# Patient Record
Sex: MALE | Race: WHITE | NOT HISPANIC OR LATINO
[De-identification: names, ages, dates, MRNs, and addresses within clinical notes are randomized per-mention and may not be internally consistent; named-entity substitution may affect disease eponyms.]

---

## 2018-08-31 PROBLEM — Z00.00 ENCOUNTER FOR PREVENTIVE HEALTH EXAMINATION: Status: ACTIVE | Noted: 2018-08-31

## 2018-09-03 ENCOUNTER — MOBILE ON CALL (OUTPATIENT)
Age: 83
End: 2018-09-03

## 2018-11-13 ENCOUNTER — APPOINTMENT (OUTPATIENT)
Dept: GASTROENTEROLOGY | Facility: CLINIC | Age: 83
End: 2018-11-13
Payer: MEDICARE

## 2018-11-13 VITALS
BODY MASS INDEX: 23.39 KG/M2 | SYSTOLIC BLOOD PRESSURE: 123 MMHG | HEIGHT: 67 IN | RESPIRATION RATE: 17 BRPM | WEIGHT: 149 LBS | OXYGEN SATURATION: 98 % | HEART RATE: 76 BPM | DIASTOLIC BLOOD PRESSURE: 70 MMHG

## 2018-11-13 DIAGNOSIS — K86.2 CYST OF PANCREAS: ICD-10-CM

## 2018-11-13 DIAGNOSIS — D50.9 IRON DEFICIENCY ANEMIA, UNSPECIFIED: ICD-10-CM

## 2018-11-13 PROCEDURE — 82272 OCCULT BLD FECES 1-3 TESTS: CPT

## 2018-11-13 PROCEDURE — 99204 OFFICE O/P NEW MOD 45 MIN: CPT

## 2018-11-13 RX ORDER — INSULIN GLARGINE 100 [IU]/ML
100 INJECTION, SOLUTION SUBCUTANEOUS
Refills: 0 | Status: DISCONTINUED | COMMUNITY

## 2019-04-22 ENCOUNTER — APPOINTMENT (OUTPATIENT)
Dept: DERMATOLOGY | Facility: CLINIC | Age: 84
End: 2019-04-22
Payer: MEDICARE

## 2019-04-22 PROCEDURE — 99203 OFFICE O/P NEW LOW 30 MIN: CPT | Mod: 25

## 2019-04-22 PROCEDURE — 17000 DESTRUCT PREMALG LESION: CPT

## 2019-04-22 PROCEDURE — 17003 DESTRUCT PREMALG LES 2-14: CPT

## 2019-10-17 ENCOUNTER — INPATIENT (INPATIENT)
Facility: HOSPITAL | Age: 84
LOS: 13 days | Discharge: NURSING, REHAB OR LONG-TERM CARE | DRG: 291 | End: 2019-10-31
Attending: HOSPITALIST | Admitting: HOSPITALIST
Payer: MEDICARE

## 2019-10-17 VITALS
HEART RATE: 88 BPM | SYSTOLIC BLOOD PRESSURE: 178 MMHG | OXYGEN SATURATION: 88 % | DIASTOLIC BLOOD PRESSURE: 74 MMHG | RESPIRATION RATE: 24 BRPM

## 2019-10-17 DIAGNOSIS — R55 SYNCOPE AND COLLAPSE: ICD-10-CM

## 2019-10-17 LAB
ALBUMIN SERPL ELPH-MCNC: 3.4 G/DL — SIGNIFICANT CHANGE UP (ref 3.3–5.2)
ALP SERPL-CCNC: 168 U/L — HIGH (ref 40–120)
ALT FLD-CCNC: 579 U/L — HIGH
ANION GAP SERPL CALC-SCNC: 16 MMOL/L — SIGNIFICANT CHANGE UP (ref 5–17)
AST SERPL-CCNC: 650 U/L — HIGH
BASE EXCESS BLDV CALC-SCNC: -4.5 MMOL/L — LOW (ref -2–2)
BASOPHILS # BLD AUTO: 0.02 K/UL — SIGNIFICANT CHANGE UP (ref 0–0.2)
BASOPHILS NFR BLD AUTO: 0.2 % — SIGNIFICANT CHANGE UP (ref 0–2)
BILIRUB SERPL-MCNC: 0.6 MG/DL — SIGNIFICANT CHANGE UP (ref 0.4–2)
BUN SERPL-MCNC: 70 MG/DL — HIGH (ref 8–20)
CA-I SERPL-SCNC: 1.13 MMOL/L — LOW (ref 1.15–1.33)
CALCIUM SERPL-MCNC: 8.8 MG/DL — SIGNIFICANT CHANGE UP (ref 8.6–10.2)
CHLORIDE BLDV-SCNC: 102 MMOL/L — SIGNIFICANT CHANGE UP (ref 98–107)
CHLORIDE SERPL-SCNC: 99 MMOL/L — SIGNIFICANT CHANGE UP (ref 98–107)
CO2 SERPL-SCNC: 19 MMOL/L — LOW (ref 22–29)
CREAT SERPL-MCNC: 3.25 MG/DL — HIGH (ref 0.5–1.3)
EOSINOPHIL # BLD AUTO: 0.05 K/UL — SIGNIFICANT CHANGE UP (ref 0–0.5)
EOSINOPHIL NFR BLD AUTO: 0.4 % — SIGNIFICANT CHANGE UP (ref 0–6)
GAS PNL BLDV: 137 MMOL/L — SIGNIFICANT CHANGE UP (ref 135–145)
GAS PNL BLDV: SIGNIFICANT CHANGE UP
GAS PNL BLDV: SIGNIFICANT CHANGE UP
GLUCOSE BLDC GLUCOMTR-MCNC: 149 MG/DL — HIGH (ref 70–99)
GLUCOSE BLDV-MCNC: 104 MG/DL — HIGH (ref 70–99)
GLUCOSE SERPL-MCNC: 103 MG/DL — SIGNIFICANT CHANGE UP (ref 70–115)
HCO3 BLDV-SCNC: 20 MMOL/L — LOW (ref 21–29)
HCT VFR BLD CALC: 32.9 % — LOW (ref 39–50)
HCT VFR BLDA CALC: 32 — LOW (ref 39–50)
HGB BLD CALC-MCNC: 10.5 G/DL — LOW (ref 13–17)
HGB BLD-MCNC: 10.1 G/DL — LOW (ref 13–17)
IMM GRANULOCYTES NFR BLD AUTO: 1 % — SIGNIFICANT CHANGE UP (ref 0–1.5)
LACTATE BLDV-MCNC: 1.5 MMOL/L — SIGNIFICANT CHANGE UP (ref 0.5–2)
LYMPHOCYTES # BLD AUTO: 0.87 K/UL — LOW (ref 1–3.3)
LYMPHOCYTES # BLD AUTO: 7.4 % — LOW (ref 13–44)
MCHC RBC-ENTMCNC: 28.5 PG — SIGNIFICANT CHANGE UP (ref 27–34)
MCHC RBC-ENTMCNC: 30.7 GM/DL — LOW (ref 32–36)
MCV RBC AUTO: 92.7 FL — SIGNIFICANT CHANGE UP (ref 80–100)
MONOCYTES # BLD AUTO: 0.87 K/UL — SIGNIFICANT CHANGE UP (ref 0–0.9)
MONOCYTES NFR BLD AUTO: 7.4 % — SIGNIFICANT CHANGE UP (ref 2–14)
NEUTROPHILS # BLD AUTO: 9.75 K/UL — HIGH (ref 1.8–7.4)
NEUTROPHILS NFR BLD AUTO: 83.6 % — HIGH (ref 43–77)
NT-PROBNP SERPL-SCNC: 811 PG/ML — HIGH (ref 0–300)
OTHER CELLS CSF MANUAL: 11 ML/DL — LOW (ref 18–22)
PCO2 BLDV: 58 MMHG — HIGH (ref 35–50)
PH BLDV: 7.22 — LOW (ref 7.32–7.43)
PLATELET # BLD AUTO: 269 K/UL — SIGNIFICANT CHANGE UP (ref 150–400)
PO2 BLDV: 50 MMHG — HIGH (ref 25–45)
POTASSIUM BLDV-SCNC: 4.7 MMOL/L — HIGH (ref 3.4–4.5)
POTASSIUM SERPL-MCNC: 4.6 MMOL/L — SIGNIFICANT CHANGE UP (ref 3.5–5.3)
POTASSIUM SERPL-SCNC: 4.6 MMOL/L — SIGNIFICANT CHANGE UP (ref 3.5–5.3)
PROT SERPL-MCNC: 7.4 G/DL — SIGNIFICANT CHANGE UP (ref 6.6–8.7)
RBC # BLD: 3.55 M/UL — LOW (ref 4.2–5.8)
RBC # FLD: 15 % — HIGH (ref 10.3–14.5)
SAO2 % BLDV: 76 % — SIGNIFICANT CHANGE UP
SODIUM SERPL-SCNC: 134 MMOL/L — LOW (ref 135–145)
TROPONIN T SERPL-MCNC: 0.03 NG/ML — SIGNIFICANT CHANGE UP (ref 0–0.06)
WBC # BLD: 11.68 K/UL — HIGH (ref 3.8–10.5)
WBC # FLD AUTO: 11.68 K/UL — HIGH (ref 3.8–10.5)

## 2019-10-17 PROCEDURE — 99223 1ST HOSP IP/OBS HIGH 75: CPT

## 2019-10-17 PROCEDURE — 70450 CT HEAD/BRAIN W/O DYE: CPT | Mod: 26

## 2019-10-17 PROCEDURE — 71250 CT THORAX DX C-: CPT | Mod: 26

## 2019-10-17 PROCEDURE — 93010 ELECTROCARDIOGRAM REPORT: CPT

## 2019-10-17 PROCEDURE — 99291 CRITICAL CARE FIRST HOUR: CPT

## 2019-10-17 PROCEDURE — 71045 X-RAY EXAM CHEST 1 VIEW: CPT | Mod: 26

## 2019-10-17 PROCEDURE — 74176 CT ABD & PELVIS W/O CONTRAST: CPT | Mod: 26

## 2019-10-17 RX ORDER — GLUCAGON INJECTION, SOLUTION 0.5 MG/.1ML
1 INJECTION, SOLUTION SUBCUTANEOUS ONCE
Refills: 0 | Status: DISCONTINUED | OUTPATIENT
Start: 2019-10-17 | End: 2019-10-31

## 2019-10-17 RX ORDER — ASPIRIN/CALCIUM CARB/MAGNESIUM 324 MG
81 TABLET ORAL DAILY
Refills: 0 | Status: DISCONTINUED | OUTPATIENT
Start: 2019-10-17 | End: 2019-10-28

## 2019-10-17 RX ORDER — DEXTROSE 50 % IN WATER 50 %
25 SYRINGE (ML) INTRAVENOUS ONCE
Refills: 0 | Status: DISCONTINUED | OUTPATIENT
Start: 2019-10-17 | End: 2019-10-19

## 2019-10-17 RX ORDER — ACETAMINOPHEN 500 MG
650 TABLET ORAL EVERY 6 HOURS
Refills: 0 | Status: DISCONTINUED | OUTPATIENT
Start: 2019-10-17 | End: 2019-10-31

## 2019-10-17 RX ORDER — FUROSEMIDE 40 MG
40 TABLET ORAL
Refills: 0 | Status: COMPLETED | OUTPATIENT
Start: 2019-10-17 | End: 2019-10-18

## 2019-10-17 RX ORDER — SODIUM CHLORIDE 9 MG/ML
1000 INJECTION, SOLUTION INTRAVENOUS
Refills: 0 | Status: DISCONTINUED | OUTPATIENT
Start: 2019-10-17 | End: 2019-10-19

## 2019-10-17 RX ORDER — ROSUVASTATIN CALCIUM 5 MG/1
1 TABLET ORAL
Qty: 0 | Refills: 0 | DISCHARGE

## 2019-10-17 RX ORDER — DONEPEZIL HYDROCHLORIDE 10 MG/1
5 TABLET, FILM COATED ORAL AT BEDTIME
Refills: 0 | Status: DISCONTINUED | OUTPATIENT
Start: 2019-10-17 | End: 2019-10-18

## 2019-10-17 RX ORDER — LANOLIN ALCOHOL/MO/W.PET/CERES
5 CREAM (GRAM) TOPICAL AT BEDTIME
Refills: 0 | Status: DISCONTINUED | OUTPATIENT
Start: 2019-10-17 | End: 2019-10-31

## 2019-10-17 RX ORDER — IPRATROPIUM/ALBUTEROL SULFATE 18-103MCG
3 AEROSOL WITH ADAPTER (GRAM) INHALATION EVERY 6 HOURS
Refills: 0 | Status: DISCONTINUED | OUTPATIENT
Start: 2019-10-17 | End: 2019-10-31

## 2019-10-17 RX ORDER — MEMANTINE HYDROCHLORIDE 10 MG/1
7 TABLET ORAL AT BEDTIME
Refills: 0 | Status: DISCONTINUED | OUTPATIENT
Start: 2019-10-17 | End: 2019-10-18

## 2019-10-17 RX ORDER — DIVALPROEX SODIUM 500 MG/1
500 TABLET, DELAYED RELEASE ORAL DAILY
Refills: 0 | Status: DISCONTINUED | OUTPATIENT
Start: 2019-10-17 | End: 2019-10-18

## 2019-10-17 RX ORDER — MEMANTINE HYDROCHLORIDE AND DONEPEZIL HYDROCHLORIDE 21; 10 MG/1; MG/1
0 CAPSULE ORAL
Qty: 0 | Refills: 0 | DISCHARGE

## 2019-10-17 RX ORDER — ONDANSETRON 8 MG/1
4 TABLET, FILM COATED ORAL ONCE
Refills: 0 | Status: COMPLETED | OUTPATIENT
Start: 2019-10-17 | End: 2019-10-17

## 2019-10-17 RX ORDER — DEXTROSE 50 % IN WATER 50 %
12.5 SYRINGE (ML) INTRAVENOUS ONCE
Refills: 0 | Status: DISCONTINUED | OUTPATIENT
Start: 2019-10-17 | End: 2019-10-19

## 2019-10-17 RX ORDER — INSULIN GLARGINE 100 [IU]/ML
10 INJECTION, SOLUTION SUBCUTANEOUS AT BEDTIME
Refills: 0 | Status: DISCONTINUED | OUTPATIENT
Start: 2019-10-18 | End: 2019-10-19

## 2019-10-17 RX ORDER — ALBUTEROL 90 UG/1
2 AEROSOL, METERED ORAL
Qty: 0 | Refills: 0 | DISCHARGE

## 2019-10-17 RX ORDER — FERROUS SULFATE 325(65) MG
325 TABLET ORAL DAILY
Refills: 0 | Status: DISCONTINUED | OUTPATIENT
Start: 2019-10-17 | End: 2019-10-19

## 2019-10-17 RX ORDER — RISPERIDONE 4 MG/1
0.25 TABLET ORAL DAILY
Refills: 0 | Status: DISCONTINUED | OUTPATIENT
Start: 2019-10-17 | End: 2019-10-19

## 2019-10-17 RX ORDER — INSULIN LISPRO 100/ML
VIAL (ML) SUBCUTANEOUS
Refills: 0 | Status: DISCONTINUED | OUTPATIENT
Start: 2019-10-17 | End: 2019-10-19

## 2019-10-17 RX ORDER — IPRATROPIUM/ALBUTEROL SULFATE 18-103MCG
3 AEROSOL WITH ADAPTER (GRAM) INHALATION ONCE
Refills: 0 | Status: COMPLETED | OUTPATIENT
Start: 2019-10-17 | End: 2019-10-17

## 2019-10-17 RX ORDER — LEVOTHYROXINE SODIUM 125 MCG
1 TABLET ORAL
Qty: 0 | Refills: 0 | DISCHARGE

## 2019-10-17 RX ORDER — INSULIN LISPRO 100/ML
VIAL (ML) SUBCUTANEOUS AT BEDTIME
Refills: 0 | Status: DISCONTINUED | OUTPATIENT
Start: 2019-10-17 | End: 2019-10-19

## 2019-10-17 RX ORDER — HEPARIN SODIUM 5000 [USP'U]/ML
5000 INJECTION INTRAVENOUS; SUBCUTANEOUS EVERY 8 HOURS
Refills: 0 | Status: DISCONTINUED | OUTPATIENT
Start: 2019-10-17 | End: 2019-10-19

## 2019-10-17 RX ORDER — MONTELUKAST 4 MG/1
10 TABLET, CHEWABLE ORAL DAILY
Refills: 0 | Status: DISCONTINUED | OUTPATIENT
Start: 2019-10-17 | End: 2019-10-19

## 2019-10-17 RX ORDER — ONDANSETRON 8 MG/1
4 TABLET, FILM COATED ORAL EVERY 6 HOURS
Refills: 0 | Status: DISCONTINUED | OUTPATIENT
Start: 2019-10-17 | End: 2019-10-31

## 2019-10-17 RX ORDER — DEXTROSE 50 % IN WATER 50 %
15 SYRINGE (ML) INTRAVENOUS ONCE
Refills: 0 | Status: DISCONTINUED | OUTPATIENT
Start: 2019-10-17 | End: 2019-10-31

## 2019-10-17 RX ORDER — PANTOPRAZOLE SODIUM 20 MG/1
40 TABLET, DELAYED RELEASE ORAL
Refills: 0 | Status: DISCONTINUED | OUTPATIENT
Start: 2019-10-17 | End: 2019-10-23

## 2019-10-17 RX ADMIN — ONDANSETRON 4 MILLIGRAM(S): 8 TABLET, FILM COATED ORAL at 14:00

## 2019-10-17 RX ADMIN — Medication 3 MILLILITER(S): at 22:30

## 2019-10-17 RX ADMIN — Medication 3 MILLILITER(S): at 15:46

## 2019-10-17 RX ADMIN — Medication 40 MILLIGRAM(S): at 22:30

## 2019-10-17 NOTE — H&P ADULT - ASSESSMENT
85M hx vascular demetnia, afib (not on ac), CKD, DM, COPD presents with syncope and worsening edema found to have hypoglycemia, nikki on ckd, transaminitis, concern for decompensated heart failure.

## 2019-10-17 NOTE — ED PROVIDER NOTE - CLINICAL SUMMARY MEDICAL DECISION MAKING FREE TEXT BOX
Pt with increasing edema. Now with two syncopal episodes today (in setting of straining and then standing up). Concern for worsening renal failure vs CHF vs liver failure. Plan for ecg, labs, CXR, CTH, CT c/a/p. Admit.

## 2019-10-17 NOTE — H&P ADULT - PROBLEM SELECTOR PLAN 2
pt with anasarca  concern for possible decompensated heart failure  will check UA and protein/creatinine ratio check for nephrotic syndrome  will also check dopplers to r/o DVT  CT with likely edema  will c/w lasix 40 bid  monitor creatinine and electrolytes  cardiology consult in am

## 2019-10-17 NOTE — H&P ADULT - NSHPREVIEWOFSYSTEMS_GEN_ALL_CORE
REVIEW OF SYSTEMS:    CONSTITUTIONAL: No fevers or chills +weakness  EYES/ENT: No visual changes;  No vertigo or throat pain   NECK: No pain or stiffness  RESPIRATORY: No cough, wheezing, hemoptysis; No shortness of breath  CARDIOVASCULAR: No chest pain or palpitations  GASTROINTESTINAL: +epigastric pain +vomiting. .No diarrhea or constipation. No melena or hematochezia.  GENITOURINARY: No dysuria, frequency or hematuria  NEUROLOGICAL: No numbness or weakness  SKIN: No itching, burning, rashes, or lesions   All other review of systems is negative unless indicated above.

## 2019-10-17 NOTE — ED PROVIDER NOTE - CARE PLAN
Principal Discharge DX:	Syncope  Secondary Diagnosis:	Acute on chronic renal failure  Secondary Diagnosis:	Edema  Secondary Diagnosis:	Transaminitis

## 2019-10-17 NOTE — CONSULT NOTE ADULT - SUBJECTIVE AND OBJECTIVE BOX
Patient is a 85y old  Male who presents with a chief complaint of syncope    HPI: 84 yo WM (known to me from office) + CKD(IV) with baseline Screat=2.5mg/dL (due to DM nephropathy) who presents to the ED with several syncopal episodes at home earlier today.  According to family he initially had episode while getting off toilet and then again when EMS was transferring him.  Noted to have hypoglycemia and poor appetite, progressive weakness over last few days.  Pt also has advanced dementia and was found to be more confused as well. The family notes worsening edema, abdominal bloating and approximately 20 lb. weight gain over the past month.      PAST MEDICAL & SURGICAL HISTORY:  CKD (chronic kidney disease)  COPD (chronic obstructive pulmonary disease)  Dementia  Afib  DM (diabetes mellitus)      FAMILY HISTORY:  No renal disease noted    Social History:  No current tobacco; no etoh nor drug abuse    MEDICATIONS  (STANDING):  Outpatient meds reviewed including Lasix  No NSAID abuse; no ACE nor ARB    MEDICATIONS  (PRN):      Allergies    No Known Allergies    Intolerances        REVIEW OF SYSTEMS:    CONSTITUTIONAL: No fever; + weight gain, +fatigue  EYES: No eye pain, visual disturbances, or discharge  ENMT:  No difficulty hearing, tinnitus, vertigo; No sinus or throat pain  NECK: No pain or stiffness  BREASTS: No pain, masses, or nipple discharge  RESPIRATORY: No cough, wheezing, chills or hemoptysis; + shortness of breath with exertion  CARDIOVASCULAR: No chest pain, palpitations, dizziness, + leg swelling  GASTROINTESTINAL: + abdominal swelling, no pain. No nausea, vomiting, or hematemesis; No diarrhea or constipation. No melena or hematochezia.  GENITOURINARY: No dysuria, frequency, hematuria, or incontinence  NEUROLOGICAL: No headaches, + memory loss, loss of strength  SKIN: No itching, burning, rashes, or lesions   MUSCULOSKELETAL: No joint pain or swelling; No muscle, back, or extremity pain  PSYCHIATRIC: + dementia        Vital Signs Last 24 Hrs  T(C): 36.4 (17 Oct 2019 13:45), Max: 36.4 (17 Oct 2019 13:45)  T(F): 97.6 (17 Oct 2019 13:45), Max: 97.6 (17 Oct 2019 13:45)  HR: 80 (17 Oct 2019 15:00) (80 - 88)  BP: 130/70 (17 Oct 2019 15:00) (130/70 - 178/74)  BP(mean): --  RR: 20 (17 Oct 2019 15:00) (18 - 24)  SpO2: 96% (17 Oct 2019 15:00) (88% - 96%)    PHYSICAL EXAM:    GENERAL: Appears acutely ill  HEAD:  Atraumatic, Normocephalic  EYES: EOMI, PERRLA, conjunctiva and sclera clear  ENMT: No tonsillar erythema, exudates, or enlargement; Moist mucous membranes  NECK: Supple, + JVD  NERVOUS SYSTEM:  Alert & Oriented X2,  intact and symmetric; demented  CHEST/LUNG: Diminished BS bilaterally  HEART: Regular rate and rhythm; No rub  ABDOMEN: Soft, + distended +BS; nontender; abd wall and flank edema  EXTREMITIES:  2+ Peripheral Pulses, + pitting LE edema; R > L arm edema  SKIN: No rashes or lesions      LABS:                        10.1   11.68 )-----------( 269      ( 17 Oct 2019 14:14 )             32.9     10-17    134<L>  |  99  |  70.0<H>  ----------------------------<  103  4.6   |  19.0<L>  |  3.25<H>    Ca    8.8      17 Oct 2019 14:14    TPro  7.4  /  Alb  3.4  /  TBili  0.6  /  DBili  x   /  AST  650<H>  /  ALT  579<H>  /  AlkPhos  168<H>  10-17            RADIOLOGY & ADDITIONAL TESTS:  < from: Xray Chest 1 View-PORTABLE IMMEDIATE (10.17.19 @ 15:24) >   EXAM:  XR CHEST PORTABLE IMMED 1V                          PROCEDURE DATE:  10/17/2019          INTERPRETATION:  Clinical indication:  SOB    Technique: XR CHEST IMMEDIATE portable AP    Comparison:None    Findings:  Lines: None    Heart/Mediastinum/Lungs: The heart size is normal. There is bibasilar   atelectasis and small bilateral pleural effusions.    Impression:    As above.    < end of copied text >

## 2019-10-17 NOTE — H&P ADULT - PROBLEM SELECTOR PLAN 4
pt with transaminitis.  non specific enteritis/cholecystiits on CT, but exam benign  will check us abdomen  otherwise may be 2/2 heart failure

## 2019-10-17 NOTE — CONSULT NOTE ADULT - ATTENDING COMMENTS
VTE sq heparin  med rec done w family  echo      Follow up exam  CHF vs PNA  examine abd  and extrrem edema? doppler all? VTE sq heparin, SCD  med rec done w family  echo  venous dopplers upper and lower extrem  RVP  BNP in AM  TSH in AM    Follow up exam  CHF vs PNA; RVP  examine abd  and extrem edema

## 2019-10-17 NOTE — H&P ADULT - PROBLEM SELECTOR PLAN 3
pt with acute worsening in setting of volume overload  posssibly 2/2 chf  will check ua, urine lytes, post void residual  nephrology consulted. follow up recs.

## 2019-10-17 NOTE — ED ADULT NURSE REASSESSMENT NOTE - NS ED NURSE REASSESS COMMENT FT1
Report received from day RN Pt A&Ox4 at this time. Pt resting comfortably, VSS, no signs of distress at this time, CM in place, safety maintained, call bell in reach. Waiting on admit. Report received from day RN Pt Alert and confused to baseline per family at this time. Pt resting comfortably, VSS, no signs of distress at this time, CM in place, safety maintained, call bell in reach. Waiting on admit.

## 2019-10-17 NOTE — ED PROVIDER NOTE - MDM ORDERS SUBMITTED SELECTION
Marielena called about the this pt's case she received and asked if Dr Ritu Vann was going to be using Dr Byron Figueroa block time. I realized I had written the wrong date and on the case request and wanted to make sure I had not done the same for the pt.  Marielena change the pt's shonna EKG/Medications/Imaging Studies/Labs

## 2019-10-17 NOTE — CONSULT NOTE ADULT - SUBJECTIVE AND OBJECTIVE BOX
EMR reviewed.  Family gives hx at bedside      85 yr old male w  vascular dementia, AFIB, CKD, COPD, DM presents w syncope    Family had difficulty getting pt out of bed today;  BG 60 and he was given food.    Pt went to bathroom and syncopized on toilet and then vomitied.  Wife caught him and called EMS  When transferring to EMS wheelchair had syncope again x 30 seconds    +2 syncopal episodes today  +all extremities are swollen "for awhile"   +increasing lower extremity edema over past month   +abdominal bloating x 1 month, worse over last few days  +increasingly confused last few days  +intermittent epigastric/chest pains x months  cannot give details due to dementia  + weight gain 20 lbs    In ED /74 HR 88 RR 24 sat 88%RA  EKG NSR 88   Head CT no acute changes  Labs reveal Cr 3.25 up from 2.2 08-.  Nephrology consulted  Noncontrast CT chest, abd and pelvis (see report)       PAST MEDICAL HX  Afib  Anemia due to CKD III  CAD    CKD (chronic kidney disease) III   COPD (chronic obstructive pulmonary disease)    Dementia    DM (diabetes mellitus)  GERD  GI bleed s/p transfusion  Hearing impaired  HLD hyperlipidemia  Hypothyroid  Lung mass (9-11 related but benign)    PAST SURGICAL HX  R CEA carotid endarterectomy   Cataract extraction w IOL  Lung biopsy 2013  Thoracoscopy w talc pleurodesis      SOCIAL HX  Lives at home w family  ex smoker quit 38 yrs ago  was 4 ppd x 49 yrs    ROS  as above  +20 lb weight gain          T(C): 36.4 (10-17-19 @ 13:45), Max: 36.4 (10-17-19 @ 13:45)  HR: 80 (10-17-19 @ 15:00) (80 - 88)  BP: 130/70 (10-17-19 @ 15:00) (130/70 - 178/74)  RR: 20 (10-17-19 @ 15:00) (18 - 24)  SpO2: 96% (10-17-19 @ 15:00) (88% - 96%)    PHYSICAL EXAM: evaluation precludes physical exam. Pertinent physical exam findings as per video conference with  teleNA at bedside is as follows:    Family at bedside  Pt hearing impaired and yelling "who is she"  "what does she want"    EKG NSR 88 w NS ST-T changes      CARDIAC MARKERS ( 17 Oct 2019 14:14 )  x     / 0.03 ng/mL / x     / x     / x                           10.1   11.68 )-----------( 269      ( 17 Oct 2019 14:14 )             32.9     17 Oct 2019 14:14    134    |  99     |  70.0   ----------------------------<  103    4.6     |  19.0   |  3.25     Ca    8.8        17 Oct 2019 14:14    TPro  7.4    /  Alb  3.4    /  TBili  0.6    /  DBili  x      /  AST  650    /  ALT  579    /  AlkPhos  168    17 Oct 2019 14:14      CAPILLARY BLOOD GLUCOSE      POCT Blood Glucose.: 111 mg/dL (17 Oct 2019 18:27)  POCT Blood Glucose.: 101 mg/dL (17 Oct 2019 13:20)    LIVER FUNCTIONS - ( 17 Oct 2019 14:14 )  Alb: 3.4 g/dL / Pro: 7.4 g/dL / ALK PHOS: 168 U/L / ALT: 579 U/L / AST: 650 U/L / GGT: x                   RADIOLOGY    1)   EXAM:  CT BRAIN                          PROCEDURE DATE:  10/17/2019    INTERPRETATION:  CLINICAL INFORMATION: Altered mental status and syncope.    COMPARISON: None available.    PROCEDURE:   Noncontrast CT of the Head was performed from the skull base to the   vertex. Coronal and Sagittal reformats were obtained.    FINDINGS:    There is no acute intracranial hemorrhage, mass effect, midline shift,   extra-axial collection, hydrocephalus, or evidence of acute vascular   territorial infarction. Mild patchy hypodensities within the   periventricular and subcortical white matter, although nonspecific,   likely reflect chronic microvascular disease. Cerebral volume loss   results in prominence of the ventricles and sulci.    The visualized paranasal sinuses and mastoid air cells are clear. Status   post bilateral cataract surgery. No calvarial fracture.    IMPRESSION:     No acute intracranial hemorrhage, mass effect, or calvarial fracture.      2)   EXAM:  XR CHEST PORTABLE IMMED 1V                          PROCEDURE DATE:  10/17/2019    INTERPRETATION:  Clinical indication:  SOB    Technique: XR CHEST IMMEDIATE portable AP    Comparison:None    Findings:  Lines: None    Heart/Mediastinum/Lungs: The heart size is normal. There is bibasilar   atelectasis and small bilateral pleural effusions.    Impression:    As above.      3)   CT chest/abd/pelvis  FINDINGS: The visualized structures of the lower neck are unremarkable. The   visualized aorta is of normal course and caliber. The heart is not enlarged.   There is atherosclerotic calcification of the thoracic aorta. There is mild   coronary artery calcification. There is a small pericardial effusion     There is no evidence of axillary, hilar, or mediastinal lymphadenopathy.     The lungs demonstrate patchy areas of groundglass opacities and tree-in-bud   nodules in the right upper lobe consistent with infiltrate. Air is probable   small amount of infiltrate in the right lower lobe as well. There are also   areas of masslike consolidation at both lung bases. Rounded contour is seen   to the density at the left lung base and this may represent rounded   atelectasis versus infiltrate or mass. Appearance of the right lower lobe is   suggestive of infiltrate versus atelectasis     The liver is of normal contour. No evidence of focal hepatic lesion on this   nonenhanced examination. The gallbladder is present. No evidence of intra or   extrahepatic biliary dilatation. There is mild stranding around the   gallbladder and mild stranding around the duodenum     The pancreas, spleen, adrenal glands, and kidneys are grossly unremarkable.   There is no evidence of hydronephrosis or perinephric stranding. No evidence   of nephrolithiasis.The bladder is thick-walled which may be due to   underdistention. The prostate gland is significantly enlarged. There is a   right inguinal hernia containing fat and a small amount of fluid.     No evidence of lymphadenopathy utilizing CT size criteria. The aorta is not   aneurysmal. There is atherosclerotic calcification of the abdominal aorta   and its branches     There is no evidence of bowel obstruction. There is no evidence of   pneumoperitoneum or free fluid.     The visualized osseous structures demonstrate osteopenia and degenerative   change most prominent at L4-5 and L5-S1.     IMPRESSION: Infiltrates in the right upper and right lower lobes   Small pericardial effusion   Rounded density at the left lung base suggestive of rounded atelectasis   versus mass or infiltrate. There is atelectasis versus infiltrate at the   right lung base as well.   Mild stranding in the fat around the gallbladder and duodenum maintained to   indicate cholecystitis and/or duodenitis. Further evaluation of the   gallbladder may be obtained with ultrasound as clinically indicated. Please   correlate clinically for possible duodenitis.   Thick-walled bladder may indicate cystitis. Please correlate clinically   Enlarged prostate glands EMR reviewed.  Family gives hx at bedside      85 yr old male w  vascular dementia, AFIB, CKD, COPD, DM presents w syncope    Family had difficulty getting pt out of bed today;  FS glucose 60 and he was given food.    Pt went to bathroom and syncopized on toilet and then vomitied.  Wife caught him and called EMS  When transferring to EMS wheelchair had syncope again x 30 seconds  Wife reported a 3rd episode while in ambulance    +2 syncopal episodes today  +all extremities are swollen "for awhile"   +increasing lower extremity edema over past month   +abdominal bloating x 1 month, worse over last few days  +increasingly confused last few days  +intermittent epigastric/chest pains x months;  cannot give details due to dementia  + weight gain 20 lbs w poor appetite repported    In ED /74 HR 88 RR 24 sat 88%RA  EKG NSR 88   Head CT no acute changes  Labs reveal Cr 3.25 up from 2.2 08-.  Nephrology consulted  Noncontrast CT chest, abd and pelvis (see report)       PAST MEDICAL HX  Afib not on AC  Anemia due to CKD III  CAD    CKD (chronic kidney disease) III   COPD (chronic obstructive pulmonary disease)    Dementia    DM (diabetes mellitus)  GERD  GI bleed s/p transfusion  Hearing impaired  HLD hyperlipidemia  Hypothyroid  Lung mass (9-11 related but benign)    PAST SURGICAL HX  R CEA carotid endarterectomy   Cataract extraction w IOL  Lung biopsy 2013  Thoracoscopy w talc pleurodesis      SOCIAL HX  Lives at home w family  ex smoker quit 38 yrs ago  was 4 ppd x 49 yrs    ROS  as above  +20 lb weight gain          T(C): 36.4 (10-17-19 @ 13:45), Max: 36.4 (10-17-19 @ 13:45)  HR: 80 (10-17-19 @ 15:00) (80 - 88)  BP: 130/70 (10-17-19 @ 15:00) (130/70 - 178/74)  RR: 20 (10-17-19 @ 15:00) (18 - 24)  SpO2: 96% (10-17-19 @ 15:00) (88% - 96%)    PHYSICAL EXAM: evaluation precludes physical exam. Pertinent physical exam findings as per video conference with  teleNA at bedside is as follows:    Family at bedside  Pt hearing impaired and yelling "who is she"  "what does she want"    EKG NSR 88 w NS ST-T changes      CARDIAC MARKERS ( 17 Oct 2019 14:14 )  x     / 0.03 ng/mL / x     / x     / x                           10.1   11.68 )-----------( 269      ( 17 Oct 2019 14:14 )             32.9     17 Oct 2019 14:14    134    |  99     |  70.0   ----------------------------<  103    4.6     |  19.0   |  3.25     Ca    8.8        17 Oct 2019 14:14    TPro  7.4    /  Alb  3.4    /  TBili  0.6    /  DBili  x      /  AST  650    /  ALT  579    /  AlkPhos  168    17 Oct 2019 14:14      CAPILLARY BLOOD GLUCOSE      POCT Blood Glucose.: 111 mg/dL (17 Oct 2019 18:27)  POCT Blood Glucose.: 101 mg/dL (17 Oct 2019 13:20)    LIVER FUNCTIONS - ( 17 Oct 2019 14:14 )  Alb: 3.4 g/dL / Pro: 7.4 g/dL / ALK PHOS: 168 U/L / ALT: 579 U/L / AST: 650 U/L / GGT: x                   RADIOLOGY    1)   EXAM:  CT BRAIN                          PROCEDURE DATE:  10/17/2019    INTERPRETATION:  CLINICAL INFORMATION: Altered mental status and syncope.    COMPARISON: None available.    PROCEDURE:   Noncontrast CT of the Head was performed from the skull base to the   vertex. Coronal and Sagittal reformats were obtained.    FINDINGS:    There is no acute intracranial hemorrhage, mass effect, midline shift,   extra-axial collection, hydrocephalus, or evidence of acute vascular   territorial infarction. Mild patchy hypodensities within the   periventricular and subcortical white matter, although nonspecific,   likely reflect chronic microvascular disease. Cerebral volume loss   results in prominence of the ventricles and sulci.    The visualized paranasal sinuses and mastoid air cells are clear. Status   post bilateral cataract surgery. No calvarial fracture.    IMPRESSION:     No acute intracranial hemorrhage, mass effect, or calvarial fracture.      2)   EXAM:  XR CHEST PORTABLE IMMED 1V                          PROCEDURE DATE:  10/17/2019    INTERPRETATION:  Clinical indication:  SOB    Technique: XR CHEST IMMEDIATE portable AP    Comparison:None    Findings:  Lines: None    Heart/Mediastinum/Lungs: The heart size is normal. There is bibasilar   atelectasis and small bilateral pleural effusions.    Impression:    As above.      3)   CT chest/abd/pelvis  FINDINGS: The visualized structures of the lower neck are unremarkable. The   visualized aorta is of normal course and caliber. The heart is not enlarged.   There is atherosclerotic calcification of the thoracic aorta. There is mild   coronary artery calcification. There is a small pericardial effusion     There is no evidence of axillary, hilar, or mediastinal lymphadenopathy.     The lungs demonstrate patchy areas of groundglass opacities and tree-in-bud   nodules in the right upper lobe consistent with infiltrate. Air is probable   small amount of infiltrate in the right lower lobe as well. There are also   areas of masslike consolidation at both lung bases. Rounded contour is seen   to the density at the left lung base and this may represent rounded   atelectasis versus infiltrate or mass. Appearance of the right lower lobe is   suggestive of infiltrate versus atelectasis     The liver is of normal contour. No evidence of focal hepatic lesion on this   nonenhanced examination. The gallbladder is present. No evidence of intra or   extrahepatic biliary dilatation. There is mild stranding around the   gallbladder and mild stranding around the duodenum     The pancreas, spleen, adrenal glands, and kidneys are grossly unremarkable.   There is no evidence of hydronephrosis or perinephric stranding. No evidence   of nephrolithiasis.The bladder is thick-walled which may be due to   underdistention. The prostate gland is significantly enlarged. There is a   right inguinal hernia containing fat and a small amount of fluid.     No evidence of lymphadenopathy utilizing CT size criteria. The aorta is not   aneurysmal. There is atherosclerotic calcification of the abdominal aorta   and its branches     There is no evidence of bowel obstruction. There is no evidence of   pneumoperitoneum or free fluid.     The visualized osseous structures demonstrate osteopenia and degenerative   change most prominent at L4-5 and L5-S1.     IMPRESSION: Infiltrates in the right upper and right lower lobes   Small pericardial effusion   Rounded density at the left lung base suggestive of rounded atelectasis   versus mass or infiltrate. There is atelectasis versus infiltrate at the   right lung base as well.   Mild stranding in the fat around the gallbladder and duodenum maintained to   indicate cholecystitis and/or duodenitis. Further evaluation of the   gallbladder may be obtained with ultrasound as clinically indicated. Please   correlate clinically for possible duodenitis.   Thick-walled bladder may indicate cystitis. Please correlate clinically   Enlarged prostate glands

## 2019-10-17 NOTE — H&P ADULT - NSICDXPASTMEDICALHX_GEN_ALL_CORE_FT
PAST MEDICAL HISTORY:  Afib     CKD (chronic kidney disease)     COPD (chronic obstructive pulmonary disease)     Dementia     DM (diabetes mellitus)

## 2019-10-17 NOTE — H&P ADULT - NSHPLABSRESULTS_GEN_ALL_CORE
10.1   11.68 )-----------( 269      ( 17 Oct 2019 14:14 )             32.9       10-17    134<L>  |  99  |  70.0<H>  ----------------------------<  103  4.6   |  19.0<L>  |  3.25<H>    Ca    8.8      17 Oct 2019 14:14    TPro  7.4  /  Alb  3.4  /  TBili  0.6  /  DBili  x   /  AST  650<H>  /  ALT  579<H>  /  AlkPhos  168<H>  10-17          Lactate Trend      CARDIAC MARKERS ( 17 Oct 2019 14:14 )  x     / 0.03 ng/mL / x     / x     / x            CAPILLARY BLOOD GLUCOSE      POCT Blood Glucose.: 149 mg/dL (17 Oct 2019 22:16)    RADIOLOGY, EKG & ADDITIONAL TESTS: Reviewed.   EKG- NSR, non specific t wave changes    < from: CT Chest No Cont (10.17.19 @ 19:58) >    IMPRESSION:  Infiltrates in the right upper and right lower lobes  Small pericardial effusion  Rounded density at the left lung base suggestive of rounded atelectasis   versus mass or infiltrate. There is atelectasis versus infiltrate at the   right lung base as well.  Mild stranding in the fat around the gallbladder and duodenum maintained   to indicate cholecystitis and/or duodenitis. Further evaluation of the   gallbladder may be obtained with ultrasound as clinically indicated.   Please correlate clinically for possible duodenitis.  Thick-walled bladder may indicate cystitis. Please correlate clinically  Enlarged prostate glands      < end of copied text >    < from: CT Head No Cont (10.17.19 @ 19:55) >    IMPRESSION:     No acute intracranial hemorrhage, mass effect, or calvarial fracture.    < end of copied text >

## 2019-10-17 NOTE — ED ADULT NURSE NOTE - PMH
Afib    CKD (chronic kidney disease)    COPD (chronic obstructive pulmonary disease)    Dementia    DM (diabetes mellitus)

## 2019-10-17 NOTE — H&P ADULT - PROBLEM SELECTOR PLAN 7
pt currently alert and cooperative. reportedly likely vascular dementia  after discussion with daughter and HCP she would like to cut back on medication  will hold memenatine, depakote and donepezil

## 2019-10-17 NOTE — ED PROVIDER NOTE - OBJECTIVE STATEMENT
85M h/o vascular dementia, afib, DM, COPD, CKD presents s/p 2 syncopal episodes today. Has increasing lower extremity edema and abdominal bloating x 1 month, worse over last few days. Also increasingly confused last few days. This morning family was having difficulty getting him out of bed, FSG 60s, gave him some food. Pt appeared pale and weak, got up and went to the bathroom and syncopized on toilet, caught by wife, called EMS, when transferring to EMS wheelchair pt syncopized again, lasted approx 30 seconds. No incontinence, tongue trauma or post ictal. +vomiting this morning. Endorses intermittent epigastric/chest pains x months. Denies fever, sick contacts.

## 2019-10-17 NOTE — CONSULT NOTE ADULT - ASSESSMENT
CKD(IV): DM nephropathy s/p syncopal episode(s)  ALMAS vs progression of disease   Decompensated CHF (? R > L)  - await NC CT scan of abdomen  - check ECHO  - urine prot:creat ratio r/o NS  - daily weights  - IV diuretics

## 2019-10-17 NOTE — CONSULT NOTE ADULT - ASSESSMENT
85 yr old male w  vascular dementia, AFIB, CKD, COPD, DM presents w syncope    #syncope  witnessed x 2 events  no soft tissue injury sustained  Hx AFIB no AC w EKG in NSR  1. admit to telemetry'  2. serial trop  3. echo  4. asa 81 mg        #RESP -   abn CT infiltrates and masses at both bases  #COPD  hx thoracostomy w pleurodysis  #poss CHF w extrem all edematous  1. duoneb  2. hold anoro ellipta  3. RVP  4. BNP in AM  5. serial trop  6. consider doppler    #acute on chronic renal failure  Cr 2.2 now 3.25  thickened bladder  1, renal  2, serial BMP  3. UA      #ABN LFT  poss R sided failure  1. trend  2. PT/INR/APTT  3. sono GB    #DM  1. lantus 10 units  2. BGM  3. ISS  4. Hb A1c      #dementia  1. memantine 7 mg  2. aricept 5 mg    #GI  1. prilosec to protonix 40 mg      IMPROVE VTE Individual Risk Assessment    RISK                                                                Points    [  ] Previous VTE                                                  3    [  ] Thrombophilia                                               2    [  ] Lower limb paralysis                                      2        (unable to hold up >15 seconds)      [  ] Current Cancer                                              2         (within 6 months)    [X  ] Immobilization > 24 hrs                                1    [  ] ICU/CCU stay > 24 hours                              1    [ X ] Age > 60                                                      1    IMPROVE VTE Score _____2____    IMPROVE Score 0-1: Low Risk, No VTE prophylaxis required for most patients, encourage ambulation.   IMPROVE Score 2-3: At risk, pharmacologic VTE prophylaxis is indicated for most patients (in the absence of a contraindication)  IMPROVE Score > or = 4: High Risk, pharmacologic VTE prophylaxis is indicated for most patients (in the absence of a contraindication) 85 yr old male w  vascular dementia, AFIB, CKD, COPD, DM presents w syncope    #syncope  witnessed x 2 events  no soft tissue injury sustained  Hx AFIB no AC w EKG in NSR  1. admit to telemetry'  2. serial trop  3. echo  4. asa 81 mg        #RESP -   abn CT infiltrates and masses at both bases  #COPD  #hx thoracostomy w pleurodysis  #poss CHF w extrem all edematous  1. duoneb  2. hold anoro ellipta  3. RVP  4. BNP in AM  5. serial trop  6. venous doppler all extremities    #acute on chronic renal failure  Cr 2.2 now 3.25  thickened bladder poss cystitis   1, renal  2, serial BMP  3. UA  4. urine studies ordered to evaluate for Nephrotic Syndrome      #ABN LFT  suspect  R sided failure  1. trend  2. PT/INR/APTT  3. sono GB  4. held lovalo    #DM  1. lantus 10 units  2. BGM  3. ISS  4. Hb A1c      #dementia  1. memantine 7 mg  2. aricept 5 mg    #GI  1. prilosec to protonix 40 mg      #Hypothyroid  1. was taken off replacement  2. TSH in AM      IMPROVE VTE Individual Risk Assessment    RISK                                                                Points    [  ] Previous VTE                                                  3    [  ] Thrombophilia                                               2    [  ] Lower limb paralysis                                      2        (unable to hold up >15 seconds)      [  ] Current Cancer                                              2         (within 6 months)    [X  ] Immobilization > 24 hrs                                1    [  ] ICU/CCU stay > 24 hours                              1    [ X ] Age > 60                                                      1    IMPROVE VTE Score _____2____    IMPROVE Score 0-1: Low Risk, No VTE prophylaxis required for most patients, encourage ambulation.   IMPROVE Score 2-3: At risk, pharmacologic VTE prophylaxis is indicated for most patients (in the absence of a contraindication)  IMPROVE Score > or = 4: High Risk, pharmacologic VTE prophylaxis is indicated for most patients (in the absence of a contraindication)

## 2019-10-17 NOTE — H&P ADULT - NSHPSOCIALHISTORY_GEN_ALL_CORE
Former smoker, smoked multipe packs per day for 40 years  former , was at [facility]  lives ho0me with family .

## 2019-10-17 NOTE — H&P ADULT - PROBLEM SELECTOR PLAN 1
pt with syncope in setting of hypoglycemia, possible heart failure  hypoglycemia now resolved  will admit to tele  check TTE  check urinalysis  no other obvious infection  CTH negative

## 2019-10-17 NOTE — ED ADULT NURSE REASSESSMENT NOTE - NS ED NURSE REASSESS COMMENT FT1
1700- pt remains a&ox3. respirations nonlabored. v/s stable. skin warm, dry. spo2 97% on 3L o2. ct scans pending. family at bedside. comfort and safety measures in place.

## 2019-10-17 NOTE — INPATIENT CERTIFICATION FOR MEDICARE PATIENTS - RISKS OF ADVERSE EVENTS
Concern for neurologic deterioration/Concern for cardiopulmonary deterioration/Concern for delay in diagnosis and treatment/Concern for renal deterioration

## 2019-10-17 NOTE — H&P ADULT - NSHPPHYSICALEXAM_GEN_ALL_CORE
Vital Signs Last 24 Hrs  T(C): 36.7 (17 Oct 2019 22:30), Max: 36.7 (17 Oct 2019 22:30)  T(F): 98 (17 Oct 2019 22:30), Max: 98 (17 Oct 2019 22:30)  HR: 84 (17 Oct 2019 22:30) (80 - 88)  BP: 133/76 (17 Oct 2019 22:30) (130/70 - 178/74)  BP(mean): --  RR: 18 (17 Oct 2019 22:30) (18 - 24)  SpO2: 96% (17 Oct 2019 22:30) (88% - 96%)    GENERAL: NAD,   HEENT:  Atraumatic, Normocephalic, MMM, no oropharyngeal lesions  EYES: EOMI, PERRLA, conjunctiva and sclera clear  NECK: Supple, No JVD, no throat tenderness.  CHEST/LUNG: bilateral rales in lower half of lungs  HEART: Regular rate and rhythm; diastolic murmur  ABDOMEN: distended, but soft and without tenderness  EXTREMITIES:  2+ Peripheral Pulses 3+ pitting edema  of lower extremities bilaterally, edema of bilater upper extremities with Right worse than left  PSYCH: AAOx2, normal affect  NEUROLOGY: moves all extremities spontaneously. no sensory deficits  SKIN: No rashes or lesions

## 2019-10-17 NOTE — H&P ADULT - HISTORY OF PRESENT ILLNESS
85M hx vascular demetnia, afib (not on ac), CKD, DM, COPD presents with syncope and worsening edema. 85M hx vascular demetnia, afib (not on ac), CKD, DM, COPD presents with syncope and worsening edema. Most of history taken from daughter at bedside and previous documentation given patient's dementia. According to daughter pt has had progressive decline. Last 2 month has had worsening weakness, edema in all extremities, 20 lb weight gain and fatigue. He was in [facility] in August where reportedly had normal nuclear stress test. For last couple weeks edema worsened and also had abdominal distention and intermittent epigastric/chest pain. For last couple of days he's been weak to point where unable to get out of bed. This morning he was using bathroom when had syncopal episode on toilet as well as vomiting. EMS called and had another syncopal episode when being transferred. FS found to be 60 which improved with juice.     In ED, pt afebrile, bp initially elevatd at 178/74, but improved to 130/70. Breathing 88% on RA, improved to 96% on 2L. Pt given lasix for fluid overload.

## 2019-10-18 DIAGNOSIS — J44.9 CHRONIC OBSTRUCTIVE PULMONARY DISEASE, UNSPECIFIED: ICD-10-CM

## 2019-10-18 DIAGNOSIS — R60.9 EDEMA, UNSPECIFIED: ICD-10-CM

## 2019-10-18 DIAGNOSIS — N17.9 ACUTE KIDNEY FAILURE, UNSPECIFIED: ICD-10-CM

## 2019-10-18 DIAGNOSIS — Z98.890 OTHER SPECIFIED POSTPROCEDURAL STATES: Chronic | ICD-10-CM

## 2019-10-18 DIAGNOSIS — E11.9 TYPE 2 DIABETES MELLITUS WITHOUT COMPLICATIONS: ICD-10-CM

## 2019-10-18 DIAGNOSIS — R74.0 NONSPECIFIC ELEVATION OF LEVELS OF TRANSAMINASE AND LACTIC ACID DEHYDROGENASE [LDH]: ICD-10-CM

## 2019-10-18 DIAGNOSIS — R55 SYNCOPE AND COLLAPSE: ICD-10-CM

## 2019-10-18 DIAGNOSIS — F03.90 UNSPECIFIED DEMENTIA WITHOUT BEHAVIORAL DISTURBANCE: ICD-10-CM

## 2019-10-18 LAB
ALBUMIN SERPL ELPH-MCNC: 3.2 G/DL — LOW (ref 3.3–5.2)
ALP SERPL-CCNC: 141 U/L — HIGH (ref 40–120)
ALT FLD-CCNC: 465 U/L — HIGH
ANION GAP SERPL CALC-SCNC: 15 MMOL/L — SIGNIFICANT CHANGE UP (ref 5–17)
ANION GAP SERPL CALC-SCNC: 18 MMOL/L — HIGH (ref 5–17)
APPEARANCE UR: CLEAR — SIGNIFICANT CHANGE UP
APTT BLD: 29.1 SEC — SIGNIFICANT CHANGE UP (ref 27.5–36.3)
AST SERPL-CCNC: 367 U/L — HIGH
BASE EXCESS BLDA CALC-SCNC: -3.7 MMOL/L — LOW (ref -2–2)
BASOPHILS # BLD AUTO: 0.01 K/UL — SIGNIFICANT CHANGE UP (ref 0–0.2)
BASOPHILS NFR BLD AUTO: 0.1 % — SIGNIFICANT CHANGE UP (ref 0–2)
BILIRUB DIRECT SERPL-MCNC: 0.2 MG/DL — SIGNIFICANT CHANGE UP (ref 0–0.3)
BILIRUB INDIRECT FLD-MCNC: 0.2 MG/DL — SIGNIFICANT CHANGE UP (ref 0.2–1)
BILIRUB SERPL-MCNC: 0.4 MG/DL — SIGNIFICANT CHANGE UP (ref 0.4–2)
BILIRUB UR-MCNC: NEGATIVE — SIGNIFICANT CHANGE UP
BLD GP AB SCN SERPL QL: SIGNIFICANT CHANGE UP
BLOOD GAS COMMENTS ARTERIAL: SIGNIFICANT CHANGE UP
BUN SERPL-MCNC: 80 MG/DL — HIGH (ref 8–20)
BUN SERPL-MCNC: 87 MG/DL — HIGH (ref 8–20)
CALCIUM SERPL-MCNC: 8.6 MG/DL — SIGNIFICANT CHANGE UP (ref 8.6–10.2)
CALCIUM SERPL-MCNC: 8.7 MG/DL — SIGNIFICANT CHANGE UP (ref 8.6–10.2)
CHLORIDE SERPL-SCNC: 96 MMOL/L — LOW (ref 98–107)
CHLORIDE SERPL-SCNC: 98 MMOL/L — SIGNIFICANT CHANGE UP (ref 98–107)
CO2 SERPL-SCNC: 19 MMOL/L — LOW (ref 22–29)
CO2 SERPL-SCNC: 23 MMOL/L — SIGNIFICANT CHANGE UP (ref 22–29)
COLOR SPEC: YELLOW — SIGNIFICANT CHANGE UP
CREAT ?TM UR-MCNC: 29 MG/DL — SIGNIFICANT CHANGE UP
CREAT SERPL-MCNC: 3.4 MG/DL — HIGH (ref 0.5–1.3)
CREAT SERPL-MCNC: 3.75 MG/DL — HIGH (ref 0.5–1.3)
DIFF PNL FLD: NEGATIVE — SIGNIFICANT CHANGE UP
EOSINOPHIL # BLD AUTO: 0.11 K/UL — SIGNIFICANT CHANGE UP (ref 0–0.5)
EOSINOPHIL NFR BLD AUTO: 0.9 % — SIGNIFICANT CHANGE UP (ref 0–6)
GAS PNL BLDA: SIGNIFICANT CHANGE UP
GLUCOSE BLDC GLUCOMTR-MCNC: 199 MG/DL — HIGH (ref 70–99)
GLUCOSE BLDC GLUCOMTR-MCNC: 203 MG/DL — HIGH (ref 70–99)
GLUCOSE BLDC GLUCOMTR-MCNC: 205 MG/DL — HIGH (ref 70–99)
GLUCOSE BLDC GLUCOMTR-MCNC: 240 MG/DL — HIGH (ref 70–99)
GLUCOSE BLDC GLUCOMTR-MCNC: 247 MG/DL — HIGH (ref 70–99)
GLUCOSE SERPL-MCNC: 184 MG/DL — HIGH (ref 70–115)
GLUCOSE SERPL-MCNC: 198 MG/DL — HIGH (ref 70–115)
GLUCOSE UR QL: NEGATIVE MG/DL — SIGNIFICANT CHANGE UP
HBA1C BLD-MCNC: 8.5 % — HIGH (ref 4–5.6)
HCO3 BLDA-SCNC: 21 MMOL/L — SIGNIFICANT CHANGE UP (ref 20–26)
HCT VFR BLD CALC: 30.5 % — LOW (ref 39–50)
HCT VFR BLD CALC: 31.4 % — LOW (ref 39–50)
HGB BLD-MCNC: 9.6 G/DL — LOW (ref 13–17)
HGB BLD-MCNC: 9.8 G/DL — LOW (ref 13–17)
HOROWITZ INDEX BLDA+IHG-RTO: SIGNIFICANT CHANGE UP
IMM GRANULOCYTES NFR BLD AUTO: 0.7 % — SIGNIFICANT CHANGE UP (ref 0–1.5)
INR BLD: 1.32 RATIO — HIGH (ref 0.88–1.16)
KETONES UR-MCNC: NEGATIVE — SIGNIFICANT CHANGE UP
LEUKOCYTE ESTERASE UR-ACNC: NEGATIVE — SIGNIFICANT CHANGE UP
LYMPHOCYTES # BLD AUTO: 0.81 K/UL — LOW (ref 1–3.3)
LYMPHOCYTES # BLD AUTO: 6.6 % — LOW (ref 13–44)
MAGNESIUM SERPL-MCNC: 2.3 MG/DL — SIGNIFICANT CHANGE UP (ref 1.6–2.6)
MCHC RBC-ENTMCNC: 28.4 PG — SIGNIFICANT CHANGE UP (ref 27–34)
MCHC RBC-ENTMCNC: 28.7 PG — SIGNIFICANT CHANGE UP (ref 27–34)
MCHC RBC-ENTMCNC: 31.2 GM/DL — LOW (ref 32–36)
MCHC RBC-ENTMCNC: 31.5 GM/DL — LOW (ref 32–36)
MCV RBC AUTO: 91 FL — SIGNIFICANT CHANGE UP (ref 80–100)
MCV RBC AUTO: 91 FL — SIGNIFICANT CHANGE UP (ref 80–100)
MONOCYTES # BLD AUTO: 0.97 K/UL — HIGH (ref 0–0.9)
MONOCYTES NFR BLD AUTO: 7.9 % — SIGNIFICANT CHANGE UP (ref 2–14)
NEUTROPHILS # BLD AUTO: 10.32 K/UL — HIGH (ref 1.8–7.4)
NEUTROPHILS NFR BLD AUTO: 83.8 % — HIGH (ref 43–77)
NITRITE UR-MCNC: NEGATIVE — SIGNIFICANT CHANGE UP
NT-PROBNP SERPL-SCNC: 1636 PG/ML — HIGH (ref 0–300)
NT-PROBNP SERPL-SCNC: 1639 PG/ML — HIGH (ref 0–300)
PCO2 BLDA: 56 MMHG — HIGH (ref 35–45)
PH BLDA: 7.24 — LOW (ref 7.35–7.45)
PH UR: 5 — SIGNIFICANT CHANGE UP (ref 5–8)
PLATELET # BLD AUTO: 235 K/UL — SIGNIFICANT CHANGE UP (ref 150–400)
PLATELET # BLD AUTO: 246 K/UL — SIGNIFICANT CHANGE UP (ref 150–400)
PO2 BLDA: 108 MMHG — SIGNIFICANT CHANGE UP (ref 83–108)
POTASSIUM SERPL-MCNC: 4.1 MMOL/L — SIGNIFICANT CHANGE UP (ref 3.5–5.3)
POTASSIUM SERPL-MCNC: 4.5 MMOL/L — SIGNIFICANT CHANGE UP (ref 3.5–5.3)
POTASSIUM SERPL-SCNC: 4.1 MMOL/L — SIGNIFICANT CHANGE UP (ref 3.5–5.3)
POTASSIUM SERPL-SCNC: 4.5 MMOL/L — SIGNIFICANT CHANGE UP (ref 3.5–5.3)
PROCALCITONIN SERPL-MCNC: 0.95 NG/ML — HIGH (ref 0.02–0.1)
PROT ?TM UR-MCNC: 5 MG/DL — SIGNIFICANT CHANGE UP (ref 0–12)
PROT SERPL-MCNC: 7.2 G/DL — SIGNIFICANT CHANGE UP (ref 6.6–8.7)
PROT UR-MCNC: NEGATIVE MG/DL — SIGNIFICANT CHANGE UP
PROT/CREAT UR-RTO: 0.2 RATIO — SIGNIFICANT CHANGE UP
PROTHROM AB SERPL-ACNC: 15.3 SEC — HIGH (ref 10–12.9)
RAPID RVP RESULT: SIGNIFICANT CHANGE UP
RBC # BLD: 3.35 M/UL — LOW (ref 4.2–5.8)
RBC # BLD: 3.45 M/UL — LOW (ref 4.2–5.8)
RBC # FLD: 14.9 % — HIGH (ref 10.3–14.5)
RBC # FLD: 15 % — HIGH (ref 10.3–14.5)
SAO2 % BLDA: 97 % — SIGNIFICANT CHANGE UP (ref 95–99)
SODIUM SERPL-SCNC: 134 MMOL/L — LOW (ref 135–145)
SODIUM SERPL-SCNC: 135 MMOL/L — SIGNIFICANT CHANGE UP (ref 135–145)
SP GR SPEC: 1.01 — SIGNIFICANT CHANGE UP (ref 1.01–1.02)
TROPONIN T SERPL-MCNC: 0.06 NG/ML — SIGNIFICANT CHANGE UP (ref 0–0.06)
TSH SERPL-MCNC: 1.04 UIU/ML — SIGNIFICANT CHANGE UP (ref 0.27–4.2)
UROBILINOGEN FLD QL: NEGATIVE MG/DL — SIGNIFICANT CHANGE UP
WBC # BLD: 12.3 K/UL — HIGH (ref 3.8–10.5)
WBC # BLD: 14.59 K/UL — HIGH (ref 3.8–10.5)
WBC # FLD AUTO: 12.3 K/UL — HIGH (ref 3.8–10.5)
WBC # FLD AUTO: 14.59 K/UL — HIGH (ref 3.8–10.5)

## 2019-10-18 PROCEDURE — 93971 EXTREMITY STUDY: CPT | Mod: 26,59

## 2019-10-18 PROCEDURE — 99233 SBSQ HOSP IP/OBS HIGH 50: CPT

## 2019-10-18 PROCEDURE — 93306 TTE W/DOPPLER COMPLETE: CPT | Mod: 26

## 2019-10-18 PROCEDURE — 93010 ELECTROCARDIOGRAM REPORT: CPT

## 2019-10-18 PROCEDURE — 76705 ECHO EXAM OF ABDOMEN: CPT | Mod: 26

## 2019-10-18 PROCEDURE — 71045 X-RAY EXAM CHEST 1 VIEW: CPT | Mod: 26

## 2019-10-18 PROCEDURE — 93970 EXTREMITY STUDY: CPT | Mod: 26

## 2019-10-18 RX ORDER — GLYCOPYRROLATE AND FORMOTEROL FUMARATE 9; 4.8 UG/1; UG/1
2 AEROSOL, METERED RESPIRATORY (INHALATION)
Qty: 0 | Refills: 0 | DISCHARGE

## 2019-10-18 RX ORDER — VORTIOXETINE 5 MG/1
1 TABLET, FILM COATED ORAL
Qty: 0 | Refills: 0 | DISCHARGE

## 2019-10-18 RX ORDER — DIVALPROEX SODIUM 500 MG/1
500 TABLET, DELAYED RELEASE ORAL AT BEDTIME
Refills: 0 | Status: DISCONTINUED | OUTPATIENT
Start: 2019-10-18 | End: 2019-10-31

## 2019-10-18 RX ORDER — LEVOTHYROXINE SODIUM 125 MCG
50 TABLET ORAL DAILY
Refills: 0 | Status: DISCONTINUED | OUTPATIENT
Start: 2019-10-18 | End: 2019-10-31

## 2019-10-18 RX ORDER — FUROSEMIDE 40 MG
40 TABLET ORAL EVERY 12 HOURS
Refills: 0 | Status: DISCONTINUED | OUTPATIENT
Start: 2019-10-18 | End: 2019-10-19

## 2019-10-18 RX ORDER — DONEPEZIL HYDROCHLORIDE 10 MG/1
5 TABLET, FILM COATED ORAL AT BEDTIME
Refills: 0 | Status: DISCONTINUED | OUTPATIENT
Start: 2019-10-18 | End: 2019-10-31

## 2019-10-18 RX ORDER — IPRATROPIUM/ALBUTEROL SULFATE 18-103MCG
3 AEROSOL WITH ADAPTER (GRAM) INHALATION ONCE
Refills: 0 | Status: DISCONTINUED | OUTPATIENT
Start: 2019-10-18 | End: 2019-10-31

## 2019-10-18 RX ORDER — DIVALPROEX SODIUM 500 MG/1
1 TABLET, DELAYED RELEASE ORAL
Qty: 0 | Refills: 0 | DISCHARGE

## 2019-10-18 RX ORDER — INSULIN GLARGINE 100 [IU]/ML
16 INJECTION, SOLUTION SUBCUTANEOUS
Qty: 0 | Refills: 0 | DISCHARGE

## 2019-10-18 RX ADMIN — Medication 3 MILLILITER(S): at 04:17

## 2019-10-18 RX ADMIN — Medication 50 MICROGRAM(S): at 13:01

## 2019-10-18 RX ADMIN — Medication 5 MILLIGRAM(S): at 21:19

## 2019-10-18 RX ADMIN — INSULIN GLARGINE 10 UNIT(S): 100 INJECTION, SOLUTION SUBCUTANEOUS at 22:06

## 2019-10-18 RX ADMIN — Medication 4: at 08:18

## 2019-10-18 RX ADMIN — Medication 3 MILLILITER(S): at 19:01

## 2019-10-18 RX ADMIN — MONTELUKAST 10 MILLIGRAM(S): 4 TABLET, CHEWABLE ORAL at 13:01

## 2019-10-18 RX ADMIN — Medication 4: at 13:01

## 2019-10-18 RX ADMIN — Medication 40 MILLIGRAM(S): at 17:29

## 2019-10-18 RX ADMIN — PANTOPRAZOLE SODIUM 40 MILLIGRAM(S): 20 TABLET, DELAYED RELEASE ORAL at 08:18

## 2019-10-18 RX ADMIN — HEPARIN SODIUM 5000 UNIT(S): 5000 INJECTION INTRAVENOUS; SUBCUTANEOUS at 21:19

## 2019-10-18 RX ADMIN — Medication 4: at 17:29

## 2019-10-18 RX ADMIN — Medication 40 MILLIGRAM(S): at 05:34

## 2019-10-18 RX ADMIN — DIVALPROEX SODIUM 500 MILLIGRAM(S): 500 TABLET, DELAYED RELEASE ORAL at 21:18

## 2019-10-18 RX ADMIN — HEPARIN SODIUM 5000 UNIT(S): 5000 INJECTION INTRAVENOUS; SUBCUTANEOUS at 05:34

## 2019-10-18 RX ADMIN — HEPARIN SODIUM 5000 UNIT(S): 5000 INJECTION INTRAVENOUS; SUBCUTANEOUS at 13:01

## 2019-10-18 RX ADMIN — DONEPEZIL HYDROCHLORIDE 5 MILLIGRAM(S): 10 TABLET, FILM COATED ORAL at 21:20

## 2019-10-18 RX ADMIN — Medication 3 MILLILITER(S): at 14:46

## 2019-10-18 RX ADMIN — Medication 3 MILLILITER(S): at 10:09

## 2019-10-18 RX ADMIN — Medication 81 MILLIGRAM(S): at 13:01

## 2019-10-18 RX ADMIN — Medication 325 MILLIGRAM(S): at 13:01

## 2019-10-18 NOTE — PROGRESS NOTE ADULT - ASSESSMENT
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

## 2019-10-18 NOTE — PROGRESS NOTE ADULT - SUBJECTIVE AND OBJECTIVE BOX
NEPHROLOGY INTERVAL HPI/OVERNIGHT EVENTS:    interim noted   feels better   good UO with Lasix   Less SOB     MEDICATIONS  (STANDING):  ALBUTerol/ipratropium for Nebulization 3 milliLiter(s) Nebulizer every 6 hours  aspirin  chewable 81 milliGRAM(s) Oral daily  dextrose 5%. 1000 milliLiter(s) (50 mL/Hr) IV Continuous <Continuous>  dextrose 50% Injectable 12.5 Gram(s) IV Push once  dextrose 50% Injectable 25 Gram(s) IV Push once  dextrose 50% Injectable 25 Gram(s) IV Push once  diVALproex  milliGRAM(s) Oral at bedtime  donepezil 5 milliGRAM(s) Oral at bedtime  ferrous    sulfate 325 milliGRAM(s) Oral daily  furosemide   Injectable 40 milliGRAM(s) IV Push every 12 hours  heparin  Injectable 5000 Unit(s) SubCutaneous every 8 hours  insulin glargine Injectable (LANTUS) 10 Unit(s) SubCutaneous at bedtime  insulin lispro (HumaLOG) corrective regimen sliding scale   SubCutaneous three times a day before meals  insulin lispro (HumaLOG) corrective regimen sliding scale   SubCutaneous at bedtime  levothyroxine 50 MICROGram(s) Oral daily  melatonin 5 milliGRAM(s) Oral at bedtime  montelukast 10 milliGRAM(s) Oral daily  pantoprazole    Tablet 40 milliGRAM(s) Oral before breakfast    MEDICATIONS  (PRN):  acetaminophen   Tablet .. 650 milliGRAM(s) Oral every 6 hours PRN Temp greater or equal to 38C (100.4F), Mild Pain (1 - 3)  dextrose 40% Gel 15 Gram(s) Oral once PRN Blood Glucose LESS THAN 70 milliGRAM(s)/deciliter  glucagon  Injectable 1 milliGRAM(s) IntraMuscular once PRN Glucose LESS THAN 70 milligrams/deciliter  ondansetron Injectable 4 milliGRAM(s) IV Push every 6 hours PRN Nausea  risperiDONE   Tablet 0.25 milliGRAM(s) Oral daily PRN agitation      Allergies    No Known Allergies    Intolerances        Vital Signs Last 24 Hrs  T(C): 36.6 (18 Oct 2019 08:01), Max: 36.7 (17 Oct 2019 22:30)  T(F): 97.8 (18 Oct 2019 08:01), Max: 98 (17 Oct 2019 22:30)  HR: 88 (18 Oct 2019 14:59) (80 - 88)  BP: 114/62 (18 Oct 2019 08:01) (114/62 - 154/84)  BP(mean): --  RR: 19 (18 Oct 2019 08:01) (18 - 20)  SpO2: 96% (18 Oct 2019 08:01) (95% - 97%)  Daily     Daily   I&O's Detail    I&O's Summary      PHYSICAL EXAM:  HEAD:  Atraumatic, Normocephalic  EYES: EOMI, PERRLA, conjunctiva and sclera clear  ENMT: No tonsillar erythema, exudates, or enlargement; Moist mucous membranes  NECK: Supple, + JVD  NERVOUS SYSTEM:  Alert & Oriented X2,  intact and symmetric; demented  CHEST/LUNG: Diminished BS bilaterally  HEART: Regular rate and rhythm; No rub  ABDOMEN: Soft, + distended +BS; nontender; abd wall and flank edema  EXTREMITIES:  2+ Peripheral Pulses, + pitting LE edema; R > L arm edema  LABS:                        9.8    14.59 )-----------( 235      ( 18 Oct 2019 07:32 )             31.4     10    135  |  98  |  80.0<H>  ----------------------------<  198<H>  4.5   |  19.0<L>  |  3.40<H>    Ca    8.6      18 Oct 2019 07:32  Mg     2.3     10-18    TPro  7.2  /  Alb  3.2<L>  /  TBili  0.4  /  DBili  0.2  /  AST  367<H>  /  ALT  465<H>  /  AlkPhos  141<H>  10    PT/INR - ( 18 Oct 2019 07:32 )   PT: 15.3 sec;   INR: 1.32 ratio         PTT - ( 18 Oct 2019 07:32 )  PTT:29.1 sec  Urinalysis Basic - ( 18 Oct 2019 09:56 )    Color: Yellow / Appearance: Clear / S.010 / pH: x  Gluc: x / Ketone: Negative  / Bili: Negative / Urobili: Negative mg/dL   Blood: x / Protein: Negative mg/dL / Nitrite: Negative   Leuk Esterase: Negative / RBC: x / WBC x   Sq Epi: x / Non Sq Epi: x / Bacteria: x      Magnesium, Serum: 2.3 mg/dL (10-18 @ 07:32)      Summary:   1. Endocardial visualization was enhanced with intravenous echo contrast.   2. Hyperdynamic global left ventricular systolic function. Left   ventricular ejection fraction, by visual estimation, is >75%.   3. LV Ejection Fraction by Espinoza's Method with a biplane EF of 76 %.   4. Decreased left ventricular internal cavity size.   5. Normal right ventricular size and function.   6. Spectral Doppler shows impaired relaxation pattern of left   ventricular myocardial filling (Grade I diastolic dysfunction).   7. No significant valvular abnormalities.   8. Small pericardial effusion.   9. ** No prior echocardiograms available for comparison.    Felicita Smiley DO Electronically signed on 10/18/2019 at 3:08:40 PM             EXAM:  US ABDOMEN LIMITED                          PROCEDURE DATE:  10/18/2019          INTERPRETATION:  CLINICAL INFORMATION: Abnormal liver function tests    COMPARISON: CT chest abdomen pelvis 10/17/2019    TECHNIQUE: Sonography of the right upper quadrant.     FINDINGS:    Liver: Increased echogenicity of hepatic parenchyma with mild coarsening   echotexture suggesting chronic fibrofatty parenchymal disease with or   without hepatic steatosis. Nodularity to the surface contour may   represent early cirrhosis. No focal hepatic masses.    Bile ducts: Normal caliber. Common bile duct measures 6 mm.     Gallbladder: No definite gallstones. Mild thickening of the gallbladder   wall.    Pancreas: Visualized portions are within normal limits.    Right kidney: 10 cm. No hydronephrosis. Increased echogenicity of the   renal parenchyma with parenchymal thinning compatible with medical renal   disease.    Ascites: None.    IVC: Visualized portions are within normal limits.    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 No focal hepatic masses. A component of   hepatic steatosis cannot be excluded.    Findings compatible with medical renal disease. No hydronephrosis.         EXAM:  US DPLX LWR EXT VEINS COMPL BI                          PROCEDURE DATE:  10/18/2019          INTERPRETATION:  CLINICAL INFORMATION: Bilateral lower extremity edema    COMPARISON: None available.    TECHNIQUE: Duplex sonography of the BILATERAL LOWER extremity veins with   color and spectral Doppler, with and without compression.      FINDINGS:    There is normal compressibility of the bilateral common femoral, femoral   and popliteal veins.     Doppler examination shows normal spontaneous and phasic flow.    No calf vein thrombosis is detected.    Minimal bilateral subcutaneous edema.    IMPRESSION:     No evidence of deep venous thrombosis in either lower extremity.             EXAM:  CT CHEST                         EXAM:  CT ABDOMEN AND PELVIS                          PROCEDURE DATE:  10/17/2019          INTERPRETATION:  CT CHEST/ABDOMEN/PELVIS:     CLINICAL INFORMATION: Altered mental status, syncope and shortness of   breath with abdominal bloating    COMPARISON: Chest x-ray of earlier in the day.    PROCEDURE:  Using multislice helical CT, 2.5 mm sections were obtained   from the thoracic inlet through the ischial tuberosities without the   administration of intravenous contrast. Oral contrast was not   administered. The patient was unable to raise the arms above the head for   this study    Coronal and sagittal reformations were performed by  CT technologist and   made available for review.    FINDINGS:  The visualized structures of the lower neck are unremarkable.   The visualized aorta is of normal course and caliber. The heart is not   enlarged. There is atherosclerotic calcification of the thoracic aorta.   There is mild coronary artery calcification. There is a small pericardial   effusion    There is no evidence of axillary, hilar, or mediastinal lymphadenopathy.    The lungs demonstrate patchy areas of groundglass opacities and   tree-in-bud nodules in the right upper lobe consistent with infiltrate.   Air is probable small amount of infiltrate in the right lower lobe as   well. There are also areas of masslike consolidation at both lung bases.   Rounded contour is seen to the density at the left lung base and this may   represent roundedatelectasis versus infiltrate or mass. Appearance of   the right lower lobe is suggestive of infiltrate versus atelectasis    The liver is of normal contour. No evidence of focal hepatic lesion on   this nonenhanced examination. The gallbladder is present. No evidence of   intra or extrahepatic biliary dilatation. There is mild stranding around   the gallbladder and mild stranding around the duodenum    The pancreas, spleen, adrenal glands, and kidneys are grossly   unremarkable. There is no evidence of hydronephrosis or perinephric   stranding. No evidence of  nephrolithiasis.The bladder is thick-walled   which may be due to underdistention. The prostate gland is significantly   enlarged. There is a right inguinal hernia containing fat and asmall   amount of fluid.    No evidence of lymphadenopathy utilizing CT size criteria. The aorta is   not aneurysmal. There is atherosclerotic calcification of the abdominal   aorta and its branches    There is no evidence of bowel obstruction. Thereis no evidence of   pneumoperitoneum or free fluid.    The visualized osseous structures demonstrate osteopenia and degenerative   change most prominent at L4-5 and L5-S1.    IMPRESSION:  Infiltrates in the right upper and right lower lobes  Small pericardial effusion  Rounded density at the left lung base suggestive of rounded atelectasis   versus mass or infiltrate. There is atelectasis versus infiltrate at the   right lung base as well.  Mild stranding in the fat around the gallbladder and duodenum maintained   to indicate cholecystitis and/or duodenitis. Further evaluation of the   gallbladder may be obtained with ultrasound as clinically indicated.   Please correlate clinically for possible duodenitis.  Thick-walled bladder may indicate cystitis. Please correlate clinically  Enlarged prostate glands

## 2019-10-18 NOTE — CONSULT NOTE ADULT - SUBJECTIVE AND OBJECTIVE BOX
[place] [facility]                                                        Office: [address] [address]                                                       Telephone: [phone]. Fax:[phone]    Patient is a 85y old  Male who presents with a chief complaint of syncope (17 Oct 2019 23:51)      HPI: 84 y/o M with a PMHx of vascular dementia, ?Afib (not on AC), CKD, DMII, COPD, and GERD who presented to the ED after a syncopal episode and with worsening edema. Patient's history was obtained from patient's daughter Felicita, who states that he has been declining over the last few months. Patient initially had an episode of chest pain and dyspnea while on vacation, had negative workup at [place], and then had a NST and echo with his primary Cardiologist Dr. Regan Farrar who said everything looked good. Patient had then been having some worsening LE edema and abdominal edema, and was put on a diuretic daily, but was only taking it eveyr other day. Patient has now had a weight gain of 20 lbs over the last month, and has been feeling weak. Yesterday after having a large BM, noted to be straining, the patient had a syncopal episode. Patient's family was able to brace him, no fall, or head trauma, but then had 2 additional syncopal episodes in the ambulance. Patient was found to have a BG in the 60s that resolved with some juice. Patient is currently resting comfortably in bed, denies any symptoms or syncopal episode.     PAST MEDICAL & SURGICAL HISTORY:  CKD (chronic kidney disease)  COPD (chronic obstructive pulmonary disease)  Dementia  Afib  DM (diabetes mellitus)  History of lung biopsy  S/P carotid endarterectomy      PREVIOUS DIAGNOSTIC TESTING:      ECHO  FINDINGS: Pending    Allergies    No Known Allergies    Intolerances    MEDICATIONS  (STANDING):  ALBUTerol/ipratropium for Nebulization 3 milliLiter(s) Nebulizer every 6 hours  aspirin  chewable 81 milliGRAM(s) Oral daily  dextrose 5%. 1000 milliLiter(s) (50 mL/Hr) IV Continuous <Continuous>  dextrose 50% Injectable 12.5 Gram(s) IV Push once  dextrose 50% Injectable 25 Gram(s) IV Push once  dextrose 50% Injectable 25 Gram(s) IV Push once  ferrous    sulfate 325 milliGRAM(s) Oral daily  heparin  Injectable 5000 Unit(s) SubCutaneous every 8 hours  insulin glargine Injectable (LANTUS) 10 Unit(s) SubCutaneous at bedtime  insulin lispro (HumaLOG) corrective regimen sliding scale   SubCutaneous three times a day before meals  insulin lispro (HumaLOG) corrective regimen sliding scale   SubCutaneous at bedtime  melatonin 5 milliGRAM(s) Oral at bedtime  montelukast 10 milliGRAM(s) Oral daily  pantoprazole    Tablet 40 milliGRAM(s) Oral before breakfast    MEDICATIONS  (PRN):  acetaminophen   Tablet .. 650 milliGRAM(s) Oral every 6 hours PRN Temp greater or equal to 38C (100.4F), Mild Pain (1 - 3)  dextrose 40% Gel 15 Gram(s) Oral once PRN Blood Glucose LESS THAN 70 milliGRAM(s)/deciliter  glucagon  Injectable 1 milliGRAM(s) IntraMuscular once PRN Glucose LESS THAN 70 milligrams/deciliter  ondansetron Injectable 4 milliGRAM(s) IV Push every 6 hours PRN Nausea  risperiDONE   Tablet 0.25 milliGRAM(s) Oral daily PRN agitation    Home Medications:  albuterol 2.5 mg/3 mL (0.083%) inhalation solution: 3 milliliter(s) inhaled 3 times a day (17 Oct 2019 22:44)  Anoro Ellipta 62.5 mcg-25 mcg/inh inhalation powder: 1 puff(s) inhaled once a day (17 Oct 2019 22:44)  Aricept 5 mg oral tablet: 1 tab(s) orally once a day (at bedtime) (17 Oct 2019 22:44)  aspirin 81 mg oral tablet: 1 tab(s) orally once a day (17 Oct 2019 22:44)  Bevespi Aerosphere 9 mcg-4.8 mcg/inh inhalation aerosol: 2 puff(s) inhaled 2 times a day (17 Oct 2019 22:44)  Centrum Silver oral tablet: 1 tab(s) orally once a day (17 Oct 2019 22:44)  divalproex sodium 500 mg oral tablet, extended release: 1 tab(s) orally once a day (17 Oct 2019 22:44)  Dyazide 25 mg-37.5 mg oral capsule: 1 cap(s) orally once a day (17 Oct 2019 22:44)  ferrous sulfate 325 mg (65 mg elemental iron) oral tablet: 1 tab(s) orally once a day (17 Oct 2019 22:44)  Livalo: orally 5 times a week (17 Oct 2019 22:44)  melatonin 5 mg oral tablet: 1 tab(s) orally once a day (at bedtime) (17 Oct 2019 22:44)  memantine 7 mg oral capsule, extended release: 1 cap(s) orally once a day (17 Oct 2019 22:44)  montelukast 10 mg oral tablet: 1 tab(s) orally once a day (17 Oct 2019 22:44)  NovoLOG 100 units/mL subcutaneous solution:  (17 Oct 2019 22:44)  pantoprazole 40 mg oral delayed release tablet: 1 tab(s) orally once a day (17 Oct 2019 22:44)  probiotic:  (17 Oct 2019 22:44)  risperiDONE 0.25 mg oral tablet: 1 tab(s) orally once a day, As Needed (17 Oct 2019 22:44)  Toujeo SoloStar 300 units/mL subcutaneous solution: 16 unit(s) subcutaneous once a day (at bedtime) (17 Oct 2019 22:44)  Trintellix 5 mg oral tablet: 1 tab(s) orally once a day (17 Oct 2019 22:44)    FAMILY HISTORY:  No pertinent family history in first degree relatives      SOCIAL HISTORY: Lives at home with his wife     CIGARETTES: Former smoker, smoked multiple packs per day x 40 years. Quit 20 years ago    ALCOHOL: Denies    REVIEW OF SYSTEMS:  CONSTITUTIONAL: No fever, weight loss, or fatigue  Cardiovascular:     AS PER HPI   Respiratory: No  Dyspnea,   cough,   ;   Genitourinary:  No dysuria, no hematuria;   Gastrointestinal:   No dark color stool, no melena, no diarrhea, no constipation, no abdominal pain;   Neurological: No headache, no dizziness, no slurred speech;    Psychiatric: No agitation, no anxiety.    ALL OTHER REVIEW OF SYSTEMS ARE NEGATIVE.    Vital Signs Last 24 Hrs  T(C): 36.6 (18 Oct 2019 08:01), Max: 36.7 (17 Oct 2019 22:30)  T(F): 97.8 (18 Oct 2019 08:01), Max: 98 (17 Oct 2019 22:30)  HR: 83 (18 Oct 2019 08:01) (80 - 88)  BP: 114/62 (18 Oct 2019 08:01) (114/62 - 178/74)  RR: 19 (18 Oct 2019 08:01) (18 - 24)  SpO2: 96% (18 Oct 2019 08:01) (88% - 97%)    PHYSICAL EXAM:  Appearance: Normal, well nourished	  HEENT:   Normal oral mucosa, PERRL, EOMI, sclera non-icteric	  Lymphatic: No cervical lymphadenopathy  Cardiovascular: Normal S1 S2, + JVD, No cardiac murmurs, No carotid bruits, 2++ pitting edema  Respiratory: 	  Psychiatry: A & O x 3, Mood & affect appropriate  Gastrointestinal:  Soft, Non-tender, + BS, no bruits	  Skin: No rashes, No ecchymoses, No cyanosis  Neurologic: Grossly non-focal with full strength in all four extremities  Extremities: Normal range of motion, No clubbing, cyanosis or edema  Vascular: Peripheral pulses palpable 2+ bilaterally      INTERPRETATION OF TELEMETRY: SR    ECG: NSR, nonspecific ST/T wave abnormalities     LABS:                        9.8    14.59 )-----------( 235      ( 18 Oct 2019 07:32 )             31.4     10-18    135  |  98  |  80.0<H>  ----------------------------<  198<H>  4.5   |  19.0<L>  |  3.40<H>    Ca    8.6      18 Oct 2019 07:32  Mg     2.3     10-18    TPro  7.2  /  Alb  3.2<L>  /  TBili  0.4  /  DBili  0.2  /  AST  367<H>  /  ALT  465<H>  /  AlkPhos  141<H>  10-18    CARDIAC MARKERS ( 18 Oct 2019 07:32 )  x     / 0.06 ng/mL / x     / x     / x      CARDIAC MARKERS ( 17 Oct 2019 14:14 )  x     / 0.03 ng/mL / x     / x     / x          PT/INR - ( 18 Oct 2019 07:32 )   PT: 15.3 sec;   INR: 1.32 ratio         PTT - ( 18 Oct 2019 07:32 )  PTT:29.1 sec    I&O's Summary    BNPSerum Pro-Brain Natriuretic Peptide: 1639 pg/mL (10-18 @ 07:32)  Serum Pro-Brain Natriuretic Peptide: 811 pg/mL (10-17 @ 14:14)    Serum Pro-Brain Natriuretic Peptide: 1639 pg/mL (10-18-19 @ 07:32)  Serum Pro-Brain Natriuretic Peptide: 811 pg/mL (10-17-19 @ 14:14)    RADIOLOGY & ADDITIONAL STUDIES:  < from: CT Chest No Cont (10.17.19 @ 19:58) >  FINDINGS:  The visualized structures of the lower neck are unremarkable.   The visualized aorta is of normal course and caliber. The heart is not   enlarged. There is atherosclerotic calcification of the thoracic aorta.   There is mild coronary artery calcification. There is a small pericardial   effusion    There is no evidence of axillary, hilar, or mediastinal lymphadenopathy.    The lungs demonstrate patchy areas of groundglass opacities and   tree-in-bud nodules in the right upper lobe consistent with infiltrate.   Air is probable small amount of infiltrate in the right lower lobe as   well. There are also areas of masslike consolidation at both lung bases.   Rounded contour is seen to the density at the left lung base and this may   represent roundedatelectasis versus infiltrate or mass. Appearance of   the right lower lobe is suggestive of infiltrate versus atelectasis    The liver is of normal contour. No evidence of focal hepatic lesion on   this nonenhanced examination. The gallbladder is present. No evidence of   intra or extrahepatic biliary dilatation. There is mild stranding around   the gallbladder and mild stranding around the duodenum    The pancreas, spleen, adrenal glands, and kidneys are grossly   unremarkable. There is no evidence of hydronephrosis or perinephric   stranding. No evidence of  nephrolithiasis.The bladder is thick-walled   which may be due to underdistention. The prostate gland is significantly   enlarged. There is a right inguinal hernia containing fat and asmall   amount of fluid.    No evidence of lymphadenopathy utilizing CT size criteria. The aorta is   not aneurysmal. There is atherosclerotic calcification of the abdominal   aorta and its branches    There is no evidence of bowel obstruction. Thereis no evidence of   pneumoperitoneum or free fluid.    The visualized osseous structures demonstrate osteopenia and degenerative   change most prominent at L4-5 and L5-S1.    IMPRESSION:  Infiltrates in the right upper and right lower lobes  Small pericardial effusion  Rounded density at the left lung base suggestive of rounded atelectasis   versus mass or infiltrate. There is atelectasis versus infiltrate at the   right lung base as well.  Mild stranding in the fat around the gallbladder and duodenum maintained   to indicate cholecystitis and/or duodenitis. Further evaluation of the   gallbladder may be obtained with ultrasound as clinically indicated.   Please correlate clinically for possible duodenitis.  Thick-walled bladder may indicate cystitis. Please correlate clinically  Enlarged prostate glands            JUDD CARDOSO M.D.,ATTENDING RADIOLOGIST    < end of copied text > [place] [facility]                                                        Office: [address] [address]                                                       Telephone: [phone]. Fax:[phone]    Patient is a 85y old  Male who presents with a chief complaint of syncope (17 Oct 2019 23:51)      HPI: 84 y/o M with a PMHx of vascular dementia, ?Afib (not on AC), CKD, DMII, COPD, and GERD who presented to the ED after a syncopal episode and with worsening edema. Patient's history was obtained from patient's daughter Felicita, who states that he has been declining over the last few months. Patient initially had an episode of chest pain and dyspnea while on vacation, had negative workup at [place], and then had a NST and echo with his primary Cardiologist Dr. Regan Farrar who said everything looked good. Patient had then been having some worsening LE edema and abdominal edema, and was put on a diuretic daily, but was only taking it eveyr other day. Patient has now had a weight gain of 20 lbs over the last month, and has been feeling weak. Yesterday after having a large BM, noted to be straining, the patient had a syncopal episode. Patient's family was able to brace him, no fall, or head trauma, but then had 2 additional syncopal episodes in the ambulance. Patient was found to have a BG in the 60s that resolved with some juice. Patient is currently resting comfortably in bed, denies any symptoms or syncopal episode.     PAST MEDICAL & SURGICAL HISTORY:  CKD (chronic kidney disease)  COPD (chronic obstructive pulmonary disease)  Dementia  Afib  DM (diabetes mellitus)  History of lung biopsy  S/P carotid endarterectomy      PREVIOUS DIAGNOSTIC TESTING:      ECHO  FINDINGS: 08/30/19  EF 55-60%, RV normal, LA/RA normal, no significant valvular abnormalities    STRESS FINDINGS:   Negative for myocardial ischemia.     Allergies    No Known Allergies    Intolerances    MEDICATIONS  (STANDING):  ALBUTerol/ipratropium for Nebulization 3 milliLiter(s) Nebulizer every 6 hours  aspirin  chewable 81 milliGRAM(s) Oral daily  dextrose 5%. 1000 milliLiter(s) (50 mL/Hr) IV Continuous <Continuous>  dextrose 50% Injectable 12.5 Gram(s) IV Push once  dextrose 50% Injectable 25 Gram(s) IV Push once  dextrose 50% Injectable 25 Gram(s) IV Push once  ferrous    sulfate 325 milliGRAM(s) Oral daily  heparin  Injectable 5000 Unit(s) SubCutaneous every 8 hours  insulin glargine Injectable (LANTUS) 10 Unit(s) SubCutaneous at bedtime  insulin lispro (HumaLOG) corrective regimen sliding scale   SubCutaneous three times a day before meals  insulin lispro (HumaLOG) corrective regimen sliding scale   SubCutaneous at bedtime  melatonin 5 milliGRAM(s) Oral at bedtime  montelukast 10 milliGRAM(s) Oral daily  pantoprazole    Tablet 40 milliGRAM(s) Oral before breakfast    MEDICATIONS  (PRN):  acetaminophen   Tablet .. 650 milliGRAM(s) Oral every 6 hours PRN Temp greater or equal to 38C (100.4F), Mild Pain (1 - 3)  dextrose 40% Gel 15 Gram(s) Oral once PRN Blood Glucose LESS THAN 70 milliGRAM(s)/deciliter  glucagon  Injectable 1 milliGRAM(s) IntraMuscular once PRN Glucose LESS THAN 70 milligrams/deciliter  ondansetron Injectable 4 milliGRAM(s) IV Push every 6 hours PRN Nausea  risperiDONE   Tablet 0.25 milliGRAM(s) Oral daily PRN agitation    Home Medications:  albuterol 2.5 mg/3 mL (0.083%) inhalation solution: 3 milliliter(s) inhaled 3 times a day (17 Oct 2019 22:44)  Anoro Ellipta 62.5 mcg-25 mcg/inh inhalation powder: 1 puff(s) inhaled once a day (17 Oct 2019 22:44)  Aricept 5 mg oral tablet: 1 tab(s) orally once a day (at bedtime) (17 Oct 2019 22:44)  aspirin 81 mg oral tablet: 1 tab(s) orally once a day (17 Oct 2019 22:44)  Bevespi Aerosphere 9 mcg-4.8 mcg/inh inhalation aerosol: 2 puff(s) inhaled 2 times a day (17 Oct 2019 22:44)  Centrum Silver oral tablet: 1 tab(s) orally once a day (17 Oct 2019 22:44)  divalproex sodium 500 mg oral tablet, extended release: 1 tab(s) orally once a day (17 Oct 2019 22:44)  Dyazide 25 mg-37.5 mg oral capsule: 1 cap(s) orally once a day (17 Oct 2019 22:44)  ferrous sulfate 325 mg (65 mg elemental iron) oral tablet: 1 tab(s) orally once a day (17 Oct 2019 22:44)  Livalo: orally 5 times a week (17 Oct 2019 22:44)  melatonin 5 mg oral tablet: 1 tab(s) orally once a day (at bedtime) (17 Oct 2019 22:44)  memantine 7 mg oral capsule, extended release: 1 cap(s) orally once a day (17 Oct 2019 22:44)  montelukast 10 mg oral tablet: 1 tab(s) orally once a day (17 Oct 2019 22:44)  NovoLOG 100 units/mL subcutaneous solution:  (17 Oct 2019 22:44)  pantoprazole 40 mg oral delayed release tablet: 1 tab(s) orally once a day (17 Oct 2019 22:44)  probiotic:  (17 Oct 2019 22:44)  risperiDONE 0.25 mg oral tablet: 1 tab(s) orally once a day, As Needed (17 Oct 2019 22:44)  Toujeo SoloStar 300 units/mL subcutaneous solution: 16 unit(s) subcutaneous once a day (at bedtime) (17 Oct 2019 22:44)  Trintellix 5 mg oral tablet: 1 tab(s) orally once a day (17 Oct 2019 22:44)    FAMILY HISTORY:  No pertinent family history in first degree relatives      SOCIAL HISTORY: Lives at home with his wife     CIGARETTES: Former smoker, smoked multiple packs per day x 40 years. Quit 20 years ago    ALCOHOL: Denies    REVIEW OF SYSTEMS:  CONSTITUTIONAL: No fever, weight loss, or fatigue  Cardiovascular:     AS PER HPI   Respiratory: No  Dyspnea,   cough,   ;   Genitourinary:  No dysuria, no hematuria;   Gastrointestinal:   No dark color stool, no melena, no diarrhea, no constipation, no abdominal pain;   Neurological: No headache, no dizziness, no slurred speech;    Psychiatric: No agitation, no anxiety.    ALL OTHER REVIEW OF SYSTEMS ARE NEGATIVE.    Vital Signs Last 24 Hrs  T(C): 36.6 (18 Oct 2019 08:01), Max: 36.7 (17 Oct 2019 22:30)  T(F): 97.8 (18 Oct 2019 08:01), Max: 98 (17 Oct 2019 22:30)  HR: 83 (18 Oct 2019 08:01) (80 - 88)  BP: 114/62 (18 Oct 2019 08:01) (114/62 - 178/74)  RR: 19 (18 Oct 2019 08:01) (18 - 24)  SpO2: 96% (18 Oct 2019 08:01) (88% - 97%)    PHYSICAL EXAM:  Appearance: Normal, well nourished	  HEENT:   Normal oral mucosa, PERRL, EOMI, sclera non-icteric	  Lymphatic: No cervical lymphadenopathy  Cardiovascular: Normal S1 S2, + JVD, No cardiac murmurs, No carotid bruits, 2++ pitting edema  Respiratory: Bilateral rales R>L	  Psychiatry: A & O x 1-2, Mood & affect appropriate  Gastrointestinal:  Soft, Non-tender, + BS, no bruits	  Skin: No rashes, No ecchymoses, No cyanosis  Neurologic: Grossly non-focal with full strength in all four extremities  Extremities: Normal range of motion, No clubbing, cyanosis, 2++ pitting edema   Vascular: Peripheral pulses palpable 2+ bilaterally      INTERPRETATION OF TELEMETRY: SR    ECG: NSR, nonspecific ST/T wave abnormalities     LABS:                        9.8    14.59 )-----------( 235      ( 18 Oct 2019 07:32 )             31.4     10-18    135  |  98  |  80.0<H>  ----------------------------<  198<H>  4.5   |  19.0<L>  |  3.40<H>    Ca    8.6      18 Oct 2019 07:32  Mg     2.3     10-18    TPro  7.2  /  Alb  3.2<L>  /  TBili  0.4  /  DBili  0.2  /  AST  367<H>  /  ALT  465<H>  /  AlkPhos  141<H>  10-18    CARDIAC MARKERS ( 18 Oct 2019 07:32 )  x     / 0.06 ng/mL / x     / x     / x      CARDIAC MARKERS ( 17 Oct 2019 14:14 )  x     / 0.03 ng/mL / x     / x     / x          PT/INR - ( 18 Oct 2019 07:32 )   PT: 15.3 sec;   INR: 1.32 ratio         PTT - ( 18 Oct 2019 07:32 )  PTT:29.1 sec    I&O's Summary    BNPSerum Pro-Brain Natriuretic Peptide: 1639 pg/mL (10-18 @ 07:32)  Serum Pro-Brain Natriuretic Peptide: 811 pg/mL (10-17 @ 14:14)    Serum Pro-Brain Natriuretic Peptide: 1639 pg/mL (10-18-19 @ 07:32)  Serum Pro-Brain Natriuretic Peptide: 811 pg/mL (10-17-19 @ 14:14)    RADIOLOGY & ADDITIONAL STUDIES:  < from: CT Chest No Cont (10.17.19 @ 19:58) >  FINDINGS:  The visualized structures of the lower neck are unremarkable.   The visualized aorta is of normal course and caliber. The heart is not   enlarged. There is atherosclerotic calcification of the thoracic aorta.   There is mild coronary artery calcification. There is a small pericardial   effusion    There is no evidence of axillary, hilar, or mediastinal lymphadenopathy.    The lungs demonstrate patchy areas of groundglass opacities and   tree-in-bud nodules in the right upper lobe consistent with infiltrate.   Air is probable small amount of infiltrate in the right lower lobe as   well. There are also areas of masslike consolidation at both lung bases.   Rounded contour is seen to the density at the left lung base and this may   represent roundedatelectasis versus infiltrate or mass. Appearance of   the right lower lobe is suggestive of infiltrate versus atelectasis    The liver is of normal contour. No evidence of focal hepatic lesion on   this nonenhanced examination. The gallbladder is present. No evidence of   intra or extrahepatic biliary dilatation. There is mild stranding around   the gallbladder and mild stranding around the duodenum    The pancreas, spleen, adrenal glands, and kidneys are grossly   unremarkable. There is no evidence of hydronephrosis or perinephric   stranding. No evidence of  nephrolithiasis.The bladder is thick-walled   which may be due to underdistention. The prostate gland is significantly   enlarged. There is a right inguinal hernia containing fat and asmall   amount of fluid.    No evidence of lymphadenopathy utilizing CT size criteria. The aorta is   not aneurysmal. There is atherosclerotic calcification of the abdominal   aorta and its branches    There is no evidence of bowel obstruction. Thereis no evidence of   pneumoperitoneum or free fluid.    The visualized osseous structures demonstrate osteopenia and degenerative   change most prominent at L4-5 and L5-S1.    IMPRESSION:  Infiltrates in the right upper and right lower lobes  Small pericardial effusion  Rounded density at the left lung base suggestive of rounded atelectasis   versus mass or infiltrate. There is atelectasis versus infiltrate at the   right lung base as well.  Mild stranding in the fat around the gallbladder and duodenum maintained   to indicate cholecystitis and/or duodenitis. Further evaluation of the   gallbladder may be obtained with ultrasound as clinically indicated.   Please correlate clinically for possible duodenitis.  Thick-walled bladder may indicate cystitis. Please correlate clinically  Enlarged prostate glands      JUDD CARDOSO M.D.,ATTENDING RADIOLOGIST    < end of copied text > [place] [facility]                                                        Office: [address] [address]                                                       Telephone: [phone]. Fax:[phone]    Patient is a 85y old  Male who presents with a chief complaint of syncope (17 Oct 2019 23:51)    HPI: 86 y/o M with a PMHx of vascular dementia, ?Afib (not on AC), CKD, DMII, COPD, and GERD who presented to the ED after a syncopal episode and with worsening edema. Patient's history was obtained from patient's daughter Felicita, who states that he has been declining over the last few months. Patient initially had an episode of chest pain and dyspnea while on vacation, had negative workup at [place], and then had a NST and echo with his primary Cardiologist Dr. Regan Farrar who said everything looked good. Patient had then been having some worsening LE edema and abdominal edema, and was put on a diuretic daily, but was only taking it eveyr other day. Patient has now had a weight gain of 20 lbs over the last month, and has been feeling weak. Yesterday after having a large BM, noted to be straining, the patient had a syncopal episode. Patient's family was able to brace him, no fall, or head trauma, but then had 2 additional syncopal episodes in the ambulance. Patient was found to have a BG in the 60s that resolved with some juice. Patient is currently resting comfortably in bed, denies any symptoms or syncopal episode.     PAST MEDICAL & SURGICAL HISTORY:  CKD (chronic kidney disease)  COPD (chronic obstructive pulmonary disease)  Dementia  Afib  DM (diabetes mellitus)  History of lung biopsy  S/P carotid endarterectomy      PREVIOUS DIAGNOSTIC TESTING:      ECHO  FINDINGS: 08/30/19  EF 55-60%, RV normal, LA/RA normal, no significant valvular abnormalities    STRESS FINDINGS:   Negative for myocardial ischemia.     Allergies    No Known Allergies    Intolerances    MEDICATIONS  (STANDING):  ALBUTerol/ipratropium for Nebulization 3 milliLiter(s) Nebulizer every 6 hours  aspirin  chewable 81 milliGRAM(s) Oral daily  dextrose 5%. 1000 milliLiter(s) (50 mL/Hr) IV Continuous <Continuous>  dextrose 50% Injectable 12.5 Gram(s) IV Push once  dextrose 50% Injectable 25 Gram(s) IV Push once  dextrose 50% Injectable 25 Gram(s) IV Push once  ferrous    sulfate 325 milliGRAM(s) Oral daily  heparin  Injectable 5000 Unit(s) SubCutaneous every 8 hours  insulin glargine Injectable (LANTUS) 10 Unit(s) SubCutaneous at bedtime  insulin lispro (HumaLOG) corrective regimen sliding scale   SubCutaneous three times a day before meals  insulin lispro (HumaLOG) corrective regimen sliding scale   SubCutaneous at bedtime  melatonin 5 milliGRAM(s) Oral at bedtime  montelukast 10 milliGRAM(s) Oral daily  pantoprazole    Tablet 40 milliGRAM(s) Oral before breakfast    MEDICATIONS  (PRN):  acetaminophen   Tablet .. 650 milliGRAM(s) Oral every 6 hours PRN Temp greater or equal to 38C (100.4F), Mild Pain (1 - 3)  dextrose 40% Gel 15 Gram(s) Oral once PRN Blood Glucose LESS THAN 70 milliGRAM(s)/deciliter  glucagon  Injectable 1 milliGRAM(s) IntraMuscular once PRN Glucose LESS THAN 70 milligrams/deciliter  ondansetron Injectable 4 milliGRAM(s) IV Push every 6 hours PRN Nausea  risperiDONE   Tablet 0.25 milliGRAM(s) Oral daily PRN agitation    Home Medications:  albuterol 2.5 mg/3 mL (0.083%) inhalation solution: 3 milliliter(s) inhaled 3 times a day (17 Oct 2019 22:44)  Anoro Ellipta 62.5 mcg-25 mcg/inh inhalation powder: 1 puff(s) inhaled once a day (17 Oct 2019 22:44)  Aricept 5 mg oral tablet: 1 tab(s) orally once a day (at bedtime) (17 Oct 2019 22:44)  aspirin 81 mg oral tablet: 1 tab(s) orally once a day (17 Oct 2019 22:44)  Bevespi Aerosphere 9 mcg-4.8 mcg/inh inhalation aerosol: 2 puff(s) inhaled 2 times a day (17 Oct 2019 22:44)  Centrum Silver oral tablet: 1 tab(s) orally once a day (17 Oct 2019 22:44)  divalproex sodium 500 mg oral tablet, extended release: 1 tab(s) orally once a day (17 Oct 2019 22:44)  Dyazide 25 mg-37.5 mg oral capsule: 1 cap(s) orally once a day (17 Oct 2019 22:44)  ferrous sulfate 325 mg (65 mg elemental iron) oral tablet: 1 tab(s) orally once a day (17 Oct 2019 22:44)  Livalo: orally 5 times a week (17 Oct 2019 22:44)  melatonin 5 mg oral tablet: 1 tab(s) orally once a day (at bedtime) (17 Oct 2019 22:44)  memantine 7 mg oral capsule, extended release: 1 cap(s) orally once a day (17 Oct 2019 22:44)  montelukast 10 mg oral tablet: 1 tab(s) orally once a day (17 Oct 2019 22:44)  NovoLOG 100 units/mL subcutaneous solution:  (17 Oct 2019 22:44)  pantoprazole 40 mg oral delayed release tablet: 1 tab(s) orally once a day (17 Oct 2019 22:44)  probiotic:  (17 Oct 2019 22:44)  risperiDONE 0.25 mg oral tablet: 1 tab(s) orally once a day, As Needed (17 Oct 2019 22:44)  Toujeo SoloStar 300 units/mL subcutaneous solution: 16 unit(s) subcutaneous once a day (at bedtime) (17 Oct 2019 22:44)  Trintellix 5 mg oral tablet: 1 tab(s) orally once a day (17 Oct 2019 22:44)    FAMILY HISTORY:  No pertinent family history in first degree relatives      SOCIAL HISTORY: Lives at home with his wife     CIGARETTES: Former smoker, smoked multiple packs per day x 40 years. Quit 20 years ago    ALCOHOL: Denies    REVIEW OF SYSTEMS:  CONSTITUTIONAL: No fever, weight loss, or fatigue  Cardiovascular:     AS PER HPI   Respiratory: No  Dyspnea,   cough,   ;   Genitourinary:  No dysuria, no hematuria;   Gastrointestinal:   No dark color stool, no melena, no diarrhea, no constipation, no abdominal pain;   Neurological: No headache, no dizziness, no slurred speech;    Psychiatric: No agitation, no anxiety.    ALL OTHER REVIEW OF SYSTEMS ARE NEGATIVE.    Vital Signs Last 24 Hrs  T(C): 36.6 (18 Oct 2019 08:01), Max: 36.7 (17 Oct 2019 22:30)  T(F): 97.8 (18 Oct 2019 08:01), Max: 98 (17 Oct 2019 22:30)  HR: 83 (18 Oct 2019 08:01) (80 - 88)  BP: 114/62 (18 Oct 2019 08:01) (114/62 - 178/74)  RR: 19 (18 Oct 2019 08:01) (18 - 24)  SpO2: 96% (18 Oct 2019 08:01) (88% - 97%)    PHYSICAL EXAM:  Appearance: Normal, well nourished	  HEENT:   Normal oral mucosa, PERRL, EOMI, sclera non-icteric	  Lymphatic: No cervical lymphadenopathy  Cardiovascular: Normal S1 S2, + JVD, No cardiac murmurs, No carotid bruits, 2++ pitting edema  Respiratory: Bilateral rales R>L	  Psychiatry: A & O x 1-2, Mood & affect appropriate  Gastrointestinal:  Soft, Non-tender, + BS, no bruits	  Skin: No rashes, No ecchymoses, No cyanosis  Neurologic: Grossly non-focal with full strength in all four extremities  Extremities: Normal range of motion, No clubbing, cyanosis, 2++ pitting edema   Vascular: Peripheral pulses palpable 2+ bilaterally      INTERPRETATION OF TELEMETRY: SR    ECG: NSR, nonspecific ST/T wave abnormalities     LABS:                        9.8    14.59 )-----------( 235      ( 18 Oct 2019 07:32 )             31.4     10-18    135  |  98  |  80.0<H>  ----------------------------<  198<H>  4.5   |  19.0<L>  |  3.40<H>    Ca    8.6      18 Oct 2019 07:32  Mg     2.3     10-18    TPro  7.2  /  Alb  3.2<L>  /  TBili  0.4  /  DBili  0.2  /  AST  367<H>  /  ALT  465<H>  /  AlkPhos  141<H>  10-18    CARDIAC MARKERS ( 18 Oct 2019 07:32 )  x     / 0.06 ng/mL / x     / x     / x      CARDIAC MARKERS ( 17 Oct 2019 14:14 )  x     / 0.03 ng/mL / x     / x     / x          PT/INR - ( 18 Oct 2019 07:32 )   PT: 15.3 sec;   INR: 1.32 ratio         PTT - ( 18 Oct 2019 07:32 )  PTT:29.1 sec    I&O's Summary    BNPSerum Pro-Brain Natriuretic Peptide: 1639 pg/mL (10-18 @ 07:32)  Serum Pro-Brain Natriuretic Peptide: 811 pg/mL (10-17 @ 14:14)    Serum Pro-Brain Natriuretic Peptide: 1639 pg/mL (10-18-19 @ 07:32)  Serum Pro-Brain Natriuretic Peptide: 811 pg/mL (10-17-19 @ 14:14)    RADIOLOGY & ADDITIONAL STUDIES:  < from: CT Chest No Cont (10.17.19 @ 19:58) >  FINDINGS:  The visualized structures of the lower neck are unremarkable.   The visualized aorta is of normal course and caliber. The heart is not   enlarged. There is atherosclerotic calcification of the thoracic aorta.   There is mild coronary artery calcification. There is a small pericardial   effusion    There is no evidence of axillary, hilar, or mediastinal lymphadenopathy.    The lungs demonstrate patchy areas of groundglass opacities and   tree-in-bud nodules in the right upper lobe consistent with infiltrate.   Air is probable small amount of infiltrate in the right lower lobe as   well. There are also areas of masslike consolidation at both lung bases.   Rounded contour is seen to the density at the left lung base and this may   represent roundedatelectasis versus infiltrate or mass. Appearance of   the right lower lobe is suggestive of infiltrate versus atelectasis    The liver is of normal contour. No evidence of focal hepatic lesion on   this nonenhanced examination. The gallbladder is present. No evidence of   intra or extrahepatic biliary dilatation. There is mild stranding around   the gallbladder and mild stranding around the duodenum    The pancreas, spleen, adrenal glands, and kidneys are grossly   unremarkable. There is no evidence of hydronephrosis or perinephric   stranding. No evidence of  nephrolithiasis.The bladder is thick-walled   which may be due to underdistention. The prostate gland is significantly   enlarged. There is a right inguinal hernia containing fat and asmall   amount of fluid.    No evidence of lymphadenopathy utilizing CT size criteria. The aorta is   not aneurysmal. There is atherosclerotic calcification of the abdominal   aorta and its branches    There is no evidence of bowel obstruction. Thereis no evidence of   pneumoperitoneum or free fluid.    The visualized osseous structures demonstrate osteopenia and degenerative   change most prominent at L4-5 and L5-S1.    IMPRESSION:  Infiltrates in the right upper and right lower lobes  Small pericardial effusion  Rounded density at the left lung base suggestive of rounded atelectasis   versus mass or infiltrate. There is atelectasis versus infiltrate at the   right lung base as well.  Mild stranding in the fat around the gallbladder and duodenum maintained   to indicate cholecystitis and/or duodenitis. Further evaluation of the   gallbladder may be obtained with ultrasound as clinically indicated.   Please correlate clinically for possible duodenitis.  Thick-walled bladder may indicate cystitis. Please correlate clinically  Enlarged prostate glands      JUDD CARDOSO M.D.,ATTENDING RADIOLOGIST    < end of copied text >

## 2019-10-18 NOTE — CONSULT NOTE ADULT - ASSESSMENT
A/P: 86 y/o M with a PMHx of vascular dementia, ?Afib (not on AC), CKD, DMII, COPD, and GERD who presented to the ED after a syncopal episode and with worsening edema. Patient's history was obtained from patient's daughter Felicita, who states that he has been declining over the last few months. Patient initially had an episode of chest pain and dyspnea while on vacation, had negative workup at [place], and then had a NST and echo with his primary Cardiologist Dr. Regan Farrar who said everything looked good. Patient had then been having some worsening LE edema and abdominal edema, and was put on a diuretic daily, but was only taking it eveyr other day. Patient has now had a weight gain of 20 lbs over the last month, and has been feeling weak. Yesterday after having a large BM, noted to be straining, the patient had a syncopal episode. Patient's family was able to brace him, no fall, or head trauma, but then had 2 additional syncopal episodes in the ambulance. Patient was found to have a BG in the 60s that resolved with some juice. Patient is currently resting comfortably in bed, denies any symptoms or syncopal episode.   Troponin 0.03       1. Fluid Overload  - Likely due to nephrotic syndrome as no documented heart failure history   - Repeat TTE ordered  - Lasix 40mg IV BID  - Nephro following    2. Syncope  - Vasovagal vs. hypoglycemic   - No events on telemetry at this time  - Echo as above  - Continue telemetry monitoring     3. Acute on Chronic CKD  - Nephro following     Preliminary evaluation, please await official recommendations by Dr. Andrade

## 2019-10-18 NOTE — RAPID RESPONSE TEAM SUMMARY - NSSITUATIONBACKGROUNDRRT_GEN_ALL_CORE
85M hx vascular demetnia, afib (not on ac), CKD, DM, COPD presents with syncope and worsening edema rapid response was called due to hypoxia. The patient was having food and "choked". The patient had coughing spell, had evidence of desaturating to 76%. Patient was given Nasal canula saturation improved to 96%. Duoneb was given stat. Pt symptoms improved.

## 2019-10-18 NOTE — CONSULT NOTE ADULT - ATTENDING COMMENTS
86 y/o M with a PMHx of vascular dementia, ?Afib (not on AC), CKD, DMII, COPD, and GERD who presented to the ED with syncope. Per patient's daughter (present at bedside), he had an episode of syncope while straining to have a BM; then had 2 subsequent episodes en route to the hospital. No history of syncope. Patient denies prodromal symptoms, head trauma, incontinence, or tongue bite. Prior to this, patient's daughter states he has been having worsening lower extremity swelling and ~20 lb weight gain over the past month. Had recent workup including echo and NST with his private cardiologist.     Troponin 0.03       Previous echo and stress results:  Echo (8/30/19):  EF 55-60%, RV normal, LA/RA normal, no significant valvular abnormalities  STRESS FINDINGS:   Negative for myocardial ischemia.     Assessment/Recommendations:  1. Syncope does not sound cardiac in etiology. EKG shows nonspecific T wave changes. No events on telemetry. Continue cardiac monitoring. Repeat TTE pending. Check carotid US.   2. Fluid overload is likely secondary to nephrotic syndrome as patient has no documented history of heart failure and a recent echo which was grossly normal. Diuresis with lasix 40mg IV q12. Monitor I's and O's, daily weights. Nephrology following.   3. Acute on Chronic CKD  4. ?AF- unclear history. Per patient's daughter, it was seen on the monitor by EMS during a previous hospitalization, but was never officially diagnosed. Attempted to contact patient's private cardiologist for further information, but they are unavailable.   5. DM    Will continue to follow.   Thank you for involving me in the care of this patient.

## 2019-10-18 NOTE — RAPID RESPONSE TEAM SUMMARY - NSADDTLFINDINGSRRT_GEN_ALL_CORE
General: elderly male sitting upright in bed, NAD  Neck: , no JVD  Lungs: Rales, ronchi and wheezing in the posterior lung fields.  Cardio: S1S2+, RRR, no murmurs  Abdominal: soft, NT, ND, BS+  Extremities: + 2 pitting edema bi-laterally

## 2019-10-18 NOTE — PROGRESS NOTE ADULT - SUBJECTIVE AND OBJECTIVE BOX
MINO STEPHENS  ----------------------------------------  The patient was seen and evaluated for heart failure.  The patient is in no acute distress.  Denied any chest pain, palpitations, dyspnea, or abdominal pain.  Offers no complaints.    Vital Signs Last 24 Hrs  T(C): 36.6 (18 Oct 2019 08:01), Max: 36.7 (17 Oct 2019 22:30)  T(F): 97.8 (18 Oct 2019 08:01), Max: 98 (17 Oct 2019 22:30)  HR: 84 (18 Oct 2019 10:35) (80 - 88)  BP: 114/62 (18 Oct 2019 08:01) (114/62 - 178/74)  BP(mean): --  RR: 19 (18 Oct 2019 08:01) (18 - 24)  SpO2: 96% (18 Oct 2019 08:01) (88% - 97%)    CAPILLARY BLOOD GLUCOSE  POCT Blood Glucose.: 205 mg/dL (18 Oct 2019 08:16)  POCT Blood Glucose.: 149 mg/dL (17 Oct 2019 22:16)  POCT Blood Glucose.: 111 mg/dL (17 Oct 2019 18:27)  POCT Blood Glucose.: 101 mg/dL (17 Oct 2019 13:20)    PHYSICAL EXAMINATION:  ----------------------------------------  General appearance: No acute distress, Awake, Alert, Responsive to questions  HEENT: Normocephalic, Atraumatic, Conjunctiva clear, EOMI  Neck: Supple, No JVD, No tenderness  Lungs: Breath sound equal bilaterally, No wheezes, Bibasilar rales  Cardiovascular: S1S2, Regular rhythm  Abdomen: Soft, Nontender, Nondistended, No guarding/rebound, Positive bowel sounds  Extremities: No clubbing, No cyanosis, No calf tenderness, Edema in the upper and lower extremities  Neuro: Strength equal bilaterally, No tremors  Psychiatric: Appropriate mood, Normal affect    LABORATORY STUDIES:  ----------------------------------------             9.8    14.59 )-----------( 235      ( 18 Oct 2019 07:32 )             31.4     10    135  |  98  |  80.0<H>  ----------------------------<  198<H>  4.5   |  19.0<L>  |  3.40<H>    Ca    8.6      18 Oct 2019 07:32  Mg     2.3     10-18    TPro  7.2  /  Alb  3.2<L>  /  TBili  0.4  /  DBili  0.2  /  AST  367<H>  /  ALT  465<H>  /  AlkPhos  141<H>  10-18    LIVER FUNCTIONS - ( 18 Oct 2019 07:32 )  Alb: 3.2 g/dL / Pro: 7.2 g/dL / ALK PHOS: 141 U/L / ALT: 465 U/L / AST: 367 U/L / GGT: x           PT/INR - ( 18 Oct 2019 07:32 )   PT: 15.3 sec;   INR: 1.32 ratio    PTT - ( 18 Oct 2019 07:32 )  PTT:29.1 sec    CARDIAC MARKERS ( 18 Oct 2019 07:32 )  x     / 0.06 ng/mL / x     / x     / x      CARDIAC MARKERS ( 17 Oct 2019 14:14 )  x     / 0.03 ng/mL / x     / x     / x        Urinalysis Basic - ( 18 Oct 2019 09:56 )  Color: Yellow / Appearance: Clear / S.010 / pH: x  Gluc: x / Ketone: Negative  / Bili: Negative / Urobili: Negative mg/dL   Blood: x / Protein: Negative mg/dL / Nitrite: Negative   Leuk Esterase: Negative / RBC: x / WBC x   Sq Epi: x / Non Sq Epi: x / Bacteria: x    MEDICATIONS  (STANDING):  ALBUTerol/ipratropium for Nebulization 3 milliLiter(s) Nebulizer every 6 hours  aspirin  chewable 81 milliGRAM(s) Oral daily  dextrose 5%. 1000 milliLiter(s) (50 mL/Hr) IV Continuous <Continuous>  dextrose 50% Injectable 12.5 Gram(s) IV Push once  dextrose 50% Injectable 25 Gram(s) IV Push once  dextrose 50% Injectable 25 Gram(s) IV Push once  ferrous    sulfate 325 milliGRAM(s) Oral daily  furosemide   Injectable 40 milliGRAM(s) IV Push every 12 hours  heparin  Injectable 5000 Unit(s) SubCutaneous every 8 hours  insulin glargine Injectable (LANTUS) 10 Unit(s) SubCutaneous at bedtime  insulin lispro (HumaLOG) corrective regimen sliding scale   SubCutaneous three times a day before meals  insulin lispro (HumaLOG) corrective regimen sliding scale   SubCutaneous at bedtime  levothyroxine 50 MICROGram(s) Oral daily  melatonin 5 milliGRAM(s) Oral at bedtime  montelukast 10 milliGRAM(s) Oral daily  pantoprazole    Tablet 40 milliGRAM(s) Oral before breakfast    MEDICATIONS  (PRN):  acetaminophen   Tablet .. 650 milliGRAM(s) Oral every 6 hours PRN Temp greater or equal to 38C (100.4F), Mild Pain (1 - 3)  dextrose 40% Gel 15 Gram(s) Oral once PRN Blood Glucose LESS THAN 70 milliGRAM(s)/deciliter  glucagon  Injectable 1 milliGRAM(s) IntraMuscular once PRN Glucose LESS THAN 70 milligrams/deciliter  ondansetron Injectable 4 milliGRAM(s) IV Push every 6 hours PRN Nausea  risperiDONE   Tablet 0.25 milliGRAM(s) Oral daily PRN agitation      ASSESSMENT / PLAN:  ----------------------------------------  85M with a history of vascular dementia, chronic kidney disease, diabetes, and COPD who presented with syncopal episodes as well as a two month history of declining status and increased weakness with increasing swelling.     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 Continue close monitoring of kidney function. On intravenous furosemide.    Syncope - On telemetry monitoring. No significant arrhythmias noted on review. Possibly due to vasovagal or hypoglycemia. CT of the head was without acute intracranial pathology.    Heart failure - Cardiology consultation noted. Echocardiogram results to be followed. On intravenous furosemide.    Transaminitis - Comprehensive metabolic panel to be followed. Abdominal ultrasound results pending.    Diabetes - Insulin coverage, close monitoring of blood glucose levels. Episodes of hypoglycemia noted. The patient was previously on 22 units of Toujeo and is now on a lower dose of Lantus.    COPD - On inhaled bronchodilator therapy.    Hypothyroidism - On levothyroxine.    Dementia - Prior discussion from last night noted. Discussed with the patient's wife who wished to continue on divalproex and donepezil.    Discussed with the patient's daughter at the bedside who expressed frustration in regards to the patient not receiving his Synthroid. It was explained to the daughter that, on initial review of the documented medications available on the computer system that levothyroxine was not listed as a home medication. The patient's list of home medications was provided by the patient's wife and the medication list on the computer was reviewed and corrected in her presence shortly before the patient's daughter had returned. It was explained that Synthroid was ordered and that the patient would receive the medications upon returning from his test. In regards to the patient using his own Toujeo while in the hospital, it was advised that the medication be held for now as the patient did present with episodes of hypoglycemia and renal insufficiency. The recommendation from the consultants were explained to the patient's daughter and wife.

## 2019-10-19 LAB
ANION GAP SERPL CALC-SCNC: 16 MMOL/L — SIGNIFICANT CHANGE UP (ref 5–17)
ANION GAP SERPL CALC-SCNC: 18 MMOL/L — HIGH (ref 5–17)
APTT BLD: >200 SEC — CRITICAL HIGH (ref 27.5–36.3)
APTT BLD: >200 SEC — CRITICAL HIGH (ref 27.5–36.3)
BUN SERPL-MCNC: 70 MG/DL — HIGH (ref 8–20)
BUN SERPL-MCNC: 95 MG/DL — HIGH (ref 8–20)
CALCIUM SERPL-MCNC: 5.3 MG/DL — CRITICAL LOW (ref 8.6–10.2)
CALCIUM SERPL-MCNC: 8.5 MG/DL — LOW (ref 8.6–10.2)
CHLORIDE SERPL-SCNC: 107 MMOL/L — SIGNIFICANT CHANGE UP (ref 98–107)
CHLORIDE SERPL-SCNC: 97 MMOL/L — LOW (ref 98–107)
CK MB CFR SERPL CALC: 5.2 NG/ML — SIGNIFICANT CHANGE UP (ref 0–6.7)
CK SERPL-CCNC: 171 U/L — SIGNIFICANT CHANGE UP (ref 30–200)
CO2 SERPL-SCNC: 15 MMOL/L — LOW (ref 22–29)
CO2 SERPL-SCNC: 21 MMOL/L — LOW (ref 22–29)
CREAT SERPL-MCNC: 2.68 MG/DL — HIGH (ref 0.5–1.3)
CREAT SERPL-MCNC: 4.32 MG/DL — HIGH (ref 0.5–1.3)
D DIMER BLD IA.RAPID-MCNC: 2703 NG/ML DDU — HIGH
GAS PNL BLDA: SIGNIFICANT CHANGE UP
GAS PNL BLDA: SIGNIFICANT CHANGE UP
GLUCOSE BLDC GLUCOMTR-MCNC: 132 MG/DL — HIGH (ref 70–99)
GLUCOSE BLDC GLUCOMTR-MCNC: 154 MG/DL — HIGH (ref 70–99)
GLUCOSE BLDC GLUCOMTR-MCNC: 175 MG/DL — HIGH (ref 70–99)
GLUCOSE BLDC GLUCOMTR-MCNC: 258 MG/DL — HIGH (ref 70–99)
GLUCOSE SERPL-MCNC: 188 MG/DL — HIGH (ref 70–115)
GLUCOSE SERPL-MCNC: 188 MG/DL — HIGH (ref 70–115)
HCT VFR BLD CALC: 27.2 % — LOW (ref 39–50)
HCT VFR BLD CALC: 32.3 % — LOW (ref 39–50)
HGB BLD-MCNC: 10.1 G/DL — LOW (ref 13–17)
HGB BLD-MCNC: 8.8 G/DL — LOW (ref 13–17)
INR BLD: 1.44 RATIO — HIGH (ref 0.88–1.16)
LACTATE SERPL-SCNC: 1 MMOL/L — SIGNIFICANT CHANGE UP (ref 0.5–2)
MAGNESIUM SERPL-MCNC: 1.5 MG/DL — LOW (ref 1.6–2.6)
MCHC RBC-ENTMCNC: 28.3 PG — SIGNIFICANT CHANGE UP (ref 27–34)
MCHC RBC-ENTMCNC: 28.7 PG — SIGNIFICANT CHANGE UP (ref 27–34)
MCHC RBC-ENTMCNC: 31.3 GM/DL — LOW (ref 32–36)
MCHC RBC-ENTMCNC: 32.4 GM/DL — SIGNIFICANT CHANGE UP (ref 32–36)
MCV RBC AUTO: 88.6 FL — SIGNIFICANT CHANGE UP (ref 80–100)
MCV RBC AUTO: 90.5 FL — SIGNIFICANT CHANGE UP (ref 80–100)
PLATELET # BLD AUTO: 230 K/UL — SIGNIFICANT CHANGE UP (ref 150–400)
PLATELET # BLD AUTO: 248 K/UL — SIGNIFICANT CHANGE UP (ref 150–400)
POTASSIUM SERPL-MCNC: 2.4 MMOL/L — CRITICAL LOW (ref 3.5–5.3)
POTASSIUM SERPL-MCNC: 3.6 MMOL/L — SIGNIFICANT CHANGE UP (ref 3.5–5.3)
POTASSIUM SERPL-SCNC: 2.4 MMOL/L — CRITICAL LOW (ref 3.5–5.3)
POTASSIUM SERPL-SCNC: 3.6 MMOL/L — SIGNIFICANT CHANGE UP (ref 3.5–5.3)
PROTHROM AB SERPL-ACNC: 16.7 SEC — HIGH (ref 10–12.9)
RBC # BLD: 3.07 M/UL — LOW (ref 4.2–5.8)
RBC # BLD: 3.57 M/UL — LOW (ref 4.2–5.8)
RBC # FLD: 14.9 % — HIGH (ref 10.3–14.5)
RBC # FLD: 14.9 % — HIGH (ref 10.3–14.5)
SODIUM SERPL-SCNC: 136 MMOL/L — SIGNIFICANT CHANGE UP (ref 135–145)
SODIUM SERPL-SCNC: 138 MMOL/L — SIGNIFICANT CHANGE UP (ref 135–145)
TROPONIN T SERPL-MCNC: 0.07 NG/ML — HIGH (ref 0–0.06)
TROPONIN T SERPL-MCNC: 0.07 NG/ML — HIGH (ref 0–0.06)
WBC # BLD: 13.87 K/UL — HIGH (ref 3.8–10.5)
WBC # BLD: 15.67 K/UL — HIGH (ref 3.8–10.5)
WBC # FLD AUTO: 13.87 K/UL — HIGH (ref 3.8–10.5)
WBC # FLD AUTO: 15.67 K/UL — HIGH (ref 3.8–10.5)

## 2019-10-19 PROCEDURE — 76700 US EXAM ABDOM COMPLETE: CPT | Mod: 26

## 2019-10-19 PROCEDURE — 99233 SBSQ HOSP IP/OBS HIGH 50: CPT

## 2019-10-19 PROCEDURE — 93010 ELECTROCARDIOGRAM REPORT: CPT

## 2019-10-19 PROCEDURE — 93306 TTE W/DOPPLER COMPLETE: CPT | Mod: 26

## 2019-10-19 PROCEDURE — 93970 EXTREMITY STUDY: CPT | Mod: 26

## 2019-10-19 PROCEDURE — 71045 X-RAY EXAM CHEST 1 VIEW: CPT | Mod: 26

## 2019-10-19 PROCEDURE — 99291 CRITICAL CARE FIRST HOUR: CPT

## 2019-10-19 RX ORDER — HEPARIN SODIUM 5000 [USP'U]/ML
3500 INJECTION INTRAVENOUS; SUBCUTANEOUS EVERY 6 HOURS
Refills: 0 | Status: DISCONTINUED | OUTPATIENT
Start: 2019-10-19 | End: 2019-10-29

## 2019-10-19 RX ORDER — HEPARIN SODIUM 5000 [USP'U]/ML
7500 INJECTION INTRAVENOUS; SUBCUTANEOUS EVERY 6 HOURS
Refills: 0 | Status: DISCONTINUED | OUTPATIENT
Start: 2019-10-19 | End: 2019-10-19

## 2019-10-19 RX ORDER — HEPARIN SODIUM 5000 [USP'U]/ML
3500 INJECTION INTRAVENOUS; SUBCUTANEOUS EVERY 6 HOURS
Refills: 0 | Status: DISCONTINUED | OUTPATIENT
Start: 2019-10-19 | End: 2019-10-19

## 2019-10-19 RX ORDER — DOPAMINE HYDROCHLORIDE 40 MG/ML
10 INJECTION, SOLUTION, CONCENTRATE INTRAVENOUS
Qty: 400 | Refills: 0 | Status: DISCONTINUED | OUTPATIENT
Start: 2019-10-19 | End: 2019-10-19

## 2019-10-19 RX ORDER — METOPROLOL TARTRATE 50 MG
25 TABLET ORAL EVERY 8 HOURS
Refills: 0 | Status: DISCONTINUED | OUTPATIENT
Start: 2019-10-19 | End: 2019-10-19

## 2019-10-19 RX ORDER — POTASSIUM CHLORIDE 20 MEQ
20 PACKET (EA) ORAL
Refills: 0 | Status: COMPLETED | OUTPATIENT
Start: 2019-10-19 | End: 2019-10-20

## 2019-10-19 RX ORDER — HEPARIN SODIUM 5000 [USP'U]/ML
INJECTION INTRAVENOUS; SUBCUTANEOUS
Qty: 25000 | Refills: 0 | Status: DISCONTINUED | OUTPATIENT
Start: 2019-10-19 | End: 2019-10-19

## 2019-10-19 RX ORDER — NOREPINEPHRINE BITARTRATE/D5W 8 MG/250ML
0.05 PLASTIC BAG, INJECTION (ML) INTRAVENOUS
Qty: 8 | Refills: 0 | Status: DISCONTINUED | OUTPATIENT
Start: 2019-10-19 | End: 2019-10-20

## 2019-10-19 RX ORDER — VANCOMYCIN HCL 1 G
1000 VIAL (EA) INTRAVENOUS ONCE
Refills: 0 | Status: COMPLETED | OUTPATIENT
Start: 2019-10-19 | End: 2019-10-19

## 2019-10-19 RX ORDER — PIPERACILLIN AND TAZOBACTAM 4; .5 G/20ML; G/20ML
3.38 INJECTION, POWDER, LYOPHILIZED, FOR SOLUTION INTRAVENOUS ONCE
Refills: 0 | Status: COMPLETED | OUTPATIENT
Start: 2019-10-19 | End: 2019-10-19

## 2019-10-19 RX ORDER — PIPERACILLIN AND TAZOBACTAM 4; .5 G/20ML; G/20ML
3.38 INJECTION, POWDER, LYOPHILIZED, FOR SOLUTION INTRAVENOUS EVERY 12 HOURS
Refills: 0 | Status: DISCONTINUED | OUTPATIENT
Start: 2019-10-19 | End: 2019-10-21

## 2019-10-19 RX ORDER — MAGNESIUM SULFATE 500 MG/ML
2 VIAL (ML) INJECTION ONCE
Refills: 0 | Status: COMPLETED | OUTPATIENT
Start: 2019-10-19 | End: 2019-10-19

## 2019-10-19 RX ORDER — HEPARIN SODIUM 5000 [USP'U]/ML
7500 INJECTION INTRAVENOUS; SUBCUTANEOUS ONCE
Refills: 0 | Status: COMPLETED | OUTPATIENT
Start: 2019-10-19 | End: 2019-10-19

## 2019-10-19 RX ORDER — SODIUM CHLORIDE 9 MG/ML
10 INJECTION INTRAMUSCULAR; INTRAVENOUS; SUBCUTANEOUS
Refills: 0 | Status: DISCONTINUED | OUTPATIENT
Start: 2019-10-19 | End: 2019-10-31

## 2019-10-19 RX ORDER — HEPARIN SODIUM 5000 [USP'U]/ML
7500 INJECTION INTRAVENOUS; SUBCUTANEOUS EVERY 6 HOURS
Refills: 0 | Status: DISCONTINUED | OUTPATIENT
Start: 2019-10-19 | End: 2019-10-29

## 2019-10-19 RX ORDER — CHLORHEXIDINE GLUCONATE 213 G/1000ML
1 SOLUTION TOPICAL
Refills: 0 | Status: DISCONTINUED | OUTPATIENT
Start: 2019-10-19 | End: 2019-10-20

## 2019-10-19 RX ORDER — HEPARIN SODIUM 5000 [USP'U]/ML
INJECTION INTRAVENOUS; SUBCUTANEOUS
Qty: 25000 | Refills: 0 | Status: DISCONTINUED | OUTPATIENT
Start: 2019-10-19 | End: 2019-10-25

## 2019-10-19 RX ORDER — SODIUM CHLORIDE 9 MG/ML
250 INJECTION INTRAMUSCULAR; INTRAVENOUS; SUBCUTANEOUS ONCE
Refills: 0 | Status: COMPLETED | OUTPATIENT
Start: 2019-10-19 | End: 2019-10-19

## 2019-10-19 RX ORDER — DOPAMINE HYDROCHLORIDE 40 MG/ML
5 INJECTION, SOLUTION, CONCENTRATE INTRAVENOUS
Qty: 400 | Refills: 0 | Status: DISCONTINUED | OUTPATIENT
Start: 2019-10-19 | End: 2019-10-19

## 2019-10-19 RX ORDER — MIDODRINE HYDROCHLORIDE 2.5 MG/1
10 TABLET ORAL ONCE
Refills: 0 | Status: COMPLETED | OUTPATIENT
Start: 2019-10-19 | End: 2019-10-19

## 2019-10-19 RX ORDER — PIPERACILLIN AND TAZOBACTAM 4; .5 G/20ML; G/20ML
INJECTION, POWDER, LYOPHILIZED, FOR SOLUTION INTRAVENOUS
Refills: 0 | Status: DISCONTINUED | OUTPATIENT
Start: 2019-10-19 | End: 2019-10-19

## 2019-10-19 RX ORDER — SODIUM CHLORIDE 9 MG/ML
1000 INJECTION, SOLUTION INTRAVENOUS
Refills: 0 | Status: DISCONTINUED | OUTPATIENT
Start: 2019-10-19 | End: 2019-10-20

## 2019-10-19 RX ADMIN — Medication 30 MILLILITER(S): at 10:59

## 2019-10-19 RX ADMIN — HEPARIN SODIUM 0 UNIT(S)/HR: 5000 INJECTION INTRAVENOUS; SUBCUTANEOUS at 19:15

## 2019-10-19 RX ADMIN — Medication 3 MILLILITER(S): at 20:32

## 2019-10-19 RX ADMIN — Medication 50 GRAM(S): at 22:30

## 2019-10-19 RX ADMIN — Medication 50 MILLIEQUIVALENT(S): at 22:31

## 2019-10-19 RX ADMIN — Medication 50 MICROGRAM(S): at 06:46

## 2019-10-19 RX ADMIN — DIVALPROEX SODIUM 500 MILLIGRAM(S): 500 TABLET, DELAYED RELEASE ORAL at 23:10

## 2019-10-19 RX ADMIN — Medication 3 MILLILITER(S): at 15:21

## 2019-10-19 RX ADMIN — PANTOPRAZOLE SODIUM 40 MILLIGRAM(S): 20 TABLET, DELAYED RELEASE ORAL at 06:46

## 2019-10-19 RX ADMIN — HEPARIN SODIUM 1700 UNIT(S)/HR: 5000 INJECTION INTRAVENOUS; SUBCUTANEOUS at 13:22

## 2019-10-19 RX ADMIN — Medication 5 MILLIGRAM(S): at 23:10

## 2019-10-19 RX ADMIN — HEPARIN SODIUM 1400 UNIT(S)/HR: 5000 INJECTION INTRAVENOUS; SUBCUTANEOUS at 22:46

## 2019-10-19 RX ADMIN — Medication 3 MILLILITER(S): at 03:15

## 2019-10-19 RX ADMIN — Medication 650 MILLIGRAM(S): at 11:20

## 2019-10-19 RX ADMIN — Medication 40 MILLIGRAM(S): at 08:28

## 2019-10-19 RX ADMIN — PIPERACILLIN AND TAZOBACTAM 200 GRAM(S): 4; .5 INJECTION, POWDER, LYOPHILIZED, FOR SOLUTION INTRAVENOUS at 15:30

## 2019-10-19 RX ADMIN — HEPARIN SODIUM 5000 UNIT(S): 5000 INJECTION INTRAVENOUS; SUBCUTANEOUS at 06:46

## 2019-10-19 RX ADMIN — Medication 250 MILLIGRAM(S): at 15:30

## 2019-10-19 RX ADMIN — SODIUM CHLORIDE 100 MILLILITER(S): 9 INJECTION, SOLUTION INTRAVENOUS at 18:46

## 2019-10-19 RX ADMIN — Medication 650 MILLIGRAM(S): at 10:26

## 2019-10-19 RX ADMIN — SODIUM CHLORIDE 1000 MILLILITER(S): 9 INJECTION INTRAMUSCULAR; INTRAVENOUS; SUBCUTANEOUS at 13:01

## 2019-10-19 RX ADMIN — DONEPEZIL HYDROCHLORIDE 5 MILLIGRAM(S): 10 TABLET, FILM COATED ORAL at 23:10

## 2019-10-19 RX ADMIN — DOPAMINE HYDROCHLORIDE 34.88 MICROGRAM(S)/KG/MIN: 40 INJECTION, SOLUTION, CONCENTRATE INTRAVENOUS at 13:22

## 2019-10-19 RX ADMIN — HEPARIN SODIUM 7500 UNIT(S): 5000 INJECTION INTRAVENOUS; SUBCUTANEOUS at 13:01

## 2019-10-19 RX ADMIN — Medication 3 MILLILITER(S): at 09:16

## 2019-10-19 RX ADMIN — PIPERACILLIN AND TAZOBACTAM 25 GRAM(S): 4; .5 INJECTION, POWDER, LYOPHILIZED, FOR SOLUTION INTRAVENOUS at 22:23

## 2019-10-19 NOTE — SWALLOW BEDSIDE ASSESSMENT ADULT - SWALLOW EVAL: DIAGNOSIS
Oral and pharyngeal stages of swallow appear grossly WFL with no s/s aspiration at bedside. Given pt's dementia, pt would benefit from supervised feeding to ensure strict adherence to aspiration precautions

## 2019-10-19 NOTE — PROVIDER CONTACT NOTE (CRITICAL VALUE NOTIFICATION) - ACTION/TREATMENT ORDERED:
heparin gtt held for 2 hours will have restart rate determined by night team coverage
hold heparin from 21:45 - 22:45    replacemenr orders to follow

## 2019-10-19 NOTE — PROGRESS NOTE ADULT - ASSESSMENT
85M with a history of vascular dementia, chronic kidney disease, diabetes, and COPD who presented with syncopal episodes as well as a two month history of declining status and increased weakness with increasing swelling.     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 No significant arrhythmias noted on review. Possibly due to vasovagal or hypoglycemia. CT of the head was without acute intracranial pathology.    acute on chronic diastolic Heart failure - Cardiology consultation noted. Echocardiogram shows preserved ef  --> cardio following     Transaminitis - improved  --> us abd reviewed    Diabetes - Insulin coverage, close monitoring of blood glucose levels. Episodes of hypoglycemia noted. The patient was previously on 22 units of Toujeo and is now on a lower dose of Lantus.    COPD - On inhaled bronchodilator therapy.    Hypothyroidism - On levothyroxine.    ** as per nurse patient went into rapid afib, after speaking to family has history of afib.   patient became hypotensive and micu consult called    Dementia - Prior discussion from last night noted. Discussed with the patient's wife who wished to continue on divalproex and donepezil. 85M with a history of vascular dementia, chronic kidney disease, diabetes, and COPD who presented with syncopal episodes as well as a two month history of declining status and increased weakness with increasing swelling.     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 No significant arrhythmias noted on review. Possibly due to vasovagal or hypoglycemia. CT of the head was without acute intracranial pathology.    acute on chronic diastolic Heart failure - Cardiology consultation noted. Echocardiogram shows preserved ef  --> cardio following     Transaminitis - improved  --> us abd reviewed    Diabetes - Insulin coverage, close monitoring of blood glucose levels. Episodes of hypoglycemia noted. The patient was previously on 22 units of Toujeo and is now on a lower dose of Lantus.    COPD - On inhaled bronchodilator therapy.    Hypothyroidism - On levothyroxine.    choking episode - spoke to speech  --> regular with thins     ** as per nurse patient went into rapid afib, after speaking to family has history of afib.   patient became hypotensive and micu consult called    Dementia - Prior discussion from last night noted. Discussed with the patient's wife who wished to continue on divalproex and donepezil.

## 2019-10-19 NOTE — PROGRESS NOTE ADULT - SUBJECTIVE AND OBJECTIVE BOX
NEPHROLOGY INTERVAL HPI/OVERNIGHT EVENTS:    Interim noted   Moved to ICU   Rapid afib with drop in BP, while on diuresis   Moved to ICU   Placed on Levophed   Lasix held , being given IVF   CVP noted   CT chest from last evening noted , placed on renal dose Zosyn and Vanco   ? episode of aspiration after chocking event 10-18 evening while eating ?  There was some concern over possible PE given changes on port ECHO , but repeat formal ECHO today shows preserved RV fcn   Placed on Heparin drip   Leg doppler 10-18 , no DVT  Family did not want CT angiogram due to RF and pt not stable enough to get VQ scan   Hemodynamics improved in ICU   Lactate 1.0         MEDICATIONS  (STANDING):  ALBUTerol/ipratropium for Nebulization 3 milliLiter(s) Nebulizer every 6 hours  ALBUTerol/ipratropium for Nebulization. 3 milliLiter(s) Nebulizer once  aspirin  chewable 81 milliGRAM(s) Oral daily  chlorhexidine 4% Liquid 1 Application(s) Topical <User Schedule>  dextrose 5%. 1000 milliLiter(s) (50 mL/Hr) IV Continuous <Continuous>  dextrose 50% Injectable 12.5 Gram(s) IV Push once  dextrose 50% Injectable 25 Gram(s) IV Push once  dextrose 50% Injectable 25 Gram(s) IV Push once  diVALproex  milliGRAM(s) Oral at bedtime  donepezil 5 milliGRAM(s) Oral at bedtime  heparin  Infusion.  Unit(s)/Hr (17 mL/Hr) IV Continuous <Continuous>  levothyroxine 50 MICROGram(s) Oral daily  melatonin 5 milliGRAM(s) Oral at bedtime  multiple electrolytes Injection Type 1 1000 milliLiter(s) (60 mL/Hr) IV Continuous <Continuous>  norepinephrine Infusion 0.05 MICROgram(s)/kG/Min (8.719 mL/Hr) IV Continuous <Continuous>  pantoprazole    Tablet 40 milliGRAM(s) Oral before breakfast  piperacillin/tazobactam IVPB.. 3.375 Gram(s) IV Intermittent every 12 hours    MEDICATIONS  (PRN):  acetaminophen   Tablet .. 650 milliGRAM(s) Oral every 6 hours PRN Temp greater or equal to 38C (100.4F), Mild Pain (1 - 3)  aluminum hydroxide/magnesium hydroxide/simethicone Suspension 30 milliLiter(s) Oral every 4 hours PRN Dyspepsia  dextrose 40% Gel 15 Gram(s) Oral once PRN Blood Glucose LESS THAN 70 milliGRAM(s)/deciliter  glucagon  Injectable 1 milliGRAM(s) IntraMuscular once PRN Glucose LESS THAN 70 milligrams/deciliter  heparin  Injectable 7500 Unit(s) IV Push every 6 hours PRN For aPTT less than 40  heparin  Injectable 3500 Unit(s) IV Push every 6 hours PRN For aPTT between 40 - 57  ondansetron Injectable 4 milliGRAM(s) IV Push every 6 hours PRN Nausea  sodium chloride 0.9% lock flush 10 milliLiter(s) IV Push every 1 hour PRN Pre/post blood products, medications, blood draw, and to maintain line patency      Allergies    No Known Allergies    Intolerances        Vital Signs Last 24 Hrs  T(C): 36.4 (19 Oct 2019 15:29), Max: 36.7 (18 Oct 2019 21:16)  T(F): 97.5 (19 Oct 2019 15:29), Max: 98.1 (18 Oct 2019 21:16)  HR: 80 (19 Oct 2019 19:00) (80 - 130)  BP: 111/67 (19 Oct 2019 19:00) (57/46 - 133/57)  BP(mean): 82 (19 Oct 2019 19:00) (64 - 90)  RR: 18 (19 Oct 2019 19:00) (7 - 28)  SpO2: 100% (19 Oct 2019 19:00) (90% - 100%)  Daily     Daily   I&O's Detail    18 Oct 2019 07:  -  19 Oct 2019 07:00  --------------------------------------------------------  IN:    Oral Fluid: 160 mL  Total IN: 160 mL    OUT:    Voided: 2320 mL  Total OUT: 2320 mL    Total NET: -2160 mL      19 Oct 2019 07:  -  19 Oct 2019 19:59  --------------------------------------------------------  IN:    DOPamine Infusion: 104.4 mL    heparin  Infusion.: 85 mL    multiple electrolytes Injection Type 1: 1300 mL    norepinephrine Infusion: 79.4 mL    Oral Fluid: 240 mL    Solution: 100 mL    Solution: 250 mL  Total IN: 2158.8 mL    OUT:    Estimated Blood Loss: 410 mL    Voided: 600 mL  Total OUT: 1010 mL    Total NET: 1148.8 mL        I&O's Summary    18 Oct 2019 07:  -  19 Oct 2019 07:00  --------------------------------------------------------  IN: 160 mL / OUT: 2320 mL / NET: -2160 mL    19 Oct 2019 07:  -  19 Oct 2019 19:59  --------------------------------------------------------  IN: 2158.8 mL / OUT: 1010 mL / NET: 1148.8 mL        PHYSICAL EXAM:  HEAD:  Atraumatic, Normocephalic  EYES: EOMI, PERRLA, conjunctiva and sclera clear  ENMT: No tonsillar erythema, exudates, or enlargement; Moist mucous membranes  NECK: Supple, + JVD  NERVOUS SYSTEM:  Alert & Oriented X2,  intact and symmetric; demented  CHEST/LUNG: Diminished BS bilaterally  HEART: Regular rate and rhythm; No rub  ABDOMEN: Soft, + distended +BS; nontender; abd wall and flank edema  EXTREMITIES:  2+ Peripheral Pulses, + pitting LE edema; R > L arm edema  LABS:                        10.1   13.87 )-----------( 248      ( 19 Oct 2019 14:05 )             32.3     10    136  |  97<L>  |  95.0<H>  ----------------------------<  188<H>  3.6   |  21.0<L>  |  4.32<H>    Ca    8.5<L>      19 Oct 2019 14:05  Mg     2.3     10-18    TPro  7.2  /  Alb  3.2<L>  /  TBili  0.4  /  DBili  0.2  /  AST  367<H>  /  ALT  465<H>  /  AlkPhos  141<H>  10-18    PT/INR - ( 19 Oct 2019 18:42 )   PT: 16.7 sec;   INR: 1.44 ratio         PTT - ( 19 Oct 2019 18:42 )  PTT:>200.0 sec  Urinalysis Basic - ( 18 Oct 2019 09:56 )    Color: Yellow / Appearance: Clear / S.010 / pH: x  Gluc: x / Ketone: Negative  / Bili: Negative / Urobili: Negative mg/dL   Blood: x / Protein: Negative mg/dL / Nitrite: Negative   Leuk Esterase: Negative / RBC: x / WBC x   Sq Epi: x / Non Sq Epi: x / Bacteria: x        ABG - ( 19 Oct 2019 14:47 )  pH, Arterial: 7.29  pH, Blood: x     /  pCO2: 47    /  pO2: 114   / HCO3: 21    / Base Excess: -4.0  /  SaO2: 98                    EXAM:  ECHO TRANSTHORACIC COMP W DOPP      PROCEDURE DATE:  Oct 19 2019   .      INTERPRETATION:  REPORT:    TRANSTHORACIC ECHOCARDIOGRAM REPORT         Patient Name:   MINO STEPHENS Patient Location:  [facility] Rec #:  [id number]      Accession #: [id number]  Account #:                    Height:           67.7 in 172.0 cm  YOB: 1934     Weight:           205.0 lb 93.00 kg  Patient Age:    85 years      BSA:              2.06 m²  Patient Gender: M             BP:               125/56 mmHg       Date of Exam:        10/19/2019 2:45:13 PM  Sonographer:         Tahir Trotter  Referring Physician: Quinn Rico MD    Procedure:     2D Echo/Doppler/Color Doppler Complete.  Indications:   Syncope and collapse - R55  Diagnosis:     Syncope and collapse - R55  Study Details: Technically limited study. Study quality was adversely   affected                 due to patient obesity, body habitus and lung interference.    SPECTRAL DOPPLER ANALYSIS (where applicable):  Aortic Valve: AoVMax Ellis: 1.36 m/s AoV Peak P.4 mmHg AoV Mean PG:    LVOT Vmax: 0.81 m/s LVOT VTI:  LVOT Diameter:    Tricuspid Valve and PA/RV Systolic Pressure: TR Max Velocity: 2.89 m/s RA   Pressure: 15 mmHg RVSP/PASP: 48.4 mmHg       PHYSICIAN INTERPRETATION:  Left Ventricle: The left ventricular internal cavity size is normal.  Global LV systolic function was hyperdynamic. Left ventricular ejection   fraction, by visual estimation, is >75%. Normal LV filling pressures.  Right Ventricle: Normal right ventricular size and function. The right   ventricular size is normal. RV systolic function is normal.  Pericardium: A small pericardial effusion is present. The pericardial   effusion is anterior to the right ventricle and localized near the right   atrium. There is no evidence of cardiac tamponade.  Tricuspid Valve: Mild-moderate tricuspid regurgitation is visualized.   Estimated pulmonary artery systolic pressure is 48.4 mmHg assuming a   right atrial pressure of 15 mmHg, which is consistent with mild pulmonary   hypertension.  Venous: The inferior vena cava was dilated, with respiratory size   variation less than 50%.       Summary:   1. Patient tachycardic during the entire study.   2. Hyperdynamic wall motion.   3. Left ventricular ejectionfraction, by visual estimation, is >75%.   4. Normal right ventricular size and function.   5. Mild-moderate tricuspid regurgitation.   6. Estimated pulmonary artery systolic pressure is 48 mmHg -mild   pulmonary hypertension.   7. Small pericardial effusion. No echo evidence of tamponade.    MD Zo, RPVI Electronically signed on 10/19/2019 at 4:41:21   PM         *** Final ***    RADIOLOGY & ADDITIONAL TESTS:

## 2019-10-19 NOTE — PROGRESS NOTE ADULT - SUBJECTIVE AND OBJECTIVE BOX
MINO STEPHENS    [id number]    85y      Male    INTERVAL HPI/OVERNIGHT EVENTS:    patient being seen for ckd, syncope and acute on chronic diastolic heart failure. patient seen earlier in the day and denies any complaints    REVIEW OF SYSTEMS:    CONSTITUTIONAL: No fever, weight loss, or fatigue  RESPIRATORY: No cough, wheezing, hemoptysis; No shortness of breath  CARDIOVASCULAR: No chest pain, palpitations  GASTROINTESTINAL: No abdominal or epigastric pain. No nausea, vomiting  NEUROLOGICAL: No headaches, memory loss, loss of strength.  MISCELLANEOUS:      Vital Signs Last 24 Hrs  T(C): 36.7 (19 Oct 2019 11:30), Max: 37 (18 Oct 2019 18:42)  T(F): 98 (19 Oct 2019 11:30), Max: 98.6 (18 Oct 2019 18:42)  HR: 118 (19 Oct 2019 12:12) (80 - 130)  BP: 74/41 (19 Oct 2019 12:58) (57/46 - 128/72)  BP(mean): --  RR: 18 (19 Oct 2019 12:12) (16 - 22)  SpO2: 93% (19 Oct 2019 12:12) (70% - 99%)    PHYSICAL EXAM:    GENERAL: NAD, well-groomed  HEENT: PERRL, +EOMI  NECK: soft, Supple, No JVD,   CHEST/LUNG: Crackles  HEART: S1S2+, no murmurs  ABDOMEN: Soft, Nontender, Nondistended; Bowel sounds present  EXTREMITIES:  2+ Peripheral Pulses, No clubbing, cyanosis, or edema  SKIN: No rashes or lesions  NEURO: AAOX3, no focal deficits,  PSYCH: normal mood      LABS:                        9.6    12.30 )-----------( 246      ( 18 Oct 2019 22:01 )             30.5     10-18    134<L>  |  96<L>  |  87.0<H>  ----------------------------<  184<H>  4.1   |  23.0  |  3.75<H>    Ca    8.7      18 Oct 2019 21:59  Mg     2.3     10-18    TPro  7.2  /  Alb  3.2<L>  /  TBili  0.4  /  DBili  0.2  /  AST  367<H>  /  ALT  465<H>  /  AlkPhos  141<H>  10-18    PT/INR - ( 18 Oct 2019 07:32 )   PT: 15.3 sec;   INR: 1.32 ratio         PTT - ( 18 Oct 2019 07:32 )  PTT:29.1 sec  Urinalysis Basic - ( 18 Oct 2019 09:56 )    Color: Yellow / Appearance: Clear / S.010 / pH: x  Gluc: x / Ketone: Negative  / Bili: Negative / Urobili: Negative mg/dL   Blood: x / Protein: Negative mg/dL / Nitrite: Negative   Leuk Esterase: Negative / RBC: x / WBC x   Sq Epi: x / Non Sq Epi: x / Bacteria: x          MEDICATIONS  (STANDING):  ALBUTerol/ipratropium for Nebulization 3 milliLiter(s) Nebulizer every 6 hours  ALBUTerol/ipratropium for Nebulization. 3 milliLiter(s) Nebulizer once  aspirin  chewable 81 milliGRAM(s) Oral daily  dextrose 5%. 1000 milliLiter(s) (50 mL/Hr) IV Continuous <Continuous>  dextrose 50% Injectable 12.5 Gram(s) IV Push once  dextrose 50% Injectable 25 Gram(s) IV Push once  dextrose 50% Injectable 25 Gram(s) IV Push once  diVALproex  milliGRAM(s) Oral at bedtime  donepezil 5 milliGRAM(s) Oral at bedtime  DOPamine Infusion 10 MICROgram(s)/kG/Min (34.875 mL/Hr) IV Continuous <Continuous>  ferrous    sulfate 325 milliGRAM(s) Oral daily  furosemide   Injectable 40 milliGRAM(s) IV Push every 12 hours  heparin  Infusion.  Unit(s)/Hr (17 mL/Hr) IV Continuous <Continuous>  heparin  Injectable 5000 Unit(s) SubCutaneous every 8 hours  insulin glargine Injectable (LANTUS) 10 Unit(s) SubCutaneous at bedtime  insulin lispro (HumaLOG) corrective regimen sliding scale   SubCutaneous three times a day before meals  insulin lispro (HumaLOG) corrective regimen sliding scale   SubCutaneous at bedtime  levothyroxine 50 MICROGram(s) Oral daily  melatonin 5 milliGRAM(s) Oral at bedtime  metoprolol tartrate 25 milliGRAM(s) Oral every 8 hours  montelukast 10 milliGRAM(s) Oral daily  pantoprazole    Tablet 40 milliGRAM(s) Oral before breakfast    MEDICATIONS  (PRN):  acetaminophen   Tablet .. 650 milliGRAM(s) Oral every 6 hours PRN Temp greater or equal to 38C (100.4F), Mild Pain (1 - 3)  aluminum hydroxide/magnesium hydroxide/simethicone Suspension 30 milliLiter(s) Oral every 4 hours PRN Dyspepsia  dextrose 40% Gel 15 Gram(s) Oral once PRN Blood Glucose LESS THAN 70 milliGRAM(s)/deciliter  glucagon  Injectable 1 milliGRAM(s) IntraMuscular once PRN Glucose LESS THAN 70 milligrams/deciliter  heparin  Injectable 7500 Unit(s) IV Push every 6 hours PRN For aPTT less than 40  heparin  Injectable 3500 Unit(s) IV Push every 6 hours PRN For aPTT between 40 - 57  ondansetron Injectable 4 milliGRAM(s) IV Push every 6 hours PRN Nausea  risperiDONE   Tablet 0.25 milliGRAM(s) Oral daily PRN agitation      RADIOLOGY & ADDITIONAL TESTS:

## 2019-10-19 NOTE — CONSULT NOTE ADULT - ATTENDING COMMENTS
85M w/ PMHx vascular dementia, HFpEF, COPD, CKD IV, Afib (not on AC) who was initially admitted for w/u recurrent syncope. ICU consulted for Afib w/ RVR and shock. CTH was grossly normal. CT C/A/P suggest of RUL/RLL PNA, L lung mass/ atelectasis, cholecystitis, duodenitis and cystitis. He was on IV lasix 40 bid in addition to metoprolol. DVT study normal.  On evaluation he was hypotensive SBP ~ 60s and pt was very encephalopathic. POCUS reveal a hypokinetic RV and dilated IVC. Pt was given fluid bolus and started on IV Hep and Dopamine prior to ICU transfer.     Shock Hypovolemic vs Septic vs cardiogenic    Dopamine switched to Levophed. Titrate to MAP ~ 65. Zapata inserted  Given close to 1L fluid bolus and maintained @ 60cc/hr  Started on empiric Zosyn and Vanco loading dose. Pancultured. Abd sonogram  Started on heparin as ACS/PE in the differential. TTE at bedside was not suggest of RV strain, will wait for full report. Trend trops, check BNP, lactate and EKGs

## 2019-10-19 NOTE — PROGRESS NOTE ADULT - SUBJECTIVE AND OBJECTIVE BOX
[place] [facility]                                                        Office: [address] [address]                                                       Telephone: [phone]. Fax:[phone]                                                                             PROGRESS NOTE   Reason for follow up:  Syncope                            Overnight: No new events.   Update:   Converted to Afib, stat ekg with documented afib.  Daughter states her family member has been treating him and is a physician for PAF.  Not sure about AC treatment or rate control drugs.  MD Rico called ICU team and to evaluate and will be transfer over to the ICU.   BP low at 57s, dopamine started, fluids, and Heparin drip.  Stat bedside echo to R/P PE pending.  Also will add d-dimmer to morning labs and venous dopplers stat.    Subjective: minimal verbalizaiton"   Complains of:  Nothing  Review of symptoms: Cardiac:  No chest pain. No dyspnea. No palpitations.  Respiratory: no cough. No dyspnea  Gastrointestinal: No diarrhea. + abdominal pain vague response.   No bleeding.     Past medical history: No updates.   Chronic conditions: HEALTH ISSUES - PROBLEM Dx:  Dementia: Dementia  COPD (chronic obstructive pulmonary disease): COPD (chronic obstructive pulmonary disease)  DM (diabetes mellitus): DM (diabetes mellitus)  Transaminitis: Transaminitis  Acute on chronic renal failure: Acute on chronic renal failure  Edema: Edema  Syncope: Syncope    Vitals:  T(C): 36.7 (10-19-19 @ 11:30), Max: 37 (10-18-19 @ 18:42)  HR: 118 (10-19-19 @ 12:12) (80 - 130)  BP: 74/41 (10-19-19 @ 12:58) (57/46 - 128/72)  RR: 18 (10-19-19 @ 12:12) (16 - 22)  SpO2: 93% (10-19-19 @ 12:12) (70% - 99%)  I&O's Summary  18 Oct 2019 07:01  -  19 Oct 2019 07:00  --------------------------------------------------------  IN: 160 mL / OUT: 2320 mL / NET: -2160 mL  19 Oct 2019 07:01  -  19 Oct 2019 13:00  --------------------------------------------------------  IN: 240 mL / OUT: 600 mL / NET: -360 mL  Weight (kg): 93 (10-18 @ 18:18)    PHYSICAL EXAM:  Appearance: Minimal verbalizaion, obese, and vomiting.     HEENT:  Head and neck: Atraumatic. Normocephalic.  Normal oral mucosa, PERRL, Neck is supple. No JVD, No carotid bruit.   Neurologic: A & O x 0, no focal deficits. EOMI , Cranial nerves are intact.  Lymphatic: No cervical lymphadenopathy  Cardiovascular: Normal S1 S2, No murmur, rubs/gallops. + JVD, +2 edema  Respiratory: bilateral wheezing, basilar crackles increased on right.    Gastrointestinal:  Soft, Non-tender, + BS  Lower Extremities: No edema  Psychiatry: Patient is calm. No agitation. Mood & affect appropriate  Skin: No rashes/ ecchymoses/cyanosis/ulcers visualized on the face, hands or feet.    CURRENT MEDICATIONS:  DOPamine Infusion 10 MICROgram(s)/kG/Min IV Continuous <Continuous>  furosemide   Injectable 40 milliGRAM(s) IV Push every 12 hours  metoprolol tartrate 25 milliGRAM(s) Oral every 8 hours  midodrine. 10 milliGRAM(s) Oral once    ALBUTerol/ipratropium for Nebulization  ALBUTerol/ipratropium for Nebulization.  montelukast  diVALproex ER  donepezil  melatonin  pantoprazole    Tablet  dextrose 50% Injectable  insulin glargine Injectable (LANTUS)  insulin lispro (HumaLOG) corrective regimen sliding scale  levothyroxine  aspirin  chewable  dextrose 5%.  ferrous    sulfate    LABS:	 	  CARDIAC MARKERS ( 18 Oct 2019 21:59 )  x     / x     / x     / x     / x      p-BNP 18 Oct 2019 21:59: 1636 pg/mL, CARDIAC MARKERS ( 18 Oct 2019 07:32 )  x     / 0.06 ng/mL / x     / x     / x      p-BNP 18 Oct 2019 07:32: 1639 pg/mL, CARDIAC MARKERS ( 17 Oct 2019 14:14 )  x     / 0.03 ng/mL / x     / x     / x      p-BNP 17 Oct 2019 14:14: 811 pg/mL                        9.6    12.30 )-----------( 246      ( 18 Oct 2019 22:01 )            30.5   10-18  134<L>  |  96<L>  |  87.0<H>  ----------------------------<  184<H>  4.1   |  23.0  |  3.75<H>  Ca    8.7      18 Oct 2019 21:59  Mg     2.3     10-18  TPro  7.2  /  Alb  3.2<L>  /  TBili  0.4  /  DBili  0.2  /  AST  367<H>  /  ALT  465<H>  /  AlkPhos  141<H>  10-18  proBNP: Serum Pro-Brain Natriuretic Peptide: 1636 pg/mL (10-18 @ 21:59)  Serum Pro-Brain Natriuretic Peptide: 1639 pg/mL (10-18 @ 07:32)  Serum Pro-Brain Natriuretic Peptide: 811 pg/mL (10-17 @ 14:14)  gA1c: Hemoglobin A1C, Whole Blood: 8.5 %  TSH: Thyroid Stimulating Hormone, Serum: 1.04 uIU/mL    TELEMETRY: afib rate 90's.      ECG:  Reviewed by me.

## 2019-10-19 NOTE — CHART NOTE - NSCHARTNOTEFT_GEN_A_CORE
Rapid response called for Afib RVR, Medicine, ICU and Cardiology attendings at bedside.  RRT dismissed

## 2019-10-19 NOTE — PROGRESS NOTE ADULT - ASSESSMENT
HPI: 84 y/o M with a PMHx of vascular dementia, ?Afib (not on AC), CKD, DMII, COPD, and GERD who presented to the ED after a syncopal episode and with worsening edema. Patient's history was obtained from patient's daughter Felicita, who states that he has been declining over the last few months. Patient initially had an episode of chest pain and dyspnea while on vacation, had negative workup at [place], and then had a NST and echo with his primary Cardiologist Dr. Regan Farrar who said everything looked good. Patient had then been having some worsening LE edema and abdominal edema, and was put on a diuretic daily, but was only taking it eveyr other day. Patient has now had a weight gain of 20 lbs over the last month, and has been feeling weak. Yesterday after having a large BM, noted to be straining, the patient had a syncopal episode. Patient's family was able to brace him, no fall, or head trauma, but then had 2 additional syncopal episodes in the ambulance. Patient was found to have a BG in the 60s that resolved with some juice. Patient is currently resting comfortably in bed, denies any symptoms or syncopal episode.   Today Converted to Afib, stat ekg with documented afib.  Daughter states her family member has been treating him and is a physician for PAF.  Not sure about AC treatment or rate control drugs.  MD Rico called ICU team and to evaluate and will be transfer over to the ICU.   BP low at 57s, dopamine started, fluids, and Heparin drip.  Stat bedside echo to R/P PE by MD Negro.  Also will add d-dimmer to morning labs and venous dopplers stat.        1. Afib/hypotension  Unable to tolerate bb at this time, consider when stable  On dopamine and fluids.   - stat ehco and doppler noted pending  -Stat Ddimer  - D.C Lasix  - Nephro following  -Transfer to ICU      3. Acute on Chronic CKD  - Nephro following

## 2019-10-19 NOTE — SWALLOW BEDSIDE ASSESSMENT ADULT - MODE OF PRESENTATION
spoon/fed by clinician noted to take larger bite size independently and benefitted from cues to take smaller bites/self fed fed by clinician self fed/8 oz/cup

## 2019-10-19 NOTE — PROGRESS NOTE ADULT - ASSESSMENT
CKD IV related to DM   Followed by dr Calhoun  No hydro on renal imaging   Possible episode of ATN related to hypotensive events   Hemodynamics better now   Hgb stable   Lactate level 1.0   Repeat ECHO noted from today , preserved EF , No sig valve lesions , normal RV fcn   No nephrotoxins or IV contrast   Placed on Levophed , IVF , Renal dose Zosyn and Vanco ( check level in am ) .   Will follow   Cardiology follow up noted     Will follow   Discussed with daughter at bedside

## 2019-10-19 NOTE — CONSULT NOTE ADULT - SUBJECTIVE AND OBJECTIVE BOX
REASON FOR CONSULT: Hypotension, AMS    CONSULT REQUESTED BY: Dr. Gabriel Rico    Patient is a 85y old  Male who presents with a chief complaint of syncope (19 Oct 2019 13:13).   Per chart patient has been worked up and given IV lasix for CKD and LE edema.  Patient  was -2.5L since admission.  TTE on 10/18 showed EF 75% with small LV cavity and normal sized RV without significant valve disease or wall motion abnormalities.  Per medical team, patient was in normal state of health  until he developed KELLEN this afternoon, he then became hypotensive and AMS.  Upon arrival to beside with Dr. Priest, Dr. Jiang was present performing a bedside echo.  Read was severe RV dysfxn, dilation, and ivc dilation.  Patient barely arousable, BP 59/43.  IV heparin bolused and gtt started for possible PE vs ACS.  IV fluids and dopamine started by cardiology team.  SBP improved to 70s and patient transferred to MICU more arousable and answering questions.           PAST MEDICAL & SURGICAL HISTORY:  CKD (chronic kidney disease)  COPD (chronic obstructive pulmonary disease)  Dementia  Afib  DM (diabetes mellitus)  History of lung biopsy  S/P carotid endarterectomy    Allergies    No Known Allergies    Intolerances      FAMILY HISTORY:  No pertinent family history in first degree relatives      Review of Systems:  CONSTITUTIONAL: No fever, chills, or fatigue  EYES: No eye pain, visual disturbances, or discharge  ENMT:  No difficulty hearing, tinnitus, vertigo; No sinus or throat pain  NECK: No pain or stiffness  RESPIRATORY: No cough, wheezing, chills or hemoptysis; No shortness of breath  CARDIOVASCULAR: No chest pain, palpitations, dizziness, or leg swelling  GASTROINTESTINAL: +vague diffuse abd pain, 2/10, non radiating.  No nausea, vomiting, or hematemesis; No diarrhea or constipation. No melena or hematochezia.  GENITOURINARY: No dysuria, frequency, hematuria, or incontinence  NEUROLOGICAL: No headaches, memory loss, loss of strength, numbness, or tremors  SKIN: No itching, burning, rashes, or lesions   MUSCULOSKELETAL: No joint pain or swelling; No muscle, back, or extremity pain  PSYCHIATRIC: No depression, anxiety, mood swings, or difficulty sleeping      Medications:  piperacillin/tazobactam IVPB. 3.375 Gram(s) IV Intermittent once  piperacillin/tazobactam IVPB.. 3.375 Gram(s) IV Intermittent every 12 hours  vancomycin  IVPB 1000 milliGRAM(s) IV Intermittent once    norepinephrine Infusion 0.05 MICROgram(s)/kG/Min IV Continuous <Continuous>    ALBUTerol/ipratropium for Nebulization 3 milliLiter(s) Nebulizer every 6 hours  ALBUTerol/ipratropium for Nebulization. 3 milliLiter(s) Nebulizer once    acetaminophen   Tablet .. 650 milliGRAM(s) Oral every 6 hours PRN  diVALproex  milliGRAM(s) Oral at bedtime  donepezil 5 milliGRAM(s) Oral at bedtime  melatonin 5 milliGRAM(s) Oral at bedtime  ondansetron Injectable 4 milliGRAM(s) IV Push every 6 hours PRN      aspirin  chewable 81 milliGRAM(s) Oral daily  heparin  Infusion.  Unit(s)/Hr IV Continuous <Continuous>  heparin  Injectable 7500 Unit(s) IV Push every 6 hours PRN  heparin  Injectable 3500 Unit(s) IV Push every 6 hours PRN    aluminum hydroxide/magnesium hydroxide/simethicone Suspension 30 milliLiter(s) Oral every 4 hours PRN  pantoprazole    Tablet 40 milliGRAM(s) Oral before breakfast      dextrose 40% Gel 15 Gram(s) Oral once PRN  dextrose 50% Injectable 12.5 Gram(s) IV Push once  dextrose 50% Injectable 25 Gram(s) IV Push once  dextrose 50% Injectable 25 Gram(s) IV Push once  glucagon  Injectable 1 milliGRAM(s) IntraMuscular once PRN  levothyroxine 50 MICROGram(s) Oral daily    dextrose 5%. 1000 milliLiter(s) IV Continuous <Continuous>  multiple electrolytes Injection Type 1 1000 milliLiter(s) IV Continuous <Continuous>  sodium chloride 0.9% lock flush 10 milliLiter(s) IV Push every 1 hour PRN      chlorhexidine 4% Liquid 1 Application(s) Topical <User Schedule>            ICU Vital Signs Last 24 Hrs  T(C): 36.7 (19 Oct 2019 11:30), Max: 37 (18 Oct 2019 18:42)  T(F): 98 (19 Oct 2019 11:30), Max: 98.6 (18 Oct 2019 18:42)  HR: 118 (19 Oct 2019 12:12) (80 - 130)  BP: 74/41 (19 Oct 2019 12:58) (57/46 - 128/72)  BP(mean): --  ABP: --  ABP(mean): --  RR: 18 (19 Oct 2019 12:12) (16 - 22)  SpO2: 93% (19 Oct 2019 12:12) (70% - 99%)    Vital Signs Last 24 Hrs  T(C): 36.7 (19 Oct 2019 11:30), Max: 37 (18 Oct 2019 18:42)  T(F): 98 (19 Oct 2019 11:30), Max: 98.6 (18 Oct 2019 18:42)  HR: 118 (19 Oct 2019 12:12) (80 - 130)  BP: 74/41 (19 Oct 2019 12:58) (57/46 - 128/72)  BP(mean): --  RR: 18 (19 Oct 2019 12:12) (16 - 22)  SpO2: 93% (19 Oct 2019 12:12) (70% - 99%)    ABG - ( 19 Oct 2019 14:47 )  pH, Arterial: 7.29  pH, Blood: x     /  pCO2: 47    /  pO2: 114   / HCO3: 21    / Base Excess: -4.0  /  SaO2: 98                  I&O's Detail    18 Oct 2019 07:01  -  19 Oct 2019 07:00  --------------------------------------------------------  IN:    Oral Fluid: 160 mL  Total IN: 160 mL    OUT:    Voided: 2320 mL  Total OUT: 2320 mL    Total NET: -2160 mL      19 Oct 2019 07:01  -  19 Oct 2019 15:09  --------------------------------------------------------  IN:    Oral Fluid: 240 mL  Total IN: 240 mL    OUT:    Voided: 600 mL  Total OUT: 600 mL    Total NET: -360 mL            LABS:                        10.1   13.87 )-----------( 248      ( 19 Oct 2019 14:05 )             32.3     10-19    136  |  97<L>  |  95.0<H>  ----------------------------<  188<H>  3.6   |  21.0<L>  |  4.32<H>    Ca    8.5<L>      19 Oct 2019 14:05  Mg     2.3     10    TPro  7.2  /  Alb  3.2<L>  /  TBili  0.4  /  DBili  0.2  /  AST  367<H>  /  ALT  465<H>  /  AlkPhos  141<H>  10-18      CARDIAC MARKERS ( 19 Oct 2019 14:05 )  x     / 0.07 ng/mL / x     / x     / x      CARDIAC MARKERS ( 18 Oct 2019 07:32 )  x     / 0.06 ng/mL / x     / x     / x          CAPILLARY BLOOD GLUCOSE      POCT Blood Glucose.: 154 mg/dL (19 Oct 2019 12:13)    PT/INR - ( 18 Oct 2019 07:32 )   PT: 15.3 sec;   INR: 1.32 ratio         PTT - ( 18 Oct 2019 07:32 )  PTT:29.1 sec  Urinalysis Basic - ( 18 Oct 2019 09:56 )    Color: Yellow / Appearance: Clear / S.010 / pH: x  Gluc: x / Ketone: Negative  / Bili: Negative / Urobili: Negative mg/dL   Blood: x / Protein: Negative mg/dL / Nitrite: Negative   Leuk Esterase: Negative / RBC: x / WBC x   Sq Epi: x / Non Sq Epi: x / Bacteria: x      CULTURES:      Physical Examination:    General:  alert, interactive, nonfocal    HEENT: Pupils equal, reactive to light.  Symmetric.    PULM: diminished BL bases no significant sputum production    CVS: irregularly irregular no m/r/g    ABD: +BS soft, ND c/o vague abd pain, but NT to palpation, no rebound no guarding no murphys sign, nondistended    EXT: 2+ edema b/l    SKIN: pale, no rashes noted    RADIOLOGY:  No ptx no pleural effusion, L IJ TLC in appopriate position    CRITICAL CARE TIME SPENT: 59 minutes non-inclusive of procedures

## 2019-10-19 NOTE — CONSULT NOTE ADULT - ASSESSMENT
85M h/o dementia, ckd, diastolic dysfxn, "lung problems,", DM presents c/o syncope.  Initial episode was after straining for a BM however patient also lost consciousness in ambulance x2, BG found to be 60s at the time of the event.  Was being actively diuresed on the floor when he developed KELLEN, profound hypotension, and AMS.  Condition guarded.      hypovolemia vs. pe vs. acs vs. sepsis    -hypovolemia likely in the setting of active diuresis with thick small cavity LV and EF >70%.  Loss of atrial kick potentiated hypotension.  Will continue fluid resuscitation, and trend CVP  -patient not a candidate for cta given ckd, le duplex negative and repeat tte unofficially appears to have normal fxn.  Will await f/u read from Dr. Jinag.  Echo currently being repeated with definity  -trend enzymes, check echo for wall motion abnormalities,repeat ekg, continue heparin  -vanco x1, zosyn for now.  No lactemia, no fever, less likely  diagnosis    -wean pressors as tolerated  -repeat labs  -continue heparin for now  -trend cvp  -continue to monitor 85M h/o dementia, ckd, diastolic dysfxn, "lung problems,", DM presents c/o syncope.  Initial episode was after straining for a BM however patient also lost consciousness in ambulance x2, BG found to be 60s at the time of the event.  Was being actively diuresed on the floor when he developed KELLEN, profound hypotension, and AMS.   Hypovolemic shock improving with volume resuscitation and afib with RVR on pressors. Condition guarded.      hypovolemia vs. pe vs. acs vs. sepsis    -hypovolemia likely in the setting of active diuresis with thick small cavity LV and EF >70%.  Loss of atrial kick potentiated hypotension.  Will continue fluid resuscitation, and trend CVP  -patient not a candidate for cta given ckd, le duplex negative and repeat tte unofficially appears to have normal fxn.  Will await f/u read from Dr. Jiang.  Echo currently being repeated with definity  -trend enzymes, check echo for wall motion abnormalities,repeat ekg, continue heparin  -vanco x1, zosyn for now.  No lactemia, no fever, less likely  diagnosis    -wean pressors as tolerated  -repeat labs  -continue heparin for now  -trend cvp  -continue to monitor

## 2019-10-20 LAB
ALBUMIN SERPL ELPH-MCNC: 2.9 G/DL — LOW (ref 3.3–5.2)
ALP SERPL-CCNC: 136 U/L — HIGH (ref 40–120)
ALT FLD-CCNC: 604 U/L — HIGH
AMYLASE P1 CFR SERPL: 53 U/L — SIGNIFICANT CHANGE UP (ref 36–128)
ANION GAP SERPL CALC-SCNC: 18 MMOL/L — HIGH (ref 5–17)
APTT BLD: 106.1 SEC — HIGH (ref 27.5–36.3)
APTT BLD: 70.2 SEC — HIGH (ref 27.5–36.3)
APTT BLD: >200 SEC — CRITICAL HIGH (ref 27.5–36.3)
AST SERPL-CCNC: 387 U/L — HIGH
BILIRUB SERPL-MCNC: 0.3 MG/DL — LOW (ref 0.4–2)
BUN SERPL-MCNC: 100 MG/DL — HIGH (ref 8–20)
CALCIUM SERPL-MCNC: 7.9 MG/DL — LOW (ref 8.6–10.2)
CHLORIDE SERPL-SCNC: 93 MMOL/L — LOW (ref 98–107)
CO2 SERPL-SCNC: 23 MMOL/L — SIGNIFICANT CHANGE UP (ref 22–29)
CREAT SERPL-MCNC: 4.22 MG/DL — HIGH (ref 0.5–1.3)
GGT SERPL-CCNC: 114 U/L — HIGH (ref 8–61)
GLUCOSE BLDC GLUCOMTR-MCNC: 101 MG/DL — HIGH (ref 70–99)
GLUCOSE BLDC GLUCOMTR-MCNC: 102 MG/DL — HIGH (ref 70–99)
GLUCOSE BLDC GLUCOMTR-MCNC: 128 MG/DL — HIGH (ref 70–99)
GLUCOSE BLDC GLUCOMTR-MCNC: 283 MG/DL — HIGH (ref 70–99)
GLUCOSE BLDC GLUCOMTR-MCNC: 284 MG/DL — HIGH (ref 70–99)
GLUCOSE BLDC GLUCOMTR-MCNC: 373 MG/DL — HIGH (ref 70–99)
GLUCOSE BLDC GLUCOMTR-MCNC: 86 MG/DL — SIGNIFICANT CHANGE UP (ref 70–99)
GLUCOSE BLDC GLUCOMTR-MCNC: 88 MG/DL — SIGNIFICANT CHANGE UP (ref 70–99)
GLUCOSE SERPL-MCNC: 410 MG/DL — HIGH (ref 70–115)
HCT VFR BLD CALC: 27.9 % — LOW (ref 39–50)
HGB BLD-MCNC: 8.6 G/DL — LOW (ref 13–17)
INR BLD: 1.33 RATIO — HIGH (ref 0.88–1.16)
LIDOCAIN IGE QN: 18 U/L — LOW (ref 22–51)
MAGNESIUM SERPL-MCNC: 2.8 MG/DL — HIGH (ref 1.6–2.6)
MCHC RBC-ENTMCNC: 27.4 PG — SIGNIFICANT CHANGE UP (ref 27–34)
MCHC RBC-ENTMCNC: 30.8 GM/DL — LOW (ref 32–36)
MCV RBC AUTO: 88.9 FL — SIGNIFICANT CHANGE UP (ref 80–100)
PHOSPHATE SERPL-MCNC: 6.3 MG/DL — HIGH (ref 2.4–4.7)
PLATELET # BLD AUTO: 217 K/UL — SIGNIFICANT CHANGE UP (ref 150–400)
POTASSIUM SERPL-MCNC: 4.2 MMOL/L — SIGNIFICANT CHANGE UP (ref 3.5–5.3)
POTASSIUM SERPL-SCNC: 4.2 MMOL/L — SIGNIFICANT CHANGE UP (ref 3.5–5.3)
PROT SERPL-MCNC: 6.2 G/DL — LOW (ref 6.6–8.7)
PROTHROM AB SERPL-ACNC: 15.4 SEC — HIGH (ref 10–12.9)
RBC # BLD: 3.14 M/UL — LOW (ref 4.2–5.8)
RBC # FLD: 15 % — HIGH (ref 10.3–14.5)
SODIUM SERPL-SCNC: 134 MMOL/L — LOW (ref 135–145)
TROPONIN T SERPL-MCNC: 0.09 NG/ML — HIGH (ref 0–0.06)
TROPONIN T SERPL-MCNC: 0.09 NG/ML — HIGH (ref 0–0.06)
VANCOMYCIN TROUGH SERPL-MCNC: 8.4 UG/ML — LOW (ref 10–20)
WBC # BLD: 11.25 K/UL — HIGH (ref 3.8–10.5)
WBC # FLD AUTO: 11.25 K/UL — HIGH (ref 3.8–10.5)

## 2019-10-20 PROCEDURE — 71045 X-RAY EXAM CHEST 1 VIEW: CPT | Mod: 26

## 2019-10-20 PROCEDURE — 99223 1ST HOSP IP/OBS HIGH 75: CPT

## 2019-10-20 PROCEDURE — 99291 CRITICAL CARE FIRST HOUR: CPT

## 2019-10-20 PROCEDURE — 93010 ELECTROCARDIOGRAM REPORT: CPT

## 2019-10-20 RX ORDER — DEXTROSE 50 % IN WATER 50 %
25 SYRINGE (ML) INTRAVENOUS ONCE
Refills: 0 | Status: DISCONTINUED | OUTPATIENT
Start: 2019-10-20 | End: 2019-10-31

## 2019-10-20 RX ORDER — INSULIN HUMAN 100 [IU]/ML
6 INJECTION, SOLUTION SUBCUTANEOUS
Qty: 100 | Refills: 0 | Status: DISCONTINUED | OUTPATIENT
Start: 2019-10-20 | End: 2019-10-20

## 2019-10-20 RX ORDER — INSULIN GLARGINE 100 [IU]/ML
15 INJECTION, SOLUTION SUBCUTANEOUS ONCE
Refills: 0 | Status: COMPLETED | OUTPATIENT
Start: 2019-10-20 | End: 2019-10-20

## 2019-10-20 RX ORDER — SODIUM CHLORIDE 9 MG/ML
1000 INJECTION, SOLUTION INTRAVENOUS
Refills: 0 | Status: DISCONTINUED | OUTPATIENT
Start: 2019-10-20 | End: 2019-10-31

## 2019-10-20 RX ORDER — DEXTROSE 50 % IN WATER 50 %
12.5 SYRINGE (ML) INTRAVENOUS ONCE
Refills: 0 | Status: DISCONTINUED | OUTPATIENT
Start: 2019-10-20 | End: 2019-10-31

## 2019-10-20 RX ORDER — INSULIN HUMAN 100 [IU]/ML
5 INJECTION, SOLUTION SUBCUTANEOUS
Qty: 100 | Refills: 0 | Status: DISCONTINUED | OUTPATIENT
Start: 2019-10-20 | End: 2019-10-21

## 2019-10-20 RX ORDER — INSULIN LISPRO 100/ML
VIAL (ML) SUBCUTANEOUS
Refills: 0 | Status: DISCONTINUED | OUTPATIENT
Start: 2019-10-20 | End: 2019-10-20

## 2019-10-20 RX ORDER — LACTOBACILLUS ACIDOPHILUS 100MM CELL
1 CAPSULE ORAL DAILY
Refills: 0 | Status: DISCONTINUED | OUTPATIENT
Start: 2019-10-20 | End: 2019-10-29

## 2019-10-20 RX ORDER — MONTELUKAST 4 MG/1
10 TABLET, CHEWABLE ORAL DAILY
Refills: 0 | Status: DISCONTINUED | OUTPATIENT
Start: 2019-10-20 | End: 2019-10-31

## 2019-10-20 RX ORDER — FERROUS SULFATE 325(65) MG
325 TABLET ORAL DAILY
Refills: 0 | Status: DISCONTINUED | OUTPATIENT
Start: 2019-10-20 | End: 2019-10-25

## 2019-10-20 RX ADMIN — PANTOPRAZOLE SODIUM 40 MILLIGRAM(S): 20 TABLET, DELAYED RELEASE ORAL at 06:41

## 2019-10-20 RX ADMIN — Medication 600 MILLIGRAM(S): at 17:23

## 2019-10-20 RX ADMIN — DIVALPROEX SODIUM 500 MILLIGRAM(S): 500 TABLET, DELAYED RELEASE ORAL at 21:14

## 2019-10-20 RX ADMIN — INSULIN HUMAN 5 UNIT(S)/HR: 100 INJECTION, SOLUTION SUBCUTANEOUS at 14:39

## 2019-10-20 RX ADMIN — PIPERACILLIN AND TAZOBACTAM 25 GRAM(S): 4; .5 INJECTION, POWDER, LYOPHILIZED, FOR SOLUTION INTRAVENOUS at 17:23

## 2019-10-20 RX ADMIN — Medication 81 MILLIGRAM(S): at 11:52

## 2019-10-20 RX ADMIN — Medication 1 TABLET(S): at 11:52

## 2019-10-20 RX ADMIN — MONTELUKAST 10 MILLIGRAM(S): 4 TABLET, CHEWABLE ORAL at 11:52

## 2019-10-20 RX ADMIN — INSULIN GLARGINE 15 UNIT(S): 100 INJECTION, SOLUTION SUBCUTANEOUS at 01:38

## 2019-10-20 RX ADMIN — HEPARIN SODIUM 0 UNIT(S)/HR: 5000 INJECTION INTRAVENOUS; SUBCUTANEOUS at 06:11

## 2019-10-20 RX ADMIN — CHLORHEXIDINE GLUCONATE 1 APPLICATION(S): 213 SOLUTION TOPICAL at 07:05

## 2019-10-20 RX ADMIN — Medication 3 MILLILITER(S): at 09:27

## 2019-10-20 RX ADMIN — HEPARIN SODIUM 1100 UNIT(S)/HR: 5000 INJECTION INTRAVENOUS; SUBCUTANEOUS at 07:10

## 2019-10-20 RX ADMIN — DONEPEZIL HYDROCHLORIDE 5 MILLIGRAM(S): 10 TABLET, FILM COATED ORAL at 21:14

## 2019-10-20 RX ADMIN — HEPARIN SODIUM 900 UNIT(S)/HR: 5000 INJECTION INTRAVENOUS; SUBCUTANEOUS at 21:13

## 2019-10-20 RX ADMIN — Medication 50 MICROGRAM(S): at 06:40

## 2019-10-20 RX ADMIN — Medication 6: at 11:51

## 2019-10-20 RX ADMIN — PIPERACILLIN AND TAZOBACTAM 25 GRAM(S): 4; .5 INJECTION, POWDER, LYOPHILIZED, FOR SOLUTION INTRAVENOUS at 05:26

## 2019-10-20 RX ADMIN — Medication 325 MILLIGRAM(S): at 11:52

## 2019-10-20 RX ADMIN — HEPARIN SODIUM 900 UNIT(S)/HR: 5000 INJECTION INTRAVENOUS; SUBCUTANEOUS at 14:35

## 2019-10-20 RX ADMIN — Medication 3 MILLILITER(S): at 16:46

## 2019-10-20 RX ADMIN — Medication 5 MILLIGRAM(S): at 21:14

## 2019-10-20 RX ADMIN — Medication 3 MILLILITER(S): at 03:32

## 2019-10-20 RX ADMIN — Medication 50 MILLIEQUIVALENT(S): at 00:46

## 2019-10-20 RX ADMIN — Medication 3 MILLILITER(S): at 21:07

## 2019-10-20 RX ADMIN — Medication 10: at 06:47

## 2019-10-20 NOTE — SWALLOW BEDSIDE ASSESSMENT ADULT - SLP PERTINENT HISTORY OF CURRENT PROBLEM
85M w/ PMHx vascular dementia, HFpEF, COPD, CKD IV, Afib (not on AC) who was initially admitted for w/u recurrent syncope. ICU consulted for Afib w/ RVR and shock. CTH was grossly normal. CT C/A/P suggest of RUL/RLL PNA, L lung mass/ atelectasis, cholecystitis, duodenitis and cystitis. He was on IV lasix 40 bid in addition to metoprolol. DVT study normal.
Nida note:"85M with a history of vascular dementia, chronic kidney disease, diabetes, and COPD who presented with syncopal episodes as well as a two month history of declining status and increased weakness with increasing swelling."Pt is s/p a rapid response for hypoxemia after choking incident while eating onion rings last night.

## 2019-10-20 NOTE — PROGRESS NOTE ADULT - SUBJECTIVE AND OBJECTIVE BOX
NEPHROLOGY INTERVAL HPI/OVERNIGHT EVENTS:    awake and interactive   Off Pressors    ml off Lasix   Afebrile on Abx regimen for PNA   Afib on tele  Daughter Felicita at bedside   + Heparin drip , Hgb and plat stable     MEDICATIONS  (STANDING):  ALBUTerol/ipratropium for Nebulization 3 milliLiter(s) Nebulizer every 6 hours  ALBUTerol/ipratropium for Nebulization. 3 milliLiter(s) Nebulizer once  aspirin  chewable 81 milliGRAM(s) Oral daily  dextrose 5%. 1000 milliLiter(s) (50 mL/Hr) IV Continuous <Continuous>  dextrose 50% Injectable 12.5 Gram(s) IV Push once  dextrose 50% Injectable 25 Gram(s) IV Push once  dextrose 50% Injectable 25 Gram(s) IV Push once  diVALproex  milliGRAM(s) Oral at bedtime  donepezil 5 milliGRAM(s) Oral at bedtime  ferrous    sulfate 325 milliGRAM(s) Oral daily  guaiFENesin  milliGRAM(s) Oral every 12 hours  heparin  Infusion.  Unit(s)/Hr (17 mL/Hr) IV Continuous <Continuous>  insulin lispro (HumaLOG) corrective regimen sliding scale   SubCutaneous Before meals and at bedtime  lactobacillus acidophilus 1 Tablet(s) Oral daily  levothyroxine 50 MICROGram(s) Oral daily  melatonin 5 milliGRAM(s) Oral at bedtime  montelukast 10 milliGRAM(s) Oral daily  multivitamin 1 Tablet(s) Oral daily  pantoprazole    Tablet 40 milliGRAM(s) Oral before breakfast  piperacillin/tazobactam IVPB.. 3.375 Gram(s) IV Intermittent every 12 hours    MEDICATIONS  (PRN):  acetaminophen   Tablet .. 650 milliGRAM(s) Oral every 6 hours PRN Temp greater or equal to 38C (100.4F), Mild Pain (1 - 3)  aluminum hydroxide/magnesium hydroxide/simethicone Suspension 30 milliLiter(s) Oral every 4 hours PRN Dyspepsia  dextrose 40% Gel 15 Gram(s) Oral once PRN Blood Glucose LESS THAN 70 milliGRAM(s)/deciliter  glucagon  Injectable 1 milliGRAM(s) IntraMuscular once PRN Glucose LESS THAN 70 milligrams/deciliter  heparin  Injectable 7500 Unit(s) IV Push every 6 hours PRN For aPTT less than 40  heparin  Injectable 3500 Unit(s) IV Push every 6 hours PRN For aPTT between 40 - 57  ondansetron Injectable 4 milliGRAM(s) IV Push every 6 hours PRN Nausea  sodium chloride 0.9% lock flush 10 milliLiter(s) IV Push every 1 hour PRN Pre/post blood products, medications, blood draw, and to maintain line patency      Allergies    No Known Allergies    Intolerances          Vital Signs Last 24 Hrs  T(C): 37 (20 Oct 2019 09:00), Max: 37 (19 Oct 2019 23:38)  T(F): 98.6 (20 Oct 2019 09:00), Max: 98.6 (19 Oct 2019 23:38)  HR: 77 (20 Oct 2019 09:27) (72 - 130)  BP: 110/64 (20 Oct 2019 09:00) (57/46 - 136/68)  BP(mean): 79 (20 Oct 2019 09:00) (64 - 96)  RR: 24 (20 Oct 2019 09:00) (7 - 36)  SpO2: 98% (20 Oct 2019 09:27) (91% - 100%)  Daily     Daily Weight in k.9 (20 Oct 2019 04:03)  I&O's Detail    19 Oct 2019 07:01  -  20 Oct 2019 07:00  --------------------------------------------------------  IN:    DOPamine Infusion: 104.4 mL    heparin  Infusion.: 123 mL    heparin  Infusion.: 85 mL    multiple electrolytes Injection Type 1multiple electrolytes Injection Type 1: 2080 mL    norepinephrine Infusion: 79.4 mL    Oral Fluid: 240 mL    Solution: 250 mL    Solution: 50 mL    Solution: 200 mL    Solution: 275 mL  Total IN: 3486.8 mL    OUT:    Estimated Blood Loss: 410 mL    Indwelling Catheter - Urethral: 810 mL    Voided: 600 mL  Total OUT: 1820 mL    Total NET: 1666.8 mL      20 Oct 2019 07:01  -  20 Oct 2019 09:54  --------------------------------------------------------  IN:    heparin  Infusion.: 22 mL    multiple electrolytes Injection Type 1multiple electrolytes Injection Type 1: 60 mL    Oral Fluid: 60 mL  Total IN: 142 mL    OUT:    Indwelling Catheter - Urethral: 110 mL  Total OUT: 110 mL    Total NET: 32 mL        I&O's Summary    19 Oct 2019 07:  -  20 Oct 2019 07:00  --------------------------------------------------------  IN: 3486.8 mL / OUT: 1820 mL / NET: 1666.8 mL    20 Oct 2019 07:  -  20 Oct 2019 09:54  --------------------------------------------------------  IN: 142 mL / OUT: 110 mL / NET: 32 mL        PHYSICAL EXAM:  HEAD:  Atraumatic, No edema   NECK: Supple, L TLC   CHEST/LUNG: EAE , few basilar crackles , no rub   HEART: Irregular , no rub   ABDOMEN: Soft, + distended +BS; nontender; abd wall and flank edema  EXTREMITIES: + edema , the same   LABS:                        8.6    11.25 )-----------( 217      ( 20 Oct 2019 05:22 )             27.9     10-20    134<L>  |  93<L>  |  100.0<H>  ----------------------------<  410<H>  4.2   |  23.0  |  4.22<H>    Ca    7.9<L>      20 Oct 2019 05:21  Phos  6.3     10-20  Mg     2.8     10-20    TPro  6.2<L>  /  Alb  2.9<L>  /  TBili  0.3<L>  /  DBili  x   /  AST  387<H>  /  ALT  604<H>  /  AlkPhos  136<H>  10-20    PT/INR - ( 20 Oct 2019 05:22 )   PT: 15.4 sec;   INR: 1.33 ratio         PTT - ( 20 Oct 2019 05:22 )  PTT:>200.0 sec  Urinalysis Basic - ( 18 Oct 2019 09:56 )    Color: Yellow / Appearance: Clear / S.010 / pH: x  Gluc: x / Ketone: Negative  / Bili: Negative / Urobili: Negative mg/dL   Blood: x / Protein: Negative mg/dL / Nitrite: Negative   Leuk Esterase: Negative / RBC: x / WBC x   Sq Epi: x / Non Sq Epi: x / Bacteria: x      Magnesium, Serum: 2.8 mg/dL (10-20 @ 05:22)  Phosphorus Level, Serum: 6.3 mg/dL (10-20 @ 05:22)  Magnesium, Serum: 1.5 mg/dL (10-19 @ 21:08)    ABG - ( 19 Oct 2019 21:02 )  pH, Arterial: 7.34  pH, Blood: x     /  pCO2: 31    /  pO2: 144   / HCO3: 18    / Base Excess: -8.1  /  SaO2: 99                    RADIOLOGY & ADDITIONAL TESTS:

## 2019-10-20 NOTE — SWALLOW BEDSIDE ASSESSMENT ADULT - COMMENTS
Pt is also a rapid response for hypoxemia 2/2 choking on onion ring per chart review. cues to cease talking with PO in oral cavity needed t/o assessment.

## 2019-10-20 NOTE — PROGRESS NOTE ADULT - ASSESSMENT
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

## 2019-10-20 NOTE — PROGRESS NOTE ADULT - SUBJECTIVE AND OBJECTIVE BOX
[facility]                                                       [facility]                                                        Office: [address] [address]                                                       Telephone: [phone]. Fax:[phone]                                                                             PROGRESS NOTE   Reason for follow up:  Syncope                            Overnight: No new events.   Update:   In ICU. opff pressors. Feels good. ggoo urine output. echo reviewed. IVC dilated and elevatred RA pressures.   Spoke to family again, patient complaisn of epigastric pain adn discomfort and had similar symptoms when he was in doctrs office when he passed out.      Subjective: i am ok   Complains of:  No new symptoms.   Review of symptoms: Cardiac:  No chest pain. No dyspnea. No palpitations.  Respiratory: no cough. No dyspnea  Gastrointestinal: No diarrhea. + abdominal pain vague response.   No bleeding.     Past medical history: No updates.   Chronic conditions: HEALTH ISSUES - PROBLEM Dx:  Dementia: Dementia  COPD (chronic obstructive pulmonary disease): COPD (chronic obstructive pulmonary disease)  DM (diabetes mellitus): DM (diabetes mellitus)  Transaminitis: Transaminitis  Acute on chronic renal failure: Acute on chronic renal failure  Edema: Edema  Syncope: Syncope    Vitals:  Vital Signs Last 24 Hrs  T(C): 36.9 (10-20-19 @ 18:00), Max: 37 (10-19-19 @ 23:38)  T(F): 98.4 (10-20-19 @ 18:00), Max: 98.6 (10-19-19 @ 23:38)  HR: 78 (10-20-19 @ 18:00) (72 - 84)  BP: 104/57 (10-20-19 @ 18:00) (95/63 - 136/68)  BP(mean): 75 (10-20-19 @ 18:00) (72 - 96)  RR: 21 (10-20-19 @ 18:00) (18 - 36)  SpO2: 96% (10-20-19 @ 18:00) (91% - 100%)    PHYSICAL EXAM:  Appearance: Minimal verbalizaion, obese, and vomiting.     HEENT:  Head and neck: Atraumatic. Normocephalic.  Normal oral mucosa, PERRL, Neck is supple. No JVD, No carotid bruit.   Neurologic: A & O x 0, no focal deficits. EOMI , Cranial nerves are intact.  Lymphatic: No cervical lymphadenopathy  Cardiovascular: Normal S1 S2, No murmur, rubs/gallops. + JVD, +2 edema  Respiratory: bilateral wheezing, basilar crackles increased on right.    Gastrointestinal:  Soft, mild tenderness. upper quadrant.   Lower Extremities: No edema  Psychiatry: Patient is calm. No agitation. Mood & affect appropriate  Skin: No rashes/ ecchymoses/cyanosis/ulcers visualized on the face, hands or feet.    CURRENT MEDICATIONS:  MEDICATIONS  (STANDING):    ALBUTerol/ipratropium for Nebulization  ALBUTerol/ipratropium for Nebulization.  guaiFENesin ER  montelukast  pantoprazole    Tablet  piperacillin/tazobactam IVPB..  aspirin  chewable  dextrose 5%.  dextrose 50% Injectable  dextrose 50% Injectable  dextrose 50% Injectable  diVALproex ER  donepezil  ferrous    sulfate  heparin  Infusion.  insulin regular Infusion  lactobacillus acidophilus  levothyroxine  melatonin  multivitamin    PRN: acetaminophen   Tablet .. PRN  aluminum hydroxide/magnesium hydroxide/simethicone Suspension PRN  dextrose 40% Gel PRN  glucagon  Injectable PRN  heparin  Injectable PRN  heparin  Injectable PRN  ondansetron Injectable PRN  sodium chloride 0.9% lock flush PRN      LABS:	 	                        8.6    11.25 )-----------( 217      ( 20 Oct 2019 05:22 )             27.9   N=x    ; L=x        20 Oct 2019 05:21    134    |  93     |  100.0  ----------------------------<  410    4.2     |  23.0   |  4.22     Ca    7.9        20 Oct 2019 05:21  Phos  6.3       20 Oct 2019 05:22  Mg     2.8       20 Oct 2019 05:22    TPro  6.2    /  Alb  2.9    /  TBili  0.3    /  DBili  x      /  AST  387    /  ALT  604    /  AlkPhos  136    20 Oct 2019 05:21      Hepatic panel: 20 Oct 2019 05:21  6.2   | 2.9                            0.3   | 0.3  /x                              387   | 604                               /136  \par                                 TELEMETRY: afib rate 90's.      ECG:  Reviewed by me. 	ORDERED.

## 2019-10-20 NOTE — SWALLOW BEDSIDE ASSESSMENT ADULT - ASR SWALLOW ASPIRATION MONITOR
gurgly voice/upper respiratory infection/cough/oral hygiene/position upright (90Y)/throat clearing/change of breathing pattern/fever/pneumonia
pneumonia/oral hygiene/position upright (90Y)/fever/throat clearing/change of breathing pattern/cough/gurgly voice/upper respiratory infection

## 2019-10-20 NOTE — SWALLOW BEDSIDE ASSESSMENT ADULT - SLP GENERAL OBSERVATIONS
Pt received A&A, reduced cognition, able to follow simple commands and respond to questions, at times needed encouragement to participate, Sp02 93-94% throughout on room air, intermittent baseline cough (per family baseline 2* COPD), no pain reported prior to evaluation, Pt's daughter and wife at bedside
Pt received A&A OOB in chair, mildly reduced cognition, able to follow simple commands and respond to questions, Sp02 90-03% throughout on room air, intermittent baseline cough (per family baseline 2* COPD), no pain reported prior to evaluation, however pt reported pain 5/10 right side chest at eval end. RN Epi aware. Pt's daughter and wife at bedside.

## 2019-10-20 NOTE — CONSULT NOTE ADULT - SUBJECTIVE AND OBJECTIVE BOX
History obtained from the family and the chart    HISTORY OF PRESENT ILLNESS:  This is a 85y old Male with a past medical history significant for vascular dementia afib (not on ac), CKD, DM, COPD presented with syncope and worsening edema. Prior to admission patient had worsening edema and abdominal distention.  He also had burning epigastric pain and chest pain. He was admitted and given IV lasix for CKD and LE edema.  Patient  was -2.5L since admission.  He developed rapid a fib yesterday then became hypotensive and AMS and was transferred to the ICU. He was found to have severe rv dysfunction on repeat echo and started on IV heparin for possible PE vs ACS. There was concern for sepsis. Imaging showed GB wall thickening. GI consulted to r/o acalculous cholecystitis     REVIEW OF SYSTEMS:  Constitutional:  No unintentional weight loss, fevers, chills or night sweats	  Eyes: No eye pain, redness, discharge, or proptosis  ENMT: No sore throat, ear pain, mouth sores, or swollen glands in the neck  Respiratory: No dyspnea, cough or wheezing  Cardiovascular: No chest pain, dyspnea on exertion, or orthopnea  Gastrointestinal:	Please see HPI  Genitourinary: No dysuria or hematuria  Neurological:	 No changes in sleep/wake cycle, convulsions, confusion, dizziness or lightheadedness  Psychiatric: No changes in personality or emotional problems   Hematology: No easy bruising   Endocrine: No hot or cold flashes or deepening of voice	  All other review of systems were completed and were otherwise negative save what is reported in the HPI.    PAST MEDICAL/SURGICAL HISTORY:  CKD (chronic kidney disease)  COPD (chronic obstructive pulmonary disease)  Dementia  Afib  DM (diabetes mellitus)  History of lung biopsy  S/P carotid endarterectomy    SOCIAL HISTORY:  - ILLICIT DRUG USE: Denies    FAMILY HISTORY:  No known history of gastrointestinal or liver disease;  No pertinent family history in first degree relatives      HOME MEDICATIONS:  albuterol 2.5 mg/3 mL (0.083%) inhalation solution: 3 milliliter(s) inhaled 3 times a day (18 Oct 2019 11:30)  Anoro Ellipta 62.5 mcg-25 mcg/inh inhalation powder: 1 puff(s) inhaled once a day (18 Oct 2019 11:30)  Aricept 5 mg oral tablet: 1 tab(s) orally once a day (at bedtime) (18 Oct 2019 11:30)  aspirin 81 mg oral tablet: 1 tab(s) orally once a day (18 Oct 2019 11:30)  Centrum Silver oral tablet: 1 tab(s) orally once a day (18 Oct 2019 11:30)  Co Q-10 100 mg oral capsule: 1 cap(s) orally once a day (18 Oct 2019 11:30)  divalproex sodium 500 mg oral tablet, extended release: 1 tab(s) orally once a day (at bedtime) (18 Oct 2019 11:30)  Dyazide 25 mg-37.5 mg oral capsule: 1 cap(s) orally once a day (18 Oct 2019 11:30)  ferrous sulfate 325 mg (65 mg elemental iron) oral tablet: 1 tab(s) orally once a day (18 Oct 2019 11:30)  Livalo: orally 5 times a week (18 Oct 2019 11:30)  melatonin 5 mg oral tablet: 1 tab(s) orally once a day (at bedtime) (18 Oct 2019 11:30)  memantine 7 mg oral capsule, extended release: 1 cap(s) orally once a day (18 Oct 2019 11:30)  montelukast 10 mg oral tablet: 1 tab(s) orally once a day (18 Oct 2019 11:30)  NovoLOG 100 units/mL subcutaneous solution:  (18 Oct 2019 11:30)  pantoprazole 40 mg oral delayed release tablet: 1 tab(s) orally once a day (18 Oct 2019 11:30)  PriLOSEC 20 mg oral delayed release capsule: 1 cap(s) orally once a day (20 Oct 2019 00:38)  probiotic:  (18 Oct 2019 11:30)  risperiDONE 0.25 mg oral tablet: 1 tab(s) orally once a day, As Needed (18 Oct 2019 11:30)  Synthroid 50 mcg (0.05 mg) oral tablet: 1 tab(s) orally once a day (18 Oct 2019 11:30)  Toujeo SoloStar 300 units/mL subcutaneous solution: 22 unit(s) subcutaneous once a day (at bedtime) (18 Oct 2019 11:30)    INPATIENT MEDICATIONS:  MEDICATIONS  (STANDING):  ALBUTerol/ipratropium for Nebulization 3 milliLiter(s) Nebulizer every 6 hours  ALBUTerol/ipratropium for Nebulization. 3 milliLiter(s) Nebulizer once  aspirin  chewable 81 milliGRAM(s) Oral daily  dextrose 5%. 1000 milliLiter(s) (50 mL/Hr) IV Continuous <Continuous>  dextrose 50% Injectable 12.5 Gram(s) IV Push once  dextrose 50% Injectable 25 Gram(s) IV Push once  dextrose 50% Injectable 25 Gram(s) IV Push once  diVALproex  milliGRAM(s) Oral at bedtime  donepezil 5 milliGRAM(s) Oral at bedtime  ferrous    sulfate 325 milliGRAM(s) Oral daily  guaiFENesin  milliGRAM(s) Oral every 12 hours  heparin  Infusion.  Unit(s)/Hr (17 mL/Hr) IV Continuous <Continuous>  insulin regular Infusion 5 Unit(s)/Hr (5 mL/Hr) IV Continuous <Continuous>  lactobacillus acidophilus 1 Tablet(s) Oral daily  levothyroxine 50 MICROGram(s) Oral daily  melatonin 5 milliGRAM(s) Oral at bedtime  montelukast 10 milliGRAM(s) Oral daily  multivitamin 1 Tablet(s) Oral daily  pantoprazole    Tablet 40 milliGRAM(s) Oral before breakfast  piperacillin/tazobactam IVPB.. 3.375 Gram(s) IV Intermittent every 12 hours    MEDICATIONS  (PRN):  acetaminophen   Tablet .. 650 milliGRAM(s) Oral every 6 hours PRN Temp greater or equal to 38C (100.4F), Mild Pain (1 - 3)  aluminum hydroxide/magnesium hydroxide/simethicone Suspension 30 milliLiter(s) Oral every 4 hours PRN Dyspepsia  dextrose 40% Gel 15 Gram(s) Oral once PRN Blood Glucose LESS THAN 70 milliGRAM(s)/deciliter  glucagon  Injectable 1 milliGRAM(s) IntraMuscular once PRN Glucose LESS THAN 70 milligrams/deciliter  heparin  Injectable 7500 Unit(s) IV Push every 6 hours PRN For aPTT less than 40  heparin  Injectable 3500 Unit(s) IV Push every 6 hours PRN For aPTT between 40 - 57  ondansetron Injectable 4 milliGRAM(s) IV Push every 6 hours PRN Nausea  sodium chloride 0.9% lock flush 10 milliLiter(s) IV Push every 1 hour PRN Pre/post blood products, medications, blood draw, and to maintain line patency    ALLERGIES:  No Known Allergies    VITAL SIGNS LAST 24 HOURS:  T(C): 36.7 (20 Oct 2019 13:00), Max: 37 (19 Oct 2019 23:38)  T(F): 98.1 (20 Oct 2019 13:00), Max: 98.6 (19 Oct 2019 23:38)  HR: 75 (20 Oct 2019 14:00) (72 - 124)  BP: 106/58 (20 Oct 2019 14:00) (81/53 - 136/68)  BP(mean): 77 (20 Oct 2019 14:00) (64 - 96)  RR: 18 (20 Oct 2019 14:00) (7 - 36)  SpO2: 99% (20 Oct 2019 14:00) (91% - 100%)      10-19-19 @ 07:01  -  10-20-19 @ 07:00  --------------------------------------------------------  IN: 3486.8 mL / OUT: 1820 mL / NET: 1666.8 mL    10-20-19 @ 07:01  -  10-20-19 @ 14:44  --------------------------------------------------------  IN: 306 mL / OUT: 325 mL / NET: -19 mL        10-19-19 @ 07:01  -  10-20-19 @ 07:00  --------------------------------------------------------  IN: 3486.8 mL / OUT: 1820 mL / NET: 1666.8 mL    10-20-19 @ 07:01  -  10-20-19 @ 14:44  --------------------------------------------------------  IN: 306 mL / OUT: 325 mL / NET: -19 mL          PHYSICAL EXAM:  Constitutional: Well-developed, well-nourished, in no apparent distress  Eyes: Sclerae anicteric, conjunctivae normal  ENMT: Mucus membranes moist, no oropharyngeal thrush noted  Neck: No thyroid nodules appreciated, no significant cervical or supraclavicular lymphadenopathy  Respiratory: Breathing nonlabored; clear to auscultation  Cardiovascular: irregular  Gastrointestinal: Soft, nontender, nondistended, normoactive bowel sounds  Extremities: No clubbing, cyanosis or edema  Neurological: Alert   Skin: No jaundice  Lymph Nodes: No significant lymphadenopathy  Musculoskeletal: No significant peripheral atrophy  Psychiatric: Affect and mood appropriate      LABS:                        8.6    11.25 )-----------( 217      ( 20 Oct 2019 05:22 )             27.9     PT/INR - ( 20 Oct 2019 05:22 )   PT: 15.4 sec;   INR: 1.33 ratio         PTT - ( 20 Oct 2019 13:22 )  PTT:106.1 sec  10-20    134<L>  |  93<L>  |  100.0<H>  ----------------------------<  410<H>  4.2   |  23.0  |  4.22<H>    Ca    7.9<L>      20 Oct 2019 05:21  Phos  6.3     10-20  Mg     2.8     10-20    TPro  6.2<L>  /  Alb  2.9<L>  /  TBili  0.3<L>  /  DBili  x   /  AST  387<H>  /  ALT  604<H>  /  AlkPhos  136<H>  10-20    LIVER FUNCTIONS - ( 20 Oct 2019 05:21 )  Alb: 2.9 g/dL / Pro: 6.2 g/dL / ALK PHOS: 136 U/L / ALT: 604 U/L / AST: 387 U/L / GGT: 114 U/L       IMAGING:  < from: US Abdomen Complete (10.19.19 @ 20:42) >  FINDINGS:   Liver: Heterogeneous echotexture.  Gallbladder: Diffuse gallbladder wall thickening again noted. Edema noted   within the gallbladder wall. No gallstones.    Common bile duct: No biliary ductal dilatation. 3 mm.  Pancreas: Visualized pancreas is unremarkable.   Right kidney: Right kidney length 10.2 cm.   No hydronephrosis.   Left kidney:  Left kidney length 10.0 cm. No   hydronephrosis.   Spleen: Normal. No splenomegaly.   Aorta: Normal. No aneurysm.   Inferior vena cava: Normal.   Portal venous: Portal vein patent with hepatopedal flow.   Hepatic veins: Visualized hepatic veins patent and unremarkable.     IMPRESSION:   1. Heterogeneous echotexture to the liver. Correlate clinically.   2. Diffuse gallbladder wall thickening again noted. Edema noted within   the gallbladder wall. No gallstones. Acalculous cholecystitis must be   considered in the appropriate clinical setting.    < end of copied text >

## 2019-10-20 NOTE — PROGRESS NOTE ADULT - SUBJECTIVE AND OBJECTIVE BOX
Patient is a 85y old  Male who presents with a chief complaint of syncope (20 Oct 2019 09:54)    BRIEF HOSPITAL COURSE:     85M w/ PMHx vascular dementia, HFpEF, COPD, CKD IV, Afib (not on AC) who was initially admitted for w/u recurrent syncope. ICU consulted for Afib w/ RVR and shock. CTH was grossly normal. CT C/A/P suggest of RUL/RLL PNA, L lung mass/ atelectasis, cholecystitis, duodenitis and cystitis. He was on IV lasix 40 bid in addition to metoprolol. DVT study normal. On evaluation he was hypotensive SBP ~ 60s and pt was very encephalopathic. POCUS reveal a hypokinetic RV and dilated IVC. Pt was given fluid bolus and started on IV Hep and Dopamine prior to ICU transfer.     Events last 24 hours:   Pt was taken off Levo overnight. Afebrile. Seems a bit congested today. IVF stopped. C/o vague abd discomfort at times. Afebrile. On Hep gtt  TTE w/ hyperdynamic LV and normal looking RV    PAST MEDICAL & SURGICAL HISTORY:  CKD (chronic kidney disease)  COPD (chronic obstructive pulmonary disease)  Dementia  Afib  DM (diabetes mellitus)  History of lung biopsy  S/P carotid endarterectomy    Review of Systems:  CONSTITUTIONAL: No fever, chills, or fatigue  EYES: No eye pain, visual disturbances, or discharge  ENMT:  No difficulty hearing, tinnitus, vertigo; No sinus or throat pain  NECK: No pain or stiffness  RESPIRATORY: No cough, wheezing, chills or hemoptysis; No shortness of breath  CARDIOVASCULAR: No chest pain, palpitations, dizziness, or leg swelling  GASTROINTESTINAL: No abdominal or epigastric pain. No nausea, vomiting, or hematemesis; No diarrhea or constipation. No melena or hematochezia.  GENITOURINARY: No dysuria, frequency, hematuria, or incontinence  NEUROLOGICAL: No headaches, memory loss, loss of strength, numbness, or tremors  SKIN: No itching, burning, rashes, or lesions   MUSCULOSKELETAL: No joint pain or swelling; No muscle, back, or extremity pain  PSYCHIATRIC: No depression, anxiety, mood swings, or difficulty sleeping    Physical Examination:    ICU Vital Signs Last 24 Hrs  T(C): 36.9 (20 Oct 2019 12:00), Max: 37 (19 Oct 2019 23:38)  T(F): 98.4 (20 Oct 2019 12:00), Max: 98.6 (19 Oct 2019 23:38)  HR: 75 (20 Oct 2019 12:00) (72 - 124)  BP: 105/59 (20 Oct 2019 12:00) (74/41 - 136/68)  BP(mean): 77 (20 Oct 2019 12:00) (64 - 96)  ABP: 108/46 (20 Oct 2019 12:00) (86/53 - 153/57)  ABP(mean): 62 (20 Oct 2019 12:00) (53 - 88)  RR: 21 (20 Oct 2019 12:00) (7 - 36)  SpO2: 98% (20 Oct 2019 12:00) (91% - 100%)    General: No acute distress.      Neuro: AAO*2, No motor, sensory, or cranial nerve deficit    HEENT: Pupils equal, reactive to light, Moist oral mucosa    PULM: Clear to auscultation bilaterally, no significant adventitious breath sounds     CVS: Regular rhythm and controlled rate, no murmurs, rubs, or gallops    ABD: Soft, nondistended, nontender, normoactive bowel sounds    EXT: 2+ b/l LE edema, nontender with pedal pulse palpable     SKIN: Warm and well perfused, no acute rashes       Medications:  piperacillin/tazobactam IVPB.. 3.375 Gram(s) IV Intermittent every 12 hours      ALBUTerol/ipratropium for Nebulization 3 milliLiter(s) Nebulizer every 6 hours  ALBUTerol/ipratropium for Nebulization. 3 milliLiter(s) Nebulizer once  guaiFENesin  milliGRAM(s) Oral every 12 hours  montelukast 10 milliGRAM(s) Oral daily    acetaminophen   Tablet .. 650 milliGRAM(s) Oral every 6 hours PRN  diVALproex  milliGRAM(s) Oral at bedtime  donepezil 5 milliGRAM(s) Oral at bedtime  melatonin 5 milliGRAM(s) Oral at bedtime  ondansetron Injectable 4 milliGRAM(s) IV Push every 6 hours PRN      aspirin  chewable 81 milliGRAM(s) Oral daily  heparin  Infusion.  Unit(s)/Hr IV Continuous <Continuous>  heparin  Injectable 7500 Unit(s) IV Push every 6 hours PRN  heparin  Injectable 3500 Unit(s) IV Push every 6 hours PRN    aluminum hydroxide/magnesium hydroxide/simethicone Suspension 30 milliLiter(s) Oral every 4 hours PRN  pantoprazole    Tablet 40 milliGRAM(s) Oral before breakfast      dextrose 40% Gel 15 Gram(s) Oral once PRN  dextrose 50% Injectable 12.5 Gram(s) IV Push once  dextrose 50% Injectable 25 Gram(s) IV Push once  dextrose 50% Injectable 25 Gram(s) IV Push once  glucagon  Injectable 1 milliGRAM(s) IntraMuscular once PRN  insulin regular Infusion 5 Unit(s)/Hr IV Continuous <Continuous>  levothyroxine 50 MICROGram(s) Oral daily    dextrose 5%. 1000 milliLiter(s) IV Continuous <Continuous>  ferrous    sulfate 325 milliGRAM(s) Oral daily  multivitamin 1 Tablet(s) Oral daily  sodium chloride 0.9% lock flush 10 milliLiter(s) IV Push every 1 hour PRN        lactobacillus acidophilus 1 Tablet(s) Oral daily          I&O's Detail    19 Oct 2019 07:01  -  20 Oct 2019 07:00  --------------------------------------------------------  IN:    DOPamine Infusion: 104.4 mL    heparin  Infusion.: 123 mL    heparin  Infusion.: 85 mL    multiple electrolytes Injection Type 1multiple electrolytes Injection Type 1: 2080 mL    norepinephrine Infusion: 79.4 mL    Oral Fluid: 240 mL    Solution: 250 mL    Solution: 50 mL    Solution: 200 mL    Solution: 275 mL  Total IN: 3486.8 mL    OUT:    Estimated Blood Loss: 410 mL    Indwelling Catheter - Urethral: 810 mL    Voided: 600 mL  Total OUT: 1820 mL    Total NET: 1666.8 mL      20 Oct 2019 07:01  -  20 Oct 2019 12:49  --------------------------------------------------------  IN:    heparin  Infusion.: 55 mL    multiple electrolytes Injection Type 1multiple electrolytes Injection Type 1: 60 mL    Oral Fluid: 180 mL  Total IN: 295 mL    OUT:    Indwelling Catheter - Urethral: 250 mL  Total OUT: 250 mL    Total NET: 45 mL            RADIOLOGY/ Microbiology/ Labs: reviewed

## 2019-10-20 NOTE — SWALLOW BEDSIDE ASSESSMENT ADULT - MODE OF PRESENTATION
spoon/fed by clinician fed by clinician/self fed/cues to limit bite size needed self fed/fed by clinician straw/cup/spoon/self fed/fed by clinician

## 2019-10-20 NOTE — SWALLOW BEDSIDE ASSESSMENT ADULT - ADDITIONAL RECOMMENDATIONS
ST will follow to check PO tolerance and reassess as schedule permits
Remind pt not to verbalized while eating to minimize aspiration risk  Crush medications PRN  Upright with all PO

## 2019-10-20 NOTE — SWALLOW BEDSIDE ASSESSMENT ADULT - SWALLOW EVAL: FEEDING ASSISTANCE
frequent cues/help required/needed cues to decrease negative behaviors such as excessive talking with PO
1:1 supervision with all PO rx'd 2* h/o dementia

## 2019-10-20 NOTE — SWALLOW BEDSIDE ASSESSMENT ADULT - SPECIFY REASON(S)
MD order for swallow re-eval 2/2 "coughing during PO" per RN and s/p rapid response with transfer to ICU now.
r/o dysphagia; pt s/p choking incident

## 2019-10-20 NOTE — PROGRESS NOTE ADULT - SUBJECTIVE AND OBJECTIVE BOX
Patient is a 85y old  Male who presents with a chief complaint of syncope (19 Oct 2019 19:59)    PAST MEDICAL & SURGICAL HISTORY:  CKD (chronic kidney disease)  COPD (chronic obstructive pulmonary disease)  Dementia  Afib  DM (diabetes mellitus)  History of lung biopsy  S/P carotid endarterectomy    MINO STEPHENS   85y    Male    BRIEF HOSPITAL COURSE:    Review of Systems:                       All other ROS are negative.    Allergies    No Known Allergies    Intolerances      Weight (kg): 93 (10-19-19 @ 19:00)    ICU Vital Signs Last 24 Hrs  T(C): 36.9 (20 Oct 2019 04:03), Max: 37 (19 Oct 2019 23:38)  T(F): 98.4 (20 Oct 2019 04:03), Max: 98.6 (19 Oct 2019 23:38)  HR: 74 (20 Oct 2019 06:00) (73 - 130)  BP: 115/62 (20 Oct 2019 06:00) (57/46 - 136/68)  BP(mean): 83 (20 Oct 2019 06:00) (64 - 96)  ABP: 128/59 (20 Oct 2019 06:00) (86/53 - 153/57)  ABP(mean): 78 (20 Oct 2019 06:00) (53 - 88)  RR: 18 (20 Oct 2019 06:00) (7 - 36)  SpO2: 96% (20 Oct 2019 06:00) (90% - 100%)    Physical Examination:    General: NAD    HEENT: L IJ TLC site clean    PULM: bilateral BS     CVS: s1 s2 reg    ABD:  soft [place] no RUQ tenderness.  Tolerated a diet     EXT: trace edema     SKIN: cool feet     Neuro: alert confused    ABG - ( 19 Oct 2019 21:02 )  pH, Arterial: 7.34  pH, Blood: x     /  pCO2: 31    /  pO2: 144   / HCO3: 18    / Base Excess: -8.1  /  SaO2: 99            LABS:                        8.6    11.25 )-----------( 217      ( 20 Oct 2019 05:22 )             27.9     10-20    134<L>  |  93<L>  |  100.0<H>  ----------------------------<  410<H>  4.2   |  23.0  |  4.22<H>    Ca    7.9<L>      20 Oct 2019 05:21  Phos  6.3     10-20  Mg     2.8     10-20    TPro  6.2<L>  /  Alb  2.9<L>  /  TBili  0.3<L>  /  DBili  x   /  AST  387<H>  /  ALT  604<H>  /  AlkPhos  136<H>  10-20      CARDIAC MARKERS ( 19 Oct 2019 18:42 )  x     / 0.07 ng/mL / 171 U/L / x     / 5.2 ng/mL  CARDIAC MARKERS ( 19 Oct 2019 14:05 )  x     / 0.07 ng/mL / x     / x     / x      CARDIAC MARKERS ( 18 Oct 2019 07:32 )  x     / 0.06 ng/mL / x     / x     / x          CAPILLARY BLOOD GLUCOSE      POCT Blood Glucose.: 258 mg/dL (19 Oct 2019 19:36)  POCT Blood Glucose.: 154 mg/dL (19 Oct 2019 12:13)  POCT Blood Glucose.: 132 mg/dL (19 Oct 2019 08:18)    PT/INR - ( 20 Oct 2019 05:22 )   PT: 15.4 sec;   INR: 1.33 ratio         PTT - ( 20 Oct 2019 05:22 )  PTT:>200.0 sec  Urinalysis Basic - ( 18 Oct 2019 09:56 )    Color: Yellow / Appearance: Clear / S.010 / pH: x  Gluc: x / Ketone: Negative  / Bili: Negative / Urobili: Negative mg/dL   Blood: x / Protein: Negative mg/dL / Nitrite: Negative   Leuk Esterase: Negative / RBC: x / WBC x   Sq Epi: x / Non Sq Epi: x / Bacteria: x      CULTURES:  Rapid RVP Result: Tres (10-18 @ 00:33)      Medications:  MEDICATIONS  (STANDING):  ALBUTerol/ipratropium for Nebulization 3 milliLiter(s) Nebulizer every 6 hours  ALBUTerol/ipratropium for Nebulization. 3 milliLiter(s) Nebulizer once  aspirin  chewable 81 milliGRAM(s) Oral daily  chlorhexidine 4% Liquid 1 Application(s) Topical <User Schedule>  dextrose 5%. 1000 milliLiter(s) (50 mL/Hr) IV Continuous <Continuous>  dextrose 50% Injectable 12.5 Gram(s) IV Push once  dextrose 50% Injectable 25 Gram(s) IV Push once  dextrose 50% Injectable 25 Gram(s) IV Push once  diVALproex  milliGRAM(s) Oral at bedtime  donepezil 5 milliGRAM(s) Oral at bedtime  heparin  Infusion.  Unit(s)/Hr (17 mL/Hr) IV Continuous <Continuous>  insulin lispro (HumaLOG) corrective regimen sliding scale   SubCutaneous Before meals and at bedtime  levothyroxine 50 MICROGram(s) Oral daily  melatonin 5 milliGRAM(s) Oral at bedtime  multiple electrolytes Injection Type 1 1000 milliLiter(s) (60 mL/Hr) IV Continuous <Continuous>  pantoprazole    Tablet 40 milliGRAM(s) Oral before breakfast  piperacillin/tazobactam IVPB.. 3.375 Gram(s) IV Intermittent every 12 hours    MEDICATIONS  (PRN):  acetaminophen   Tablet .. 650 milliGRAM(s) Oral every 6 hours PRN Temp greater or equal to 38C (100.4F), Mild Pain (1 - 3)  aluminum hydroxide/magnesium hydroxide/simethicone Suspension 30 milliLiter(s) Oral every 4 hours PRN Dyspepsia  dextrose 40% Gel 15 Gram(s) Oral once PRN Blood Glucose LESS THAN 70 milliGRAM(s)/deciliter  glucagon  Injectable 1 milliGRAM(s) IntraMuscular once PRN Glucose LESS THAN 70 milligrams/deciliter  heparin  Injectable 7500 Unit(s) IV Push every 6 hours PRN For aPTT less than 40  heparin  Injectable 3500 Unit(s) IV Push every 6 hours PRN For aPTT between 40 - 57  ondansetron Injectable 4 milliGRAM(s) IV Push every 6 hours PRN Nausea  sodium chloride 0.9% lock flush 10 milliLiter(s) IV Push every 1 hour PRN Pre/post blood products, medications, blood draw, and to maintain line patency        10-18 @ 07:01  -  10-19 @ 07:00  --------------------------------------------------------  IN: 160 mL / OUT: 2320 mL / NET: -2160 mL    10-19 @ 07:01  -  10-20 @ 06:17  --------------------------------------------------------  IN: 2670.8 mL / OUT: 1770 mL / NET: 900.8 mL        RADIOLOGY/IMAGING/ECHO    < from: TTE Echo Complete w/Doppler (10.19.19 @ 14:45) >  Summary:   1. Patient tachycardic during the entire study.   2. Hyperdynamic wall motion.   3. Left ventricular ejectionfraction, by visual estimation, is >75%.   4. Normal right ventricular size and function.   5. Mild-moderate tricuspid regurgitation.   6. Estimated pulmonary artery systolic pressure is 48 mmHg -mild   pulmonary hypertension.   7. Small pericardial effusion. No echo evidence of tamponade.    < from: CT Abdomen and Pelvis No Cont (10.17.19 @ 19:56) >  IMPRESSION:  Infiltrates in the right upper and right lower lobes  Small pericardial effusion  Rounded density at the left lung base suggestive of rounded atelectasis   versus mass or infiltrate. There is atelectasis versus infiltrate at the   right lung base as well.  Mild stranding in the fat around the gallbladder and duodenum maintained   to indicate cholecystitis and/or duodenitis. Further evaluation of the   gallbladder may be obtained with ultrasound as clinically indicated.   Please correlate clinically for possible duodenitis.  Thick-walled bladder may indicate cystitis. Please correlate clinically  Enlarged prostate glands          Assessment/Plan:    85M hx  dementia, HFpEF, COPD, CKD IV, Afib (not on AC) admit 10/17 with syncope.    Yesterday, he developed  Afib w/ RVR with hypotension  started on ABX  for PNA seen on CT and full AC for the  a fib and concern for possible PE KAREN      Rate related hypotension in the setting of A fib with a rvr while volume depleted and hypokalemic/hypomagnesemic  Has converted to SR is off pressors.  Has some ischemic hepatopathy from yesterdays hypotensive episode.  RUQ US shows GB wall thickening, but no RUQ tenderness on exam.    Complete course of ABX  Will discuss if he should be on permanent AC

## 2019-10-20 NOTE — PROVIDER CONTACT NOTE (OTHER) - SITUATION
Pt chart reviewed,contents noted, pt transferred to ICU from medical floor, please enter new PT orders when deemed appropriate by team, PT to sign off pending receipt of new order.

## 2019-10-20 NOTE — PROGRESS NOTE ADULT - ASSESSMENT
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 Hold off on any sedation if possible  Cardiovascular: Aim for MAP target 65. Off pressors. IVF stopped as well. Plan for cardiac cath once stable. On heparin gtt  Resp/Chest: Maintain SpO2 > 92%. On Duoneb and Singulair  GI/Nutrition: Seen by SLP, will reassess. On regular diet for now w/ aspiration precautions. GB wall thickening w/ no CBD dilation on US. HIDA and GI consult requested.   ID: f/u Blood cultures. On empiric Zosyn/vanco for now. Dose Vacno by level  Renal: Trend Cr which will likely go up further in the next few days. Renal already following. Avoid nephrotoxic agents. Strict I&O. No HD needs at this time  Endocrinology: Start Insulin gtt as per protocol. Maintain Blood sugar < 180. On levothyroxine  Haem/Oncology:  DVT ppx w/ Hep gtt    Critical Care time: 35 minutes assessing presenting problems of acute illness that poses high probability of life threatening deterioration or end organ damage/dysfunction.  Medical decision making including Initiating plan of care, reviewing data, reviewing radiology, discussing with multidisciplinary team, non inclusive of procedures, discussing goals of care with patient/family

## 2019-10-20 NOTE — SWALLOW BEDSIDE ASSESSMENT ADULT - SWALLOW EVAL: RECOMMENDED FEEDING/EATING TECHNIQUES
maintain upright posture during/after eating for 30 mins/oral hygiene/small sips/bites/check mouth frequently for oral residue/pocketing/position upright (90 degrees)/no straws
maintain upright posture during/after eating for 30 mins/crush medication (when feasible)/no straws/position upright (90 degrees)/small sips/bites/oral hygiene

## 2019-10-20 NOTE — CONSULT NOTE ADULT - ASSESSMENT
85y old Male with a past medical history significant for vascular dementia afib (not on ac), CKD, DM, COPD presented with syncope and worsening edema. s/p IV lasix. Developed rapid afib. Questionable PE.  In the ICU for hypotension. Concern for sepsis. Us abdomen showed GB wall thickening.   No RUQ tenderness.  LFTs are likely elevated due to RV dysfunction and passive congestion.   - monitor LFTs   - agree with HIDA scan     Thanks

## 2019-10-20 NOTE — PROGRESS NOTE ADULT - ASSESSMENT
HPI: 84 y/o M with a PMHx of vascular dementia, ?Afib (not on AC), CKD, DMII, COPD, and GERD who presented to the ED after a syncopal episode and with worsening edema. Patient's history was obtained from patient's daughter Felicita, who states that he has been declining over the last few months. Patient initially had an episode of chest pain and dyspnea while on vacation, had negative workup at [place], and then had a NST and echo with his primary Cardiologist Dr. Regan Farrar who said everything looked good. Patient had then been having some worsening LE edema and abdominal edema, and was put on a diuretic daily, but was only taking it eveyr other day. Patient has now had a weight gain of 20 lbs over the last month, and has been feeling weak. Yesterday after having a large BM, noted to be straining, the patient had a syncopal episode. Patient's family was able to brace him, no fall, or head trauma, but then had 2 additional syncopal episodes in the ambulance. Patient was found to have a BG in the 60s that resolved with some juice. Patient is currently resting comfortably in bed, denies any symptoms or syncopal episode.   Today Converted to Afib, stat ekg with documented afib.  Daughter states her family member has been treating him and is a physician for PAF.  Not sure about AC treatment or rate control drugs.  MD Rico called ICU team and to evaluate and will be transfer over to the ICU.   BP low at 57s, dopamine started, fluids, and Heparin drip.  Stat bedside echo to R/P PE by MD Negro.  Also will add d-dimmer to morning labs and venous dopplers stat.      1) Shock: Transient.  Unclear cause.  Vagal vs. ischemic RCA . plan for cath once cr stable.    Abdominal pain and tansaminitis. GI evalns. GI consult.   2) pulm edema and elevated RA pressures: no more IV fluids if hypotensive. RA pressure elevated. self diuresing for now. Monitor uirn output.   3) Acute on chronioc renal failure: ATN due to hypotension.   4) Tranasietn RV dysfunction: due to hypotension or ischemia.   5)

## 2019-10-21 DIAGNOSIS — J44.9 CHRONIC OBSTRUCTIVE PULMONARY DISEASE, UNSPECIFIED: ICD-10-CM

## 2019-10-21 DIAGNOSIS — R60.9 EDEMA, UNSPECIFIED: ICD-10-CM

## 2019-10-21 LAB
ANION GAP SERPL CALC-SCNC: 15 MMOL/L — SIGNIFICANT CHANGE UP (ref 5–17)
APTT BLD: 57.3 SEC — HIGH (ref 27.5–36.3)
APTT BLD: 73.6 SEC — HIGH (ref 27.5–36.3)
APTT BLD: 79.7 SEC — HIGH (ref 27.5–36.3)
BUN SERPL-MCNC: 94 MG/DL — HIGH (ref 8–20)
CALCIUM SERPL-MCNC: 8 MG/DL — LOW (ref 8.6–10.2)
CHLORIDE SERPL-SCNC: 97 MMOL/L — LOW (ref 98–107)
CO2 SERPL-SCNC: 24 MMOL/L — SIGNIFICANT CHANGE UP (ref 22–29)
CREAT SERPL-MCNC: 3.97 MG/DL — HIGH (ref 0.5–1.3)
GLUCOSE BLDC GLUCOMTR-MCNC: 104 MG/DL — HIGH (ref 70–99)
GLUCOSE BLDC GLUCOMTR-MCNC: 245 MG/DL — HIGH (ref 70–99)
GLUCOSE BLDC GLUCOMTR-MCNC: 339 MG/DL — HIGH (ref 70–99)
GLUCOSE BLDC GLUCOMTR-MCNC: 79 MG/DL — SIGNIFICANT CHANGE UP (ref 70–99)
GLUCOSE SERPL-MCNC: 67 MG/DL — LOW (ref 70–115)
HCT VFR BLD CALC: 28 % — LOW (ref 39–50)
HGB BLD-MCNC: 9 G/DL — LOW (ref 13–17)
INR BLD: 1.22 RATIO — HIGH (ref 0.88–1.16)
MAGNESIUM SERPL-MCNC: 2.6 MG/DL — SIGNIFICANT CHANGE UP (ref 1.6–2.6)
MCHC RBC-ENTMCNC: 28.3 PG — SIGNIFICANT CHANGE UP (ref 27–34)
MCHC RBC-ENTMCNC: 32.1 GM/DL — SIGNIFICANT CHANGE UP (ref 32–36)
MCV RBC AUTO: 88.1 FL — SIGNIFICANT CHANGE UP (ref 80–100)
NT-PROBNP SERPL-SCNC: 2462 PG/ML — HIGH (ref 0–300)
PHOSPHATE SERPL-MCNC: 5.4 MG/DL — HIGH (ref 2.4–4.7)
PLATELET # BLD AUTO: 213 K/UL — SIGNIFICANT CHANGE UP (ref 150–400)
POTASSIUM SERPL-MCNC: 3.5 MMOL/L — SIGNIFICANT CHANGE UP (ref 3.5–5.3)
POTASSIUM SERPL-SCNC: 3.5 MMOL/L — SIGNIFICANT CHANGE UP (ref 3.5–5.3)
PROTHROM AB SERPL-ACNC: 14.1 SEC — HIGH (ref 10–12.9)
RBC # BLD: 3.18 M/UL — LOW (ref 4.2–5.8)
RBC # FLD: 14.9 % — HIGH (ref 10.3–14.5)
SODIUM SERPL-SCNC: 136 MMOL/L — SIGNIFICANT CHANGE UP (ref 135–145)
VANCOMYCIN FLD-MCNC: 8.3 UG/ML — SIGNIFICANT CHANGE UP
WBC # BLD: 12.22 K/UL — HIGH (ref 3.8–10.5)
WBC # FLD AUTO: 12.22 K/UL — HIGH (ref 3.8–10.5)

## 2019-10-21 PROCEDURE — 99233 SBSQ HOSP IP/OBS HIGH 50: CPT

## 2019-10-21 PROCEDURE — 99291 CRITICAL CARE FIRST HOUR: CPT

## 2019-10-21 PROCEDURE — 78226 HEPATOBILIARY SYSTEM IMAGING: CPT | Mod: 26

## 2019-10-21 RX ORDER — INSULIN LISPRO 100/ML
VIAL (ML) SUBCUTANEOUS
Refills: 0 | Status: DISCONTINUED | OUTPATIENT
Start: 2019-10-21 | End: 2019-10-31

## 2019-10-21 RX ORDER — INSULIN GLARGINE 100 [IU]/ML
16 INJECTION, SOLUTION SUBCUTANEOUS AT BEDTIME
Refills: 0 | Status: DISCONTINUED | OUTPATIENT
Start: 2019-10-21 | End: 2019-10-24

## 2019-10-21 RX ADMIN — Medication 600 MILLIGRAM(S): at 06:17

## 2019-10-21 RX ADMIN — Medication 1 TABLET(S): at 14:40

## 2019-10-21 RX ADMIN — Medication 3 MILLILITER(S): at 08:15

## 2019-10-21 RX ADMIN — Medication 3 MILLILITER(S): at 03:56

## 2019-10-21 RX ADMIN — Medication 3 MILLILITER(S): at 20:25

## 2019-10-21 RX ADMIN — Medication 3 MILLILITER(S): at 15:08

## 2019-10-21 RX ADMIN — Medication 4: at 16:55

## 2019-10-21 RX ADMIN — MONTELUKAST 10 MILLIGRAM(S): 4 TABLET, CHEWABLE ORAL at 14:40

## 2019-10-21 RX ADMIN — DONEPEZIL HYDROCHLORIDE 5 MILLIGRAM(S): 10 TABLET, FILM COATED ORAL at 21:37

## 2019-10-21 RX ADMIN — HEPARIN SODIUM 1100 UNIT(S)/HR: 5000 INJECTION INTRAVENOUS; SUBCUTANEOUS at 06:36

## 2019-10-21 RX ADMIN — Medication 50 MICROGRAM(S): at 06:17

## 2019-10-21 RX ADMIN — INSULIN GLARGINE 16 UNIT(S): 100 INJECTION, SOLUTION SUBCUTANEOUS at 22:43

## 2019-10-21 RX ADMIN — HEPARIN SODIUM 1100 UNIT(S)/HR: 5000 INJECTION INTRAVENOUS; SUBCUTANEOUS at 21:45

## 2019-10-21 RX ADMIN — Medication 325 MILLIGRAM(S): at 14:40

## 2019-10-21 RX ADMIN — HEPARIN SODIUM 1100 UNIT(S)/HR: 5000 INJECTION INTRAVENOUS; SUBCUTANEOUS at 14:14

## 2019-10-21 RX ADMIN — Medication 5 MILLIGRAM(S): at 21:37

## 2019-10-21 RX ADMIN — Medication 600 MILLIGRAM(S): at 16:55

## 2019-10-21 RX ADMIN — Medication 81 MILLIGRAM(S): at 14:40

## 2019-10-21 RX ADMIN — PIPERACILLIN AND TAZOBACTAM 25 GRAM(S): 4; .5 INJECTION, POWDER, LYOPHILIZED, FOR SOLUTION INTRAVENOUS at 06:17

## 2019-10-21 RX ADMIN — Medication 1 DROP(S): at 21:36

## 2019-10-21 RX ADMIN — PANTOPRAZOLE SODIUM 40 MILLIGRAM(S): 20 TABLET, DELAYED RELEASE ORAL at 06:17

## 2019-10-21 RX ADMIN — DIVALPROEX SODIUM 500 MILLIGRAM(S): 500 TABLET, DELAYED RELEASE ORAL at 21:37

## 2019-10-21 NOTE — PROGRESS NOTE ADULT - SUBJECTIVE AND OBJECTIVE BOX
NEPHROLOGY INTERVAL HPI/OVERNIGHT EVENTS:    Feels better   Awaits HIDA scan   No new events   GI follow up noted   Good UO , Off Lasix   SBP holding off pressors   Afebrile on renal doose Zosyn     MEDICATIONS  (STANDING):  ALBUTerol/ipratropium for Nebulization 3 milliLiter(s) Nebulizer every 6 hours  ALBUTerol/ipratropium for Nebulization. 3 milliLiter(s) Nebulizer once  aspirin  chewable 81 milliGRAM(s) Oral daily  dextrose 5%. 1000 milliLiter(s) (50 mL/Hr) IV Continuous <Continuous>  dextrose 50% Injectable 12.5 Gram(s) IV Push once  dextrose 50% Injectable 25 Gram(s) IV Push once  dextrose 50% Injectable 25 Gram(s) IV Push once  diVALproex  milliGRAM(s) Oral at bedtime  donepezil 5 milliGRAM(s) Oral at bedtime  ferrous    sulfate 325 milliGRAM(s) Oral daily  guaiFENesin  milliGRAM(s) Oral every 12 hours  heparin  Infusion.  Unit(s)/Hr (17 mL/Hr) IV Continuous <Continuous>  lactobacillus acidophilus 1 Tablet(s) Oral daily  levothyroxine 50 MICROGram(s) Oral daily  melatonin 5 milliGRAM(s) Oral at bedtime  montelukast 10 milliGRAM(s) Oral daily  multivitamin 1 Tablet(s) Oral daily  pantoprazole    Tablet 40 milliGRAM(s) Oral before breakfast  piperacillin/tazobactam IVPB.. 3.375 Gram(s) IV Intermittent every 12 hours    MEDICATIONS  (PRN):  acetaminophen   Tablet .. 650 milliGRAM(s) Oral every 6 hours PRN Temp greater or equal to 38C (100.4F), Mild Pain (1 - 3)  aluminum hydroxide/magnesium hydroxide/simethicone Suspension 30 milliLiter(s) Oral every 4 hours PRN Dyspepsia  dextrose 40% Gel 15 Gram(s) Oral once PRN Blood Glucose LESS THAN 70 milliGRAM(s)/deciliter  glucagon  Injectable 1 milliGRAM(s) IntraMuscular once PRN Glucose LESS THAN 70 milligrams/deciliter  heparin  Injectable 7500 Unit(s) IV Push every 6 hours PRN For aPTT less than 40  heparin  Injectable 3500 Unit(s) IV Push every 6 hours PRN For aPTT between 40 - 57  ondansetron Injectable 4 milliGRAM(s) IV Push every 6 hours PRN Nausea  sodium chloride 0.9% lock flush 10 milliLiter(s) IV Push every 1 hour PRN Pre/post blood products, medications, blood draw, and to maintain line patency      Allergies    No Known Allergies    Intolerances          Vital Signs Last 24 Hrs  T(C): 36.8 (21 Oct 2019 11:00), Max: 37 (20 Oct 2019 19:30)  T(F): 98.2 (21 Oct 2019 11:00), Max: 98.6 (20 Oct 2019 19:30)  HR: 74 (21 Oct 2019 11:00) (72 - 87)  BP: 97/55 (21 Oct 2019 11:00) (90/56 - 122/66)  BP(mean): 71 (21 Oct 2019 11:00) (70 - 89)  RR: 17 (21 Oct 2019 11:00) (17 - 30)  SpO2: 99% (21 Oct 2019 11:00) (83% - 99%)  Daily     Daily Weight in k.8 (21 Oct 2019 04:55)  I&O's Detail    20 Oct 2019 07:  -  21 Oct 2019 07:00  --------------------------------------------------------  IN:    heparin  Infusion.: 122 mL    insulin regular Infusion: 11 mL    multiple electrolytes Injection Type 1multiple electrolytes Injection Type 1: 60 mL    Oral Fluid: 300 mL  Total IN: 493 mL    OUT:    Indwelling Catheter - Urethral: 700 mL  Total OUT: 700 mL    Total NET: -207 mL      21 Oct 2019 07:  -  21 Oct 2019 11:34  --------------------------------------------------------  IN:    heparin  Infusion.: 44 mL  Total IN: 44 mL    OUT:    Indwelling Catheter - Urethral: 120 mL  Total OUT: 120 mL    Total NET: -76 mL        I&O's Summary    20 Oct 2019 07:  -  21 Oct 2019 07:00  --------------------------------------------------------  IN: 493 mL / OUT: 700 mL / NET: -207 mL    21 Oct 2019 07:01  -  21 Oct 2019 11:34  --------------------------------------------------------  IN: 44 mL / OUT: 120 mL / NET: -76 mL        PHYSICAL EXAM:    HEAD:  Atraumatic, No edema   NECK: Supple, L TLC   CHEST/LUNG: EAE , few basilar crackles , no rub   HEART: Irregular , no rub   ABDOMEN: Soft, + distended +BS; nontender; abd wall and flank edema  EXTREMITIES: + edema , the same . + beal     LABS:                        9.0    12.22 )-----------( 213      ( 21 Oct 2019 06:08 )             28.0     10-21    136  |  97<L>  |  94.0<H>  ----------------------------<  67<L>  3.5   |  24.0  |  3.97<H>    Ca    8.0<L>      21 Oct 2019 06:08  Phos  5.4     10-21  Mg     2.6     10-21    TPro  6.2<L>  /  Alb  2.9<L>  /  TBili  0.3<L>  /  DBili  x   /  AST  387<H>  /  ALT  604<H>  /  AlkPhos  136<H>  10-20    PT/INR - ( 21 Oct 2019 06:08 )   PT: 14.1 sec;   INR: 1.22 ratio         PTT - ( 21 Oct 2019 06:08 )  PTT:57.3 sec    Magnesium, Serum: 2.6 mg/dL (10-21 @ 06:08)  Phosphorus Level, Serum: 5.4 mg/dL (10-21 @ 06:08)    ABG - ( 19 Oct 2019 21:02 )  pH, Arterial: 7.34  pH, Blood: x     /  pCO2: 31    /  pO2: 144   / HCO3: 18    / Base Excess: -8.1  /  SaO2: 99             EXAM:  XR CHEST PORTABLE ROUTINE 1V                          PROCEDURE DATE:  10/20/2019          INTERPRETATION:  CHEST AP PORTABLE:    History: Abnormal Chest Sounds.     Date and time of exam: 10/20/2019 6:45 AM.    Technique: A single AP view of the chest was obtained.    Comparison exam: 10/19/2019 2:00 PM.    Findings:  Left IJ central line again noted, unchanged. Persistent bilateral   airspace disease without significant change area and no evidence of   pneumothorax. No definite evidence of pleural effusion..    Impression:  Diffuse bilateral airspace disease, unchanged..                MORTEZA KELLER M.D., ATTENDING RADIOLOGIST  This document has been electronically signed. Oct 20 2019  9:22AM                      RADIOLOGY & ADDITIONAL TESTS:

## 2019-10-21 NOTE — DIETITIAN INITIAL EVALUATION ADULT. - PERTINENT LABORATORY DATA
10-21 Na136 mmol/L Glu 67 mg/dL<L> K+ 3.5 mmol/L Cr  3.97 mg/dL<H> BUN 94.0 mg/dL<H> Phos 5.4 mg/dL<H> Alb n/a   PAB n/a  HgA1c 8.5%

## 2019-10-21 NOTE — PROGRESS NOTE ADULT - ASSESSMENT
CKD IV related to DM   Followed by Dr Calhoun  No hydro on renal imaging   ALMAS from likely  episode of ATN related to hypotensive events , getting better   Hemodynamics better now   Hgb better   Repeat ECHO noted from 10-19 , preserved EF , No sig valve lesions , normal RV fcn   No nephrotoxins or IV contrast   Off pressors and IVF   Renal dose Zosyn   Will follow   Cardiology follow up noted   Lasix currently on hold       Will follow   Discussed with daughter at bedside   I explained that there is no pressing need for acute HD at this moment and will continue to monitor   Also , I explained to her that HIDA scan does not hurt the kidneys

## 2019-10-21 NOTE — PROGRESS NOTE ADULT - SUBJECTIVE AND OBJECTIVE BOX
[place] [facility]                                                        Office: [address] [address]                                                       Telephone: [phone]. Fax:[phone]                                                                             PROGRESS NOTE      CC: Patient is a 85y old  Male who presents with a chief complaint of syncope (21 Oct 2019 08:22)      Review of symptoms: All review of systems negative unless otherwise indicated below    Past medical history: No updates.   Chronic conditions:  Hypertension: controlled. CHF: Stable. CAD: Stable ischemic heart disease. DM: Stable;    	  Vitals:  T(C): 36.8 (10-21-19 @ 11:00), Max: 37 (10-20-19 @ 19:30)  HR: 74 (10-21-19 @ 11:00) (72 - 87)  BP: 97/55 (10-21-19 @ 11:00) (90/56 - 122/66)  RR: 17 (10-21-19 @ 11:00) (17 - 30)  SpO2: 99% (10-21-19 @ 11:00) (83% - 99%)  Wt(kg): --  I&O's Summary    20 Oct 2019 07:01  -  21 Oct 2019 07:00  --------------------------------------------------------  IN: 493 mL / OUT: 700 mL / NET: -207 mL    21 Oct 2019 07:01  -  21 Oct 2019 11:28  --------------------------------------------------------  IN: 44 mL / OUT: 120 mL / NET: -76 mL      Weight (kg): 93 (10-19 @ 19:00)    PHYSICAL EXAM:  Appearance: Comfortable. No acute distress  HEENT:  Head and neck: Atraumatic. Normocephalic.  Normal oral mucosa, PERRL, Neck is supple. No JVD, No carotid bruit.   Neurologic: A & O x 3, no focal deficits. EOMI , Cranial nerves are intact.  Lymphatic: No cervical lymphadenopathy  Cardiovascular: Normal S1 S2, No murmur, rubs/gallops. No JVD, No edema  Respiratory: Lungs clear to auscultation  Gastrointestinal:  Soft, Non-tender, + BS  Lower Extremities: No edema  Psychiatry: Patient is calm. No agitation. Mood & affect appropriate  Skin: No rashes/ ecchymoses/cyanosis/ulcers visualized on the face, hands or feet.    CURRENT MEDICATIONS:    ALBUTerol/ipratropium for Nebulization  ALBUTerol/ipratropium for Nebulization.  guaiFENesin ER  montelukast  piperacillin/tazobactam IVPB..  diVALproex ER  donepezil  melatonin  pantoprazole    Tablet  dextrose 50% Injectable  dextrose 50% Injectable  dextrose 50% Injectable  levothyroxine  aspirin  chewable  dextrose 5%.  ferrous    sulfate  heparin  Infusion.  multivitamin      LABS:	 	  CARDIAC MARKERS ( 21 Oct 2019 06:08 )  x     / x     / x     / x     / x      p-BNP 21 Oct 2019 06:08: 2462 pg/mL, CARDIAC MARKERS ( 20 Oct 2019 13:22 )  x     / 0.09 ng/mL / x     / x     / x      p-BNP 20 Oct 2019 13:22: x    , CARDIAC MARKERS ( 20 Oct 2019 05:21 )  x     / 0.09 ng/mL / x     / x     / x      p-BNP 20 Oct 2019 05:21: x    , CARDIAC MARKERS ( 19 Oct 2019 18:42 )  x     / 0.07 ng/mL / 171 U/L / x     / 5.2 ng/mL  p-BNP 19 Oct 2019 18:42: x    , CARDIAC MARKERS ( 19 Oct 2019 14:05 )  x     / 0.07 ng/mL / x     / x     / x      p-BNP 19 Oct 2019 14:05: x    , CARDIAC MARKERS ( 18 Oct 2019 21:59 )  x     / x     / x     / x     / x      p-BNP 18 Oct 2019 21:59: 1636 pg/mL                            9.0    12.22 )-----------( 213      ( 21 Oct 2019 06:08 )             28.0     10-21    136  |  97<L>  |  94.0<H>  ----------------------------<  67<L>  3.5   |  24.0  |  3.97<H>    Ca    8.0<L>      21 Oct 2019 06:08  Phos  5.4     10-21  Mg     2.6     10-21    TPro  6.2<L>  /  Alb  2.9<L>  /  TBili  0.3<L>  /  DBili  x   /  AST  387<H>  /  ALT  604<H>  /  AlkPhos  136<H>  10-20    proBNP: Serum Pro-Brain Natriuretic Peptide: 2462 pg/mL (10-21 @ 06:08)  Serum Pro-Brain Natriuretic Peptide: 1636 pg/mL (10-18 @ 21:59)  Serum Pro-Brain Natriuretic Peptide: 1639 pg/mL (10-18 @ 07:32)  Serum Pro-Brain Natriuretic Peptide: 811 pg/mL (10-17 @ 14:14)    Lipid Profile:   HgA1c: Hemoglobin A1C, Whole Blood: 8.5 %  TSH: Thyroid Stimulating Hormone, Serum: 1.04 uIU/mL      TELEMETRY: Reviewed    ECG:  Reviewed by me. 	    DIAGNOSTIC TESTING:  [ ] Echocardiogram:    1. Patient tachycardic during the entire study.   2. Hyperdynamic wall motion.   3. Left ventricular ejection fraction, by visual estimation, is >75%.   4. Normal right ventricular size and function.   5. Mild-moderate tricuspid regurgitation.   6. Estimated pulmonary artery systolic pressure is 48 mmHg -mild   pulmonary hypertension.   7. Small pericardial effusion. No echo evidence of tamponade.    [ ]  Catheterization:  [ ] Stress Test:    OTHER:

## 2019-10-21 NOTE — PROGRESS NOTE ADULT - SUBJECTIVE AND OBJECTIVE BOX
Patient is a 85y old  Male who presents with a chief complaint of syncope (21 Oct 2019 11:34)      BRIEF HOSPITAL COURSE:   85M w/ PMHx vascular dementia, HFpEF, COPD, CKD IV, Afib (not on AC) who was initially admitted for w/u recurrent syncope. ICU consulted for Afib w/ RVR and shock. CTH was grossly normal. CT C/A/P suggest of RUL/RLL PNA, L lung mass/ atelectasis, cholecystitis, duodenitis and cystitis. He was on IV lasix 40 bid in addition to metoprolol. DVT study normal. On evaluation he was hypotensive SBP ~ 60s and pt was very encephalopathic. POCUS reveal a hypokinetic RV and dilated IVC. Pt was given fluid bolus and started on IV Hep and Dopamine prior to ICU transfer.     S/p Pressor   S/p IVF  Making Urine       Events last 24 hours:   No significant overnight events  C/o abd/ epi gastric pain-> Hungry     PAST MEDICAL & SURGICAL HISTORY:  CKD (chronic kidney disease)  COPD (chronic obstructive pulmonary disease)  Dementia  Afib  DM (diabetes mellitus)  History of lung biopsy  S/P carotid endarterectomy      All systems reviewed with symptoms mentions as above.       Physical Examination:    ICU Vital Signs Last 24 Hrs  T(C): 36.9 (21 Oct 2019 14:00), Max: 37 (20 Oct 2019 19:30)  T(F): 98.4 (21 Oct 2019 14:00), Max: 98.6 (20 Oct 2019 19:30)  HR: 86 (21 Oct 2019 14:00) (72 - 87)  BP: 125/66 (21 Oct 2019 14:00) (90/56 - 125/66)  BP(mean): 89 (21 Oct 2019 14:00) (70 - 89)  ABP: 126/62 (21 Oct 2019 14:00) (88/46 - 149/56)  ABP(mean): 82 (21 Oct 2019 14:00) (55 - 123)  RR: 25 (21 Oct 2019 14:00) (16 - 30)  SpO2: 95% (21 Oct 2019 14:00) (83% - 99%)      General: No acute distress.      Neuro: AAO*3, No motor, sensory, or cranial nerve deficit    HEENT: Pupils equal, reactive to light, Oral mucosa    PULM: Clear to auscultation bilaterally, no significant adventitious breath sounds     CVS: Regular rhythm and controlled rate, no murmurs, rubs, or gallops    ABD: Soft, nondistended, non spec tender, normoactive bowel sounds, no CVA tenderness    EXT:3+ b/l LE edema, nontender with pedal pulse palpable     SKIN: Warm and well perfused, no acute rashes       Medications:  piperacillin/tazobactam IVPB.. 3.375 Gram(s) IV Intermittent every 12 hours      ALBUTerol/ipratropium for Nebulization 3 milliLiter(s) Nebulizer every 6 hours  ALBUTerol/ipratropium for Nebulization. 3 milliLiter(s) Nebulizer once  guaiFENesin  milliGRAM(s) Oral every 12 hours  montelukast 10 milliGRAM(s) Oral daily    acetaminophen   Tablet .. 650 milliGRAM(s) Oral every 6 hours PRN  diVALproex  milliGRAM(s) Oral at bedtime  donepezil 5 milliGRAM(s) Oral at bedtime  melatonin 5 milliGRAM(s) Oral at bedtime  ondansetron Injectable 4 milliGRAM(s) IV Push every 6 hours PRN      aspirin  chewable 81 milliGRAM(s) Oral daily  heparin  Infusion.  Unit(s)/Hr IV Continuous <Continuous>  heparin  Injectable 7500 Unit(s) IV Push every 6 hours PRN  heparin  Injectable 3500 Unit(s) IV Push every 6 hours PRN    aluminum hydroxide/magnesium hydroxide/simethicone Suspension 30 milliLiter(s) Oral every 4 hours PRN  pantoprazole    Tablet 40 milliGRAM(s) Oral before breakfast      dextrose 40% Gel 15 Gram(s) Oral once PRN  dextrose 50% Injectable 12.5 Gram(s) IV Push once  dextrose 50% Injectable 25 Gram(s) IV Push once  dextrose 50% Injectable 25 Gram(s) IV Push once  glucagon  Injectable 1 milliGRAM(s) IntraMuscular once PRN  levothyroxine 50 MICROGram(s) Oral daily    dextrose 5%. 1000 milliLiter(s) IV Continuous <Continuous>  ferrous    sulfate 325 milliGRAM(s) Oral daily  multivitamin 1 Tablet(s) Oral daily  sodium chloride 0.9% lock flush 10 milliLiter(s) IV Push every 1 hour PRN        lactobacillus acidophilus 1 Tablet(s) Oral daily          I&O's Detail    20 Oct 2019 07:01  -  21 Oct 2019 07:00  --------------------------------------------------------  IN:    heparin  Infusion.: 122 mL    insulin regular Infusion: 11 mL    multiple electrolytes Injection Type 1multiple electrolytes Injection Type 1: 60 mL    Oral Fluid: 300 mL  Total IN: 493 mL    OUT:    Indwelling Catheter - Urethral: 700 mL  Total OUT: 700 mL    Total NET: -207 mL      21 Oct 2019 07:01  -  21 Oct 2019 14:42  --------------------------------------------------------  IN:    heparin  Infusion.: 88 mL  Total IN: 88 mL    OUT:    Indwelling Catheter - Urethral: 150 mL  Total OUT: 150 mL    Total NET: -62 mL      RADIOLOGY/ Microbiology/ Labs: reviewed     CRITICAL CARE TIME SPENT: 45 min

## 2019-10-21 NOTE — PROGRESS NOTE ADULT - SUBJECTIVE AND OBJECTIVE BOX
Chief Complaint: This is a, 85-year-old man being seen for follow-up consultation for elevated liver chemistries and gallbladder wall thickening..    HPI / 24H events:  Patient had increased oxygen requirement overnight.  Awaiting HIDA scan.  Denies any nausea, vomiting, or abdominal pain.  Denies any history of alcohol abuse or liver disease.    ROS: A 14-point review of systems was reviewed and was otherwise negative save what was reported in the HPI.    PAST MEDICAL/SURGICAL HISTORY:  CKD (chronic kidney disease)  COPD (chronic obstructive pulmonary disease)  Dementia  Afib  DM (diabetes mellitus)  History of lung biopsy  S/P carotid endarterectomy    MEDICATIONS  (STANDING):  ALBUTerol/ipratropium for Nebulization 3 milliLiter(s) Nebulizer every 6 hours  ALBUTerol/ipratropium for Nebulization. 3 milliLiter(s) Nebulizer once  aspirin  chewable 81 milliGRAM(s) Oral daily  dextrose 5%. 1000 milliLiter(s) (50 mL/Hr) IV Continuous <Continuous>  dextrose 50% Injectable 12.5 Gram(s) IV Push once  dextrose 50% Injectable 25 Gram(s) IV Push once  dextrose 50% Injectable 25 Gram(s) IV Push once  diVALproex  milliGRAM(s) Oral at bedtime  donepezil 5 milliGRAM(s) Oral at bedtime  ferrous    sulfate 325 milliGRAM(s) Oral daily  guaiFENesin  milliGRAM(s) Oral every 12 hours  heparin  Infusion.  Unit(s)/Hr (17 mL/Hr) IV Continuous <Continuous>  insulin regular Infusion 5 Unit(s)/Hr (5 mL/Hr) IV Continuous <Continuous>  lactobacillus acidophilus 1 Tablet(s) Oral daily  levothyroxine 50 MICROGram(s) Oral daily  melatonin 5 milliGRAM(s) Oral at bedtime  montelukast 10 milliGRAM(s) Oral daily  multivitamin 1 Tablet(s) Oral daily  pantoprazole    Tablet 40 milliGRAM(s) Oral before breakfast  piperacillin/tazobactam IVPB.. 3.375 Gram(s) IV Intermittent every 12 hours    MEDICATIONS  (PRN):  acetaminophen   Tablet .. 650 milliGRAM(s) Oral every 6 hours PRN Temp greater or equal to 38C (100.4F), Mild Pain (1 - 3)  aluminum hydroxide/magnesium hydroxide/simethicone Suspension 30 milliLiter(s) Oral every 4 hours PRN Dyspepsia  dextrose 40% Gel 15 Gram(s) Oral once PRN Blood Glucose LESS THAN 70 milliGRAM(s)/deciliter  glucagon  Injectable 1 milliGRAM(s) IntraMuscular once PRN Glucose LESS THAN 70 milligrams/deciliter  heparin  Injectable 7500 Unit(s) IV Push every 6 hours PRN For aPTT less than 40  heparin  Injectable 3500 Unit(s) IV Push every 6 hours PRN For aPTT between 40 - 57  ondansetron Injectable 4 milliGRAM(s) IV Push every 6 hours PRN Nausea  sodium chloride 0.9% lock flush 10 milliLiter(s) IV Push every 1 hour PRN Pre/post blood products, medications, blood draw, and to maintain line patency    T(C): 36.7 (10-21-19 @ 08:00), Max: 37 (10-20-19 @ 09:00)  HR: 75 (10-21-19 @ 08:17) (72 - 87)  BP: 93/59 (10-21-19 @ 08:00) (90/56 - 122/66)  RR: 30 (10-21-19 @ 08:00) (17 - 30)  SpO2: 98% (10-21-19 @ 08:17) (83% - 99%)    I&O's Summary    20 Oct 2019 07:01  -  21 Oct 2019 07:00  --------------------------------------------------------  IN: 493 mL / OUT: 700 mL / NET: -207 mL    21 Oct 2019 07:01  -  21 Oct 2019 08:23  --------------------------------------------------------  IN: 11 mL / OUT: 40 mL / NET: -29 mL      ABG - ( 19 Oct 2019 21:02 )  pH, Arterial: 7.34  pH, Blood: x     /  pCO2: 31    /  pO2: 144   / HCO3: 18    / Base Excess: -8.1  /  SaO2: 99                                      9.0    12.22 )-----------( 213      ( 21 Oct 2019 06:08 )             28.0     10-21    136  |  97<L>  |  94.0<H>  ----------------------------<  67<L>  3.5   |  24.0  |  3.97<H>    Ca    8.0<L>      21 Oct 2019 06:08  Phos  5.4     10-21  Mg     2.6     10-21    TPro  6.2<L>  /  Alb  2.9<L>  /  TBili  0.3<L>  /  DBili  x   /  AST  387<H>  /  ALT  604<H>  /  AlkPhos  136<H>  10-20    LIVER FUNCTIONS - ( 20 Oct 2019 05:21 )  Alb: 2.9 g/dL / Pro: 6.2 g/dL / ALK PHOS: 136 U/L / ALT: 604 U/L / AST: 387 U/L / GGT: 114 U/L       Amylase, Serum Total: 53 U/L (10-20-19 @ 05:21)    Lipase, Serum: 18 U/L (10-20-19 @ 05:21)    PT/INR - ( 21 Oct 2019 06:08 )   PT: 14.1 sec;   INR: 1.22 ratio         PTT - ( 21 Oct 2019 06:08 )  PTT:57.3 sec    IMAGING: I personally reviewed the abdominal sonogram, and I agree with the radiologist's interpretation as described below:    < from: US Abdomen Complete (10.19.19 @ 20:42) >  FINDINGS:   Liver: Heterogeneous echotexture.  Gallbladder: Diffuse gallbladder wall thickening again noted. Edema noted   within the gallbladder wall. No gallstones.    Common bile duct: No biliary ductal dilatation. 3 mm.  Pancreas: Visualized pancreas is unremarkable.   Right kidney: Right kidney length 10.2 cm.   No hydronephrosis.   Left kidney:  Left kidney length 10.0 cm. No   hydronephrosis.   Spleen: Normal. No splenomegaly.   Aorta: Normal. No aneurysm.   Inferior vena cava: Normal.   Portal venous: Portal vein patent with hepatopedal flow.   Hepatic veins: Visualized hepatic veins patent and unremarkable.     IMPRESSION:   1. Heterogeneous echotexture to the liver. Correlate clinically.   2. Diffuse gallbladder wall thickening again noted. Edema noted within   the gallbladder wall. No gallstones. Acalculous cholecystitis must be   considered in the appropriate clinical setting.    < end of copied text >

## 2019-10-21 NOTE — DIETITIAN INITIAL EVALUATION ADULT. - OTHER INFO
85 year old male PMH of vascular dementia, ?Afib, CKD, DMII, COPD, and GERD presents after syncopal episode and with worsening edema. Admitted to workup recurrent syncope. ICU consulted for Afib w/ RVR and shock. CT C/A/P suggest of RUL/RLL PNA, L lung mass/ atelectasis, cholecystitis, duodenitis and cystitis. Spoke with pts family at bedside, reports pt eating good PTA and currently. Aware seen by SLP with recommendations for a regular diet with thin liquids as tolerated (strict aspiration precautions). Pt consuming lunch at time of interview, appears to be tolerating well with good po intake. Family reports pt gained ~20 lbs over the last 2 months due to fluid. Family also requesting diet education -- provided in depth education on CKD and DM diet. Written education provided as well.

## 2019-10-21 NOTE — PROGRESS NOTE ADULT - ASSESSMENT
86 y/o M with a PMH of vascular dementia, ?Afib (not on AC), CKD, DMII, COPD, and GERD who presented to the ED after a syncopal episode and with worsening edema. Patient had previous workup in 08/2019 with his private cardiologist Dr. Regan Farrar that included an echo and NST after an episode of chest pain and dyspnea while on vacation, results were normal.     Over the weekend, patient had an episode of AF with hypotension that required pressor support and IVF. He had a stat bedside echo which revealed hypokinetic RV and dilated IVC. Official TTE revealed hyperdynamic LV and normal looking RV.     Patient seen and examined in ICU. Daughter and wife present at bedside. He has no current complaints.     Assessment/Plan:   1) Shock secondary to unclear etiology- vagal vs. septic vs. ischemic etiology. Now hemodynamically stable, off pressors >24 hours. Off IVF. Receiving broad spectrum antibiotics. HIDA scan today to rule out cholecystitis. Will benefit from cardiac cath once Cr stabilizes.   2) Pulm edema and elevated RA pressures- patient appears congested, however, would not diurese patient in view of his renal insufficiency and recent hypotension. Avoid IVF. Monitor I's and O's.  3) ALMAS on CKD- Cr improved from 4.22 to 3.97 today. Avoid nephrotoxins. Nephrology following.   4) PAF- now in sinus rhythm. Not currently on any rate control medications. CHADSVASC 5- patient is currently on heparin drip; would recommend longterm AC if no contraindication. However, will hold off until plan is clear re: cath.     Plan was discussed with patient and his family at bedside. They are still undecided about cardiac cath, but will consider further as patient's kidney function stabilizes/improves.  Will continue to follow     Discussed with Dr. Villegas

## 2019-10-21 NOTE — DIETITIAN INITIAL EVALUATION ADULT. - PERTINENT MEDS FT
MEDICATIONS  (STANDING):  ALBUTerol/ipratropium for Nebulization 3 milliLiter(s) Nebulizer every 6 hours  ALBUTerol/ipratropium for Nebulization. 3 milliLiter(s) Nebulizer once  aspirin  chewable 81 milliGRAM(s) Oral daily  dextrose 5%. 1000 milliLiter(s) (50 mL/Hr) IV Continuous <Continuous>  dextrose 50% Injectable 12.5 Gram(s) IV Push once  dextrose 50% Injectable 25 Gram(s) IV Push once  dextrose 50% Injectable 25 Gram(s) IV Push once  diVALproex  milliGRAM(s) Oral at bedtime  donepezil 5 milliGRAM(s) Oral at bedtime  ferrous    sulfate 325 milliGRAM(s) Oral daily  guaiFENesin  milliGRAM(s) Oral every 12 hours  heparin  Infusion.  Unit(s)/Hr (17 mL/Hr) IV Continuous <Continuous>  lactobacillus acidophilus 1 Tablet(s) Oral daily  levothyroxine 50 MICROGram(s) Oral daily  melatonin 5 milliGRAM(s) Oral at bedtime  montelukast 10 milliGRAM(s) Oral daily  multivitamin 1 Tablet(s) Oral daily  pantoprazole    Tablet 40 milliGRAM(s) Oral before breakfast  piperacillin/tazobactam IVPB.. 3.375 Gram(s) IV Intermittent every 12 hours    MEDICATIONS  (PRN):  acetaminophen   Tablet .. 650 milliGRAM(s) Oral every 6 hours PRN Temp greater or equal to 38C (100.4F), Mild Pain (1 - 3)  aluminum hydroxide/magnesium hydroxide/simethicone Suspension 30 milliLiter(s) Oral every 4 hours PRN Dyspepsia  dextrose 40% Gel 15 Gram(s) Oral once PRN Blood Glucose LESS THAN 70 milliGRAM(s)/deciliter  glucagon  Injectable 1 milliGRAM(s) IntraMuscular once PRN Glucose LESS THAN 70 milligrams/deciliter  heparin  Injectable 7500 Unit(s) IV Push every 6 hours PRN For aPTT less than 40  heparin  Injectable 3500 Unit(s) IV Push every 6 hours PRN For aPTT between 40 - 57  ondansetron Injectable 4 milliGRAM(s) IV Push every 6 hours PRN Nausea  sodium chloride 0.9% lock flush 10 milliLiter(s) IV Push every 1 hour PRN Pre/post blood products, medications, blood draw, and to maintain line patency

## 2019-10-21 NOTE — DIETITIAN INITIAL EVALUATION ADULT. - DIET TYPE
continue Glucerna TID to optimize po intake and provide an additional 220 kcal, 10g protein per serving./consistent carbohydrate renal with evening snacks

## 2019-10-21 NOTE — PROGRESS NOTE ADULT - ASSESSMENT
1: Shock of unclear etiology: CAD vs less likely VTE/PE and ruled out cholecystitis; duodenitis related    2: Syncope    3: Afib with RVR: controlled for now     Patient seen and examined  Full code  POc explained to patient and his wife as well as daughter at bedside      Neuro:   Pain Mx: Tylenol as needed   Sedation/Anxiolytic:   HD depakote, Aricept    Cardiovascular:   MAP Target: 65  HDS for now   Cardio following   Cardiac Cath possible once renal function improved     Resp/Chest:   On supplemental oxygen-> oxygenating and ventilating fine for now   PRN Duoneb   Chest PT    Gi/Nutrition:   Diet: Resumed diet  IV PPI, PRN antiemetic   Bowel Regimen as needed    ID:   Microbiological studies: Will get Blood Cx  Abx: Will stop Abx for now     Nephro/Electrolyte/Acid-Base:   Avoid nephrotoxic agents  Strict I&O with Cr monitoring   Close Electrolyte monitoring and correction as needed     Endocrinology:   c/w HD synthroid     Haem/Oncology:   Mechanical and Chemical DVT ppx

## 2019-10-21 NOTE — DIETITIAN INITIAL EVALUATION ADULT. - PROBLEM SELECTOR PLAN 7
Dr Kulkarni pt currently alert and cooperative. reportedly likely vascular dementia  after discussion with daughter and HCP she would like to cut back on medication  will hold memenatine, depakote and donepezil

## 2019-10-21 NOTE — PROGRESS NOTE ADULT - ASSESSMENT
85y old Male with a past medical history significant for vascular dementia afib (not on ac), CKD, DM, COPD presented with syncope and worsening edema. s/p IV lasix. Developed rapid afib. Questionable PE.  In the ICU for hypotension. Concern for sepsis. Us abdomen showed GB wall thickening.   No RUQ tenderness.  LFTs are likely elevated due to RV dysfunction and passive congestion.   - monitor LFTs   - agree with HIDA scan

## 2019-10-22 DIAGNOSIS — I95.9 HYPOTENSION, UNSPECIFIED: ICD-10-CM

## 2019-10-22 DIAGNOSIS — I48.91 UNSPECIFIED ATRIAL FIBRILLATION: ICD-10-CM

## 2019-10-22 DIAGNOSIS — Z51.5 ENCOUNTER FOR PALLIATIVE CARE: ICD-10-CM

## 2019-10-22 DIAGNOSIS — N17.9 ACUTE KIDNEY FAILURE, UNSPECIFIED: ICD-10-CM

## 2019-10-22 LAB
ALBUMIN SERPL ELPH-MCNC: 2.8 G/DL — LOW (ref 3.3–5.2)
ALP SERPL-CCNC: 116 U/L — SIGNIFICANT CHANGE UP (ref 40–120)
ALT FLD-CCNC: 264 U/L — HIGH
ANION GAP SERPL CALC-SCNC: 14 MMOL/L — SIGNIFICANT CHANGE UP (ref 5–17)
APPEARANCE UR: CLEAR — SIGNIFICANT CHANGE UP
APTT BLD: 71.5 SEC — HIGH (ref 27.5–36.3)
AST SERPL-CCNC: 32 U/L — SIGNIFICANT CHANGE UP
BACTERIA # UR AUTO: ABNORMAL
BILIRUB SERPL-MCNC: 0.3 MG/DL — LOW (ref 0.4–2)
BILIRUB UR-MCNC: NEGATIVE — SIGNIFICANT CHANGE UP
BUN SERPL-MCNC: 84 MG/DL — HIGH (ref 8–20)
CALCIUM SERPL-MCNC: 8.2 MG/DL — LOW (ref 8.6–10.2)
CHLORIDE SERPL-SCNC: 97 MMOL/L — LOW (ref 98–107)
CO2 SERPL-SCNC: 25 MMOL/L — SIGNIFICANT CHANGE UP (ref 22–29)
COLOR SPEC: YELLOW — SIGNIFICANT CHANGE UP
CREAT SERPL-MCNC: 3.26 MG/DL — HIGH (ref 0.5–1.3)
DIFF PNL FLD: ABNORMAL
EPI CELLS # UR: SIGNIFICANT CHANGE UP
GLUCOSE BLDC GLUCOMTR-MCNC: 236 MG/DL — HIGH (ref 70–99)
GLUCOSE BLDC GLUCOMTR-MCNC: 288 MG/DL — HIGH (ref 70–99)
GLUCOSE BLDC GLUCOMTR-MCNC: 296 MG/DL — HIGH (ref 70–99)
GLUCOSE BLDC GLUCOMTR-MCNC: 322 MG/DL — HIGH (ref 70–99)
GLUCOSE SERPL-MCNC: 267 MG/DL — HIGH (ref 70–115)
GLUCOSE UR QL: 50 MG/DL
HCT VFR BLD CALC: 28.1 % — LOW (ref 39–50)
HGB BLD-MCNC: 8.8 G/DL — LOW (ref 13–17)
KETONES UR-MCNC: NEGATIVE — SIGNIFICANT CHANGE UP
LEUKOCYTE ESTERASE UR-ACNC: NEGATIVE — SIGNIFICANT CHANGE UP
MAGNESIUM SERPL-MCNC: 2.5 MG/DL — SIGNIFICANT CHANGE UP (ref 1.6–2.6)
MCHC RBC-ENTMCNC: 28.2 PG — SIGNIFICANT CHANGE UP (ref 27–34)
MCHC RBC-ENTMCNC: 31.3 GM/DL — LOW (ref 32–36)
MCV RBC AUTO: 90.1 FL — SIGNIFICANT CHANGE UP (ref 80–100)
NITRITE UR-MCNC: NEGATIVE — SIGNIFICANT CHANGE UP
PH UR: 5 — SIGNIFICANT CHANGE UP (ref 5–8)
PHOSPHATE SERPL-MCNC: 4.6 MG/DL — SIGNIFICANT CHANGE UP (ref 2.4–4.7)
PLATELET # BLD AUTO: 198 K/UL — SIGNIFICANT CHANGE UP (ref 150–400)
POTASSIUM SERPL-MCNC: 3.9 MMOL/L — SIGNIFICANT CHANGE UP (ref 3.5–5.3)
POTASSIUM SERPL-SCNC: 3.9 MMOL/L — SIGNIFICANT CHANGE UP (ref 3.5–5.3)
PROT SERPL-MCNC: 6.4 G/DL — LOW (ref 6.6–8.7)
PROT UR-MCNC: 30 MG/DL
RBC # BLD: 3.12 M/UL — LOW (ref 4.2–5.8)
RBC # FLD: 15 % — HIGH (ref 10.3–14.5)
RBC CASTS # UR COMP ASSIST: SIGNIFICANT CHANGE UP /HPF (ref 0–4)
SODIUM SERPL-SCNC: 136 MMOL/L — SIGNIFICANT CHANGE UP (ref 135–145)
SP GR SPEC: 1.01 — SIGNIFICANT CHANGE UP (ref 1.01–1.02)
UROBILINOGEN FLD QL: NEGATIVE MG/DL — SIGNIFICANT CHANGE UP
WBC # BLD: 11.15 K/UL — HIGH (ref 3.8–10.5)
WBC # FLD AUTO: 11.15 K/UL — HIGH (ref 3.8–10.5)
WBC UR QL: SIGNIFICANT CHANGE UP

## 2019-10-22 PROCEDURE — 99233 SBSQ HOSP IP/OBS HIGH 50: CPT

## 2019-10-22 PROCEDURE — 99223 1ST HOSP IP/OBS HIGH 75: CPT

## 2019-10-22 PROCEDURE — 99232 SBSQ HOSP IP/OBS MODERATE 35: CPT

## 2019-10-22 PROCEDURE — 12345: CPT | Mod: NC

## 2019-10-22 RX ORDER — MEMANTINE HYDROCHLORIDE 10 MG/1
7 TABLET ORAL DAILY
Refills: 0 | Status: DISCONTINUED | OUTPATIENT
Start: 2019-10-22 | End: 2019-10-31

## 2019-10-22 RX ORDER — MEMANTINE HYDROCHLORIDE 10 MG/1
7 TABLET ORAL AT BEDTIME
Refills: 0 | Status: DISCONTINUED | OUTPATIENT
Start: 2019-10-22 | End: 2019-10-22

## 2019-10-22 RX ORDER — MEMANTINE HYDROCHLORIDE 10 MG/1
7 TABLET ORAL DAILY
Refills: 0 | Status: DISCONTINUED | OUTPATIENT
Start: 2019-10-22 | End: 2019-10-22

## 2019-10-22 RX ORDER — ERYTHROPOIETIN 10000 [IU]/ML
20000 INJECTION, SOLUTION INTRAVENOUS; SUBCUTANEOUS
Refills: 0 | Status: DISCONTINUED | OUTPATIENT
Start: 2019-10-22 | End: 2019-10-23

## 2019-10-22 RX ORDER — UMECLIDINIUM BROMIDE AND VILANTEROL TRIFENATATE 62.5; 25 UG/1; UG/1
1 POWDER RESPIRATORY (INHALATION)
Refills: 0 | Status: DISCONTINUED | OUTPATIENT
Start: 2019-10-23 | End: 2019-10-31

## 2019-10-22 RX ORDER — FOLIC ACID 0.8 MG
1 TABLET ORAL DAILY
Refills: 0 | Status: DISCONTINUED | OUTPATIENT
Start: 2019-10-22 | End: 2019-10-31

## 2019-10-22 RX ORDER — PITAVASTATIN CALCIUM 1.04 MG/1
2 TABLET, FILM COATED ORAL
Refills: 0 | Status: DISCONTINUED | OUTPATIENT
Start: 2019-10-22 | End: 2019-10-31

## 2019-10-22 RX ADMIN — Medication 3 MILLILITER(S): at 08:01

## 2019-10-22 RX ADMIN — Medication 325 MILLIGRAM(S): at 12:43

## 2019-10-22 RX ADMIN — DIVALPROEX SODIUM 500 MILLIGRAM(S): 500 TABLET, DELAYED RELEASE ORAL at 21:59

## 2019-10-22 RX ADMIN — PANTOPRAZOLE SODIUM 40 MILLIGRAM(S): 20 TABLET, DELAYED RELEASE ORAL at 05:32

## 2019-10-22 RX ADMIN — INSULIN GLARGINE 16 UNIT(S): 100 INJECTION, SOLUTION SUBCUTANEOUS at 22:00

## 2019-10-22 RX ADMIN — Medication 1 TABLET(S): at 12:57

## 2019-10-22 RX ADMIN — MEMANTINE HYDROCHLORIDE 7 MILLIGRAM(S): 10 TABLET ORAL at 18:12

## 2019-10-22 RX ADMIN — Medication 50 MICROGRAM(S): at 05:32

## 2019-10-22 RX ADMIN — Medication 3 MILLILITER(S): at 20:48

## 2019-10-22 RX ADMIN — Medication 600 MILLIGRAM(S): at 05:32

## 2019-10-22 RX ADMIN — Medication 1 TABLET(S): at 12:43

## 2019-10-22 RX ADMIN — Medication 3 MILLILITER(S): at 03:11

## 2019-10-22 RX ADMIN — Medication 6: at 11:53

## 2019-10-22 RX ADMIN — DONEPEZIL HYDROCHLORIDE 5 MILLIGRAM(S): 10 TABLET, FILM COATED ORAL at 21:59

## 2019-10-22 RX ADMIN — Medication 1 DROP(S): at 17:50

## 2019-10-22 RX ADMIN — HEPARIN SODIUM 1100 UNIT(S)/HR: 5000 INJECTION INTRAVENOUS; SUBCUTANEOUS at 08:36

## 2019-10-22 RX ADMIN — Medication 5 MILLIGRAM(S): at 21:59

## 2019-10-22 RX ADMIN — Medication 600 MILLIGRAM(S): at 17:50

## 2019-10-22 RX ADMIN — Medication 4: at 08:20

## 2019-10-22 RX ADMIN — Medication 3 MILLILITER(S): at 14:18

## 2019-10-22 RX ADMIN — Medication 1 DROP(S): at 05:32

## 2019-10-22 RX ADMIN — MONTELUKAST 10 MILLIGRAM(S): 4 TABLET, CHEWABLE ORAL at 12:57

## 2019-10-22 RX ADMIN — Medication 81 MILLIGRAM(S): at 12:43

## 2019-10-22 RX ADMIN — PITAVASTATIN CALCIUM 2 MILLIGRAM(S): 1.04 TABLET, FILM COATED ORAL at 22:00

## 2019-10-22 RX ADMIN — Medication 1 MILLIGRAM(S): at 18:11

## 2019-10-22 RX ADMIN — Medication 6: at 16:34

## 2019-10-22 RX ADMIN — Medication 30 MILLILITER(S): at 18:14

## 2019-10-22 RX ADMIN — Medication 1 DROP(S): at 11:54

## 2019-10-22 NOTE — PROGRESS NOTE ADULT - ASSESSMENT
85M hx vascular demetnia, afib (not on ac), CKD, DM, COPD presents with syncope and worsening edema found to have hypoglycemia, nikki on ckd, transaminitis. Course complicated by Afib w/ RVR and shock requiring pressors. Now stable ready for transfer out of micu.

## 2019-10-22 NOTE — PROGRESS NOTE ADULT - ASSESSMENT
85M hx vascular demetnia, afib (not on ac), CKD, DM, COPD presents with syncope and worsening edema found to have hypoglycemia, nikki on ckd, transaminitis. Course complicated by Afib w/ RVR and shock requiring pressors.     #Hypotension- present on admission w/ shock (unclear etiology ?cardiogenic)  -currently normotensive  -hold hypertensive medications  -monitor pressure     #Afib  -?new afib. pt now reverted to sinus rhythm  -heparing gtt for now will eventually need to be changed to po   -consider starting BB when blood pressure stable.  -cardiology consult appreciated- ?cardiac cath possible  -chadsvasc elevated    #COPD (chronic obstructive pulmonary disease)  -pt with bilateral rhonchi on exam, but otherwise breathing well on 2L nc  -montelukast and duonebs.     #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  -nephrology consult appreciated  -possible 2/2 atn  -hold lasix for now   -monitor cr  -avoid nephrotoxic meds     #DM (diabetes mellitus)  -monitor fingersticks  -lantus  -iss    #Transaminitis  -likely 2/2 heart failure  -cholecystitis ruled out  -continue to trend  -GI consult appreciated     #DVT prophylaxis  - heparin GTT 85M hx vascular demetnia, afib (not on ac), CKD, DM, COPD presents with syncope and worsening edema found to have hypoglycemia, nikki on ckd, transaminitis. Course complicated by Afib w/ RVR and shock requiring pressors.     #Hypotension- present on admission w/ shock (unclear etiology ?cardiogenic)  -currently normotensive  -hold hypertensive medications  -monitor pressure     #Afib  -?new afib. pt now reverted to sinus rhythm  -heparing gtt for now will eventually need to be changed to po   -consider starting BB when blood pressure stable.  -cardiology consult appreciated- ?cardiac cath possible  -chadsvasc elevated    #COPD (chronic obstructive pulmonary disease)  -pt with bilateral rhonchi on exam, but otherwise breathing well on 2L nc  -montelukast and duonebs.     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

## 2019-10-22 NOTE — PROGRESS NOTE ADULT - SUBJECTIVE AND OBJECTIVE BOX
[facility]                                                        Office: [address] [address]                                                       Telephone: [phone]. Fax:[phone]                                                                             PROGRESS NOTE      CC: Patient is a 85y old  Male who presents with a chief complaint of syncope (22 Oct 2019 08:52)    Interval history: Patient was downgraded from MICU. Currently feeling well. He is accumulating some fluid in his upper and lower extremities, but states breathing is comfortable. O2 sat 92% off supplemental oxygen. No events on telemetry; remains in sinus rhythm.     Review of symptoms: All review of systems negative unless otherwise indicated below    Past medical history: No updates.   Chronic conditions:  Hypertension: controlled. CHF: Stable. CAD: Stable ischemic heart disease. DM: Stable;    	  Vitals:  T(C): 36.6 (10-22-19 @ 08:00), Max: 37.1 (10-21-19 @ 16:06)  HR: 88 (10-22-19 @ 08:09) (78 - 93)  BP: 125/73 (10-22-19 @ 08:00) (81/49 - 125/73)  RR: 20 (10-22-19 @ 08:00) (16 - 25)  SpO2: 98% (10-22-19 @ 08:09) (92% - 99%)  Wt(kg): --  I&O's Summary    21 Oct 2019 07:01  -  22 Oct 2019 07:00  --------------------------------------------------------  IN: 432 mL / OUT: 515 mL / NET: -83 mL    22 Oct 2019 07:01  -  22 Oct 2019 12:36  --------------------------------------------------------  IN: 0 mL / OUT: 50 mL / NET: -50 mL      Weight (kg): 93 (10-19 @ 19:00)    PHYSICAL EXAM:  Appearance: Comfortable. No acute distress  HEENT:  Head and neck: Atraumatic. Normocephalic.  Normal oral mucosa, PERRL, Neck is supple. No JVD, No carotid bruit.   Neurologic: A & O x 3, no focal deficits. EOMI , Cranial nerves are intact.  Lymphatic: No cervical lymphadenopathy  Cardiovascular: Normal S1 S2, No murmur, rubs/gallops. No JVD, No edema  Respiratory: Mild bibasilar crackles  Gastrointestinal:  Soft, Non-tender, + BS  Lower Extremities: No edema  Psychiatry: Patient is calm. No agitation. Mood & affect appropriate  Skin: No rashes/ ecchymoses/cyanosis/ulcers visualized on the face, hands or feet.    CURRENT MEDICATIONS:    ALBUTerol/ipratropium for Nebulization  ALBUTerol/ipratropium for Nebulization.  guaiFENesin ER  montelukast  diVALproex ER  donepezil  melatonin  memantine ER  pantoprazole    Tablet  dextrose 50% Injectable  dextrose 50% Injectable  dextrose 50% Injectable  insulin glargine Injectable (LANTUS)  insulin lispro (HumaLOG) corrective regimen sliding scale  levothyroxine  artificial  tears Solution  aspirin  chewable  dextrose 5%.  ferrous    sulfate  heparin  Infusion.  multivitamin      LABS:	 	  CARDIAC MARKERS ( 21 Oct 2019 06:08 )  x     / x     / x     / x     / x      p-BNP 21 Oct 2019 06:08: 2462 pg/mL, CARDIAC MARKERS ( 20 Oct 2019 13:22 )  x     / 0.09 ng/mL / x     / x     / x      p-BNP 20 Oct 2019 13:22: x    , CARDIAC MARKERS ( 20 Oct 2019 05:21 )  x     / 0.09 ng/mL / x     / x     / x      p-BNP 20 Oct 2019 05:21: x    , CARDIAC MARKERS ( 19 Oct 2019 18:42 )  x     / 0.07 ng/mL / 171 U/L / x     / 5.2 ng/mL  p-BNP 19 Oct 2019 18:42: x    , CARDIAC MARKERS ( 19 Oct 2019 14:05 )  x     / 0.07 ng/mL / x     / x     / x      p-BNP 19 Oct 2019 14:05: x                                8.8    11.15 )-----------( 198      ( 22 Oct 2019 10:44 )             28.1     10-22    136  |  97<L>  |  84.0<H>  ----------------------------<  267<H>  3.9   |  25.0  |  3.26<H>    Ca    8.2<L>      22 Oct 2019 10:44  Phos  4.6     10-22  Mg     2.5     10-22    TPro  6.4<L>  /  Alb  2.8<L>  /  TBili  0.3<L>  /  DBili  x   /  AST  32  /  ALT  264<H>  /  AlkPhos  116  10-22    proBNP: Serum Pro-Brain Natriuretic Peptide: 2462 pg/mL (10-21 @ 06:08)  Serum Pro-Brain Natriuretic Peptide: 1636 pg/mL (10-18 @ 21:59)  Serum Pro-Brain Natriuretic Peptide: 1639 pg/mL (10-18 @ 07:32)  Serum Pro-Brain Natriuretic Peptide: 811 pg/mL (10-17 @ 14:14)    Lipid Profile:   HgA1c: Hemoglobin A1C, Whole Blood: 8.5 %  TSH: Thyroid Stimulating Hormone, Serum: 1.04 uIU/mL      TELEMETRY: Reviewed    ECG:  Reviewed by me. 	    DIAGNOSTIC TESTING:  [ ] Echocardiogram:   [ ]  Catheterization:  [ ] Stress Test:    OTHER:

## 2019-10-22 NOTE — CONSULT NOTE ADULT - SUBJECTIVE AND OBJECTIVE BOX
Palliative Medicine Initial Consultation Note    HPI:  85M hx vascular demetnia, afib (not on ac), CKD, DM, COPD presents with syncope and worsening edema. Most of history taken from daughter at bedside and previous documentation given patient's dementia. According to daughter pt has had progressive decline. Last 2 month has had worsening weakness, edema in all extremities, 20 lb weight gain and fatigue. He was in [facility] in August where reportedly had normal nuclear stress test. For last couple weeks edema worsened and also had abdominal distention and intermittent epigastric/chest pain. For last couple of days he's been weak to point where unable to get out of bed. This morning he was using bathroom when had syncopal episode on toilet as well as vomiting. EMS called and had another syncopal episode when being transferred. FS found to be 60 which improved with juice.     PERTINENT PMH REVIEWED:  [x ] YES [ ] NO         Afib     CKD (chronic kidney disease)     COPD (chronic obstructive pulmonary disease)     Dementia     DM (diabetes mellitus).     PAST SURGICAL HISTORY:  History of lung biopsy     S/P carotid endarterectomy.     No pertinent family history in first degree relatives.     No Pertinent Family History in first degree relatives of: no known history of heart failure.      SOCIAL HISTORY:  EtOH [ ] Yes  [x ] No                                Drugs [ ] Yes [x ] No                                   [ ]x smoker- former   [ ] nonsmoker                                    Admitted from: [x ] home [ ] SNF _________ [ ] AVERY ________   Former smoker, smoked multiple packs per day for 40 years former , was at      Surrogate/HCP/Guardian: Daughter Felicita Lopez  [phone]  FAMILY HISTORY:    No Pertinent Family History in first degree relatives of: no known history of heart failure.        Baseline ADLs (prior to admission):  Independent [ ] moderately [ ] fully   Dependent   [ ] moderately [ ]fully    MEDICATIONS  (STANDING):  ALBUTerol/ipratropium for Nebulization 3 milliLiter(s) Nebulizer every 6 hours  ALBUTerol/ipratropium for Nebulization. 3 milliLiter(s) Nebulizer once  artificial  tears Solution 1 Drop(s) Both EYES four times a day  aspirin  chewable 81 milliGRAM(s) Oral daily  dextrose 5%. 1000 milliLiter(s) (50 mL/Hr) IV Continuous <Continuous>  dextrose 50% Injectable 12.5 Gram(s) IV Push once  dextrose 50% Injectable 25 Gram(s) IV Push once  dextrose 50% Injectable 25 Gram(s) IV Push once  diVALproex  milliGRAM(s) Oral at bedtime  donepezil 5 milliGRAM(s) Oral at bedtime  epoetin triny Injectable 94439 Unit(s) SubCutaneous every 7 days  ferrous    sulfate 325 milliGRAM(s) Oral daily  folic acid 1 milliGRAM(s) Oral daily  guaiFENesin  milliGRAM(s) Oral every 12 hours  heparin  Infusion.  Unit(s)/Hr (17 mL/Hr) IV Continuous <Continuous>  insulin glargine Injectable (LANTUS) 16 Unit(s) SubCutaneous at bedtime  insulin lispro (HumaLOG) corrective regimen sliding scale   SubCutaneous three times a day before meals  lactobacillus acidophilus 1 Tablet(s) Oral daily  levothyroxine 50 MICROGram(s) Oral daily  melatonin 5 milliGRAM(s) Oral at bedtime  memantine ER 7 milliGRAM(s) Oral daily  montelukast 10 milliGRAM(s) Oral daily  multivitamin 1 Tablet(s) Oral daily  pantoprazole    Tablet 40 milliGRAM(s) Oral before breakfast    MEDICATIONS  (PRN):  acetaminophen   Tablet .. 650 milliGRAM(s) Oral every 6 hours PRN Temp greater or equal to 38C (100.4F), Mild Pain (1 - 3)  aluminum hydroxide/magnesium hydroxide/simethicone Suspension 30 milliLiter(s) Oral every 4 hours PRN Dyspepsia  dextrose 40% Gel 15 Gram(s) Oral once PRN Blood Glucose LESS THAN 70 milliGRAM(s)/deciliter  glucagon  Injectable 1 milliGRAM(s) IntraMuscular once PRN Glucose LESS THAN 70 milligrams/deciliter  heparin  Injectable 7500 Unit(s) IV Push every 6 hours PRN For aPTT less than 40  heparin  Injectable 3500 Unit(s) IV Push every 6 hours PRN For aPTT between 40 - 57  ondansetron Injectable 4 milliGRAM(s) IV Push every 6 hours PRN Nausea  sodium chloride 0.9% lock flush 10 milliLiter(s) IV Push every 1 hour PRN Pre/post blood products, medications, blood draw, and to maintain line patency      Allergies    No Known Allergies    Intolerances        REVIEW OF SYSTEMS       [ x] Unable to obtain due to poor mentation       Karnofsky Performance Score/Palliative Performance Status Version 2:         %    Vital Signs Last 24 Hrs  T(C): 36.4 (22 Oct 2019 13:41), Max: 37.1 (21 Oct 2019 16:06)  T(F): 97.6 (22 Oct 2019 13:41), Max: 98.8 (21 Oct 2019 16:06)  HR: 88 (22 Oct 2019 08:09) (78 - 93)  BP: 125/73 (22 Oct 2019 08:00) (81/49 - 125/73)  BP(mean): 89 (22 Oct 2019 08:00) (61 - 89)  RR: 20 (22 Oct 2019 08:00) (17 - 25)  SpO2: 98% (22 Oct 2019 08:09) (92% - 98%)    PHYSICAL EXAM:    General: [ ] alert  [ ] oriented x ____ [ ] lethargic [ ] agitated                  [ ] cachexia  [ ] nonverbal  [ ] coma    HEENT: [ ] normal  [ ] dry mouth  [ ] ET tube/trach    Lungs: [ ] comfortable [ ] tachypnea/labored breathing  [ ] excessive secretions    CV: [ ] normal  [ ] tachycardia    GI: [ ] normal  [ ] distended  [ ] tender  [ ] no BS               [ ] PEG/NG/OG tube    : [ ] normal  [ ] incontinent  [ ] oliguria/anuria  [ ] beal    MSK: [ ] normal  [ ] weakness  [ ] edema             [ ] ambulatory  [ ] bedbound/wheelchair bound    Skin: [ ] normal  [ ] pressure ulcers- Stage_____  [ ] no rash    LABS:                        8.8    11.15 )-----------( 198      ( 22 Oct 2019 10:44 )             28.1     10-22    136  |  97<L>  |  84.0<H>  ----------------------------<  267<H>  3.9   |  25.0  |  3.26<H>    Ca    8.2<L>      22 Oct 2019 10:44  Phos  4.6     10-22  Mg     2.5     10-22    TPro  6.4<L>  /  Alb  2.8<L>  /  TBili  0.3<L>  /  DBili  x   /  AST  32  /  ALT  264<H>  /  AlkPhos  116  10-22    PT/INR - ( 21 Oct 2019 06:08 )   PT: 14.1 sec;   INR: 1.22 ratio         PTT - ( 22 Oct 2019 06:42 )  PTT:71.5 sec  Urinalysis Basic - ( 22 Oct 2019 07:02 )    Color: Yellow / Appearance: Clear / S.015 / pH: x  Gluc: x / Ketone: Negative  / Bili: Negative / Urobili: Negative mg/dL   Blood: x / Protein: 30 mg/dL / Nitrite: Negative   Leuk Esterase: Negative / RBC: 0-2 /HPF / WBC 0-2   Sq Epi: x / Non Sq Epi: Few / Bacteria: Few      I&O's Summary    21 Oct 2019 07:01  -  22 Oct 2019 07:00  --------------------------------------------------------  IN: 432 mL / OUT: 515 mL / NET: -83 mL    22 Oct 2019 07:01  -  22 Oct 2019 14:21  --------------------------------------------------------  IN: 0 mL / OUT: 50 mL / NET: -50 mL        RADIOLOGY & ADDITIONAL STUDIES:  < from: TTE Echo Complete w/Doppler (10.19.19 @ 14:45) >    Summary:   1. Patient tachycardic during the entire study.   2. Hyperdynamic wall motion.   3. Left ventricular ejectionfraction, by visual estimation, is >75%.   4. Normal right ventricular size and function.   5. Mild-moderate tricuspid regurgitation.   6. Estimated pulmonary artery systolic pressure is 48 mmHg -mild   pulmonary hypertension.   7. Small pericardial effusion. No echo evidence of tamponade.      < end of copied text >      ADVANCE DIRECTIVES:  [x ] YES [ ] NO HCP  DNR [ ] YES [ x] NO  Completed on:                     MOLST  [ ] YES [ ]x NO   Completed on:  Living Will  [ ] YES [x ] NO   Completed on:    Thank you for the opportunity to assist with the care of this patient.   [place] Palliative Medicine Consult Service [phone]. Palliative Medicine Initial Consultation Note    HPI:  85M hx vascular demetnia, afib (not on ac), CKD, DM, COPD presents with syncope and worsening edema. Most of history taken from daughter at bedside and previous documentation given patient's dementia. According to daughter pt has had progressive decline. Last 2 month has had worsening weakness, edema in all extremities, 20 lb weight gain and fatigue. He was in [facility] in August where reportedly had normal nuclear stress test. For last couple weeks edema worsened and also had abdominal distention and intermittent epigastric/chest pain. For last couple of days he's been weak to point where unable to get out of bed. This morning he was using bathroom when had syncopal episode on toilet as well as vomiting. EMS called and had another syncopal episode when being transferred. FS found to be 60 which improved with juice.     PERTINENT PMH REVIEWED:  [x ] YES [ ] NO         Afib     CKD (chronic kidney disease)     COPD (chronic obstructive pulmonary disease)     Dementia     DM (diabetes mellitus).     PAST SURGICAL HISTORY:  History of lung biopsy     S/P carotid endarterectomy.     No pertinent family history in first degree relatives.     No Pertinent Family History in first degree relatives of: no known history of heart failure.      SOCIAL HISTORY:  EtOH [ ] Yes  [x ] No                                Drugs [ ] Yes [x ] No                                   [ ]x smoker- former   [ ] nonsmoker                                    Admitted from: [x ] home [ ] SNF _________ [ ] [facility] ________   Former smoker, smoked multiple packs per day for 40 years former , was at      Surrogate/HCP/Guardian: Daughter Felicita Lopez  [phone]  FAMILY HISTORY:    No Pertinent Family History in first degree relatives of: no known history of heart failure.        Baseline ADLs (prior to admission):  Independent [ ] moderately [ ] fully   Dependent   [ ] moderately [ ]fully    MEDICATIONS  (STANDING):  ALBUTerol/ipratropium for Nebulization 3 milliLiter(s) Nebulizer every 6 hours  ALBUTerol/ipratropium for Nebulization. 3 milliLiter(s) Nebulizer once  artificial  tears Solution 1 Drop(s) Both EYES four times a day  aspirin  chewable 81 milliGRAM(s) Oral daily  dextrose 5%. 1000 milliLiter(s) (50 mL/Hr) IV Continuous <Continuous>  dextrose 50% Injectable 12.5 Gram(s) IV Push once  dextrose 50% Injectable 25 Gram(s) IV Push once  dextrose 50% Injectable 25 Gram(s) IV Push once  diVALproex  milliGRAM(s) Oral at bedtime  donepezil 5 milliGRAM(s) Oral at bedtime  epoetin triny Injectable 58795 Unit(s) SubCutaneous every 7 days  ferrous    sulfate 325 milliGRAM(s) Oral daily  folic acid 1 milliGRAM(s) Oral daily  guaiFENesin  milliGRAM(s) Oral every 12 hours  heparin  Infusion.  Unit(s)/Hr (17 mL/Hr) IV Continuous <Continuous>  insulin glargine Injectable (LANTUS) 16 Unit(s) SubCutaneous at bedtime  insulin lispro (HumaLOG) corrective regimen sliding scale   SubCutaneous three times a day before meals  lactobacillus acidophilus 1 Tablet(s) Oral daily  levothyroxine 50 MICROGram(s) Oral daily  melatonin 5 milliGRAM(s) Oral at bedtime  memantine ER 7 milliGRAM(s) Oral daily  montelukast 10 milliGRAM(s) Oral daily  multivitamin 1 Tablet(s) Oral daily  pantoprazole    Tablet 40 milliGRAM(s) Oral before breakfast    MEDICATIONS  (PRN):  acetaminophen   Tablet .. 650 milliGRAM(s) Oral every 6 hours PRN Temp greater or equal to 38C (100.4F), Mild Pain (1 - 3)  aluminum hydroxide/magnesium hydroxide/simethicone Suspension 30 milliLiter(s) Oral every 4 hours PRN Dyspepsia  dextrose 40% Gel 15 Gram(s) Oral once PRN Blood Glucose LESS THAN 70 milliGRAM(s)/deciliter  glucagon  Injectable 1 milliGRAM(s) IntraMuscular once PRN Glucose LESS THAN 70 milligrams/deciliter  heparin  Injectable 7500 Unit(s) IV Push every 6 hours PRN For aPTT less than 40  heparin  Injectable 3500 Unit(s) IV Push every 6 hours PRN For aPTT between 40 - 57  ondansetron Injectable 4 milliGRAM(s) IV Push every 6 hours PRN Nausea  sodium chloride 0.9% lock flush 10 milliLiter(s) IV Push every 1 hour PRN Pre/post blood products, medications, blood draw, and to maintain line patency      Allergies    No Known Allergies    Intolerances        REVIEW OF SYSTEMS       [ x] Unable to obtain due to poor mentation       Karnofsky Performance Score/Palliative Performance Status Version 2:  40  %    Vital Signs Last 24 Hrs  T(C): 36.4 (22 Oct 2019 13:41), Max: 37.1 (21 Oct 2019 16:06)  T(F): 97.6 (22 Oct 2019 13:41), Max: 98.8 (21 Oct 2019 16:06)  HR: 88 (22 Oct 2019 08:09) (78 - 93)  BP: 125/73 (22 Oct 2019 08:00) (81/49 - 125/73)  BP(mean): 89 (22 Oct 2019 08:00) (61 - 89)  RR: 20 (22 Oct 2019 08:00) (17 - 25)  SpO2: 98% (22 Oct 2019 08:09) (92% - 98%)    PHYSICAL EXAM:    General: awakens to verbal communication NAD    HEENT: [ x] normal  [ ] dry mouth  [ ] ET tube/trach    Lungs: [ x] comfortable [ ] tachypnea/labored breathing  [ ] excessive secretions    CV: [ x] normal  [ ] tachycardia    GI: [x ] normal  [ ] distended  [ ] tender  [ ] no BS               [ ] PEG/NG/OG tube    : [ x] normal  [ ] incontinent  [ ] oliguria/anuria  [ ] beal    MSK: [ ] normal  [x ] weakness  [ ] edema             [ ] ambulatory  [ ] bedbound/wheelchair bound    Skin: [ ] normal  [ ] pressure ulcers- Stage_____  [x] no rash    LABS:                        8.8    11.15 )-----------( 198      ( 22 Oct 2019 10:44 )             28.1     10    136  |  97<L>  |  84.0<H>  ----------------------------<  267<H>  3.9   |  25.0  |  3.26<H>    Ca    8.2<L>      22 Oct 2019 10:44  Phos  4.6     10  Mg     2.5     10-22    TPro  6.4<L>  /  Alb  2.8<L>  /  TBili  0.3<L>  /  DBili  x   /  AST  32  /  ALT  264<H>  /  AlkPhos  116  10    PT/INR - ( 21 Oct 2019 06:08 )   PT: 14.1 sec;   INR: 1.22 ratio         PTT - ( 22 Oct 2019 06:42 )  PTT:71.5 sec  Urinalysis Basic - ( 22 Oct 2019 07:02 )    Color: Yellow / Appearance: Clear / S.015 / pH: x  Gluc: x / Ketone: Negative  / Bili: Negative / Urobili: Negative mg/dL   Blood: x / Protein: 30 mg/dL / Nitrite: Negative   Leuk Esterase: Negative / RBC: 0-2 /HPF / WBC 0-2   Sq Epi: x / Non Sq Epi: Few / Bacteria: Few      I&O's Summary    21 Oct 2019 07:01  -  22 Oct 2019 07:00  --------------------------------------------------------  IN: 432 mL / OUT: 515 mL / NET: -83 mL    22 Oct 2019 07:01  -  22 Oct 2019 14:21  --------------------------------------------------------  IN: 0 mL / OUT: 50 mL / NET: -50 mL        RADIOLOGY & ADDITIONAL STUDIES:  < from: TTE Echo Complete w/Doppler (10.19.19 @ 14:45) >    Summary:   1. Patient tachycardic during the entire study.   2. Hyperdynamic wall motion.   3. Left ventricular ejectionfraction, by visual estimation, is >75%.   4. Normal right ventricular size and function.   5. Mild-moderate tricuspid regurgitation.   6. Estimated pulmonary artery systolic pressure is 48 mmHg -mild   pulmonary hypertension.   7. Small pericardial effusion. No echo evidence of tamponade.      < end of copied text >      ADVANCE DIRECTIVES:  [x ] YES [ ] NO HCP  DNR [ ] YES [ x] NO  Completed on:                     MOLST  [ ] YES [ ]x NO   Completed on:  Living Will  [ ] YES [x ] NO   Completed on:    Thank you for the opportunity to assist with the care of this patient.   [place] Palliative Medicine Consult Service [phone].

## 2019-10-22 NOTE — PROGRESS NOTE ADULT - ASSESSMENT
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

## 2019-10-22 NOTE — PROGRESS NOTE ADULT - SUBJECTIVE AND OBJECTIVE BOX
Patient is a 85y old  Male who presents with a chief complaint of syncope (22 Oct 2019 05:57)      HPI:  85M hx vascular demetnia, afib (not on ac), CKD, DM, COPD presents with syncope and worsening edema.Off pressors.  Mild epigastric discomfort. + anxiety. No SOB      REVIEW OF SYSTEMS:  Constitutional: No fever, weight loss or fatigue  ENMT:  No difficulty hearing, tinnitus, vertigo; No sinus or throat pain  Respiratory: No cough, wheezing, chills or hemoptysis  Cardiovascular: No chest pain, palpitations, dizziness or leg swelling  Gastrointestinal:+epigastric pain. No nausea, vomiting or hematemesis; No diarrhea or constipation. No melena or hematochezia.  Skin: No itching, burning, rashes or lesions   Musculoskeletal: No joint pain or swelling; No muscle, back or extremity pain    PAST MEDICAL & SURGICAL HISTORY:  CKD (chronic kidney disease)  COPD (chronic obstructive pulmonary disease)  Dementia  Afib  DM (diabetes mellitus)  History of lung biopsy  S/P carotid endarterectomy      FAMILY HISTORY:  No pertinent family history in first degree relatives      SOCIAL HISTORY:  Smoking Status: [ ] Current, [ ] Former, [ ] Never  Pack Years:  [  ] EtOH-no  [  ] IVDA    MEDICATIONS:  MEDICATIONS  (STANDING):  ALBUTerol/ipratropium for Nebulization 3 milliLiter(s) Nebulizer every 6 hours  ALBUTerol/ipratropium for Nebulization. 3 milliLiter(s) Nebulizer once  artificial  tears Solution 1 Drop(s) Both EYES four times a day  aspirin  chewable 81 milliGRAM(s) Oral daily  dextrose 5%. 1000 milliLiter(s) (50 mL/Hr) IV Continuous <Continuous>  dextrose 50% Injectable 12.5 Gram(s) IV Push once  dextrose 50% Injectable 25 Gram(s) IV Push once  dextrose 50% Injectable 25 Gram(s) IV Push once  diVALproex  milliGRAM(s) Oral at bedtime  donepezil 5 milliGRAM(s) Oral at bedtime  ferrous    sulfate 325 milliGRAM(s) Oral daily  guaiFENesin  milliGRAM(s) Oral every 12 hours  heparin  Infusion.  Unit(s)/Hr (17 mL/Hr) IV Continuous <Continuous>  insulin glargine Injectable (LANTUS) 16 Unit(s) SubCutaneous at bedtime  insulin lispro (HumaLOG) corrective regimen sliding scale   SubCutaneous three times a day before meals  lactobacillus acidophilus 1 Tablet(s) Oral daily  levothyroxine 50 MICROGram(s) Oral daily  melatonin 5 milliGRAM(s) Oral at bedtime  memantine ER 7 milliGRAM(s) Oral daily  montelukast 10 milliGRAM(s) Oral daily  multivitamin 1 Tablet(s) Oral daily  pantoprazole    Tablet 40 milliGRAM(s) Oral before breakfast    MEDICATIONS  (PRN):  acetaminophen   Tablet .. 650 milliGRAM(s) Oral every 6 hours PRN Temp greater or equal to 38C (100.4F), Mild Pain (1 - 3)  aluminum hydroxide/magnesium hydroxide/simethicone Suspension 30 milliLiter(s) Oral every 4 hours PRN Dyspepsia  dextrose 40% Gel 15 Gram(s) Oral once PRN Blood Glucose LESS THAN 70 milliGRAM(s)/deciliter  glucagon  Injectable 1 milliGRAM(s) IntraMuscular once PRN Glucose LESS THAN 70 milligrams/deciliter  heparin  Injectable 7500 Unit(s) IV Push every 6 hours PRN For aPTT less than 40  heparin  Injectable 3500 Unit(s) IV Push every 6 hours PRN For aPTT between 40 - 57  ondansetron Injectable 4 milliGRAM(s) IV Push every 6 hours PRN Nausea  sodium chloride 0.9% lock flush 10 milliLiter(s) IV Push every 1 hour PRN Pre/post blood products, medications, blood draw, and to maintain line patency      Allergies    No Known Allergies    Intolerances        Vital Signs Last 24 Hrs  T(C): 36.7 (22 Oct 2019 03:40), Max: 37.1 (21 Oct 2019 16:06)  T(F): 98 (22 Oct 2019 03:40), Max: 98.8 (21 Oct 2019 16:06)  HR: 87 (22 Oct 2019 05:20) (74 - 93)  BP: 106/54 (22 Oct 2019 05:00) (81/49 - 125/66)  BP(mean): 74 (22 Oct 2019 05:00) (61 - 89)  RR: 20 (22 Oct 2019 05:20) (16 - 30)  SpO2: 94% (22 Oct 2019 05:20) (92% - 99%)    10-21 @ 07:01  -  10-22 @ 07:00  --------------------------------------------------------  IN: 432 mL / OUT: 515 mL / NET: -83 mL          PHYSICAL EXAM:    General: Well developed; well nourished; in no acute distress  HEENT: MMM, conjunctiva and sclera clear  H- RRR  L- CTA  Gastrointestinal: Soft, non-tender  ( no increased pain on palpation) non-distended; Normal bowel sounds; No rebound or guarding  Extremities: Normal range of motion, No clubbing, cyanosis or edema  Neurological: Alert and oriented x3  Skin: Warm and dry. No obvious rash      LABS:                        9.0    12.22 )-----------( 213      ( 21 Oct 2019 06:08 )             28.0     21 Oct 2019 06:08    136    |  97     |  94.0   ----------------------------<  67     3.5     |  24.0   |  3.97     Ca    8.0        21 Oct 2019 06:08  Phos  5.4       21 Oct 2019 06:08  Mg     2.6       21 Oct 2019 06:08                RADIOLOGY & ADDITIONAL STUDIES:     < from: CT Abdomen and Pelvis No Cont (10.17.19 @ 19:56) >  IMPRESSION:  Infiltrates in the right upper and right lower lobes  Small pericardial effusion  Rounded density at the left lung base suggestive of rounded atelectasis   versus mass or infiltrate. There is atelectasis versus infiltrate at the   right lung base as well.  Mild stranding in the fat around the gallbladder and duodenum maintained   to indicate cholecystitis and/or duodenitis. Further evaluation of the   gallbladder may be obtained with ultrasound as clinically indicated.   Please correlate clinically for possible duodenitis.  Thick-walled bladder may indicate cystitis. Please correlate clinically  Enlarged prostate glands    < end of copied text >

## 2019-10-22 NOTE — PROGRESS NOTE ADULT - ASSESSMENT
86 y/o M with a PMH of vascular dementia, ?Afib (not on AC), CKD, DMII, COPD, and GERD who presented to the ED after a syncopal episode and with worsening edema. Patient had previous workup in 08/2019 with his private cardiologist Dr. Regan Farrar that included an echo and NST after an episode of chest pain and dyspnea while on vacation, results were normal.     Over the weekend, patient had an episode of AF with hypotension that required pressor support and IVF. He had a stat bedside echo which revealed hypokinetic RV and dilated IVC. Official TTE revealed hyperdynamic LV and normal looking RV.     Patient seen and examined at bedside, with daughter present.      Assessment/Plan:   1) Shock secondary to unclear etiology- vagal vs. septic vs. ischemic etiology. Now hemodynamically stable, off pressors. Off IVF. Receiving broad spectrum antibiotics. HIDA scan yesterday was negative for cholecystitis. Will benefit from cardiac cath to check for ischemia once Cr stabilizes.   2) Pulm edema and elevated RA pressures- patient appears congested, however, would not aggressively diurese patient in view of his renal insufficiency and recent hypotension. Avoid IVF. Continue to monitor and consider starting PO lasix as patient's kidney function continues to normalize. Monitor I's and O's.  3) ALMAS on CKD- Cr improved from 3.97 to 3.26 today. Avoid nephrotoxins. Nephrology following.   4) PAF- remains in sinus rhythm. As patient's BP is stable, there is no contraindication to starting low dose beta blocker if needed for rate control; however, at this time, patient's HR is stable off medications. CHADSVASC 5- patient is currently on heparin drip; would recommend longterm AC if no contraindication. However, will hold off until plan is clear re: cath.     Plan was discussed with patient and his family at bedside.     Will continue to follow.  Thank you for involving me in the care of this patient.

## 2019-10-22 NOTE — CONSULT NOTE ADULT - PROBLEM SELECTOR RECOMMENDATION 7
Patient has HCP in chart - Daughter Felicita Lopez [phone] primary HCP, Tony mcdonald [phone]  Met with wife with daughter Susanne.  Informed me about his dementia ( see above). Patient seemed fairly functional, not end stage. However, will likely have a decline in functional status after this hospitalization.  They report he had been in a hospital in the [facility] over the summer for chest pain. He was then at [facility] in August for a nuclear stress test which was essentially negative.  Currently full code  Plan to discuss further goals of care with HCP , Felicita.

## 2019-10-22 NOTE — PROGRESS NOTE ADULT - SUBJECTIVE AND OBJECTIVE BOX
Patient is a 85y old  Male who presents with a chief complaint of syncope (22 Oct 2019 07:27)      Patient seen and examined at bedside. patient plan and hospital course d/w daughter bedside     ALLERGIES:  No Known Allergies    MEDICATIONS  (STANDING):  ALBUTerol/ipratropium for Nebulization 3 milliLiter(s) Nebulizer every 6 hours  ALBUTerol/ipratropium for Nebulization. 3 milliLiter(s) Nebulizer once  artificial  tears Solution 1 Drop(s) Both EYES four times a day  aspirin  chewable 81 milliGRAM(s) Oral daily  dextrose 5%. 1000 milliLiter(s) (50 mL/Hr) IV Continuous <Continuous>  dextrose 50% Injectable 12.5 Gram(s) IV Push once  dextrose 50% Injectable 25 Gram(s) IV Push once  dextrose 50% Injectable 25 Gram(s) IV Push once  diVALproex  milliGRAM(s) Oral at bedtime  donepezil 5 milliGRAM(s) Oral at bedtime  ferrous    sulfate 325 milliGRAM(s) Oral daily  guaiFENesin  milliGRAM(s) Oral every 12 hours  heparin  Infusion.  Unit(s)/Hr (17 mL/Hr) IV Continuous <Continuous>  insulin glargine Injectable (LANTUS) 16 Unit(s) SubCutaneous at bedtime  insulin lispro (HumaLOG) corrective regimen sliding scale   SubCutaneous three times a day before meals  lactobacillus acidophilus 1 Tablet(s) Oral daily  levothyroxine 50 MICROGram(s) Oral daily  melatonin 5 milliGRAM(s) Oral at bedtime  memantine ER 7 milliGRAM(s) Oral daily  montelukast 10 milliGRAM(s) Oral daily  multivitamin 1 Tablet(s) Oral daily  pantoprazole    Tablet 40 milliGRAM(s) Oral before breakfast    MEDICATIONS  (PRN):  acetaminophen   Tablet .. 650 milliGRAM(s) Oral every 6 hours PRN Temp greater or equal to 38C (100.4F), Mild Pain (1 - 3)  aluminum hydroxide/magnesium hydroxide/simethicone Suspension 30 milliLiter(s) Oral every 4 hours PRN Dyspepsia  dextrose 40% Gel 15 Gram(s) Oral once PRN Blood Glucose LESS THAN 70 milliGRAM(s)/deciliter  glucagon  Injectable 1 milliGRAM(s) IntraMuscular once PRN Glucose LESS THAN 70 milligrams/deciliter  heparin  Injectable 7500 Unit(s) IV Push every 6 hours PRN For aPTT less than 40  heparin  Injectable 3500 Unit(s) IV Push every 6 hours PRN For aPTT between 40 - 57  ondansetron Injectable 4 milliGRAM(s) IV Push every 6 hours PRN Nausea  sodium chloride 0.9% lock flush 10 milliLiter(s) IV Push every 1 hour PRN Pre/post blood products, medications, blood draw, and to maintain line patency    Vital Signs Last 24 Hrs  T(F): 97.9 (22 Oct 2019 08:00), Max: 98.8 (21 Oct 2019 16:06)  HR: 88 (22 Oct 2019 08:09) (74 - 93)  BP: 125/73 (22 Oct 2019 08:00) (81/49 - 125/73)  RR: 20 (22 Oct 2019 08:00) (16 - 25)  SpO2: 98% (22 Oct 2019 08:09) (92% - 99%)  I&O's Summary    21 Oct 2019 07:  -  22 Oct 2019 07:00  --------------------------------------------------------  IN: 432 mL / OUT: 515 mL / NET: -83 mL    22 Oct 2019 07:01  -  22 Oct 2019 08:52  --------------------------------------------------------  IN: 0 mL / OUT: 50 mL / NET: -50 mL      PHYSICAL EXAM:  General: NAD, alert elderly  ENT: MMM, no thrush  Neck: Supple, No JVD  Lungs: +rhonchi b/l lungs greatest on right lower lobe  Cardio: +s1/s2 +2pitting edema   Abdomen: Soft, Nontender, Nondistended; Bowel sounds present  Extremities: No calf tenderness    LABS:                        9.0    12.22 )-----------( 213      ( 21 Oct 2019 06:08 )             28.0     10-21    136  |  97  |  94.0  ----------------------------<  67  3.5   |  24.0  |  3.97    Ca    8.0      21 Oct 2019 06:08  Phos  5.4     10-21  Mg     2.6     10-21    TPro  6.2  /  Alb  2.9  /  TBili  0.3  /  DBili  x   /  AST  387  /  ALT  604  /  AlkPhos  136  10-20    Amylase, Serum Total: 53 U/L (10-20-19 @ 05:21)    Lipase, Serum: 18 U/L (10-20-19 @ 05:21)    eGFR if Non African American: 13 mL/min/1.73M2 (10-21-19 @ 06:08)  eGFR if African American: 15 mL/min/1.73M2 (10-21-19 @ 06:08)    PT/INR - ( 21 Oct 2019 06:08 )   PT: 14.1 sec;   INR: 1.22 ratio         PTT - ( 22 Oct 2019 06:42 )  PTT:71.5 sec  Lactate, Blood: 1.0 mmol/L (10-19 @ 18:40)      CARDIAC MARKERS ( 20 Oct 2019 13:22 )  x     / 0.09 ng/mL / x     / x     / x      CARDIAC MARKERS ( 20 Oct 2019 05:21 )  x     / 0.09 ng/mL / x     / x     / x      CARDIAC MARKERS ( 19 Oct 2019 18:42 )  x     / 0.07 ng/mL / 171 U/L / x     / 5.2 ng/mL  CARDIAC MARKERS ( 19 Oct 2019 14:05 )  x     / 0.07 ng/mL / x     / x     / x                ABG - ( 19 Oct 2019 21:02 )  pH, Arterial: 7.34  pH, Blood: x     /  pCO2: 31    /  pO2: 144   / HCO3: 18    / Base Excess: -8.1  /  SaO2: 99        Glucose  POCT Blood Glucose.: 236 mg/dL (22 Oct 2019 08:14)  POCT Blood Glucose.: 339 mg/dL (21 Oct 2019 21:44)  POCT Blood Glucose.: 245 mg/dL (21 Oct 2019 16:38)  POCT Blood Glucose.: 104 mg/dL (21 Oct 2019 09:34)    10-18 [id number] 8.5    Urinalysis Basic - ( 22 Oct 2019 07:02 )  Color: Yellow / Appearance: Clear / S.015 / pH: x  Gluc: x / Ketone: Negative  / Bili: Negative / Urobili: Negative mg/dL   Blood: x / Protein: 30 mg/dL / Nitrite: Negative   Leuk Esterase: Negative / RBC: 0-2 /HPF / WBC 0-2   Sq Epi: x / Non Sq Epi: Few / Bacteria: Few    RADIOLOGY & ADDITIONAL TESTS:  < from: NM Hepatobiliary Imaging (10.21.19 @ 12:53) >  IMPRESSION: Normal hepatobiliary scan.  No evidence of acute cholecystitis.  < end of copied text >    < from: TTE Echo Complete w/Doppler (10.19.19 @ 14:45) >  Summary:   1. Patient tachycardic during the entire study.   2. Hyperdynamic wall motion.   3. Left ventricular ejectionfraction, by visual estimation, is >75%.   4. Normal right ventricular size and function.   5. Mild-moderate tricuspid regurgitation.   6. Estimated pulmonary artery systolic pressure is 48 mmHg -mild   pulmonary hypertension.   7. Small pericardial effusion. No echo evidence of tamponade.  < end of copied text >

## 2019-10-22 NOTE — PROGRESS NOTE ADULT - SUBJECTIVE AND OBJECTIVE BOX
MICU DOWN GRADE NOTE      Patient is a 85y old  Male who presents with a chief complaint of syncope (21 Oct 2019 14:42)      HPI: 85M hx vascular demetnia, afib (not on ac), CKD, DM, COPD presents with syncope and worsening edema.  For last couple of days he's been weak to point where unable to get out of bed. This morning he was using bathroom when had syncopal episode on toilet as well as vomiting. EMS called and had another syncopal episode when being transferred. FS found to be 60 which improved with juice.     Patient was admitted to medicine on 10/17 and started on lasix.     Patient  was -2.5L since admission.  TTE on 10/18 showed EF 75% with small LV cavity and normal sized RV without significant valve disease or wall motion abnormalities.  Per medical team, patient was in normal state of health  until he developed Afib with RVR this afternoon, he then became hypotensive and AMS.  Upon arrival to beside with Dr. Priest, Dr. Jiang was present performing a bedside echo.  Read was severe RV dysfxn, dilation, and ivc dilation.  Patient barely arousable, BP 59/43.  IV heparin bolused and gtt started for possible PE vs ACS.  IV fluids and dopamine started by cardiology team.  SBP improved to 70s and patient transferred to MICU.    INTERVAL HPI/OVERNIGHT EVENTS: Upon arriving to MICU pt mental status improved. Etiology shock unclear thought sepsis vs pe vs cad. Pt continued on heparin. HIDA scan done which did not show cholecystitis. TTE showed improved RV funciton. Pt able to be titrated off pressors and blood pressure remained stable. Currently has no complaints was resting comfortably.         REVIEW OF SYSTEMS:  CONSTITUTIONAL: No fever, chills  HEENT:  No blurry vision No sinus or throat pain  NECK: No pain or stiffness  RESPIRATORY: +cough, +sob improved  CARDIOVASCULAR: No chest pain, palpitations  GASTROINTESTINAL: No abdominal pain. No nausea, vomiting, or diarrhea  GENITOURINARY: No dysuria  NEUROLOGICAL: No HA, No focal weakness  SKIN: No itching, burning, rashes, or lesions   MUSCULOSKELETAL: No joint pain or swelling; No muscle, back, or extremity pain    MEDICATIONS:  acetaminophen   Tablet .. 650 milliGRAM(s) Oral every 6 hours PRN  ALBUTerol/ipratropium for Nebulization 3 milliLiter(s) Nebulizer every 6 hours  ALBUTerol/ipratropium for Nebulization. 3 milliLiter(s) Nebulizer once  aluminum hydroxide/magnesium hydroxide/simethicone Suspension 30 milliLiter(s) Oral every 4 hours PRN  artificial  tears Solution 1 Drop(s) Both EYES four times a day  aspirin  chewable 81 milliGRAM(s) Oral daily  dextrose 40% Gel 15 Gram(s) Oral once PRN  dextrose 5%. 1000 milliLiter(s) IV Continuous <Continuous>  dextrose 50% Injectable 12.5 Gram(s) IV Push once  dextrose 50% Injectable 25 Gram(s) IV Push once  dextrose 50% Injectable 25 Gram(s) IV Push once  diVALproex  milliGRAM(s) Oral at bedtime  donepezil 5 milliGRAM(s) Oral at bedtime  ferrous    sulfate 325 milliGRAM(s) Oral daily  glucagon  Injectable 1 milliGRAM(s) IntraMuscular once PRN  guaiFENesin  milliGRAM(s) Oral every 12 hours  heparin  Infusion.  Unit(s)/Hr IV Continuous <Continuous>  heparin  Injectable 7500 Unit(s) IV Push every 6 hours PRN  heparin  Injectable 3500 Unit(s) IV Push every 6 hours PRN  insulin glargine Injectable (LANTUS) 16 Unit(s) SubCutaneous at bedtime  insulin lispro (HumaLOG) corrective regimen sliding scale   SubCutaneous three times a day before meals  lactobacillus acidophilus 1 Tablet(s) Oral daily  levothyroxine 50 MICROGram(s) Oral daily  melatonin 5 milliGRAM(s) Oral at bedtime  memantine ER 7 milliGRAM(s) Oral daily  montelukast 10 milliGRAM(s) Oral daily  multivitamin 1 Tablet(s) Oral daily  ondansetron Injectable 4 milliGRAM(s) IV Push every 6 hours PRN  pantoprazole    Tablet 40 milliGRAM(s) Oral before breakfast  sodium chloride 0.9% lock flush 10 milliLiter(s) IV Push every 1 hour PRN      T(C): 36.7 (10-22-19 @ 03:40), Max: 37.1 (10-21-19 @ 16:06)  HR: 87 (10-22-19 @ 05:20) (73 - 93)  BP: 106/54 (10-22-19 @ 05:00) (81/49 - 125/66)  RR: 20 (10-22-19 @ 05:20) (16 - 30)  SpO2: 94% (10-22-19 @ 05:20) (92% - 99%)  Wt(kg): --Vital Signs Last 24 Hrs  T(C): 36.7 (22 Oct 2019 03:40), Max: 37.1 (21 Oct 2019 16:06)  T(F): 98 (22 Oct 2019 03:40), Max: 98.8 (21 Oct 2019 16:06)  HR: 87 (22 Oct 2019 05:20) (73 - 93)  BP: 106/54 (22 Oct 2019 05:00) (81/49 - 125/66)  BP(mean): 74 (22 Oct 2019 05:00) (61 - 89)  RR: 20 (22 Oct 2019 05:20) (16 - 30)  SpO2: 94% (22 Oct 2019 05:20) (92% - 99%)    PHYSICAL EXAM:  GENERAL: NAD, resting comfortably  HEAD:  Atraumatic, Normocephalic  EYES: EOMI, PERRLA, conjunctiva and sclera clear  ENMT:  Moist mucous membranes  NECK: Supple, No JVD,  CHEST/LUNG: diffuse rhonchi, more pronouniced in RLL  HEART: Regular rate and rhythm; No murmurs, rubs, or gallops  ABDOMEN: Soft, Nontender, Nondistended; Bowel sounds present  NEURO: Alert & Oriented X3  EXTREMITIES: b/l edema w/ pitting  LYMPH: No lymphadenopathy noted      Consultant(s) Notes Reviewed:  [x ] YES  [ ] NO  Care Discussed with Consultants/Other Providers [ x] YES  [ ] NO    LABS:                        9.0    12.22 )-----------( 213      ( 21 Oct 2019 06:08 )             28.0     10-21    136  |  97<L>  |  94.0<H>  ----------------------------<  67<L>  3.5   |  24.0  |  3.97<H>    Ca    8.0<L>      21 Oct 2019 06:08  Phos  5.4     10-21  Mg     2.6     10-21      PT/INR - ( 21 Oct 2019 06:08 )   PT: 14.1 sec;   INR: 1.22 ratio         PTT - ( 21 Oct 2019 20:36 )  PTT:79.7 sec    CAPILLARY BLOOD GLUCOSE      POCT Blood Glucose.: 339 mg/dL (21 Oct 2019 21:44)  POCT Blood Glucose.: 245 mg/dL (21 Oct 2019 16:38)  POCT Blood Glucose.: 104 mg/dL (21 Oct 2019 09:34)  POCT Blood Glucose.: 79 mg/dL (21 Oct 2019 06:26)            RADIOLOGY & ADDITIONAL TESTS:    Imaging Personally Reviewed:  [x ] YES  [ ] NO  < from: NM Hepatobiliary Imaging (10.21.19 @ 12:53) >  IMPRESSION: Normal hepatobiliary scan.    No evidence of acute cholecystitis.    < end of copied text >  < from: Xray Chest 1 View- PORTABLE-Routine (10.20.19 @ 07:24) >  Impression:  Diffuse bilateral airspace disease, unchanged..    < end of copied text >  < from: US Abdomen Complete (10.19.19 @ 20:42) >  IMPRESSION:   1. Heterogeneous echotexture to the liver. Correlate clinically.   2. Diffuse gallbladder wall thickening again noted. Edema noted within   the gallbladder wall. No gallstones. Acalculous cholecystitis must be   considered in the appropriate clinical setting.    < end of copied text >

## 2019-10-22 NOTE — CONSULT NOTE ADULT - ASSESSMENT
85yr man, hx of hx vascular dementia afib (not on ac), CKD, DM, COPD admitted with syncope found to be hypoglycemic with nikki on ckd. Patient's hospital course complicated by Afib with RVR was in MICU

## 2019-10-22 NOTE — PROGRESS NOTE ADULT - SUBJECTIVE AND OBJECTIVE BOX
NEPHROLOGY INTERVAL HPI/OVERNIGHT EVENTS:    ILIANA mosher   Moved out of ICU   Remains on Heparin drip   Family at bedside   BP stable    ml thus far   Remains off Lasix     MEDICATIONS  (STANDING):  ALBUTerol/ipratropium for Nebulization 3 milliLiter(s) Nebulizer every 6 hours  ALBUTerol/ipratropium for Nebulization. 3 milliLiter(s) Nebulizer once  artificial  tears Solution 1 Drop(s) Both EYES four times a day  aspirin  chewable 81 milliGRAM(s) Oral daily  dextrose 5%. 1000 milliLiter(s) (50 mL/Hr) IV Continuous <Continuous>  dextrose 50% Injectable 12.5 Gram(s) IV Push once  dextrose 50% Injectable 25 Gram(s) IV Push once  dextrose 50% Injectable 25 Gram(s) IV Push once  diVALproex  milliGRAM(s) Oral at bedtime  donepezil 5 milliGRAM(s) Oral at bedtime  ferrous    sulfate 325 milliGRAM(s) Oral daily  guaiFENesin  milliGRAM(s) Oral every 12 hours  heparin  Infusion.  Unit(s)/Hr (17 mL/Hr) IV Continuous <Continuous>  insulin glargine Injectable (LANTUS) 16 Unit(s) SubCutaneous at bedtime  insulin lispro (HumaLOG) corrective regimen sliding scale   SubCutaneous three times a day before meals  lactobacillus acidophilus 1 Tablet(s) Oral daily  levothyroxine 50 MICROGram(s) Oral daily  melatonin 5 milliGRAM(s) Oral at bedtime  memantine ER 7 milliGRAM(s) Oral daily  montelukast 10 milliGRAM(s) Oral daily  multivitamin 1 Tablet(s) Oral daily  pantoprazole    Tablet 40 milliGRAM(s) Oral before breakfast    MEDICATIONS  (PRN):  acetaminophen   Tablet .. 650 milliGRAM(s) Oral every 6 hours PRN Temp greater or equal to 38C (100.4F), Mild Pain (1 - 3)  aluminum hydroxide/magnesium hydroxide/simethicone Suspension 30 milliLiter(s) Oral every 4 hours PRN Dyspepsia  dextrose 40% Gel 15 Gram(s) Oral once PRN Blood Glucose LESS THAN 70 milliGRAM(s)/deciliter  glucagon  Injectable 1 milliGRAM(s) IntraMuscular once PRN Glucose LESS THAN 70 milligrams/deciliter  heparin  Injectable 7500 Unit(s) IV Push every 6 hours PRN For aPTT less than 40  heparin  Injectable 3500 Unit(s) IV Push every 6 hours PRN For aPTT between 40 - 57  ondansetron Injectable 4 milliGRAM(s) IV Push every 6 hours PRN Nausea  sodium chloride 0.9% lock flush 10 milliLiter(s) IV Push every 1 hour PRN Pre/post blood products, medications, blood draw, and to maintain line patency      Allergies    No Known Allergies    Intolerances      Vital Signs Last 24 Hrs  T(C): 36.6 (22 Oct 2019 08:00), Max: 37.1 (21 Oct 2019 16:06)  T(F): 97.9 (22 Oct 2019 08:00), Max: 98.8 (21 Oct 2019 16:06)  HR: 88 (22 Oct 2019 08:09) (78 - 93)  BP: 125/73 (22 Oct 2019 08:00) (81/49 - 125/73)  BP(mean): 89 (22 Oct 2019 08:00) (61 - 89)  RR: 20 (22 Oct 2019 08:00) (16 - 25)  SpO2: 98% (22 Oct 2019 08:09) (92% - 99%)  Daily     Daily Weight in k.9 (22 Oct 2019 03:40)  I&O's Detail    21 Oct 2019 07:  -  22 Oct 2019 07:00  --------------------------------------------------------  IN:    heparin  Infusion.: 132 mL    Oral Fluid: 300 mL  Total IN: 432 mL    OUT:    Incontinent per Condom Catheter: 200 mL    Indwelling Catheter - Urethral: 315 mL  Total OUT: 515 mL    Total NET: -83 mL      22 Oct 2019 07:  -  22 Oct 2019 12:36  --------------------------------------------------------  IN:  Total IN: 0 mL    OUT:    Incontinent per Condom Catheter: 50 mL  Total OUT: 50 mL    Total NET: -50 mL        I&O's Summary    21 Oct 2019 07:  -  22 Oct 2019 07:00  --------------------------------------------------------  IN: 432 mL / OUT: 515 mL / NET: -83 mL    22 Oct 2019 07:01  -  22 Oct 2019 12:36  --------------------------------------------------------  IN: 0 mL / OUT: 50 mL / NET: -50 mL        PHYSICAL EXAM:  HEAD:  Atraumatic, No edema   NECK: Supple, L TLC   CHEST/LUNG: EAE , few basilar crackles , no rub   HEART: Irregular , no rub   ABDOMEN: Soft, + distended +BS; nontender; abd wall and flank edema  EXTREMITIES: + edema , the same     LABS:                        8.8    11.15 )-----------( 198      ( 22 Oct 2019 10:44 )             28.1     10    136  |  97<L>  |  84.0<H>  ----------------------------<  267<H>  3.9   |  25.0  |  3.26<H>    Ca    8.2<L>      22 Oct 2019 10:44  Phos  4.6     10  Mg     2.5     10-22    TPro  6.4<L>  /  Alb  2.8<L>  /  TBili  0.3<L>  /  DBili  x   /  AST  32  /  ALT  264<H>  /  AlkPhos  116  10-22    PT/INR - ( 21 Oct 2019 06:08 )   PT: 14.1 sec;   INR: 1.22 ratio         PTT - ( 22 Oct 2019 06:42 )  PTT:71.5 sec  Urinalysis Basic - ( 22 Oct 2019 07:02 )    Color: Yellow / Appearance: Clear / S.015 / pH: x  Gluc: x / Ketone: Negative  / Bili: Negative / Urobili: Negative mg/dL   Blood: x / Protein: 30 mg/dL / Nitrite: Negative   Leuk Esterase: Negative / RBC: 0-2 /HPF / WBC 0-2   Sq Epi: x / Non Sq Epi: Few / Bacteria: Few      Magnesium, Serum: 2.5 mg/dL (10-22 @ 10:44)  Phosphorus Level, Serum: 4.6 mg/dL (10-22 @ 10:44)          RADIOLOGY & ADDITIONAL TESTS:

## 2019-10-23 DIAGNOSIS — R10.13 EPIGASTRIC PAIN: ICD-10-CM

## 2019-10-23 LAB
ALBUMIN SERPL ELPH-MCNC: 3 G/DL — LOW (ref 3.3–5.2)
ALP SERPL-CCNC: 117 U/L — SIGNIFICANT CHANGE UP (ref 40–120)
ALT FLD-CCNC: 195 U/L — HIGH
ANION GAP SERPL CALC-SCNC: 13 MMOL/L — SIGNIFICANT CHANGE UP (ref 5–17)
APTT BLD: 55.6 SEC — HIGH (ref 27.5–36.3)
APTT BLD: 74.5 SEC — HIGH (ref 27.5–36.3)
AST SERPL-CCNC: 20 U/L — SIGNIFICANT CHANGE UP
BILIRUB SERPL-MCNC: 0.3 MG/DL — LOW (ref 0.4–2)
BUN SERPL-MCNC: 84 MG/DL — HIGH (ref 8–20)
CALCIUM SERPL-MCNC: 8.6 MG/DL — SIGNIFICANT CHANGE UP (ref 8.6–10.2)
CHLORIDE SERPL-SCNC: 97 MMOL/L — LOW (ref 98–107)
CO2 SERPL-SCNC: 24 MMOL/L — SIGNIFICANT CHANGE UP (ref 22–29)
CREAT SERPL-MCNC: 3.41 MG/DL — HIGH (ref 0.5–1.3)
GLUCOSE BLDC GLUCOMTR-MCNC: 235 MG/DL — HIGH (ref 70–99)
GLUCOSE BLDC GLUCOMTR-MCNC: 249 MG/DL — HIGH (ref 70–99)
GLUCOSE BLDC GLUCOMTR-MCNC: 257 MG/DL — HIGH (ref 70–99)
GLUCOSE BLDC GLUCOMTR-MCNC: 301 MG/DL — HIGH (ref 70–99)
GLUCOSE SERPL-MCNC: 244 MG/DL — HIGH (ref 70–115)
HCT VFR BLD CALC: 26.9 % — LOW (ref 39–50)
HGB BLD-MCNC: 8.5 G/DL — LOW (ref 13–17)
MAGNESIUM SERPL-MCNC: 2.5 MG/DL — SIGNIFICANT CHANGE UP (ref 1.6–2.6)
MCHC RBC-ENTMCNC: 28.5 PG — SIGNIFICANT CHANGE UP (ref 27–34)
MCHC RBC-ENTMCNC: 31.6 GM/DL — LOW (ref 32–36)
MCV RBC AUTO: 90.3 FL — SIGNIFICANT CHANGE UP (ref 80–100)
PLATELET # BLD AUTO: 196 K/UL — SIGNIFICANT CHANGE UP (ref 150–400)
POTASSIUM SERPL-MCNC: 3.9 MMOL/L — SIGNIFICANT CHANGE UP (ref 3.5–5.3)
POTASSIUM SERPL-SCNC: 3.9 MMOL/L — SIGNIFICANT CHANGE UP (ref 3.5–5.3)
PROT SERPL-MCNC: 6.6 G/DL — SIGNIFICANT CHANGE UP (ref 6.6–8.7)
RBC # BLD: 2.98 M/UL — LOW (ref 4.2–5.8)
RBC # FLD: 15.1 % — HIGH (ref 10.3–14.5)
SODIUM SERPL-SCNC: 134 MMOL/L — LOW (ref 135–145)
WBC # BLD: 11 K/UL — HIGH (ref 3.8–10.5)
WBC # FLD AUTO: 11 K/UL — HIGH (ref 3.8–10.5)

## 2019-10-23 PROCEDURE — 99233 SBSQ HOSP IP/OBS HIGH 50: CPT

## 2019-10-23 RX ORDER — ERYTHROPOIETIN 10000 [IU]/ML
20000 INJECTION, SOLUTION INTRAVENOUS; SUBCUTANEOUS
Refills: 0 | Status: DISCONTINUED | OUTPATIENT
Start: 2019-10-23 | End: 2019-10-31

## 2019-10-23 RX ORDER — PANTOPRAZOLE SODIUM 20 MG/1
40 TABLET, DELAYED RELEASE ORAL EVERY 12 HOURS
Refills: 0 | Status: DISCONTINUED | OUTPATIENT
Start: 2019-10-23 | End: 2019-10-31

## 2019-10-23 RX ORDER — POLYETHYLENE GLYCOL 3350 17 G/17G
17 POWDER, FOR SOLUTION ORAL ONCE
Refills: 0 | Status: COMPLETED | OUTPATIENT
Start: 2019-10-23 | End: 2019-10-23

## 2019-10-23 RX ADMIN — Medication 1 DROP(S): at 12:33

## 2019-10-23 RX ADMIN — Medication 4: at 12:13

## 2019-10-23 RX ADMIN — MONTELUKAST 10 MILLIGRAM(S): 4 TABLET, CHEWABLE ORAL at 12:31

## 2019-10-23 RX ADMIN — INSULIN GLARGINE 16 UNIT(S): 100 INJECTION, SOLUTION SUBCUTANEOUS at 21:58

## 2019-10-23 RX ADMIN — HEPARIN SODIUM 1300 UNIT(S)/HR: 5000 INJECTION INTRAVENOUS; SUBCUTANEOUS at 07:22

## 2019-10-23 RX ADMIN — Medication 5 MILLIGRAM(S): at 21:58

## 2019-10-23 RX ADMIN — Medication 325 MILLIGRAM(S): at 12:31

## 2019-10-23 RX ADMIN — DONEPEZIL HYDROCHLORIDE 5 MILLIGRAM(S): 10 TABLET, FILM COATED ORAL at 21:58

## 2019-10-23 RX ADMIN — ERYTHROPOIETIN 20000 UNIT(S): 10000 INJECTION, SOLUTION INTRAVENOUS; SUBCUTANEOUS at 16:00

## 2019-10-23 RX ADMIN — PANTOPRAZOLE SODIUM 40 MILLIGRAM(S): 20 TABLET, DELAYED RELEASE ORAL at 17:36

## 2019-10-23 RX ADMIN — UMECLIDINIUM BROMIDE AND VILANTEROL TRIFENATATE 1 PUFF(S): 62.5; 25 POWDER RESPIRATORY (INHALATION) at 08:30

## 2019-10-23 RX ADMIN — Medication 1 DROP(S): at 05:16

## 2019-10-23 RX ADMIN — Medication 50 MICROGRAM(S): at 05:17

## 2019-10-23 RX ADMIN — Medication 3 MILLILITER(S): at 08:30

## 2019-10-23 RX ADMIN — PANTOPRAZOLE SODIUM 40 MILLIGRAM(S): 20 TABLET, DELAYED RELEASE ORAL at 05:17

## 2019-10-23 RX ADMIN — PITAVASTATIN CALCIUM 2 MILLIGRAM(S): 1.04 TABLET, FILM COATED ORAL at 21:59

## 2019-10-23 RX ADMIN — Medication 1 DROP(S): at 19:14

## 2019-10-23 RX ADMIN — Medication 1 TABLET(S): at 12:31

## 2019-10-23 RX ADMIN — Medication 3 MILLILITER(S): at 15:15

## 2019-10-23 RX ADMIN — Medication 3 MILLILITER(S): at 20:49

## 2019-10-23 RX ADMIN — MEMANTINE HYDROCHLORIDE 7 MILLIGRAM(S): 10 TABLET ORAL at 12:30

## 2019-10-23 RX ADMIN — POLYETHYLENE GLYCOL 3350 17 GRAM(S): 17 POWDER, FOR SOLUTION ORAL at 12:33

## 2019-10-23 RX ADMIN — Medication 6: at 16:30

## 2019-10-23 RX ADMIN — Medication 600 MILLIGRAM(S): at 17:36

## 2019-10-23 RX ADMIN — HEPARIN SODIUM 1300 UNIT(S)/HR: 5000 INJECTION INTRAVENOUS; SUBCUTANEOUS at 17:39

## 2019-10-23 RX ADMIN — Medication 81 MILLIGRAM(S): at 12:31

## 2019-10-23 RX ADMIN — DIVALPROEX SODIUM 500 MILLIGRAM(S): 500 TABLET, DELAYED RELEASE ORAL at 21:58

## 2019-10-23 RX ADMIN — Medication 1 DROP(S): at 00:20

## 2019-10-23 RX ADMIN — Medication 600 MILLIGRAM(S): at 05:17

## 2019-10-23 RX ADMIN — Medication 1 MILLIGRAM(S): at 12:31

## 2019-10-23 RX ADMIN — Medication 4: at 07:53

## 2019-10-23 NOTE — PHYSICAL THERAPY INITIAL EVALUATION ADULT - GAIT DEVIATIONS NOTED, PT EVAL
decreased step length/decreased sarbjit/decreased stride length/shuffling steps
decreased sarbjit/decreased step length/decreased stride length

## 2019-10-23 NOTE — PHYSICAL THERAPY INITIAL EVALUATION ADULT - DIAGNOSIS, PT EVAL
Impaired functional mobility secondary to weakness and recent episodes of syncope and worsening edema
decreased functional mobility secondary to decreased strength and endurance

## 2019-10-23 NOTE — PROGRESS NOTE ADULT - ASSESSMENT
84 y/o M with a PMH of vascular dementia, ?Afib (not on AC), CKD, DMII, COPD, and GERD who presented to the ED after a syncopal episode and with worsening edema. Patient had previous workup in 08/2019 with his private cardiologist Dr. Regan Farrar that included an echo and NST after an episode of chest pain and dyspnea while on vacation, results were normal.     Over the weekend, patient had an episode of AF with hypotension that required pressor support and IVF. He had a stat bedside echo which revealed hypokinetic RV and dilated IVC. Official TTE revealed hyperdynamic LV and normal looking RV.     Patient seen and examined at bedside, with daughter present.      Assessment/Plan:   1) Shock secondary to unclear etiology- vagal vs. septic vs. ischemic etiology. Now hemodynamically stable, off pressors. Off IVF. Will benefit from cardiac cath to check for ischemia once Cr stabilizes.   2) Fluid overload secondary to HFpEF and renal insufficiency- Edema is worsening. Patient has been off diuretics to allow improvement in renal function. To be started on torsemide 20mg daily. Monitor I's and O's. Patient will also benefit from physical therapy to mobilize fluid in his extremities.   3) ALMAS on CKD- Cr essentially stable (3.26 -> 3.4). Avoid nephrotoxins. Nephrology following.   4) PAF- remains in sinus rhythm. As patient's BP is stable, there is no contraindication to starting low dose beta blocker if needed for rate control; however, at this time, patient's HR is stable off medications. CHADSVASC 5- patient is currently on heparin drip; would recommend longterm AC if no contraindication. However, will hold off until plan is clear re: cath.     Plan was discussed with patient, his family, and Dr. Villegas.     Will continue to follow.  Thank you for involving me in the care of this patient.

## 2019-10-23 NOTE — PROGRESS NOTE ADULT - ASSESSMENT
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

## 2019-10-23 NOTE — PROGRESS NOTE ADULT - SUBJECTIVE AND OBJECTIVE BOX
NEPHROLOGY INTERVAL HPI/OVERNIGHT EVENTS:    Interim noted   Increased edema   daughter at bedside   Cardio follow up noted     MEDICATIONS  (STANDING):  ALBUTerol/ipratropium for Nebulization 3 milliLiter(s) Nebulizer every 6 hours  ALBUTerol/ipratropium for Nebulization. 3 milliLiter(s) Nebulizer once  artificial  tears Solution 1 Drop(s) Both EYES four times a day  aspirin  chewable 81 milliGRAM(s) Oral daily  dextrose 5%. 1000 milliLiter(s) (50 mL/Hr) IV Continuous <Continuous>  dextrose 50% Injectable 12.5 Gram(s) IV Push once  dextrose 50% Injectable 25 Gram(s) IV Push once  dextrose 50% Injectable 25 Gram(s) IV Push once  diVALproex  milliGRAM(s) Oral at bedtime  donepezil 5 milliGRAM(s) Oral at bedtime  epoetin triny Injectable 10899 Unit(s) SubCutaneous every 7 days  ferrous    sulfate 325 milliGRAM(s) Oral daily  folic acid 1 milliGRAM(s) Oral daily  guaiFENesin  milliGRAM(s) Oral every 12 hours  heparin  Infusion.  Unit(s)/Hr (17 mL/Hr) IV Continuous <Continuous>  insulin glargine Injectable (LANTUS) 16 Unit(s) SubCutaneous at bedtime  insulin lispro (HumaLOG) corrective regimen sliding scale   SubCutaneous three times a day before meals  lactobacillus acidophilus 1 Tablet(s) Oral daily  levothyroxine 50 MICROGram(s) Oral daily  melatonin 5 milliGRAM(s) Oral at bedtime  memantine ER 7 milliGRAM(s) Oral daily  montelukast 10 milliGRAM(s) Oral daily  multivitamin 1 Tablet(s) Oral daily  pantoprazole    Tablet 40 milliGRAM(s) Oral every 12 hours  pitavastatin 2 milliGRAM(s) Oral <User Schedule>  torsemide 20 milliGRAM(s) Oral daily  umeclidinium 62.5 MICROgram(s)/vilanterol 25 MICROgram(s) Inhaler 1 Puff(s) Inhalation <User Schedule>    MEDICATIONS  (PRN):  acetaminophen   Tablet .. 650 milliGRAM(s) Oral every 6 hours PRN Temp greater or equal to 38C (100.4F), Mild Pain (1 - 3)  aluminum hydroxide/magnesium hydroxide/simethicone Suspension 30 milliLiter(s) Oral every 4 hours PRN Dyspepsia  dextrose 40% Gel 15 Gram(s) Oral once PRN Blood Glucose LESS THAN 70 milliGRAM(s)/deciliter  glucagon  Injectable 1 milliGRAM(s) IntraMuscular once PRN Glucose LESS THAN 70 milligrams/deciliter  heparin  Injectable 7500 Unit(s) IV Push every 6 hours PRN For aPTT less than 40  heparin  Injectable 3500 Unit(s) IV Push every 6 hours PRN For aPTT between 40 - 57  ondansetron Injectable 4 milliGRAM(s) IV Push every 6 hours PRN Nausea  sodium chloride 0.9% lock flush 10 milliLiter(s) IV Push every 1 hour PRN Pre/post blood products, medications, blood draw, and to maintain line patency      Allergies    No Known Allergies    Intolerances        Vital Signs Last 24 Hrs  T(C): 36.7 (23 Oct 2019 13:26), Max: 37.3 (23 Oct 2019 05:00)  T(F): 98 (23 Oct 2019 13:26), Max: 99.2 (23 Oct 2019 05:00)  HR: 83 (23 Oct 2019 15:15) (83 - 91)  BP: 121/61 (23 Oct 2019 12:00) (115/64 - 131/68)  BP(mean): 80 (23 Oct 2019 04:00) (80 - 80)  RR: 25 (23 Oct 2019 12:00) (18 - 25)  SpO2: 95% (23 Oct 2019 15:15) (93% - 97%)  Daily     Daily Weight in k.1 (23 Oct 2019 05:00)  I&O's Detail    22 Oct 2019 07:01  -  23 Oct 2019 07:00  --------------------------------------------------------  IN:    heparin  Infusion.: 242 mL    Oral Fluid: 350 mL  Total IN: 592 mL    OUT:    Incontinent per Condom Catheter: 50 mL    Voided: 1150 mL  Total OUT: 1200 mL    Total NET: -608 mL      23 Oct 2019 07:01  -  23 Oct 2019 16:02  --------------------------------------------------------  IN:  Total IN: 0 mL    OUT:    Voided: 125 mL  Total OUT: 125 mL    Total NET: -125 mL        I&O's Summary    22 Oct 2019 07:  -  23 Oct 2019 07:00  --------------------------------------------------------  IN: 592 mL / OUT: 1200 mL / NET: -608 mL    23 Oct 2019 07:  -  23 Oct 2019 16:02  --------------------------------------------------------  IN: 0 mL / OUT: 125 mL / NET: -125 mL        PHYSICAL EXAM:  HEAD:  Atraumatic, No edema   NECK: Supple, L TLC   CHEST/LUNG: EAE , few basilar crackles , no rub   HEART: Irregular , no rub   ABDOMEN: Soft, + distended +BS; nontender; abd wall and flank edema  EXTREMITIES: more edema    LABS:                        8.5    11.00 )-----------( 196      ( 23 Oct 2019 06:45 )             26.9     10-    134<L>  |  97<L>  |  84.0<H>  ----------------------------<  244<H>  3.9   |  24.0  |  3.41<H>    Ca    8.6      23 Oct 2019 06:45  Phos  4.6     10-  Mg     2.5     10-    TPro  6.6  /  Alb  3.0<L>  /  TBili  0.3<L>  /  DBili  x   /  AST  20  /  ALT  195<H>  /  AlkPhos  117  10-23    PTT - ( 23 Oct 2019 15:18 )  PTT:74.5 sec  Urinalysis Basic - ( 22 Oct 2019 07:02 )    Color: Yellow / Appearance: Clear / S.015 / pH: x  Gluc: x / Ketone: Negative  / Bili: Negative / Urobili: Negative mg/dL   Blood: x / Protein: 30 mg/dL / Nitrite: Negative   Leuk Esterase: Negative / RBC: 0-2 /HPF / WBC 0-2   Sq Epi: x / Non Sq Epi: Few / Bacteria: Few      Magnesium, Serum: 2.5 mg/dL (10-23 @ 06:45)          RADIOLOGY & ADDITIONAL TESTS:

## 2019-10-23 NOTE — PHYSICAL THERAPY INITIAL EVALUATION ADULT - PRECAUTIONS/LIMITATIONS, REHAB EVAL
aspiration precautions/cardiac precautions/fall precautions/oxygen therapy device and L/min/O2 3L/min via NC
fall precautions

## 2019-10-23 NOTE — PHYSICAL THERAPY INITIAL EVALUATION ADULT - FOLLOWS COMMANDS/ANSWERS QUESTIONS, REHAB EVAL
50% of the time/able to follow single-step instructions
WDL
able to follow single-step instructions/50% of the time

## 2019-10-23 NOTE — PROGRESS NOTE ADULT - SUBJECTIVE AND OBJECTIVE BOX
Patient is a 85y old  Male who presents with a chief complaint of syncope (22 Oct 2019 14:20)      HPI:  85M hx vascular demetnia, afib (not on ac), CKD, DM, COPD  Daughter at bedside. LFT better. Has mild epigastric discomfort. States he had hx of GI bleed remotely. However, no reports of melena. AS per daughter only one BM since admission.          REVIEW OF SYSTEMS:  Constitutional: No fever, weight loss or fatigue  ENMT:  No difficulty hearing, tinnitus, vertigo; No sinus or throat pain  Respiratory: No cough, wheezing, chills or hemoptysis  Cardiovascular: No chest pain, palpitations, dizziness or leg swelling  Gastrointestinal: epigastric pain. No nausea, vomiting or hematemesis; constipation. No melena or hematochezia.  Skin: No itching, burning, rashes or lesions   Musculoskeletal: No joint pain or swelling; No muscle, back or extremity pain    PAST MEDICAL & SURGICAL HISTORY:  CKD (chronic kidney disease)  COPD (chronic obstructive pulmonary disease)  Dementia  Afib  DM (diabetes mellitus)  History of lung biopsy  S/P carotid endarterectomy      FAMILY HISTORY:  No pertinent family history in first degree relatives      SOCIAL HISTORY:  Smoking Status: [ ] Current, [ ] Former, [ ] Never  Pack Years:  [  ] EtOH-no  [  ] IVDA    MEDICATIONS:  MEDICATIONS  (STANDING):  ALBUTerol/ipratropium for Nebulization 3 milliLiter(s) Nebulizer every 6 hours  ALBUTerol/ipratropium for Nebulization. 3 milliLiter(s) Nebulizer once  artificial  tears Solution 1 Drop(s) Both EYES four times a day  aspirin  chewable 81 milliGRAM(s) Oral daily  dextrose 5%. 1000 milliLiter(s) (50 mL/Hr) IV Continuous <Continuous>  dextrose 50% Injectable 12.5 Gram(s) IV Push once  dextrose 50% Injectable 25 Gram(s) IV Push once  dextrose 50% Injectable 25 Gram(s) IV Push once  diVALproex  milliGRAM(s) Oral at bedtime  donepezil 5 milliGRAM(s) Oral at bedtime  epoetin triny Injectable 74611 Unit(s) SubCutaneous every 7 days  ferrous    sulfate 325 milliGRAM(s) Oral daily  folic acid 1 milliGRAM(s) Oral daily  guaiFENesin  milliGRAM(s) Oral every 12 hours  heparin  Infusion.  Unit(s)/Hr (17 mL/Hr) IV Continuous <Continuous>  insulin glargine Injectable (LANTUS) 16 Unit(s) SubCutaneous at bedtime  insulin lispro (HumaLOG) corrective regimen sliding scale   SubCutaneous three times a day before meals  lactobacillus acidophilus 1 Tablet(s) Oral daily  levothyroxine 50 MICROGram(s) Oral daily  melatonin 5 milliGRAM(s) Oral at bedtime  memantine ER 7 milliGRAM(s) Oral daily  montelukast 10 milliGRAM(s) Oral daily  multivitamin 1 Tablet(s) Oral daily  pantoprazole    Tablet 40 milliGRAM(s) Oral before breakfast  pitavastatin 2 milliGRAM(s) Oral <User Schedule>  umeclidinium 62.5 MICROgram(s)/vilanterol 25 MICROgram(s) Inhaler 1 Puff(s) Inhalation <User Schedule>    MEDICATIONS  (PRN):  acetaminophen   Tablet .. 650 milliGRAM(s) Oral every 6 hours PRN Temp greater or equal to 38C (100.4F), Mild Pain (1 - 3)  aluminum hydroxide/magnesium hydroxide/simethicone Suspension 30 milliLiter(s) Oral every 4 hours PRN Dyspepsia  dextrose 40% Gel 15 Gram(s) Oral once PRN Blood Glucose LESS THAN 70 milliGRAM(s)/deciliter  glucagon  Injectable 1 milliGRAM(s) IntraMuscular once PRN Glucose LESS THAN 70 milligrams/deciliter  heparin  Injectable 7500 Unit(s) IV Push every 6 hours PRN For aPTT less than 40  heparin  Injectable 3500 Unit(s) IV Push every 6 hours PRN For aPTT between 40 - 57  ondansetron Injectable 4 milliGRAM(s) IV Push every 6 hours PRN Nausea  sodium chloride 0.9% lock flush 10 milliLiter(s) IV Push every 1 hour PRN Pre/post blood products, medications, blood draw, and to maintain line patency      Allergies    No Known Allergies    Intolerances        Vital Signs Last 24 Hrs  T(C): 37.1 (23 Oct 2019 08:42), Max: 37.3 (23 Oct 2019 05:00)  T(F): 98.8 (23 Oct 2019 08:42), Max: 99.2 (23 Oct 2019 05:00)  HR: 87 (23 Oct 2019 08:35) (80 - 89)  BP: 121/65 (23 Oct 2019 04:00) (101/60 - 121/65)  BP(mean): 80 (23 Oct 2019 04:00) (80 - 80)  RR: 24 (23 Oct 2019 04:00) (18 - 24)  SpO2: 95% (23 Oct 2019 08:35) (94% - 97%)    10-22 @ 07:01  -  10-23 @ 07:00  --------------------------------------------------------  IN: 592 mL / OUT: 1200 mL / NET: -608 mL          PHYSICAL EXAM:    General: Well developed; well nourished; in no acute distress  HEENT: MMM, conjunctiva and sclera clear  H- RRR  Gastrointestinal: Soft, non-tender non-distended; Normal bowel sounds; No rebound or guarding  Extremities: Normal range of motion, No clubbing, cyanosis + b/l upper extremity edema  Neurological: Alert and oriented x 1   Skin: Warm and dry. No obvious rash      LABS:                        8.5    11.00 )-----------( 196      ( 23 Oct 2019 06:45 )             26.9     23 Oct 2019 06:45    134    |  97     |  84.0   ----------------------------<  244    3.9     |  24.0   |  3.41     Ca    8.6        23 Oct 2019 06:45  Phos  4.6       22 Oct 2019 10:44  Mg     2.5       23 Oct 2019 06:45    TPro  6.6    /  Alb  3.0    /  TBili  0.3    /  DBili  x      /  AST  20     /  ALT  195    /  AlkPhos  117    / Amylase x      /Lipase x      23 Oct 2019 06:45              RADIOLOGY & ADDITIONAL STUDIES:     < from: CT Abdomen and Pelvis No Cont (10.17.19 @ 19:56) >  IMPRESSION:  Infiltrates in the right upper and right lower lobes  Small pericardial effusion  Rounded density at the left lung base suggestive of rounded atelectasis   versus mass or infiltrate. There is atelectasis versus infiltrate at the   right lung base as well.  Mild stranding in the fat around the gallbladder and duodenum maintained   to indicate cholecystitis and/or duodenitis. Further evaluation of the   gallbladder may be obtained with ultrasound as clinically indicated.   Please correlate clinically for possible duodenitis.  Thick-walled bladder may indicate cystitis. Please correlate clinically  Enlarged prostate glands    < end of copied text >

## 2019-10-23 NOTE — PHYSICAL THERAPY INITIAL EVALUATION ADULT - BED MOBILITY LIMITATIONS, REHAB EVAL
decreased ability to use legs for bridging/pushing/decreased ability to use arms for pushing/pulling
decreased ability to use arms for pushing/pulling/decreased ability to use legs for bridging/pushing

## 2019-10-23 NOTE — PROGRESS NOTE ADULT - ASSESSMENT
85M hx vascular demetnia, afib (not on ac), CKD, DM, COPD presents with syncope and worsening edema found to have hypoglycemia, nikki on ckd, transaminitis. Course complicated by Afib w/ RVR and shock requiring pressors.     #Hypotension- present on admission w/ shock (unclear etiology ?cardiogenic)  -currently normotensive  -hold hypertensive medications  -monitor pressure     #LE edema  - torsemide  - pt follow up    #Afib  -?new afib. pt now reverted to sinus rhythm  -heparing gtt for now will eventually need to be changed to po   -consider starting BB when blood pressure stable.  -cardiology consult appreciated- ?cardiac cath possible  -chadsvasc elevated    #COPD (chronic obstructive pulmonary disease)  -pt with bilateral rhonchi on exam, but otherwise breathing well on 2L nc  -montelukast and duonebs.     #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  -nephrology consult appreciated  -possible 2/2 atn due to hypotension  -hold lasix for now   -monitor cr  -avoid nephrotoxic meds     #DM (diabetes mellitus) type 2  -monitor fingersticks  -lantus  -iss    #Transaminitis  -likely 2/2 heart failure  -cholecystitis ruled out  -continue to trend  -GI consult appreciated     #constipation  - miralax    #DVT prophylaxis  - heparin GTT 85M hx vascular demetnia, afib (not on ac), CKD, DM, COPD presents with syncope and worsening edema found to have hypoglycemia, nikki on ckd, transaminitis. Course complicated by Afib w/ RVR and shock requiring pressors.     #Hypotension- present on admission w/ shock (unclear etiology ?cardiogenic)  -currently normotensive  -hold hypertensive medications  -monitor pressure     #Pitting edema  - torsemide  - pt follow up    #Afib  -?new afib. pt now reverted to sinus rhythm  -heparing gtt for now will eventually need to be changed to po   -consider starting BB when blood pressure stable.  -cardiology consult appreciated- ?cardiac cath possible  -chadsvasc elevated    #COPD (chronic obstructive pulmonary disease)  -pt with bilateral rhonchi on exam, but otherwise breathing well on 2L nc  -montelukast and duonebs.     #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  -nephrology consult appreciated  -possible 2/2 atn due to hypotension  -hold lasix for now   -monitor cr  -avoid nephrotoxic meds     #DM (diabetes mellitus) type 2  -monitor fingersticks  -lantus  -iss    #Transaminitis  -likely 2/2 heart failure  -cholecystitis ruled out  -continue to trend  -GI consult appreciated     #constipation  - miralax    #DVT prophylaxis  - heparin GTT

## 2019-10-23 NOTE — PHYSICAL THERAPY INITIAL EVALUATION ADULT - LEVEL OF INDEPENDENCE: STAIR NEGOTIATION, REHAB EVAL
secondary to decreased safety/unable to perform
Not assessed secondary to confusion, weakness; TBA at future session

## 2019-10-23 NOTE — PHYSICAL THERAPY INITIAL EVALUATION ADULT - ADDITIONAL COMMENTS
Patient is poor historian secondary to dementia, history taken from spouse and daughter at bedside. Patient lives in a two-story house with 2 LEIGHTON with railing and all needs met on the first floor. He lives with his wife, who is his primary caregiver and available to provide 24/7 assist. Family is also very involved in his care (four daughters). Pt was able to ambulate without AD prior to admission, but required some level of assist from family. He has only a cane at home.
pt lives with wife and daughter in a house with 2 steps to enter with bilat rails. pt was independent without AD prior to admission. pt's wife is at home with pt to assist 24/7 and daughters also able to assist.

## 2019-10-23 NOTE — PHYSICAL THERAPY INITIAL EVALUATION ADULT - RANGE OF MOTION EXAMINATION, REHAB EVAL
no ROM deficits were identified
bilateral lower extremity ROM was WFL (within functional limits)/bilateral upper extremity ROM was WFL (within functional limits)

## 2019-10-23 NOTE — PHYSICAL THERAPY INITIAL EVALUATION ADULT - PERTINENT HX OF CURRENT PROBLEM, REHAB EVAL
Multiple episodes of syncope and worsening Edema, hx of afib, COPD
syncope and worsening edema found to have hypoglycemia, nikki on ckd, transaminitis. Course complicated by Afib w/ RVR and shock requiring pressors.

## 2019-10-23 NOTE — PHYSICAL THERAPY INITIAL EVALUATION ADULT - IMPAIRMENTS FOUND, PT EVAL
aerobic capacity/endurance/gait, locomotion, and balance
gait, locomotion, and balance/aerobic capacity/endurance/cognitive impairment/muscle strength

## 2019-10-23 NOTE — PROGRESS NOTE ADULT - SUBJECTIVE AND OBJECTIVE BOX
[facility]                                                        Office: [address] [address]                                                       Telephone: [phone]. Fax:[phone]                                                                             PROGRESS NOTE      CC: Patient is a 85y old  Male who presents with a chief complaint of syncope (23 Oct 2019 10:24)    Interval history: Patient seen and examined at bedside. Appears slightly more lethargic today, but denies any current complaints. No overnight events on telemetry.     Review of symptoms: All review of systems negative unless otherwise indicated below    Past medical history: No updates.   Chronic conditions:  Hypertension: controlled. CHF: Stable. CAD: Stable ischemic heart disease. DM: Stable;    	  Vitals:  T(C): 37.1 (10-23-19 @ 08:42), Max: 37.3 (10-23-19 @ 05:00)  HR: 87 (10-23-19 @ 08:35) (80 - 89)  BP: 121/65 (10-23-19 @ 04:00) (101/60 - 121/65)  RR: 24 (10-23-19 @ 04:00) (18 - 24)  SpO2: 95% (10-23-19 @ 08:35) (94% - 97%)  Wt(kg): --  I&O's Summary    22 Oct 2019 07:01  -  23 Oct 2019 07:00  --------------------------------------------------------  IN: 592 mL / OUT: 1200 mL / NET: -608 mL      Weight (kg): 93 (10-19 @ 19:00)    PHYSICAL EXAM:  Appearance: Comfortable. No acute distress  HEENT:  Head and neck: Atraumatic. Normocephalic.  Normal oral mucosa, PERRL, Neck is supple. No JVD, No carotid bruit.   Neurologic: A & O x 3, no focal deficits. EOMI , Cranial nerves are intact.  Lymphatic: No cervical lymphadenopathy  Cardiovascular: Normal S1 S2, No murmur, rubs/gallops. No JVD, No edema  Respiratory: Lung exam limited due to patient noncooperation; decreased breath sounds b/l at bases  Gastrointestinal:  Soft, Non-tender, + BS  Lower Extremities: 2+ edema in all four extremities   Psychiatry: Patient is calm. No agitation. Mood & affect appropriate  Skin: No rashes/ ecchymoses/cyanosis/ulcers visualized on the face, hands or feet.    CURRENT MEDICATIONS:  torsemide 20 milliGRAM(s) Oral daily    ALBUTerol/ipratropium for Nebulization  ALBUTerol/ipratropium for Nebulization.  guaiFENesin ER  montelukast  umeclidinium 62.5 MICROgram(s)/vilanterol 25 MICROgram(s) Inhaler  diVALproex ER  donepezil  melatonin  memantine ER  pantoprazole    Tablet  polyethylene glycol 3350  dextrose 50% Injectable  dextrose 50% Injectable  dextrose 50% Injectable  insulin glargine Injectable (LANTUS)  insulin lispro (HumaLOG) corrective regimen sliding scale  levothyroxine  pitavastatin  artificial  tears Solution  aspirin  chewable  dextrose 5%.  epoetin triny Injectable  ferrous    sulfate  folic acid  heparin  Infusion.  multivitamin      LABS:	 	  CARDIAC MARKERS ( 21 Oct 2019 06:08 )  x     / x     / x     / x     / x      p-BNP 21 Oct 2019 06:08: 2462 pg/mL, CARDIAC MARKERS ( 20 Oct 2019 13:22 )  x     / 0.09 ng/mL / x     / x     / x      p-BNP 20 Oct 2019 13:22: x                                8.5    11.00 )-----------( 196      ( 23 Oct 2019 06:45 )             26.9     10-23    134<L>  |  97<L>  |  84.0<H>  ----------------------------<  244<H>  3.9   |  24.0  |  3.41<H>    Ca    8.6      23 Oct 2019 06:45  Phos  4.6     10-22  Mg     2.5     10-23    TPro  6.6  /  Alb  3.0<L>  /  TBili  0.3<L>  /  DBili  x   /  AST  20  /  ALT  195<H>  /  AlkPhos  117  10-23    proBNP: Serum Pro-Brain Natriuretic Peptide: 2462 pg/mL (10-21 @ 06:08)  Serum Pro-Brain Natriuretic Peptide: 1636 pg/mL (10-18 @ 21:59)    Lipid Profile:   HgA1c: Hemoglobin A1C, Whole Blood: 8.5 %  TSH: Thyroid Stimulating Hormone, Serum: 1.04 uIU/mL      TELEMETRY: Reviewed    ECG:  Reviewed by me. 	    DIAGNOSTIC TESTING:  [ ] Echocardiogram:   [ ]  Catheterization:  [ ] Stress Test:    OTHER:

## 2019-10-23 NOTE — PHYSICAL THERAPY INITIAL EVALUATION ADULT - CRITERIA FOR SKILLED THERAPEUTIC INTERVENTIONS
functional limitations in following categories/anticipated equipment needs at discharge/impairments found/rehab potential/anticipated discharge recommendation
impairments found

## 2019-10-23 NOTE — PROGRESS NOTE ADULT - SUBJECTIVE AND OBJECTIVE BOX
Patient is a 85y old  Male who presents with a chief complaint of syncope (23 Oct 2019 10:24)      Patient seen and examined at bedside. will order pt and start torsemide via nephro    ALLERGIES:  No Known Allergies    MEDICATIONS  (STANDING):  ALBUTerol/ipratropium for Nebulization 3 milliLiter(s) Nebulizer every 6 hours  ALBUTerol/ipratropium for Nebulization. 3 milliLiter(s) Nebulizer once  artificial  tears Solution 1 Drop(s) Both EYES four times a day  aspirin  chewable 81 milliGRAM(s) Oral daily  dextrose 5%. 1000 milliLiter(s) (50 mL/Hr) IV Continuous <Continuous>  dextrose 50% Injectable 12.5 Gram(s) IV Push once  dextrose 50% Injectable 25 Gram(s) IV Push once  dextrose 50% Injectable 25 Gram(s) IV Push once  diVALproex  milliGRAM(s) Oral at bedtime  donepezil 5 milliGRAM(s) Oral at bedtime  epoetin triny Injectable 43393 Unit(s) SubCutaneous every 7 days  ferrous    sulfate 325 milliGRAM(s) Oral daily  folic acid 1 milliGRAM(s) Oral daily  guaiFENesin  milliGRAM(s) Oral every 12 hours  heparin  Infusion.  Unit(s)/Hr (17 mL/Hr) IV Continuous <Continuous>  insulin glargine Injectable (LANTUS) 16 Unit(s) SubCutaneous at bedtime  insulin lispro (HumaLOG) corrective regimen sliding scale   SubCutaneous three times a day before meals  lactobacillus acidophilus 1 Tablet(s) Oral daily  levothyroxine 50 MICROGram(s) Oral daily  melatonin 5 milliGRAM(s) Oral at bedtime  memantine ER 7 milliGRAM(s) Oral daily  montelukast 10 milliGRAM(s) Oral daily  multivitamin 1 Tablet(s) Oral daily  pantoprazole    Tablet 40 milliGRAM(s) Oral every 12 hours  pitavastatin 2 milliGRAM(s) Oral <User Schedule>  polyethylene glycol 3350 17 Gram(s) Oral once  umeclidinium 62.5 MICROgram(s)/vilanterol 25 MICROgram(s) Inhaler 1 Puff(s) Inhalation <User Schedule>    MEDICATIONS  (PRN):  acetaminophen   Tablet .. 650 milliGRAM(s) Oral every 6 hours PRN Temp greater or equal to 38C (100.4F), Mild Pain (1 - 3)  aluminum hydroxide/magnesium hydroxide/simethicone Suspension 30 milliLiter(s) Oral every 4 hours PRN Dyspepsia  dextrose 40% Gel 15 Gram(s) Oral once PRN Blood Glucose LESS THAN 70 milliGRAM(s)/deciliter  glucagon  Injectable 1 milliGRAM(s) IntraMuscular once PRN Glucose LESS THAN 70 milligrams/deciliter  heparin  Injectable 7500 Unit(s) IV Push every 6 hours PRN For aPTT less than 40  heparin  Injectable 3500 Unit(s) IV Push every 6 hours PRN For aPTT between 40 - 57  ondansetron Injectable 4 milliGRAM(s) IV Push every 6 hours PRN Nausea  sodium chloride 0.9% lock flush 10 milliLiter(s) IV Push every 1 hour PRN Pre/post blood products, medications, blood draw, and to maintain line patency    Vital Signs Last 24 Hrs  T(F): 98.8 (23 Oct 2019 08:42), Max: 99.2 (23 Oct 2019 05:00)  HR: 87 (23 Oct 2019 08:35) (80 - 89)  BP: 121/65 (23 Oct 2019 04:00) (101/60 - 121/65)  RR: 24 (23 Oct 2019 04:00) (18 - 24)  SpO2: 95% (23 Oct 2019 08:35) (94% - 97%)  I&O's Summary    22 Oct 2019 07:01  -  23 Oct 2019 07:00  --------------------------------------------------------  IN: 592 mL / OUT: 1200 mL / NET: -608 mL      PHYSICAL EXAM:  General: NAD, alert elderly  ENT: MMM, no thrush  Neck: Supple, No JVD  Lungs: +rhonchi b/l lungs greatest on right lower lobe  Cardio: +s1/s2 +3pitting edema   Abdomen: Soft, Nontender, Nondistended; Bowel sounds present  Extremities: No calf tenderness    LABS:                        8.5    11.00 )-----------( 196      ( 23 Oct 2019 06:45 )             26.9     10-23    134  |  97  |  84.0  ----------------------------<  244  3.9   |  24.0  |  3.41    Ca    8.6      23 Oct 2019 06:45  Phos  4.6     10-22  Mg     2.5     10-23    TPro  6.6  /  Alb  3.0  /  TBili  0.3  /  DBili  x   /  AST  20  /  ALT  195  /  AlkPhos  117  10-23    Amylase, Serum Total: 53 U/L (10-20-19 @ 05:21)    Lipase, Serum: 18 U/L (10-20-19 @ 05:21)    eGFR if Non African American: 16 mL/min/1.73M2 (10-23-19 @ 06:45)  eGFR if : 18 mL/min/1.73M2 (10-23-19 @ 06:45)    PT/INR - ( 21 Oct 2019 06:08 )   PT: 14.1 sec;   INR: 1.22 ratio    PTT - ( 23 Oct 2019 06:45 )  PTT:55.6 sec      CARDIAC MARKERS ( 20 Oct 2019 13:22 )  x     / 0.09 ng/mL / x     / x     / x        Glucose  POCT Blood Glucose.: 235 mg/dL (23 Oct 2019 07:46)  POCT Blood Glucose.: 322 mg/dL (22 Oct 2019 21:59)  POCT Blood Glucose.: 296 mg/dL (22 Oct 2019 16:29)  POCT Blood Glucose.: 288 mg/dL (22 Oct 2019 11:49)    10-18 [id number] 8.5    Urinalysis Basic - ( 22 Oct 2019 07:02 )  Color: Yellow / Appearance: Clear / S.015 / pH: x  Gluc: x / Ketone: Negative  / Bili: Negative / Urobili: Negative mg/dL   Blood: x / Protein: 30 mg/dL / Nitrite: Negative   Leuk Esterase: Negative / RBC: 0-2 /HPF / WBC 0-2   Sq Epi: x / Non Sq Epi: Few / Bacteria: Few    Case d/w  Nephrology   Cardiology

## 2019-10-23 NOTE — PHYSICAL THERAPY INITIAL EVALUATION ADULT - GROSSLY INTACT, SENSORY
Grossly Intact
Grossly Intact/difficult to assess secondary to confusion, pt responded to light touch by pulling foot away on both sides

## 2019-10-23 NOTE — PHYSICAL THERAPY INITIAL EVALUATION ADULT - IMPAIRMENTS CONTRIBUTING TO GAIT DEVIATIONS, PT EVAL
impaired balance/cognition/decreased strength/pt needing ++ encouragement throughout session to continue participating
impaired balance/cognition/decreased strength

## 2019-10-23 NOTE — PHYSICAL THERAPY INITIAL EVALUATION ADULT - GENERAL OBSERVATIONS, REHAB EVAL
Patient received lying in bed, NAD, +tele, +NC (3L), family present at bedside. Pt/family agreeable to Physical Therapy evaluation.
Pt received in bed + IV, + Tele, + 2LNC O2. pt in NAD and agreeable to mobilize with PT. pt's daughters, wife and sister at bedside eager to have pt out of bed.

## 2019-10-23 NOTE — PHYSICAL THERAPY INITIAL EVALUATION ADULT - DISCHARGE DISPOSITION, PT EVAL
[facility] vs home with PT, home with 24/7 assist, pending progress
Home with home PT and RW, pt has 24/7 family support/assist/home w/ home PT/home w/ assist

## 2019-10-23 NOTE — PHYSICAL THERAPY INITIAL EVALUATION ADULT - PLANNED THERAPY INTERVENTIONS, PT EVAL
gait training/transfer training/bed mobility training
transfer training/strengthening/gait training/bed mobility training

## 2019-10-24 DIAGNOSIS — R94.5 ABNORMAL RESULTS OF LIVER FUNCTION STUDIES: ICD-10-CM

## 2019-10-24 LAB
ANION GAP SERPL CALC-SCNC: 15 MMOL/L — SIGNIFICANT CHANGE UP (ref 5–17)
APTT BLD: 68.6 SEC — HIGH (ref 27.5–36.3)
BUN SERPL-MCNC: 81 MG/DL — HIGH (ref 8–20)
CALCIUM SERPL-MCNC: 8.6 MG/DL — SIGNIFICANT CHANGE UP (ref 8.6–10.2)
CHLORIDE SERPL-SCNC: 98 MMOL/L — SIGNIFICANT CHANGE UP (ref 98–107)
CO2 SERPL-SCNC: 25 MMOL/L — SIGNIFICANT CHANGE UP (ref 22–29)
CREAT SERPL-MCNC: 3.93 MG/DL — HIGH (ref 0.5–1.3)
FERRITIN SERPL-MCNC: 432 NG/ML — HIGH (ref 30–400)
GLUCOSE BLDC GLUCOMTR-MCNC: 240 MG/DL — HIGH (ref 70–99)
GLUCOSE BLDC GLUCOMTR-MCNC: 283 MG/DL — HIGH (ref 70–99)
GLUCOSE BLDC GLUCOMTR-MCNC: 326 MG/DL — HIGH (ref 70–99)
GLUCOSE BLDC GLUCOMTR-MCNC: 519 MG/DL — CRITICAL HIGH (ref 70–99)
GLUCOSE BLDC GLUCOMTR-MCNC: >530 MG/DL — CRITICAL HIGH (ref 70–99)
GLUCOSE BLDC GLUCOMTR-MCNC: >530 MG/DL — CRITICAL HIGH (ref 70–99)
GLUCOSE SERPL-MCNC: 276 MG/DL — HIGH (ref 70–115)
HCT VFR BLD CALC: 27 % — LOW (ref 39–50)
HGB BLD-MCNC: 8.3 G/DL — LOW (ref 13–17)
IRON SATN MFR SERPL: 20 UG/DL — LOW (ref 59–158)
IRON SATN MFR SERPL: 8 % — LOW (ref 16–55)
MCHC RBC-ENTMCNC: 27.8 PG — SIGNIFICANT CHANGE UP (ref 27–34)
MCHC RBC-ENTMCNC: 30.7 GM/DL — LOW (ref 32–36)
MCV RBC AUTO: 90.3 FL — SIGNIFICANT CHANGE UP (ref 80–100)
PLATELET # BLD AUTO: 184 K/UL — SIGNIFICANT CHANGE UP (ref 150–400)
POTASSIUM SERPL-MCNC: 3.8 MMOL/L — SIGNIFICANT CHANGE UP (ref 3.5–5.3)
POTASSIUM SERPL-SCNC: 3.8 MMOL/L — SIGNIFICANT CHANGE UP (ref 3.5–5.3)
RBC # BLD: 2.99 M/UL — LOW (ref 4.2–5.8)
RBC # FLD: 15.3 % — HIGH (ref 10.3–14.5)
SODIUM SERPL-SCNC: 138 MMOL/L — SIGNIFICANT CHANGE UP (ref 135–145)
TIBC SERPL-MCNC: 252 UG/DL — SIGNIFICANT CHANGE UP (ref 220–430)
TRANSFERRIN SERPL-MCNC: 176 MG/DL — LOW (ref 180–329)
VIT B12 SERPL-MCNC: 1063 PG/ML — SIGNIFICANT CHANGE UP (ref 232–1245)
WBC # BLD: 9.99 K/UL — SIGNIFICANT CHANGE UP (ref 3.8–10.5)
WBC # FLD AUTO: 9.99 K/UL — SIGNIFICANT CHANGE UP (ref 3.8–10.5)

## 2019-10-24 PROCEDURE — 99233 SBSQ HOSP IP/OBS HIGH 50: CPT

## 2019-10-24 PROCEDURE — 71045 X-RAY EXAM CHEST 1 VIEW: CPT | Mod: 26

## 2019-10-24 PROCEDURE — 99223 1ST HOSP IP/OBS HIGH 75: CPT

## 2019-10-24 RX ORDER — IRON SUCROSE 20 MG/ML
200 INJECTION, SOLUTION INTRAVENOUS EVERY 24 HOURS
Refills: 0 | Status: DISCONTINUED | OUTPATIENT
Start: 2019-10-24 | End: 2019-10-25

## 2019-10-24 RX ORDER — ACETYLCYSTEINE 200 MG/ML
2 VIAL (ML) MISCELLANEOUS EVERY 6 HOURS
Refills: 0 | Status: COMPLETED | OUTPATIENT
Start: 2019-10-24 | End: 2019-10-26

## 2019-10-24 RX ORDER — INSULIN GLARGINE 100 [IU]/ML
20 INJECTION, SOLUTION SUBCUTANEOUS AT BEDTIME
Refills: 0 | Status: DISCONTINUED | OUTPATIENT
Start: 2019-10-24 | End: 2019-10-25

## 2019-10-24 RX ORDER — POLYETHYLENE GLYCOL 3350 17 G/17G
17 POWDER, FOR SOLUTION ORAL DAILY
Refills: 0 | Status: DISCONTINUED | OUTPATIENT
Start: 2019-10-24 | End: 2019-10-27

## 2019-10-24 RX ORDER — SENNA PLUS 8.6 MG/1
2 TABLET ORAL AT BEDTIME
Refills: 0 | Status: DISCONTINUED | OUTPATIENT
Start: 2019-10-24 | End: 2019-10-31

## 2019-10-24 RX ORDER — BUDESONIDE, MICRONIZED 100 %
0.5 POWDER (GRAM) MISCELLANEOUS
Refills: 0 | Status: DISCONTINUED | OUTPATIENT
Start: 2019-10-24 | End: 2019-10-31

## 2019-10-24 RX ORDER — PIPERACILLIN AND TAZOBACTAM 4; .5 G/20ML; G/20ML
3.38 INJECTION, POWDER, LYOPHILIZED, FOR SOLUTION INTRAVENOUS EVERY 12 HOURS
Refills: 0 | Status: DISCONTINUED | OUTPATIENT
Start: 2019-10-24 | End: 2019-10-25

## 2019-10-24 RX ORDER — AZITHROMYCIN 500 MG/1
500 TABLET, FILM COATED ORAL DAILY
Refills: 0 | Status: COMPLETED | OUTPATIENT
Start: 2019-10-24 | End: 2019-10-28

## 2019-10-24 RX ORDER — METOPROLOL TARTRATE 50 MG
12.5 TABLET ORAL
Refills: 0 | Status: DISCONTINUED | OUTPATIENT
Start: 2019-10-24 | End: 2019-10-31

## 2019-10-24 RX ORDER — LACTULOSE 10 G/15ML
20 SOLUTION ORAL ONCE
Refills: 0 | Status: COMPLETED | OUTPATIENT
Start: 2019-10-24 | End: 2019-10-24

## 2019-10-24 RX ADMIN — Medication 20 MILLIGRAM(S): at 14:48

## 2019-10-24 RX ADMIN — Medication 1 DROP(S): at 00:20

## 2019-10-24 RX ADMIN — Medication 1 DROP(S): at 13:05

## 2019-10-24 RX ADMIN — Medication 12.5 MILLIGRAM(S): at 18:56

## 2019-10-24 RX ADMIN — Medication 1 DROP(S): at 23:23

## 2019-10-24 RX ADMIN — Medication 8: at 16:09

## 2019-10-24 RX ADMIN — Medication 6: at 12:30

## 2019-10-24 RX ADMIN — Medication 3 MILLILITER(S): at 09:57

## 2019-10-24 RX ADMIN — HEPARIN SODIUM 1300 UNIT(S)/HR: 5000 INJECTION INTRAVENOUS; SUBCUTANEOUS at 02:00

## 2019-10-24 RX ADMIN — POLYETHYLENE GLYCOL 3350 17 GRAM(S): 17 POWDER, FOR SOLUTION ORAL at 14:48

## 2019-10-24 RX ADMIN — Medication 0.5 MILLIGRAM(S): at 20:35

## 2019-10-24 RX ADMIN — UMECLIDINIUM BROMIDE AND VILANTEROL TRIFENATATE 1 PUFF(S): 62.5; 25 POWDER RESPIRATORY (INHALATION) at 09:58

## 2019-10-24 RX ADMIN — SENNA PLUS 2 TABLET(S): 8.6 TABLET ORAL at 22:17

## 2019-10-24 RX ADMIN — PANTOPRAZOLE SODIUM 40 MILLIGRAM(S): 20 TABLET, DELAYED RELEASE ORAL at 05:20

## 2019-10-24 RX ADMIN — Medication 2 MILLILITER(S): at 15:09

## 2019-10-24 RX ADMIN — Medication 600 MILLIGRAM(S): at 18:57

## 2019-10-24 RX ADMIN — Medication 3 MILLILITER(S): at 04:10

## 2019-10-24 RX ADMIN — Medication 5 MILLIGRAM(S): at 22:16

## 2019-10-24 RX ADMIN — Medication 1 DROP(S): at 05:20

## 2019-10-24 RX ADMIN — INSULIN GLARGINE 20 UNIT(S): 100 INJECTION, SOLUTION SUBCUTANEOUS at 23:23

## 2019-10-24 RX ADMIN — Medication 40 MILLIGRAM(S): at 22:16

## 2019-10-24 RX ADMIN — PIPERACILLIN AND TAZOBACTAM 25 GRAM(S): 4; .5 INJECTION, POWDER, LYOPHILIZED, FOR SOLUTION INTRAVENOUS at 12:30

## 2019-10-24 RX ADMIN — MEMANTINE HYDROCHLORIDE 7 MILLIGRAM(S): 10 TABLET ORAL at 12:29

## 2019-10-24 RX ADMIN — Medication 1 DROP(S): at 18:57

## 2019-10-24 RX ADMIN — Medication 20 MILLIGRAM(S): at 05:20

## 2019-10-24 RX ADMIN — Medication 1 TABLET(S): at 12:30

## 2019-10-24 RX ADMIN — Medication 40 MILLIGRAM(S): at 12:29

## 2019-10-24 RX ADMIN — LACTULOSE 20 GRAM(S): 10 SOLUTION ORAL at 18:57

## 2019-10-24 RX ADMIN — PANTOPRAZOLE SODIUM 40 MILLIGRAM(S): 20 TABLET, DELAYED RELEASE ORAL at 18:56

## 2019-10-24 RX ADMIN — Medication 1 MILLIGRAM(S): at 12:30

## 2019-10-24 RX ADMIN — Medication 3 MILLILITER(S): at 15:08

## 2019-10-24 RX ADMIN — MONTELUKAST 10 MILLIGRAM(S): 4 TABLET, CHEWABLE ORAL at 12:29

## 2019-10-24 RX ADMIN — Medication 3 MILLILITER(S): at 20:35

## 2019-10-24 RX ADMIN — DIVALPROEX SODIUM 500 MILLIGRAM(S): 500 TABLET, DELAYED RELEASE ORAL at 22:16

## 2019-10-24 RX ADMIN — Medication 325 MILLIGRAM(S): at 12:30

## 2019-10-24 RX ADMIN — Medication 1 TABLET(S): at 13:08

## 2019-10-24 RX ADMIN — PITAVASTATIN CALCIUM 2 MILLIGRAM(S): 1.04 TABLET, FILM COATED ORAL at 22:16

## 2019-10-24 RX ADMIN — AZITHROMYCIN 500 MILLIGRAM(S): 500 TABLET, FILM COATED ORAL at 14:48

## 2019-10-24 RX ADMIN — Medication 50 MICROGRAM(S): at 05:20

## 2019-10-24 RX ADMIN — Medication 81 MILLIGRAM(S): at 12:30

## 2019-10-24 RX ADMIN — Medication 2 MILLILITER(S): at 20:35

## 2019-10-24 RX ADMIN — Medication 4: at 08:26

## 2019-10-24 RX ADMIN — Medication 600 MILLIGRAM(S): at 05:20

## 2019-10-24 RX ADMIN — DONEPEZIL HYDROCHLORIDE 5 MILLIGRAM(S): 10 TABLET, FILM COATED ORAL at 22:16

## 2019-10-24 NOTE — PROGRESS NOTE ADULT - PROBLEM SELECTOR PLAN 1
- nephrology following, worsening kidney function. I discussed with family - daughter Felicita regarding major change in quality of life with an invasive procedure like HD. At this time, would like to avoid that at all costs if possible.   -meds being renally dosed.

## 2019-10-24 NOTE — PROGRESS NOTE ADULT - ASSESSMENT
85M hx vascular demetnia, afib (not on ac), CKD, DM, COPD presents with syncope and worsening edema found to have hypoglycemia, nikki on ckd, transaminitis. Course complicated by Afib w/ RVR and shock requiring pressors.     1- COPd with excaerbation   possible pneumonia on CT nova add zosyn renal dose adjusted   will add short course of steroid , mucomyst , chest PT and zithromax   pulmonary consult called pending   cont neb therapy     2-Paroxysmal atrial fib in NSR   cont iv heparin , add   cardiology follow up appreciated    add low dose b blocker BP is better   eventual cardiac cath for ischemic work up once respiratory status and cr better     3Acuteon chronic renal failure currently stage 5  -nephrology on board   -possible 2/2 atn due to hypotension  on low dose diuretics   -monitor cr    4- Diabetes Mellitus type 2 - uncontrolled   will increase dose of lantus for better control  add pre meals insulin if needed ,keep BG less than 200  diet     5-anemia of cronic kidney dx   will check iron studies , vit b12 , folate   epogen per renal weekly     6- Fluid overload due to renal failure , diastolic CHF acute   cont lasix low dose

## 2019-10-24 NOTE — PROGRESS NOTE ADULT - SUBJECTIVE AND OBJECTIVE BOX
Patient seen and examined    I&O's Summary    23 Oct 2019 07:01  -  24 Oct 2019 07:00  --------------------------------------------------------  IN: 962 mL / OUT: 1075 mL / NET: -113 mL        REVIEW OF SYSTEMS:    CONSTITUTIONAL: No F/C  RESPIRATORY: No cough,  No SOB  CARDIOVASCULAR: No CP/palpitations,    GASTROINTESTINAL: No abdominal pain  or NVD   GENITOURINARY: No UTI sx  NEUROLOGICAL: No headaches, NO wk/numbness  MUSCULOSKELETAL:   EXTREMITIES : no swelling,    Vital Signs Last 24 Hrs  T(C): 37.3 (24 Oct 2019 05:00), Max: 37.3 (24 Oct 2019 05:00)  T(F): 99.1 (24 Oct 2019 05:00), Max: 99.1 (24 Oct 2019 05:00)  HR: 90 (24 Oct 2019 10:00) (83 - 93)  BP: 103/55 (24 Oct 2019 04:00) (103/55 - 132/54)  BP(mean): 70 (24 Oct 2019 04:00) (70 - 88)  RR: 26 (24 Oct 2019 04:00) (24 - 26)  SpO2: 95% (24 Oct 2019 04:00) (95% - 99%)    PHYSICAL EXAM:    GENERAL: ppears Dysp, frail, on NCO2  EYES:  conjunctiva and sclera clear  NECK: Supple, No JVD/Bruit  NERVOUS SYSTEM:  A/O x3,   CHEST:  bibasilar rales Few scatt rhonchi  HEART:  RRR, gr 2 murmur  ABDOMEN: Soft, NT/ND BS+  EXTREMITIES:  + pitting Edema;  SKIN: No rashes    LABS:                          8.3    9.99  )-----------( 184      ( 24 Oct 2019 02:26 )             27.0     10-24    138  |  98  |  81.0<H>  ----------------------------<  276<H>  3.8   |  25.0  |  3.93<H>    Ca    8.6      24 Oct 2019 02:26  Mg     2.5     10-23    TPro  6.6  /  Alb  3.0<L>  /  TBili  0.3<L>  /  DBili  x   /  AST  20  /  ALT  195<H>  /  AlkPhos  117  10-23      MEDICATIONS  (STANDING):  acetaminophen   Tablet .. PRN  acetylcysteine 10%  Inhalation  ALBUTerol/ipratropium for Nebulization  ALBUTerol/ipratropium for Nebulization.  aluminum hydroxide/magnesium hydroxide/simethicone Suspension PRN  artificial  tears Solution  aspirin  chewable  azithromycin   Tablet  buDESOnide    Inhalation Suspension  dextrose 40% Gel PRN  dextrose 5%.  dextrose 50% Injectable  dextrose 50% Injectable  dextrose 50% Injectable  diVALproex ER  donepezil  epoetin triny Injectable  ferrous    sulfate  folic acid  glucagon  Injectable PRN  guaiFENesin ER  heparin  Infusion.  heparin  Injectable PRN  heparin  Injectable PRN  insulin glargine Injectable (LANTUS)  insulin lispro (HumaLOG) corrective regimen sliding scale  lactobacillus acidophilus  levothyroxine  melatonin  memantine ER  methylPREDNISolone sodium succinate Injectable  metoprolol tartrate  montelukast  multivitamin  ondansetron Injectable PRN  pantoprazole    Tablet  piperacillin/tazobactam IVPB..  pitavastatin  polyethylene glycol 3350  senna  sodium chloride 0.9% lock flush PRN  torsemide  umeclidinium 62.5 MICROgram(s)/vilanterol 25 MICROgram(s) Inhaler

## 2019-10-24 NOTE — PROGRESS NOTE ADULT - SUBJECTIVE AND OBJECTIVE BOX
[facility]                                                        Office: [address] [address]                                                       Telephone: [phone]. Fax:[phone]                                                                             PROGRESS NOTE      CC: Patient is a 85y old  Male who presents with a chief complaint of syncope (24 Oct 2019 13:09)    Interval history: Patient appears more lethargic today. Extremity edema is improved, but per his daughter, his breathing was significantly labored overnight and he "sounded like he was breathing underwater."     Review of symptoms: All review of systems negative unless otherwise indicated below    Past medical history: No updates.   Chronic conditions:  Hypertension: controlled. CHF: Stable. CAD: Stable ischemic heart disease. DM: Stable;    	  Vitals:  T(C): 37.3 (10-24-19 @ 05:00), Max: 37.3 (10-24-19 @ 05:00)  HR: 90 (10-24-19 @ 10:00) (85 - 93)  BP: 103/55 (10-24-19 @ 04:00) (103/55 - 132/54)  RR: 26 (10-24-19 @ 04:00) (24 - 26)  SpO2: 95% (10-24-19 @ 04:00) (95% - 99%)  Wt(kg): --  I&O's Summary    23 Oct 2019 07:01  -  24 Oct 2019 07:00  --------------------------------------------------------  IN: 962 mL / OUT: 1075 mL / NET: -113 mL      Weight (kg): 93 (10-19 @ 19:00)    PHYSICAL EXAM:  Appearance: Mildly lethargic  HEENT:  Head and neck: Atraumatic. Normocephalic.  Normal oral mucosa, PERRL, Neck is supple. No JVD, No carotid bruit.   Neurologic: A & O x 3, no focal deficits. EOMI , Cranial nerves are intact.  Lymphatic: No cervical lymphadenopathy  Cardiovascular: Normal S1 S2, No murmur, rubs/gallops.   Respiratory: Coarse breath sounds b/l, with mild wheezing throughout and crackles at the bases   Gastrointestinal:  Soft, Non-tender, + BS  Lower Extremities: +Edema of all 4 extremities   Psychiatry: Patient is calm. No agitation. Mood & affect appropriate  Skin: No rashes/ ecchymoses/cyanosis/ulcers visualized on the face, hands or feet.    CURRENT MEDICATIONS:  metoprolol tartrate 12.5 milliGRAM(s) Oral two times a day    acetylcysteine 10%  Inhalation  ALBUTerol/ipratropium for Nebulization  ALBUTerol/ipratropium for Nebulization.  buDESOnide    Inhalation Suspension  guaiFENesin ER  montelukast  umeclidinium 62.5 MICROgram(s)/vilanterol 25 MICROgram(s) Inhaler  azithromycin   Tablet  piperacillin/tazobactam IVPB..  diVALproex ER  donepezil  melatonin  memantine ER  pantoprazole    Tablet  polyethylene glycol 3350  senna  dextrose 50% Injectable  dextrose 50% Injectable  dextrose 50% Injectable  insulin glargine Injectable (LANTUS)  insulin lispro (HumaLOG) corrective regimen sliding scale  levothyroxine  methylPREDNISolone sodium succinate Injectable  pitavastatin  artificial  tears Solution  aspirin  chewable  dextrose 5%.  epoetin triny Injectable  ferrous    sulfate  folic acid  heparin  Infusion.  iron sucrose IVPB  multivitamin      LABS:	 	                              8.3    9.99  )-----------( 184      ( 24 Oct 2019 02:26 )             27.0     10-24    138  |  98  |  81.0<H>  ----------------------------<  276<H>  3.8   |  25.0  |  3.93<H>    Ca    8.6      24 Oct 2019 02:26  Mg     2.5     10-23    TPro  6.6  /  Alb  3.0<L>  /  TBili  0.3<L>  /  DBili  x   /  AST  20  /  ALT  195<H>  /  AlkPhos  117  10-23    proBNP: Serum Pro-Brain Natriuretic Peptide: 2462 pg/mL (10-21 @ 06:08)    Lipid Profile:   HgA1c: Hemoglobin A1C, Whole Blood: 8.5 %  TSH:     TELEMETRY: Reviewed    ECG:  Reviewed by me. 	    DIAGNOSTIC TESTING:  [ ] Echocardiogram:   [ ]  Catheterization:  [ ] Stress Test:    OTHER:

## 2019-10-24 NOTE — CONSULT NOTE ADULT - SUBJECTIVE AND OBJECTIVE BOX
PULMONARY CONSULT NOTE      MINO STEPHENS  MRN-[id number]    Patient is a 85y old  Male who presents with a chief complaint of syncope (24 Oct 2019 11:34)      HISTORY OF PRESENT ILLNESS:    85M w/ PMHx vascular dementia, HFpEF, COPD, CKD IV, Afib (not on AC) who was initially admitted for w/u recurrent syncope. ICU consulted for Afib w/ RVR and shock. CTH was grossly normal. CT C/A/P suggest of RUL/RLL PNA, L lung mass/ atelectasis, cholecystitis, duodenitis and cystitis. He was on IV lasix 40 bid in addition to metoprolol. DVT study normal. On evaluation he was hypotensive SBP ~ 60s and pt was very encephalopathic. POCUS reveal a hypokinetic RV and dilated IVC. Pt was given fluid bolus and started on IV Hep and Dopamine prior to ICU transfer. Pt improved and was tx'd to 4B. He is an ex-smoker of up to 4 ppd x 40 years but quit 20 years ago. Daughter also reports that pt had [employer] exposure. He is reportedly maintained on drug nebs at home.    Pt is arousable and responsive but cannot give hx therefore hx is obtained from daughter at bedside and from chart.      MEDICATIONS  (STANDING):  acetylcysteine 10%  Inhalation 2 milliLiter(s) Inhalation every 6 hours  ALBUTerol/ipratropium for Nebulization 3 milliLiter(s) Nebulizer every 6 hours  ALBUTerol/ipratropium for Nebulization. 3 milliLiter(s) Nebulizer once  artificial  tears Solution 1 Drop(s) Both EYES four times a day  aspirin  chewable 81 milliGRAM(s) Oral daily  azithromycin   Tablet 500 milliGRAM(s) Oral daily  buDESOnide    Inhalation Suspension 0.5 milliGRAM(s) Inhalation two times a day  dextrose 5%. 1000 milliLiter(s) (50 mL/Hr) IV Continuous <Continuous>  dextrose 50% Injectable 12.5 Gram(s) IV Push once  dextrose 50% Injectable 25 Gram(s) IV Push once  dextrose 50% Injectable 25 Gram(s) IV Push once  diVALproex  milliGRAM(s) Oral at bedtime  donepezil 5 milliGRAM(s) Oral at bedtime  epoetin triny Injectable 31063 Unit(s) SubCutaneous every 7 days  ferrous    sulfate 325 milliGRAM(s) Oral daily  folic acid 1 milliGRAM(s) Oral daily  guaiFENesin  milliGRAM(s) Oral every 12 hours  heparin  Infusion.  Unit(s)/Hr (17 mL/Hr) IV Continuous <Continuous>  insulin glargine Injectable (LANTUS) 20 Unit(s) SubCutaneous at bedtime  insulin lispro (HumaLOG) corrective regimen sliding scale   SubCutaneous three times a day before meals  lactobacillus acidophilus 1 Tablet(s) Oral daily  levothyroxine 50 MICROGram(s) Oral daily  melatonin 5 milliGRAM(s) Oral at bedtime  memantine ER 7 milliGRAM(s) Oral daily  methylPREDNISolone sodium succinate Injectable 40 milliGRAM(s) IV Push every 8 hours  metoprolol tartrate 12.5 milliGRAM(s) Oral two times a day  montelukast 10 milliGRAM(s) Oral daily  multivitamin 1 Tablet(s) Oral daily  pantoprazole    Tablet 40 milliGRAM(s) Oral every 12 hours  piperacillin/tazobactam IVPB.. 3.375 Gram(s) IV Intermittent every 12 hours  pitavastatin 2 milliGRAM(s) Oral <User Schedule>  polyethylene glycol 3350 17 Gram(s) Oral daily  senna 2 Tablet(s) Oral at bedtime  torsemide 20 milliGRAM(s) Oral daily  umeclidinium 62.5 MICROgram(s)/vilanterol 25 MICROgram(s) Inhaler 1 Puff(s) Inhalation <User Schedule>      MEDICATIONS  (PRN):  acetaminophen   Tablet .. 650 milliGRAM(s) Oral every 6 hours PRN Temp greater or equal to 38C (100.4F), Mild Pain (1 - 3)  aluminum hydroxide/magnesium hydroxide/simethicone Suspension 30 milliLiter(s) Oral every 4 hours PRN Dyspepsia  dextrose 40% Gel 15 Gram(s) Oral once PRN Blood Glucose LESS THAN 70 milliGRAM(s)/deciliter  glucagon  Injectable 1 milliGRAM(s) IntraMuscular once PRN Glucose LESS THAN 70 milligrams/deciliter  heparin  Injectable 7500 Unit(s) IV Push every 6 hours PRN For aPTT less than 40  heparin  Injectable 3500 Unit(s) IV Push every 6 hours PRN For aPTT between 40 - 57  ondansetron Injectable 4 milliGRAM(s) IV Push every 6 hours PRN Nausea  sodium chloride 0.9% lock flush 10 milliLiter(s) IV Push every 1 hour PRN Pre/post blood products, medications, blood draw, and to maintain line patency      Allergies    No Known Allergies    Intolerances        PAST MEDICAL & SURGICAL HISTORY:  CKD (chronic kidney disease)  COPD (chronic obstructive pulmonary disease)  Dementia  Afib  DM (diabetes mellitus)  History of lung biopsy  S/P carotid endarterectomy      FAMILY HISTORY:  No pertinent family history in first degree relatives      SOCIAL HISTORY  Smoking History: as above    REVIEW OF SYSTEMS: Pt unable to provide    Vital Signs Last 24 Hrs  T(C): 37.3 (24 Oct 2019 05:00), Max: 37.3 (24 Oct 2019 05:00)  T(F): 99.1 (24 Oct 2019 05:00), Max: 99.1 (24 Oct 2019 05:00)  HR: 90 (24 Oct 2019 10:00) (83 - 93)  BP: 103/55 (24 Oct 2019 04:00) (103/55 - 132/54)  BP(mean): 70 (24 Oct 2019 04:00) (70 - 88)  RR: 26 (24 Oct 2019 04:00) (24 - 26)  SpO2: 95% (24 Oct 2019 04:00) (95% - 99%)    PHYSICAL EXAMINATION:    GENERAL: The patient is elderly male in no apparent distress.     HEENT: Head is normocephalic and atraumatic. Extraocular muscles are intact. Mucous membranes are moist.     NECK: Supple.     LUNGS: Clear to auscultation without wheezing, rales, but with b/l rhonchi. Respirations unlabored    HEART: Regular rate and rhythm without murmur.    ABDOMEN: Soft, nontender, and nondistended.  No hepatosplenomegaly is noted.    EXTREMITIES: Without any cyanosis, clubbing, rash, lesions or edema.    NEUROLOGIC: Grossly intact.      LABS:                        8.3    9.99  )-----------( 184      ( 24 Oct 2019 02:26 )             27.0     10-24    138  |  98  |  81.0<H>  ----------------------------<  276<H>  3.8   |  25.0  |  3.93<H>    Ca    8.6      24 Oct 2019 02:26  Mg     2.5     10-23    TPro  6.6  /  Alb  3.0<L>  /  TBili  0.3<L>  /  DBili  x   /  AST  20  /  ALT  195<H>  /  AlkPhos  117  10-23    PTT - ( 24 Oct 2019 02:26 )  PTT:68.6 sec        MICROBIOLOGY:    Rapid Respiratory Viral Panel (10.18.19 @ 00:33)    Rapid RVP Result: NotDetec: This Respiratory Panel uses polymerase chain reaction (PCR) to detect for  adenovirus; coronavirus (HKU1, NL63, 229E, OC43); human metapneumovirus  (hMPV); human enterovirus/rhinovirus (Entero/RV); influenza A; influenza  A/H1; influenza A/H3; influenza A/H1-2009; influenza B; parainfluenza  viruses 1, 2, 3, 4; respiratory syncytial virus; Mycoplasma pneumoniae;  and Chlamydophila pneumoniae.        RADIOLOGY & ADDITIONAL STUDIES:       EXAM:  XR CHEST PORTABLE ROUTINE 1V                          PROCEDURE DATE:  10/20/2019          INTERPRETATION:  CHEST AP PORTABLE:    History: Abnormal Chest Sounds.     Date and time of exam: 10/20/2019 6:45 AM.    Technique: A single AP view of the chest was obtained.    Comparison exam: 10/19/2019 2:00 PM.    Findings:  Left IJ central line again noted, unchanged. Persistent bilateral   airspace disease without significant change area and no evidence of   pneumothorax. No definite evidence of pleural effusion..    Impression:  Diffuse bilateral airspace disease, unchanged..      MORTEZA KELLER M.D., ATTENDING RADIOLOGIST  This document has been electronically signed. Oct 20 2019  9:22AM             EXAM:  US DPLX LWR EXT VEINS COMPL BI                          PROCEDURE DATE:  10/19/2019          INTERPRETATION:  CLINICAL INFORMATION: Lower extremity edema.    COMPARISON: Bilateral lower extremity duplex of 10/18/2019.    TECHNIQUE: Duplex sonography of the BILATERAL LOWER extremity veins with   color and spectral Doppler, with and without compression.      FINDINGS:    There is normal compressibility of the bilateral common femoral, femoral   and popliteal veins.     Doppler examinationshows normal spontaneous and phasic flow.    Limited evaluation of the calf veins secondary to lower extremity edema.   No calf vein thrombosis is detected.    IMPRESSION:     No evidence of deep venous thrombosis in either lower extremity.    Limited evaluation of the calf veins.          FAWN FIGUEROA M.D., ATTENDING RADIOLOGIST  This document has been electronically signed. Oct 19 2019  8:54PM             EXAM:  CT CHEST                         EXAM:  CT ABDOMEN AND PELVIS                          PROCEDURE DATE:  10/17/2019          INTERPRETATION:  CT CHEST/ABDOMEN/PELVIS:     CLINICAL INFORMATION: Altered mental status, syncope and shortness of   breath with abdominal bloating    COMPARISON: Chest x-ray of earlier in the day.    PROCEDURE:  Using multislice helical CT, 2.5 mm sections were obtained   from the thoracic inlet through the ischial tuberosities without the   administration of intravenous contrast. Oral contrast was not   administered. The patient was unable to raise the arms above the head for   this study    Coronal and sagittal reformations were performed by  CT technologist and   made available for review.    FINDINGS:  The visualized structures of the lower neck are unremarkable.   The visualized aorta is of normal course and caliber. The heart is not   enlarged. There is atherosclerotic calcification of the thoracic aorta.   There is mild coronary artery calcification. There is a small pericardial   effusion    There is no evidence of axillary, hilar, or mediastinal lymphadenopathy.    The lungs demonstrate patchy areas of groundglass opacities and   tree-in-bud nodules in the right upper lobe consistent with infiltrate.   Air is probable small amount of infiltrate in the right lower lobe as   well. There are also areas of masslike consolidation at both lung bases.   Rounded contour is seen to the density at the left lung base and this may   represent roundedatelectasis versus infiltrate or mass. Appearance of   the right lower lobe is suggestive of infiltrate versus atelectasis    The liver is of normal contour. No evidence of focal hepatic lesion on   this nonenhanced examination. The gallbladder is present. No evidence of   intra or extrahepatic biliary dilatation. There is mild stranding around   the gallbladder and mild stranding around the duodenum    The pancreas, spleen, adrenal glands, and kidneys are grossly   unremarkable. There is no evidence of hydronephrosis or perinephric   stranding. No evidence of  nephrolithiasis.The bladder is thick-walled   which may be due to underdistention. The prostate gland is significantly   enlarged. There is a right inguinal hernia containing fat and asmall   amount of fluid.    No evidence of lymphadenopathy utilizing CT size criteria. The aorta is   not aneurysmal. There is atherosclerotic calcification of the abdominal   aorta and its branches    There is no evidence of bowel obstruction. Thereis no evidence of   pneumoperitoneum or free fluid.    The visualized osseous structures demonstrate osteopenia and degenerative   change most prominent at L4-5 and L5-S1.    IMPRESSION:  Infiltrates in the right upper and right lower lobes  Small pericardial effusion  Rounded density at the left lung base suggestive of rounded atelectasis   versus mass or infiltrate. There is atelectasis versus infiltrate at the   right lung base as well.  Mild stranding in the fat around the gallbladder and duodenum maintained   to indicate cholecystitis and/or duodenitis. Further evaluation of the   gallbladder may be obtained with ultrasound as clinically indicated.   Please correlate clinically for possible duodenitis.  Thick-walled bladder may indicate cystitis. Please correlate clinically  Enlarged prostate glands        JUDD CARDOSO M.D.,ATTENDING RADIOLOGIST  This document has been electronically signed. Oct 17 2019  8:26PM            ECHO:      Summary:   1. Patient tachycardic during the entire study.   2. Hyperdynamic wall motion.   3. Left ventricular ejectionfraction, by visual estimation, is >75%.   4. Normal right ventricular size and function.   5. Mild-moderate tricuspid regurgitation.   6. Estimated pulmonary artery systolic pressure is 48 mmHg -mild   pulmonary hypertension.   7. Small pericardial effusion. No echo evidence of tamponade.    MD Zo, RPVI Electronically signed on 10/19/2019 at 4:41:21   PM

## 2019-10-24 NOTE — PROGRESS NOTE ADULT - ASSESSMENT
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 Mcdowell re [facility] and [facility], - he will d/w   Family wants HD if pt not improving; Explained that we have not maximized diuretics at present , shall increase as needed  At very high risk of ERICKSON but family already wants HD, will explain once again the risks if cath planned  Increase Torsemide 40 mg po daily   If no response or Creat worsens then t/c HD    Will follow   Discussed with daughter at bedside

## 2019-10-24 NOTE — PROGRESS NOTE ADULT - PROBLEM SELECTOR PLAN 1
Improving likely secondary to passive congestion from right sided heart failure. No plans or need for continued GI f/u. Signing off. Reconsult as needed. Improving likely secondary to passive congestion from right sided heart failure. No plans or need for continued GI f/u. Signing off. Reconsult as needed. Thank you.

## 2019-10-24 NOTE — PROGRESS NOTE ADULT - SUBJECTIVE AND OBJECTIVE BOX
Pt seen and examined. Condition essentially unchanged although pt. appears somewhat more SOB this AM.     REVIEW OF SYSTEMS:  Constitutional: No fever, weight loss or fatigue  Cardiovascular: No chest pain, palpitations, dizziness or leg swelling  Gastrointestinal: As noted above.  Skin: No itching, burning, rashes or lesions       MEDICATIONS:  MEDICATIONS  (STANDING):  ALBUTerol/ipratropium for Nebulization 3 milliLiter(s) Nebulizer every 6 hours  ALBUTerol/ipratropium for Nebulization. 3 milliLiter(s) Nebulizer once  artificial  tears Solution 1 Drop(s) Both EYES four times a day  aspirin  chewable 81 milliGRAM(s) Oral daily  dextrose 5%. 1000 milliLiter(s) (50 mL/Hr) IV Continuous <Continuous>  dextrose 50% Injectable 12.5 Gram(s) IV Push once  dextrose 50% Injectable 25 Gram(s) IV Push once  dextrose 50% Injectable 25 Gram(s) IV Push once  diVALproex  milliGRAM(s) Oral at bedtime  donepezil 5 milliGRAM(s) Oral at bedtime  epoetin triny Injectable 35662 Unit(s) SubCutaneous every 7 days  ferrous    sulfate 325 milliGRAM(s) Oral daily  folic acid 1 milliGRAM(s) Oral daily  guaiFENesin  milliGRAM(s) Oral every 12 hours  heparin  Infusion.  Unit(s)/Hr (17 mL/Hr) IV Continuous <Continuous>  insulin glargine Injectable (LANTUS) 16 Unit(s) SubCutaneous at bedtime  insulin lispro (HumaLOG) corrective regimen sliding scale   SubCutaneous three times a day before meals  lactobacillus acidophilus 1 Tablet(s) Oral daily  levothyroxine 50 MICROGram(s) Oral daily  melatonin 5 milliGRAM(s) Oral at bedtime  memantine ER 7 milliGRAM(s) Oral daily  montelukast 10 milliGRAM(s) Oral daily  multivitamin 1 Tablet(s) Oral daily  pantoprazole    Tablet 40 milliGRAM(s) Oral every 12 hours  pitavastatin 2 milliGRAM(s) Oral <User Schedule>  torsemide 20 milliGRAM(s) Oral daily  umeclidinium 62.5 MICROgram(s)/vilanterol 25 MICROgram(s) Inhaler 1 Puff(s) Inhalation <User Schedule>    MEDICATIONS  (PRN):  acetaminophen   Tablet .. 650 milliGRAM(s) Oral every 6 hours PRN Temp greater or equal to 38C (100.4F), Mild Pain (1 - 3)  aluminum hydroxide/magnesium hydroxide/simethicone Suspension 30 milliLiter(s) Oral every 4 hours PRN Dyspepsia  dextrose 40% Gel 15 Gram(s) Oral once PRN Blood Glucose LESS THAN 70 milliGRAM(s)/deciliter  glucagon  Injectable 1 milliGRAM(s) IntraMuscular once PRN Glucose LESS THAN 70 milligrams/deciliter  heparin  Injectable 7500 Unit(s) IV Push every 6 hours PRN For aPTT less than 40  heparin  Injectable 3500 Unit(s) IV Push every 6 hours PRN For aPTT between 40 - 57  ondansetron Injectable 4 milliGRAM(s) IV Push every 6 hours PRN Nausea  sodium chloride 0.9% lock flush 10 milliLiter(s) IV Push every 1 hour PRN Pre/post blood products, medications, blood draw, and to maintain line patency      Allergies    No Known Allergies    Intolerances        Vital Signs Last 24 Hrs  T(C): 37.3 (24 Oct 2019 05:00), Max: 37.3 (24 Oct 2019 05:00)  T(F): 99.1 (24 Oct 2019 05:00), Max: 99.1 (24 Oct 2019 05:00)  HR: 90 (24 Oct 2019 04:00) (83 - 92)  BP: 103/55 (24 Oct 2019 04:00) (103/55 - 132/54)  BP(mean): 70 (24 Oct 2019 04:00) (70 - 88)  RR: 26 (24 Oct 2019 04:00) (24 - 26)  SpO2: 95% (24 Oct 2019 04:00) (93% - 99%)    10-23 @ 07:01  -  10-24 @ 07:00  --------------------------------------------------------  IN: 962 mL / OUT: 1075 mL / NET: -113 mL        PHYSICAL EXAM:    General: Well developed; well nourished; in no acute distress  HEENT: MMM, conjunctiva pink and sclera anicteric.  Lungs: Bilateral rales appreciated.  Cor: RRR S1, S2 only.  Gastrointestinal: Abdomen: Soft non-tender non-distended; Normal bowel sounds; No hepatosplenomegaly.  Extremities: no cyanosis, clubbing or edema.  Skin: Warm and dry. No obvious rash  Neuro: Pt. a + o x 3.    LABS:  CBC Full  -  ( 24 Oct 2019 02:26 )  WBC Count : 9.99 K/uL  RBC Count : 2.99 M/uL  Hemoglobin : 8.3 g/dL  Hematocrit : 27.0 %  Platelet Count - Automated : 184 K/uL  Mean Cell Volume : 90.3 fl  Mean Cell Hemoglobin : 27.8 pg  Mean Cell Hemoglobin Concentration : 30.7 gm/dL  Auto Neutrophil # : x  Auto Lymphocyte # : x  Auto Monocyte # : x  Auto Eosinophil # : x  Auto Basophil # : x  Auto Neutrophil % : x  Auto Lymphocyte % : x  Auto Monocyte % : x  Auto Eosinophil % : x  Auto Basophil % : x    10-24    138  |  98  |  81.0<H>  ----------------------------<  276<H>  3.8   |  25.0  |  3.93<H>    Ca    8.6      24 Oct 2019 02:26  Phos  4.6     10-22  Mg     2.5     10-23    TPro  6.6  /  Alb  3.0<L>  /  TBili  0.3<L>  /  DBili  x   /  AST  20  /  ALT  195<H>  /  AlkPhos  117  10-23    PTT - ( 24 Oct 2019 02:26 )  PTT:68.6 sec                  RADIOLOGY & ADDITIONAL STUDIES (The following images were personally reviewed): Pt seen and examined. Condition essentially unchanged although pt. appears somewhat more SOB this AM. He continues to c/o epigastric pain also likely secondary to passive congestion from right sided heart failure. Epigastric pain is unchanged.    REVIEW OF SYSTEMS:  Constitutional: No fever, weight loss or fatigue  Cardiovascular: No chest pain, palpitations, dizziness or leg swelling  Gastrointestinal: As noted above.  Skin: No itching, burning, rashes or lesions       MEDICATIONS:  MEDICATIONS  (STANDING):  ALBUTerol/ipratropium for Nebulization 3 milliLiter(s) Nebulizer every 6 hours  ALBUTerol/ipratropium for Nebulization. 3 milliLiter(s) Nebulizer once  artificial  tears Solution 1 Drop(s) Both EYES four times a day  aspirin  chewable 81 milliGRAM(s) Oral daily  dextrose 5%. 1000 milliLiter(s) (50 mL/Hr) IV Continuous <Continuous>  dextrose 50% Injectable 12.5 Gram(s) IV Push once  dextrose 50% Injectable 25 Gram(s) IV Push once  dextrose 50% Injectable 25 Gram(s) IV Push once  diVALproex  milliGRAM(s) Oral at bedtime  donepezil 5 milliGRAM(s) Oral at bedtime  epoetin triny Injectable 54066 Unit(s) SubCutaneous every 7 days  ferrous    sulfate 325 milliGRAM(s) Oral daily  folic acid 1 milliGRAM(s) Oral daily  guaiFENesin  milliGRAM(s) Oral every 12 hours  heparin  Infusion.  Unit(s)/Hr (17 mL/Hr) IV Continuous <Continuous>  insulin glargine Injectable (LANTUS) 16 Unit(s) SubCutaneous at bedtime  insulin lispro (HumaLOG) corrective regimen sliding scale   SubCutaneous three times a day before meals  lactobacillus acidophilus 1 Tablet(s) Oral daily  levothyroxine 50 MICROGram(s) Oral daily  melatonin 5 milliGRAM(s) Oral at bedtime  memantine ER 7 milliGRAM(s) Oral daily  montelukast 10 milliGRAM(s) Oral daily  multivitamin 1 Tablet(s) Oral daily  pantoprazole    Tablet 40 milliGRAM(s) Oral every 12 hours  pitavastatin 2 milliGRAM(s) Oral <User Schedule>  torsemide 20 milliGRAM(s) Oral daily  umeclidinium 62.5 MICROgram(s)/vilanterol 25 MICROgram(s) Inhaler 1 Puff(s) Inhalation <User Schedule>    MEDICATIONS  (PRN):  acetaminophen   Tablet .. 650 milliGRAM(s) Oral every 6 hours PRN Temp greater or equal to 38C (100.4F), Mild Pain (1 - 3)  aluminum hydroxide/magnesium hydroxide/simethicone Suspension 30 milliLiter(s) Oral every 4 hours PRN Dyspepsia  dextrose 40% Gel 15 Gram(s) Oral once PRN Blood Glucose LESS THAN 70 milliGRAM(s)/deciliter  glucagon  Injectable 1 milliGRAM(s) IntraMuscular once PRN Glucose LESS THAN 70 milligrams/deciliter  heparin  Injectable 7500 Unit(s) IV Push every 6 hours PRN For aPTT less than 40  heparin  Injectable 3500 Unit(s) IV Push every 6 hours PRN For aPTT between 40 - 57  ondansetron Injectable 4 milliGRAM(s) IV Push every 6 hours PRN Nausea  sodium chloride 0.9% lock flush 10 milliLiter(s) IV Push every 1 hour PRN Pre/post blood products, medications, blood draw, and to maintain line patency      Allergies    No Known Allergies    Intolerances        Vital Signs Last 24 Hrs  T(C): 37.3 (24 Oct 2019 05:00), Max: 37.3 (24 Oct 2019 05:00)  T(F): 99.1 (24 Oct 2019 05:00), Max: 99.1 (24 Oct 2019 05:00)  HR: 90 (24 Oct 2019 04:00) (83 - 92)  BP: 103/55 (24 Oct 2019 04:00) (103/55 - 132/54)  BP(mean): 70 (24 Oct 2019 04:00) (70 - 88)  RR: 26 (24 Oct 2019 04:00) (24 - 26)  SpO2: 95% (24 Oct 2019 04:00) (93% - 99%)    10-23 @ 07:01  -  10-24 @ 07:00  --------------------------------------------------------  IN: 962 mL / OUT: 1075 mL / NET: -113 mL        PHYSICAL EXAM:    General: Well developed; well nourished; in no acute distress  HEENT: MMM, conjunctiva pink and sclera anicteric.  Lungs: Bilateral rales appreciated.  Cor: RRR S1, S2 only.  Gastrointestinal: Abdomen: Soft non-tender non-distended; Normal bowel sounds; No hepatosplenomegaly.  Extremities: no cyanosis, clubbing or edema.  Skin: Warm and dry. No obvious rash  Neuro: Pt. a + o x 3.    LABS:  CBC Full  -  ( 24 Oct 2019 02:26 )  WBC Count : 9.99 K/uL  RBC Count : 2.99 M/uL  Hemoglobin : 8.3 g/dL  Hematocrit : 27.0 %  Platelet Count - Automated : 184 K/uL  Mean Cell Volume : 90.3 fl  Mean Cell Hemoglobin : 27.8 pg  Mean Cell Hemoglobin Concentration : 30.7 gm/dL  Auto Neutrophil # : x  Auto Lymphocyte # : x  Auto Monocyte # : x  Auto Eosinophil # : x  Auto Basophil # : x  Auto Neutrophil % : x  Auto Lymphocyte % : x  Auto Monocyte % : x  Auto Eosinophil % : x  Auto Basophil % : x    10-24    138  |  98  |  81.0<H>  ----------------------------<  276<H>  3.8   |  25.0  |  3.93<H>    Ca    8.6      24 Oct 2019 02:26  Phos  4.6     10-22  Mg     2.5     10-23    TPro  6.6  /  Alb  3.0<L>  /  TBili  0.3<L>  /  DBili  x   /  AST  20  /  ALT  195<H>  /  AlkPhos  117  10-23    PTT - ( 24 Oct 2019 02:26 )  PTT:68.6 sec                  RADIOLOGY & ADDITIONAL STUDIES (The following images were personally reviewed):

## 2019-10-24 NOTE — PROGRESS NOTE ADULT - SUBJECTIVE AND OBJECTIVE BOX
CC cough congestion   seen at the bedside , daughter is present , she is concern about his lung breathing status , fludi overload   all questions answered , labs chart reviewed     pt is sitting in the chair at the bedside , tired , denies SOB , no chest pain at present , daughter  states that he is constipated   d/w nephrology and cardiology     Vital Signs Last 24 Hrs  T(C): 37.3 (24 Oct 2019 05:00), Max: 37.3 (24 Oct 2019 05:00)  T(F): 99.1 (24 Oct 2019 05:00), Max: 99.1 (24 Oct 2019 05:00)  HR: 90 (24 Oct 2019 10:00) (83 - 93)  BP: 103/55 (24 Oct 2019 04:00) (103/55 - 132/54)  BP(mean): 70 (24 Oct 2019 04:00) (70 - 88)  RR: 26 (24 Oct 2019 04:00) (24 - 26)  SpO2: 95% (24 Oct 2019 04:00) (94% - 99%)    PHYSICAL EXAM:  General: No distress , + congestion  upper airway   Neck: Supple, No JVD  Lungs: diffuse coarse BS bilaterally +rhonchi b/l lungs   Cardio: +s1/s2 , irregular tachycardic    Abdomen: Soft, Nontender, Nondistended; Bowel sounds present  Extremities: No calf tenderness, +  pretibial edema feet swelling   Skin : normal color no rash                             8.3    9.99  )-----------( 184      ( 24 Oct 2019 02:26 )             27.0   10-24    138  |  98  |  81.0<H>  ----------------------------<  276<H>  3.8   |  25.0  |  3.93<H>    Ca    8.6      24 Oct 2019 02:26  Mg     2.5     10-23    TPro  6.6  /  Alb  3.0<L>  /  TBili  0.3<L>  /  DBili  x   /  AST  20  /  ALT  195<H>  /  AlkPhos  117  10-23

## 2019-10-24 NOTE — CONSULT NOTE ADULT - CONSULT REQUESTED DATE/TIME
17-Oct-2019 19:12
17-Oct-2019 21:32
19-Oct-2019
20-Oct-2019 14:44
22-Oct-2019 14:21
24-Oct-2019 12:04
18-Oct-2019 08:50

## 2019-10-24 NOTE — PROGRESS NOTE ADULT - ASSESSMENT
84 y/o M with a PMH of vascular dementia, ?Afib (not on AC), CKD, DMII, COPD, and GERD who presented to the ED after a syncopal episode and with worsening edema. Patient had previous workup in 08/2019 with his private cardiologist Dr. Regan Farrar that included an echo and NST after an episode of chest pain and dyspnea while on vacation, results were normal. Admitted for HFpEF and ALMAS on CKD. Hospital course was complicated by an episode of rapid AF with hypotension that required pressor support and IVF. He had a stat bedside echo which revealed hypokinetic RV and dilated IVC. Later, official TTE revealed hyperdynamic LV and normal looking RV.     No overnight events on telemetry.   Patient has more labored breathing today, with b/l wheezing and rales.     Assessment/Plan:   1) Shock secondary to unclear etiology- vagal vs. septic vs. ischemic etiology. Now hemodynamically stable, off pressors. Off IVF. Will benefit from cardiac cath to check for ischemia once Cr stabilizes, or if patient is started on HD.   2) Fluid overload secondary to HFpEF and renal insufficiency- Patient's respiratory status is worsening. Was started on torsemide 20mg daily yesterday; to be increased to 40mg today. Monitor I's and O's. Patient will also benefit from physical therapy to mobilize fluid in his extremities.   3) COPD exacerbation- Patient was started on steroids, azithromycin, and mucomyst. Pulmonary following.   4) ALMAS on CKD- Cr worsening today. Family is stating that they want to start HD as he is not improving. I discussed increasing diuretic dose to allow for improvement in his fluid status, with the possibility of starting HD if he continues deteriorating. This was also explained by nephrology Dr. Jimenez. Avoid nephrotoxins. Nephrology following.   5) PAF- remains in sinus rhythm. Patient was started on low dose lopressor today. Monitor HR and BP. CHADSVASC 5- patient is currently on heparin drip; would recommend longterm AC if no contraindication. However, will hold off until plan is clear re: cath.     Plan was discussed with patient's wife and daughter, Dr. Parsons, and Dr. Jimenez.     Will continue to follow.  Thank you for involving me in the care of this patient.

## 2019-10-24 NOTE — CONSULT NOTE ADULT - ASSESSMENT
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

## 2019-10-24 NOTE — PROGRESS NOTE ADULT - SUBJECTIVE AND OBJECTIVE BOX
OVERNIGHT EVENTS: patient sitting in bedside chair, does not appear short of breath.     Present Symptoms:     Dyspnea: 0  Nausea/Vomiting: No  Anxiety:  No  Depression: No  Fatigue: Yes   Loss of appetite: No    Pain: none currently             Character-            Duration-            Effect-            Factors-            Frequency-            Location-            Severity-    Review of Systems: Reviewed             Unable to obtain due to poor mentation   All others negative    MEDICATIONS  (STANDING):  acetylcysteine 10%  Inhalation 2 milliLiter(s) Inhalation every 6 hours  ALBUTerol/ipratropium for Nebulization 3 milliLiter(s) Nebulizer every 6 hours  ALBUTerol/ipratropium for Nebulization. 3 milliLiter(s) Nebulizer once  artificial  tears Solution 1 Drop(s) Both EYES four times a day  aspirin  chewable 81 milliGRAM(s) Oral daily  azithromycin   Tablet 500 milliGRAM(s) Oral daily  buDESOnide    Inhalation Suspension 0.5 milliGRAM(s) Inhalation two times a day  dextrose 5%. 1000 milliLiter(s) (50 mL/Hr) IV Continuous <Continuous>  dextrose 50% Injectable 12.5 Gram(s) IV Push once  dextrose 50% Injectable 25 Gram(s) IV Push once  dextrose 50% Injectable 25 Gram(s) IV Push once  diVALproex  milliGRAM(s) Oral at bedtime  donepezil 5 milliGRAM(s) Oral at bedtime  epoetin triny Injectable 64164 Unit(s) SubCutaneous every 7 days  ferrous    sulfate 325 milliGRAM(s) Oral daily  folic acid 1 milliGRAM(s) Oral daily  guaiFENesin  milliGRAM(s) Oral every 12 hours  heparin  Infusion.  Unit(s)/Hr (17 mL/Hr) IV Continuous <Continuous>  insulin glargine Injectable (LANTUS) 20 Unit(s) SubCutaneous at bedtime  insulin lispro (HumaLOG) corrective regimen sliding scale   SubCutaneous three times a day before meals  iron sucrose IVPB 200 milliGRAM(s) IV Intermittent every 24 hours  lactobacillus acidophilus 1 Tablet(s) Oral daily  levothyroxine 50 MICROGram(s) Oral daily  melatonin 5 milliGRAM(s) Oral at bedtime  memantine ER 7 milliGRAM(s) Oral daily  methylPREDNISolone sodium succinate Injectable 40 milliGRAM(s) IV Push every 8 hours  metoprolol tartrate 12.5 milliGRAM(s) Oral two times a day  montelukast 10 milliGRAM(s) Oral daily  multivitamin 1 Tablet(s) Oral daily  pantoprazole    Tablet 40 milliGRAM(s) Oral every 12 hours  piperacillin/tazobactam IVPB.. 3.375 Gram(s) IV Intermittent every 12 hours  pitavastatin 2 milliGRAM(s) Oral <User Schedule>  polyethylene glycol 3350 17 Gram(s) Oral daily  senna 2 Tablet(s) Oral at bedtime  umeclidinium 62.5 MICROgram(s)/vilanterol 25 MICROgram(s) Inhaler 1 Puff(s) Inhalation <User Schedule>    MEDICATIONS  (PRN):  acetaminophen   Tablet .. 650 milliGRAM(s) Oral every 6 hours PRN Temp greater or equal to 38C (100.4F), Mild Pain (1 - 3)  aluminum hydroxide/magnesium hydroxide/simethicone Suspension 30 milliLiter(s) Oral every 4 hours PRN Dyspepsia  dextrose 40% Gel 15 Gram(s) Oral once PRN Blood Glucose LESS THAN 70 milliGRAM(s)/deciliter  glucagon  Injectable 1 milliGRAM(s) IntraMuscular once PRN Glucose LESS THAN 70 milligrams/deciliter  heparin  Injectable 7500 Unit(s) IV Push every 6 hours PRN For aPTT less than 40  heparin  Injectable 3500 Unit(s) IV Push every 6 hours PRN For aPTT between 40 - 57  ondansetron Injectable 4 milliGRAM(s) IV Push every 6 hours PRN Nausea  sodium chloride 0.9% lock flush 10 milliLiter(s) IV Push every 1 hour PRN Pre/post blood products, medications, blood draw, and to maintain line patency    PHYSICAL EXAM:    Vital Signs Last 24 Hrs  T(C): 37.3 (24 Oct 2019 05:00), Max: 37.3 (24 Oct 2019 05:00)  T(F): 99.1 (24 Oct 2019 05:00), Max: 99.1 (24 Oct 2019 05:00)  HR: 88 (24 Oct 2019 15:15) (85 - 93)  BP: 103/55 (24 Oct 2019 04:00) (103/55 - 132/54)  BP(mean): 70 (24 Oct 2019 04:00) (70 - 88)  RR: 26 (24 Oct 2019 04:00) (24 - 26)  SpO2: 98% (24 Oct 2019 15:15) (95% - 99%)    General: alert not oriented     Karnofsky: 30 %    HEENT: normal      Lungs: comfortable    CV: normal      GI: normal      : normal     MSK: weakness     Skin: no rash    LABS:                      8.3    9.99  )-----------( 184      ( 24 Oct 2019 02:26 )             27.0     10-24    138  |  98  |  81.0<H>  ----------------------------<  276<H>  3.8   |  25.0  |  3.93<H>    Ca    8.6      24 Oct 2019 02:26  Mg     2.5     10-23    TPro  6.6  /  Alb  3.0<L>  /  TBili  0.3<L>  /  DBili  x   /  AST  20  /  ALT  195<H>  /  AlkPhos  117  10-23    PTT - ( 24 Oct 2019 02:26 )  PTT:68.6 sec    I&O's Summary    23 Oct 2019 07:01  -  24 Oct 2019 07:00  --------------------------------------------------------  IN: 962 mL / OUT: 1075 mL / NET: -113 mL    RADIOLOGY & ADDITIONAL STUDIES:    < from: Xray Chest 1 View- PORTABLE-Routine (10.24.19 @ 14:44) >  Unchanged bibasilar opacity which could be related to atelectasis and/or pneumonia. Superimposed small pleural effusions are not excluded.    < end of copied text >    ADVANCE DIRECTIVES: Full Code

## 2019-10-24 NOTE — PROGRESS NOTE ADULT - ASSESSMENT
85M with vascular dementia, afib, CKD, DM, COPD, admitted with syncope, hypoglycemia, now with worsening ALMAS on CKD, fluid overload, s/p MICU stay for afib with RVR.

## 2019-10-25 LAB
ANION GAP SERPL CALC-SCNC: 16 MMOL/L — SIGNIFICANT CHANGE UP (ref 5–17)
ANION GAP SERPL CALC-SCNC: 18 MMOL/L — HIGH (ref 5–17)
APTT BLD: 59.2 SEC — HIGH (ref 27.5–36.3)
APTT BLD: 79.6 SEC — HIGH (ref 27.5–36.3)
BUN SERPL-MCNC: 83 MG/DL — HIGH (ref 8–20)
BUN SERPL-MCNC: 90 MG/DL — HIGH (ref 8–20)
CALCIUM SERPL-MCNC: 8.7 MG/DL — SIGNIFICANT CHANGE UP (ref 8.6–10.2)
CALCIUM SERPL-MCNC: 8.9 MG/DL — SIGNIFICANT CHANGE UP (ref 8.6–10.2)
CHLORIDE SERPL-SCNC: 93 MMOL/L — LOW (ref 98–107)
CHLORIDE SERPL-SCNC: 95 MMOL/L — LOW (ref 98–107)
CO2 SERPL-SCNC: 22 MMOL/L — SIGNIFICANT CHANGE UP (ref 22–29)
CO2 SERPL-SCNC: 24 MMOL/L — SIGNIFICANT CHANGE UP (ref 22–29)
CREAT SERPL-MCNC: 3.7 MG/DL — HIGH (ref 0.5–1.3)
CREAT SERPL-MCNC: 3.7 MG/DL — HIGH (ref 0.5–1.3)
GLUCOSE BLDC GLUCOMTR-MCNC: 306 MG/DL — HIGH (ref 70–99)
GLUCOSE BLDC GLUCOMTR-MCNC: 312 MG/DL — HIGH (ref 70–99)
GLUCOSE BLDC GLUCOMTR-MCNC: 347 MG/DL — HIGH (ref 70–99)
GLUCOSE BLDC GLUCOMTR-MCNC: 349 MG/DL — HIGH (ref 70–99)
GLUCOSE BLDC GLUCOMTR-MCNC: 468 MG/DL — CRITICAL HIGH (ref 70–99)
GLUCOSE BLDC GLUCOMTR-MCNC: >530 MG/DL — CRITICAL HIGH (ref 70–99)
GLUCOSE BLDC GLUCOMTR-MCNC: >530 MG/DL — CRITICAL HIGH (ref 70–99)
GLUCOSE SERPL-MCNC: 471 MG/DL — HIGH (ref 70–115)
GLUCOSE SERPL-MCNC: 606 MG/DL — CRITICAL HIGH (ref 70–115)
HCT VFR BLD CALC: 26.9 % — LOW (ref 39–50)
HGB BLD-MCNC: 8.4 G/DL — LOW (ref 13–17)
MAGNESIUM SERPL-MCNC: 2.3 MG/DL — SIGNIFICANT CHANGE UP (ref 1.6–2.6)
MCHC RBC-ENTMCNC: 28 PG — SIGNIFICANT CHANGE UP (ref 27–34)
MCHC RBC-ENTMCNC: 31.2 GM/DL — LOW (ref 32–36)
MCV RBC AUTO: 89.7 FL — SIGNIFICANT CHANGE UP (ref 80–100)
OB PNL STL: NEGATIVE — SIGNIFICANT CHANGE UP
PHOSPHATE SERPL-MCNC: 3.4 MG/DL — SIGNIFICANT CHANGE UP (ref 2.4–4.7)
PLATELET # BLD AUTO: 181 K/UL — SIGNIFICANT CHANGE UP (ref 150–400)
POTASSIUM SERPL-MCNC: 3.6 MMOL/L — SIGNIFICANT CHANGE UP (ref 3.5–5.3)
POTASSIUM SERPL-MCNC: 4.6 MMOL/L — SIGNIFICANT CHANGE UP (ref 3.5–5.3)
POTASSIUM SERPL-SCNC: 3.6 MMOL/L — SIGNIFICANT CHANGE UP (ref 3.5–5.3)
POTASSIUM SERPL-SCNC: 4.6 MMOL/L — SIGNIFICANT CHANGE UP (ref 3.5–5.3)
RBC # BLD: 3 M/UL — LOW (ref 4.2–5.8)
RBC # FLD: 14.9 % — HIGH (ref 10.3–14.5)
SODIUM SERPL-SCNC: 133 MMOL/L — LOW (ref 135–145)
SODIUM SERPL-SCNC: 135 MMOL/L — SIGNIFICANT CHANGE UP (ref 135–145)
WBC # BLD: 10.14 K/UL — SIGNIFICANT CHANGE UP (ref 3.8–10.5)
WBC # FLD AUTO: 10.14 K/UL — SIGNIFICANT CHANGE UP (ref 3.8–10.5)

## 2019-10-25 PROCEDURE — 99233 SBSQ HOSP IP/OBS HIGH 50: CPT

## 2019-10-25 PROCEDURE — 74018 RADEX ABDOMEN 1 VIEW: CPT | Mod: 26

## 2019-10-25 PROCEDURE — 74176 CT ABD & PELVIS W/O CONTRAST: CPT | Mod: 26

## 2019-10-25 RX ORDER — DOCUSATE SODIUM 100 MG
100 CAPSULE ORAL
Refills: 0 | Status: DISCONTINUED | OUTPATIENT
Start: 2019-10-25 | End: 2019-10-31

## 2019-10-25 RX ORDER — INSULIN HUMAN 100 [IU]/ML
12 INJECTION, SOLUTION SUBCUTANEOUS ONCE
Refills: 0 | Status: COMPLETED | OUTPATIENT
Start: 2019-10-25 | End: 2019-10-25

## 2019-10-25 RX ORDER — HEPARIN SODIUM 5000 [USP'U]/ML
1300 INJECTION INTRAVENOUS; SUBCUTANEOUS
Qty: 25000 | Refills: 0 | Status: DISCONTINUED | OUTPATIENT
Start: 2019-10-25 | End: 2019-10-25

## 2019-10-25 RX ORDER — HEPARIN SODIUM 5000 [USP'U]/ML
1200 INJECTION INTRAVENOUS; SUBCUTANEOUS
Qty: 25000 | Refills: 0 | Status: DISCONTINUED | OUTPATIENT
Start: 2019-10-25 | End: 2019-10-29

## 2019-10-25 RX ORDER — PIPERACILLIN AND TAZOBACTAM 4; .5 G/20ML; G/20ML
3.38 INJECTION, POWDER, LYOPHILIZED, FOR SOLUTION INTRAVENOUS EVERY 12 HOURS
Refills: 0 | Status: COMPLETED | OUTPATIENT
Start: 2019-10-25 | End: 2019-10-31

## 2019-10-25 RX ORDER — INSULIN GLARGINE 100 [IU]/ML
10 INJECTION, SOLUTION SUBCUTANEOUS ONCE
Refills: 0 | Status: COMPLETED | OUTPATIENT
Start: 2019-10-25 | End: 2019-10-25

## 2019-10-25 RX ORDER — POTASSIUM CHLORIDE 20 MEQ
20 PACKET (EA) ORAL ONCE
Refills: 0 | Status: COMPLETED | OUTPATIENT
Start: 2019-10-25 | End: 2019-10-25

## 2019-10-25 RX ORDER — TAMSULOSIN HYDROCHLORIDE 0.4 MG/1
0.4 CAPSULE ORAL AT BEDTIME
Refills: 0 | Status: DISCONTINUED | OUTPATIENT
Start: 2019-10-25 | End: 2019-10-27

## 2019-10-25 RX ORDER — LACTULOSE 10 G/15ML
20 SOLUTION ORAL ONCE
Refills: 0 | Status: DISCONTINUED | OUTPATIENT
Start: 2019-10-25 | End: 2019-10-25

## 2019-10-25 RX ORDER — MINERAL OIL
133 OIL (ML) MISCELLANEOUS DAILY
Refills: 0 | Status: DISCONTINUED | OUTPATIENT
Start: 2019-10-25 | End: 2019-10-27

## 2019-10-25 RX ORDER — INSULIN HUMAN 100 [IU]/ML
10 INJECTION, SOLUTION SUBCUTANEOUS ONCE
Refills: 0 | Status: COMPLETED | OUTPATIENT
Start: 2019-10-25 | End: 2019-10-25

## 2019-10-25 RX ORDER — LACTULOSE 10 G/15ML
20 SOLUTION ORAL
Refills: 0 | Status: DISCONTINUED | OUTPATIENT
Start: 2019-10-25 | End: 2019-10-31

## 2019-10-25 RX ORDER — HEPARIN SODIUM 5000 [USP'U]/ML
5600 INJECTION INTRAVENOUS; SUBCUTANEOUS EVERY 6 HOURS
Refills: 0 | Status: DISCONTINUED | OUTPATIENT
Start: 2019-10-25 | End: 2019-10-25

## 2019-10-25 RX ORDER — INSULIN LISPRO 100/ML
10 VIAL (ML) SUBCUTANEOUS
Refills: 0 | Status: DISCONTINUED | OUTPATIENT
Start: 2019-10-25 | End: 2019-10-27

## 2019-10-25 RX ORDER — INSULIN LISPRO 100/ML
5 VIAL (ML) SUBCUTANEOUS
Refills: 0 | Status: DISCONTINUED | OUTPATIENT
Start: 2019-10-25 | End: 2019-10-25

## 2019-10-25 RX ORDER — INSULIN GLARGINE 100 [IU]/ML
24 INJECTION, SOLUTION SUBCUTANEOUS AT BEDTIME
Refills: 0 | Status: DISCONTINUED | OUTPATIENT
Start: 2019-10-25 | End: 2019-10-26

## 2019-10-25 RX ADMIN — Medication 600 MILLIGRAM(S): at 17:46

## 2019-10-25 RX ADMIN — HEPARIN SODIUM 1200 UNIT(S)/HR: 5000 INJECTION INTRAVENOUS; SUBCUTANEOUS at 17:34

## 2019-10-25 RX ADMIN — Medication 1 DROP(S): at 12:07

## 2019-10-25 RX ADMIN — Medication 40 MILLIGRAM(S): at 05:32

## 2019-10-25 RX ADMIN — HEPARIN SODIUM 1200 UNIT(S)/HR: 5000 INJECTION INTRAVENOUS; SUBCUTANEOUS at 10:51

## 2019-10-25 RX ADMIN — UMECLIDINIUM BROMIDE AND VILANTEROL TRIFENATATE 1 PUFF(S): 62.5; 25 POWDER RESPIRATORY (INHALATION) at 09:13

## 2019-10-25 RX ADMIN — Medication 3 MILLILITER(S): at 15:32

## 2019-10-25 RX ADMIN — Medication 1 TABLET(S): at 12:07

## 2019-10-25 RX ADMIN — Medication 81 MILLIGRAM(S): at 12:07

## 2019-10-25 RX ADMIN — AZITHROMYCIN 500 MILLIGRAM(S): 500 TABLET, FILM COATED ORAL at 12:07

## 2019-10-25 RX ADMIN — LACTULOSE 20 GRAM(S): 10 SOLUTION ORAL at 17:46

## 2019-10-25 RX ADMIN — INSULIN HUMAN 12 UNIT(S): 100 INJECTION, SOLUTION SUBCUTANEOUS at 03:34

## 2019-10-25 RX ADMIN — Medication 2 MILLILITER(S): at 03:09

## 2019-10-25 RX ADMIN — MEMANTINE HYDROCHLORIDE 7 MILLIGRAM(S): 10 TABLET ORAL at 12:07

## 2019-10-25 RX ADMIN — Medication 5 MILLIGRAM(S): at 20:54

## 2019-10-25 RX ADMIN — HEPARIN SODIUM 1300 UNIT(S)/HR: 5000 INJECTION INTRAVENOUS; SUBCUTANEOUS at 03:46

## 2019-10-25 RX ADMIN — PANTOPRAZOLE SODIUM 40 MILLIGRAM(S): 20 TABLET, DELAYED RELEASE ORAL at 17:46

## 2019-10-25 RX ADMIN — PANTOPRAZOLE SODIUM 40 MILLIGRAM(S): 20 TABLET, DELAYED RELEASE ORAL at 05:31

## 2019-10-25 RX ADMIN — Medication 1 DROP(S): at 17:46

## 2019-10-25 RX ADMIN — Medication 10 UNIT(S): at 12:08

## 2019-10-25 RX ADMIN — PITAVASTATIN CALCIUM 2 MILLIGRAM(S): 1.04 TABLET, FILM COATED ORAL at 20:54

## 2019-10-25 RX ADMIN — Medication 1 TABLET(S): at 12:06

## 2019-10-25 RX ADMIN — Medication 2 MILLILITER(S): at 08:53

## 2019-10-25 RX ADMIN — Medication 12.5 MILLIGRAM(S): at 05:31

## 2019-10-25 RX ADMIN — Medication 600 MILLIGRAM(S): at 05:30

## 2019-10-25 RX ADMIN — Medication 0.5 MILLIGRAM(S): at 09:14

## 2019-10-25 RX ADMIN — DIVALPROEX SODIUM 500 MILLIGRAM(S): 500 TABLET, DELAYED RELEASE ORAL at 20:51

## 2019-10-25 RX ADMIN — Medication 10 UNIT(S): at 09:20

## 2019-10-25 RX ADMIN — Medication 3 MILLILITER(S): at 08:51

## 2019-10-25 RX ADMIN — PIPERACILLIN AND TAZOBACTAM 25 GRAM(S): 4; .5 INJECTION, POWDER, LYOPHILIZED, FOR SOLUTION INTRAVENOUS at 12:07

## 2019-10-25 RX ADMIN — Medication 50 MICROGRAM(S): at 05:30

## 2019-10-25 RX ADMIN — HEPARIN SODIUM 1200 UNIT(S)/HR: 5000 INJECTION INTRAVENOUS; SUBCUTANEOUS at 07:22

## 2019-10-25 RX ADMIN — Medication 8: at 17:30

## 2019-10-25 RX ADMIN — Medication 40 MILLIGRAM(S): at 05:31

## 2019-10-25 RX ADMIN — SENNA PLUS 2 TABLET(S): 8.6 TABLET ORAL at 20:51

## 2019-10-25 RX ADMIN — Medication 1 MILLIGRAM(S): at 12:06

## 2019-10-25 RX ADMIN — Medication 2 MILLILITER(S): at 15:32

## 2019-10-25 RX ADMIN — Medication 0.5 MILLIGRAM(S): at 21:50

## 2019-10-25 RX ADMIN — Medication 3 MILLILITER(S): at 03:10

## 2019-10-25 RX ADMIN — Medication 20 MILLIEQUIVALENT(S): at 17:34

## 2019-10-25 RX ADMIN — Medication 12: at 06:42

## 2019-10-25 RX ADMIN — Medication 8: at 12:08

## 2019-10-25 RX ADMIN — Medication 133 MILLILITER(S): at 17:45

## 2019-10-25 RX ADMIN — INSULIN GLARGINE 24 UNIT(S): 100 INJECTION, SOLUTION SUBCUTANEOUS at 21:38

## 2019-10-25 RX ADMIN — PIPERACILLIN AND TAZOBACTAM 25 GRAM(S): 4; .5 INJECTION, POWDER, LYOPHILIZED, FOR SOLUTION INTRAVENOUS at 00:15

## 2019-10-25 RX ADMIN — Medication 2 MILLILITER(S): at 21:49

## 2019-10-25 RX ADMIN — Medication 3 MILLILITER(S): at 21:50

## 2019-10-25 RX ADMIN — Medication 100 MILLIGRAM(S): at 20:51

## 2019-10-25 RX ADMIN — Medication 1 DROP(S): at 05:29

## 2019-10-25 RX ADMIN — DONEPEZIL HYDROCHLORIDE 5 MILLIGRAM(S): 10 TABLET, FILM COATED ORAL at 20:52

## 2019-10-25 RX ADMIN — INSULIN HUMAN 10 UNIT(S): 100 INJECTION, SOLUTION SUBCUTANEOUS at 02:47

## 2019-10-25 NOTE — PROGRESS NOTE ADULT - SUBJECTIVE AND OBJECTIVE BOX
NEPHROLOGY INTERVAL HPI/OVERNIGHT EVENTS:    Examined  Family at bedside  edematous  Lethargic    MEDICATIONS  (STANDING):  acetylcysteine 10%  Inhalation 2 milliLiter(s) Inhalation every 6 hours  ALBUTerol/ipratropium for Nebulization 3 milliLiter(s) Nebulizer every 6 hours  ALBUTerol/ipratropium for Nebulization. 3 milliLiter(s) Nebulizer once  artificial  tears Solution 1 Drop(s) Both EYES four times a day  aspirin  chewable 81 milliGRAM(s) Oral daily  azithromycin   Tablet 500 milliGRAM(s) Oral daily  buDESOnide    Inhalation Suspension 0.5 milliGRAM(s) Inhalation two times a day  dextrose 5%. 1000 milliLiter(s) (50 mL/Hr) IV Continuous <Continuous>  dextrose 50% Injectable 12.5 Gram(s) IV Push once  dextrose 50% Injectable 25 Gram(s) IV Push once  dextrose 50% Injectable 25 Gram(s) IV Push once  diVALproex  milliGRAM(s) Oral at bedtime  donepezil 5 milliGRAM(s) Oral at bedtime  epoetin triny Injectable 20342 Unit(s) SubCutaneous every 7 days  folic acid 1 milliGRAM(s) Oral daily  guaiFENesin  milliGRAM(s) Oral every 12 hours  heparin  Infusion. 1200 Unit(s)/Hr (12 mL/Hr) IV Continuous <Continuous>  insulin glargine Injectable (LANTUS) 24 Unit(s) SubCutaneous at bedtime  insulin lispro (HumaLOG) corrective regimen sliding scale   SubCutaneous three times a day before meals  insulin lispro Injectable (HumaLOG) 10 Unit(s) SubCutaneous three times a day before meals  lactobacillus acidophilus 1 Tablet(s) Oral daily  levothyroxine 50 MICROGram(s) Oral daily  melatonin 5 milliGRAM(s) Oral at bedtime  memantine ER 7 milliGRAM(s) Oral daily  metoprolol tartrate 12.5 milliGRAM(s) Oral two times a day  montelukast 10 milliGRAM(s) Oral daily  multivitamin 1 Tablet(s) Oral daily  pantoprazole    Tablet 40 milliGRAM(s) Oral every 12 hours  piperacillin/tazobactam IVPB.. 3.375 Gram(s) IV Intermittent every 12 hours  pitavastatin 2 milliGRAM(s) Oral <User Schedule>  polyethylene glycol 3350 17 Gram(s) Oral daily  potassium chloride    Tablet ER 20 milliEquivalent(s) Oral once  senna 2 Tablet(s) Oral at bedtime  torsemide 40 milliGRAM(s) Oral daily  umeclidinium 62.5 MICROgram(s)/vilanterol 25 MICROgram(s) Inhaler 1 Puff(s) Inhalation <User Schedule>    MEDICATIONS  (PRN):  acetaminophen   Tablet .. 650 milliGRAM(s) Oral every 6 hours PRN Temp greater or equal to 38C (100.4F), Mild Pain (1 - 3)  aluminum hydroxide/magnesium hydroxide/simethicone Suspension 30 milliLiter(s) Oral every 4 hours PRN Dyspepsia  dextrose 40% Gel 15 Gram(s) Oral once PRN Blood Glucose LESS THAN 70 milliGRAM(s)/deciliter  glucagon  Injectable 1 milliGRAM(s) IntraMuscular once PRN Glucose LESS THAN 70 milligrams/deciliter  heparin  Injectable 7500 Unit(s) IV Push every 6 hours PRN For aPTT less than 40  heparin  Injectable 3500 Unit(s) IV Push every 6 hours PRN For aPTT between 40 - 57  ondansetron Injectable 4 milliGRAM(s) IV Push every 6 hours PRN Nausea  sodium chloride 0.9% lock flush 10 milliLiter(s) IV Push every 1 hour PRN Pre/post blood products, medications, blood draw, and to maintain line patency      Allergies    No Known Allergies    Intolerances        Vital Signs Last 24 Hrs  T(C): 36.6 (25 Oct 2019 13:40), Max: 36.9 (24 Oct 2019 21:00)  T(F): 97.9 (25 Oct 2019 13:40), Max: 98.5 (24 Oct 2019 21:00)  HR: 89 (25 Oct 2019 12:00) (82 - 102)  BP: 124/63 (25 Oct 2019 12:00) (107/67 - 160/95)  BP(mean): 81 (25 Oct 2019 12:00) (73 - 114)  RR: 22 (25 Oct 2019 12:00) (20 - 23)  SpO2: 97% (25 Oct 2019 12:00) (94% - 99%)  Daily     Daily Weight in k.1 (25 Oct 2019 05:00)    PHYSICAL EXAM:  HEAD:  NCAT  NECK: Supple, L TLC   CHEST/LUNG: EAE , few basilar crackles , no rub   HEART: Irregular , no rub   ABDOMEN: Soft, + distended +BS; nontender; abd wall and flank edema  EXTREMITIES: + edema pitting B/L LE      LABS:                        8.4    10.14 )-----------( 181      ( 25 Oct 2019 05:38 )             26.9     10    135  |  95<L>  |  83.0<H>  ----------------------------<  471<H>  3.6   |  24.0  |  3.70<H>    Ca    8.9      25 Oct 2019 05:38  Phos  3.4     10-25  Mg     2.3     10-25      PTT - ( 25 Oct 2019 05:38 )  PTT:79.6 sec    Magnesium, Serum: 2.3 mg/dL (10-25 @ 05:38)  Phosphorus Level, Serum: 3.4 mg/dL (10-25 @ 05:38)          RADIOLOGY & ADDITIONAL TESTS:

## 2019-10-25 NOTE — PROGRESS NOTE ADULT - SUBJECTIVE AND OBJECTIVE BOX
[facility]                                                        Office: [address] [address]                                                       Telephone: [phone]. Fax:[phone]                                                                             PROGRESS NOTE      CC: Patient is a 85y old  Male who presents with a chief complaint of syncope (25 Oct 2019 14:19)    Interval History: Patient is more awake and alert today. Mildly dyspneic, but breathing appears less labored. Overall, edema appears stable.   Patient had a brief episode of AF overnight, but otherwise no cardiac events on telemetry. Now back in sinus rhythm.   C/o abdominal distention; abd xray revealed fecal impaction.     Review of symptoms: All review of systems negative unless otherwise indicated below    Past medical history: No updates.   Chronic conditions:  Hypertension: controlled. CHF: Stable. CAD: Stable ischemic heart disease. DM: Stable;    	  Vitals:  T(C): 36.6 (10-25-19 @ 13:40), Max: 36.9 (10-24-19 @ 21:00)  HR: 89 (10-25-19 @ 12:00) (82 - 102)  BP: 124/63 (10-25-19 @ 12:00) (107/67 - 160/95)  RR: 22 (10-25-19 @ 12:00) (20 - 23)  SpO2: 97% (10-25-19 @ 12:00) (94% - 99%)  Wt(kg): --  I&O's Summary    24 Oct 2019 07:01  -  25 Oct 2019 07:00  --------------------------------------------------------  IN: 1832 mL / OUT: 1152 mL / NET: 680 mL    25 Oct 2019 07:01  -  25 Oct 2019 14:42  --------------------------------------------------------  IN: 36 mL / OUT: 0 mL / NET: 36 mL          PHYSICAL EXAM:  Appearance: Comfortable. No acute distress  HEENT:  Head and neck: Atraumatic. Normocephalic.  Normal oral mucosa, PERRL, Neck is supple. No JVD, No carotid bruit.   Neurologic: A & O x 3, no focal deficits. EOMI , Cranial nerves are intact.  Lymphatic: No cervical lymphadenopathy  Cardiovascular: Normal S1 S2, No murmur, rubs/gallops. No JVD, No edema  Respiratory: Lungs clear to auscultation  Gastrointestinal:  Soft, Non-tender, + BS  Lower Extremities: No edema  Psychiatry: Patient is calm. No agitation. Mood & affect appropriate  Skin: No rashes/ ecchymoses/cyanosis/ulcers visualized on the face, hands or feet.    CURRENT MEDICATIONS:  metoprolol tartrate 12.5 milliGRAM(s) Oral two times a day  torsemide 40 milliGRAM(s) Oral daily    acetylcysteine 10%  Inhalation  ALBUTerol/ipratropium for Nebulization  ALBUTerol/ipratropium for Nebulization.  buDESOnide    Inhalation Suspension  guaiFENesin ER  montelukast  umeclidinium 62.5 MICROgram(s)/vilanterol 25 MICROgram(s) Inhaler  azithromycin   Tablet  piperacillin/tazobactam IVPB..  diVALproex ER  donepezil  melatonin  memantine ER  pantoprazole    Tablet  polyethylene glycol 3350  senna  dextrose 50% Injectable  dextrose 50% Injectable  dextrose 50% Injectable  insulin glargine Injectable (LANTUS)  insulin lispro (HumaLOG) corrective regimen sliding scale  insulin lispro Injectable (HumaLOG)  levothyroxine  pitavastatin  artificial  tears Solution  aspirin  chewable  dextrose 5%.  epoetin triny Injectable  folic acid  heparin  Infusion.  multivitamin  potassium chloride    Tablet ER      LABS:	 	                              8.4    10.14 )-----------( 181      ( 25 Oct 2019 05:38 )             26.9     10-25    135  |  95<L>  |  83.0<H>  ----------------------------<  471<H>  3.6   |  24.0  |  3.70<H>    Ca    8.9      25 Oct 2019 05:38  Phos  3.4     10-25  Mg     2.3     10-25      proBNP: Serum Pro-Brain Natriuretic Peptide: 2462 pg/mL (10-21 @ 06:08)    Lipid Profile:   HgA1c: TSH:     TELEMETRY: Reviewed    ECG:  Reviewed by me. 	    DIAGNOSTIC TESTING:  [ ] Echocardiogram:   [ ]  Catheterization:  [ ] Stress Test:    OTHER:

## 2019-10-25 NOTE — CHART NOTE - NSCHARTNOTEFT_GEN_A_CORE
Notified by RN that patient Glucose was not reading on the glucometer. stat BMP request for confirmation. multiple attempts conducted for blood sample due to difficult sticks. Notified by RN that patient Glucose was not reading on the glucometer. 20units Lantus given. stat BMP request for confirmation. multiple attempts conducted for blood sample due to difficult sticks. patient seen and evaluated, asymptomatic, annoyed that he has to have his blood drawn. BMP results reviewed, Glu 606 with mild elevation in AG. 10 units regular insulin IVP given. recheck Glu shows >530. patient IV medications reviewed with pharmacy, confirmed Heparin can be mixed with NS instead of 5% dextrose, order changed accordingly. Zosyn also changed to NS mixture instead of dextrose. 12 units IVP regular insulin given. will cont to re-evaluate. patient currently appears clinically volume overloaded with significant extremity edema. No additional IV hydration given. Will cont to monitor Glucose closely. Will consult ICU for insulin drip if glucose remains elevated on next re-evaluation.

## 2019-10-25 NOTE — PROGRESS NOTE ADULT - PROBLEM SELECTOR PLAN 1
- stabilizing. no need for HD at this time; additionally, in my discussions with daughter who is HCP Felicita, it is unlikely that her father would do well with hemodialysis.

## 2019-10-25 NOTE — PROGRESS NOTE ADULT - SUBJECTIVE AND OBJECTIVE BOX
OVERNIGHT EVENTS:     Present Symptoms:     Dyspnea: 0 1 2 3   Nausea/Vomiting: Yes No  Anxiety:  Yes No  Depression: Yes No  Fatigue: Yes No  Loss of appetite: Yes No    Pain:             Character-            Duration-            Effect-            Factors-            Frequency-            Location-            Severity-    Review of Systems: Reviewed                     Negative:                     Positive:  Unable to obtain due to poor mentation   All others negative    MEDICATIONS  (STANDING):  acetylcysteine 10%  Inhalation 2 milliLiter(s) Inhalation every 6 hours  ALBUTerol/ipratropium for Nebulization 3 milliLiter(s) Nebulizer every 6 hours  ALBUTerol/ipratropium for Nebulization. 3 milliLiter(s) Nebulizer once  artificial  tears Solution 1 Drop(s) Both EYES four times a day  aspirin  chewable 81 milliGRAM(s) Oral daily  azithromycin   Tablet 500 milliGRAM(s) Oral daily  buDESOnide    Inhalation Suspension 0.5 milliGRAM(s) Inhalation two times a day  dextrose 5%. 1000 milliLiter(s) (50 mL/Hr) IV Continuous <Continuous>  dextrose 50% Injectable 12.5 Gram(s) IV Push once  dextrose 50% Injectable 25 Gram(s) IV Push once  dextrose 50% Injectable 25 Gram(s) IV Push once  diVALproex  milliGRAM(s) Oral at bedtime  donepezil 5 milliGRAM(s) Oral at bedtime  epoetin triny Injectable 41990 Unit(s) SubCutaneous every 7 days  folic acid 1 milliGRAM(s) Oral daily  guaiFENesin  milliGRAM(s) Oral every 12 hours  heparin  Infusion. 1200 Unit(s)/Hr (12 mL/Hr) IV Continuous <Continuous>  insulin glargine Injectable (LANTUS) 24 Unit(s) SubCutaneous at bedtime  insulin lispro (HumaLOG) corrective regimen sliding scale   SubCutaneous three times a day before meals  insulin lispro Injectable (HumaLOG) 10 Unit(s) SubCutaneous three times a day before meals  lactobacillus acidophilus 1 Tablet(s) Oral daily  levothyroxine 50 MICROGram(s) Oral daily  melatonin 5 milliGRAM(s) Oral at bedtime  memantine ER 7 milliGRAM(s) Oral daily  metoprolol tartrate 12.5 milliGRAM(s) Oral two times a day  montelukast 10 milliGRAM(s) Oral daily  multivitamin 1 Tablet(s) Oral daily  pantoprazole    Tablet 40 milliGRAM(s) Oral every 12 hours  piperacillin/tazobactam IVPB.. 3.375 Gram(s) IV Intermittent every 12 hours  pitavastatin 2 milliGRAM(s) Oral <User Schedule>  polyethylene glycol 3350 17 Gram(s) Oral daily  potassium chloride    Tablet ER 20 milliEquivalent(s) Oral once  senna 2 Tablet(s) Oral at bedtime  torsemide 40 milliGRAM(s) Oral daily  umeclidinium 62.5 MICROgram(s)/vilanterol 25 MICROgram(s) Inhaler 1 Puff(s) Inhalation <User Schedule>    MEDICATIONS  (PRN):  acetaminophen   Tablet .. 650 milliGRAM(s) Oral every 6 hours PRN Temp greater or equal to 38C (100.4F), Mild Pain (1 - 3)  aluminum hydroxide/magnesium hydroxide/simethicone Suspension 30 milliLiter(s) Oral every 4 hours PRN Dyspepsia  dextrose 40% Gel 15 Gram(s) Oral once PRN Blood Glucose LESS THAN 70 milliGRAM(s)/deciliter  glucagon  Injectable 1 milliGRAM(s) IntraMuscular once PRN Glucose LESS THAN 70 milligrams/deciliter  heparin  Injectable 7500 Unit(s) IV Push every 6 hours PRN For aPTT less than 40  heparin  Injectable 3500 Unit(s) IV Push every 6 hours PRN For aPTT between 40 - 57  ondansetron Injectable 4 milliGRAM(s) IV Push every 6 hours PRN Nausea  sodium chloride 0.9% lock flush 10 milliLiter(s) IV Push every 1 hour PRN Pre/post blood products, medications, blood draw, and to maintain line patency    PHYSICAL EXAM:    Vital Signs Last 24 Hrs  T(C): 36.6 (25 Oct 2019 13:40), Max: 36.9 (24 Oct 2019 21:00)  T(F): 97.9 (25 Oct 2019 13:40), Max: 98.5 (24 Oct 2019 21:00)  HR: 89 (25 Oct 2019 12:00) (82 - 102)  BP: 124/63 (25 Oct 2019 12:00) (107/67 - 160/95)  BP(mean): 81 (25 Oct 2019 12:00) (73 - 114)  RR: 22 (25 Oct 2019 12:00) (20 - 23)  SpO2: 97% (25 Oct 2019 12:00) (94% - 99%)    General: alert  oriented x ____ lethargic agitated                  cachexia  nonverbal  coma    Karnofsky:  %    HEENT: normal  dry mouth  ET tube/trach    Lungs: comfortable tachypnea/labored breathing  excessive secretions    CV: normal  tachycardia    GI: normal  distended  tender  no BS               PEG/NG/OG tube  constipation  last BM:     : normal  incontinent  oliguria/anuria  beal    MSK: normal  weakness  edema             ambulatory  bedbound/wheelchair bound    Skin: normal  pressure ulcers- Stage_____  no rash    LABS:                      8.4    10.14 )-----------( 181      ( 25 Oct 2019 05:38 )             26.9     10-25    135  |  95<L>  |  83.0<H>  ----------------------------<  471<H>  3.6   |  24.0  |  3.70<H>    Ca    8.9      25 Oct 2019 05:38  Phos  3.4     10-25  Mg     2.3     10-25    PTT - ( 25 Oct 2019 05:38 )  PTT:79.6 sec    I&O's Summary    24 Oct 2019 07:01  -  25 Oct 2019 07:00  --------------------------------------------------------  IN: 1832 mL / OUT: 1152 mL / NET: 680 mL    25 Oct 2019 07:01  -  25 Oct 2019 14:58  --------------------------------------------------------  IN: 36 mL / OUT: 0 mL / NET: 36 mL        RADIOLOGY & ADDITIONAL STUDIES:    ADVANCE DIRECTIVES:   DNR YES NO  Completed on:                     MOLST  YES NO   Completed on:  Living Will  YES NO   Completed on:

## 2019-10-25 NOTE — PROGRESS NOTE ADULT - ASSESSMENT
85M hx vascular demetnia, afib (not on ac), CKD, DM, COPD presents with syncope and worsening edema found to have hypoglycemia, nikki on ckd, transaminitis. Course complicated by Afib w/ RVR and shock requiring pressors. Pt is transfered to SDU , cardiology , nephrology on board , cardiac cath on hold due to CRF and respiratory status , pulmonary called , iv abx and steroid added for COPD exacerbation  and possible PNA     1- COPD with exacerbation   short course of steroid, cont neb   pulmonary consult appreciated     2- Bilateral pneumonia - gr positive gr neg infection   cont zosyn , zithromax   add florastar   incentive spirometry mobilize     3- Diabetes Mellitus type 2 with hyperglycemia   adjusted lantus and premeals insulin coverage added   cont ISS     4-Paroxysmal atrial fib in NSR   cont iv heparin , add low dose B blocker   cardiology follow up appreciated      5-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  -nephrology on board , monitor trend   -possible 2/2 atn due to hypotension  pt's daughter is aware of worsening cr if he gets LHC due to contrast     6-anemia of cronic kidney dx   venofer added daughter is refusing with stop ( pt does not have second line she is refusing to get him stuck again)    epogen per renal weekly   add ferrous sulfate vit c , folic acid     7- Fluid overload due to renal failure , acute diastolic CHF acute   cont lasix , increased dose to 40 mg daily     8- Constipated   AXR ordered r/o impaction ileus   will try enema if axr no acute findings   overall prognosis poor   explained the daughter in details

## 2019-10-25 NOTE — PROGRESS NOTE ADULT - SUBJECTIVE AND OBJECTIVE BOX
PULMONARY PROGRESS NOTE      [id number]    Patient is a 85y old  Male who presents with a chief complaint of syncope (25 Oct 2019 10:02)      INTERVAL HPI/OVERNIGHT EVENTS:  85M w/ PMHx vascular dementia, HFpEF, COPD, CKD IV, Afib (not on AC) who was initially admitted for w/u recurrent syncope. ICU consulted for Afib w/ RVR and shock. CTH was grossly normal. CT C/A/P suggest of RUL/RLL PNA, L lung mass/ atelectasis, cholecystitis, duodenitis and cystitis. He was on IV lasix 40 bid in addition to metoprolol. DVT study normal. On evaluation he was hypotensive SBP ~ 60s and pt was very encephalopathic. POCUS reveal a hypokinetic RV and dilated IVC. Pt was given fluid bolus and started on IV Hep and Dopamine prior to ICU transfer. Pt improved and was tx'd to 4B. He is an ex-smoker of up to 4 ppd x 40 years but quit 20 years ago. Daughter also reports that pt had [employer] exposure. He is reportedly maintained on drug nebs at home.    Currently awake and alert in NAD on NCO. Wife and daughter at baseline    MEDICATIONS  (STANDING):  acetylcysteine 10%  Inhalation 2 milliLiter(s) Inhalation every 6 hours  ALBUTerol/ipratropium for Nebulization 3 milliLiter(s) Nebulizer every 6 hours  ALBUTerol/ipratropium for Nebulization. 3 milliLiter(s) Nebulizer once  artificial  tears Solution 1 Drop(s) Both EYES four times a day  aspirin  chewable 81 milliGRAM(s) Oral daily  azithromycin   Tablet 500 milliGRAM(s) Oral daily  buDESOnide    Inhalation Suspension 0.5 milliGRAM(s) Inhalation two times a day  dextrose 5%. 1000 milliLiter(s) (50 mL/Hr) IV Continuous <Continuous>  dextrose 50% Injectable 12.5 Gram(s) IV Push once  dextrose 50% Injectable 25 Gram(s) IV Push once  dextrose 50% Injectable 25 Gram(s) IV Push once  diVALproex  milliGRAM(s) Oral at bedtime  donepezil 5 milliGRAM(s) Oral at bedtime  epoetin triny Injectable 63920 Unit(s) SubCutaneous every 7 days  ferrous    sulfate 325 milliGRAM(s) Oral daily  folic acid 1 milliGRAM(s) Oral daily  guaiFENesin  milliGRAM(s) Oral every 12 hours  heparin  Infusion. 1200 Unit(s)/Hr (12 mL/Hr) IV Continuous <Continuous>  insulin glargine Injectable (LANTUS) 24 Unit(s) SubCutaneous at bedtime  insulin lispro (HumaLOG) corrective regimen sliding scale   SubCutaneous three times a day before meals  insulin lispro Injectable (HumaLOG) 10 Unit(s) SubCutaneous three times a day before meals  lactobacillus acidophilus 1 Tablet(s) Oral daily  levothyroxine 50 MICROGram(s) Oral daily  melatonin 5 milliGRAM(s) Oral at bedtime  memantine ER 7 milliGRAM(s) Oral daily  methylPREDNISolone sodium succinate Injectable 40 milliGRAM(s) IV Push every 12 hours  metoprolol tartrate 12.5 milliGRAM(s) Oral two times a day  montelukast 10 milliGRAM(s) Oral daily  multivitamin 1 Tablet(s) Oral daily  pantoprazole    Tablet 40 milliGRAM(s) Oral every 12 hours  piperacillin/tazobactam IVPB.. 3.375 Gram(s) IV Intermittent every 12 hours  pitavastatin 2 milliGRAM(s) Oral <User Schedule>  polyethylene glycol 3350 17 Gram(s) Oral daily  potassium chloride    Tablet ER 20 milliEquivalent(s) Oral once  senna 2 Tablet(s) Oral at bedtime  torsemide 40 milliGRAM(s) Oral daily  umeclidinium 62.5 MICROgram(s)/vilanterol 25 MICROgram(s) Inhaler 1 Puff(s) Inhalation <User Schedule>      MEDICATIONS  (PRN):  acetaminophen   Tablet .. 650 milliGRAM(s) Oral every 6 hours PRN Temp greater or equal to 38C (100.4F), Mild Pain (1 - 3)  aluminum hydroxide/magnesium hydroxide/simethicone Suspension 30 milliLiter(s) Oral every 4 hours PRN Dyspepsia  dextrose 40% Gel 15 Gram(s) Oral once PRN Blood Glucose LESS THAN 70 milliGRAM(s)/deciliter  glucagon  Injectable 1 milliGRAM(s) IntraMuscular once PRN Glucose LESS THAN 70 milligrams/deciliter  heparin  Injectable 7500 Unit(s) IV Push every 6 hours PRN For aPTT less than 40  heparin  Injectable 3500 Unit(s) IV Push every 6 hours PRN For aPTT between 40 - 57  ondansetron Injectable 4 milliGRAM(s) IV Push every 6 hours PRN Nausea  sodium chloride 0.9% lock flush 10 milliLiter(s) IV Push every 1 hour PRN Pre/post blood products, medications, blood draw, and to maintain line patency      Allergies    No Known Allergies    Intolerances        PAST MEDICAL & SURGICAL HISTORY:  CKD (chronic kidney disease)  COPD (chronic obstructive pulmonary disease)  Dementia  Afib  DM (diabetes mellitus)  History of lung biopsy  S/P carotid endarterectomy      SOCIAL HISTORY  Smoking History:       REVIEW OF SYSTEMS:    CONSTITUTIONAL:  No distress    HEENT:  Eyes:  No diplopia or blurred vision. ENT:  No earache, sore throat or runny nose.    CARDIOVASCULAR:  No pressure, squeezing, tightness, heaviness or aching about the chest; no palpitations.    RESPIRATORY:  above    GASTROINTESTINAL:  No nausea, vomiting or diarrhea.    GENITOURINARY:  No dysuria, frequency or urgency.    NEUROLOGIC:  No paresthesias, fasciculations, seizures or weakness.    Extremities: No cyanosis, clubbing or edema    PSYCHIATRIC:  No disorder of thought or mood.    Vital Signs Last 24 Hrs  T(C): 36.7 (25 Oct 2019 08:04), Max: 36.9 (24 Oct 2019 21:00)  T(F): 98 (25 Oct 2019 08:04), Max: 98.5 (24 Oct 2019 21:00)  HR: 85 (25 Oct 2019 08:53) (82 - 102)  BP: 160/95 (25 Oct 2019 08:00) (107/67 - 160/95)  BP(mean): 114 (25 Oct 2019 08:00) (73 - 114)  RR: 21 (25 Oct 2019 08:00) (20 - 23)  SpO2: 98% (25 Oct 2019 08:53) (94% - 99%)    PHYSICAL EXAMINATION:    GENERAL: The patient is awake and alert in no apparent distress.     HEENT: Head is normocephalic and atraumatic. Extraocular muscles are intact. Mucous membranes are moist.    NECK: Supple.    LUNGS: rales at bases bilateral; respirations unlabored    HEART: Regular rate and rhythm without murmur.    ABDOMEN: Soft, nontender, and nondistended.  obese    EXTREMITIES: Without any cyanosis, clubbing, rash, lesions, 3+ edema.    NEUROLOGIC: Grossly intact.    LABS:                        8.4    10.14 )-----------( 181      ( 25 Oct 2019 05:38 )             26.9     10-25    135  |  95<L>  |  83.0<H>  ----------------------------<  471<H>  3.6   |  24.0  |  3.70<H>    Ca    8.9      25 Oct 2019 05:38  Phos  3.4     10-25  Mg     2.3     10-25      PTT - ( 25 Oct 2019 05:38 )  PTT:79.6 sec                    MICROBIOLOGY:    RADIOLOGY & ADDITIONAL STUDIES:  EXAM:  XR CHEST PORTABLE ROUTINE 1V                          PROCEDURE DATE:  10/20/2019          INTERPRETATION:  CHEST AP PORTABLE:    History: Abnormal Chest Sounds.     Date and time of exam: 10/20/2019 6:45 AM.    Technique: A single AP view of the chest was obtained.    Comparison exam: 10/19/2019 2:00 PM.    Findings:  Left IJ central line again noted, unchanged. Persistent bilateral   airspace disease without significant change area and no evidence of   pneumothorax. No definite evidence of pleural effusion..    Impression:  Diffuse bilateral airspace disease, unchanged..      MORTEZA KELLER M.D., ATTENDING RADIOLOGIST  This document has been electronically signed. Oct 20 2019  9:22AM             EXAM:  US DPLX LWR EXT VEINS COMPL BI                          PROCEDURE DATE:  10/19/2019          INTERPRETATION:  CLINICAL INFORMATION: Lower extremity edema.    COMPARISON: Bilateral lower extremity duplex of 10/18/2019.    TECHNIQUE: Duplex sonography of the BILATERAL LOWER extremity veins with   color and spectral Doppler, with and without compression.      FINDINGS:    There is normal compressibility of the bilateral common femoral, femoral   and popliteal veins.     Doppler examinationshows normal spontaneous and phasic flow.    Limited evaluation of the calf veins secondary to lower extremity edema.   No calf vein thrombosis is detected.    IMPRESSION:     No evidence of deep venous thrombosis in either lower extremity.    Limited evaluation of the calf veins.          FAWN FIGUEROA M.D., ATTENDING RADIOLOGIST  This document has been electronically signed. Oct 19 2019  8:54PM             EXAM:  CT CHEST                         EXAM:  CT ABDOMEN AND PELVIS                          PROCEDURE DATE:  10/17/2019          INTERPRETATION:  CT CHEST/ABDOMEN/PELVIS:     CLINICAL INFORMATION: Altered mental status, syncope and shortness of   breath with abdominal bloating    COMPARISON: Chest x-ray of earlier in the day.    PROCEDURE:  Using multislice helical CT, 2.5 mm sections were obtained   from the thoracic inlet through the ischial tuberosities without the   administration of intravenous contrast. Oral contrast was not   administered. The patient was unable to raise the arms above the head for   this study    Coronal and sagittal reformations were performed by  CT technologist and   made available for review.    FINDINGS:  The visualized structures of the lower neck are unremarkable.   The visualized aorta is of normal course and caliber. The heart is not   enlarged. There is atherosclerotic calcification of the thoracic aorta.   There is mild coronary artery calcification. There is a small pericardial   effusion    There is no evidence of axillary, hilar, or mediastinal lymphadenopathy.    The lungs demonstrate patchy areas of groundglass opacities and   tree-in-bud nodules in the right upper lobe consistent with infiltrate.   Air is probable small amount of infiltrate in the right lower lobe as   well. There are also areas of masslike consolidation at both lung bases.   Rounded contour is seen to the density at the left lung base and this may   represent roundedatelectasis versus infiltrate or mass. Appearance of   the right lower lobe is suggestive of infiltrate versus atelectasis    The liver is of normal contour. No evidence of focal hepatic lesion on   this nonenhanced examination. The gallbladder is present. No evidence of   intra or extrahepatic biliary dilatation. There is mild stranding around   the gallbladder and mild stranding around the duodenum    The pancreas, spleen, adrenal glands, and kidneys are grossly   unremarkable. There is no evidence of hydronephrosis or perinephric   stranding. No evidence of  nephrolithiasis.The bladder is thick-walled   which may be due to underdistention. The prostate gland is significantly   enlarged. There is a right inguinal hernia containing fat and asmall   amount of fluid.    No evidence of lymphadenopathy utilizing CT size criteria. The aorta is   not aneurysmal. There is atherosclerotic calcification of the abdominal   aorta and its branches    There is no evidence of bowel obstruction. Thereis no evidence of   pneumoperitoneum or free fluid.    The visualized osseous structures demonstrate osteopenia and degenerative   change most prominent at L4-5 and L5-S1.    IMPRESSION:  Infiltrates in the right upper and right lower lobes  Small pericardial effusion  Rounded density at the left lung base suggestive of rounded atelectasis   versus mass or infiltrate. There is atelectasis versus infiltrate at the   right lung base as well.  Mild stranding in the fat around the gallbladder and duodenum maintained   to indicate cholecystitis and/or duodenitis. Further evaluation of the   gallbladder may be obtained with ultrasound as clinically indicated.   Please correlate clinically for possible duodenitis.  Thick-walled bladder may indicate cystitis. Please correlate clinically  Enlarged prostate glands        JUDD CARDOSO M.D.,ATTENDING RADIOLOGIST  This document has been electronically signed. Oct 17 2019  8:26PM            ECHO:      Summary:   1. Patient tachycardic during the entire study.   2. Hyperdynamic wall motion.   3. Left ventricular ejectionfraction, by visual estimation, is >75%.   4. Normal right ventricular size and function.   5. Mild-moderate tricuspid regurgitation.   6. Estimated pulmonary artery systolic pressure is 48 mmHg -mild   pulmonary hypertension.   7. Small pericardial effusion. No echo evidence of tamponade.    MD Zo, RPVI Electronically signed on 10/19/2019 at 4:41:21   PM    All films reviewed on PACS

## 2019-10-25 NOTE — PROGRESS NOTE ADULT - SUBJECTIVE AND OBJECTIVE BOX
CC cough congestion   seen earlier , daughter and wife  are at the bedside    High BG overnight , treated with iv insulin , explained to the daughter in lenght the findings, treatment plan ,   pt is still with no BM , + flatus , given laxative lactulose   + abd distention , no nausea, pt is feeling better   daughter declines iv venofer due to iv acsess , does not want  her dad to get new iv peripheral line   less congested today       Vital Signs Last 24 Hrs  T(C): 36.7 (25 Oct 2019 08:04), Max: 36.9 (24 Oct 2019 21:00)  T(F): 98 (25 Oct 2019 08:04), Max: 98.5 (24 Oct 2019 21:00)  HR: 85 (25 Oct 2019 08:53) (82 - 102)  BP: 160/95 (25 Oct 2019 08:00) (107/67 - 160/95)  BP(mean): 114 (25 Oct 2019 08:00) (73 - 114)  RR: 21 (25 Oct 2019 08:00) (20 - 23)  SpO2: 98% (25 Oct 2019 08:53) (94% - 99%)    PHYSICAL EXAM:  General: No distress , + congestion  upper airway   Neck: Supple, No JVD  Lungs: diffuse coarse BS bilaterally +rhonchi b/l lungs   Cardio: +s1/s2 , irregular tachycardic    Abdomen: Soft, Nontender, distended , Bowel sounds present hypoactive   Extremities: No calf tenderness, +  pretibial edema, + hands and feet swelling    Skin : normal color no rash                < from: Xray Chest 1 View- PORTABLE-Routine (10.24.19 @ 14:44) >        IMPRESSION:  Unchanged bibasilar opacity which could be related to   atelectasis and/or pneumonia. Superimposed small pleural effusions are   not excluded.    < end of copied text >  AXR is pending

## 2019-10-25 NOTE — PROGRESS NOTE ADULT - ASSESSMENT
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

## 2019-10-25 NOTE — PROGRESS NOTE ADULT - ASSESSMENT
84 y/o M with a PMH of vascular dementia, ?Afib (not on AC), CKD, DMII, COPD, and GERD who presented to the ED after a syncopal episode and with worsening edema. Patient had previous workup in 08/2019 with his private cardiologist Dr. Regan Farrar that included an echo and NST after an episode of chest pain and dyspnea while on vacation, results were normal. Admitted for HFpEF and ALMAS on CKD. Hospital course was complicated by an episode of rapid AF with hypotension that required pressor support and IVF. He had a stat bedside echo which revealed hypokinetic RV and dilated IVC. Later, official TTE revealed hyperdynamic LV and normal looking RV.     Assessment/Plan:   1) Shock secondary to unclear etiology- vagal vs. septic vs. ischemic etiology. Resolved. Hemodynamically stable, off pressors. Off IVF. We discussed cardiac cath to check for ischemia when Cr stabilizes or if patient was started on HD; patient's daughter was initially interested in pursuing this, however, as his overall condition has deteriorated, she now states that she would like him to stabilize enough to be discharged home, and then consider further workup in the future.   2) Fluid overload secondary to HFpEF and renal insufficiency- Overall, patient's fluid status is stable/slightly improved. Continue torsemide 40mg daily. Monitor I's and O's. Patient will also benefit from physical therapy to mobilize fluid in his extremities.   3) ALMAS on CKD- Cr essentially stable (3.9 -> 3.7). Continue to monitor. Avoid nephrotoxins. Nephrology following.   4) PAF- In sinus rhythm after brief episode of AF overnight. Tolerating low dose lopressor today. Monitor HR and BP. CHADSVASC 5- patient is currently on heparin drip; would recommend longterm AC if no contraindication.  5) COPD exacerbation- Patient on steroids, azithromycin, and mucomyst. Pulmonary following.  6) Possible PNA- on antibiotics  7) Fecal impaction on abd xray; CT pending     Plan was discussed with patient's daughter and Dr. Parsons     Will continue to follow.  Thank you for involving me in the care of this patient.

## 2019-10-25 NOTE — PROGRESS NOTE ADULT - ASSESSMENT
CHF responding to diuresis  afib  No active bronchospasm from COPD  Possible PNA w rounded atectasis    Plan:  diurese  rate control  dc steroids  drug neb  abx  f/u Chest xray for resolution once clear

## 2019-10-26 LAB
ANION GAP SERPL CALC-SCNC: 15 MMOL/L — SIGNIFICANT CHANGE UP (ref 5–17)
APTT BLD: 62.1 SEC — HIGH (ref 27.5–36.3)
BUN SERPL-MCNC: 93 MG/DL — HIGH (ref 8–20)
CALCIUM SERPL-MCNC: 8.9 MG/DL — SIGNIFICANT CHANGE UP (ref 8.6–10.2)
CHLORIDE SERPL-SCNC: 96 MMOL/L — LOW (ref 98–107)
CO2 SERPL-SCNC: 26 MMOL/L — SIGNIFICANT CHANGE UP (ref 22–29)
CREAT SERPL-MCNC: 3.49 MG/DL — HIGH (ref 0.5–1.3)
GLUCOSE BLDC GLUCOMTR-MCNC: 315 MG/DL — HIGH (ref 70–99)
GLUCOSE BLDC GLUCOMTR-MCNC: 373 MG/DL — HIGH (ref 70–99)
GLUCOSE BLDC GLUCOMTR-MCNC: 385 MG/DL — HIGH (ref 70–99)
GLUCOSE BLDC GLUCOMTR-MCNC: 413 MG/DL — HIGH (ref 70–99)
GLUCOSE BLDC GLUCOMTR-MCNC: 423 MG/DL — HIGH (ref 70–99)
GLUCOSE BLDC GLUCOMTR-MCNC: 449 MG/DL — HIGH (ref 70–99)
GLUCOSE SERPL-MCNC: 403 MG/DL — HIGH (ref 70–115)
HCT VFR BLD CALC: 26.6 % — LOW (ref 39–50)
HGB BLD-MCNC: 8.2 G/DL — LOW (ref 13–17)
MCHC RBC-ENTMCNC: 28 PG — SIGNIFICANT CHANGE UP (ref 27–34)
MCHC RBC-ENTMCNC: 30.8 GM/DL — LOW (ref 32–36)
MCV RBC AUTO: 90.8 FL — SIGNIFICANT CHANGE UP (ref 80–100)
PLATELET # BLD AUTO: 225 K/UL — SIGNIFICANT CHANGE UP (ref 150–400)
POTASSIUM SERPL-MCNC: 4.1 MMOL/L — SIGNIFICANT CHANGE UP (ref 3.5–5.3)
POTASSIUM SERPL-SCNC: 4.1 MMOL/L — SIGNIFICANT CHANGE UP (ref 3.5–5.3)
RBC # BLD: 2.93 M/UL — LOW (ref 4.2–5.8)
RBC # FLD: 14.8 % — HIGH (ref 10.3–14.5)
SODIUM SERPL-SCNC: 137 MMOL/L — SIGNIFICANT CHANGE UP (ref 135–145)
WBC # BLD: 15.48 K/UL — HIGH (ref 3.8–10.5)
WBC # FLD AUTO: 15.48 K/UL — HIGH (ref 3.8–10.5)

## 2019-10-26 PROCEDURE — 99233 SBSQ HOSP IP/OBS HIGH 50: CPT

## 2019-10-26 RX ORDER — INSULIN HUMAN 100 [IU]/ML
10 INJECTION, SOLUTION SUBCUTANEOUS ONCE
Refills: 0 | Status: COMPLETED | OUTPATIENT
Start: 2019-10-26 | End: 2019-10-26

## 2019-10-26 RX ORDER — INSULIN GLARGINE 100 [IU]/ML
30 INJECTION, SOLUTION SUBCUTANEOUS AT BEDTIME
Refills: 0 | Status: DISCONTINUED | OUTPATIENT
Start: 2019-10-26 | End: 2019-10-27

## 2019-10-26 RX ADMIN — PANTOPRAZOLE SODIUM 40 MILLIGRAM(S): 20 TABLET, DELAYED RELEASE ORAL at 17:34

## 2019-10-26 RX ADMIN — Medication 12.5 MILLIGRAM(S): at 17:33

## 2019-10-26 RX ADMIN — Medication 50 MICROGRAM(S): at 06:44

## 2019-10-26 RX ADMIN — Medication 1 TABLET(S): at 12:51

## 2019-10-26 RX ADMIN — Medication 2 MILLILITER(S): at 08:48

## 2019-10-26 RX ADMIN — Medication 1 MILLIGRAM(S): at 12:51

## 2019-10-26 RX ADMIN — Medication 3 MILLILITER(S): at 15:21

## 2019-10-26 RX ADMIN — Medication 600 MILLIGRAM(S): at 17:34

## 2019-10-26 RX ADMIN — Medication 2 MILLILITER(S): at 04:02

## 2019-10-26 RX ADMIN — Medication 3 MILLILITER(S): at 20:32

## 2019-10-26 RX ADMIN — Medication 81 MILLIGRAM(S): at 12:51

## 2019-10-26 RX ADMIN — Medication 1 DROP(S): at 17:34

## 2019-10-26 RX ADMIN — Medication 10 UNIT(S): at 12:05

## 2019-10-26 RX ADMIN — Medication 5 MILLIGRAM(S): at 21:56

## 2019-10-26 RX ADMIN — UMECLIDINIUM BROMIDE AND VILANTEROL TRIFENATATE 1 PUFF(S): 62.5; 25 POWDER RESPIRATORY (INHALATION) at 08:51

## 2019-10-26 RX ADMIN — INSULIN HUMAN 10 UNIT(S): 100 INJECTION, SOLUTION SUBCUTANEOUS at 13:48

## 2019-10-26 RX ADMIN — PANTOPRAZOLE SODIUM 40 MILLIGRAM(S): 20 TABLET, DELAYED RELEASE ORAL at 06:45

## 2019-10-26 RX ADMIN — Medication 3 MILLILITER(S): at 08:48

## 2019-10-26 RX ADMIN — AZITHROMYCIN 500 MILLIGRAM(S): 500 TABLET, FILM COATED ORAL at 12:51

## 2019-10-26 RX ADMIN — Medication 12.5 MILLIGRAM(S): at 06:44

## 2019-10-26 RX ADMIN — Medication 3 MILLILITER(S): at 04:05

## 2019-10-26 RX ADMIN — POLYETHYLENE GLYCOL 3350 17 GRAM(S): 17 POWDER, FOR SOLUTION ORAL at 12:51

## 2019-10-26 RX ADMIN — DIVALPROEX SODIUM 500 MILLIGRAM(S): 500 TABLET, DELAYED RELEASE ORAL at 21:56

## 2019-10-26 RX ADMIN — INSULIN GLARGINE 30 UNIT(S): 100 INJECTION, SOLUTION SUBCUTANEOUS at 21:56

## 2019-10-26 RX ADMIN — DONEPEZIL HYDROCHLORIDE 5 MILLIGRAM(S): 10 TABLET, FILM COATED ORAL at 21:57

## 2019-10-26 RX ADMIN — PIPERACILLIN AND TAZOBACTAM 25 GRAM(S): 4; .5 INJECTION, POWDER, LYOPHILIZED, FOR SOLUTION INTRAVENOUS at 00:24

## 2019-10-26 RX ADMIN — Medication 10 UNIT(S): at 16:50

## 2019-10-26 RX ADMIN — Medication 12: at 07:02

## 2019-10-26 RX ADMIN — MONTELUKAST 10 MILLIGRAM(S): 4 TABLET, CHEWABLE ORAL at 12:51

## 2019-10-26 RX ADMIN — Medication 10 UNIT(S): at 09:29

## 2019-10-26 RX ADMIN — Medication 100 MILLIGRAM(S): at 06:44

## 2019-10-26 RX ADMIN — PIPERACILLIN AND TAZOBACTAM 25 GRAM(S): 4; .5 INJECTION, POWDER, LYOPHILIZED, FOR SOLUTION INTRAVENOUS at 13:11

## 2019-10-26 RX ADMIN — Medication 1 DROP(S): at 06:46

## 2019-10-26 RX ADMIN — Medication 100 MILLIGRAM(S): at 17:34

## 2019-10-26 RX ADMIN — LACTULOSE 20 GRAM(S): 10 SOLUTION ORAL at 12:51

## 2019-10-26 RX ADMIN — Medication 10: at 16:50

## 2019-10-26 RX ADMIN — Medication 1 DROP(S): at 12:51

## 2019-10-26 RX ADMIN — SENNA PLUS 2 TABLET(S): 8.6 TABLET ORAL at 21:56

## 2019-10-26 RX ADMIN — Medication 0.5 MILLIGRAM(S): at 20:32

## 2019-10-26 RX ADMIN — Medication 12: at 12:06

## 2019-10-26 RX ADMIN — Medication 0.5 MILLIGRAM(S): at 08:50

## 2019-10-26 RX ADMIN — HEPARIN SODIUM 1200 UNIT(S)/HR: 5000 INJECTION INTRAVENOUS; SUBCUTANEOUS at 00:23

## 2019-10-26 RX ADMIN — Medication 600 MILLIGRAM(S): at 06:44

## 2019-10-26 RX ADMIN — Medication 40 MILLIGRAM(S): at 06:44

## 2019-10-26 RX ADMIN — MEMANTINE HYDROCHLORIDE 7 MILLIGRAM(S): 10 TABLET ORAL at 12:52

## 2019-10-26 NOTE — PROGRESS NOTE ADULT - SUBJECTIVE AND OBJECTIVE BOX
PULMONARY PROGRESS NOTE      MINO STEPHENSEITAN-[id number]    Patient is a 85y old  Male who presents with a chief complaint of syncope (26 Oct 2019 07:48)      INTERVAL HPI/OVERNIGHT EVENTS:  Awake alert in NAD on 2lpm [employer]  Daughter at bedside    MEDICATIONS  (STANDING):  ALBUTerol/ipratropium for Nebulization 3 milliLiter(s) Nebulizer every 6 hours  ALBUTerol/ipratropium for Nebulization. 3 milliLiter(s) Nebulizer once  artificial  tears Solution 1 Drop(s) Both EYES four times a day  aspirin  chewable 81 milliGRAM(s) Oral daily  azithromycin   Tablet 500 milliGRAM(s) Oral daily  buDESOnide    Inhalation Suspension 0.5 milliGRAM(s) Inhalation two times a day  dextrose 5%. 1000 milliLiter(s) (50 mL/Hr) IV Continuous <Continuous>  dextrose 50% Injectable 12.5 Gram(s) IV Push once  dextrose 50% Injectable 25 Gram(s) IV Push once  dextrose 50% Injectable 25 Gram(s) IV Push once  diVALproex  milliGRAM(s) Oral at bedtime  docusate sodium 100 milliGRAM(s) Oral two times a day  donepezil 5 milliGRAM(s) Oral at bedtime  epoetin triny Injectable 50377 Unit(s) SubCutaneous every 7 days  folic acid 1 milliGRAM(s) Oral daily  guaiFENesin  milliGRAM(s) Oral every 12 hours  heparin  Infusion. 1200 Unit(s)/Hr (12 mL/Hr) IV Continuous <Continuous>  insulin glargine Injectable (LANTUS) 24 Unit(s) SubCutaneous at bedtime  insulin lispro (HumaLOG) corrective regimen sliding scale   SubCutaneous three times a day before meals  insulin lispro Injectable (HumaLOG) 10 Unit(s) SubCutaneous three times a day before meals  lactobacillus acidophilus 1 Tablet(s) Oral daily  levothyroxine 50 MICROGram(s) Oral daily  melatonin 5 milliGRAM(s) Oral at bedtime  memantine ER 7 milliGRAM(s) Oral daily  metoprolol tartrate 12.5 milliGRAM(s) Oral two times a day  montelukast 10 milliGRAM(s) Oral daily  multivitamin 1 Tablet(s) Oral daily  pantoprazole    Tablet 40 milliGRAM(s) Oral every 12 hours  piperacillin/tazobactam IVPB.. 3.375 Gram(s) IV Intermittent every 12 hours  pitavastatin 2 milliGRAM(s) Oral <User Schedule>  polyethylene glycol 3350 17 Gram(s) Oral daily  senna 2 Tablet(s) Oral at bedtime  tamsulosin 0.4 milliGRAM(s) Oral at bedtime  torsemide 40 milliGRAM(s) Oral daily  umeclidinium 62.5 MICROgram(s)/vilanterol 25 MICROgram(s) Inhaler 1 Puff(s) Inhalation <User Schedule>      MEDICATIONS  (PRN):  acetaminophen   Tablet .. 650 milliGRAM(s) Oral every 6 hours PRN Temp greater or equal to 38C (100.4F), Mild Pain (1 - 3)  aluminum hydroxide/magnesium hydroxide/simethicone Suspension 30 milliLiter(s) Oral every 4 hours PRN Dyspepsia  dextrose 40% Gel 15 Gram(s) Oral once PRN Blood Glucose LESS THAN 70 milliGRAM(s)/deciliter  glucagon  Injectable 1 milliGRAM(s) IntraMuscular once PRN Glucose LESS THAN 70 milligrams/deciliter  heparin  Injectable 7500 Unit(s) IV Push every 6 hours PRN For aPTT less than 40  heparin  Injectable 3500 Unit(s) IV Push every 6 hours PRN For aPTT between 40 - 57  lactulose Syrup 20 Gram(s) Oral two times a day PRN constipation  mineral oil enema 133 milliLiter(s) Rectal daily PRN constipation  ondansetron Injectable 4 milliGRAM(s) IV Push every 6 hours PRN Nausea  sodium chloride 0.9% lock flush 10 milliLiter(s) IV Push every 1 hour PRN Pre/post blood products, medications, blood draw, and to maintain line patency      Allergies    No Known Allergies    Intolerances        PAST MEDICAL & SURGICAL HISTORY:  CKD (chronic kidney disease)  COPD (chronic obstructive pulmonary disease)  Dementia  Afib  DM (diabetes mellitus)  History of lung biopsy  S/P carotid endarterectomy      SOCIAL HISTORY  Smoking History:       REVIEW OF SYSTEMS:    CONSTITUTIONAL:  No distress    HEENT:  Eyes:  No diplopia or blurred vision. ENT:  No earache, sore throat or runny nose.    CARDIOVASCULAR:  No pressure, squeezing, tightness, heaviness or aching about the chest; no palpitations.    RESPIRATORY:  No cough, shortness of breath, PND or orthopnea. Mild SOBOE    GASTROINTESTINAL:  No nausea, vomiting or diarrhea.    GENITOURINARY:  No dysuria, frequency or urgency.    NEUROLOGIC:  No paresthesias, fasciculations, seizures or weakness.    Extremities: No cyanosis, clubbing or edema    PSYCHIATRIC:  Ox1    Vital Signs Last 24 Hrs  T(C): 36.8 (25 Oct 2019 23:18), Max: 36.8 (25 Oct 2019 23:18)  T(F): 98.2 (25 Oct 2019 23:18), Max: 98.2 (25 Oct 2019 23:18)  HR: 95 (26 Oct 2019 04:02) (88 - 95)  BP: 109/65 (26 Oct 2019 04:00) (108/60 - 124/63)  BP(mean): 79 (26 Oct 2019 04:00) (75 - 81)  RR: 21 (26 Oct 2019 04:00) (16 - 22)  SpO2: 97% (26 Oct 2019 04:02) (94% - 98%)    PHYSICAL EXAMINATION:    GENERAL: The patient is awake and alert in no apparent distress.     HEENT: Head is normocephalic and atraumatic. Extraocular muscles are intact. Mucous membranes are moist.    NECK: Supple.    LUNGS: Clear to auscultation without wheezing, rales or rhonchi; respirations unlabored    HEART: Regular rate and rhythm without murmur.    ABDOMEN: Soft, nontender, and nondistended.  obese    EXTREMITIES: Without any cyanosis, clubbing, rash, lesions or edema.    NEUROLOGIC: Grossly intact.    LABS:                        8.2    15.48 )-----------( 225      ( 26 Oct 2019 08:20 )             26.6     10-25    135  |  95<L>  |  83.0<H>  ----------------------------<  471<H>  3.6   |  24.0  |  3.70<H>    Ca    8.9      25 Oct 2019 05:38  Phos  3.4     10-25  Mg     2.3     10-25      PTT - ( 25 Oct 2019 23:55 )  PTT:62.1 sec                    MICROBIOLOGY:    RADIOLOGY & ADDITIONAL STUDIES:  All films reviewed on PACS

## 2019-10-26 NOTE — PROGRESS NOTE ADULT - SUBJECTIVE AND OBJECTIVE BOX
CC cough congestion , uncontrolled Diabetes Mellitus , CHF . CRF   pt is seen in am , he is resting sleeping , reported to have large BM last evening per nurse   daughter is at the bedside     Pt is with no new complaints , no chest pain , no SOB at rest '  denies nausea , abd pain     Vital Signs Last 24 Hrs  T(C): 37.1 (26 Oct 2019 13:06), Max: 37.1 (26 Oct 2019 13:06)  T(F): 98.8 (26 Oct 2019 13:06), Max: 98.8 (26 Oct 2019 13:06)  HR: 98 (26 Oct 2019 12:00) (88 - 98)  BP: 109/69 (26 Oct 2019 12:00) (108/60 - 119/73)  BP(mean): 82 (26 Oct 2019 12:00) (75 - 82)  RR: 22 (26 Oct 2019 12:00) (16 - 22)  SpO2: 93% (26 Oct 2019 12:00) (93% - 98%)    PHYSICAL EXAM:  General: No distress , awake alert    Neck: Supple, No JVD  Lungs: clear to auscultation bilaterally   Cardio: +s1/s2 , irregular tachycardic    Abdomen: Soft, Nontender, not distended , Bowel sounds present   Extremities: No calf tenderness, +  pretibial edema, + hands and feet swelling    Skin : normal color no rash                               8.2    15.48 )-----------( 225      ( 26 Oct 2019 08:20 )             26.6   10-26    137  |  96<L>  |  93.0<H>  ----------------------------<  403<H>  4.1   |  26.0  |  3.49<H>    Ca    8.9      26 Oct 2019 08:20  Phos  3.4     10-25  Mg     2.3     10-25               < from: Xray Chest 1 View- PORTABLE-Routine (10.24.19 @ 14:44) >        IMPRESSION:  Unchanged bibasilar opacity which could be related to   atelectasis and/or pneumonia. Superimposed small pleural effusions are   not excluded.    < end of copied text >  AXR is pending

## 2019-10-26 NOTE — PROGRESS NOTE ADULT - SUBJECTIVE AND OBJECTIVE BOX
NEPHROLOGY INTERVAL HPI/OVERNIGHT EVENTS:    Examined  Lethargic - sleepy  Edema better    MEDICATIONS  (STANDING):  acetylcysteine 10%  Inhalation 2 milliLiter(s) Inhalation every 6 hours  ALBUTerol/ipratropium for Nebulization 3 milliLiter(s) Nebulizer every 6 hours  ALBUTerol/ipratropium for Nebulization. 3 milliLiter(s) Nebulizer once  artificial  tears Solution 1 Drop(s) Both EYES four times a day  aspirin  chewable 81 milliGRAM(s) Oral daily  azithromycin   Tablet 500 milliGRAM(s) Oral daily  buDESOnide    Inhalation Suspension 0.5 milliGRAM(s) Inhalation two times a day  dextrose 5%. 1000 milliLiter(s) (50 mL/Hr) IV Continuous <Continuous>  dextrose 50% Injectable 12.5 Gram(s) IV Push once  dextrose 50% Injectable 25 Gram(s) IV Push once  dextrose 50% Injectable 25 Gram(s) IV Push once  diVALproex  milliGRAM(s) Oral at bedtime  docusate sodium 100 milliGRAM(s) Oral two times a day  donepezil 5 milliGRAM(s) Oral at bedtime  epoetin triny Injectable 74642 Unit(s) SubCutaneous every 7 days  folic acid 1 milliGRAM(s) Oral daily  guaiFENesin  milliGRAM(s) Oral every 12 hours  heparin  Infusion. 1200 Unit(s)/Hr (12 mL/Hr) IV Continuous <Continuous>  insulin glargine Injectable (LANTUS) 24 Unit(s) SubCutaneous at bedtime  insulin lispro (HumaLOG) corrective regimen sliding scale   SubCutaneous three times a day before meals  insulin lispro Injectable (HumaLOG) 10 Unit(s) SubCutaneous three times a day before meals  lactobacillus acidophilus 1 Tablet(s) Oral daily  levothyroxine 50 MICROGram(s) Oral daily  melatonin 5 milliGRAM(s) Oral at bedtime  memantine ER 7 milliGRAM(s) Oral daily  metoprolol tartrate 12.5 milliGRAM(s) Oral two times a day  montelukast 10 milliGRAM(s) Oral daily  multivitamin 1 Tablet(s) Oral daily  pantoprazole    Tablet 40 milliGRAM(s) Oral every 12 hours  piperacillin/tazobactam IVPB.. 3.375 Gram(s) IV Intermittent every 12 hours  pitavastatin 2 milliGRAM(s) Oral <User Schedule>  polyethylene glycol 3350 17 Gram(s) Oral daily  senna 2 Tablet(s) Oral at bedtime  tamsulosin 0.4 milliGRAM(s) Oral at bedtime  torsemide 40 milliGRAM(s) Oral daily  umeclidinium 62.5 MICROgram(s)/vilanterol 25 MICROgram(s) Inhaler 1 Puff(s) Inhalation <User Schedule>    MEDICATIONS  (PRN):  acetaminophen   Tablet .. 650 milliGRAM(s) Oral every 6 hours PRN Temp greater or equal to 38C (100.4F), Mild Pain (1 - 3)  aluminum hydroxide/magnesium hydroxide/simethicone Suspension 30 milliLiter(s) Oral every 4 hours PRN Dyspepsia  dextrose 40% Gel 15 Gram(s) Oral once PRN Blood Glucose LESS THAN 70 milliGRAM(s)/deciliter  glucagon  Injectable 1 milliGRAM(s) IntraMuscular once PRN Glucose LESS THAN 70 milligrams/deciliter  heparin  Injectable 7500 Unit(s) IV Push every 6 hours PRN For aPTT less than 40  heparin  Injectable 3500 Unit(s) IV Push every 6 hours PRN For aPTT between 40 - 57  lactulose Syrup 20 Gram(s) Oral two times a day PRN constipation  mineral oil enema 133 milliLiter(s) Rectal daily PRN constipation  ondansetron Injectable 4 milliGRAM(s) IV Push every 6 hours PRN Nausea  sodium chloride 0.9% lock flush 10 milliLiter(s) IV Push every 1 hour PRN Pre/post blood products, medications, blood draw, and to maintain line patency      Allergies    No Known Allergies    Intolerances        Vital Signs Last 24 Hrs  T(C): 36.8 (25 Oct 2019 23:18), Max: 36.8 (25 Oct 2019 23:18)  T(F): 98.2 (25 Oct 2019 23:18), Max: 98.2 (25 Oct 2019 23:18)  HR: 95 (26 Oct 2019 04:02) (85 - 95)  BP: 109/65 (26 Oct 2019 04:00) (108/60 - 160/95)  BP(mean): 79 (26 Oct 2019 04:00) (75 - 114)  RR: 21 (26 Oct 2019 04:00) (16 - 22)  SpO2: 97% (26 Oct 2019 04:02) (94% - 99%)  Daily     Daily     PHYSICAL EXAM:  HEAD:  NCAT  NECK: Supple, L TLC   CHEST/LUNG: EAE , few basilar crackles , no rub   HEART: Irregular , no rub   ABDOMEN: Soft, + distended +BS; nontender; abd wall and flank edema  EXTREMITIES: + edema pitting B/L LE        LABS:                        8.4    10.14 )-----------( 181      ( 25 Oct 2019 05:38 )             26.9     10-25    135  |  95<L>  |  83.0<H>  ----------------------------<  471<H>  3.6   |  24.0  |  3.70<H>    Ca    8.9      25 Oct 2019 05:38  Phos  3.4     10-25  Mg     2.3     10-25      PTT - ( 25 Oct 2019 23:55 )  PTT:62.1 sec            RADIOLOGY & ADDITIONAL TESTS:

## 2019-10-26 NOTE — PROGRESS NOTE ADULT - ASSESSMENT
85M hx vascular demetnia, afib (not on ac), CKD, DM, COPD presents with syncope and worsening edema found to have hypoglycemia, nikki on ckd, transaminitis. Course complicated by Afib w/ RVR and shock requiring pressors. Pt is transfered to SDU , cardiology , nephrology on board , cardiac cath on hold due to CRF and respiratory status , pulmonary called , iv abx and steroid added for COPD exacerbation  and possible PNA     1- Diabetes Mellitus type 2 with hyperglycemia   still with high BG   will increase lantus dose , give extra dose of insulin iv ,cont ISS and  premeals insulin     2- COPD with exacerbation   short course of steroid  given improving , cont neb   pulmonary consult appreciated     3- Bilateral pneumonia - gr positive gr neg infection   cont zosyn , zithromax complete  course   add florastar   Pt ambulate     4-Paroxysmal atrial fib in NSR   cont iv heparin , added  low dose B blocker , improving   cardiology on board       5-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  -nephrology on board , monitor trend   -possible 2/2 atn due to hypotension  pt's daughter is aware of worsening cr if he gets LHC due to contrast   she is thinking likely to defer until after discharge     6-anemia of cronic kidney dx   venofer added daughter is refusing with stop ( pt does not have second line she is refusing to get him stuck again)    epogen per renal weekly   add ferrous sulfate vit c , folic acid     7- Fluid overload due to renal failure , acute diastolic CHF acute   cont lasix , increased dose to 40 mg daily     8- Constipated improving    pt had BM , cont bowel regimen 'stool softener

## 2019-10-26 NOTE — PROGRESS NOTE ADULT - ASSESSMENT
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

## 2019-10-26 NOTE — PROGRESS NOTE ADULT - ASSESSMENT
84 y/o M with a PMH of vascular dementia, ?Afib (not on AC), CKD, DMII, COPD, and GERD who presented to the ED after a syncopal episode and with worsening edema. Patient had previous workup in 08/2019 with his private cardiologist Dr. Regan Farrar that included an echo and NST after an episode of chest pain and dyspnea while on vacation, results were normal. Admitted for HFpEF and ALMAS on CKD. Hospital course was complicated by an episode of rapid AF with hypotension that required pressor support and IVF. He had a stat bedside echo which revealed hypokinetic RV and dilated IVC. Later, official TTE revealed hyperdynamic LV and normal looking RV.     Assessment/Plan:   1) Shock secondary to unclear etiology- vagal vs. septic vs. ischemic etiology. Resolved. Hemodynamically stable, off pressors. Off IVF. We discussed cardiac cath to check for ischemia when Cr stabilizes or if patient was started on HD; patient's daughter was initially interested in pursuing this, however, as his overall condition has deteriorated, she now states that she would like him to stabilize enough to be discharged home, and then consider further workup in the future.   2) Fluid overload secondary to HFpEF and renal insufficiency- Overall, patient's fluid status is stable/slightly improved. Continue torsemide 40mg daily. Monitor I's and O's. Patient will also benefit from physical therapy to mobilize fluid in his extremities.   3) ALMAS on CKD- Cr essentially stable (3.9 -> 3.7). Continue to monitor. Avoid nephrotoxins. Nephrology following.   4) PAF- In sinus rhythm after brief episode of AF overnight. Tolerating low dose lopressor today. Monitor HR and BP. CHADSVASC 5- patient is currently on heparin drip; I spoke to the patients wife about changing to Coumadin or NOAC's, patient has unsteady gait, risks of bleeding were discussed, patients wife at this just want to continue the IV Heparin drip and she some more time to decide about long term AC    5) COPD exacerbation- Patient on steroids, azithromycin, and mucomyst. Pulmonary following.  6) Possible PNA- on antibiotics      Discussed with Wife    Will continue to follow.  Thank you for involving me in the care of this patient.

## 2019-10-26 NOTE — PROGRESS NOTE ADULT - SUBJECTIVE AND OBJECTIVE BOX
[facility]                                                        Office: [address] [address]                                                       Telephone: [phone]. Fax:[phone]                                                                             PROGRESS NOTE    Subjective:  Patient is alert and awake, wife at bedside. Per wife patient had 10-15 mins of chest discomfort today am. No other associated symptoms.     Review of symptoms:   Cardiac:  +chest pain. No dyspnea. No palpitations.  Respiratory:no cough. No dyspnea  Gastrointestinal: No diarrhea. No abdominal pain. No bleeding.   Neuro: No focal neuro complaints.      	  Vitals:  T(C): 36.7 (10-26-19 @ 16:24), Max: 37.1 (10-26-19 @ 13:06)  HR: 95 (10-26-19 @ 16:00) (88 - 98)  BP: 107/60 (10-26-19 @ 16:00) (107/60 - 114/65)  RR: 24 (10-26-19 @ 16:00) (16 - 24)  SpO2: 97% (10-26-19 @ 16:00) (93% - 99%)  Wt(kg): --  I&O's Summary    25 Oct 2019 07:01  -  26 Oct 2019 07:00  --------------------------------------------------------  IN: 1136 mL / OUT: 1600 mL / NET: -464 mL    26 Oct 2019 07:01  -  26 Oct 2019 18:14  --------------------------------------------------------  IN: 724 mL / OUT: 600 mL / NET: 124 mL          PHYSICAL EXAM:  Appearance: Comfortable. No acute distress  HEENT:  Head and neck: Atraumatic. Normocephalic. , Neck is supple. No JVD,   Neurologic: Alert and awake, Grossly nonfocal.   Lymphatic: No cervical lymphadenopathy  Cardiovascular: Normal S1 S2, No murmurs. No JVD,   Respiratory: Lungs clear to auscultation  Gastrointestinal:  Soft, Non-tender, + BS  Lower Extremities: + edema  Psychiatry: Patient is calm. No agitation.  Skin: No rashes.    CURRENT MEDICATIONS:    MEDICATIONS  (STANDING):  ALBUTerol/ipratropium for Nebulization 3 milliLiter(s) Nebulizer every 6 hours  ALBUTerol/ipratropium for Nebulization. 3 milliLiter(s) Nebulizer once  artificial  tears Solution 1 Drop(s) Both EYES four times a day  aspirin  chewable 81 milliGRAM(s) Oral daily  azithromycin   Tablet 500 milliGRAM(s) Oral daily  buDESOnide    Inhalation Suspension 0.5 milliGRAM(s) Inhalation two times a day  dextrose 5%. 1000 milliLiter(s) (50 mL/Hr) IV Continuous <Continuous>  dextrose 50% Injectable 12.5 Gram(s) IV Push once  dextrose 50% Injectable 25 Gram(s) IV Push once  dextrose 50% Injectable 25 Gram(s) IV Push once  diVALproex  milliGRAM(s) Oral at bedtime  docusate sodium 100 milliGRAM(s) Oral two times a day  donepezil 5 milliGRAM(s) Oral at bedtime  epoetin triny Injectable 93938 Unit(s) SubCutaneous every 7 days  folic acid 1 milliGRAM(s) Oral daily  guaiFENesin  milliGRAM(s) Oral every 12 hours  heparin  Infusion. 1200 Unit(s)/Hr (12 mL/Hr) IV Continuous <Continuous>  insulin glargine Injectable (LANTUS) 30 Unit(s) SubCutaneous at bedtime  insulin lispro (HumaLOG) corrective regimen sliding scale   SubCutaneous three times a day before meals  insulin lispro Injectable (HumaLOG) 10 Unit(s) SubCutaneous three times a day before meals  lactobacillus acidophilus 1 Tablet(s) Oral daily  levothyroxine 50 MICROGram(s) Oral daily  melatonin 5 milliGRAM(s) Oral at bedtime  memantine ER 7 milliGRAM(s) Oral daily  metoprolol tartrate 12.5 milliGRAM(s) Oral two times a day  montelukast 10 milliGRAM(s) Oral daily  multivitamin 1 Tablet(s) Oral daily  pantoprazole    Tablet 40 milliGRAM(s) Oral every 12 hours  piperacillin/tazobactam IVPB.. 3.375 Gram(s) IV Intermittent every 12 hours  pitavastatin 2 milliGRAM(s) Oral <User Schedule>  polyethylene glycol 3350 17 Gram(s) Oral daily  senna 2 Tablet(s) Oral at bedtime  tamsulosin 0.4 milliGRAM(s) Oral at bedtime  torsemide 40 milliGRAM(s) Oral daily  umeclidinium 62.5 MICROgram(s)/vilanterol 25 MICROgram(s) Inhaler 1 Puff(s) Inhalation <User Schedule>      LABS:	 	                          8.2    15.48 )-----------( 225      ( 26 Oct 2019 08:20 )             26.6     10-26    137  |  96<L>  |  93.0<H>  ----------------------------<  403<H>  4.1   |  26.0  |  3.49<H>    Ca    8.9      26 Oct 2019 08:20  Phos  3.4     10-25  Mg     2.3     10-25      proBNP:   Lipid Profile:           TELEMETRY: Reviewed  - No significant arrhythmias. Sinus tachycardia    ECG:    < from: TTE Echo Complete w/Doppler (10.19.19 @ 14:45) >  Summary:   1. Patient tachycardic during the entire study.   2. Hyperdynamic wall motion.   3. Left ventricular ejectionfraction, by visual estimation, is >75%.   4. Normal right ventricular size and function.   5. Mild-moderate tricuspid regurgitation.   6. Estimated pulmonary artery systolic pressure is 48 mmHg -mild   pulmonary hypertension.   7. Small pericardial effusion. No echo evidence of tamponade.    MD Zo, RPVI Electronically signed on 10/19/2019 at 4:41:21   PM       < end of copied text >

## 2019-10-26 NOTE — PROGRESS NOTE ADULT - ASSESSMENT
No active bronchospasm off steroids  CHF responding to diuresis  a fib  ?pna responding well to rx      Plan:  wean O2  rate control  DN  complete abx

## 2019-10-27 LAB
ANION GAP SERPL CALC-SCNC: 18 MMOL/L — HIGH (ref 5–17)
APTT BLD: 54.1 SEC — HIGH (ref 27.5–36.3)
APTT BLD: 65.2 SEC — HIGH (ref 27.5–36.3)
APTT BLD: 71.1 SEC — HIGH (ref 27.5–36.3)
BUN SERPL-MCNC: 89 MG/DL — HIGH (ref 8–20)
CALCIUM SERPL-MCNC: 9.3 MG/DL — SIGNIFICANT CHANGE UP (ref 8.6–10.2)
CHLORIDE SERPL-SCNC: 96 MMOL/L — LOW (ref 98–107)
CO2 SERPL-SCNC: 27 MMOL/L — SIGNIFICANT CHANGE UP (ref 22–29)
CREAT SERPL-MCNC: 3.48 MG/DL — HIGH (ref 0.5–1.3)
GLUCOSE BLDC GLUCOMTR-MCNC: 168 MG/DL — HIGH (ref 70–99)
GLUCOSE BLDC GLUCOMTR-MCNC: 173 MG/DL — HIGH (ref 70–99)
GLUCOSE BLDC GLUCOMTR-MCNC: 188 MG/DL — HIGH (ref 70–99)
GLUCOSE BLDC GLUCOMTR-MCNC: 299 MG/DL — HIGH (ref 70–99)
GLUCOSE BLDC GLUCOMTR-MCNC: 92 MG/DL — SIGNIFICANT CHANGE UP (ref 70–99)
GLUCOSE SERPL-MCNC: 106 MG/DL — SIGNIFICANT CHANGE UP (ref 70–115)
HCT VFR BLD CALC: 28.3 % — LOW (ref 39–50)
HGB BLD-MCNC: 8.7 G/DL — LOW (ref 13–17)
MCHC RBC-ENTMCNC: 27.7 PG — SIGNIFICANT CHANGE UP (ref 27–34)
MCHC RBC-ENTMCNC: 30.7 GM/DL — LOW (ref 32–36)
MCV RBC AUTO: 90.1 FL — SIGNIFICANT CHANGE UP (ref 80–100)
PLATELET # BLD AUTO: 245 K/UL — SIGNIFICANT CHANGE UP (ref 150–400)
POTASSIUM SERPL-MCNC: 3.7 MMOL/L — SIGNIFICANT CHANGE UP (ref 3.5–5.3)
POTASSIUM SERPL-SCNC: 3.7 MMOL/L — SIGNIFICANT CHANGE UP (ref 3.5–5.3)
RBC # BLD: 3.14 M/UL — LOW (ref 4.2–5.8)
RBC # FLD: 15 % — HIGH (ref 10.3–14.5)
SODIUM SERPL-SCNC: 141 MMOL/L — SIGNIFICANT CHANGE UP (ref 135–145)
WBC # BLD: 12.16 K/UL — HIGH (ref 3.8–10.5)
WBC # FLD AUTO: 12.16 K/UL — HIGH (ref 3.8–10.5)

## 2019-10-27 PROCEDURE — 99232 SBSQ HOSP IP/OBS MODERATE 35: CPT

## 2019-10-27 PROCEDURE — 99233 SBSQ HOSP IP/OBS HIGH 50: CPT

## 2019-10-27 PROCEDURE — 93010 ELECTROCARDIOGRAM REPORT: CPT

## 2019-10-27 RX ORDER — POLYETHYLENE GLYCOL 3350 17 G/17G
17 POWDER, FOR SOLUTION ORAL DAILY
Refills: 0 | Status: DISCONTINUED | OUTPATIENT
Start: 2019-10-27 | End: 2019-10-31

## 2019-10-27 RX ORDER — SODIUM CHLORIDE 9 MG/ML
200 INJECTION INTRAMUSCULAR; INTRAVENOUS; SUBCUTANEOUS ONCE
Refills: 0 | Status: COMPLETED | OUTPATIENT
Start: 2019-10-27 | End: 2019-10-27

## 2019-10-27 RX ORDER — INSULIN GLARGINE 100 [IU]/ML
25 INJECTION, SOLUTION SUBCUTANEOUS AT BEDTIME
Refills: 0 | Status: DISCONTINUED | OUTPATIENT
Start: 2019-10-27 | End: 2019-10-31

## 2019-10-27 RX ADMIN — Medication 12.5 MILLIGRAM(S): at 05:35

## 2019-10-27 RX ADMIN — MEMANTINE HYDROCHLORIDE 7 MILLIGRAM(S): 10 TABLET ORAL at 12:03

## 2019-10-27 RX ADMIN — Medication 40 MILLIGRAM(S): at 05:35

## 2019-10-27 RX ADMIN — Medication 3 MILLILITER(S): at 14:21

## 2019-10-27 RX ADMIN — Medication 0.5 MILLIGRAM(S): at 20:10

## 2019-10-27 RX ADMIN — Medication 2: at 08:14

## 2019-10-27 RX ADMIN — Medication 100 MILLIGRAM(S): at 05:36

## 2019-10-27 RX ADMIN — HEPARIN SODIUM 1400 UNIT(S)/HR: 5000 INJECTION INTRAVENOUS; SUBCUTANEOUS at 14:46

## 2019-10-27 RX ADMIN — AZITHROMYCIN 500 MILLIGRAM(S): 500 TABLET, FILM COATED ORAL at 12:02

## 2019-10-27 RX ADMIN — Medication 50 MICROGRAM(S): at 05:36

## 2019-10-27 RX ADMIN — Medication 3 MILLILITER(S): at 01:33

## 2019-10-27 RX ADMIN — Medication 600 MILLIGRAM(S): at 17:20

## 2019-10-27 RX ADMIN — Medication 3 MILLILITER(S): at 20:11

## 2019-10-27 RX ADMIN — Medication 0.5 MILLIGRAM(S): at 08:45

## 2019-10-27 RX ADMIN — Medication 1 DROP(S): at 05:35

## 2019-10-27 RX ADMIN — Medication 1 DROP(S): at 01:00

## 2019-10-27 RX ADMIN — SENNA PLUS 2 TABLET(S): 8.6 TABLET ORAL at 21:29

## 2019-10-27 RX ADMIN — Medication 10 UNIT(S): at 08:14

## 2019-10-27 RX ADMIN — DONEPEZIL HYDROCHLORIDE 5 MILLIGRAM(S): 10 TABLET, FILM COATED ORAL at 21:28

## 2019-10-27 RX ADMIN — UMECLIDINIUM BROMIDE AND VILANTEROL TRIFENATATE 1 PUFF(S): 62.5; 25 POWDER RESPIRATORY (INHALATION) at 08:48

## 2019-10-27 RX ADMIN — POLYETHYLENE GLYCOL 3350 17 GRAM(S): 17 POWDER, FOR SOLUTION ORAL at 12:02

## 2019-10-27 RX ADMIN — Medication 3 MILLILITER(S): at 08:45

## 2019-10-27 RX ADMIN — SODIUM CHLORIDE 400 MILLILITER(S): 9 INJECTION INTRAMUSCULAR; INTRAVENOUS; SUBCUTANEOUS at 16:55

## 2019-10-27 RX ADMIN — Medication 1 DROP(S): at 21:29

## 2019-10-27 RX ADMIN — INSULIN GLARGINE 25 UNIT(S): 100 INJECTION, SOLUTION SUBCUTANEOUS at 21:54

## 2019-10-27 RX ADMIN — Medication 600 MILLIGRAM(S): at 05:36

## 2019-10-27 RX ADMIN — Medication 1 MILLIGRAM(S): at 12:03

## 2019-10-27 RX ADMIN — Medication 2: at 16:56

## 2019-10-27 RX ADMIN — PIPERACILLIN AND TAZOBACTAM 25 GRAM(S): 4; .5 INJECTION, POWDER, LYOPHILIZED, FOR SOLUTION INTRAVENOUS at 13:44

## 2019-10-27 RX ADMIN — MONTELUKAST 10 MILLIGRAM(S): 4 TABLET, CHEWABLE ORAL at 12:02

## 2019-10-27 RX ADMIN — Medication 100 MILLIGRAM(S): at 17:20

## 2019-10-27 RX ADMIN — Medication 30 MILLILITER(S): at 15:56

## 2019-10-27 RX ADMIN — PANTOPRAZOLE SODIUM 40 MILLIGRAM(S): 20 TABLET, DELAYED RELEASE ORAL at 17:22

## 2019-10-27 RX ADMIN — Medication 1 TABLET(S): at 12:02

## 2019-10-27 RX ADMIN — Medication 5 MILLIGRAM(S): at 21:28

## 2019-10-27 RX ADMIN — Medication 1 DROP(S): at 11:59

## 2019-10-27 RX ADMIN — Medication 1 DROP(S): at 17:19

## 2019-10-27 RX ADMIN — PIPERACILLIN AND TAZOBACTAM 25 GRAM(S): 4; .5 INJECTION, POWDER, LYOPHILIZED, FOR SOLUTION INTRAVENOUS at 01:00

## 2019-10-27 RX ADMIN — Medication 81 MILLIGRAM(S): at 12:02

## 2019-10-27 RX ADMIN — HEPARIN SODIUM 1400 UNIT(S)/HR: 5000 INJECTION INTRAVENOUS; SUBCUTANEOUS at 06:06

## 2019-10-27 RX ADMIN — PANTOPRAZOLE SODIUM 40 MILLIGRAM(S): 20 TABLET, DELAYED RELEASE ORAL at 05:36

## 2019-10-27 RX ADMIN — DIVALPROEX SODIUM 500 MILLIGRAM(S): 500 TABLET, DELAYED RELEASE ORAL at 21:28

## 2019-10-27 RX ADMIN — PIPERACILLIN AND TAZOBACTAM 25 GRAM(S): 4; .5 INJECTION, POWDER, LYOPHILIZED, FOR SOLUTION INTRAVENOUS at 21:48

## 2019-10-27 NOTE — PROGRESS NOTE ADULT - SUBJECTIVE AND OBJECTIVE BOX
NEPHROLOGY INTERVAL HPI/OVERNIGHT EVENTS:    Examined  Looks comfortable  Dtr wife at bedside  Edema improved      MEDICATIONS  (STANDING):  ALBUTerol/ipratropium for Nebulization 3 milliLiter(s) Nebulizer every 6 hours  ALBUTerol/ipratropium for Nebulization. 3 milliLiter(s) Nebulizer once  artificial  tears Solution 1 Drop(s) Both EYES four times a day  aspirin  chewable 81 milliGRAM(s) Oral daily  azithromycin   Tablet 500 milliGRAM(s) Oral daily  buDESOnide    Inhalation Suspension 0.5 milliGRAM(s) Inhalation two times a day  dextrose 5%. 1000 milliLiter(s) (50 mL/Hr) IV Continuous <Continuous>  dextrose 50% Injectable 12.5 Gram(s) IV Push once  dextrose 50% Injectable 25 Gram(s) IV Push once  dextrose 50% Injectable 25 Gram(s) IV Push once  diVALproex  milliGRAM(s) Oral at bedtime  docusate sodium 100 milliGRAM(s) Oral two times a day  donepezil 5 milliGRAM(s) Oral at bedtime  epoetin triny Injectable 92222 Unit(s) SubCutaneous every 7 days  folic acid 1 milliGRAM(s) Oral daily  guaiFENesin  milliGRAM(s) Oral every 12 hours  heparin  Infusion. 1200 Unit(s)/Hr (12 mL/Hr) IV Continuous <Continuous>  insulin glargine Injectable (LANTUS) 30 Unit(s) SubCutaneous at bedtime  insulin lispro (HumaLOG) corrective regimen sliding scale   SubCutaneous three times a day before meals  insulin lispro Injectable (HumaLOG) 10 Unit(s) SubCutaneous three times a day before meals  lactobacillus acidophilus 1 Tablet(s) Oral daily  levothyroxine 50 MICROGram(s) Oral daily  melatonin 5 milliGRAM(s) Oral at bedtime  memantine ER 7 milliGRAM(s) Oral daily  metoprolol tartrate 12.5 milliGRAM(s) Oral two times a day  montelukast 10 milliGRAM(s) Oral daily  multivitamin 1 Tablet(s) Oral daily  pantoprazole    Tablet 40 milliGRAM(s) Oral every 12 hours  piperacillin/tazobactam IVPB.. 3.375 Gram(s) IV Intermittent every 12 hours  pitavastatin 2 milliGRAM(s) Oral <User Schedule>  polyethylene glycol 3350 17 Gram(s) Oral daily  senna 2 Tablet(s) Oral at bedtime  tamsulosin 0.4 milliGRAM(s) Oral at bedtime  torsemide 40 milliGRAM(s) Oral daily  umeclidinium 62.5 MICROgram(s)/vilanterol 25 MICROgram(s) Inhaler 1 Puff(s) Inhalation <User Schedule>    MEDICATIONS  (PRN):  acetaminophen   Tablet .. 650 milliGRAM(s) Oral every 6 hours PRN Temp greater or equal to 38C (100.4F), Mild Pain (1 - 3)  aluminum hydroxide/magnesium hydroxide/simethicone Suspension 30 milliLiter(s) Oral every 4 hours PRN Dyspepsia  dextrose 40% Gel 15 Gram(s) Oral once PRN Blood Glucose LESS THAN 70 milliGRAM(s)/deciliter  glucagon  Injectable 1 milliGRAM(s) IntraMuscular once PRN Glucose LESS THAN 70 milligrams/deciliter  heparin  Injectable 7500 Unit(s) IV Push every 6 hours PRN For aPTT less than 40  heparin  Injectable 3500 Unit(s) IV Push every 6 hours PRN For aPTT between 40 - 57  lactulose Syrup 20 Gram(s) Oral two times a day PRN constipation  mineral oil enema 133 milliLiter(s) Rectal daily PRN constipation  ondansetron Injectable 4 milliGRAM(s) IV Push every 6 hours PRN Nausea  sodium chloride 0.9% lock flush 10 milliLiter(s) IV Push every 1 hour PRN Pre/post blood products, medications, blood draw, and to maintain line patency      Allergies    No Known Allergies    Intolerances        Vital Signs Last 24 Hrs  T(C): 37.1 (27 Oct 2019 08:00), Max: 37.2 (27 Oct 2019 05:00)  T(F): 98.7 (27 Oct 2019 08:00), Max: 98.9 (27 Oct 2019 05:00)  HR: 86 (27 Oct 2019 09:01) (86 - 98)  BP: 98/56 (27 Oct 2019 08:00) (98/56 - 143/78)  BP(mean): 75 (27 Oct 2019 04:00) (74 - 96)  RR: 26 (27 Oct 2019 08:00) (22 - 26)  SpO2: 94% (27 Oct 2019 09:01) (92% - 97%)  Daily     Daily Weight in k.5 (27 Oct 2019 05:00)    PHYSICAL EXAM:  HEAD:  NCAT  NECK: Supple, L TLC   CHEST/LUNG: EAE , few basilar crackles , no rub   HEART: Irregular , no rub   ABDOMEN: Soft, + distended +BS; nontender; abd wall and flank edema  EXTREMITIES: + edema pitting B/L LE    LABS:                        8.7    12.16 )-----------( 245      ( 27 Oct 2019 05:21 )             28.3     10-26    137  |  96<L>  |  93.0<H>  ----------------------------<  403<H>  4.1   |  26.0  |  3.49<H>    Ca    8.9      26 Oct 2019 08:20      PTT - ( 27 Oct 2019 05:21 )  PTT:54.1 sec            RADIOLOGY & ADDITIONAL TESTS:

## 2019-10-27 NOTE — PROGRESS NOTE ADULT - SUBJECTIVE AND OBJECTIVE BOX
[place] [facility]                                                        Office: [address] [address]                                                       Telephone: [phone]. Fax:[phone]                                                                             PROGRESS NOTE    Subjective:  Patient is sitting in a wheel chair, patients wife and meleciogter and others were at bedside     Review of symptoms:   Cardiac:  No chest pain. No dyspnea. No palpitations.  Respiratory:no cough. No dyspnea  Gastrointestinal: No diarrhea. No abdominal pain. No bleeding.   Neuro: No focal neuro complaints.      	  Vitals:  T(C): 37.3 (10-27-19 @ 13:34), Max: 37.3 (10-27-19 @ 13:34)  HR: 86 (10-27-19 @ 09:01) (86 - 95)  BP: 98/56 (10-27-19 @ 08:00) (98/56 - 143/78)  RR: 26 (10-27-19 @ 08:00) (23 - 26)  SpO2: 94% (10-27-19 @ 09:01) (92% - 97%)  Wt(kg): --  I&O's Summary    26 Oct 2019 07:01  -  27 Oct 2019 07:00  --------------------------------------------------------  IN: 1424 mL / OUT: 900 mL / NET: 524 mL    27 Oct 2019 07:01  -  27 Oct 2019 14:18  --------------------------------------------------------  IN: 28 mL / OUT: 100 mL / NET: -72 mL          PHYSICAL EXAM:  Appearance: Comfortable. No acute distress  HEENT:  Head and neck: Atraumatic. Normocephalic. , Neck is supple. No JVD,   Neurologic: Alert and awake, Grossly nonfocal. has dementia  Lymphatic: No cervical lymphadenopathy  Cardiovascular: Normal S1 S2, No murmurs. No JVD,   Respiratory: Lungs clear to auscultation  Gastrointestinal:  Soft, Non-tender, + BS  Lower Extremities: No edema  Psychiatry: Patient is calm. No agitation.  Skin: No rashes.    CURRENT MEDICATIONS:    MEDICATIONS  (STANDING):  ALBUTerol/ipratropium for Nebulization 3 milliLiter(s) Nebulizer every 6 hours  ALBUTerol/ipratropium for Nebulization. 3 milliLiter(s) Nebulizer once  artificial  tears Solution 1 Drop(s) Both EYES four times a day  aspirin  chewable 81 milliGRAM(s) Oral daily  azithromycin   Tablet 500 milliGRAM(s) Oral daily  buDESOnide    Inhalation Suspension 0.5 milliGRAM(s) Inhalation two times a day  dextrose 5%. 1000 milliLiter(s) (50 mL/Hr) IV Continuous <Continuous>  dextrose 50% Injectable 12.5 Gram(s) IV Push once  dextrose 50% Injectable 25 Gram(s) IV Push once  dextrose 50% Injectable 25 Gram(s) IV Push once  diVALproex  milliGRAM(s) Oral at bedtime  docusate sodium 100 milliGRAM(s) Oral two times a day  donepezil 5 milliGRAM(s) Oral at bedtime  epoetin triny Injectable 46042 Unit(s) SubCutaneous every 7 days  folic acid 1 milliGRAM(s) Oral daily  guaiFENesin  milliGRAM(s) Oral every 12 hours  heparin  Infusion. 1200 Unit(s)/Hr (12 mL/Hr) IV Continuous <Continuous>  insulin glargine Injectable (LANTUS) 30 Unit(s) SubCutaneous at bedtime  insulin lispro (HumaLOG) corrective regimen sliding scale   SubCutaneous three times a day before meals  lactobacillus acidophilus 1 Tablet(s) Oral daily  levothyroxine 50 MICROGram(s) Oral daily  melatonin 5 milliGRAM(s) Oral at bedtime  memantine ER 7 milliGRAM(s) Oral daily  metoprolol tartrate 12.5 milliGRAM(s) Oral two times a day  montelukast 10 milliGRAM(s) Oral daily  multivitamin 1 Tablet(s) Oral daily  pantoprazole    Tablet 40 milliGRAM(s) Oral every 12 hours  piperacillin/tazobactam IVPB.. 3.375 Gram(s) IV Intermittent every 12 hours  pitavastatin 2 milliGRAM(s) Oral <User Schedule>  polyethylene glycol 3350 17 Gram(s) Oral daily  senna 2 Tablet(s) Oral at bedtime  tamsulosin 0.4 milliGRAM(s) Oral at bedtime  torsemide 40 milliGRAM(s) Oral daily  umeclidinium 62.5 MICROgram(s)/vilanterol 25 MICROgram(s) Inhaler 1 Puff(s) Inhalation <User Schedule>      LABS:	 	                          8.7    12.16 )-----------( 245      ( 27 Oct 2019 05:21 )             28.3     10-27    141  |  96<L>  |  89.0<H>  ----------------------------<  106  3.7   |  27.0  |  3.48<H>    Ca    9.3      27 Oct 2019 13:44      proBNP:   Lipid Profile:           TELEMETRY: Reviewed  - No significant arrhythmias    ECG:

## 2019-10-27 NOTE — PROGRESS NOTE ADULT - ASSESSMENT
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   Followed by Dr Calhoun  No hydro on renal imaging   Improved ALMAS from likely  episode of ATN related to hypotensive events- awaiting today labs  Hemodynamics better now   Hgb better   Repeat ECHO noted from 10-19 , preserved EF , No sig valve lesions   No nephrotoxins or IV contrast   Off pressors and IVF   Renal dose Zosyn and Zithromax  Cont Torsemide 20 mg po daily w strict I&O's    No urgent need for HD   Cards eval noted-  cardiac cath to check for ischemia- would prefer to hold for improved Cr - will discuss w cards  Cont current care had long discussion w dtr on plans    Anemia - cont Epogen weekly and folate       Will follow

## 2019-10-27 NOTE — PROGRESS NOTE ADULT - ASSESSMENT
84 y/o M with a PMH of vascular dementia, ?Afib (not on AC), CKD, DMII, COPD, and GERD who presented to the ED after a syncopal episode and with worsening edema. Patient had previous workup in 08/2019 with his private cardiologist Dr. Regan Farrar that included an echo and NST after an episode of chest pain and dyspnea while on vacation, results were normal. Admitted for HFpEF and ALMAS on CKD. Hospital course was complicated by an episode of rapid AF with hypotension that required pressor support and IVF. He had a stat bedside echo which revealed hypokinetic RV and dilated IVC. Later, official TTE revealed hyperdynamic LV and normal looking RV.     Assessment/Plan:   1) Shock secondary to unclear etiology- vagal vs. septic vs. ischemic etiology. Resolved. Hemodynamically stable, off pressors. Off IVF. We discussed cardiac cath to check for ischemia when Cr stabilizes or if patient was started on HD; patient's daughter was initially interested in pursuing this, however, as his overall condition has deteriorated, she now states that she would like him to stabilize enough to be discharged home, and then consider further workup in the future.   2) Fluid overload secondary to HFpEF and renal insufficiency- Overall, patient's fluid status is stable/slightly improved. Continue torsemide 40mg daily. Monitor I's and O's. Patient will also benefit from physical therapy to mobilize fluid in his extremities.   3) ALMAS on CKD- Cr essentially stable (3.9 -> 3.7). Continue to monitor. Avoid nephrotoxins. Nephrology following.   4) PAF- In sinus rhythm after brief episode of AF overnight. Tolerating low dose lopressor today. Monitor HR and BP. CHADSVASC 5- patient is currently on heparin drip; I spoke to the patients wife about changing to Coumadin or NOAC's, patient has unsteady gait, risks of bleeding were discussed, patients wife at this just want to continue the IV Heparin drip and she some more time to decide about long term AC. Again today I have discussed anticoagulation issue with the wife/daughter, given his CKD, he is not a candidate for NOAC's, he can take coumadin, risks and benefits of coumadin therapy have been discussed with the family again, they have to make a decision    5) COPD exacerbation- Patient on steroids, azithromycin, and mucomyst. Pulmonary following.  6) Possible PNA- on antibiotics      Discussed with Wife    Will continue to follow.  Thank you for involving me in the care of this patient.

## 2019-10-27 NOTE — PROGRESS NOTE ADULT - ASSESSMENT
85M hx vascular demetnia, afib (not on ac), CKD, DM, COPD presents with syncope and worsening edema found to have hypoglycemia, nikki on ckd, transaminitis. Course complicated by Afib w/ RVR and shock requiring pressors. Pt is transfered to SDU , cardiology , nephrology on board , cardiac cath on hold due to CRF and respiratory status , pulmonary called , iv abx and steroid added for COPD exacerbation  and possible PNA , respiratory status improving ,     1- Diabetes Mellitus type 2 with hyperglycemia   BG improved , low at lunch   poor appetite   will discontinue pre meals insulin, restart lower dose as needed   cont ISS and lantus at bedtime     2- COPD with exacerbation   short course of steroid  given improving , cont neb   pulmonary consult appreciated     3- Bilateral pneumonia - gr positive gr neg infection   cont zosyn , zithromax complete  course of therapy   add florastar   mobilize     4- constipated improving   cont bowel regimen     5-Paroxysmal atrial fib in NSR   cont iv heparin , added  low dose B blocker , improving   cardiology on board , d/w cardio concern re anticoagulation use since he is weak and may be high risk for fall   will talk to family explain isk and benefit of anticoagulation   if declines will give aspirin plavix per cardio     6-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  -possible 2/2 atn due to hypotension  pt's daughter is aware of worsening cr if he gets LHC due to contrast   cr stable monitor on diuretics     7-anemia of cronic kidney dx   venofer added daughter is refusing with stop ( pt does not have second line she is refusing to get him stuck again)    epogen per renal weekly   add ferrous sulfate vit c , folic acid     8- Fluid overload /acute diastolic CHF acute   cont lasix 40 mg daily      mobilize , PT pending     d/w wife and daughter at the bedside

## 2019-10-27 NOTE — PROGRESS NOTE ADULT - SUBJECTIVE AND OBJECTIVE BOX
CC uncontrolled Diabetes Mellitus , CHF . CRF . PAF     pt is seen in am , family at the bedside   he is resting in the recliner chair , no complaints , poor appetite reported by family   pt denies abd pain, no chest pain , + BM     Vital Signs Last 24 Hrs  T(C): 37.1 (27 Oct 2019 08:00), Max: 37.2 (27 Oct 2019 05:00)  T(F): 98.7 (27 Oct 2019 08:00), Max: 98.9 (27 Oct 2019 05:00)  HR: 86 (27 Oct 2019 09:01) (86 - 95)  BP: 98/56 (27 Oct 2019 08:00) (98/56 - 143/78)  BP(mean): 75 (27 Oct 2019 04:00) (74 - 96)  RR: 26 (27 Oct 2019 08:00) (23 - 26)  SpO2: 94% (27 Oct 2019 09:01) (92% - 97%)    PHYSICAL EXAM:  General: No distress , resting   Neck: Supple, No JVD  Lungs: clear to auscultation bilaterally   Cardio: +s1/s2 , regular   Abdomen: Soft, Nontender, not distended , Bowel sounds present   Extremities: No calf tenderness, +  pretibial edema  Skin : normal color no rash                             8.7    12.16 )-----------( 245      ( 27 Oct 2019 05:21 )             28.3   10-26    137  |  96<L>  |  93.0<H>  ----------------------------<  403<H>  4.1   |  26.0  |  3.49<H>    Ca    8.9      26 Oct 2019 08:20    CAPILLARY BLOOD GLUCOSE      POCT Blood Glucose.: 92 mg/dL (27 Oct 2019 11:52)  POCT Blood Glucose.: 173 mg/dL (27 Oct 2019 08:02)  POCT Blood Glucose.: 188 mg/dL (27 Oct 2019 05:32)  POCT Blood Glucose.: 315 mg/dL (26 Oct 2019 21:48)  POCT Blood Glucose.: 373 mg/dL (26 Oct 2019 16:11)  POCT Blood Glucose.: 449 mg/dL (26 Oct 2019 13:13)

## 2019-10-28 DIAGNOSIS — I48.91 UNSPECIFIED ATRIAL FIBRILLATION: ICD-10-CM

## 2019-10-28 LAB
ANION GAP SERPL CALC-SCNC: 15 MMOL/L — SIGNIFICANT CHANGE UP (ref 5–17)
BUN SERPL-MCNC: 91 MG/DL — HIGH (ref 8–20)
CALCIUM SERPL-MCNC: 9 MG/DL — SIGNIFICANT CHANGE UP (ref 8.6–10.2)
CHLORIDE SERPL-SCNC: 97 MMOL/L — LOW (ref 98–107)
CO2 SERPL-SCNC: 29 MMOL/L — SIGNIFICANT CHANGE UP (ref 22–29)
CREAT SERPL-MCNC: 3.48 MG/DL — HIGH (ref 0.5–1.3)
GLUCOSE BLDC GLUCOMTR-MCNC: 175 MG/DL — HIGH (ref 70–99)
GLUCOSE BLDC GLUCOMTR-MCNC: 186 MG/DL — HIGH (ref 70–99)
GLUCOSE BLDC GLUCOMTR-MCNC: 201 MG/DL — HIGH (ref 70–99)
GLUCOSE BLDC GLUCOMTR-MCNC: 231 MG/DL — HIGH (ref 70–99)
GLUCOSE BLDC GLUCOMTR-MCNC: 236 MG/DL — HIGH (ref 70–99)
GLUCOSE SERPL-MCNC: 214 MG/DL — HIGH (ref 70–115)
HCT VFR BLD CALC: 27.6 % — LOW (ref 39–50)
HGB BLD-MCNC: 8.6 G/DL — LOW (ref 13–17)
MCHC RBC-ENTMCNC: 27.9 PG — SIGNIFICANT CHANGE UP (ref 27–34)
MCHC RBC-ENTMCNC: 31.2 GM/DL — LOW (ref 32–36)
MCV RBC AUTO: 89.6 FL — SIGNIFICANT CHANGE UP (ref 80–100)
PLATELET # BLD AUTO: 266 K/UL — SIGNIFICANT CHANGE UP (ref 150–400)
POTASSIUM SERPL-MCNC: 4.1 MMOL/L — SIGNIFICANT CHANGE UP (ref 3.5–5.3)
POTASSIUM SERPL-SCNC: 4.1 MMOL/L — SIGNIFICANT CHANGE UP (ref 3.5–5.3)
RBC # BLD: 3.08 M/UL — LOW (ref 4.2–5.8)
RBC # FLD: 15.3 % — HIGH (ref 10.3–14.5)
SODIUM SERPL-SCNC: 141 MMOL/L — SIGNIFICANT CHANGE UP (ref 135–145)
WBC # BLD: 10.5 K/UL — SIGNIFICANT CHANGE UP (ref 3.8–10.5)
WBC # FLD AUTO: 10.5 K/UL — SIGNIFICANT CHANGE UP (ref 3.8–10.5)

## 2019-10-28 PROCEDURE — 99232 SBSQ HOSP IP/OBS MODERATE 35: CPT

## 2019-10-28 PROCEDURE — 99233 SBSQ HOSP IP/OBS HIGH 50: CPT

## 2019-10-28 RX ORDER — IRON SUCROSE 20 MG/ML
250 INJECTION, SOLUTION INTRAVENOUS
Refills: 0 | Status: COMPLETED | OUTPATIENT
Start: 2019-10-28 | End: 2019-10-31

## 2019-10-28 RX ORDER — ASPIRIN/CALCIUM CARB/MAGNESIUM 324 MG
81 TABLET ORAL DAILY
Refills: 0 | Status: DISCONTINUED | OUTPATIENT
Start: 2019-10-28 | End: 2019-10-29

## 2019-10-28 RX ADMIN — Medication 100 MILLIGRAM(S): at 16:50

## 2019-10-28 RX ADMIN — Medication 2: at 11:24

## 2019-10-28 RX ADMIN — Medication 100 MILLIGRAM(S): at 05:47

## 2019-10-28 RX ADMIN — Medication 4: at 05:47

## 2019-10-28 RX ADMIN — MEMANTINE HYDROCHLORIDE 7 MILLIGRAM(S): 10 TABLET ORAL at 11:29

## 2019-10-28 RX ADMIN — Medication 1 DROP(S): at 05:47

## 2019-10-28 RX ADMIN — Medication 0.5 MILLIGRAM(S): at 09:13

## 2019-10-28 RX ADMIN — Medication 3 MILLILITER(S): at 09:13

## 2019-10-28 RX ADMIN — PITAVASTATIN CALCIUM 2 MILLIGRAM(S): 1.04 TABLET, FILM COATED ORAL at 23:02

## 2019-10-28 RX ADMIN — SENNA PLUS 2 TABLET(S): 8.6 TABLET ORAL at 23:03

## 2019-10-28 RX ADMIN — Medication 1 DROP(S): at 23:03

## 2019-10-28 RX ADMIN — Medication 1 TABLET(S): at 11:24

## 2019-10-28 RX ADMIN — Medication 3 MILLILITER(S): at 20:14

## 2019-10-28 RX ADMIN — Medication 12.5 MILLIGRAM(S): at 05:49

## 2019-10-28 RX ADMIN — Medication 5 MILLIGRAM(S): at 23:02

## 2019-10-28 RX ADMIN — Medication 3 MILLILITER(S): at 03:10

## 2019-10-28 RX ADMIN — Medication 2: at 16:51

## 2019-10-28 RX ADMIN — Medication 0.5 MILLIGRAM(S): at 20:15

## 2019-10-28 RX ADMIN — Medication 81 MILLIGRAM(S): at 16:50

## 2019-10-28 RX ADMIN — Medication 1 DROP(S): at 16:50

## 2019-10-28 RX ADMIN — UMECLIDINIUM BROMIDE AND VILANTEROL TRIFENATATE 1 PUFF(S): 62.5; 25 POWDER RESPIRATORY (INHALATION) at 09:17

## 2019-10-28 RX ADMIN — Medication 600 MILLIGRAM(S): at 05:48

## 2019-10-28 RX ADMIN — DIVALPROEX SODIUM 500 MILLIGRAM(S): 500 TABLET, DELAYED RELEASE ORAL at 23:02

## 2019-10-28 RX ADMIN — PIPERACILLIN AND TAZOBACTAM 25 GRAM(S): 4; .5 INJECTION, POWDER, LYOPHILIZED, FOR SOLUTION INTRAVENOUS at 23:03

## 2019-10-28 RX ADMIN — INSULIN GLARGINE 25 UNIT(S): 100 INJECTION, SOLUTION SUBCUTANEOUS at 23:02

## 2019-10-28 RX ADMIN — PIPERACILLIN AND TAZOBACTAM 25 GRAM(S): 4; .5 INJECTION, POWDER, LYOPHILIZED, FOR SOLUTION INTRAVENOUS at 11:27

## 2019-10-28 RX ADMIN — Medication 600 MILLIGRAM(S): at 16:50

## 2019-10-28 RX ADMIN — Medication 1 DROP(S): at 11:24

## 2019-10-28 RX ADMIN — PANTOPRAZOLE SODIUM 40 MILLIGRAM(S): 20 TABLET, DELAYED RELEASE ORAL at 05:52

## 2019-10-28 RX ADMIN — Medication 50 MICROGRAM(S): at 05:46

## 2019-10-28 RX ADMIN — Medication 3 MILLILITER(S): at 14:59

## 2019-10-28 RX ADMIN — DONEPEZIL HYDROCHLORIDE 5 MILLIGRAM(S): 10 TABLET, FILM COATED ORAL at 23:03

## 2019-10-28 RX ADMIN — Medication 1 MILLIGRAM(S): at 11:24

## 2019-10-28 RX ADMIN — MONTELUKAST 10 MILLIGRAM(S): 4 TABLET, CHEWABLE ORAL at 11:24

## 2019-10-28 RX ADMIN — AZITHROMYCIN 500 MILLIGRAM(S): 500 TABLET, FILM COATED ORAL at 11:24

## 2019-10-28 RX ADMIN — PANTOPRAZOLE SODIUM 40 MILLIGRAM(S): 20 TABLET, DELAYED RELEASE ORAL at 17:11

## 2019-10-28 NOTE — PROCEDURE NOTE - NSPROCDETAILS_GEN_ALL_CORE
ultrasound utilization/secured in place/dressing applied/location identified, draped/prepped, sterile technique used/flushes easily/blood seen on insertion/sterile technique, catheter placed
guidewire recovered/lumen(s) aspirated and flushed/sterile technique, catheter placed/sterile dressing applied/ultrasound guidance
sutured in place/Seldinger technique

## 2019-10-28 NOTE — PROCEDURE NOTE - NSPOSTCAREGUIDE_GEN_A_CORE
Instructed patient/caregiver regarding signs and symptoms of infection/Care for catheter as per unit/ICU protocols/Keep the cast/splint/dressing clean and dry/Instructed patient/caregiver to follow-up with primary care physician/Verbal/written post procedure instructions were given to patient/caregiver
Care for catheter as per unit/ICU protocols
Care for catheter as per unit/ICU protocols

## 2019-10-28 NOTE — PROGRESS NOTE ADULT - SUBJECTIVE AND OBJECTIVE BOX
[place] [facility]                                                        Office: [address] [address]                                                       Telephone: [phone]. Fax: [phone]      CC: chest pain    INTERVAL HISTORY: hypotensive episodes. Now stable.     MEDICATIONS  (STANDING):  ALBUTerol/ipratropium for Nebulization 3 milliLiter(s) Nebulizer every 6 hours  ALBUTerol/ipratropium for Nebulization. 3 milliLiter(s) Nebulizer once  artificial  tears Solution 1 Drop(s) Both EYES four times a day  aspirin enteric coated 81 milliGRAM(s) Oral daily  buDESOnide    Inhalation Suspension 0.5 milliGRAM(s) Inhalation two times a day  dextrose 5%. 1000 milliLiter(s) (50 mL/Hr) IV Continuous <Continuous>  dextrose 50% Injectable 12.5 Gram(s) IV Push once  dextrose 50% Injectable 25 Gram(s) IV Push once  dextrose 50% Injectable 25 Gram(s) IV Push once  diVALproex  milliGRAM(s) Oral at bedtime  docusate sodium 100 milliGRAM(s) Oral two times a day  donepezil 5 milliGRAM(s) Oral at bedtime  epoetin triny Injectable 75145 Unit(s) SubCutaneous every 7 days  folic acid 1 milliGRAM(s) Oral daily  guaiFENesin  milliGRAM(s) Oral every 12 hours  heparin  Infusion. 1200 Unit(s)/Hr (12 mL/Hr) IV Continuous <Continuous>  insulin glargine Injectable (LANTUS) 25 Unit(s) SubCutaneous at bedtime  insulin lispro (HumaLOG) corrective regimen sliding scale   SubCutaneous three times a day before meals  iron sucrose IVPB 250 milliGRAM(s) IV Intermittent <User Schedule>  lactobacillus acidophilus 1 Tablet(s) Oral daily  levothyroxine 50 MICROGram(s) Oral daily  melatonin 5 milliGRAM(s) Oral at bedtime  memantine ER 7 milliGRAM(s) Oral daily  metoprolol tartrate 12.5 milliGRAM(s) Oral two times a day  montelukast 10 milliGRAM(s) Oral daily  multivitamin 1 Tablet(s) Oral daily  pantoprazole    Tablet 40 milliGRAM(s) Oral every 12 hours  piperacillin/tazobactam IVPB.. 3.375 Gram(s) IV Intermittent every 12 hours  pitavastatin 2 milliGRAM(s) Oral <User Schedule>  senna 2 Tablet(s) Oral at bedtime  torsemide 20 milliGRAM(s) Oral daily  umeclidinium 62.5 MICROgram(s)/vilanterol 25 MICROgram(s) Inhaler 1 Puff(s) Inhalation <User Schedule>    ROS: All others negative     PHYSICAL EXAM:  T(C): 36.9 (10-28-19 @ 16:48), Max: 36.9 (10-27-19 @ 19:35)  HR: 96 (10-28-19 @ 17:03) (81 - 97)  BP: 96/68 (10-28-19 @ 17:03) (94/56 - 124/66)  RR: 22 (10-28-19 @ 17:03) (17 - 26)  SpO2: 98% (10-28-19 @ 17:03) (94% - 99%)  Wt(kg): --  I&O's Summary    27 Oct 2019 07:01  -  28 Oct 2019 07:00  --------------------------------------------------------  IN: 871 mL / OUT: 1451 mL / NET: -580 mL    28 Oct 2019 07:01  -  28 Oct 2019 18:03  --------------------------------------------------------  IN: 0 mL / OUT: 500 mL / NET: -500 mL      Appearance: Normal	  HEENT:   Normal oral mucosa, PERRL, EOMI	  Cardiovascular: Normal S1 S2, No JVD, No murmurs. Diffuse edema.   Respiratory: Lungs clear to auscultation	  Psychiatry: A & O x 3, Mood & affect appropriate  Gastrointestinal:  Soft, Non-tender, + BS	  Skin: No rashes,   Neurologic: Non-focal  Extremities: Normal range of motion,    TELEMETRY: Sinus. 	      LABS:	 	                        8.6    10.50 )-----------( 266      ( 28 Oct 2019 06:32 )             27.6     10-28    141  |  97<L>  |  91.0<H>  ----------------------------<  214<H>  4.1   |  29.0  |  3.48<H>    Ca    9.0      28 Oct 2019 06:32

## 2019-10-28 NOTE — PROCEDURE NOTE - NSICDXPROCEDURE_GEN_ALL_CORE_FT
PROCEDURES:  US guided vascular access 28-Oct-2019 15:52:54 Patent left median vein Arron Camarillo  US guided needle placement 28-Oct-2019 15:52:45 #20G IV ns flush good heme back left median vein Arron Camarillo
PROCEDURES:  Insertion, arterial line, percutaneous 19-Oct-2019 14:05:48  Susanne Shannon  Insert CV cath w/o tunnel 5+yr 19-Oct-2019 14:04:12  Susanne Shannon
PROCEDURES:  Insert CV cath w/o tunnel 5+yr 19-Oct-2019 14:04:12  Susanne Shannon

## 2019-10-28 NOTE — PROCEDURE NOTE - NSINDICATIONS_GEN_A_CORE
heparin/zosyn
critical illness/hemodynamic monitoring/emergency venous access
critical patient/monitoring purposes

## 2019-10-28 NOTE — PROGRESS NOTE ADULT - PROBLEM SELECTOR PLAN 1
Patient's symptoms of epigastric pain are non cardiac in nature. TTE normal, previous stress normal, cardiac enzymes do not denote cardiac event.   Had a very lengthy discussion with family. Do not think that an invasive route is necessary in this setting. They understand.

## 2019-10-28 NOTE — PROGRESS NOTE ADULT - ASSESSMENT
85M hx vascular demetnia, afib (not on ac), CKD, DM, COPD presents with syncope and worsening edema found to have hypoglycemia, nikki on ckd, transaminitis. Course complicated by Afib w/ RVR and shock requiring pressors. Pt is transfered to SDU , cardiology , nephrology on board , cardiac cath on hold due to CRF and respiratory status , pulmonary called , iv abx and steroid added for COPD exacerbation  and possible PNA , respiratory status improving , Pt remains on iv heparin pending decision re LHC on this admission or not     1- Syncope on admission multifactorial     2- PAF - on iv heparin   due to decreased functional capacity , fall risks and risk of possible bleed , h/o stomach bleed per daughter in the past , family d/w cardiology re risk and benefit of being on warfarin   family daughter thinking likely no anticoagulation and to add plavix to ASA   pt is in NSR now     3- Diastolic CHF acute on cronic   cont demadex low dose   monitor cr    4- Diabetes Mellitus  with hyperglycemia - improving   cont ISS , lantus   diet      5-COPD  exacerbation - resolved   short course of steroid  given improving , cont neb   pulmonary consult appreciated     6- Bilateral pneumonia - gr positive gr neg infection   cont zosyn , zithromax complete  course of therapy   ambulate , improving   mobilize     7- Constipation - improved   cont bowel regimen     8-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  -possible 2/2 atn due to hypotension  pt's daughter is aware of worsening cr if he gets LHC due to contrast and risk of HD if cr worsens       9- Anemia of cronic kidney disease       d/w  daughter at the bedside

## 2019-10-28 NOTE — PROGRESS NOTE ADULT - ASSESSMENT
CKD(IV): +DM nephropathy  ALMAS with pre renal azotemia; probable ATN component due to hypotensive event  - low sodium and fluid restricted diet  - cont oral Torsemide and adjust dose as needed  - weight daily if possible  - avoid potential nephrotoxins  - monitor labs    Anemia: % Saturation, Iron: 8 % (10.24.19), Ferritin, Serum: 432 ng/mL (10.24.19)  - cont THERESA  - add IV Fe  - trend H/H    RO: Phosphorus Level, Serum: 3.4 mg/dL (10.25.19)  - no binders for now    CAD: pt at high risk for ERICKSON if cardiac cath needed; could result in progression to dialysis

## 2019-10-28 NOTE — PROCEDURE NOTE - NSSITEPREP_SKIN_A_CORE
chlorhexidine/Adherence to aseptic technique: hand hygiene prior to donning barriers (gown, gloves), don cap and mask, sterile drape over patient
chlorhexidine
chlorhexidine

## 2019-10-28 NOTE — PROGRESS NOTE ADULT - SUBJECTIVE AND OBJECTIVE BOX
C uncontrolled Diabetes Mellitus , CHF . CRF . PAF     pt is seen examined , resting in recliner chair ,daughter Felicita is at the bedside   pt is more awake alert today , he is feeling well , no BM today had few yesterday per daughter   He denies chest pian today , no SOB , had lower chest discomfort yesterday afternoon was in afib on monitor ( PAF )   daughter is asking to speak to Cardiologist Dr Chung lee [facility] risks and options before making any decision and meet with palliative care team       Vital Signs Last 24 Hrs  T(C): 36.8 (28 Oct 2019 12:01), Max: 37.3 (27 Oct 2019 13:34)  T(F): 98.2 (28 Oct 2019 12:01), Max: 99.2 (27 Oct 2019 13:34)  HR: 87 (28 Oct 2019 11:16) (83 - 101)  BP: 94/56 (28 Oct 2019 11:16) (80/60 - 124/66)  BP(mean): 68 (28 Oct 2019 11:16) (68 - 83)  RR: 26 (28 Oct 2019 11:16) (17 - 29)  SpO2: 98% (28 Oct 2019 11:16) (94% - 99%)      PHYSICAL EXAM:  General: No distress , resting in the chair   Neck: Supple, No JVD  Lungs: clear to auscultation bilaterally , no rhonchi  fair air entry   Cardio: +s1/s2 , regular   Abdomen: Soft, Nontender, not distended , Bowel sounds present   Extremities: No calf tenderness, +  pretibial edema  Skin : normal color no rash                           8.6    10.50 )-----------( 266      ( 28 Oct 2019 06:32 )             27.6   10-28    141  |  97<L>  |  91.0<H>  ----------------------------<  214<H>  4.1   |  29.0  |  3.48<H>    Ca    9.0      28 Oct 2019 06:32

## 2019-10-28 NOTE — PROGRESS NOTE ADULT - SUBJECTIVE AND OBJECTIVE BOX
NEPHROLOGY INTERVAL HPI/OVERNIGHT EVENTS:  pt resting comfortably when seen earlier  no acute distress  no cp, sob, n/v/d  Family at bedside    MEDICATIONS  (STANDING):  ALBUTerol/ipratropium for Nebulization 3 milliLiter(s) Nebulizer every 6 hours  ALBUTerol/ipratropium for Nebulization. 3 milliLiter(s) Nebulizer once  artificial  tears Solution 1 Drop(s) Both EYES four times a day  aspirin  chewable 81 milliGRAM(s) Oral daily  azithromycin   Tablet 500 milliGRAM(s) Oral daily  buDESOnide    Inhalation Suspension 0.5 milliGRAM(s) Inhalation two times a day  dextrose 5%. 1000 milliLiter(s) (50 mL/Hr) IV Continuous <Continuous>  dextrose 50% Injectable 12.5 Gram(s) IV Push once  dextrose 50% Injectable 25 Gram(s) IV Push once  dextrose 50% Injectable 25 Gram(s) IV Push once  diVALproex  milliGRAM(s) Oral at bedtime  docusate sodium 100 milliGRAM(s) Oral two times a day  donepezil 5 milliGRAM(s) Oral at bedtime  epoetin triny Injectable 70979 Unit(s) SubCutaneous every 7 days  folic acid 1 milliGRAM(s) Oral daily  guaiFENesin  milliGRAM(s) Oral every 12 hours  heparin  Infusion. 1200 Unit(s)/Hr (12 mL/Hr) IV Continuous <Continuous>  insulin glargine Injectable (LANTUS) 25 Unit(s) SubCutaneous at bedtime  insulin lispro (HumaLOG) corrective regimen sliding scale   SubCutaneous three times a day before meals  lactobacillus acidophilus 1 Tablet(s) Oral daily  levothyroxine 50 MICROGram(s) Oral daily  melatonin 5 milliGRAM(s) Oral at bedtime  memantine ER 7 milliGRAM(s) Oral daily  metoprolol tartrate 12.5 milliGRAM(s) Oral two times a day  montelukast 10 milliGRAM(s) Oral daily  multivitamin 1 Tablet(s) Oral daily  pantoprazole    Tablet 40 milliGRAM(s) Oral every 12 hours  piperacillin/tazobactam IVPB.. 3.375 Gram(s) IV Intermittent every 12 hours  pitavastatin 2 milliGRAM(s) Oral <User Schedule>  senna 2 Tablet(s) Oral at bedtime  torsemide 20 milliGRAM(s) Oral daily  umeclidinium 62.5 MICROgram(s)/vilanterol 25 MICROgram(s) Inhaler 1 Puff(s) Inhalation <User Schedule>    MEDICATIONS  (PRN):  acetaminophen   Tablet .. 650 milliGRAM(s) Oral every 6 hours PRN Temp greater or equal to 38C (100.4F), Mild Pain (1 - 3)  aluminum hydroxide/magnesium hydroxide/simethicone Suspension 30 milliLiter(s) Oral every 4 hours PRN Dyspepsia  dextrose 40% Gel 15 Gram(s) Oral once PRN Blood Glucose LESS THAN 70 milliGRAM(s)/deciliter  glucagon  Injectable 1 milliGRAM(s) IntraMuscular once PRN Glucose LESS THAN 70 milligrams/deciliter  heparin  Injectable 7500 Unit(s) IV Push every 6 hours PRN For aPTT less than 40  heparin  Injectable 3500 Unit(s) IV Push every 6 hours PRN For aPTT between 40 - 57  lactulose Syrup 20 Gram(s) Oral two times a day PRN constipation  ondansetron Injectable 4 milliGRAM(s) IV Push every 6 hours PRN Nausea  polyethylene glycol 3350 17 Gram(s) Oral daily PRN if no bM for 2 days  sodium chloride 0.9% lock flush 10 milliLiter(s) IV Push every 1 hour PRN Pre/post blood products, medications, blood draw, and to maintain line patency      Allergies    No Known Allergies    Intolerances        Vital Signs Last 24 Hrs  T(C): 36.5 (28 Oct 2019 05:00), Max: 37.3 (27 Oct 2019 13:34)  T(F): 97.7 (28 Oct 2019 05:00), Max: 99.2 (27 Oct 2019 13:34)  HR: 85 (28 Oct 2019 08:00) (83 - 101)  BP: 120/68 (28 Oct 2019 08:00) (80/60 - 124/66)  BP(mean): 83 (28 Oct 2019 08:00) (77 - 83)  RR: 20 (28 Oct 2019 08:00) (17 - 29)  SpO2: 97% (28 Oct 2019 08:00) (94% - 99%)    PHYSICAL EXAM:  GENERAL: Chronically ill and debilitated  HEAD:  Atraumatic, Normocephalic  EYES: EOMI, PERRLA, conjunctiva and sclera clear  ENMT: Moist mucous membranes  NECK: Supple, + JVD  NERVOUS SYSTEM:  Alert & Oriented X2,  intact and symmetric; demented  CHEST/LUNG: Diminished BS bilaterally  HEART: Regular rate and rhythm; No rub  ABDOMEN: Soft, + distended +BS; nontender  EXTREMITIES:  2+ Peripheral Pulses, + improved LE edema  SKIN: No rashes or lesions    LABS:                        8.6    10.50 )-----------( 266      ( 28 Oct 2019 06:32 )             27.6     10-28    141  |  97<L>  |  91.0<H>  ----------------------------<  214<H>  4.1   |  29.0  |  3.48<H>        Ca    9.0      28 Oct 2019 06:32      PTT - ( 27 Oct 2019 21:54 )  PTT:71.1 sec        RADIOLOGY & ADDITIONAL TESTS:  < from: CT Abdomen and Pelvis No Cont (10.25.19 @ 16:20) >     EXAM:  CT ABDOMEN AND PELVIS                          PROCEDURE DATE:  10/25/2019          INTERPRETATION:  CLINICAL INFORMATION: Constipation. Syncope and   collapse. Clinical suspicion of small bowel obstruction.    COMPARISON: 10/17/2019    PROCEDURE:   CT of the Abdomen and Pelvis was performed without intravenous contrast.   Intravenous contrast: None.  Oral contrast: None.  Sagittal and coronal reformats were performed.    FINDINGS:    Small right pleural effusion as well as pleural thickening at the right   lung base demonstrating no change since the prior study. Small left   pleural effusion, unchanged. Small pericardial effusion, unchanged.   Atelectasis at both lung bases, unchanged.    The liver and gallbladder demonstrate no abnormality. The spleen is not   enlarged and demonstrates no focal abnormality.    The pancreatic contour is unremarkable without evidence of mass,   inflammation or ductal dilatation. The adrenal glands demonstrate normal   size and contour.    Mild right hydronephrosis and hydroureter which is new since 10/17/2019.   The ureter demonstrate a transition zone in the right lower quadrant of   uncertain etiology. The left kidney also demonstrates mild hydronephrosis   and hydroureter to the level of the left UVJ. This is new since the prior   exam. The bladder is distended which is new since the prior study. Air is   noted in the lumen presumably from prior instrumentation. The prostate   gland is enlarged, unchanged. The seminal vesicles are unremarkable.    Atherosclerotic changes in the abdominal aorta. No evidence of aneurysm.    No evidence of retroperitoneal or pelvic lymphadenopathy.    There is a right inguinal hernia. Edema noted in the soft tissues   surrounding the flanks. No otherabdominal wall abnormality.    Evidence of constipation with abundant stool in the colon. No evidence of   diverticulitis. Diverticulosis is noted. No evidence of colitis. The   appendix is visualized and is normal. No evidence of small bowel   obstruction.    Degenerative changes in the spine.    IMPRESSION:     Small bilateral pleural effusions, pleural thickening at the right lung   base, and small pericardial effusion as well as bibasilar atelectasis.   These findings are all unchanged from 10/17/2019.    Evidence of constipation. No evidence of intestinal obstruction.    Mild bilateral hydronephrosis, new since the prior study. This may be   secondary to a significantly distended urinary bladder.    Right inguinal hernia, unchanged.    < end of copied text >

## 2019-10-28 NOTE — CHART NOTE - NSCHARTNOTEFT_GEN_A_CORE
Source: Patient [ ]  Family [x]   other [x] EMR    Current Diet: Diet, Consistent Carbohydrate/No Snacks:   Low Sodium  Supplement Feeding Modality:  Oral  Glucerna Shake Cans or Servings Per Day:  1       Frequency:  Three Times a day (10-20-19 @ 09:07)      Patient reports [ ] nausea  [ ] vomiting [ ] diarrhea [ ] constipation  [ ]chewing problems [ ] swallowing issues  [ ] other:     PO intake:  < 50% [x]   50-75%  [ ]   %  [ ]  other :    Source for PO intake [ ] Patient [x] family [x] chart [ ] staff [ ] other    Current Weight:   (10/28) 180.9 lbs  (10/27) 210.5 lbs  (10/25) 207.4 lbs  (10/24) 216.2 lbs  (10/23) 218.4 lbs  (10/22) 215.8 lbs  (10/21) 213.4 lbs  (10/20) 218 lbs  (10/19) 205 lbs  ? accuracy of weights, noted with 1+ generalized edema and 2+ b/l leg/ankle edema, continue to trend and maintain strict Is&Os     Pertinent Medications: MEDICATIONS  (STANDING):  ALBUTerol/ipratropium for Nebulization 3 milliLiter(s) Nebulizer every 6 hours  ALBUTerol/ipratropium for Nebulization. 3 milliLiter(s) Nebulizer once  artificial  tears Solution 1 Drop(s) Both EYES four times a day  aspirin enteric coated 81 milliGRAM(s) Oral daily  buDESOnide    Inhalation Suspension 0.5 milliGRAM(s) Inhalation two times a day  dextrose 5%. 1000 milliLiter(s) (50 mL/Hr) IV Continuous <Continuous>  dextrose 50% Injectable 12.5 Gram(s) IV Push once  dextrose 50% Injectable 25 Gram(s) IV Push once  dextrose 50% Injectable 25 Gram(s) IV Push once  diVALproex  milliGRAM(s) Oral at bedtime  docusate sodium 100 milliGRAM(s) Oral two times a day  donepezil 5 milliGRAM(s) Oral at bedtime  epoetin triny Injectable 70210 Unit(s) SubCutaneous every 7 days  folic acid 1 milliGRAM(s) Oral daily  guaiFENesin  milliGRAM(s) Oral every 12 hours  heparin  Infusion. 1200 Unit(s)/Hr (12 mL/Hr) IV Continuous <Continuous>  insulin glargine Injectable (LANTUS) 25 Unit(s) SubCutaneous at bedtime  insulin lispro (HumaLOG) corrective regimen sliding scale   SubCutaneous three times a day before meals  iron sucrose IVPB 250 milliGRAM(s) IV Intermittent <User Schedule>  lactobacillus acidophilus 1 Tablet(s) Oral daily  levothyroxine 50 MICROGram(s) Oral daily  melatonin 5 milliGRAM(s) Oral at bedtime  memantine ER 7 milliGRAM(s) Oral daily  metoprolol tartrate 12.5 milliGRAM(s) Oral two times a day  montelukast 10 milliGRAM(s) Oral daily  multivitamin 1 Tablet(s) Oral daily  pantoprazole    Tablet 40 milliGRAM(s) Oral every 12 hours  piperacillin/tazobactam IVPB.. 3.375 Gram(s) IV Intermittent every 12 hours  pitavastatin 2 milliGRAM(s) Oral <User Schedule>  senna 2 Tablet(s) Oral at bedtime  torsemide 20 milliGRAM(s) Oral daily  umeclidinium 62.5 MICROgram(s)/vilanterol 25 MICROgram(s) Inhaler 1 Puff(s) Inhalation <User Schedule>    MEDICATIONS  (PRN):  acetaminophen   Tablet .. 650 milliGRAM(s) Oral every 6 hours PRN Temp greater or equal to 38C (100.4F), Mild Pain (1 - 3)  aluminum hydroxide/magnesium hydroxide/simethicone Suspension 30 milliLiter(s) Oral every 4 hours PRN Dyspepsia  dextrose 40% Gel 15 Gram(s) Oral once PRN Blood Glucose LESS THAN 70 milliGRAM(s)/deciliter  glucagon  Injectable 1 milliGRAM(s) IntraMuscular once PRN Glucose LESS THAN 70 milligrams/deciliter  heparin  Injectable 7500 Unit(s) IV Push every 6 hours PRN For aPTT less than 40  heparin  Injectable 3500 Unit(s) IV Push every 6 hours PRN For aPTT between 40 - 57  lactulose Syrup 20 Gram(s) Oral two times a day PRN constipation  ondansetron Injectable 4 milliGRAM(s) IV Push every 6 hours PRN Nausea  polyethylene glycol 3350 17 Gram(s) Oral daily PRN if no bM for 2 days  sodium chloride 0.9% lock flush 10 milliLiter(s) IV Push every 1 hour PRN Pre/post blood products, medications, blood draw, and to maintain line patency    Pertinent Labs: CBC Full  -  ( 28 Oct 2019 06:32 )  WBC Count : 10.50 K/uL  RBC Count : 3.08 M/uL  Hemoglobin : 8.6 g/dL  Hematocrit : 27.6 %  Platelet Count - Automated : 266 K/uL  Mean Cell Volume : 89.6 fl  Mean Cell Hemoglobin : 27.9 pg  Mean Cell Hemoglobin Concentration : 31.2 gm/dL  Auto Neutrophil # : x  Auto Lymphocyte # : x  Auto Monocyte # : x  Auto Eosinophil # : x  Auto Basophil # : x  Auto Neutrophil % : x  Auto Lymphocyte % : x  Auto Monocyte % : x  Auto Eosinophil % : x  Auto Basophil % : x    10-28 Na141 mmol/L Glu 214 mg/dL<H> K+ 4.1 mmol/L Cr  3.48 mg/dL<H> BUN 91.0 mg/dL<H> Phos n/a   Alb n/a   PAB n/a       Skin: Intact per documentation    Nutrition focused physical exam not conducted at this time- found signs of malnutrition [ ]absent [ ]present    Subcutaneous fat loss: [ ] Orbital fat pads region, [ ]Buccal fat region, [ ]Triceps region,  [ ]Ribs region    Muscle wasting: [ ]Temples region, [ ]Clavicle region, [ ]Shoulder region, [ ]Scapula region, [ ]Interosseous region,  [ ]thigh region, [ ]Calf region    Estimated Needs:   [x] no change since previous assessment  [ ] recalculated:     Current Nutrition Diagnosis: Pt remains at nutrition risk secondary to inadequate protein-energy intake related to inability to meet sufficient protein-energy in setting of decreased appetite as evidenced by suboptimal po intake x 2 days. Pt sleeping at time of interview. Spoke with pts wife and daughter at bedside who report pt has hardly consumed any food over the last two days despite encouragement. States yesterday she was able to get pt to drink 2 Glucernas, however, today he consumed less then 1. Lunch tray observed at bedside untouched. Encouraged family to make protein a priority at meals at this time.    Recommendations:   1) Continue diet; if suboptimal po intake continues suggest to liberalize.  2) Continue Glucerna TID to optimize po intake and provide an additional 220 kcal, 10g protein per serving.  3) Change MVI to Nephro-Ashely.  4) Encourage po intake at all meals.  5) Obtain daily weights to monitor trends.     Monitoring and Evaluation:   [x] PO intake [x] Tolerance to diet prescription [X] Weights  [X] Follow up per protocol [X] Labs

## 2019-10-29 DIAGNOSIS — R91.8 OTHER NONSPECIFIC ABNORMAL FINDING OF LUNG FIELD: ICD-10-CM

## 2019-10-29 LAB
ALBUMIN SERPL ELPH-MCNC: 2.7 G/DL — LOW (ref 3.3–5.2)
ALP SERPL-CCNC: 64 U/L — SIGNIFICANT CHANGE UP (ref 40–120)
ALT FLD-CCNC: 32 U/L — SIGNIFICANT CHANGE UP
ANION GAP SERPL CALC-SCNC: 15 MMOL/L — SIGNIFICANT CHANGE UP (ref 5–17)
APTT BLD: 89.5 SEC — HIGH (ref 27.5–36.3)
AST SERPL-CCNC: 10 U/L — SIGNIFICANT CHANGE UP
BILIRUB SERPL-MCNC: 0.3 MG/DL — LOW (ref 0.4–2)
BUN SERPL-MCNC: 81 MG/DL — HIGH (ref 8–20)
CALCIUM SERPL-MCNC: 9.1 MG/DL — SIGNIFICANT CHANGE UP (ref 8.6–10.2)
CHLORIDE SERPL-SCNC: 98 MMOL/L — SIGNIFICANT CHANGE UP (ref 98–107)
CO2 SERPL-SCNC: 28 MMOL/L — SIGNIFICANT CHANGE UP (ref 22–29)
CREAT SERPL-MCNC: 3.11 MG/DL — HIGH (ref 0.5–1.3)
GLUCOSE BLDC GLUCOMTR-MCNC: 133 MG/DL — HIGH (ref 70–99)
GLUCOSE BLDC GLUCOMTR-MCNC: 145 MG/DL — HIGH (ref 70–99)
GLUCOSE BLDC GLUCOMTR-MCNC: 153 MG/DL — HIGH (ref 70–99)
GLUCOSE BLDC GLUCOMTR-MCNC: 278 MG/DL — HIGH (ref 70–99)
GLUCOSE SERPL-MCNC: 133 MG/DL — HIGH (ref 70–115)
HCT VFR BLD CALC: 29.6 % — LOW (ref 39–50)
HGB BLD-MCNC: 9 G/DL — LOW (ref 13–17)
MCHC RBC-ENTMCNC: 27.9 PG — SIGNIFICANT CHANGE UP (ref 27–34)
MCHC RBC-ENTMCNC: 30.4 GM/DL — LOW (ref 32–36)
MCV RBC AUTO: 91.6 FL — SIGNIFICANT CHANGE UP (ref 80–100)
PHOSPHATE SERPL-MCNC: 4.6 MG/DL — SIGNIFICANT CHANGE UP (ref 2.4–4.7)
PLATELET # BLD AUTO: 268 K/UL — SIGNIFICANT CHANGE UP (ref 150–400)
POTASSIUM SERPL-MCNC: 4.3 MMOL/L — SIGNIFICANT CHANGE UP (ref 3.5–5.3)
POTASSIUM SERPL-SCNC: 4.3 MMOL/L — SIGNIFICANT CHANGE UP (ref 3.5–5.3)
PROT SERPL-MCNC: 6.3 G/DL — LOW (ref 6.6–8.7)
RBC # BLD: 3.23 M/UL — LOW (ref 4.2–5.8)
RBC # FLD: 15.4 % — HIGH (ref 10.3–14.5)
SODIUM SERPL-SCNC: 141 MMOL/L — SIGNIFICANT CHANGE UP (ref 135–145)
WBC # BLD: 10.66 K/UL — HIGH (ref 3.8–10.5)
WBC # FLD AUTO: 10.66 K/UL — HIGH (ref 3.8–10.5)

## 2019-10-29 PROCEDURE — 99233 SBSQ HOSP IP/OBS HIGH 50: CPT

## 2019-10-29 PROCEDURE — 99232 SBSQ HOSP IP/OBS MODERATE 35: CPT

## 2019-10-29 RX ORDER — ASPIRIN/CALCIUM CARB/MAGNESIUM 324 MG
325 TABLET ORAL DAILY
Refills: 0 | Status: DISCONTINUED | OUTPATIENT
Start: 2019-10-29 | End: 2019-10-31

## 2019-10-29 RX ORDER — SACCHAROMYCES BOULARDII 250 MG
250 POWDER IN PACKET (EA) ORAL
Refills: 0 | Status: DISCONTINUED | OUTPATIENT
Start: 2019-10-29 | End: 2019-10-31

## 2019-10-29 RX ADMIN — Medication 100 MILLIGRAM(S): at 18:55

## 2019-10-29 RX ADMIN — Medication 0.5 MILLIGRAM(S): at 20:56

## 2019-10-29 RX ADMIN — Medication 0.5 MILLIGRAM(S): at 09:17

## 2019-10-29 RX ADMIN — Medication 1 TABLET(S): at 13:11

## 2019-10-29 RX ADMIN — Medication 3 MILLILITER(S): at 15:51

## 2019-10-29 RX ADMIN — Medication 600 MILLIGRAM(S): at 05:07

## 2019-10-29 RX ADMIN — Medication 20 MILLIGRAM(S): at 18:57

## 2019-10-29 RX ADMIN — IRON SUCROSE 112.5 MILLIGRAM(S): 20 INJECTION, SOLUTION INTRAVENOUS at 19:00

## 2019-10-29 RX ADMIN — PANTOPRAZOLE SODIUM 40 MILLIGRAM(S): 20 TABLET, DELAYED RELEASE ORAL at 05:08

## 2019-10-29 RX ADMIN — PIPERACILLIN AND TAZOBACTAM 25 GRAM(S): 4; .5 INJECTION, POWDER, LYOPHILIZED, FOR SOLUTION INTRAVENOUS at 13:28

## 2019-10-29 RX ADMIN — Medication 1 MILLIGRAM(S): at 13:10

## 2019-10-29 RX ADMIN — Medication 325 MILLIGRAM(S): at 13:28

## 2019-10-29 RX ADMIN — Medication 3 MILLILITER(S): at 20:52

## 2019-10-29 RX ADMIN — INSULIN GLARGINE 25 UNIT(S): 100 INJECTION, SOLUTION SUBCUTANEOUS at 23:02

## 2019-10-29 RX ADMIN — Medication 1 DROP(S): at 05:08

## 2019-10-29 RX ADMIN — Medication 30 MILLILITER(S): at 22:56

## 2019-10-29 RX ADMIN — DIVALPROEX SODIUM 500 MILLIGRAM(S): 500 TABLET, DELAYED RELEASE ORAL at 22:56

## 2019-10-29 RX ADMIN — PANTOPRAZOLE SODIUM 40 MILLIGRAM(S): 20 TABLET, DELAYED RELEASE ORAL at 18:59

## 2019-10-29 RX ADMIN — Medication 1 DROP(S): at 13:10

## 2019-10-29 RX ADMIN — Medication 30 MILLILITER(S): at 09:02

## 2019-10-29 RX ADMIN — DONEPEZIL HYDROCHLORIDE 5 MILLIGRAM(S): 10 TABLET, FILM COATED ORAL at 22:56

## 2019-10-29 RX ADMIN — MONTELUKAST 10 MILLIGRAM(S): 4 TABLET, CHEWABLE ORAL at 13:11

## 2019-10-29 RX ADMIN — UMECLIDINIUM BROMIDE AND VILANTEROL TRIFENATATE 1 PUFF(S): 62.5; 25 POWDER RESPIRATORY (INHALATION) at 09:27

## 2019-10-29 RX ADMIN — Medication 2: at 18:57

## 2019-10-29 RX ADMIN — MEMANTINE HYDROCHLORIDE 7 MILLIGRAM(S): 10 TABLET ORAL at 13:11

## 2019-10-29 RX ADMIN — SENNA PLUS 2 TABLET(S): 8.6 TABLET ORAL at 22:56

## 2019-10-29 RX ADMIN — Medication 3 MILLILITER(S): at 09:16

## 2019-10-29 RX ADMIN — Medication 50 MICROGRAM(S): at 08:58

## 2019-10-29 RX ADMIN — Medication 250 MILLIGRAM(S): at 18:59

## 2019-10-29 RX ADMIN — Medication 100 MILLIGRAM(S): at 05:08

## 2019-10-29 RX ADMIN — PIPERACILLIN AND TAZOBACTAM 25 GRAM(S): 4; .5 INJECTION, POWDER, LYOPHILIZED, FOR SOLUTION INTRAVENOUS at 23:21

## 2019-10-29 RX ADMIN — Medication 5 MILLIGRAM(S): at 22:56

## 2019-10-29 RX ADMIN — Medication 1 DROP(S): at 18:55

## 2019-10-29 RX ADMIN — Medication 1 DROP(S): at 23:21

## 2019-10-29 NOTE — PROGRESS NOTE ADULT - SUBJECTIVE AND OBJECTIVE BOX
OVERNIGHT EVENTS: transferred off [facility], now on 3 tower.     Present Symptoms:     Dyspnea: 0   Nausea/Vomiting: No  Anxiety:  No  Depression: No  Fatigue: Yes   Loss of appetite: No    Pain: none             Character-            Duration-            Effect-            Factors-            Frequency-            Location-            Severity-    Review of Systems: Reviewed                Unable to obtain due to poor mentation   All others negative    MEDICATIONS  (STANDING):  ALBUTerol/ipratropium for Nebulization 3 milliLiter(s) Nebulizer every 6 hours  ALBUTerol/ipratropium for Nebulization. 3 milliLiter(s) Nebulizer once  artificial  tears Solution 1 Drop(s) Both EYES four times a day  aspirin enteric coated 325 milliGRAM(s) Oral daily  buDESOnide    Inhalation Suspension 0.5 milliGRAM(s) Inhalation two times a day  dextrose 5%. 1000 milliLiter(s) (50 mL/Hr) IV Continuous <Continuous>  dextrose 50% Injectable 12.5 Gram(s) IV Push once  dextrose 50% Injectable 25 Gram(s) IV Push once  dextrose 50% Injectable 25 Gram(s) IV Push once  diVALproex  milliGRAM(s) Oral at bedtime  docusate sodium 100 milliGRAM(s) Oral two times a day  donepezil 5 milliGRAM(s) Oral at bedtime  epoetin triny Injectable 04072 Unit(s) SubCutaneous every 7 days  folic acid 1 milliGRAM(s) Oral daily  insulin glargine Injectable (LANTUS) 25 Unit(s) SubCutaneous at bedtime  insulin lispro (HumaLOG) corrective regimen sliding scale   SubCutaneous three times a day before meals  iron sucrose IVPB 250 milliGRAM(s) IV Intermittent <User Schedule>  levothyroxine 50 MICROGram(s) Oral daily  melatonin 5 milliGRAM(s) Oral at bedtime  memantine ER 7 milliGRAM(s) Oral daily  metoprolol tartrate 12.5 milliGRAM(s) Oral two times a day  montelukast 10 milliGRAM(s) Oral daily  multivitamin 1 Tablet(s) Oral daily  pantoprazole    Tablet 40 milliGRAM(s) Oral every 12 hours  piperacillin/tazobactam IVPB.. 3.375 Gram(s) IV Intermittent every 12 hours  pitavastatin 2 milliGRAM(s) Oral <User Schedule>  saccharomyces boulardii 250 milliGRAM(s) Oral two times a day  senna 2 Tablet(s) Oral at bedtime  torsemide 20 milliGRAM(s) Oral two times a day  umeclidinium 62.5 MICROgram(s)/vilanterol 25 MICROgram(s) Inhaler 1 Puff(s) Inhalation <User Schedule>    MEDICATIONS  (PRN):  acetaminophen   Tablet .. 650 milliGRAM(s) Oral every 6 hours PRN Temp greater or equal to 38C (100.4F), Mild Pain (1 - 3)  aluminum hydroxide/magnesium hydroxide/simethicone Suspension 30 milliLiter(s) Oral every 4 hours PRN Dyspepsia  dextrose 40% Gel 15 Gram(s) Oral once PRN Blood Glucose LESS THAN 70 milliGRAM(s)/deciliter  glucagon  Injectable 1 milliGRAM(s) IntraMuscular once PRN Glucose LESS THAN 70 milligrams/deciliter  lactulose Syrup 20 Gram(s) Oral two times a day PRN constipation  ondansetron Injectable 4 milliGRAM(s) IV Push every 6 hours PRN Nausea  polyethylene glycol 3350 17 Gram(s) Oral daily PRN if no bM for 2 days  sodium chloride 0.9% lock flush 10 milliLiter(s) IV Push every 1 hour PRN Pre/post blood products, medications, blood draw, and to maintain line patency    PHYSICAL EXAM:    Vital Signs Last 24 Hrs  T(C): 36.7 (29 Oct 2019 08:00), Max: 36.9 (28 Oct 2019 16:48)  T(F): 98.1 (29 Oct 2019 08:00), Max: 98.4 (28 Oct 2019 16:48)  HR: 83 (29 Oct 2019 09:20) (81 - 98)  BP: 114/73 (29 Oct 2019 08:00) (96/60 - 114/73)  BP(mean): 74 (28 Oct 2019 17:03) (74 - 74)  RR: 18 (29 Oct 2019 08:00) (18 - 22)  SpO2: 96% (29 Oct 2019 09:20) (94% - 98%)    General: alert     Karnofsky: 30 - 40%    HEENT: normal      Lungs: comfortable    CV: normal      GI: normal      : normal      MSK: weakness     Skin: no rash    LABS:                      9.0    10.66 )-----------( 268      ( 29 Oct 2019 07:55 )             29.6     10-29    141  |  98  |  81.0<H>  ----------------------------<  133<H>  4.3   |  28.0  |  3.11<H>    Ca    9.1      29 Oct 2019 07:55  Phos  4.6     10-29    TPro  6.3<L>  /  Alb  2.7<L>  /  TBili  0.3<L>  /  DBili  x   /  AST  10  /  ALT  32  /  AlkPhos  64  10-29    PTT - ( 29 Oct 2019 07:55 )  PTT:89.5 sec    I&O's Summary    28 Oct 2019 07:01  -  29 Oct 2019 07:00  --------------------------------------------------------  IN: 0 mL / OUT: 1000 mL / NET: -1000 mL    RADIOLOGY & ADDITIONAL STUDIES:    ADVANCE DIRECTIVES: full code

## 2019-10-29 NOTE — PROGRESS NOTE ADULT - ASSESSMENT
· Assessment		  CKD(IV): +DM nephropathy  ALMAS with pre renal azotemia==> decompensated CHF and diuretics   ATN component due to hypotensive event==> resolving  - low sodium and fluid restricted diet  - cont oral Torsemide (dose adjusted); daily weights  - avoid potential nephrotoxins  - monitor labs    Anemia: % Saturation, Iron: 8 % (10.24.19), Ferritin, Serum: 432 ng/mL (10.24.19)  - cont THERESA  - added IV Fe  - trend H/H    RO: Phosphorus Level, Serum: 3.4 mg/dL (10.25.19)  - no binders for now    CAD: cardiac cath to be deferred

## 2019-10-29 NOTE — PROGRESS NOTE ADULT - ASSESSMENT
85M hx vascular demetnia, afib (not on ac), CKD, DM, COPD presents with syncope and worsening edema found to have hypoglycemia, nikki on ckd, transaminitis. Course complicated by Afib w/ RVR and shock requiring pressors. Pt is transfered to SDU , cardiology , nephrology on board , cardiac cath on hold due to CRF and respiratory status , pulmonary called , iv abx and steroid added for COPD exacerbation  and possible PNA , respiratory status improving . Not a candidate for long term AC, started on full dose Aspirin. Family to speak with Hospice for possible home support services.     1- Syncope on admission multifactorial     2- PAF -  due to decreased functional capacity , fall risks and risk of possible bleed , h/o stomach bleed per daughter in the past , patient will not be started on Coumadin. Heparin dc'd. As per discussion with Cardiology will give  mg daily, enteric coated. Monitor for S/S bleeding. Add PPI for GI protection.   pt is in NSR now     3- Diastolic CHF acute on chronic   Torsemide 20 mg BID  monitor cr    4- Diabetes Mellitus  with hyperglycemia - improving   cont ISS , lantus   diet      5-COPD  exacerbation - resolved   short course of steroid  given improving , cont neb   pulmonary input appreciated    6- Bilateral pneumonia - gr positive gr neg infection   cont zosyn , zithromax complete  course of therapy , Florastor  ambulate , improving   mobilize     7- Constipation - improved   cont bowel regimen     8-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  -possible 2/2 atn due to hypotension  -Renal following, monitor Cr with addition of Torsemide      9- Anemia of chronic kidney disease - H&H stable       dispo: Daughter at bedside, concerns over AVERY but would need more assistance if plan is for home. Will speak with Hospice to explore services offered and look into additional private support staff for home. 85M hx vascular demetnia, afib (not on ac), CKD, DM, COPD presents with syncope and worsening edema found to have hypoglycemia, nikki on ckd, transaminitis. Course complicated by Afib w/ RVR and shock requiring pressors. Pt is transfered to SDU , cardiology , nephrology on board , cardiac cath on hold due to CRF and respiratory status , pulmonary called , iv abx and steroid added for COPD exacerbation  and possible PNA , respiratory status improving . Not a candidate for long term AC, started on full dose Aspirin. Family to speak with Hospice for possible home support services.     1- Acute on croinc diastolic CHF right herat failure   cont demadex bid   monitor cr   overall prognosis poor     2- PAF   due to decreased functional capacity , fall risks and risk of possible bleed , h/o stomach bleed per daughter in the past , patient will not be started on Coumadin. Heparin dc'd. As per discussion with Cardiology will give  mg daily, enteric coated. Monitor for S/S bleeding. Add PPI for GI protection.   pt is in NSR now     3- Diabetes Mellitus  with hyperglycemia - improving   cont ISS , lantus   diet      4-COPD  exacerbation - resolved   short course of steroid  given improving , cont neb   pulmonary input appreciated    5- Bilateral pneumonia - gr positive gr neg infection   cont zosyn , zithromax completed  , Florastor  ambulate , improving   mobilize   6- Constipation - improved   cont bowel regimen     7-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  -possible 2/2 atn due to hypotension  -Renal following, monitor Cr with addition of Torsemide      8- Anemia of chronic kidney disease - H&H stable       dispo: Daughter at bedside, concerns over AVERY but would need more assistance if plan is for home. Will speak with Hospice to explore services offered and look into additional private support staff for home.

## 2019-10-29 NOTE — PROGRESS NOTE ADULT - SUBJECTIVE AND OBJECTIVE BOX
CC: syncope, CHF . CRF . PAF     INTERVAL HPI/OVERNIGHT EVENTS: Patient seen and examined. Patient confused, intermittently uncooperative but will comply with help of daughter who is staying at bedside. Assisted OOB to chair with PT. Appears comfortable but unwilling to answer questions.    Vital Signs Last 24 Hrs  T(C): 36.7 (29 Oct 2019 08:00), Max: 36.9 (28 Oct 2019 16:48)  T(F): 98.1 (29 Oct 2019 08:00), Max: 98.4 (28 Oct 2019 16:48)  HR: 83 (29 Oct 2019 09:20) (81 - 98)  BP: 114/73 (29 Oct 2019 08:00) (96/60 - 114/73)  BP(mean): 74 (28 Oct 2019 17:03) (74 - 74)  RR: 18 (29 Oct 2019 08:00) (18 - 22)  SpO2: 96% (29 Oct 2019 09:20) (94% - 98%)    ROS:   Unable to assess due to mental status.   PHYSICAL EXAM:  General: No distress , resting in the chair   Neck: Supple, No JVD  Lungs: clear to auscultation bilaterally , no rhonchi  fair air entry   Cardio: +s1/s2 , regular   Abdomen: Soft, Nontender, not distended , Bowel sounds present   Extremities: No calf tenderness, +  pretibial edema  NEURO: Oriented to person, no focal deficits  Skin : normal color no rash     I&O's Detail    28 Oct 2019 07:01  -  29 Oct 2019 07:00  --------------------------------------------------------  IN:  Total IN: 0 mL    OUT:    Incontinent per Collection Ba mL  Total OUT: 1000 mL    Total NET: -1000 mL                                    9.0    10.66 )-----------( 268      ( 29 Oct 2019 07:55 )             29.6     29 Oct 2019 07:55    141    |  98     |  81.0   ----------------------------<  133    4.3     |  28.0   |  3.11     Ca    9.1        29 Oct 2019 07:55  Phos  4.6       29 Oct 2019 07:55    TPro  6.3    /  Alb  2.7    /  TBili  0.3    /  DBili  x      /  AST  10     /  ALT  32     /  AlkPhos  64     29 Oct 2019 07:55    PTT - ( 29 Oct 2019 07:55 )  PTT:89.5 sec  CAPILLARY BLOOD GLUCOSE      POCT Blood Glucose.: 145 mg/dL (29 Oct 2019 11:52)  POCT Blood Glucose.: 133 mg/dL (29 Oct 2019 08:37)  POCT Blood Glucose.: 236 mg/dL (28 Oct 2019 22:58)  POCT Blood Glucose.: 186 mg/dL (28 Oct 2019 16:44)    LIVER FUNCTIONS - ( 29 Oct 2019 07:55 )  Alb: 2.7 g/dL / Pro: 6.3 g/dL / ALK PHOS: 64 U/L / ALT: 32 U/L / AST: 10 U/L / GGT: x               MEDICATIONS  (STANDING):  ALBUTerol/ipratropium for Nebulization 3 milliLiter(s) Nebulizer every 6 hours  ALBUTerol/ipratropium for Nebulization. 3 milliLiter(s) Nebulizer once  artificial  tears Solution 1 Drop(s) Both EYES four times a day  aspirin enteric coated 325 milliGRAM(s) Oral daily  buDESOnide    Inhalation Suspension 0.5 milliGRAM(s) Inhalation two times a day  dextrose 5%. 1000 milliLiter(s) (50 mL/Hr) IV Continuous <Continuous>  dextrose 50% Injectable 12.5 Gram(s) IV Push once  dextrose 50% Injectable 25 Gram(s) IV Push once  dextrose 50% Injectable 25 Gram(s) IV Push once  diVALproex  milliGRAM(s) Oral at bedtime  docusate sodium 100 milliGRAM(s) Oral two times a day  donepezil 5 milliGRAM(s) Oral at bedtime  epoetin triny Injectable 43493 Unit(s) SubCutaneous every 7 days  folic acid 1 milliGRAM(s) Oral daily  insulin glargine Injectable (LANTUS) 25 Unit(s) SubCutaneous at bedtime  insulin lispro (HumaLOG) corrective regimen sliding scale   SubCutaneous three times a day before meals  iron sucrose IVPB 250 milliGRAM(s) IV Intermittent <User Schedule>  levothyroxine 50 MICROGram(s) Oral daily  melatonin 5 milliGRAM(s) Oral at bedtime  memantine ER 7 milliGRAM(s) Oral daily  metoprolol tartrate 12.5 milliGRAM(s) Oral two times a day  montelukast 10 milliGRAM(s) Oral daily  multivitamin 1 Tablet(s) Oral daily  pantoprazole    Tablet 40 milliGRAM(s) Oral every 12 hours  piperacillin/tazobactam IVPB.. 3.375 Gram(s) IV Intermittent every 12 hours  pitavastatin 2 milliGRAM(s) Oral <User Schedule>  saccharomyces boulardii 250 milliGRAM(s) Oral two times a day  senna 2 Tablet(s) Oral at bedtime  torsemide 20 milliGRAM(s) Oral two times a day  umeclidinium 62.5 MICROgram(s)/vilanterol 25 MICROgram(s) Inhaler 1 Puff(s) Inhalation <User Schedule>    MEDICATIONS  (PRN):  acetaminophen   Tablet .. 650 milliGRAM(s) Oral every 6 hours PRN Temp greater or equal to 38C (100.4F), Mild Pain (1 - 3)  aluminum hydroxide/magnesium hydroxide/simethicone Suspension 30 milliLiter(s) Oral every 4 hours PRN Dyspepsia  dextrose 40% Gel 15 Gram(s) Oral once PRN Blood Glucose LESS THAN 70 milliGRAM(s)/deciliter  glucagon  Injectable 1 milliGRAM(s) IntraMuscular once PRN Glucose LESS THAN 70 milligrams/deciliter  lactulose Syrup 20 Gram(s) Oral two times a day PRN constipation  ondansetron Injectable 4 milliGRAM(s) IV Push every 6 hours PRN Nausea  polyethylene glycol 3350 17 Gram(s) Oral daily PRN if no bM for 2 days  sodium chloride 0.9% lock flush 10 milliLiter(s) IV Push every 1 hour PRN Pre/post blood products, medications, blood draw, and to maintain line patency      RADIOLOGY & ADDITIONAL TESTS:

## 2019-10-29 NOTE — PROGRESS NOTE ADULT - SUBJECTIVE AND OBJECTIVE BOX
NEPHROLOGY INTERVAL HPI/OVERNIGHT EVENTS:  pt clinically stable  no acute sob, cp, n/v/d    MEDICATIONS  (STANDING):  ALBUTerol/ipratropium for Nebulization 3 milliLiter(s) Nebulizer every 6 hours  ALBUTerol/ipratropium for Nebulization. 3 milliLiter(s) Nebulizer once  artificial  tears Solution 1 Drop(s) Both EYES four times a day  aspirin enteric coated 325 milliGRAM(s) Oral daily  buDESOnide    Inhalation Suspension 0.5 milliGRAM(s) Inhalation two times a day  dextrose 5%. 1000 milliLiter(s) (50 mL/Hr) IV Continuous <Continuous>  dextrose 50% Injectable 12.5 Gram(s) IV Push once  dextrose 50% Injectable 25 Gram(s) IV Push once  dextrose 50% Injectable 25 Gram(s) IV Push once  diVALproex  milliGRAM(s) Oral at bedtime  docusate sodium 100 milliGRAM(s) Oral two times a day  donepezil 5 milliGRAM(s) Oral at bedtime  epoetin triny Injectable 67137 Unit(s) SubCutaneous every 7 days  folic acid 1 milliGRAM(s) Oral daily  insulin glargine Injectable (LANTUS) 25 Unit(s) SubCutaneous at bedtime  insulin lispro (HumaLOG) corrective regimen sliding scale   SubCutaneous three times a day before meals  iron sucrose IVPB 250 milliGRAM(s) IV Intermittent <User Schedule>  levothyroxine 50 MICROGram(s) Oral daily  melatonin 5 milliGRAM(s) Oral at bedtime  memantine ER 7 milliGRAM(s) Oral daily  metoprolol tartrate 12.5 milliGRAM(s) Oral two times a day  montelukast 10 milliGRAM(s) Oral daily  multivitamin 1 Tablet(s) Oral daily  pantoprazole    Tablet 40 milliGRAM(s) Oral every 12 hours  piperacillin/tazobactam IVPB.. 3.375 Gram(s) IV Intermittent every 12 hours  pitavastatin 2 milliGRAM(s) Oral <User Schedule>  saccharomyces boulardii 250 milliGRAM(s) Oral two times a day  senna 2 Tablet(s) Oral at bedtime  torsemide 20 milliGRAM(s) Oral two times a day  umeclidinium 62.5 MICROgram(s)/vilanterol 25 MICROgram(s) Inhaler 1 Puff(s) Inhalation <User Schedule>    MEDICATIONS  (PRN):  acetaminophen   Tablet .. 650 milliGRAM(s) Oral every 6 hours PRN Temp greater or equal to 38C (100.4F), Mild Pain (1 - 3)  aluminum hydroxide/magnesium hydroxide/simethicone Suspension 30 milliLiter(s) Oral every 4 hours PRN Dyspepsia  dextrose 40% Gel 15 Gram(s) Oral once PRN Blood Glucose LESS THAN 70 milliGRAM(s)/deciliter  glucagon  Injectable 1 milliGRAM(s) IntraMuscular once PRN Glucose LESS THAN 70 milligrams/deciliter  lactulose Syrup 20 Gram(s) Oral two times a day PRN constipation  ondansetron Injectable 4 milliGRAM(s) IV Push every 6 hours PRN Nausea  polyethylene glycol 3350 17 Gram(s) Oral daily PRN if no bM for 2 days  sodium chloride 0.9% lock flush 10 milliLiter(s) IV Push every 1 hour PRN Pre/post blood products, medications, blood draw, and to maintain line patency      Allergies    No Known Allergies    Intolerances              Vital Signs Last 24 Hrs  T(C): 36.7 (29 Oct 2019 08:00), Max: 36.9 (28 Oct 2019 16:48)  T(F): 98.1 (29 Oct 2019 08:00), Max: 98.4 (28 Oct 2019 16:48)  HR: 83 (29 Oct 2019 09:20) (81 - 98)  BP: 114/73 (29 Oct 2019 08:00) (96/60 - 114/73)  BP(mean): 74 (28 Oct 2019 17:03) (74 - 77)  RR: 18 (29 Oct 2019 08:00) (18 - 22)  SpO2: 96% (29 Oct 2019 09:20) (94% - 98%)    PHYSICAL EXAM:  GENERAL: Elderly; weak and frail  ENMT: Moist mucous membranes  NECK: Supple, + JVD  NERVOUS SYSTEM:  Alert & Oriented X2,  intact and symmetric; demented  CHEST/LUNG: Diminished BS bilaterally  HEART: Regular rate and rhythm; No rub  ABDOMEN: Soft, + distended +BS; nontender  EXTREMITIES:  2+ Peripheral Pulses, less LE edema  SKIN: No rashes or lesions    LABS:                        9.0    10.66 )-----------( 268      ( 29 Oct 2019 07:55 )             29.6     Hemoglobin: 8.6 g/dL (10.28.19 @ 06:32)        10-29    141  |  98  |  81.0<H>  ----------------------------<  133<H>  4.3   |  28.0  |  3.11<H>    Creatinine, Serum: 3.48 mg/dL (10.28.19 @ 06:32)        Ca    9.1      29 Oct 2019 07:55  Phos  4.6     10-29    TPro  6.3<L>  /  Alb  2.7<L>  /  TBili  0.3<L>  /  DBili  x   /  AST  10  /  ALT  32  /  AlkPhos  64  10-29    PTT - ( 29 Oct 2019 07:55 )  PTT:89.5 sec    Phosphorus Level, Serum: 4.6 mg/dL (10-29 @ 07:55)      RADIOLOGY & ADDITIONAL TESTS:

## 2019-10-30 LAB
GLUCOSE BLDC GLUCOMTR-MCNC: 141 MG/DL — HIGH (ref 70–99)
GLUCOSE BLDC GLUCOMTR-MCNC: 149 MG/DL — HIGH (ref 70–99)
GLUCOSE BLDC GLUCOMTR-MCNC: 216 MG/DL — HIGH (ref 70–99)
GLUCOSE BLDC GLUCOMTR-MCNC: 310 MG/DL — HIGH (ref 70–99)

## 2019-10-30 PROCEDURE — 71045 X-RAY EXAM CHEST 1 VIEW: CPT | Mod: 26

## 2019-10-30 PROCEDURE — 99233 SBSQ HOSP IP/OBS HIGH 50: CPT

## 2019-10-30 PROCEDURE — 99232 SBSQ HOSP IP/OBS MODERATE 35: CPT

## 2019-10-30 RX ORDER — HEPARIN SODIUM 5000 [USP'U]/ML
5000 INJECTION INTRAVENOUS; SUBCUTANEOUS EVERY 8 HOURS
Refills: 0 | Status: DISCONTINUED | OUTPATIENT
Start: 2019-10-30 | End: 2019-10-31

## 2019-10-30 RX ADMIN — HEPARIN SODIUM 5000 UNIT(S): 5000 INJECTION INTRAVENOUS; SUBCUTANEOUS at 13:32

## 2019-10-30 RX ADMIN — Medication 50 MICROGRAM(S): at 06:23

## 2019-10-30 RX ADMIN — Medication 20 MILLIGRAM(S): at 06:21

## 2019-10-30 RX ADMIN — Medication 0.5 MILLIGRAM(S): at 08:11

## 2019-10-30 RX ADMIN — Medication 1 DROP(S): at 13:28

## 2019-10-30 RX ADMIN — Medication 20 MILLIGRAM(S): at 17:36

## 2019-10-30 RX ADMIN — Medication 3 MILLILITER(S): at 08:12

## 2019-10-30 RX ADMIN — MONTELUKAST 10 MILLIGRAM(S): 4 TABLET, CHEWABLE ORAL at 13:29

## 2019-10-30 RX ADMIN — ERYTHROPOIETIN 20000 UNIT(S): 10000 INJECTION, SOLUTION INTRAVENOUS; SUBCUTANEOUS at 17:33

## 2019-10-30 RX ADMIN — Medication 4: at 17:34

## 2019-10-30 RX ADMIN — SENNA PLUS 2 TABLET(S): 8.6 TABLET ORAL at 21:35

## 2019-10-30 RX ADMIN — PIPERACILLIN AND TAZOBACTAM 25 GRAM(S): 4; .5 INJECTION, POWDER, LYOPHILIZED, FOR SOLUTION INTRAVENOUS at 23:44

## 2019-10-30 RX ADMIN — Medication 100 MILLIGRAM(S): at 17:35

## 2019-10-30 RX ADMIN — DONEPEZIL HYDROCHLORIDE 5 MILLIGRAM(S): 10 TABLET, FILM COATED ORAL at 21:35

## 2019-10-30 RX ADMIN — Medication 1 DROP(S): at 23:44

## 2019-10-30 RX ADMIN — PANTOPRAZOLE SODIUM 40 MILLIGRAM(S): 20 TABLET, DELAYED RELEASE ORAL at 17:36

## 2019-10-30 RX ADMIN — Medication 1 DROP(S): at 17:34

## 2019-10-30 RX ADMIN — PITAVASTATIN CALCIUM 2 MILLIGRAM(S): 1.04 TABLET, FILM COATED ORAL at 21:34

## 2019-10-30 RX ADMIN — IRON SUCROSE 112.5 MILLIGRAM(S): 20 INJECTION, SOLUTION INTRAVENOUS at 17:34

## 2019-10-30 RX ADMIN — PIPERACILLIN AND TAZOBACTAM 25 GRAM(S): 4; .5 INJECTION, POWDER, LYOPHILIZED, FOR SOLUTION INTRAVENOUS at 13:31

## 2019-10-30 RX ADMIN — Medication 1 TABLET(S): at 13:28

## 2019-10-30 RX ADMIN — Medication 0.5 MILLIGRAM(S): at 20:14

## 2019-10-30 RX ADMIN — DIVALPROEX SODIUM 500 MILLIGRAM(S): 500 TABLET, DELAYED RELEASE ORAL at 21:35

## 2019-10-30 RX ADMIN — PANTOPRAZOLE SODIUM 40 MILLIGRAM(S): 20 TABLET, DELAYED RELEASE ORAL at 06:27

## 2019-10-30 RX ADMIN — Medication 1 DROP(S): at 06:21

## 2019-10-30 RX ADMIN — INSULIN GLARGINE 25 UNIT(S): 100 INJECTION, SOLUTION SUBCUTANEOUS at 21:43

## 2019-10-30 RX ADMIN — Medication 3 MILLILITER(S): at 20:12

## 2019-10-30 RX ADMIN — Medication 3 MILLILITER(S): at 15:09

## 2019-10-30 RX ADMIN — UMECLIDINIUM BROMIDE AND VILANTEROL TRIFENATATE 1 PUFF(S): 62.5; 25 POWDER RESPIRATORY (INHALATION) at 08:14

## 2019-10-30 RX ADMIN — Medication 250 MILLIGRAM(S): at 06:21

## 2019-10-30 RX ADMIN — Medication 250 MILLIGRAM(S): at 17:36

## 2019-10-30 RX ADMIN — Medication 3 MILLILITER(S): at 03:16

## 2019-10-30 RX ADMIN — Medication 12.5 MILLIGRAM(S): at 06:21

## 2019-10-30 RX ADMIN — Medication 5 MILLIGRAM(S): at 21:35

## 2019-10-30 RX ADMIN — Medication 1 MILLIGRAM(S): at 13:43

## 2019-10-30 RX ADMIN — MEMANTINE HYDROCHLORIDE 7 MILLIGRAM(S): 10 TABLET ORAL at 13:27

## 2019-10-30 RX ADMIN — Medication 100 MILLIGRAM(S): at 06:21

## 2019-10-30 RX ADMIN — HEPARIN SODIUM 5000 UNIT(S): 5000 INJECTION INTRAVENOUS; SUBCUTANEOUS at 21:43

## 2019-10-30 RX ADMIN — Medication 325 MILLIGRAM(S): at 13:28

## 2019-10-30 NOTE — PROGRESS NOTE ADULT - SUBJECTIVE AND OBJECTIVE BOX
OVERNIGHT EVENTS: no changes or acute events overnight.     Present Symptoms:     Dyspnea: 0   Nausea/Vomiting: No  Anxiety:  No  Depression: No  Fatigue: Yes   Loss of appetite: No    Pain: none             Character-            Duration-            Effect-            Factors-            Frequency-            Location-            Severity-    Review of Systems: Reviewed       Unable to obtain due to poor mentation   All others negative    MEDICATIONS  (STANDING):  ALBUTerol/ipratropium for Nebulization 3 milliLiter(s) Nebulizer every 6 hours  ALBUTerol/ipratropium for Nebulization. 3 milliLiter(s) Nebulizer once  artificial  tears Solution 1 Drop(s) Both EYES four times a day  aspirin enteric coated 325 milliGRAM(s) Oral daily  buDESOnide    Inhalation Suspension 0.5 milliGRAM(s) Inhalation two times a day  dextrose 5%. 1000 milliLiter(s) (50 mL/Hr) IV Continuous <Continuous>  dextrose 50% Injectable 12.5 Gram(s) IV Push once  dextrose 50% Injectable 25 Gram(s) IV Push once  dextrose 50% Injectable 25 Gram(s) IV Push once  diVALproex  milliGRAM(s) Oral at bedtime  docusate sodium 100 milliGRAM(s) Oral two times a day  donepezil 5 milliGRAM(s) Oral at bedtime  epoetin triny Injectable 88944 Unit(s) SubCutaneous every 7 days  folic acid 1 milliGRAM(s) Oral daily  heparin  Injectable 5000 Unit(s) SubCutaneous every 8 hours  insulin glargine Injectable (LANTUS) 25 Unit(s) SubCutaneous at bedtime  insulin lispro (HumaLOG) corrective regimen sliding scale   SubCutaneous three times a day before meals  iron sucrose IVPB 250 milliGRAM(s) IV Intermittent <User Schedule>  levothyroxine 50 MICROGram(s) Oral daily  melatonin 5 milliGRAM(s) Oral at bedtime  memantine ER 7 milliGRAM(s) Oral daily  metoprolol tartrate 12.5 milliGRAM(s) Oral two times a day  montelukast 10 milliGRAM(s) Oral daily  multivitamin 1 Tablet(s) Oral daily  pantoprazole    Tablet 40 milliGRAM(s) Oral every 12 hours  piperacillin/tazobactam IVPB.. 3.375 Gram(s) IV Intermittent every 12 hours  pitavastatin 2 milliGRAM(s) Oral <User Schedule>  saccharomyces boulardii 250 milliGRAM(s) Oral two times a day  senna 2 Tablet(s) Oral at bedtime  torsemide 20 milliGRAM(s) Oral two times a day  umeclidinium 62.5 MICROgram(s)/vilanterol 25 MICROgram(s) Inhaler 1 Puff(s) Inhalation <User Schedule>    MEDICATIONS  (PRN):  acetaminophen   Tablet .. 650 milliGRAM(s) Oral every 6 hours PRN Temp greater or equal to 38C (100.4F), Mild Pain (1 - 3)  aluminum hydroxide/magnesium hydroxide/simethicone Suspension 30 milliLiter(s) Oral every 4 hours PRN Dyspepsia  dextrose 40% Gel 15 Gram(s) Oral once PRN Blood Glucose LESS THAN 70 milliGRAM(s)/deciliter  glucagon  Injectable 1 milliGRAM(s) IntraMuscular once PRN Glucose LESS THAN 70 milligrams/deciliter  lactulose Syrup 20 Gram(s) Oral two times a day PRN constipation  ondansetron Injectable 4 milliGRAM(s) IV Push every 6 hours PRN Nausea  polyethylene glycol 3350 17 Gram(s) Oral daily PRN if no bM for 2 days  sodium chloride 0.9% lock flush 10 milliLiter(s) IV Push every 1 hour PRN Pre/post blood products, medications, blood draw, and to maintain line patency    PHYSICAL EXAM:    Vital Signs Last 24 Hrs  T(C): 36.4 (30 Oct 2019 16:07), Max: 36.9 (30 Oct 2019 00:00)  T(F): 97.5 (30 Oct 2019 16:07), Max: 98.5 (30 Oct 2019 00:00)  HR: 79 (30 Oct 2019 16:07) (78 - 97)  BP: 102/62 (30 Oct 2019 16:07) (102/62 - 135/82)  BP(mean): --  RR: 18 (30 Oct 2019 16:07) (16 - 18)  SpO2: 97% (30 Oct 2019 16:07) (93% - 97%)    General: alert      Karnofsky:  30-40 %    HEENT: normal      Lungs: comfortable     CV: normal      GI: normal      : normal      MSK: bedbound/wheelchair bound    Skin: no rash    LABS:                      9.0    10.66 )-----------( 268      ( 29 Oct 2019 07:55 )             29.6     10-29    141  |  98  |  81.0<H>  ----------------------------<  133<H>  4.3   |  28.0  |  3.11<H>    Ca    9.1      29 Oct 2019 07:55  Phos  4.6     10-29    TPro  6.3<L>  /  Alb  2.7<L>  /  TBili  0.3<L>  /  DBili  x   /  AST  10  /  ALT  32  /  AlkPhos  64  10-29    PTT - ( 29 Oct 2019 07:55 )  PTT:89.5 sec    I&O's Summary    29 Oct 2019 07:01  -  30 Oct 2019 07:00  --------------------------------------------------------  IN: 0 mL / OUT: 1600 mL / NET: -1600 mL    30 Oct 2019 07:01  -  30 Oct 2019 17:34  --------------------------------------------------------  IN: 0 mL / OUT: 650 mL / NET: -650 mL    RADIOLOGY & ADDITIONAL STUDIES:    ADVANCE DIRECTIVES: Full code

## 2019-10-30 NOTE — PROGRESS NOTE ADULT - PROBLEM SELECTOR PROBLEM 1
Abnormal LFTs (liver function tests)
Dementia
Transaminitis
Transaminitis
ALMAS (acute kidney injury)
Dementia
Hypotension
Epigastric pain
ALMAS (acute kidney injury)
Transaminitis

## 2019-10-30 NOTE — PROGRESS NOTE ADULT - ASSESSMENT
85M hx vascular demetnia, afib (not on ac), CKD, DM, COPD presents with syncope and worsening edema found to have hypoglycemia, nikki on ckd, transaminitis. Course complicated by Afib w/ RVR and shock requiring pressors. Pt is transfered to SDU , cardiology , nephrology on board , cardiac cath on hold due to CRF and respiratory status , pulmonary called , iv abx and steroid added for COPD exacerbation  and possible PNA , respiratory status improving . Not a candidate for long term AC, started on full dose Aspirin. Family to speak with Hospice for possible home support services.     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   mg daily, enteric coated. Monitor for S/S bleeding. Add PPI for GI protection.   pt is in NSR now     3- Diabetes Mellitus  with hyperglycemia - improved   cont ISS , lantus   diet      4-COPD  exacerbation - resolved   short course of steroid  given improved , cont neb   pulmonary input appreciated    5- Bilateral pneumonia - gr positive gr neg infection   cont zosyn to complete 7 days, zithromax completed  , Florastor   Repeat chest xray    6- Constipation - improved   cont bowel regimen     7-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  -possible 2/2 atn due to hypotension  -Renal following, monitor Cr with addition of Torsemide      8- Anemia of chronic kidney disease - H&H stable     DVT ppx: Heparin SQ      dispo: Daughter at bedside, lengthy conversation regarding father's known wishes and decision whether to explore rehab as option or home with Hospice. Meeting with Hospice today. Discharge planner giving information regarding home health aides for private hire. Also considering [facility] if she would be allowed to stay in room 24/7. 85M hx vascular demetnia, afib (not on ac), CKD, DM, COPD presents with syncope and worsening edema found to have hypoglycemia, nikki on ckd, transaminitis. Course complicated by Afib w/ RVR and shock requiring pressors. Pt is transfered to SDU , cardiology , nephrology on board , cardiac cath on hold due to CRF and respiratory status , pulmonary called , iv abx and steroid added for COPD exacerbation  and possible PNA , respiratory status improving . Not a candidate for long term AC, started on full dose Aspirin. Family to speak with Hospice for possible home support services.     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   mg daily, enteric coated. Monitor for S/S bleeding. Add PPI for GI protection.   pt is in NSR now     3- Diabetes Mellitus  with hyperglycemia - improved   cont ISS , lantus   diet      4-COPD  exacerbation - resolved   short course of steroid  given improved , cont neb   pulmonary input appreciated    5- Bilateral pneumonia - gr positive gr neg infection   cont zosyn to complete 7 days, zithromax completed  , Florastor   Repeat chest xray    6- Constipation - improved   cont bowel regimen     7-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  -possible 2/2 atn due to hypotension  -Renal following, monitor Cr with addition of Torsemide      8- Anemia of chronic kidney disease - H&H stable   Procrit weekly, Venofer    DVT ppx: Heparin SQ      dispo: Daughter at bedside, lengthy conversation regarding father's known wishes and decision whether to explore rehab as option or home with Hospice. Meeting with Hospice today. Discharge planner giving information regarding home health aides for private hire. Also considering [facility] if she would be allowed to stay in room 24/7.

## 2019-10-30 NOTE — PROGRESS NOTE ADULT - PROBLEM SELECTOR PROBLEM 4
Afib
Lung mass
Acute on chronic renal failure
Encounter for palliative care
Lung mass
Chronic obstructive pulmonary disease, unspecified COPD type

## 2019-10-30 NOTE — PROGRESS NOTE ADULT - PROBLEM SELECTOR PLAN 2
Now in sinus. Can use low dose beta blockers. ? of AC. Family prefers to not use AC. Can dc heparin drip considering no plans for long term AC.   Use aspirin alone. ? of plavix- no clear data for plavix in use of stroke prevention. Will not recommend using plavix.
increase PPI to BId  Pt is  on FE- maybe causing constipation. Hgb stable  add miralax
- has plateaued.
- steady decline in the last year. per daughter Felicita he needs supervision 24/7.
?new afib. pt now reverted to sinus rhythm  pt started on heparin gtt, will need to be transitioned to PO AC  consider starting BB when blood pressure stable.  cardiology recs appreciated  cards considering cath in renal function improves
Likely secondary to passive congestion from right sided heart failure. He is not a suitable candidate for endoscopic evaluation given his cardiopulmonary status. Continue current Pantoprazole therapy for GI mucosal cytoprotection. Signing off. Reconsult as needed. Thank you.
stabilizing.
-steadily declining in the last year and a half. Needs 24/7 supervision.
Agree with HIDA scan for further evaluation.

## 2019-10-30 NOTE — PROGRESS NOTE ADULT - PROBLEM SELECTOR PLAN 4
-rate control. will likely need long term AC per cardiology.
-? malignancy related versus rounded atelectasis from asbestos exposure. he is an ex smoker and had ground zero exposure.
- -? malignancy related versus rounded atelectasis from asbestos exposure. he is an ex smoker and had ground zero exposure.
-met with daughter Felicita outside the room. we discussed the overall situation. Felicita is struggling with the decisions in front of her regarding cath versus dialysis, etc. I expressed to daughter Felicita that both situations will be difficult and there is no good answer. I explained that we need to think about what her father would saw if he was well enough to decide or what he would view as an acceptable quality of life. I expressed to her that hemodialysis is a major change in someone's quality of life and her father is very high risk given his other comorbid conditions. I also discussed with her regarding advanced directives. Felicita is the HCP and she was at the laywer with her father years ago when he did the proxy and at that time, her father was not specific about his wishes, he stated that she would make those decisions when the time comes. I tralked to her about the importance of knowing if he decompensates or has a cardiac arrest what she thinks we should do forr her father with regard to CPR or intubation., At this time, she would like all aggressive measures because she is afraid that her father will die alone. I did explain to her that once those things are done, it is not easy to decide to remove a loved one from life support. At present I recommended to continue to conservatively treat and hold off on cardiac cath and daughter is in agreement. Overall condition is guarded at best.
slightly improved from  yesterday. possible ATN for hypotensive episode  nephrology recs appreciated  holding lasix
Plan per MICU team.

## 2019-10-30 NOTE — PROGRESS NOTE ADULT - PROBLEM SELECTOR PROBLEM 2
ALMAS (acute kidney injury)
Epigastric pain
ALMAS (acute kidney injury)
Afib
Dementia
Atrial fibrillation, unspecified type
Epigastric pain
Dementia
R/O Acalculous cholecystitis

## 2019-10-30 NOTE — PROGRESS NOTE ADULT - PROBLEM SELECTOR PLAN 3
- steroids being tapered quickly per pulmonary. continue oxygen supplementation.
Edema is not secondary to CHF. Does have abnormal BNP but could be secondary to RV strain. hyperdynamic LV function and no history of significant CHF.
- oxygen dependent
- pulmonary saw patient today. patient now on steroids, back on antibiotics
pt with bilateral rhonchi on exam, but otherwise breathing well on 2L nc  c/w montelukast and duonebs
- oxygen dependent
Likely secondary to underlying global process and RV dysfunction.

## 2019-10-30 NOTE — PROGRESS NOTE ADULT - PROBLEM SELECTOR PROBLEM 5
Encounter for palliative care
Encounter for palliative care
DM (diabetes mellitus)
Encounter for palliative care

## 2019-10-30 NOTE — PROGRESS NOTE ADULT - NSHPATTENDINGPLANDISCUSS_GEN_ALL_CORE
daughter at bedside
wife and daughter
the pt's family.
Dr. Andrade
Dr. Parsons
Dr. Wilkes, Dr. Calhoun, Dr. Parsons
Dr. Parsons
RN

## 2019-10-30 NOTE — PROGRESS NOTE ADULT - ASSESSMENT
· Assessment		  CKD(IV): +DM nephropathy  ALMAS with pre renal azotemia==> decompensated CHF and diuretics   ATN component due to hypotensive event==> resolving; No new labs today - difficult stick  - low sodium and fluid restricted diet  - cont oral Torsemide (dose adjusted); daily weights  - avoid potential nephrotoxins  - monitor labs    Anemia: % Saturation, Iron: 8 % (10.24.19), Ferritin, Serum: 432 ng/mL (10.24.19)  - cont THERESA  - added IV Fe  - trend H/H    RO: Phosphorus Level, Serum: 3.4 mg/dL (10.25.19)  - no binders for now    CAD: cardiac cath to be deferred    CXR - noted - Possible PNA; on Zosyn; d/w Dr. Burns, will f/u tomorrow

## 2019-10-30 NOTE — PROGRESS NOTE ADULT - SUBJECTIVE AND OBJECTIVE BOX
CC: syncope, CHF . CRF . PAF     INTERVAL HPI/OVERNIGHT EVENTS: Patient seen and examined. Confused, appetite poor, intermittently uncooperative with care but will take medications with daughter's encouragement. OOB to chair with PT this am. Appears comfortable at present.     Vital Signs Last 24 Hrs  T(C): 36.7 (30 Oct 2019 08:43), Max: 37.1 (29 Oct 2019 16:02)  T(F): 98 (30 Oct 2019 08:43), Max: 98.8 (29 Oct 2019 16:02)  HR: 89 (30 Oct 2019 08:43) (78 - 97)  BP: 110/64 (30 Oct 2019 08:43) (100/63 - 135/82)  BP(mean): --  RR: 18 (30 Oct 2019 08:43) (16 - 18)  SpO2: 96% (30 Oct 2019 08:43) (93% - 97%)    ROS:   Unable to assess due to mental status.   PHYSICAL EXAM:  General: No distress , resting recliner chair   Neck: Supple, No JVD  Lungs: clear to auscultation bilaterally , no rhonchi  fair air entry   Cardio: +s1/s2 , regular   Abdomen: Soft, Nontender, not distended , Bowel sounds present   Extremities: No calf tenderness, +  pretibial edema  NEURO: Oriented to person, no focal deficits  Skin : normal color no rash       I&O's Detail    29 Oct 2019 07:01  -  30 Oct 2019 07:00  --------------------------------------------------------  IN:  Total IN: 0 mL    OUT:    Incontinent per Collection Ba mL    Voided: 900 mL  Total OUT: 1600 mL    Total NET: -1600 mL                                    9.0    10.66 )-----------( 268      ( 29 Oct 2019 07:55 )             29.6     29 Oct 2019 07:55    141    |  98     |  81.0   ----------------------------<  133    4.3     |  28.0   |  3.11     Ca    9.1        29 Oct 2019 07:55  Phos  4.6       29 Oct 2019 07:55    TPro  6.3    /  Alb  2.7    /  TBili  0.3    /  DBili  x      /  AST  10     /  ALT  32     /  AlkPhos  64     29 Oct 2019 07:55    PTT - ( 29 Oct 2019 07:55 )  PTT:89.5 sec  CAPILLARY BLOOD GLUCOSE      POCT Blood Glucose.: 149 mg/dL (30 Oct 2019 11:51)  POCT Blood Glucose.: 141 mg/dL (30 Oct 2019 08:18)  POCT Blood Glucose.: 278 mg/dL (29 Oct 2019 23:01)  POCT Blood Glucose.: 153 mg/dL (29 Oct 2019 17:40)    LIVER FUNCTIONS - ( 29 Oct 2019 07:55 )  Alb: 2.7 g/dL / Pro: 6.3 g/dL / ALK PHOS: 64 U/L / ALT: 32 U/L / AST: 10 U/L / GGT: x               MEDICATIONS  (STANDING):  ALBUTerol/ipratropium for Nebulization 3 milliLiter(s) Nebulizer every 6 hours  ALBUTerol/ipratropium for Nebulization. 3 milliLiter(s) Nebulizer once  artificial  tears Solution 1 Drop(s) Both EYES four times a day  aspirin enteric coated 325 milliGRAM(s) Oral daily  buDESOnide    Inhalation Suspension 0.5 milliGRAM(s) Inhalation two times a day  dextrose 5%. 1000 milliLiter(s) (50 mL/Hr) IV Continuous <Continuous>  dextrose 50% Injectable 12.5 Gram(s) IV Push once  dextrose 50% Injectable 25 Gram(s) IV Push once  dextrose 50% Injectable 25 Gram(s) IV Push once  diVALproex  milliGRAM(s) Oral at bedtime  docusate sodium 100 milliGRAM(s) Oral two times a day  donepezil 5 milliGRAM(s) Oral at bedtime  epoetin triny Injectable 60248 Unit(s) SubCutaneous every 7 days  folic acid 1 milliGRAM(s) Oral daily  heparin  Injectable 5000 Unit(s) SubCutaneous every 8 hours  insulin glargine Injectable (LANTUS) 25 Unit(s) SubCutaneous at bedtime  insulin lispro (HumaLOG) corrective regimen sliding scale   SubCutaneous three times a day before meals  iron sucrose IVPB 250 milliGRAM(s) IV Intermittent <User Schedule>  levothyroxine 50 MICROGram(s) Oral daily  melatonin 5 milliGRAM(s) Oral at bedtime  memantine ER 7 milliGRAM(s) Oral daily  metoprolol tartrate 12.5 milliGRAM(s) Oral two times a day  montelukast 10 milliGRAM(s) Oral daily  multivitamin 1 Tablet(s) Oral daily  pantoprazole    Tablet 40 milliGRAM(s) Oral every 12 hours  piperacillin/tazobactam IVPB.. 3.375 Gram(s) IV Intermittent every 12 hours  pitavastatin 2 milliGRAM(s) Oral <User Schedule>  saccharomyces boulardii 250 milliGRAM(s) Oral two times a day  senna 2 Tablet(s) Oral at bedtime  torsemide 20 milliGRAM(s) Oral two times a day  umeclidinium 62.5 MICROgram(s)/vilanterol 25 MICROgram(s) Inhaler 1 Puff(s) Inhalation <User Schedule>    MEDICATIONS  (PRN):  acetaminophen   Tablet .. 650 milliGRAM(s) Oral every 6 hours PRN Temp greater or equal to 38C (100.4F), Mild Pain (1 - 3)  aluminum hydroxide/magnesium hydroxide/simethicone Suspension 30 milliLiter(s) Oral every 4 hours PRN Dyspepsia  dextrose 40% Gel 15 Gram(s) Oral once PRN Blood Glucose LESS THAN 70 milliGRAM(s)/deciliter  glucagon  Injectable 1 milliGRAM(s) IntraMuscular once PRN Glucose LESS THAN 70 milligrams/deciliter  lactulose Syrup 20 Gram(s) Oral two times a day PRN constipation  ondansetron Injectable 4 milliGRAM(s) IV Push every 6 hours PRN Nausea  polyethylene glycol 3350 17 Gram(s) Oral daily PRN if no bM for 2 days  sodium chloride 0.9% lock flush 10 milliLiter(s) IV Push every 1 hour PRN Pre/post blood products, medications, blood draw, and to maintain line patency      RADIOLOGY & ADDITIONAL TESTS:

## 2019-10-30 NOTE — PROGRESS NOTE ADULT - PROBLEM SELECTOR PLAN 5
-long talk with HCP daughter Felicita regarding overall condition. At this time she seems to be shifting a bit more to the " less is more" thought process. I had expressed to her yesterday that I wasn't sure about pursing aggressive measures like cardiac cath and entertaining the possibility of HD. I expressed to her all the risks and benefits of those measures and at this time given his dementia, debility, overall medical condition and poor mental status, maybe pursing more conservative measures and focusing on optimizing his quality of life is best. I expressed to her that if she wanted to forego heroic measures, and continue conservative measures and keep him home without the burden of coming back and forth to the hospital, then we could discuss hospice services at home. I did tell her it would take a big shift of focusing on all the details of his care and numbers to him as a person and optmizing his quality of life and meaningful interactions with the family. I also discussed with her that it may mean his life wont be as long but we could at least make sure the time he has left is as comfortable as possible and prevent further suffering. She would like to get the whole family together early next week for a meeting to discuss further. For now  full code and continue all conservative measures, but no cardiac cath and no hemodialysis.
- lengthy discussion with daughter Felicita and patient's spouse at bedside. They understand the overall condition, and we discussed the different options. I spoke to them about rehab versus home hospice. I discussed that he may do okay at rehab but there is also probability that he will decompensate, develop an infection, have difficult breathing, etc and come back into the hospital. I also discussed that given his dementia and his confusion, it is possible he may not do well at rehab because of lack of cooperation. Both daughter and wife feel he would be best at home so we discussed getting a hospice evaluation to see if he qualifies for that extra service at home to try to prevent the burden of coming back and forth to the hospital. We discussed that sometimes " less is more," meaning less aggressive measures given his overall clinical condition may be a better option to try to preserve as much quality of life as possible. Daughter is coming to terms with the possibility of losing her father soon. She is his health care proxy and hopes she can honor his wishes and prevent him from suffering. Family meeting tomorrow at 2pm with hospice.
fingerstick elevated to 300, however was 90 earlier in the day  pt initially presented with hypoglycemia to 60's  will c/w lantus 16 for now and ISS, titrate as needed
met with daughter and wife today with [facility] RN. They are in the process of trying to private hire home health aids for patient to be able to go home with hospice. She will meet again with hospice tomorrow morning at 11am to sign hospice consents.

## 2019-10-31 ENCOUNTER — TRANSCRIPTION ENCOUNTER (OUTPATIENT)
Age: 84
End: 2019-10-31

## 2019-10-31 VITALS — SYSTOLIC BLOOD PRESSURE: 95 MMHG | HEART RATE: 95 BPM | TEMPERATURE: 98 F | DIASTOLIC BLOOD PRESSURE: 61 MMHG

## 2019-10-31 LAB
GLUCOSE BLDC GLUCOMTR-MCNC: 140 MG/DL — HIGH (ref 70–99)
GLUCOSE BLDC GLUCOMTR-MCNC: 163 MG/DL — HIGH (ref 70–99)

## 2019-10-31 PROCEDURE — 99239 HOSP IP/OBS DSCHRG MGMT >30: CPT

## 2019-10-31 RX ORDER — ASPIRIN/CALCIUM CARB/MAGNESIUM 324 MG
1 TABLET ORAL
Qty: 0 | Refills: 0 | DISCHARGE
Start: 2019-10-31

## 2019-10-31 RX ORDER — INSULIN GLARGINE 100 [IU]/ML
22 INJECTION, SOLUTION SUBCUTANEOUS
Qty: 0 | Refills: 0 | DISCHARGE

## 2019-10-31 RX ORDER — PITAVASTATIN CALCIUM 1.04 MG/1
1 TABLET, FILM COATED ORAL
Qty: 0 | Refills: 0 | DISCHARGE
Start: 2019-10-31

## 2019-10-31 RX ORDER — TRIAMTERENE/HYDROCHLOROTHIAZID 75 MG-50MG
1 TABLET ORAL
Qty: 0 | Refills: 0 | DISCHARGE

## 2019-10-31 RX ORDER — METOPROLOL TARTRATE 50 MG
12.5 TABLET ORAL
Qty: 0 | Refills: 0 | DISCHARGE
Start: 2019-10-31

## 2019-10-31 RX ORDER — ALBUTEROL 90 UG/1
3 AEROSOL, METERED ORAL
Qty: 0 | Refills: 0 | DISCHARGE

## 2019-10-31 RX ORDER — INSULIN LISPRO 100/ML
0 VIAL (ML) SUBCUTANEOUS
Qty: 0 | Refills: 0 | DISCHARGE
Start: 2019-10-31

## 2019-10-31 RX ORDER — IPRATROPIUM/ALBUTEROL SULFATE 18-103MCG
3 AEROSOL WITH ADAPTER (GRAM) INHALATION
Qty: 0 | Refills: 0 | DISCHARGE
Start: 2019-10-31

## 2019-10-31 RX ORDER — LACTULOSE 10 G/15ML
30 SOLUTION ORAL
Qty: 0 | Refills: 0 | DISCHARGE
Start: 2019-10-31

## 2019-10-31 RX ORDER — OMEPRAZOLE 10 MG/1
1 CAPSULE, DELAYED RELEASE ORAL
Qty: 0 | Refills: 0 | DISCHARGE

## 2019-10-31 RX ORDER — ACETAMINOPHEN 500 MG
2 TABLET ORAL
Qty: 0 | Refills: 0 | DISCHARGE
Start: 2019-10-31

## 2019-10-31 RX ORDER — SENNA PLUS 8.6 MG/1
2 TABLET ORAL
Qty: 0 | Refills: 0 | DISCHARGE
Start: 2019-10-31

## 2019-10-31 RX ORDER — FOLIC ACID 0.8 MG
1 TABLET ORAL
Qty: 0 | Refills: 0 | DISCHARGE
Start: 2019-10-31

## 2019-10-31 RX ORDER — INSULIN ASPART 100 [IU]/ML
0 INJECTION, SOLUTION SUBCUTANEOUS
Qty: 0 | Refills: 0 | DISCHARGE

## 2019-10-31 RX ORDER — DOCUSATE SODIUM 100 MG
1 CAPSULE ORAL
Qty: 0 | Refills: 0 | DISCHARGE
Start: 2019-10-31

## 2019-10-31 RX ORDER — MULTIVIT-MIN/FERROUS GLUCONATE 9 MG/15 ML
1 LIQUID (ML) ORAL
Qty: 0 | Refills: 0 | DISCHARGE

## 2019-10-31 RX ORDER — HEPARIN SODIUM 5000 [USP'U]/ML
5000 INJECTION INTRAVENOUS; SUBCUTANEOUS
Qty: 0 | Refills: 0 | DISCHARGE
Start: 2019-10-31

## 2019-10-31 RX ORDER — RISPERIDONE 4 MG/1
0.25 TABLET ORAL ONCE
Refills: 0 | Status: COMPLETED | OUTPATIENT
Start: 2019-10-31 | End: 2019-10-31

## 2019-10-31 RX ORDER — INSULIN GLARGINE 100 [IU]/ML
25 INJECTION, SOLUTION SUBCUTANEOUS
Qty: 0 | Refills: 0 | DISCHARGE
Start: 2019-10-31

## 2019-10-31 RX ORDER — POLYETHYLENE GLYCOL 3350 17 G/17G
17 POWDER, FOR SOLUTION ORAL
Qty: 0 | Refills: 0 | DISCHARGE
Start: 2019-10-31

## 2019-10-31 RX ORDER — PITAVASTATIN CALCIUM 1.04 MG/1
0 TABLET, FILM COATED ORAL
Qty: 0 | Refills: 0 | DISCHARGE

## 2019-10-31 RX ORDER — PANTOPRAZOLE SODIUM 20 MG/1
1 TABLET, DELAYED RELEASE ORAL
Qty: 0 | Refills: 0 | DISCHARGE

## 2019-10-31 RX ORDER — RISPERIDONE 4 MG/1
1 TABLET ORAL
Qty: 0 | Refills: 0 | DISCHARGE

## 2019-10-31 RX ORDER — PANTOPRAZOLE SODIUM 20 MG/1
1 TABLET, DELAYED RELEASE ORAL
Qty: 0 | Refills: 0 | DISCHARGE
Start: 2019-10-31

## 2019-10-31 RX ORDER — ASPIRIN/CALCIUM CARB/MAGNESIUM 324 MG
1 TABLET ORAL
Qty: 0 | Refills: 0 | DISCHARGE

## 2019-10-31 RX ADMIN — Medication 1 DROP(S): at 12:38

## 2019-10-31 RX ADMIN — MEMANTINE HYDROCHLORIDE 7 MILLIGRAM(S): 10 TABLET ORAL at 12:38

## 2019-10-31 RX ADMIN — Medication 3 MILLILITER(S): at 08:17

## 2019-10-31 RX ADMIN — Medication 3 MILLILITER(S): at 14:33

## 2019-10-31 RX ADMIN — Medication 250 MILLIGRAM(S): at 05:38

## 2019-10-31 RX ADMIN — Medication 50 MICROGRAM(S): at 09:43

## 2019-10-31 RX ADMIN — Medication 0.5 MILLIGRAM(S): at 08:17

## 2019-10-31 RX ADMIN — Medication 325 MILLIGRAM(S): at 16:22

## 2019-10-31 RX ADMIN — UMECLIDINIUM BROMIDE AND VILANTEROL TRIFENATATE 1 PUFF(S): 62.5; 25 POWDER RESPIRATORY (INHALATION) at 08:20

## 2019-10-31 RX ADMIN — Medication 1 TABLET(S): at 12:38

## 2019-10-31 RX ADMIN — Medication 2: at 09:45

## 2019-10-31 RX ADMIN — Medication 1 DROP(S): at 05:38

## 2019-10-31 RX ADMIN — HEPARIN SODIUM 5000 UNIT(S): 5000 INJECTION INTRAVENOUS; SUBCUTANEOUS at 05:53

## 2019-10-31 RX ADMIN — Medication 20 MILLIGRAM(S): at 05:39

## 2019-10-31 RX ADMIN — Medication 100 MILLIGRAM(S): at 05:39

## 2019-10-31 RX ADMIN — PANTOPRAZOLE SODIUM 40 MILLIGRAM(S): 20 TABLET, DELAYED RELEASE ORAL at 05:39

## 2019-10-31 RX ADMIN — Medication 12.5 MILLIGRAM(S): at 05:39

## 2019-10-31 RX ADMIN — Medication 30 MILLILITER(S): at 08:13

## 2019-10-31 RX ADMIN — Medication 1 MILLIGRAM(S): at 12:38

## 2019-10-31 RX ADMIN — PIPERACILLIN AND TAZOBACTAM 25 GRAM(S): 4; .5 INJECTION, POWDER, LYOPHILIZED, FOR SOLUTION INTRAVENOUS at 12:39

## 2019-10-31 RX ADMIN — RISPERIDONE 0.25 MILLIGRAM(S): 4 TABLET ORAL at 12:37

## 2019-10-31 RX ADMIN — MONTELUKAST 10 MILLIGRAM(S): 4 TABLET, CHEWABLE ORAL at 12:38

## 2019-10-31 NOTE — PROGRESS NOTE ADULT - ASSESSMENT
85M hx vascular demetnia, afib (not on ac), CKD, DM, COPD presents with syncope and worsening edema found to have hypoglycemia, nikki on ckd, transaminitis. Course complicated by Afib w/ RVR and shock requiring pressors. Pt is transfered to [facility] , cardiology , nephrology on board , cardiac cath on hold due to CRF and respiratory status , pulmonary called , iv abx and steroid added for COPD exacerbation  and possible PNA , respiratory status improving . Not a candidate for long term AC, started on full dose Aspirin. Family to speak with Hospice for possible home support services. Pt is for [facility]     1- Acute on croinc diastolic CHF right herat failure   cont demadex bid   repeat chest xray reviewed stable   overall prognosis poor   family is aware     2- PAF - NSR now   due to decreased functional capacity , fall risks and risk of possible bleed , h/o stomach bleed per daughter in the past no oral anticoagulant   started   mg daily, enteric coated. Monitor for S/S bleeding. Add PPI for GI protection.       3- Diabetes Mellitus  with hyperglycemia - improved   cont ISS , lantus   diet      4-COPD  exacerbation - resolved   short course of steroid  given improved , cont neb   pulmonary signed off     5- Bilateral pneumonia - gr positive gr neg infection   completed abx therapy       6- Constipation - improved   cont bowel regimen     7-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  -possible 2/2 atn due to hypotension  stable cr , he refused blood work   -Renal follow up in the office       8- Anemia of chronic kidney disease - H&H stable   Procrit weekly, Venofer given     DVT ppx: Heparin SQ      dispo: Daughter at bedside, lengthy conversation regarding father's known wishes and decision  family wishes to get him to [facility] first short term rehab the likely home with hospice

## 2019-10-31 NOTE — DISCHARGE NOTE PROVIDER - CARE PROVIDER_API CALL
Benito Calhoun)  Nephrology  [address], [place] [address]  Phone: [phone]  Fax: [phone]  Follow Up Time:     Jayesh Rivera)  Critical Care Medicine; Pulmonary Disease; Sleep Medicine  [address], [address]  [place] [address]  Phone: [phone]  Fax: [phone]  Follow Up Time:     Melvin Wilkes)  Cardiovascular Disease; Interventional Cardiology  [address], [address]  [place] [address]  Phone: [phone]  Fax: [phone]  Follow Up Time:     Rene Kim)  Gastroenterology; Internal Medicine  [address], [address]  [place] [address]  Phone: [phone]  Fax: [phone]  Follow Up Time:

## 2019-10-31 NOTE — DISCHARGE NOTE NURSING/CASE MANAGEMENT/SOCIAL WORK - NURSING SECTION COMPLETE
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 CAAP-2 administered this date to update plan of care. GOALS/ TREATMENT SESSION:     1. Patient/Caregiver will be independent with home exercise program. ongoing  2. Pt will express her wants/needs utilizing 3-4 word utterances on a communication device (or with own verbal attempts if device not present) for 75% opportunities: verbally, 3 word utterances following a model and verbal cues 80%  3. Pt will imitate bilabials in verbal productions with 75% accuracy: Imitated /p/ initial and /b/ initial 80% with min-mod verbal/visual cues, final /p and b/ 75% with mod cues  4. Pt will use pronouns (I, he, she, they) within structured activities with 90% accuracy: I/me/you 90% accurate  5.  Pt will complete commands containing spatial concepts (in, on, out of, off, behind, in front, next to) with tangible objects with 90% accuracy: on, off, in, out, next to targeted with 80% accuracy with mod cues        EDUCATION  Education provided to patient/family/caregiver:      [x]Yes/New education    []Yes/Continued Review of prior education   __No  If yes Education Provided: Discussed device from school, signed consent for release of information (to communicate with school)      Method of Education:     [x]Discussion     []Demonstration    [] Written     []Other  Evaluation of Patients Response to Education:         []Patient and or caregiver verbalized understanding  []Patient and or Caregiver Demonstrated without assistance   []Patient and or Caregiver Demonstrated with assistance  []Needs additional instruction to demonstrate understanding of education  ASSESSMENT  Patient tolerated todays treatment session:    [x] Good   []  Fair   []  Poor  Limitations/difficulties with treatment session due to:   []Pain     []Fatigue     []Other medical complications     []Other  Goal Assessment: [] No Change    [x]Improved  Comments:  PLAN  [x]Continue with current plan of care  [][facility]  []IHold per patient request  [] Change Treatment plan:  [] Insurance hold  __ Other     TIME   Time Treatment session was INITIATED 8:30   Time Treatment session was STOPPED 9:00       Total TIMED minutes    Total UNTIMED minutes    Total TREATMENT minutes 30     Charges: speech [id number]  Electronically signed by:    Neena Mcdaniel M.S., CCC/SLP              Date:10/31/2017 Patient/Caregiver provided printed discharge information.

## 2019-10-31 NOTE — DISCHARGE NOTE PROVIDER - NSDCCPCAREPLAN_GEN_ALL_CORE_FT
PRINCIPAL DISCHARGE DIAGNOSIS  Diagnosis: Diastolic CHF, acute on chronic  Assessment and Plan of Treatment: Continue Torsemide BID  Weight patient daily  Strict I&O  Follow up with Cardiology 1-2 weeks, sooner if needed        SECONDARY DISCHARGE DIAGNOSES  Diagnosis: PAF (paroxysmal atrial fibrillation)  Assessment and Plan of Treatment: Continue low dose Metoprolol for rate control  Not a candidate for AC due to risk of bleeding, fall risk    Diagnosis: Edema  Assessment and Plan of Treatment: Continue Torsemide as prescribed  low sodium diet    Diagnosis: Acute on chronic renal failure  Assessment and Plan of Treatment: Continue torsemide  Monitor renal function weekly  Avoid nephrotoxic drugs

## 2019-10-31 NOTE — PROGRESS NOTE ADULT - PROVIDER SPECIALTY LIST ADULT
Cardiology
Critical Care
Critical Care
Gastroenterology
Hospitalist
Internal Medicine
Nephrology
Palliative Care
Pulmonology
Pulmonology
Cardiology
Hospitalist
Nephrology
Cardiology
Critical Care
Hospitalist
Hospitalist
Nephrology
Cardiology
Cardiology
Gastroenterology
Palliative Care

## 2019-10-31 NOTE — DISCHARGE NOTE NURSING/CASE MANAGEMENT/SOCIAL WORK - PATIENT PORTAL LINK FT
You can access the FollowMyHealth Patient Portal offered by [facility] by registering at the following website: [email or web address]. By joining [employer] [employer]’s FollowMyHealth portal, you will also be able to view your health information using other applications (apps) compatible with our system.

## 2019-10-31 NOTE — PROGRESS NOTE ADULT - ASSESSMENT
· Assessment		  CKD(IV): +DM nephropathy  ALMAS with pre renal azotemia==> decompensated CHF and diuretics; no new labs ; Last creat 3.11   ATN component due to hypotensive event==> resolving; No new labs today - difficult stick  - low sodium and fluid restricted diet  - cont oral Torsemide (dose adjusted); daily weights  - avoid potential nephrotoxins  - monitor labs    Anemia: % Saturation, Iron: 8 % (10.24.19), Ferritin, Serum: 432 ng/mL (10.24.19)  - cont THERESA  - added IV Fe  - trend H/H    RO: Phosphorus Level, Serum: 3.4 mg/dL (10.25.19)  - no binders for now  CAD: cardiac cath to be deferred    CXR - noted - Possible PNA; on Zosyn; d/w Dr. Burns,    d/w daughter - likely d/c to [facility] today

## 2019-10-31 NOTE — PROGRESS NOTE ADULT - SUBJECTIVE AND OBJECTIVE BOX
Patient seen and examined    I&O's Summary    30 Oct 2019 07:01  -  31 Oct 2019 07:00  --------------------------------------------------------  IN: 0 mL / OUT: 1500 mL / NET: -1500 mL    more comfortable supine  Ate mod amounts    REVIEW OF SYSTEMS:    CONSTITUTIONAL: No F/C  RESPIRATORY: No cough,  less SOB  CARDIOVASCULAR: No CP/palpitations,    GASTROINTESTINAL: No abdominal pain  or NVD   GENITOURINARY: No UTI sx  NEUROLOGICAL: No headaches, NO wk/numbness  MUSCULOSKELETAL:   EXTREMITIES : still has some swelling,    Vital Signs Last 24 Hrs  T(C): 36.5 (31 Oct 2019 07:51), Max: 36.8 (30 Oct 2019 23:48)  T(F): 97.7 (31 Oct 2019 07:51), Max: 98.3 (30 Oct 2019 23:48)  HR: 83 (31 Oct 2019 14:35) (77 - 86)  BP: 97/65 (31 Oct 2019 07:51) (97/65 - 110/68)  BP(mean): --  RR: 18 (31 Oct 2019 07:51) (18 - 18)  SpO2: 94% (31 Oct 2019 14:35) (94% - 97%)    PHYSICAL EXAM:    GENERAL: NAD,   EYES:  conjunctiva and sclera clear  NECK: Supple, No JVD/Bruit  NERVOUS SYSTEM:  A/O x3,   CHEST:  No rales occ rhonchi  HEART:  RRR, gr 2 murmur  ABDOMEN: Soft, NT/ND BS+  EXTREMITIES:  + Edema;  SKIN: No rashes    LABS: Refused by patient    MEDICATIONS  (STANDING):  acetaminophen   Tablet .. PRN  ALBUTerol/ipratropium for Nebulization  ALBUTerol/ipratropium for Nebulization.  aluminum hydroxide/magnesium hydroxide/simethicone Suspension PRN  artificial  tears Solution  aspirin enteric coated  buDESOnide    Inhalation Suspension  dextrose 40% Gel PRN  dextrose 5%.  dextrose 50% Injectable  dextrose 50% Injectable  dextrose 50% Injectable  diVALproex ER  docusate sodium  donepezil  epoetin triny Injectable  folic acid  glucagon  Injectable PRN  heparin  Injectable  insulin glargine Injectable (LANTUS)  insulin lispro (HumaLOG) corrective regimen sliding scale  iron sucrose IVPB  lactulose Syrup PRN  levothyroxine  melatonin  memantine ER  metoprolol tartrate  montelukast  multivitamin  ondansetron Injectable PRN  pantoprazole    Tablet  pitavastatin  polyethylene glycol 3350 PRN  saccharomyces boulardii  senna  sodium chloride 0.9% lock flush PRN  torsemide  umeclidinium 62.5 MICROgram(s)/vilanterol 25 MICROgram(s) Inhaler

## 2019-10-31 NOTE — DISCHARGE NOTE PROVIDER - CARE PROVIDERS DIRECT ADDRESSES
,DirectAddress_Unknown,[email or web address]

## 2019-10-31 NOTE — DISCHARGE NOTE PROVIDER - PROVIDER TOKENS
PROVIDER:[TOKEN:[[id number]]],PROVIDER:[TOKEN:[[id number]]],PROVIDER:[TOKEN:[[id number]]],PROVIDER:[TOKEN:[[id number]]]

## 2019-10-31 NOTE — PROGRESS NOTE ADULT - ATTENDING COMMENTS
Estimated date of discharge undetermined.
Critical Care time: >40 minutes of noncontinuous critical care time spent evaluating/treating, reviewing imaging, labs, discussing case with multidisciplinary team, discussing plans/goals of care with patient/family to prevent further life threatening depreciation of the patient's condition. Non-inclusive is procedure time.
above reviewed   spoke to the daughter with NP Nicci examined the patient   pt is stable , no overnight events   hospice evaluation pending to meet daughter
patient had paroxymal rapid heart rate and low blood pressure.    patient seen at bedside. looks pale and in discomfort. Altered sensorium. c/o nausea and vomiting.   hypotensive to 70.    Started on dopamine drip 5 mcg/kg/min.   ECG. non speicfic ST T change.    Patient has CKD and was admitted for similar symptoms. of not feeling right, pale and feelick sick with nausea nad epigastric discomfort.  CT chest and abdomen negative with elevated LFTs and  elevated cr.    Qucki bedside echo during hypotensive episode  showed that patients RV was dilated and markedly hypokinetic. and LV was small in size and hyperdyanmic.   IVC was dilated and not collapsible.     possible diagnosis of acute pulmonary embolism was made.   US venous LE duplex. Stgarted on heparin drip. trasnfered to ICU.  patietn was given  1 L bolus IV fluids.  \on dopamine drip.   xray chest and abdomen. ordered. Bladder scan and beal     patient transferred to ICU . got a central luine. Started on levophed and normal BP. repeat echo showed,: normalization of RV function,. normal LV. small pericardial effusion no tamponade.   Differential includes: Vasovagal syncope due to GI pathology, Ulcer or Gall bladder , Proximal RCA ischemia,  or PE.  D dimer.. US venous , trops. contineu heparin. Aspirin. f/u LFTs and bilirubin.  evaluate for urinary retention and stool impaction.   central venous pressures.    Plan for cath or stress test.  IN view of acute renal failure, will defer cath for now until cr stabilized or patient agreeble for hemodialysis ./   ct ICU monitoring.   PPI protonix.               Critical Care time: >40 minutes of noncontinuous critical care time spent evaluating/treating, reviewing imaging, labs, discussing case with multidisciplinary team, discussing plans/goals of care with patient/family to prevent further life threatening depreciation of the patient's condition. Non-inclusive is procedure time.
above reviewed   spoke to the daughter with NP Nicci examined the patient   pt is stable , no overnight events   hospice evaluation pending to meet daughter
spoke to the palliative care team and daughter , family willing to take him home with possible hospice   hospice referal   pt is seen was sitting in the chair , agitated at times , poor oral intake reported by the family
Thank you for the opportunity to assist with the care of this patient.   [facility] Consult Service [phone].
Thank you for the opportunity to assist with the care of this patient.   [facility] Consult [facility] [phone].
Thank you for the opportunity to assist with the care of this patient.   [facility] Consult Service [phone].

## 2019-10-31 NOTE — DISCHARGE NOTE PROVIDER - NSDCMRMEDTOKEN_GEN_ALL_CORE_FT
acetaminophen 325 mg oral tablet: 2 tab(s) orally every 6 hours, As needed, Temp greater or equal to 38C (100.4F), Mild Pain (1 - 3)  aluminum hydroxide-magnesium hydroxide 200 mg-200 mg/5 mL oral suspension: 30 milliliter(s) orally every 4 hours, As needed, Dyspepsia  Anoro Ellipta 62.5 mcg-25 mcg/inh inhalation powder: 1 puff(s) inhaled once a day  Aricept 5 mg oral tablet: 1 tab(s) orally once a day (at bedtime)  aspirin 325 mg oral delayed release tablet: 1 tab(s) orally once a day  budesonide 0.5 mg/2 mL inhalation suspension: 2 milliliter(s) inhaled 2 times a day  Co Q-10 100 mg oral capsule: 1 cap(s) orally once a day  divalproex sodium 500 mg oral tablet, extended release: 1 tab(s) orally once a day (at bedtime)  docusate sodium 100 mg oral capsule: 1 cap(s) orally 2 times a day  ferrous sulfate 325 mg (65 mg elemental iron) oral tablet: 1 tab(s) orally once a day  folic acid 1 mg oral tablet: 1 tab(s) orally once a day  heparin: 5000 unit(s) subcutaneous every 8 hours  insulin glargine: 25 unit(s) subcutaneous once a day (at bedtime)  insulin lispro: Accuchecks AC/HS with Insulin Sliding Scale  ipratropium-albuterol 0.5 mg-2.5 mg/3 mLinhalation solution: 3 milliliter(s) inhaled every 6 hours  lactulose 10 g/15 mL oral syrup: 30 milliliter(s) orally 2 times a day, As needed, constipation  melatonin 5 mg oral tablet: 1 tab(s) orally once a day (at bedtime)  memantine 7 mg oral capsule, extended release: 1 cap(s) orally once a day  metoprolol: 12.5 milligram(s) orally 2 times a day  montelukast 10 mg oral tablet: 1 tab(s) orally once a day  Multiple Vitamins oral tablet: 1 tab(s) orally once a day  ocular lubricant ophthalmic solution: 1 drop(s) to each affected eye 4 times a day  pantoprazole 40 mg oral delayed release tablet: 1 tab(s) orally every 12 hours  pitavastatin (as calcium) 2 mg oral tablet: 1 tab(s) orally to be given at 2200 on Mon, Tues, Wed, Thurs, Fri.   polyethylene glycol 3350 oral powder for reconstitution: 17 gram(s) orally once a day, As needed, if no bM for 2 days  senna oral tablet: 2 tab(s) orally once a day (at bedtime)  Synthroid 50 mcg (0.05 mg) oral tablet: 1 tab(s) orally once a day  torsemide 20 mg oral tablet: 1 tab(s) orally 2 times a day acetaminophen 325 mg oral tablet: 2 tab(s) orally every 6 hours, As needed, Temp greater or equal to 38C (100.4F), Mild Pain (1 - 3)  aluminum hydroxide-magnesium hydroxide 200 mg-200 mg/5 mL oral suspension: 30 milliliter(s) orally every 4 hours, As needed, Dyspepsia  Anoro Ellipta 62.5 mcg-25 mcg/inh inhalation powder: 1 puff(s) inhaled once a day  Aricept 5 mg oral tablet: 1 tab(s) orally once a day (at bedtime)  aspirin 325 mg oral delayed release tablet: 1 tab(s) orally once a day  budesonide 0.5 mg/2 mL inhalation suspension: 2 milliliter(s) inhaled 2 times a day  Co Q-10 100 mg oral capsule: 1 cap(s) orally once a day  divalproex sodium 500 mg oral tablet, extended release: 1 tab(s) orally once a day (at bedtime)  docusate sodium 100 mg oral capsule: 1 cap(s) orally 2 times a day  ferrous sulfate 325 mg (65 mg elemental iron) oral tablet: 1 tab(s) orally once a day  folic acid 1 mg oral tablet: 1 tab(s) orally once a day  heparin: 5000 unit(s) subcutaneous every 8 hours  insulin glargine: 25 unit(s) subcutaneous once a day (at bedtime)  insulin lispro: Accuchecks AC/HS with Insulin Sliding Scale  ipratropium-albuterol 0.5 mg-2.5 mg/3 mLinhalation solution: 3 milliliter(s) inhaled every 6 hours  lactulose 10 g/15 mL oral syrup: 30 milliliter(s) orally 2 times a day, As needed, constipation  melatonin 5 mg oral tablet: 1 tab(s) orally once a day (at bedtime)  memantine 7 mg oral capsule, extended release: 1 cap(s) orally once a day  metoprolol: 12.5 milligram(s) orally 2 times a day hold for SBP less than 90   montelukast 10 mg oral tablet: 1 tab(s) orally once a day  Multiple Vitamins oral tablet: 1 tab(s) orally once a day  ocular lubricant ophthalmic solution: 1 drop(s) to each affected eye 4 times a day  pantoprazole 40 mg oral delayed release tablet: 1 tab(s) orally every 12 hours  pitavastatin (as calcium) 2 mg oral tablet: 1 tab(s) orally to be given at 2200 on Mon, Tues, Wed, Thurs, Fri.   polyethylene glycol 3350 oral powder for reconstitution: 17 gram(s) orally once a day, As needed, if no bM for 2 days  senna oral tablet: 2 tab(s) orally once a day (at bedtime)  Synthroid 50 mcg (0.05 mg) oral tablet: 1 tab(s) orally once a day  torsemide 20 mg oral tablet: 1 tab(s) orally 2 times a day

## 2019-10-31 NOTE — PROGRESS NOTE ADULT - SUBJECTIVE AND OBJECTIVE BOX
CC: syncope, CHF . CRF . PAF     pt is feeling better , c/o stomach discomfort on off   no chest pain , no SOB       Vital Signs Last 24 Hrs  T(C): 36.5 (31 Oct 2019 07:51), Max: 36.8 (30 Oct 2019 23:48)  T(F): 97.7 (31 Oct 2019 07:51), Max: 98.3 (30 Oct 2019 23:48)  HR: 77 (31 Oct 2019 08:20) (77 - 86)  BP: 97/65 (31 Oct 2019 07:51) (97/65 - 110/68)  BP(mean): --  RR: 18 (31 Oct 2019 07:51) (18 - 18)  SpO2: 94% (31 Oct 2019 08:20) (94% - 97%)      PHYSICAL EXAM:  General: No distress , lying in the bed   Lungs: clear to auscultation bilaterally , no rhonchi  fair air entry   Cardio: +s1/s2 , regular   Abdomen: Soft, Nontender, not distended , Bowel sounds present   Extremities: No calf tenderness, +  pretibial edema  NEURO: Oriented to person, no focal deficits  Skin : normal color no rash             CAPILLARY BLOOD GLUCOSE      POCT Blood Glucose.: 140 mg/dL (31 Oct 2019 11:58)  POCT Blood Glucose.: 163 mg/dL (31 Oct 2019 08:58)  POCT Blood Glucose.: 310 mg/dL (30 Oct 2019 21:41)  POCT Blood Glucose.: 216 mg/dL (30 Oct 2019 17:18)    < from: Xray Chest 1 View- PORTABLE-Urgent (10.30.19 @ 14:47) >    IMPRESSION:   Bibasilar airspace consolidations as described..      < end of copied text >

## 2019-10-31 NOTE — GOALS OF CARE CONVERSATION - ADVANCED CARE PLANNING - CONVERSATION DETAILS
Writer/[facility] RN again met with patient's daughter/HCP, Felicita at bedside. As per patient's daughter, overnight she reconsidered and now wishes to pursue short term rehab for her father before he returns home with hospice services. Writer notified SAV Cooley and Dr. Desi Pandya of [facility]. Will continue to follow.     Sierra Mon RN  [phone]
Writer/[facility] RN, together with Dr. Desi Pandya, held lengthy family meeting regarding possibility of patient being discharged home with hospice services. Daughter/HCP Felicita and wife Montse agree with hospice plan of care - however, they will need to hire private HHA before patient can be discharged home safely. This writer gave HCP Felicita a list of HHA agencies to call for additional services. Also gave Felicita informational materials regarding [facility] and left consent forms with her to review overnight. Will meet with Felicita again tomorrow morning at approximately 11am.     Sierra Mon RN  [phone]
Had conversation with daughter about his cronic medical condition , CRF , fluid overload , Atrial fib and need for possible LHC , however now on hold due to his worsening cr and risk of getting worse with LHC and need for possible HD , she understand the conditions and overall prognosis , she does not want him to suffer , she would like to think about it , full code , defer LHC at present as well   overall prognosis poor risk of recurrent admission present

## 2019-10-31 NOTE — GOALS OF CARE CONVERSATION - ADVANCED CARE PLANNING - NSPROPTRIGHTCAREGIVER_GEN_A_NUR
information could not be obtained

## 2019-10-31 NOTE — PROGRESS NOTE ADULT - REASON FOR ADMISSION
syncope

## 2019-10-31 NOTE — DISCHARGE NOTE PROVIDER - HOSPITAL COURSE
85M hx vascular dementia, afib (not on ac), CKD, DM, COPD presented with syncope and worsening edema found to have hypoglycemia, nikki on ckd, transaminitis. Course complicated by Afib w/ RVR and shock requiring pressors. Cardiology and  nephrology ere consulted. Cardiac cath was not recommended due  to CRF and respiratory status. The patient was seen in consultation by  pulmonary and received iv Zosyn  and steroid  for COPD exacerbation  and possible PNA . His respiratory status  was improving . The patient was deemed not  a candidate for long term AC due to decreased functional capacity , fall risks and risk of possible bleed ( h/o stomach bleed per daughter in the past) and  started on full dose Aspirin. 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, possible ATN as a result of  hypotension. Cr stable and the  patient resumed Torsemide 20 mg BID. The patient was seen by PT and recommend AVERY. Palliative care was consulted, the daughter who is HCP agrees with AVERY with eventual plan to bring patient home and will explore Hospice as outpatient.     Vital Signs Last 24 Hrs    T(C): 36.5 (31 Oct 2019 07:51), Max: 36.8 (30 Oct 2019 23:48)    T(F): 97.7 (31 Oct 2019 07:51), Max: 98.3 (30 Oct 2019 23:48)    HR: 77 (31 Oct 2019 08:20) (77 - 86)    BP: 97/65 (31 Oct 2019 07:51) (97/65 - 110/68)    BP(mean): --    RR: 18 (31 Oct 2019 07:51) (18 - 18)    SpO2: 94% (31 Oct 2019 08:20) (94% - 97%)                                CAPILLARY BLOOD GLUCOSE            POCT Blood Glucose.: 140 mg/dL (31 Oct 2019 11:58)    POCT Blood Glucose.: 163 mg/dL (31 Oct 2019 08:58)    POCT Blood Glucose.: 310 mg/dL (30 Oct 2019 21:41)    POCT Blood Glucose.: 216 mg/dL (30 Oct 2019 17:18)        PHYSICAL EXAM:    General: No distress , resting in bed    Neck: Supple, No JVD    Lungs: clear to auscultation bilaterally , no rhonchi  fair air entry     Cardio: +s1/s2 , regular     Abdomen: Soft, Nontender, not distended , Bowel sounds present     Extremities: No calf tenderness, +  pretibial edema    NEURO: Oriented to person, no focal deficits    Skin : normal color no rash

## 2019-11-04 PROBLEM — F03.90 UNSPECIFIED DEMENTIA WITHOUT BEHAVIORAL DISTURBANCE: Chronic | Status: ACTIVE | Noted: 2019-10-17

## 2019-11-04 PROBLEM — I48.91 UNSPECIFIED ATRIAL FIBRILLATION: Chronic | Status: ACTIVE | Noted: 2019-10-17

## 2019-11-04 PROBLEM — N18.9 CHRONIC KIDNEY DISEASE, UNSPECIFIED: Chronic | Status: ACTIVE | Noted: 2019-10-17

## 2019-11-04 PROBLEM — E11.9 TYPE 2 DIABETES MELLITUS WITHOUT COMPLICATIONS: Chronic | Status: ACTIVE | Noted: 2019-10-17

## 2019-11-04 PROBLEM — J44.9 CHRONIC OBSTRUCTIVE PULMONARY DISEASE, UNSPECIFIED: Chronic | Status: ACTIVE | Noted: 2019-10-17

## 2019-11-12 PROCEDURE — 84295 ASSAY OF SERUM SODIUM: CPT

## 2019-11-12 PROCEDURE — 82570 ASSAY OF URINE CREATININE: CPT

## 2019-11-12 PROCEDURE — 97530 THERAPEUTIC ACTIVITIES: CPT

## 2019-11-12 PROCEDURE — 94640 AIRWAY INHALATION TREATMENT: CPT

## 2019-11-12 PROCEDURE — 76700 US EXAM ABDOM COMPLETE: CPT

## 2019-11-12 PROCEDURE — 83880 ASSAY OF NATRIURETIC PEPTIDE: CPT

## 2019-11-12 PROCEDURE — 84443 ASSAY THYROID STIM HORMONE: CPT

## 2019-11-12 PROCEDURE — 78226 HEPATOBILIARY SYSTEM IMAGING: CPT

## 2019-11-12 PROCEDURE — 94760 N-INVAS EAR/PLS OXIMETRY 1: CPT

## 2019-11-12 PROCEDURE — 82550 ASSAY OF CK (CPK): CPT

## 2019-11-12 PROCEDURE — 84466 ASSAY OF TRANSFERRIN: CPT

## 2019-11-12 PROCEDURE — 84132 ASSAY OF SERUM POTASSIUM: CPT

## 2019-11-12 PROCEDURE — 86850 RBC ANTIBODY SCREEN: CPT

## 2019-11-12 PROCEDURE — 74018 RADEX ABDOMEN 1 VIEW: CPT

## 2019-11-12 PROCEDURE — 80202 ASSAY OF VANCOMYCIN: CPT

## 2019-11-12 PROCEDURE — 82947 ASSAY GLUCOSE BLOOD QUANT: CPT

## 2019-11-12 PROCEDURE — 96374 THER/PROPH/DIAG INJ IV PUSH: CPT

## 2019-11-12 PROCEDURE — 82150 ASSAY OF AMYLASE: CPT

## 2019-11-12 PROCEDURE — 97116 GAIT TRAINING THERAPY: CPT

## 2019-11-12 PROCEDURE — 80053 COMPREHEN METABOLIC PANEL: CPT

## 2019-11-12 PROCEDURE — 82270 OCCULT BLOOD FECES: CPT

## 2019-11-12 PROCEDURE — 82435 ASSAY OF BLOOD CHLORIDE: CPT

## 2019-11-12 PROCEDURE — 85379 FIBRIN DEGRADATION QUANT: CPT

## 2019-11-12 PROCEDURE — 99285 EMERGENCY DEPT VISIT HI MDM: CPT | Mod: 25

## 2019-11-12 PROCEDURE — 87798 DETECT AGENT NOS DNA AMP: CPT

## 2019-11-12 PROCEDURE — 84100 ASSAY OF PHOSPHORUS: CPT

## 2019-11-12 PROCEDURE — A9537: CPT

## 2019-11-12 PROCEDURE — 82977 ASSAY OF GGT: CPT

## 2019-11-12 PROCEDURE — 82962 GLUCOSE BLOOD TEST: CPT

## 2019-11-12 PROCEDURE — 83540 ASSAY OF IRON: CPT

## 2019-11-12 PROCEDURE — 83690 ASSAY OF LIPASE: CPT

## 2019-11-12 PROCEDURE — 85014 HEMATOCRIT: CPT

## 2019-11-12 PROCEDURE — 85730 THROMBOPLASTIN TIME PARTIAL: CPT

## 2019-11-12 PROCEDURE — 97110 THERAPEUTIC EXERCISES: CPT

## 2019-11-12 PROCEDURE — 71250 CT THORAX DX C-: CPT

## 2019-11-12 PROCEDURE — 86901 BLOOD TYPING SEROLOGIC RH(D): CPT

## 2019-11-12 PROCEDURE — 84484 ASSAY OF TROPONIN QUANT: CPT

## 2019-11-12 PROCEDURE — 82803 BLOOD GASES ANY COMBINATION: CPT

## 2019-11-12 PROCEDURE — 93970 EXTREMITY STUDY: CPT

## 2019-11-12 PROCEDURE — 80048 BASIC METABOLIC PNL TOTAL CA: CPT

## 2019-11-12 PROCEDURE — 85610 PROTHROMBIN TIME: CPT

## 2019-11-12 PROCEDURE — 82330 ASSAY OF CALCIUM: CPT

## 2019-11-12 PROCEDURE — 97112 NEUROMUSCULAR REEDUCATION: CPT

## 2019-11-12 PROCEDURE — 93005 ELECTROCARDIOGRAM TRACING: CPT

## 2019-11-12 PROCEDURE — 71045 X-RAY EXAM CHEST 1 VIEW: CPT

## 2019-11-12 PROCEDURE — 87581 M.PNEUMON DNA AMP PROBE: CPT

## 2019-11-12 PROCEDURE — 87633 RESP VIRUS 12-25 TARGETS: CPT

## 2019-11-12 PROCEDURE — 80076 HEPATIC FUNCTION PANEL: CPT

## 2019-11-12 PROCEDURE — 87486 CHLMYD PNEUM DNA AMP PROBE: CPT

## 2019-11-12 PROCEDURE — 76705 ECHO EXAM OF ABDOMEN: CPT

## 2019-11-12 PROCEDURE — 83550 IRON BINDING TEST: CPT

## 2019-11-12 PROCEDURE — 83735 ASSAY OF MAGNESIUM: CPT

## 2019-11-12 PROCEDURE — 81001 URINALYSIS AUTO W/SCOPE: CPT

## 2019-11-12 PROCEDURE — 82607 VITAMIN B-12: CPT

## 2019-11-12 PROCEDURE — 82553 CREATINE MB FRACTION: CPT

## 2019-11-12 PROCEDURE — 70450 CT HEAD/BRAIN W/O DYE: CPT

## 2019-11-12 PROCEDURE — 86900 BLOOD TYPING SEROLOGIC ABO: CPT

## 2019-11-12 PROCEDURE — 82728 ASSAY OF FERRITIN: CPT

## 2019-11-12 PROCEDURE — 36415 COLL VENOUS BLD VENIPUNCTURE: CPT

## 2019-11-12 PROCEDURE — 83036 HEMOGLOBIN GLYCOSYLATED A1C: CPT

## 2019-11-12 PROCEDURE — 84145 PROCALCITONIN (PCT): CPT

## 2019-11-12 PROCEDURE — 93306 TTE W/DOPPLER COMPLETE: CPT

## 2019-11-12 PROCEDURE — 84156 ASSAY OF PROTEIN URINE: CPT

## 2019-11-12 PROCEDURE — 85027 COMPLETE CBC AUTOMATED: CPT

## 2019-11-12 PROCEDURE — 92610 EVALUATE SWALLOWING FUNCTION: CPT

## 2019-11-12 PROCEDURE — 74176 CT ABD & PELVIS W/O CONTRAST: CPT

## 2019-11-12 PROCEDURE — 97163 PT EVAL HIGH COMPLEX 45 MIN: CPT

## 2019-11-12 PROCEDURE — 81003 URINALYSIS AUTO W/O SCOPE: CPT

## 2019-11-12 PROCEDURE — 83605 ASSAY OF LACTIC ACID: CPT

## 2019-12-12 ENCOUNTER — APPOINTMENT (OUTPATIENT)
Dept: CARDIOLOGY | Facility: CLINIC | Age: 84
End: 2019-12-12

## 2019-12-18 NOTE — CONSULT NOTE ADULT - PROBLEM SELECTOR RECOMMENDATION 6
----- Message from Shu Stokes MD sent at 12/18/2019  9:37 AM EST -----  Please call the patient regarding her abnormal result.  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  This is what is causing her issue.  It is not a surgical problem, but it will not get any better.  I would recommend her doing strengthening exercises for her lower back and stretching to minimize pain.  If the pain gets worse, we can always do pain management as we talked about when she was here.  She wanted more of a what to expect outlook, and I think this is going to be a chronic thing that she is going to have to deal with from now on.   Wife states dx with dementia 1 year ago. Mainly forgetful + ambulatory and able to do most ADLs. However, he does not have any concept of time or place.  She does have a  that stays with him when she works.  cont Namenda and Aricept

## 2019-12-19 ENCOUNTER — NON-APPOINTMENT (OUTPATIENT)
Age: 84
End: 2019-12-19

## 2019-12-19 ENCOUNTER — APPOINTMENT (OUTPATIENT)
Dept: CARDIOLOGY | Facility: CLINIC | Age: 84
End: 2019-12-19
Payer: MEDICARE

## 2019-12-19 VITALS — SYSTOLIC BLOOD PRESSURE: 98 MMHG | DIASTOLIC BLOOD PRESSURE: 60 MMHG

## 2019-12-19 VITALS
HEART RATE: 76 BPM | WEIGHT: 173 LBS | DIASTOLIC BLOOD PRESSURE: 63 MMHG | OXYGEN SATURATION: 98 % | HEIGHT: 67 IN | BODY MASS INDEX: 27.15 KG/M2 | SYSTOLIC BLOOD PRESSURE: 106 MMHG

## 2019-12-19 DIAGNOSIS — Z83.3 FAMILY HISTORY OF DIABETES MELLITUS: ICD-10-CM

## 2019-12-19 DIAGNOSIS — E11.29 TYPE 2 DIABETES MELLITUS WITH OTHER DIABETIC KIDNEY COMPLICATION: ICD-10-CM

## 2019-12-19 DIAGNOSIS — Z82.3 FAMILY HISTORY OF STROKE: ICD-10-CM

## 2019-12-19 DIAGNOSIS — Z29.9 ENCOUNTER FOR PROPHYLACTIC MEASURES, UNSPECIFIED: ICD-10-CM

## 2019-12-19 DIAGNOSIS — F39 UNSPECIFIED MOOD [AFFECTIVE] DISORDER: ICD-10-CM

## 2019-12-19 DIAGNOSIS — E03.9 HYPOTHYROIDISM, UNSPECIFIED: ICD-10-CM

## 2019-12-19 PROCEDURE — 99214 OFFICE O/P EST MOD 30 MIN: CPT

## 2019-12-19 PROCEDURE — 93000 ELECTROCARDIOGRAM COMPLETE: CPT

## 2019-12-19 RX ORDER — VORTIOXETINE 5 MG/1
5 TABLET, FILM COATED ORAL
Refills: 0 | Status: DISCONTINUED | COMMUNITY
End: 2019-12-19

## 2019-12-19 RX ORDER — BACILLUS COAGULANS/INULIN 1B-250 MG
CAPSULE ORAL
Refills: 0 | Status: DISCONTINUED | COMMUNITY
End: 2019-12-19

## 2019-12-19 RX ORDER — PANTOPRAZOLE 40 MG/1
40 TABLET, DELAYED RELEASE ORAL
Refills: 0 | Status: DISCONTINUED | COMMUNITY
End: 2019-12-19

## 2019-12-19 RX ORDER — INSULIN GLARGINE 300 U/ML
300 INJECTION, SOLUTION SUBCUTANEOUS
Refills: 0 | Status: DISCONTINUED | COMMUNITY
End: 2019-12-19

## 2019-12-19 RX ORDER — MV-MIN/FOLIC/K1/LYCOPEN/LUTEIN 300-60 MCG
TABLET ORAL
Refills: 0 | Status: DISCONTINUED | COMMUNITY
End: 2019-12-19

## 2019-12-19 RX ORDER — OMEGA-3-ACID ETHYL ESTERS 1 G/1
1 CAPSULE, LIQUID FILLED ORAL
Refills: 0 | Status: DISCONTINUED | COMMUNITY
End: 2019-12-19

## 2019-12-19 RX ORDER — PITAVASTATIN CALCIUM 2.09 MG/1
2 TABLET, FILM COATED ORAL
Qty: 90 | Refills: 1 | Status: DISCONTINUED | COMMUNITY
End: 2019-12-19

## 2019-12-19 RX ORDER — HEPARIN SODIUM 5000 [USP'U]/.5ML
5000 INJECTION, SOLUTION INTRAVENOUS; SUBCUTANEOUS 3 TIMES DAILY
Refills: 0 | Status: DISCONTINUED | COMMUNITY
Start: 2019-12-19 | End: 2019-12-19

## 2019-12-19 RX ORDER — GLYCOPYRROLATE AND FORMOTEROL FUMARATE 9; 4.8 UG/1; UG/1
9-4.8 AEROSOL, METERED RESPIRATORY (INHALATION)
Refills: 0 | Status: DISCONTINUED | COMMUNITY
End: 2019-12-19

## 2019-12-19 RX ORDER — ROSUVASTATIN CALCIUM 20 MG/1
20 TABLET, FILM COATED ORAL
Refills: 0 | Status: DISCONTINUED | COMMUNITY
End: 2019-12-19

## 2019-12-19 RX ORDER — GUAIFENESIN AND DEXTROMETHORPHAN HYDROBROMIDE 600; 30 MG/1; MG/1
30-600 TABLET, EXTENDED RELEASE ORAL
Refills: 0 | Status: DISCONTINUED | COMMUNITY
End: 2019-12-19

## 2019-12-19 RX ORDER — ALBUTEROL SULFATE 2 MG/5ML
2 SYRUP ORAL
Refills: 0 | Status: DISCONTINUED | COMMUNITY
End: 2019-12-19

## 2019-12-19 RX ORDER — DIVALPROEX SODIUM 500 MG/1
500 TABLET, DELAYED RELEASE ORAL
Refills: 0 | Status: DISCONTINUED | COMMUNITY
Start: 2019-12-19 | End: 2019-12-19

## 2019-12-19 RX ORDER — DONEPEZIL HYDROCHLORIDE 5 MG/1
5 TABLET, FILM COATED ORAL
Refills: 0 | Status: ACTIVE | COMMUNITY
Start: 2019-12-19

## 2019-12-19 RX ORDER — INSULIN GLARGINE 100 [IU]/ML
100 INJECTION, SOLUTION SUBCUTANEOUS
Refills: 0 | Status: DISCONTINUED | COMMUNITY
Start: 2019-12-19 | End: 2019-12-19

## 2019-12-19 RX ORDER — LACTOSE-REDUCED FOOD
LIQUID (ML) ORAL
Refills: 0 | Status: DISCONTINUED | COMMUNITY
End: 2019-12-19

## 2019-12-19 RX ORDER — UMECLIDINIUM BROMIDE AND VILANTEROL TRIFENATATE 62.5; 25 UG/1; UG/1
62.5-25 POWDER RESPIRATORY (INHALATION)
Refills: 0 | Status: DISCONTINUED | COMMUNITY
Start: 2019-12-19 | End: 2019-12-19

## 2019-12-31 ENCOUNTER — RX RENEWAL (OUTPATIENT)
Age: 84
End: 2019-12-31

## 2020-01-11 NOTE — HISTORY OF PRESENT ILLNESS
[FreeTextEntry1] : Patient with history of ckd, dementia, afib, CHF, COPD. Initially presented to [facility] and noted to have afib with RVR which was medically managed. Converted to sinus rhythm spontaneously. \par Noted to have mildly elevated troponins with normal ejection fraction. Due to his dementia, advanced age, ckd, medical management was suggested. \par \par 12/2019- No further cardiac symptoms.

## 2020-01-11 NOTE — DISCUSSION/SUMMARY
[FreeTextEntry1] : 1. CHF- Restarted on torsemide. \par 2. Afib- Not on AC. Frail and concern for falls. Discussed watchman. Now in sinus. \par 3. Follow up in 3 months.

## 2020-01-11 NOTE — PHYSICAL EXAM
[General Appearance - Well Developed] : well developed [Normal Conjunctiva] : the conjunctiva exhibited no abnormalities [Normal Jugular Venous V Waves Present] : normal jugular venous V waves present [Normal Oropharynx] : normal oropharynx [] : no respiratory distress [Heart Rate And Rhythm] : heart rate and rhythm were normal [Bowel Sounds] : normal bowel sounds [Skin Color & Pigmentation] : normal skin color and pigmentation [FreeTextEntry1] : limitefd gait [Nail Clubbing] : no clubbing of the fingernails [Oriented To Time, Place, And Person] : oriented to person, place, and time

## 2020-02-13 ENCOUNTER — NON-APPOINTMENT (OUTPATIENT)
Age: 85
End: 2020-02-13

## 2020-02-13 ENCOUNTER — APPOINTMENT (OUTPATIENT)
Dept: CARDIOLOGY | Facility: CLINIC | Age: 85
End: 2020-02-13
Payer: MEDICARE

## 2020-02-13 VITALS
HEART RATE: 68 BPM | SYSTOLIC BLOOD PRESSURE: 111 MMHG | RESPIRATION RATE: 18 BRPM | OXYGEN SATURATION: 96 % | DIASTOLIC BLOOD PRESSURE: 70 MMHG

## 2020-02-13 VITALS — WEIGHT: 170 LBS | HEIGHT: 67 IN | BODY MASS INDEX: 26.68 KG/M2

## 2020-02-13 PROCEDURE — 93000 ELECTROCARDIOGRAM COMPLETE: CPT

## 2020-02-13 PROCEDURE — 99214 OFFICE O/P EST MOD 30 MIN: CPT

## 2020-02-13 RX ORDER — MULTIVIT-MIN/FA/LYCOPEN/LUTEIN .4-300-25
TABLET ORAL DAILY
Refills: 0 | Status: ACTIVE | COMMUNITY

## 2020-06-29 ENCOUNTER — NON-APPOINTMENT (OUTPATIENT)
Age: 85
End: 2020-06-29

## 2020-07-09 ENCOUNTER — APPOINTMENT (OUTPATIENT)
Dept: CARDIOLOGY | Facility: CLINIC | Age: 85
End: 2020-07-09
Payer: MEDICARE

## 2020-07-09 ENCOUNTER — NON-APPOINTMENT (OUTPATIENT)
Age: 85
End: 2020-07-09

## 2020-07-09 ENCOUNTER — APPOINTMENT (OUTPATIENT)
Dept: GASTROENTEROLOGY | Facility: CLINIC | Age: 85
End: 2020-07-09
Payer: MEDICARE

## 2020-07-09 VITALS
RESPIRATION RATE: 16 BRPM | DIASTOLIC BLOOD PRESSURE: 62 MMHG | HEART RATE: 68 BPM | OXYGEN SATURATION: 94 % | TEMPERATURE: 98.2 F | SYSTOLIC BLOOD PRESSURE: 90 MMHG | BODY MASS INDEX: 25.11 KG/M2 | WEIGHT: 160 LBS | HEIGHT: 67 IN

## 2020-07-09 VITALS
WEIGHT: 160 LBS | BODY MASS INDEX: 25.11 KG/M2 | SYSTOLIC BLOOD PRESSURE: 93 MMHG | TEMPERATURE: 97.6 F | HEART RATE: 64 BPM | DIASTOLIC BLOOD PRESSURE: 56 MMHG | HEIGHT: 67 IN

## 2020-07-09 PROCEDURE — 99214 OFFICE O/P EST MOD 30 MIN: CPT

## 2020-07-09 PROCEDURE — 93000 ELECTROCARDIOGRAM COMPLETE: CPT

## 2020-07-09 RX ORDER — INSULIN LISPRO 100 [IU]/ML
INJECTION, SOLUTION INTRAVENOUS; SUBCUTANEOUS
Refills: 0 | Status: DISCONTINUED | COMMUNITY
End: 2020-07-09

## 2020-07-09 NOTE — PHYSICAL EXAM
[General Appearance - Alert] : alert [General Appearance - In No Acute Distress] : in no acute distress [Sclera] : the sclera and conjunctiva were normal [PERRL With Normal Accommodation] : pupils were equal in size, round, and reactive to light [Extraocular Movements] : extraocular movements were intact [Neck Appearance] : the appearance of the neck was normal [Neck Cervical Mass (___cm)] : no neck mass was observed [Jugular Venous Distention Increased] : there was no jugular-venous distention [Thyroid Diffuse Enlargement] : the thyroid was not enlarged [Thyroid Nodule] : there were no palpable thyroid nodules [Auscultation Breath Sounds / Voice Sounds] : lungs were clear to auscultation bilaterally [Heart Rate And Rhythm] : heart rate was normal and rhythm regular [Heart Sounds] : normal S1 and S2 [Heart Sounds Gallop] : no gallops [Murmurs] : no murmurs [Bowel Sounds] : normal bowel sounds [Heart Sounds Pericardial Friction Rub] : no pericardial rub [Abdomen Soft] : soft [Abdomen Tenderness] : non-tender [] : no hepato-splenomegaly [Abdomen Mass (___ Cm)] : no abdominal mass palpated [Normal Sphincter Tone] : normal sphincter tone [No Rectal Mass] : no rectal mass [Occult Blood Positive] : stool was negative for occult blood [FreeTextEntry1] : dementia

## 2020-07-09 NOTE — ASSESSMENT
[FreeTextEntry1] : I discussed with the patient and his family at the moment there is nothing obvious on physical examination.He had recent blood work performed which I asked them to send me for review. They were concerned he may have gallbladder disease and I ordered a sonogram to see if in fact is any pathology causing his symptoms. Pending the results of this test will determine further workup and treatment.

## 2020-07-10 ENCOUNTER — APPOINTMENT (OUTPATIENT)
Dept: ULTRASOUND IMAGING | Facility: CLINIC | Age: 85
End: 2020-07-10
Payer: MEDICARE

## 2020-07-10 ENCOUNTER — OUTPATIENT (OUTPATIENT)
Dept: OUTPATIENT SERVICES | Facility: HOSPITAL | Age: 85
LOS: 1 days | End: 2020-07-10
Payer: MEDICARE

## 2020-07-10 DIAGNOSIS — Z98.890 OTHER SPECIFIED POSTPROCEDURAL STATES: Chronic | ICD-10-CM

## 2020-07-10 DIAGNOSIS — Z00.00 ENCOUNTER FOR GENERAL ADULT MEDICAL EXAMINATION WITHOUT ABNORMAL FINDINGS: ICD-10-CM

## 2020-07-10 DIAGNOSIS — R10.11 RIGHT UPPER QUADRANT PAIN: ICD-10-CM

## 2020-07-10 PROCEDURE — 76700 US EXAM ABDOM COMPLETE: CPT | Mod: 26

## 2020-07-10 PROCEDURE — 76700 US EXAM ABDOM COMPLETE: CPT

## 2020-07-13 ENCOUNTER — APPOINTMENT (OUTPATIENT)
Dept: DERMATOLOGY | Facility: CLINIC | Age: 85
End: 2020-07-13
Payer: MEDICARE

## 2020-07-13 PROCEDURE — 99214 OFFICE O/P EST MOD 30 MIN: CPT | Mod: 25

## 2020-07-13 PROCEDURE — 10120 INC&RMVL FB SUBQ TISS SMPL: CPT

## 2020-07-23 DIAGNOSIS — R10.9 UNSPECIFIED ABDOMINAL PAIN: ICD-10-CM

## 2020-08-18 ENCOUNTER — APPOINTMENT (OUTPATIENT)
Dept: CARDIOLOGY | Facility: CLINIC | Age: 85
End: 2020-08-18

## 2020-08-24 NOTE — DIETITIAN INITIAL EVALUATION ADULT. - ADD RECOMMEND
No cyanosis, no pallor, no jaundice, no rash
Encourage continued good po intake at all meals, monitor tolerance. Obtain daily weights to monitor trends. RD to follow up with diet education PRN.

## 2020-10-08 ENCOUNTER — APPOINTMENT (OUTPATIENT)
Dept: GASTROENTEROLOGY | Facility: CLINIC | Age: 85
End: 2020-10-08
Payer: MEDICARE

## 2020-10-08 VITALS
DIASTOLIC BLOOD PRESSURE: 62 MMHG | HEIGHT: 67 IN | BODY MASS INDEX: 25.74 KG/M2 | WEIGHT: 164 LBS | RESPIRATION RATE: 15 BRPM | HEART RATE: 67 BPM | SYSTOLIC BLOOD PRESSURE: 98 MMHG | OXYGEN SATURATION: 97 % | TEMPERATURE: 97.3 F

## 2020-10-08 DIAGNOSIS — R10.11 RIGHT UPPER QUADRANT PAIN: ICD-10-CM

## 2020-10-08 PROCEDURE — 99214 OFFICE O/P EST MOD 30 MIN: CPT

## 2020-10-08 NOTE — HISTORY OF PRESENT ILLNESS
[de-identified] : lise returns with similar complaints that he's had a past 3 times the last week he had some lower abdominal pain primarily right lower quadrant and a tiny couple episodes of nonbloody vomitus. The family thinks the pain is worse after meals sometimes but not all the time and the patient denies even having pain. Patient had repeat imaging including sonogram and x-rays which were unremarkable.His hemoglobin was 10.8 with normocytic normochromic patient does take iron and folate but his folate levels normal and is heard and was elevated. Patient also takes injections because of his diabetes into his lower abdomen.the daughter was also concerned about possible return of being and requested that to be checked as well.

## 2020-10-08 NOTE — ASSESSMENT
[FreeTextEntry1] : i discussed with the daughter and the wife that the pain could be related to the ecchymoses he has from continued injections in the same spots and advised them to rotate the injections. In addition I did explain also that iron can give abdominal pain and since he does not have iron deficiency anemia this should be stopped. The folic also be stopped since his folate levels normal area another possibility would be mesenteric ischemia but the patient cannot take contrast with CT air angiogram is have the question and therapy little that can be done anyway since he is not an operative candidate. However he is seeing a vascular surgeon next week.The family will call in 2 weeks to let me know how he is doing with this pain after stopping the iron and rotating his injections

## 2020-10-08 NOTE — PHYSICAL EXAM
[General Appearance - Alert] : alert [General Appearance - In No Acute Distress] : in no acute distress [Sclera] : the sclera and conjunctiva were normal [PERRL With Normal Accommodation] : pupils were equal in size, round, and reactive to light [Extraocular Movements] : extraocular movements were intact [Neck Appearance] : the appearance of the neck was normal [Neck Cervical Mass (___cm)] : no neck mass was observed [Jugular Venous Distention Increased] : there was no jugular-venous distention [Thyroid Diffuse Enlargement] : the thyroid was not enlarged [Thyroid Nodule] : there were no palpable thyroid nodules [] : no respiratory distress [Auscultation Breath Sounds / Voice Sounds] : lungs were clear to auscultation bilaterally [Heart Rate And Rhythm] : heart rate was normal and rhythm regular [Heart Sounds] : normal S1 and S2 [Heart Sounds Gallop] : no gallops [Murmurs] : no murmurs [Heart Sounds Pericardial Friction Rub] : no pericardial rub [Bowel Sounds] : normal bowel sounds [Abdomen Soft] : soft [Abdomen Tenderness] : non-tender [Normal Sphincter Tone] : normal sphincter tone [No Rectal Mass] : no rectal mass [Occult Blood Positive] : stool was negative for occult blood [FreeTextEntry1] : ecchymosis

## 2020-10-15 ENCOUNTER — APPOINTMENT (OUTPATIENT)
Dept: CARDIOLOGY | Facility: CLINIC | Age: 85
End: 2020-10-15
Payer: MEDICARE

## 2020-10-15 ENCOUNTER — NON-APPOINTMENT (OUTPATIENT)
Age: 85
End: 2020-10-15

## 2020-10-15 VITALS — OXYGEN SATURATION: 96 % | SYSTOLIC BLOOD PRESSURE: 114 MMHG | HEART RATE: 60 BPM | DIASTOLIC BLOOD PRESSURE: 75 MMHG

## 2020-10-15 PROCEDURE — 99214 OFFICE O/P EST MOD 30 MIN: CPT

## 2020-10-15 PROCEDURE — 93000 ELECTROCARDIOGRAM COMPLETE: CPT

## 2020-10-15 RX ORDER — ERYTHROPOIETIN 10000 [IU]/ML
10000 INJECTION, SOLUTION INTRAVENOUS; SUBCUTANEOUS
Refills: 0 | Status: DISCONTINUED | COMMUNITY
End: 2020-10-15

## 2020-10-15 RX ORDER — CHLORHEXIDINE GLUCONATE 4 %
325 (65 FE) LIQUID (ML) TOPICAL DAILY
Refills: 0 | Status: DISCONTINUED | COMMUNITY
Start: 2019-12-19 | End: 2020-10-15

## 2020-10-15 RX ORDER — TORSEMIDE 20 MG/1
20 TABLET ORAL DAILY
Refills: 0 | Status: DISCONTINUED | COMMUNITY
End: 2020-10-15

## 2020-10-15 RX ORDER — FOLIC ACID 1 MG/1
1 TABLET ORAL DAILY
Qty: 90 | Refills: 3 | Status: DISCONTINUED | COMMUNITY
Start: 2019-12-19 | End: 2020-10-15

## 2020-10-15 RX ORDER — ERYTHROPOIETIN 2000 [IU]/ML
2000 INJECTION, SOLUTION INTRAVENOUS; SUBCUTANEOUS
Refills: 0 | Status: DISCONTINUED | COMMUNITY
Start: 2020-07-09 | End: 2020-10-15

## 2020-10-15 RX ORDER — UBIDECARENONE 100 MG
100 CAPSULE ORAL
Refills: 0 | Status: DISCONTINUED | COMMUNITY
Start: 2019-12-19 | End: 2020-10-15

## 2020-10-15 RX ORDER — DOCUSATE SODIUM 100 MG/1
100 CAPSULE, LIQUID FILLED ORAL TWICE DAILY
Refills: 0 | Status: DISCONTINUED | COMMUNITY
Start: 2019-12-19 | End: 2020-10-15

## 2020-10-15 RX ORDER — INSULIN ASPART 100 [IU]/ML
100 INJECTION, SOLUTION INTRAVENOUS; SUBCUTANEOUS
Refills: 0 | Status: DISCONTINUED | COMMUNITY
End: 2020-10-15

## 2020-10-15 RX ORDER — PANTOPRAZOLE 40 MG/1
40 TABLET, DELAYED RELEASE ORAL DAILY
Refills: 4 | Status: DISCONTINUED | COMMUNITY
Start: 2019-12-19 | End: 2020-10-15

## 2020-11-18 ENCOUNTER — NON-APPOINTMENT (OUTPATIENT)
Age: 85
End: 2020-11-18

## 2020-11-18 DIAGNOSIS — R11.10 VOMITING, UNSPECIFIED: ICD-10-CM

## 2020-12-04 ENCOUNTER — OUTPATIENT (OUTPATIENT)
Dept: OUTPATIENT SERVICES | Facility: HOSPITAL | Age: 85
LOS: 1 days | End: 2020-12-04
Payer: MEDICARE

## 2020-12-04 ENCOUNTER — RESULT REVIEW (OUTPATIENT)
Age: 85
End: 2020-12-04

## 2020-12-04 DIAGNOSIS — R11.10 VOMITING, UNSPECIFIED: ICD-10-CM

## 2020-12-04 DIAGNOSIS — Z98.890 OTHER SPECIFIED POSTPROCEDURAL STATES: Chronic | ICD-10-CM

## 2020-12-04 PROCEDURE — 74240 X-RAY XM UPR GI TRC 1CNTRST: CPT | Mod: 26

## 2020-12-04 PROCEDURE — 74240 X-RAY XM UPR GI TRC 1CNTRST: CPT

## 2020-12-15 ENCOUNTER — NON-APPOINTMENT (OUTPATIENT)
Age: 85
End: 2020-12-15

## 2021-03-09 ENCOUNTER — RX RENEWAL (OUTPATIENT)
Age: 86
End: 2021-03-09

## 2021-04-06 ENCOUNTER — APPOINTMENT (OUTPATIENT)
Dept: CARDIOLOGY | Facility: CLINIC | Age: 86
End: 2021-04-06
Payer: MEDICARE

## 2021-04-06 ENCOUNTER — NON-APPOINTMENT (OUTPATIENT)
Age: 86
End: 2021-04-06

## 2021-04-06 VITALS
SYSTOLIC BLOOD PRESSURE: 123 MMHG | HEART RATE: 63 BPM | HEIGHT: 67 IN | DIASTOLIC BLOOD PRESSURE: 70 MMHG | OXYGEN SATURATION: 94 % | BODY MASS INDEX: 25.27 KG/M2 | WEIGHT: 161 LBS | TEMPERATURE: 97.3 F

## 2021-04-06 PROCEDURE — 93000 ELECTROCARDIOGRAM COMPLETE: CPT

## 2021-04-06 PROCEDURE — 99214 OFFICE O/P EST MOD 30 MIN: CPT

## 2021-04-06 RX ORDER — DIVALPROEX SODIUM 250 MG/1
250 TABLET, DELAYED RELEASE ORAL
Refills: 0 | Status: DISCONTINUED | COMMUNITY
Start: 2019-12-19 | End: 2021-04-06

## 2021-04-06 RX ORDER — INSULIN ASPART 100 [IU]/ML
100 INJECTION, SOLUTION INTRAVENOUS; SUBCUTANEOUS
Refills: 0 | Status: DISCONTINUED | COMMUNITY
Start: 2020-07-09 | End: 2021-04-06

## 2021-04-08 ENCOUNTER — APPOINTMENT (OUTPATIENT)
Dept: CARDIOLOGY | Facility: CLINIC | Age: 86
End: 2021-04-08
Payer: MEDICARE

## 2021-04-08 PROCEDURE — 93880 EXTRACRANIAL BILAT STUDY: CPT

## 2021-04-08 PROCEDURE — 93306 TTE W/DOPPLER COMPLETE: CPT

## 2021-04-16 ENCOUNTER — APPOINTMENT (OUTPATIENT)
Dept: DERMATOLOGY | Facility: CLINIC | Age: 86
End: 2021-04-16
Payer: MEDICARE

## 2021-04-16 PROCEDURE — 99214 OFFICE O/P EST MOD 30 MIN: CPT | Mod: 25

## 2021-04-16 PROCEDURE — 17000 DESTRUCT PREMALG LESION: CPT

## 2021-04-19 ENCOUNTER — EMERGENCY (EMERGENCY)
Facility: HOSPITAL | Age: 86
LOS: 1 days | Discharge: HOME | End: 2021-04-19
Attending: STUDENT IN AN ORGANIZED HEALTH CARE EDUCATION/TRAINING PROGRAM
Payer: MEDICARE

## 2021-04-19 VITALS
TEMPERATURE: 98 F | OXYGEN SATURATION: 96 % | WEIGHT: 145.06 LBS | DIASTOLIC BLOOD PRESSURE: 81 MMHG | RESPIRATION RATE: 14 BRPM | HEART RATE: 62 BPM | SYSTOLIC BLOOD PRESSURE: 126 MMHG | HEIGHT: 68 IN

## 2021-04-19 DIAGNOSIS — Z98.890 OTHER SPECIFIED POSTPROCEDURAL STATES: Chronic | ICD-10-CM

## 2021-04-19 LAB
ALBUMIN SERPL ELPH-MCNC: 4 G/DL — SIGNIFICANT CHANGE UP (ref 3.3–5.2)
ALP SERPL-CCNC: 249 U/L — HIGH (ref 40–120)
ALT FLD-CCNC: 35 U/L — SIGNIFICANT CHANGE UP
ANION GAP SERPL CALC-SCNC: 17 MMOL/L — SIGNIFICANT CHANGE UP (ref 5–17)
APPEARANCE UR: CLEAR — SIGNIFICANT CHANGE UP
APTT BLD: 30.2 SEC — SIGNIFICANT CHANGE UP (ref 27.5–35.5)
AST SERPL-CCNC: 38 U/L — SIGNIFICANT CHANGE UP
BACTERIA # UR AUTO: ABNORMAL
BASOPHILS # BLD AUTO: 0.03 K/UL — SIGNIFICANT CHANGE UP (ref 0–0.2)
BASOPHILS NFR BLD AUTO: 0.3 % — SIGNIFICANT CHANGE UP (ref 0–2)
BILIRUB SERPL-MCNC: 0.6 MG/DL — SIGNIFICANT CHANGE UP (ref 0.4–2)
BILIRUB UR-MCNC: NEGATIVE — SIGNIFICANT CHANGE UP
BUN SERPL-MCNC: 115 MG/DL — HIGH (ref 8–20)
CALCIUM SERPL-MCNC: 10.1 MG/DL — SIGNIFICANT CHANGE UP (ref 8.6–10.2)
CHLORIDE SERPL-SCNC: 90 MMOL/L — LOW (ref 98–107)
CO2 SERPL-SCNC: 31 MMOL/L — HIGH (ref 22–29)
COLOR SPEC: YELLOW — SIGNIFICANT CHANGE UP
CREAT SERPL-MCNC: 2.87 MG/DL — HIGH (ref 0.5–1.3)
DIFF PNL FLD: NEGATIVE — SIGNIFICANT CHANGE UP
EOSINOPHIL # BLD AUTO: 0.23 K/UL — SIGNIFICANT CHANGE UP (ref 0–0.5)
EOSINOPHIL NFR BLD AUTO: 2.1 % — SIGNIFICANT CHANGE UP (ref 0–6)
EPI CELLS # UR: SIGNIFICANT CHANGE UP
GLUCOSE SERPL-MCNC: 207 MG/DL — HIGH (ref 70–99)
GLUCOSE UR QL: NEGATIVE MG/DL — SIGNIFICANT CHANGE UP
HCT VFR BLD CALC: 40.4 % — SIGNIFICANT CHANGE UP (ref 39–50)
HGB BLD-MCNC: 13.4 G/DL — SIGNIFICANT CHANGE UP (ref 13–17)
IMM GRANULOCYTES NFR BLD AUTO: 0.3 % — SIGNIFICANT CHANGE UP (ref 0–1.5)
INR BLD: 1.14 RATIO — SIGNIFICANT CHANGE UP (ref 0.88–1.16)
KETONES UR-MCNC: NEGATIVE — SIGNIFICANT CHANGE UP
LACTATE BLDV-MCNC: 1.2 MMOL/L — SIGNIFICANT CHANGE UP (ref 0.5–2)
LEUKOCYTE ESTERASE UR-ACNC: NEGATIVE — SIGNIFICANT CHANGE UP
LIDOCAIN IGE QN: 53 U/L — HIGH (ref 22–51)
LYMPHOCYTES # BLD AUTO: 1.98 K/UL — SIGNIFICANT CHANGE UP (ref 1–3.3)
LYMPHOCYTES # BLD AUTO: 17.9 % — SIGNIFICANT CHANGE UP (ref 13–44)
MAGNESIUM SERPL-MCNC: 3.1 MG/DL — HIGH (ref 1.8–2.6)
MCHC RBC-ENTMCNC: 29.4 PG — SIGNIFICANT CHANGE UP (ref 27–34)
MCHC RBC-ENTMCNC: 33.2 GM/DL — SIGNIFICANT CHANGE UP (ref 32–36)
MCV RBC AUTO: 88.6 FL — SIGNIFICANT CHANGE UP (ref 80–100)
MONOCYTES # BLD AUTO: 0.92 K/UL — HIGH (ref 0–0.9)
MONOCYTES NFR BLD AUTO: 8.3 % — SIGNIFICANT CHANGE UP (ref 2–14)
NEUTROPHILS # BLD AUTO: 7.86 K/UL — HIGH (ref 1.8–7.4)
NEUTROPHILS NFR BLD AUTO: 71.1 % — SIGNIFICANT CHANGE UP (ref 43–77)
NITRITE UR-MCNC: NEGATIVE — SIGNIFICANT CHANGE UP
PH UR: 6 — SIGNIFICANT CHANGE UP (ref 5–8)
PLATELET # BLD AUTO: 187 K/UL — SIGNIFICANT CHANGE UP (ref 150–400)
POTASSIUM SERPL-MCNC: 4.3 MMOL/L — SIGNIFICANT CHANGE UP (ref 3.5–5.3)
POTASSIUM SERPL-SCNC: 4.3 MMOL/L — SIGNIFICANT CHANGE UP (ref 3.5–5.3)
PROT SERPL-MCNC: 8 G/DL — SIGNIFICANT CHANGE UP (ref 6.6–8.7)
PROT UR-MCNC: 30 MG/DL
PROTHROM AB SERPL-ACNC: 13.1 SEC — SIGNIFICANT CHANGE UP (ref 10.6–13.6)
RBC # BLD: 4.56 M/UL — SIGNIFICANT CHANGE UP (ref 4.2–5.8)
RBC # FLD: 13.9 % — SIGNIFICANT CHANGE UP (ref 10.3–14.5)
RBC CASTS # UR COMP ASSIST: SIGNIFICANT CHANGE UP /HPF (ref 0–4)
SODIUM SERPL-SCNC: 138 MMOL/L — SIGNIFICANT CHANGE UP (ref 135–145)
SP GR SPEC: 1.01 — SIGNIFICANT CHANGE UP (ref 1.01–1.02)
UROBILINOGEN FLD QL: NEGATIVE MG/DL — SIGNIFICANT CHANGE UP
WBC # BLD: 11.05 K/UL — HIGH (ref 3.8–10.5)
WBC # FLD AUTO: 11.05 K/UL — HIGH (ref 3.8–10.5)
WBC UR QL: SIGNIFICANT CHANGE UP

## 2021-04-19 PROCEDURE — 71045 X-RAY EXAM CHEST 1 VIEW: CPT

## 2021-04-19 PROCEDURE — 82962 GLUCOSE BLOOD TEST: CPT

## 2021-04-19 PROCEDURE — 74176 CT ABD & PELVIS W/O CONTRAST: CPT | Mod: 26,ME

## 2021-04-19 PROCEDURE — 85025 COMPLETE CBC W/AUTO DIFF WBC: CPT

## 2021-04-19 PROCEDURE — 83735 ASSAY OF MAGNESIUM: CPT

## 2021-04-19 PROCEDURE — 85610 PROTHROMBIN TIME: CPT

## 2021-04-19 PROCEDURE — 81001 URINALYSIS AUTO W/SCOPE: CPT

## 2021-04-19 PROCEDURE — 93010 ELECTROCARDIOGRAM REPORT: CPT

## 2021-04-19 PROCEDURE — 99284 EMERGENCY DEPT VISIT MOD MDM: CPT | Mod: 25

## 2021-04-19 PROCEDURE — G1004: CPT

## 2021-04-19 PROCEDURE — 85730 THROMBOPLASTIN TIME PARTIAL: CPT

## 2021-04-19 PROCEDURE — 71045 X-RAY EXAM CHEST 1 VIEW: CPT | Mod: 26

## 2021-04-19 PROCEDURE — 83690 ASSAY OF LIPASE: CPT

## 2021-04-19 PROCEDURE — 74176 CT ABD & PELVIS W/O CONTRAST: CPT

## 2021-04-19 PROCEDURE — 99285 EMERGENCY DEPT VISIT HI MDM: CPT | Mod: GC

## 2021-04-19 PROCEDURE — 80053 COMPREHEN METABOLIC PANEL: CPT

## 2021-04-19 PROCEDURE — 36415 COLL VENOUS BLD VENIPUNCTURE: CPT

## 2021-04-19 PROCEDURE — 93005 ELECTROCARDIOGRAM TRACING: CPT

## 2021-04-19 PROCEDURE — 87086 URINE CULTURE/COLONY COUNT: CPT

## 2021-04-19 PROCEDURE — 83605 ASSAY OF LACTIC ACID: CPT

## 2021-04-19 RX ORDER — INSULIN LISPRO 100/ML
VIAL (ML) SUBCUTANEOUS
Refills: 0 | Status: DISCONTINUED | OUTPATIENT
Start: 2021-04-19 | End: 2021-04-24

## 2021-04-19 RX ORDER — IOHEXOL 300 MG/ML
30 INJECTION, SOLUTION INTRAVENOUS ONCE
Refills: 0 | Status: COMPLETED | OUTPATIENT
Start: 2021-04-19 | End: 2021-04-19

## 2021-04-19 RX ADMIN — Medication 4: at 21:55

## 2021-04-19 RX ADMIN — IOHEXOL 30 MILLILITER(S): 300 INJECTION, SOLUTION INTRAVENOUS at 21:07

## 2021-04-19 NOTE — ED PROVIDER NOTE - NSFOLLOWUPINSTRUCTIONS_ED_ALL_ED_FT
- PLEASE FOLLOW UP WITH NEPHROLOGIST AND GASTROENTEROLOGIST.  - RETURN TO THE EMERGENCY ROOM FOR ANY NEW OR WORSENING SYMPTOMS, INCLUDING FEVER, VOMITING BLOOD, BLACK/BLOODY STOOLS, CHANGE IN MENTAL STATUS.    ----------------------------------    Chronic Abdominal Pain    WHAT YOU NEED TO KNOW:    The cause of chronic abdominal pain may not be found. You may be given medicine to manage symptoms. You may need therapy to help manage anxiety or stress that is causing abdominal pain. Rarely, surgery is needed if there is a problem with an organ in your abdomen.     DISCHARGE INSTRUCTIONS:    Return to the emergency department if:   •Your abdominal pain gets worse, and spreads to your back.       •You vomit blood or what looks like coffee grounds.       •You have blood or mucus in your bowel movement.       •You cannot stop vomiting.       •You have diarrhea for more than 1 week.       •You feel weak, dizzy, or faint.       •Your abdomen is larger than usual, more painful, and hard.       Contact your healthcare provider if:   •You have a fever or chills.      •You have new symptoms.       •You lose weight without trying.       •Your pain prevents you from doing your daily activities.       •You have questions or concerns about your condition or care.      Medicines:   •Medicines may be given to decrease pain and manage your symptoms. Medicines may also be given to manage anxiety.       •Take your medicine as directed. Contact your healthcare provider if you think your medicine is not helping or if you have side effects. Tell him of her if you are allergic to any medicine. Keep a list of the medicines, vitamins, and herbs you take. Include the amounts, and when and why you take them. Bring the list or the pill bottles to follow-up visits. Carry your medicine list with you in case of an emergency.      Self-care:   •Apply heat on your abdomen for 20 to 30 minutes every 2 hours for as many days as directed. Heat helps decrease pain and muscle spasms.       •Make changes to the food you eat as directed. Do not eat foods that cause abdominal pain or other symptoms. Eat small meals more often. ?Eat more high-fiber foods if you are constipated. High-fiber foods include fruits, vegetables, whole-grain foods, and legumes.       ?Do not eat foods that cause gas if you have bloating. Examples include broccoli, cabbage, and cauliflower. Do not drink soda or carbonated drinks, because these may also cause gas.       ?Do not eat foods or drinks that contain sorbitol or fructose if you have diarrhea and bloating. Some examples are fruit juices, candy, jelly, and sugar-free gum.       ?Do not eat high-fat foods, such as fried foods, cheeseburgers, hot dogs, and desserts.      ?Limit or do not drink caffeine. Caffeine may make symptoms, such as heart burn or nausea, worse.       ?Drink plenty of liquids to prevent dehydration from diarrhea or vomiting. Ask your healthcare provider how much liquid to drink each day and which liquids are best for you.       •Keep a diary of your abdominal pain. A diary may help your healthcare provider learn what is causing your abdominal pain. Include when the pain happens, how long it lasts, and what the pain feels like. Write down any other symptoms you have with abdominal pain. Also write down what you eat, and what symptoms you have after you eat.       •Manage your stress. Stress may cause abdominal pain. Your healthcare provider may recommend relaxation techniques and deep breathing exercises to help decrease your stress. Your healthcare provider may recommend you talk to someone about your stress or anxiety, such as a counselor or a trusted friend. Get plenty of sleep and exercise regularly.       •Limit or do not drink alcohol. Alcohol can make your abdominal pain worse. Ask your healthcare provider if it is safe for you to drink alcohol. Also ask how much is safe for you to drink.       •Do not smoke. Nicotine and other chemicals in cigarettes can damage your esophagus and stomach. Ask your healthcare provider for information if you currently smoke and need help to quit. E-cigarettes or smokeless tobacco still contain nicotine. Talk to your healthcare provider before you use these products.       Follow up with your healthcare provider as directed: You may need more tests. Write down your questions so you remember to ask them during your visits.

## 2021-04-19 NOTE — ED ADULT TRIAGE NOTE - CHIEF COMPLAINT QUOTE
Patient agitated, refusing to answer questions. Daughter stated patient has been having abdominal pain, chronic but getting worse. Sent from PCP, concerned for dehydration.

## 2021-04-19 NOTE — ED PROVIDER NOTE - PATIENT PORTAL LINK FT
You can access the FollowMyHealth Patient Portal offered by [facility] by registering at the following website: [email or web address]. By joining [employer]’s FollowMyHealth portal, you will also be able to view your health information using other applications (apps) compatible with our system.

## 2021-04-19 NOTE — ED PROVIDER NOTE - NS ED ROS FT
Constitutional: no fever, no chills  Eyes: no vision changes  ENT: no nasal congestion, no sore throat  CV: no chest pain  Resp: no cough, no shortness of breath  GI: +abdominal pain, +vomiting, no diarrhea  : no dysuria  MSK: no joint pain  Skin: no rash  Neuro: no headache

## 2021-04-19 NOTE — ED ADULT TRIAGE NOTE - WEIGHT IN KG
65.8 no blurred vision/no photophobia/no double vision/no pain/no eye lid swelling/no itching/no foreign body

## 2021-04-19 NOTE — ED PROVIDER NOTE - CARE PROVIDERS DIRECT ADDRESSES
,DirectAddress_Unknown,[email or web address] ,DirectAddress_Unknown,[email or web address]

## 2021-04-19 NOTE — ED PROVIDER NOTE - OBJECTIVE STATEMENT
86y M w/ hx vascular dementia, afib (not on AC), CKD, DM, COPD, presenting for abdominal pain.  Per daughter, pt follows with Dr. Calhoun of nephrology, who has been adjusting his diuretics.  Pt had been on Torsemide, but then lost around 20-30 lbs over the past few weeks.  Pt was then sent to the ED today to evaluate for possible electrolyte abnormalities.  Per daughter, pt also has been complaining of intermittent R sided abdominal pain over the past few weeks.  Has history of chronic abdominal pain, but states that this has increased over the past few weeks.  Pain seems to occur after lunch, associated with vomiting.  Daughter states that pt has been more irritable and generally weak as of late, but otherwise has been acting his usual self.  No fever, chills, cough, chest pain, SOB, hematemesis, black/bloody stools.

## 2021-04-19 NOTE — ED PROVIDER NOTE - PROVIDER TOKENS
PROVIDER:[TOKEN:[[id number]]],PROVIDER:[TOKEN:[[id number]]] PROVIDER:[TOKEN:[[id number]]],PROVIDER:[TOKEN:[[id number]]]

## 2021-04-19 NOTE — ED PROVIDER NOTE - PROGRESS NOTE DETAILS
Baron: Labs and imaging results discussed with pt's daughter.  Kidney function stable.  Pt to f/u with nephrology and GI.  Stable for discharge.

## 2021-04-19 NOTE — ED PROVIDER NOTE - CLINICAL SUMMARY MEDICAL DECISION MAKING FREE TEXT BOX
86y M w/ hx CKD, vascular dementia, Afib, HTN, COPD, DM, presenting for intermittent abdominal pain and concern of possible electrolyte abnormalities in the setting of recent weight loss from diuretic use.  Pt currently appears well, in no distress, stable VS.  Will check labs, EKG, CXR, CT abd/pelvis, UA.

## 2021-04-19 NOTE — ED PROVIDER NOTE - PHYSICAL EXAMINATION
Constitutional: Awake, alert, in no acute distress  Eyes: no scleral icterus  HENT: normocephalic, atraumatic, moist oral mucosa  Neck: supple  CV: RRR, no murmur  Pulm: non-labored respirations, CTAB  Abdomen: soft, non-tender, +mild distention  Extremities: +trace b/l LE pitting edema, no deformity  Skin: no rash, no jaundice  Neuro: AAOx3, moving all extremities equally

## 2021-04-19 NOTE — ED PROVIDER NOTE - CARE PROVIDER_API CALL
Benito Calhoun)  Nephrology  [address], [place] [address]  Phone: [phone]  Fax: [phone]  Follow Up Time:     Chintan Thomas)  Gastroenterology; Internal Medicine  [address], [address]  [place] [address]  Phone: [phone]  Fax: [phone]  Follow Up Time:    Benito Calhoun)  Nephrology  [address], [place] [address]  Phone: [phone]  Fax: [phone]  Follow Up Time:     Jayjay Stewart)  Gastroenterology; Internal Medicine  [address] [facility] [address], [address]  [place] [address]  Phone: [phone]  Fax: [phone]  Follow Up Time:

## 2021-04-19 NOTE — ED PROVIDER NOTE - ATTENDING CONTRIBUTION TO CARE
87 yo well appearing male with hx of dementia, DM, and renal disease brought in by daughter due to concern of rapid weight loss over the past several weeks with increasing diuretic use and episodic abdominal pains(  usually resolved quickly after he has a brief episode of emesis). Patient currently resting comfortably with no abdominal discomfort. I personally saw the patient with the resident, and completed the key components of the history and physical exam. I then discussed the management plan with the resident.

## 2021-04-20 VITALS
OXYGEN SATURATION: 98 % | DIASTOLIC BLOOD PRESSURE: 70 MMHG | HEART RATE: 70 BPM | RESPIRATION RATE: 15 BRPM | TEMPERATURE: 99 F | SYSTOLIC BLOOD PRESSURE: 120 MMHG

## 2021-04-20 NOTE — ED ADULT NURSE NOTE - OBJECTIVE STATEMENT
Pt is alert and disoriented. As per daughter this is the patients normal. Pts daughter states that the patient has been having right sided abdominal pain chronically. As per daughter, pts  stated to come to the er because he is dehydrated. Pt has vomited 4x in the past 2-3 weeks. Pt has also been unsteady on his feet. Pt has bowel sounds in all 4 quadrants and no pain to palpation,. Pt resp are even and unlabored, skin color cy for race. pt educated on plan of care, pt able to successfully teach back plan of care to RN, RN will continue to reeducate pt during hospital stay.

## 2021-04-20 NOTE — ED ADULT NURSE NOTE - CHIEF COMPLAINT
"  Delivery Date: 2019   Delivery Time: 2:28 PM   Delivery Type: Vaginal, Spontaneous     Maternal History:   Girl Yudith العراقي is a 1 days day old 40w2d   born to a mother who is a 36 y.o.   . She has no past medical history on file. .     Prenatal Labs Review:  ABO/Rh:   Lab Results   Component Value Date/Time    GROUPTRH O POS 2019 10:50 AM     Group B Beta Strep:   Lab Results   Component Value Date/Time    STREPBCULT No Group B Streptococcus isolated 2019 10:35 AM     HIV: 2019: HIV 1/2 Ag/Ab Negative (Ref range: Negative)  RPR:   Lab Results   Component Value Date/Time    RPR Non-reactive 2019 08:04 AM     Hepatitis B Surface Antigen:   Lab Results   Component Value Date/Time    HEPBSAG Negative 2018 09:26 AM     Rubella Immune Status:   Lab Results   Component Value Date/Time    RUBELLAIMMUN Reactive 2018 09:26 AM       Pregnancy/Delivery Course (synopsis of major diagnoses, care, treatment, and services provided during the course of the hospital stay):    The pregnancy was uncomplicated. Prenatal ultrasound revealed normal anatomy. Prenatal care was good. Mother received expectant delivery medications. Membranes ruptured just prior to vaginal delivery. The delivery was complicated by loose nuchal x 1. Apgar scores 9/10.  [place] Assessment:     1 Minute:   Skin color:     Muscle tone:     Heart rate:     Breathing:     Grimace:     Total:  9          5 Minute:   Skin color:     Muscle tone:     Heart rate:     Breathing:     Grimace:     Total:  10          10 Minute:   Skin color:     Muscle tone:     Heart rate:     Breathing:     Grimace:     Total:           Living Status:         Review of Systems  Objective:     Admission GA: 40w2d   Admission Weight: 3544 g (7 lb 13 oz)(Filed from Delivery Summary)  Admission  Head Circumference: 35.6 cm(Filed from Delivery Summary)   Admission Length: Height: 49.5 cm (19.5")(Filed from Delivery Summary)    Delivery " Method: Vaginal, Spontaneous     Feeding Method: Breastmilk     Labs:  Recent Results (from the past 168 hour(s))   Cord Blood Evaluation    Collection Time: 19  3:17 PM   Result Value Ref Range    Cord ABO O POS     Cord Direct Sathya NEG        There is no immunization history for the selected administration types on file for this patient.    Nursery Course (synopsis of major diagnoses, care, treatment, and services provided during the course of the hospital stay): - Infant had uncomplicated  course. Infant fed well with normal urination, stools, hydration status, and appropriate weight. Bilirubin assessment prior to discharge with close PCP follow up.    [place] Screen sent greater than 24 hours?: yes  Hearing Screen Right Ear:      Left Ear:     Stooling: Yes  Voiding: Yes        Car Seat Test?    Therapeutic Interventions: none  Surgical Procedures: none    Discharge Exam:   Discharge Weight: Weight: 3450 g (7 lb 9.7 oz)  Weight Change Since Birth: -3%     Physical Exam   Constitutional: She appears well-developed. She is easily aroused. She has a strong cry. No distress.   HENT:   Normocephalic, atraumatic. Anterior fontanelle open, soft, flat. Nares patent bilaterally without discharge or congestion. Moist mucous membranes without oral lesions. Palate intact. Normal external ears bilaterally without pits or tags.   Eyes: Conjunctivae and lids are normal. Red reflex is present bilaterally.   Neck: Normal range of motion.   Cardiovascular: Normal rate, regular rhythm, S1 normal and S2 normal.   No murmur heard.  2+ femoral and brachial pulses equal bilaterally   Pulmonary/Chest: Effort normal and breath sounds normal. There is normal air entry. No nasal flaring or grunting. No respiratory distress. She exhibits no retraction.   Abdominal: Soft. Bowel sounds are normal. The umbilical stump is clean. There is no tenderness.   No palpable abdominal masses.    Genitourinary:   Genitourinary Comments:  Normal female genitalia   Musculoskeletal:   Moves all extremities equally. Negative Ortolani and Riley hip testing. Spine straight without sacral dimple or tuft of hair.   Neurological: She is easily aroused.   Awake and responsive to exam. Normal muscle tone and bulk for gestational age. Moves all extremities well and equally. Symmetric [place], intact suck reflex, normal plantar and clark grasp, upgoing Babinski.   Skin:   Warm, well perfused without rashes or bruising.       The patient is a 86y Male complaining of see chief complaint quote.

## 2021-04-21 ENCOUNTER — APPOINTMENT (OUTPATIENT)
Dept: GASTROENTEROLOGY | Facility: CLINIC | Age: 86
End: 2021-04-21
Payer: MEDICARE

## 2021-04-21 VITALS
OXYGEN SATURATION: 96 % | DIASTOLIC BLOOD PRESSURE: 62 MMHG | HEART RATE: 60 BPM | SYSTOLIC BLOOD PRESSURE: 144 MMHG | TEMPERATURE: 97.2 F | RESPIRATION RATE: 14 BRPM

## 2021-04-21 LAB
CULTURE RESULTS: SIGNIFICANT CHANGE UP
SPECIMEN SOURCE: SIGNIFICANT CHANGE UP

## 2021-04-21 PROCEDURE — 99214 OFFICE O/P EST MOD 30 MIN: CPT

## 2021-04-21 RX ORDER — LORATADINE 10 MG
17 TABLET,DISINTEGRATING ORAL
Refills: 0 | Status: DISCONTINUED | COMMUNITY
End: 2021-04-21

## 2021-04-21 RX ORDER — ROSUVASTATIN CALCIUM 10 MG/1
10 TABLET, FILM COATED ORAL DAILY
Qty: 30 | Refills: 4 | Status: DISCONTINUED | COMMUNITY
Start: 2021-04-13 | End: 2021-04-21

## 2021-04-21 NOTE — HISTORY OF PRESENT ILLNESS
[de-identified] : Patient presents today for an acute visit to the fact that he was in the emergency room yesterday with abdominal pain.  A complete evaluation was unremarkable including a CT of the abdomen.  He was not anemic but he did have an elevated alkaline phosphatase in the 300s his last one was in the 200s and the last time I saw him a number of months ago it was in the low 100s.  Patient has no evidence of liver or pancreatic disease on the CT scan.  His main complaint is some right lower quadrant pain but also he has burning pain in the epigastrium after meals but specifically after lunch where he has pain and then vomits.  He does not vomit all the time.  He has no hematemesis.  Patient had a GI series for this issue earlier this year and was unremarkable.  He has no weight loss.  He does take senna for constipation but he has been constipated in a long time.  He also takes Colace.  Patient does have significant chronic kidney disease with that creatinine of 2.9 and a BUN of 115 but is not a candidate for dialysis.  He also has significant dementia.

## 2021-04-21 NOTE — PHYSICAL EXAM
[General Appearance - Alert] : alert [General Appearance - In No Acute Distress] : in no acute distress [Auscultation Breath Sounds / Voice Sounds] : lungs were clear to auscultation bilaterally [Heart Rate And Rhythm] : heart rate was normal and rhythm regular [Heart Sounds] : normal S1 and S2 [Heart Sounds Gallop] : no gallops [Murmurs] : no murmurs [Heart Sounds Pericardial Friction Rub] : no pericardial rub [Bowel Sounds] : normal bowel sounds [Abdomen Soft] : soft [Abdomen Tenderness] : non-tender [] : no hepato-splenomegaly [Abdomen Mass (___ Cm)] : no abdominal mass palpated [FreeTextEntry1] : Unable to respond to normal questions and ask inappropriate questions and makes nonsensical comments and not oriented

## 2021-04-21 NOTE — ASSESSMENT
[FreeTextEntry1] : I had a long talk with both the daughter and the patient's wife who is on FaceTime.  It is unclear the etiology of the patient's symptoms but giving the pain and episodes of vomiting it certainly possible that he has gastroparesis especially considering his significant chronic kidney disease as well as diabetes which would be both put him at increased risk for gastroparesis.  I spoke to the daughter and we both agreed he would not tolerate being under nuclear camera for gastric emptying test for a few hours and therefore be appropriate to treat him empirically with metoclopramide.  I explained we would give him a reduced doses of 5 mg 3 times daily because of his renal insufficiency but she also wanted to check with his nephrologist.  The patient is also taking protein drinks and I think he is taking Glucerna but I recommended they talk to nephrologist about giving him Nepro.  I also recommended we do alkaline phosphatase isoenzymes because his alkaline phosphatase was elevated.  Although from imaging it does not seem to be hepatic in etiology would be important to see if he has bone disease and in fact whether some of his pain is related to bone disease.  I also said he could have intestinal angina due to mesenteric ischemia but he is not a candidate for further investigation because he could not tolerate contrast because of his kidney disease and he would clearly not be a candidate for any intervention.

## 2021-04-27 ENCOUNTER — INPATIENT (INPATIENT)
Facility: HOSPITAL | Age: 86
LOS: 7 days | Discharge: HOME | DRG: 444 | End: 2021-05-05
Attending: INTERNAL MEDICINE | Admitting: INTERNAL MEDICINE
Payer: MEDICARE

## 2021-04-27 ENCOUNTER — APPOINTMENT (OUTPATIENT)
Dept: CARDIOLOGY | Facility: CLINIC | Age: 86
End: 2021-04-27
Payer: MEDICARE

## 2021-04-27 VITALS
WEIGHT: 149 LBS | SYSTOLIC BLOOD PRESSURE: 108 MMHG | HEIGHT: 67 IN | OXYGEN SATURATION: 95 % | BODY MASS INDEX: 23.39 KG/M2 | DIASTOLIC BLOOD PRESSURE: 60 MMHG | TEMPERATURE: 97.7 F | HEART RATE: 74 BPM

## 2021-04-27 VITALS
SYSTOLIC BLOOD PRESSURE: 106 MMHG | DIASTOLIC BLOOD PRESSURE: 66 MMHG | TEMPERATURE: 98 F | HEIGHT: 68 IN | WEIGHT: 190.04 LBS | OXYGEN SATURATION: 98 % | RESPIRATION RATE: 18 BRPM | HEART RATE: 97 BPM

## 2021-04-27 DIAGNOSIS — Z98.890 OTHER SPECIFIED POSTPROCEDURAL STATES: Chronic | ICD-10-CM

## 2021-04-27 LAB
ALP BLD-CCNC: 198 IU/L
ALP BONE CFR SERPL: 24 %
ALP INTEST CFR SERPL: 10 %
ALP LIVER CFR SERPL: 66 %
APPEARANCE UR: CLEAR — SIGNIFICANT CHANGE UP
APTT BLD: 29.3 SEC — SIGNIFICANT CHANGE UP (ref 27.5–35.5)
BACTERIA # UR AUTO: ABNORMAL
BASE EXCESS BLDV CALC-SCNC: 5.3 MMOL/L — HIGH (ref -2–2)
BASOPHILS # BLD AUTO: 0.03 K/UL — SIGNIFICANT CHANGE UP (ref 0–0.2)
BASOPHILS NFR BLD AUTO: 0.2 % — SIGNIFICANT CHANGE UP (ref 0–2)
BILIRUB UR-MCNC: NEGATIVE — SIGNIFICANT CHANGE UP
COLOR SPEC: YELLOW — SIGNIFICANT CHANGE UP
DIFF PNL FLD: NEGATIVE — SIGNIFICANT CHANGE UP
EOSINOPHIL # BLD AUTO: 0.04 K/UL — SIGNIFICANT CHANGE UP (ref 0–0.5)
EOSINOPHIL NFR BLD AUTO: 0.3 % — SIGNIFICANT CHANGE UP (ref 0–6)
EPI CELLS # UR: SIGNIFICANT CHANGE UP
GAS PNL BLDV: SIGNIFICANT CHANGE UP
GLUCOSE UR QL: NEGATIVE MG/DL — SIGNIFICANT CHANGE UP
HCO3 BLDV-SCNC: 29 MMOL/L — SIGNIFICANT CHANGE UP (ref 21–29)
HCT VFR BLD CALC: 37.5 % — LOW (ref 39–50)
HGB BLD-MCNC: 12.2 G/DL — LOW (ref 13–17)
IMM GRANULOCYTES NFR BLD AUTO: 0.5 % — SIGNIFICANT CHANGE UP (ref 0–1.5)
INR BLD: 1.17 RATIO — HIGH (ref 0.88–1.16)
KETONES UR-MCNC: ABNORMAL
LACTATE BLDV-MCNC: 1.5 MMOL/L — SIGNIFICANT CHANGE UP (ref 0.5–2)
LEUKOCYTE ESTERASE UR-ACNC: ABNORMAL
LYMPHOCYTES # BLD AUTO: 1.33 K/UL — SIGNIFICANT CHANGE UP (ref 1–3.3)
LYMPHOCYTES # BLD AUTO: 8.6 % — LOW (ref 13–44)
MCHC RBC-ENTMCNC: 29.3 PG — SIGNIFICANT CHANGE UP (ref 27–34)
MCHC RBC-ENTMCNC: 32.5 GM/DL — SIGNIFICANT CHANGE UP (ref 32–36)
MCV RBC AUTO: 90.1 FL — SIGNIFICANT CHANGE UP (ref 80–100)
MONOCYTES # BLD AUTO: 1.06 K/UL — HIGH (ref 0–0.9)
MONOCYTES NFR BLD AUTO: 6.9 % — SIGNIFICANT CHANGE UP (ref 2–14)
NEUTROPHILS # BLD AUTO: 12.84 K/UL — HIGH (ref 1.8–7.4)
NEUTROPHILS NFR BLD AUTO: 83.5 % — HIGH (ref 43–77)
NITRITE UR-MCNC: NEGATIVE — SIGNIFICANT CHANGE UP
PCO2 BLDV: 47 MMHG — SIGNIFICANT CHANGE UP (ref 35–50)
PH BLDV: 7.42 — SIGNIFICANT CHANGE UP (ref 7.32–7.43)
PH UR: 6 — SIGNIFICANT CHANGE UP (ref 5–8)
PLATELET # BLD AUTO: 171 K/UL — SIGNIFICANT CHANGE UP (ref 150–400)
PO2 BLDV: 44 MMHG — SIGNIFICANT CHANGE UP (ref 25–45)
PROT UR-MCNC: 30 MG/DL
PROTHROM AB SERPL-ACNC: 13.5 SEC — SIGNIFICANT CHANGE UP (ref 10.6–13.6)
RBC # BLD: 4.16 M/UL — LOW (ref 4.2–5.8)
RBC # FLD: 13.8 % — SIGNIFICANT CHANGE UP (ref 10.3–14.5)
RBC CASTS # UR COMP ASSIST: SIGNIFICANT CHANGE UP /HPF (ref 0–4)
SAO2 % BLDV: 79 % — SIGNIFICANT CHANGE UP
SP GR SPEC: 1.01 — SIGNIFICANT CHANGE UP (ref 1.01–1.02)
UROBILINOGEN FLD QL: NEGATIVE MG/DL — SIGNIFICANT CHANGE UP
WBC # BLD: 15.38 K/UL — HIGH (ref 3.8–10.5)
WBC # FLD AUTO: 15.38 K/UL — HIGH (ref 3.8–10.5)
WBC UR QL: SIGNIFICANT CHANGE UP

## 2021-04-27 PROCEDURE — 99215 OFFICE O/P EST HI 40 MIN: CPT

## 2021-04-27 PROCEDURE — 71045 X-RAY EXAM CHEST 1 VIEW: CPT | Mod: 26

## 2021-04-27 PROCEDURE — 36000 PLACE NEEDLE IN VEIN: CPT | Mod: GC

## 2021-04-27 PROCEDURE — 99285 EMERGENCY DEPT VISIT HI MDM: CPT | Mod: CS,25,GC

## 2021-04-27 RX ORDER — ASPIRIN 325 MG/1
325 TABLET, FILM COATED ORAL DAILY
Qty: 90 | Refills: 3 | Status: DISCONTINUED | COMMUNITY
Start: 2019-12-19 | End: 2021-04-27

## 2021-04-27 RX ORDER — ACETAMINOPHEN 500 MG
975 TABLET ORAL ONCE
Refills: 0 | Status: COMPLETED | OUTPATIENT
Start: 2021-04-27 | End: 2021-04-27

## 2021-04-27 RX ORDER — SODIUM CHLORIDE 9 MG/ML
2100 INJECTION INTRAMUSCULAR; INTRAVENOUS; SUBCUTANEOUS ONCE
Refills: 0 | Status: COMPLETED | OUTPATIENT
Start: 2021-04-27 | End: 2021-04-27

## 2021-04-27 RX ORDER — SENNOSIDES 8.6 MG TABLETS 8.6 MG/1
TABLET ORAL
Refills: 0 | Status: DISCONTINUED | COMMUNITY
End: 2021-04-27

## 2021-04-27 RX ORDER — CEFTRIAXONE 500 MG/1
1000 INJECTION, POWDER, FOR SOLUTION INTRAMUSCULAR; INTRAVENOUS ONCE
Refills: 0 | Status: COMPLETED | OUTPATIENT
Start: 2021-04-27 | End: 2021-04-27

## 2021-04-27 RX ORDER — TORSEMIDE 10 MG/ML
20 INJECTION, SOLUTION INTRAVENOUS
Refills: 0 | Status: DISCONTINUED | COMMUNITY
End: 2021-04-27

## 2021-04-27 RX ORDER — IPRATROPIUM BROMIDE AND ALBUTEROL SULFATE 2.5; .5 MG/3ML; MG/3ML
0.5-2.5 (3) SOLUTION RESPIRATORY (INHALATION) EVERY 6 HOURS
Refills: 0 | Status: DISCONTINUED | COMMUNITY
End: 2021-04-27

## 2021-04-27 RX ADMIN — Medication 975 MILLIGRAM(S): at 23:37

## 2021-04-27 RX ADMIN — CEFTRIAXONE 100 MILLIGRAM(S): 500 INJECTION, POWDER, FOR SOLUTION INTRAMUSCULAR; INTRAVENOUS at 23:36

## 2021-04-27 NOTE — ED PROVIDER NOTE - CARE PLAN
Principal Discharge DX:	Sepsis with acute liver failure without hepatic coma or septic shock, due to unspecified organism  Secondary Diagnosis:	Dementia without behavioral disturbance, unspecified dementia type  Secondary Diagnosis:	Stage 3a chronic kidney disease

## 2021-04-27 NOTE — ED ADULT TRIAGE NOTE - CHIEF COMPLAINT QUOTE
BIBEMS from home. As per daughter patient has been declining in health since this morning. As per daughter patient has become increasingly lethargic, and feverish (highest temp 102.0 oral). +Generalized body shakes and leg discomfort. Hx of dementia (alert to person and place baseline), COPD, CHF, CKD and A-fib.

## 2021-04-27 NOTE — ED ADULT TRIAGE NOTE - MEANS OF ARRIVAL
stretcher Instructed patient/caregiver to follow-up with primary care physician./Instructed patient/caregiver regarding signs and symptoms of infection./Verbal/written post procedure instructions were given to patient/caregiver.

## 2021-04-27 NOTE — ED PROVIDER NOTE - CLINICAL SUMMARY MEDICAL DECISION MAKING FREE TEXT BOX
Pt with acute episode of tremors, urinary incontinence, ataxia suggestible of acute seizure episode, will obtain labs, ct and admit for further management. Discussed plan with family and explained our process.
PROVIDER:[TOKEN:[[id number]]]

## 2021-04-27 NOTE — ED PROVIDER NOTE - PROGRESS NOTE DETAILS
Pt daughter after initial eval came back and states pt did not have episode of incontinence, spoke with step mother and was told pt urinated on himself because he had to get to the bathroom but step mother had difficulty getting to bathroom in time. However still concerns he was shaking, unsteady on feet and more confused then normal. Concern pt possibly septic, recommend IV fluids, however given problem with fluid retention daughter requesting we hold off until we discuss case with nephrologist and she speaks with them.

## 2021-04-27 NOTE — ED PROVIDER NOTE - OBJECTIVE STATEMENT
86y M with PMHx of Dementia, Afib, CKD, DM, COPD presents to the ED BIBEMS from home, with daughter at bedside for eval. After leaving ED 8 days ago, patient was started on Reglan 3 days ago from Dr. Stewart, followed up with Cardiologist, had normal workup performed and had apt with Dr. Sebastian which was normal today. Daughter states that pt was acting to his baseline today, took a nap at 3pm, was given medication after meal when he felt fatigued and then at 6pm, he had lethargy, b/l hand tremors, urinary incontinence, confusion and difficulty weight bearing with temperature monitored at 101.9, no NSAID given. COVID vaccination UTD.

## 2021-04-27 NOTE — ED ADULT NURSE NOTE - INTERVENTIONS DEFINITIONS
Non-slip footwear when patient is off stretcher/Physically safe environment: no spills, clutter or unnecessary equipment/Provide visual cue, wrist band, yellow gown, etc./Monitor gait and stability

## 2021-04-27 NOTE — ED ADULT NURSE NOTE - OBJECTIVE STATEMENT
BIBEMS from home accompanied by daughter who states that the patient rapidly declined this morning since she found him to be febrile. Patient has PMH of dementia and was just seen by all of his primary physicians and was found to be "doing well." Daughter is concerned for stroke r/t patient having one episode urinary incontinence, fever, and repetitively asking questions/saying the same sentence over and over again, and refusing to get up and walk at home. When asked questions about pain, cognitive awareness, patient responds appropriately and most times with humor. MD Gee at bedside. completely a full neuro assessment. Safety maintained, needs attended, will continue to monitor.

## 2021-04-27 NOTE — ED PROVIDER NOTE - MUSCULOSKELETAL, MLM
ROM normal. Normal refluxes. Fine tremor to b/l hands right greater then left. strength and sensation symmetric.

## 2021-04-28 DIAGNOSIS — A41.9 SEPSIS, UNSPECIFIED ORGANISM: ICD-10-CM

## 2021-04-28 LAB
ALBUMIN SERPL ELPH-MCNC: 3.8 G/DL — SIGNIFICANT CHANGE UP (ref 3.3–5.2)
ALBUMIN SERPL ELPH-MCNC: 3.9 G/DL — SIGNIFICANT CHANGE UP (ref 3.3–5.2)
ALP SERPL-CCNC: 463 U/L — HIGH (ref 40–120)
ALP SERPL-CCNC: 497 U/L — HIGH (ref 40–120)
ALT FLD-CCNC: 201 U/L — HIGH
ALT FLD-CCNC: 229 U/L — HIGH
ANION GAP SERPL CALC-SCNC: 11 MMOL/L — SIGNIFICANT CHANGE UP (ref 5–17)
ANION GAP SERPL CALC-SCNC: 13 MMOL/L — SIGNIFICANT CHANGE UP (ref 5–17)
AST SERPL-CCNC: 222 U/L — HIGH
AST SERPL-CCNC: 317 U/L — HIGH
BILIRUB DIRECT SERPL-MCNC: 1.7 MG/DL — HIGH (ref 0–0.3)
BILIRUB SERPL-MCNC: 2.3 MG/DL — HIGH (ref 0.4–2)
BILIRUB SERPL-MCNC: 2.3 MG/DL — HIGH (ref 0.4–2)
BUN SERPL-MCNC: 76 MG/DL — HIGH (ref 8–20)
BUN SERPL-MCNC: 80 MG/DL — HIGH (ref 8–20)
CALCIUM SERPL-MCNC: 9.4 MG/DL — SIGNIFICANT CHANGE UP (ref 8.6–10.2)
CALCIUM SERPL-MCNC: 9.6 MG/DL — SIGNIFICANT CHANGE UP (ref 8.6–10.2)
CHLORIDE SERPL-SCNC: 98 MMOL/L — SIGNIFICANT CHANGE UP (ref 98–107)
CHLORIDE SERPL-SCNC: 98 MMOL/L — SIGNIFICANT CHANGE UP (ref 98–107)
CHOLEST SERPL-MCNC: 134 MG/DL — SIGNIFICANT CHANGE UP
CO2 SERPL-SCNC: 27 MMOL/L — SIGNIFICANT CHANGE UP (ref 22–29)
CO2 SERPL-SCNC: 28 MMOL/L — SIGNIFICANT CHANGE UP (ref 22–29)
CREAT SERPL-MCNC: 2.12 MG/DL — HIGH (ref 0.5–1.3)
CREAT SERPL-MCNC: 2.18 MG/DL — HIGH (ref 0.5–1.3)
CRP SERPL-MCNC: 39 MG/L — HIGH
ETHANOL SERPL-MCNC: <10 MG/DL — SIGNIFICANT CHANGE UP (ref 0–9)
FERRITIN SERPL-MCNC: 821 NG/ML — HIGH (ref 30–400)
GLUCOSE BLDC GLUCOMTR-MCNC: 139 MG/DL — HIGH (ref 70–99)
GLUCOSE BLDC GLUCOMTR-MCNC: 154 MG/DL — HIGH (ref 70–99)
GLUCOSE BLDC GLUCOMTR-MCNC: 183 MG/DL — HIGH (ref 70–99)
GLUCOSE BLDC GLUCOMTR-MCNC: 381 MG/DL — HIGH (ref 70–99)
GLUCOSE SERPL-MCNC: 146 MG/DL — HIGH (ref 70–99)
GLUCOSE SERPL-MCNC: 169 MG/DL — HIGH (ref 70–99)
HAV IGM SER-ACNC: SIGNIFICANT CHANGE UP
HBV CORE IGM SER-ACNC: SIGNIFICANT CHANGE UP
HBV SURFACE AG SER-ACNC: SIGNIFICANT CHANGE UP
HCT VFR BLD CALC: 37.5 % — LOW (ref 39–50)
HCV AB S/CO SERPL IA: 0.19 S/CO — SIGNIFICANT CHANGE UP (ref 0–0.99)
HCV AB SERPL-IMP: SIGNIFICANT CHANGE UP
HDLC SERPL-MCNC: 50 MG/DL — SIGNIFICANT CHANGE UP
HGB BLD-MCNC: 11.9 G/DL — LOW (ref 13–17)
LIDOCAIN IGE QN: 223 U/L — HIGH (ref 22–51)
LIPID PNL WITH DIRECT LDL SERPL: 63 MG/DL — SIGNIFICANT CHANGE UP
MAGNESIUM SERPL-MCNC: 2.6 MG/DL — SIGNIFICANT CHANGE UP (ref 1.6–2.6)
MCHC RBC-ENTMCNC: 29 PG — SIGNIFICANT CHANGE UP (ref 27–34)
MCHC RBC-ENTMCNC: 31.7 GM/DL — LOW (ref 32–36)
MCV RBC AUTO: 91.5 FL — SIGNIFICANT CHANGE UP (ref 80–100)
NON HDL CHOLESTEROL: 84 MG/DL — SIGNIFICANT CHANGE UP
PLATELET # BLD AUTO: 172 K/UL — SIGNIFICANT CHANGE UP (ref 150–400)
POTASSIUM SERPL-MCNC: 3.8 MMOL/L — SIGNIFICANT CHANGE UP (ref 3.5–5.3)
POTASSIUM SERPL-MCNC: 4.1 MMOL/L — SIGNIFICANT CHANGE UP (ref 3.5–5.3)
POTASSIUM SERPL-SCNC: 3.8 MMOL/L — SIGNIFICANT CHANGE UP (ref 3.5–5.3)
POTASSIUM SERPL-SCNC: 4.1 MMOL/L — SIGNIFICANT CHANGE UP (ref 3.5–5.3)
PROCALCITONIN SERPL-MCNC: 0.26 NG/ML — HIGH (ref 0.02–0.1)
PROT SERPL-MCNC: 7.6 G/DL — SIGNIFICANT CHANGE UP (ref 6.6–8.7)
PROT SERPL-MCNC: 7.8 G/DL — SIGNIFICANT CHANGE UP (ref 6.6–8.7)
RAPID RVP RESULT: SIGNIFICANT CHANGE UP
RBC # BLD: 4.1 M/UL — LOW (ref 4.2–5.8)
RBC # FLD: 14.1 % — SIGNIFICANT CHANGE UP (ref 10.3–14.5)
SARS-COV-2 RNA SPEC QL NAA+PROBE: SIGNIFICANT CHANGE UP
SODIUM SERPL-SCNC: 136 MMOL/L — SIGNIFICANT CHANGE UP (ref 135–145)
SODIUM SERPL-SCNC: 139 MMOL/L — SIGNIFICANT CHANGE UP (ref 135–145)
TRIGL SERPL-MCNC: 106 MG/DL — SIGNIFICANT CHANGE UP
TROPONIN T SERPL-MCNC: 0.04 NG/ML — SIGNIFICANT CHANGE UP (ref 0–0.06)
TSH SERPL-MCNC: 0.98 UIU/ML — SIGNIFICANT CHANGE UP (ref 0.27–4.2)
WBC # BLD: 14.57 K/UL — HIGH (ref 3.8–10.5)
WBC # FLD AUTO: 14.57 K/UL — HIGH (ref 3.8–10.5)

## 2021-04-28 PROCEDURE — 74176 CT ABD & PELVIS W/O CONTRAST: CPT | Mod: 26,ME

## 2021-04-28 PROCEDURE — 76705 ECHO EXAM OF ABDOMEN: CPT | Mod: 26,RT

## 2021-04-28 PROCEDURE — 99223 1ST HOSP IP/OBS HIGH 75: CPT

## 2021-04-28 PROCEDURE — G1004: CPT

## 2021-04-28 PROCEDURE — 70450 CT HEAD/BRAIN W/O DYE: CPT | Mod: 26,77

## 2021-04-28 PROCEDURE — 12345: CPT | Mod: NC

## 2021-04-28 PROCEDURE — 71250 CT THORAX DX C-: CPT | Mod: 26

## 2021-04-28 PROCEDURE — 78226 HEPATOBILIARY SYSTEM IMAGING: CPT | Mod: 26

## 2021-04-28 PROCEDURE — 70450 CT HEAD/BRAIN W/O DYE: CPT | Mod: 26,MA

## 2021-04-28 PROCEDURE — 99497 ADVNCD CARE PLAN 30 MIN: CPT

## 2021-04-28 RX ORDER — METOPROLOL TARTRATE 50 MG
12.5 TABLET ORAL
Refills: 0 | Status: DISCONTINUED | OUTPATIENT
Start: 2021-04-28 | End: 2021-05-05

## 2021-04-28 RX ORDER — LEVOTHYROXINE SODIUM 125 MCG
75 TABLET ORAL DAILY
Refills: 0 | Status: DISCONTINUED | OUTPATIENT
Start: 2021-04-28 | End: 2021-05-05

## 2021-04-28 RX ORDER — POLYETHYLENE GLYCOL 3350 17 G/17G
17 POWDER, FOR SOLUTION ORAL DAILY
Refills: 0 | Status: DISCONTINUED | OUTPATIENT
Start: 2021-04-28 | End: 2021-05-05

## 2021-04-28 RX ORDER — IPRATROPIUM/ALBUTEROL SULFATE 18-103MCG
3 AEROSOL WITH ADAPTER (GRAM) INHALATION EVERY 6 HOURS
Refills: 0 | Status: DISCONTINUED | OUTPATIENT
Start: 2021-04-28 | End: 2021-05-05

## 2021-04-28 RX ORDER — TIOTROPIUM BROMIDE 18 UG/1
1 CAPSULE ORAL; RESPIRATORY (INHALATION) DAILY
Refills: 0 | Status: DISCONTINUED | OUTPATIENT
Start: 2021-04-28 | End: 2021-05-05

## 2021-04-28 RX ORDER — DEXTROSE 50 % IN WATER 50 %
25 SYRINGE (ML) INTRAVENOUS ONCE
Refills: 0 | Status: DISCONTINUED | OUTPATIENT
Start: 2021-04-28 | End: 2021-05-05

## 2021-04-28 RX ORDER — MONTELUKAST 4 MG/1
10 TABLET, CHEWABLE ORAL DAILY
Refills: 0 | Status: DISCONTINUED | OUTPATIENT
Start: 2021-04-28 | End: 2021-05-05

## 2021-04-28 RX ORDER — CEFTRIAXONE 500 MG/1
1000 INJECTION, POWDER, FOR SOLUTION INTRAMUSCULAR; INTRAVENOUS EVERY 24 HOURS
Refills: 0 | Status: DISCONTINUED | OUTPATIENT
Start: 2021-04-28 | End: 2021-04-30

## 2021-04-28 RX ORDER — UBIDECARENONE 100 MG
1 CAPSULE ORAL
Qty: 0 | Refills: 0 | DISCHARGE

## 2021-04-28 RX ORDER — BUDESONIDE AND FORMOTEROL FUMARATE DIHYDRATE 160; 4.5 UG/1; UG/1
2 AEROSOL RESPIRATORY (INHALATION)
Refills: 0 | Status: DISCONTINUED | OUTPATIENT
Start: 2021-04-28 | End: 2021-05-05

## 2021-04-28 RX ORDER — LANOLIN ALCOHOL/MO/W.PET/CERES
5 CREAM (GRAM) TOPICAL AT BEDTIME
Refills: 0 | Status: DISCONTINUED | OUTPATIENT
Start: 2021-04-28 | End: 2021-05-05

## 2021-04-28 RX ORDER — SODIUM CHLORIDE 9 MG/ML
1000 INJECTION, SOLUTION INTRAVENOUS
Refills: 0 | Status: DISCONTINUED | OUTPATIENT
Start: 2021-04-28 | End: 2021-05-05

## 2021-04-28 RX ORDER — DEXTROSE 50 % IN WATER 50 %
15 SYRINGE (ML) INTRAVENOUS ONCE
Refills: 0 | Status: DISCONTINUED | OUTPATIENT
Start: 2021-04-28 | End: 2021-05-05

## 2021-04-28 RX ORDER — GLUCAGON INJECTION, SOLUTION 0.5 MG/.1ML
1 INJECTION, SOLUTION SUBCUTANEOUS ONCE
Refills: 0 | Status: DISCONTINUED | OUTPATIENT
Start: 2021-04-28 | End: 2021-05-05

## 2021-04-28 RX ORDER — DIVALPROEX SODIUM 500 MG/1
1 TABLET, DELAYED RELEASE ORAL
Qty: 0 | Refills: 0 | DISCHARGE

## 2021-04-28 RX ORDER — FERROUS SULFATE 325(65) MG
1 TABLET ORAL
Qty: 0 | Refills: 0 | DISCHARGE

## 2021-04-28 RX ORDER — PANTOPRAZOLE SODIUM 20 MG/1
40 TABLET, DELAYED RELEASE ORAL
Refills: 0 | Status: DISCONTINUED | OUTPATIENT
Start: 2021-04-28 | End: 2021-05-05

## 2021-04-28 RX ORDER — INSULIN LISPRO 100/ML
VIAL (ML) SUBCUTANEOUS AT BEDTIME
Refills: 0 | Status: DISCONTINUED | OUTPATIENT
Start: 2021-04-28 | End: 2021-05-05

## 2021-04-28 RX ORDER — MEMANTINE HYDROCHLORIDE 10 MG/1
5 TABLET ORAL DAILY
Refills: 0 | Status: DISCONTINUED | OUTPATIENT
Start: 2021-04-28 | End: 2021-05-05

## 2021-04-28 RX ORDER — ATORVASTATIN CALCIUM 80 MG/1
40 TABLET, FILM COATED ORAL AT BEDTIME
Refills: 0 | Status: DISCONTINUED | OUTPATIENT
Start: 2021-04-28 | End: 2021-04-28

## 2021-04-28 RX ORDER — AZITHROMYCIN 500 MG/1
500 TABLET, FILM COATED ORAL EVERY 24 HOURS
Refills: 0 | Status: DISCONTINUED | OUTPATIENT
Start: 2021-04-28 | End: 2021-04-28

## 2021-04-28 RX ORDER — DONEPEZIL HYDROCHLORIDE 10 MG/1
5 TABLET, FILM COATED ORAL AT BEDTIME
Refills: 0 | Status: DISCONTINUED | OUTPATIENT
Start: 2021-04-28 | End: 2021-05-05

## 2021-04-28 RX ORDER — DEXTROSE 50 % IN WATER 50 %
12.5 SYRINGE (ML) INTRAVENOUS ONCE
Refills: 0 | Status: DISCONTINUED | OUTPATIENT
Start: 2021-04-28 | End: 2021-05-05

## 2021-04-28 RX ORDER — INSULIN LISPRO 100/ML
VIAL (ML) SUBCUTANEOUS
Refills: 0 | Status: DISCONTINUED | OUTPATIENT
Start: 2021-04-28 | End: 2021-05-05

## 2021-04-28 RX ORDER — INSULIN GLARGINE 100 [IU]/ML
10 INJECTION, SOLUTION SUBCUTANEOUS AT BEDTIME
Refills: 0 | Status: DISCONTINUED | OUTPATIENT
Start: 2021-04-28 | End: 2021-04-30

## 2021-04-28 RX ORDER — LEVOTHYROXINE SODIUM 125 MCG
1 TABLET ORAL
Qty: 0 | Refills: 0 | DISCHARGE

## 2021-04-28 RX ORDER — HEPARIN SODIUM 5000 [USP'U]/ML
5000 INJECTION INTRAVENOUS; SUBCUTANEOUS EVERY 8 HOURS
Refills: 0 | Status: DISCONTINUED | OUTPATIENT
Start: 2021-04-28 | End: 2021-05-03

## 2021-04-28 RX ORDER — INSULIN GLARGINE 100 [IU]/ML
10 INJECTION, SOLUTION SUBCUTANEOUS ONCE
Refills: 0 | Status: COMPLETED | OUTPATIENT
Start: 2021-04-28 | End: 2021-04-28

## 2021-04-28 RX ORDER — ASPIRIN/CALCIUM CARB/MAGNESIUM 324 MG
325 TABLET ORAL DAILY
Refills: 0 | Status: DISCONTINUED | OUTPATIENT
Start: 2021-04-28 | End: 2021-04-29

## 2021-04-28 RX ADMIN — Medication 3 MILLILITER(S): at 08:26

## 2021-04-28 RX ADMIN — Medication 3 MILLILITER(S): at 15:33

## 2021-04-28 RX ADMIN — MEMANTINE HYDROCHLORIDE 5 MILLIGRAM(S): 10 TABLET ORAL at 13:46

## 2021-04-28 RX ADMIN — POLYETHYLENE GLYCOL 3350 17 GRAM(S): 17 POWDER, FOR SOLUTION ORAL at 13:45

## 2021-04-28 RX ADMIN — HEPARIN SODIUM 5000 UNIT(S): 5000 INJECTION INTRAVENOUS; SUBCUTANEOUS at 23:35

## 2021-04-28 RX ADMIN — Medication 2: at 07:46

## 2021-04-28 RX ADMIN — Medication 75 MICROGRAM(S): at 06:14

## 2021-04-28 RX ADMIN — INSULIN GLARGINE 10 UNIT(S): 100 INJECTION, SOLUTION SUBCUTANEOUS at 23:37

## 2021-04-28 RX ADMIN — Medication 1 TABLET(S): at 13:46

## 2021-04-28 RX ADMIN — Medication 975 MILLIGRAM(S): at 04:52

## 2021-04-28 RX ADMIN — CEFTRIAXONE 100 MILLIGRAM(S): 500 INJECTION, POWDER, FOR SOLUTION INTRAMUSCULAR; INTRAVENOUS at 23:34

## 2021-04-28 RX ADMIN — Medication 3 MILLILITER(S): at 21:34

## 2021-04-28 RX ADMIN — Medication 10 MILLIGRAM(S): at 06:11

## 2021-04-28 RX ADMIN — AZITHROMYCIN 255 MILLIGRAM(S): 500 TABLET, FILM COATED ORAL at 06:10

## 2021-04-28 RX ADMIN — Medication 2: at 16:30

## 2021-04-28 RX ADMIN — PANTOPRAZOLE SODIUM 40 MILLIGRAM(S): 20 TABLET, DELAYED RELEASE ORAL at 06:19

## 2021-04-28 RX ADMIN — Medication 12.5 MILLIGRAM(S): at 17:28

## 2021-04-28 RX ADMIN — MONTELUKAST 10 MILLIGRAM(S): 4 TABLET, CHEWABLE ORAL at 13:46

## 2021-04-28 RX ADMIN — Medication 5 MILLIGRAM(S): at 23:36

## 2021-04-28 RX ADMIN — CEFTRIAXONE 1000 MILLIGRAM(S): 500 INJECTION, POWDER, FOR SOLUTION INTRAMUSCULAR; INTRAVENOUS at 04:52

## 2021-04-28 RX ADMIN — Medication 6: at 23:38

## 2021-04-28 RX ADMIN — Medication 12.5 MILLIGRAM(S): at 06:19

## 2021-04-28 RX ADMIN — DONEPEZIL HYDROCHLORIDE 5 MILLIGRAM(S): 10 TABLET, FILM COATED ORAL at 23:36

## 2021-04-28 NOTE — CONSULT NOTE ADULT - ASSESSMENT
A/P: 87 y/o M with a PMHx of AFib (not on AC), Vascular dementia, CKD, DMII, COPD, and HFpEF who presented to the ER with complaints of AMS and fever. Patient's daughter Felicita at bedside who states that he went to see Dr. Wilkes yesterday, and everything appeared well. Following the appointment and a nap, he woke up with a fever of 102, was weak, confused, and shaking. Patient was complaining of some severe abdominal pain yesterday. Patient with normal lactate, and CT with some gallbladder thickening. Patient was evaluated by surgery, no consideration of acute cholecystitis. Patient is currently resting comfortably, no complaints. Patient denies fevers, chills, CP, SOB, orthopnea, N/V/D, headache, or dizziness.   Troponin negative x 1    HFpEF   - EF normal on last echo.   - Appears euvolemic on exam.   - Will hold torsemide as concern for sepsis.   - IV fluids judiciously as needed.   - Monitor on telemetry.    - Strict i/o and daily weights.    - Keep K > 4, Mg > 2.    - Monitor renal function with ongoing diuresis.    Atrial Fibrillation   - Currently in NSR.   - Continue metoprolol 25mg PO BID.  - Not on AC due to advanced age, frail, and risk of falls.   - Continue telemetry monitoring.     SIRS  - Unknown source.   - Management per primary team.     Nasrin Wilkins PA-C [phone]  Assessment and recommendations are final when note is signed by the attending.

## 2021-04-28 NOTE — PROGRESS NOTE ADULT - SUBJECTIVE AND OBJECTIVE BOX
Patient is a 86y old  Male who presents with a chief complaint of sirs, weakness (2021 05:45)    Patient seen and examined at bedside.     ALLERGIES:  No Known Allergies    MEDICATIONS  (STANDING):  albuterol/ipratropium for Nebulization 3 milliLiter(s) Nebulizer every 6 hours  aspirin enteric coated 325 milliGRAM(s) Oral daily  atorvastatin 40 milliGRAM(s) Oral at bedtime  azithromycin  IVPB 500 milliGRAM(s) IV Intermittent every 24 hours  budesonide 160 MICROgram(s)/formoterol 4.5 MICROgram(s) Inhaler 2 Puff(s) Inhalation two times a day  cefTRIAXone   IVPB 1000 milliGRAM(s) IV Intermittent every 24 hours  dextrose 40% Gel 15 Gram(s) Oral once  dextrose 5%. 1000 milliLiter(s) (50 mL/Hr) IV Continuous <Continuous>  dextrose 5%. 1000 milliLiter(s) (100 mL/Hr) IV Continuous <Continuous>  dextrose 50% Injectable 25 Gram(s) IV Push once  dextrose 50% Injectable 12.5 Gram(s) IV Push once  dextrose 50% Injectable 25 Gram(s) IV Push once  donepezil 5 milliGRAM(s) Oral at bedtime  glucagon  Injectable 1 milliGRAM(s) IntraMuscular once  heparin   Injectable 5000 Unit(s) SubCutaneous every 8 hours  insulin glargine Injectable (LANTUS) 10 Unit(s) SubCutaneous at bedtime  insulin glargine Injectable (LANTUS) 10 Unit(s) SubCutaneous once  insulin lispro (ADMELOG) corrective regimen sliding scale   SubCutaneous three times a day before meals  insulin lispro (ADMELOG) corrective regimen sliding scale   SubCutaneous at bedtime  levothyroxine 75 MICROGram(s) Oral daily  melatonin 5 milliGRAM(s) Oral at bedtime  memantine 5 milliGRAM(s) Oral daily  metolazone 5 milliGRAM(s) Oral daily  metoprolol tartrate 12.5 milliGRAM(s) Oral two times a day  montelukast 10 milliGRAM(s) Oral daily  multivitamin 1 Tablet(s) Oral daily  pantoprazole    Tablet 40 milliGRAM(s) Oral before breakfast  polyethylene glycol 3350 17 Gram(s) Oral daily  tiotropium 18 MICROgram(s) Capsule 1 Capsule(s) Inhalation daily  torsemide 20 milliGRAM(s) Oral two times a day    MEDICATIONS  (PRN):    Vital Signs Last 24 Hrs  T(F): 98.4 (2021 08:03), Max: 102.4 (2021 22:15)  HR: 70 (2021 08:03) (70 - 97)  BP: 93/55 (2021 08:03) (93/55 - 127/65)  RR: 18 (2021 08:03) (17 - 20)  SpO2: 96% (2021 08:03) (96% - 99%)  I&O's Summary    PHYSICAL EXAM:  General: NAD, Alert  ENT: MMM, no thrush  Neck: Supple, No JVD  Lungs: good air entry, non-labored breathing  Cardio: +s1/s2 +1 pitting edema b/l le  Abdomen: Soft, Nontender, Nondistended; Bowel sounds present  Extremities: No calf tenderness    LABS:                        12.2   15.38 )-----------( 171      ( 2021 23:30 )             37.5         136  |  98  |  80.0  ----------------------------<  146  4.1   |  27.0  |  2.18    Ca    9.6      2021 23:30    TPro  7.8  /  Alb  3.9  /  TBili  2.3  /  DBili  x   /  AST  317  /  ALT  229  /  AlkPhos  497      eGFR if Non African American: 26 mL/min/1.73M2 (21 @ 23:30)  eGFR if : 31 mL/min/1.73M2 (21 @ 23:30)    PT/INR - ( 2021 23:30 )   PT: 13.5 sec;   INR: 1.17 ratio    PTT - ( 2021 23:30 )  PTT:29.3 sec    Procalcitonin, Serum: 0.26 ng/mL (21 @ 23:30)    CARDIAC MARKERS ( 2021 23:30 )  x     / 0.04 ng/mL / x     / x     / x         Chol 134 mg/dL LDL -- HDL 50 mg/dL Trig 106 mg/dL    23:33 - VBG - pH:       | pCO2:       | pO2:       | Lactate: 1.5    23:32 - VBG - pH: 7.42  | pCO2: 47    | pO2: 44    | Lactate:          Glucose  POCT Blood Glucose.: 183 mg/dL (2021 07:39)  POCT Blood Glucose.: 181 mg/dL (2021 20:59)    Urinalysis Basic - ( 2021 23:34 )  Color: Yellow / Appearance: Clear / S.010 / pH: x  Gluc: x / Ketone: Trace  / Bili: Negative / Urobili: Negative mg/dL   Blood: x / Protein: 30 mg/dL / Nitrite: Negative   Leuk Esterase: Trace / RBC: 0-2 /HPF / WBC 0-2   Sq Epi: x / Non Sq Epi: Occasional / Bacteria: Occasional    RADIOLOGY & ADDITIONAL TESTS:  < from: CT Abdomen and Pelvis/ Chest No Cont (21 @ 04:53) >  IMPRESSION:  No interval change in lung findings compared to prior exam from 2021. Rounded chronic atelectasis at the lung bases and chronic small bilateral pleural effusions with associated pleural thickening.  Mildly distended gallbladder with mild pericholecystic edema. Correlate with LFTs. Sonographic evaluation may be considered, as clinically indicated.  Mild thickening of the urinary bladder walls despite underdistention with slight infiltration of the perivesicular fat which may in part be due to chronic outlet obstruction in the setting of an enlarged prostate gland, however, perivesicular fatty infiltration raises question of cystitis. Correlate with urinalysis.  Mild colonic diverticulosis without evidence of diverticulitis.  < end of copied text >    < from: CT Head No Cont (21 @ 01:35) >  IMPRESSION:  No acute intracranial hemorrhage, mass effect, or CT evidence of an acute vascular territorial infarct. Mild chronic ischemic changes in the frontoparietal white matter.  < end of copied text >    < from: US Abdomen Upper Quadrant Right (21 @ 01:13) >  IMPRESSION:  Distended gallbladder with trace sludge/stones and mild gallbladder wall thickening. Negative sonographic Simpson sign. HIDA scan may be considered for further evaluation.  < end of copied text >    Care Discussed with Consultants/Other Providers:   Nephrology Patient is a 86y old  Male who presents with a chief complaint of sirs, weakness (2021 05:45)    Patient seen and examined at bedside.     ALLERGIES:  No Known Allergies    MEDICATIONS  (STANDING):  albuterol/ipratropium for Nebulization 3 milliLiter(s) Nebulizer every 6 hours  aspirin enteric coated 325 milliGRAM(s) Oral daily  atorvastatin 40 milliGRAM(s) Oral at bedtime  azithromycin  IVPB 500 milliGRAM(s) IV Intermittent every 24 hours  budesonide 160 MICROgram(s)/formoterol 4.5 MICROgram(s) Inhaler 2 Puff(s) Inhalation two times a day  cefTRIAXone   IVPB 1000 milliGRAM(s) IV Intermittent every 24 hours  dextrose 40% Gel 15 Gram(s) Oral once  dextrose 5%. 1000 milliLiter(s) (50 mL/Hr) IV Continuous <Continuous>  dextrose 5%. 1000 milliLiter(s) (100 mL/Hr) IV Continuous <Continuous>  dextrose 50% Injectable 25 Gram(s) IV Push once  dextrose 50% Injectable 12.5 Gram(s) IV Push once  dextrose 50% Injectable 25 Gram(s) IV Push once  donepezil 5 milliGRAM(s) Oral at bedtime  glucagon  Injectable 1 milliGRAM(s) IntraMuscular once  heparin   Injectable 5000 Unit(s) SubCutaneous every 8 hours  insulin glargine Injectable (LANTUS) 10 Unit(s) SubCutaneous at bedtime  insulin glargine Injectable (LANTUS) 10 Unit(s) SubCutaneous once  insulin lispro (ADMELOG) corrective regimen sliding scale   SubCutaneous three times a day before meals  insulin lispro (ADMELOG) corrective regimen sliding scale   SubCutaneous at bedtime  levothyroxine 75 MICROGram(s) Oral daily  melatonin 5 milliGRAM(s) Oral at bedtime  memantine 5 milliGRAM(s) Oral daily  metoprolol tartrate 12.5 milliGRAM(s) Oral two times a day  montelukast 10 milliGRAM(s) Oral daily  multivitamin 1 Tablet(s) Oral daily  pantoprazole    Tablet 40 milliGRAM(s) Oral before breakfast  polyethylene glycol 3350 17 Gram(s) Oral daily  tiotropium 18 MICROgram(s) Capsule 1 Capsule(s) Inhalation daily  torsemide 20 milliGRAM(s) Oral daily    MEDICATIONS  (PRN):      Vital Signs Last 24 Hrs  T(F): 98.4 (2021 08:03), Max: 102.4 (2021 22:15)  HR: 70 (2021 08:03) (70 - 97)  BP: 93/55 (2021 08:03) (93/55 - 127/65)  RR: 18 (2021 08:03) (17 - 20)  SpO2: 96% (2021 08:03) (96% - 99%)  I&O's Summary    PHYSICAL EXAM:  General: NAD, Alert  ENT: MMM, no thrush  Neck: Supple, No JVD  Lungs: good air entry, non-labored breathing  Cardio: +s1/s2 +1 pitting edema b/l le  Abdomen: Soft, Nontender, Nondistended; Bowel sounds present  Extremities: No calf tenderness    LABS:                        12.2   15.38 )-----------( 171      ( 2021 23:30 )             37.5         136  |  98  |  80.0  ----------------------------<  146  4.1   |  27.0  |  2.18    Ca    9.6      2021 23:30    TPro  7.8  /  Alb  3.9  /  TBili  2.3  /  DBili  x   /  AST  317  /  ALT  229  /  AlkPhos  497      eGFR if Non African American: 26 mL/min/1.73M2 (21 @ 23:30)  eGFR if : 31 mL/min/1.73M2 (21 @ 23:30)    PT/INR - ( 2021 23:30 )   PT: 13.5 sec;   INR: 1.17 ratio    PTT - ( 2021 23:30 )  PTT:29.3 sec    Procalcitonin, Serum: 0.26 ng/mL (21 @ 23:30)    CARDIAC MARKERS ( 2021 23:30 )  x     / 0.04 ng/mL / x     / x     / x         Chol 134 mg/dL LDL -- HDL 50 mg/dL Trig 106 mg/dL    23:33 - VBG - pH:       | pCO2:       | pO2:       | Lactate: 1.5    23:32 - VBG - pH: 7.42  | pCO2: 47    | pO2: 44    | Lactate:          Glucose  POCT Blood Glucose.: 183 mg/dL (2021 07:39)  POCT Blood Glucose.: 181 mg/dL (2021 20:59)    Urinalysis Basic - ( 2021 23:34 )  Color: Yellow / Appearance: Clear / S.010 / pH: x  Gluc: x / Ketone: Trace  / Bili: Negative / Urobili: Negative mg/dL   Blood: x / Protein: 30 mg/dL / Nitrite: Negative   Leuk Esterase: Trace / RBC: 0-2 /HPF / WBC 0-2   Sq Epi: x / Non Sq Epi: Occasional / Bacteria: Occasional    RADIOLOGY & ADDITIONAL TESTS:  < from: CT Abdomen and Pelvis/ Chest No Cont (21 @ 04:53) >  IMPRESSION:  No interval change in lung findings compared to prior exam from 2021. Rounded chronic atelectasis at the lung bases and chronic small bilateral pleural effusions with associated pleural thickening.  Mildly distended gallbladder with mild pericholecystic edema. Correlate with LFTs. Sonographic evaluation may be considered, as clinically indicated.  Mild thickening of the urinary bladder walls despite underdistention with slight infiltration of the perivesicular fat which may in part be due to chronic outlet obstruction in the setting of an enlarged prostate gland, however, perivesicular fatty infiltration raises question of cystitis. Correlate with urinalysis.  Mild colonic diverticulosis without evidence of diverticulitis.  < end of copied text >    < from: CT Head No Cont (21 @ 01:35) >  IMPRESSION:  No acute intracranial hemorrhage, mass effect, or CT evidence of an acute vascular territorial infarct. Mild chronic ischemic changes in the frontoparietal white matter.  < end of copied text >    < from: US Abdomen Upper Quadrant Right (21 @ 01:13) >  IMPRESSION:  Distended gallbladder with trace sludge/stones and mild gallbladder wall thickening. Negative sonographic Simpson sign. HIDA scan may be considered for further evaluation.  < end of copied text >    Care Discussed with Consultants/Other Providers:   Nephrology

## 2021-04-28 NOTE — H&P ADULT - ASSESSMENT
86M hx vascular dementia afib (not on ac), CKD, DM2, COPD presents with fever and worsening mental status found to have sirs criteria.         #SIRS   -no clear infectious source  -meeting sirs with fever, tachycardia, leukocytosis  -s/p 2.1L, lactate negative, bp stable  -will c/w ceftriaxone and azithromycin until cultures return  -ct chest/abdomen/pelvis without clear infectious source  -id consult    #transaminitis  -no clear cause on ct abdomen  -perhaps related to GB disease, but exam benign  -check hepatitis panel  -continue to trend    #CKD  -appears to be near baseline  -avoid nephrotoxins, renally dose meds  -continue to trend    #Afib  -not on ac  -c/w metoprolol    #Chronic abdominal pain  -ct with nothing acute  -appears to have moderate stool, will place on bowel regiemn    #dm2  c/w lantus 10 units and moderate iss  -adjust as needed    #dvt ppx  -heparin sq

## 2021-04-28 NOTE — CONSULT NOTE ADULT - ASSESSMENT
CKD(IV): febrile illness, elevated LFTs  Renal function at baseline  - avoid potential nephrotoxins  - diuretics as needed  - follow labs

## 2021-04-28 NOTE — CONSULT NOTE ADULT - ATTENDING COMMENTS
86M history as listed with HFpEF on Torsemide 20mg PRN if weight >148 last seen in office with Dr. Wilkes yesterday presents with acute mental status change and fever, suspected sepsis, elevated AST/ALT, HIDA negative, on empiric IV ceftriaxone per ID  -known pAfib but declined anticoagulation by patient/family  -hold Torsemide as euvolemic on exam and suspected ongoing sepsis; patient is not on metolazone at home   -hold statin until AST/ALT normalize          Jose L Cote DO, [facility]  Faculty Non-Invasive Cardiologist  [phone]

## 2021-04-28 NOTE — H&P ADULT - HISTORY OF PRESENT ILLNESS
86M hx vascular dementia afib (not on ac), CKD, DM2, COPD presents with fever and worsening mental status. Most of hpi taken from valerie Mims at bedside. She states yesterday pt was in normal state of health and went to cardiologist appointment. Following appointment pt took a nap and was difficult to wake up afterwards. He was found to have a fever of 102, weak, lethargic and mildly confused. Daughter states pt has been being worked up for chronic abdominal pain and vomiting by GI. He was recently started on metoclopramide as thought 2/2 gastroparesis. Otherwise pt denies acute complaint including cough, sob, back pain, dysuria, or diarrhea.     In ER, pt tmax 102.4, pulse 97, bp 106/59 and spo2 98 on RA. WBC 15. CXR and ua negative. Pt given ceftriaxone and 2.1L bolus, Lactate negative. US with distended GB, evaluated by surgery, but unimpressed and did not think acute cholecystitis.

## 2021-04-28 NOTE — H&P ADULT - NSHPREVIEWOFSYSTEMS_GEN_ALL_CORE
REVIEW OF SYSTEMS:    CONSTITUTIONAL: +weakness +fever  EYES/ENT: No visual changes;  No vertigo or throat pain   NECK: No pain or stiffness  RESPIRATORY: No cough, wheezing, hemoptysis; No shortness of breath  CARDIOVASCULAR: No chest pain or palpitations  GASTROINTESTINAL: +abd pain. No nausea, vomiting, or hematemesis; No diarrhea or constipation. No melena or hematochezia.  GENITOURINARY: No dysuria, frequency or hematuria  NEUROLOGICAL: No numbness or weakness  SKIN: No itching, burning, rashes, or lesions   All other review of systems is negative unless indicated above.

## 2021-04-28 NOTE — PROGRESS NOTE ADULT - ASSESSMENT
86M hx vascular dementia afib (not on ac), CKD, DM2, COPD presents with fever and worsening mental status found to have sirs criteria.     #abdominal pain   - w/ sirs present on admission   - w/ worsening mental status from baseline   - check hida   - w/ transaminitis   - w/ leukocytosis on admission   - ID consult pending   - monitor cultures  - Surgery consult appreciated  - GI consult pending     #COPD   - not in exacerbation   - spiriva, singulair, duoneb    #dementia  - nameneda, aricept    #HFpEF  - not in exacerbation currently  - echo 10/2019 lvef >75  - cardio consult pending  - torsemide    #CKD4  - appears to be near baseline  - avoid nephrotoxic meds   - monitor cr  - nephro consult pending    #Afib  - not on ac  - metoprolol    #dm2  - lantus, iss  - monitor fingersticks   - a1c pending    #DVT Prophylaxis  - Heparin SC 86M hx vascular dementia afib (not on ac), CKD, DM2, COPD presents with fever and worsening mental status found to have sirs criteria.     #abdominal pain   - w/ sirs present on admission   - w/ worsening mental status from baseline   - check hida   - w/ transaminitis   - w/ leukocytosis on admission   - ID consult pending   - monitor cultures  - Surgery consult appreciated  - GI consult pending     #COPD   - not in exacerbation   - spiriva, singulair, duoneb    #dementia  - nameneda, aricept    #HFpEF  - not in exacerbation currently  - echo 10/2019 lvef >75  - cardio consult pending  - torsemide daughter stating takes torsemide bid if weight greater then 148     #CKD4  - appears to be near baseline  - avoid nephrotoxic meds   - monitor cr  - nephro consult pending    #Afib  - not on ac  - metoprolol    #dm2  - lantus, iss  - monitor fingersticks   - a1c pending    #DVT Prophylaxis  - Heparin SC 86M hx vascular dementia afib (not on ac), CKD, DM2, COPD presents with fever and worsening mental status found to have sirs criteria.     #abdominal pain   - w/ sirs present on admission   - w/ worsening mental status from baseline   - check hida   - w/ transaminitis   - w/ leukocytosis on admission   - ID consult pending   - monitor cultures  - Surgery consult appreciated  - GI consult pending     #COPD   - not in exacerbation   - spiriva, singulair, duoneb    #dementia  - nameneda, aricept    #HFpEF  - not in exacerbation currently  - echo 10/2019 lvef >75  - cardio consult pending  - torsemide daughter stating takes torsemide bid if weight greater then 148     #CKD4  - appears to be near baseline  - avoid nephrotoxic meds   - monitor cr  - nephro consult pending    #Afib  - not on ac  - metoprolol    #dm2  - lantus, iss  - monitor fingersticks   - a1c pending    #DVT Prophylaxis  - Heparin SC    spoke to patient daughter bedside regarding hospital course. all questions answered

## 2021-04-28 NOTE — H&P ADULT - NSHPLABSRESULTS_GEN_ALL_CORE
12.2   15.38 )-----------( 171      ( 2021 23:30 )             37.5       136  |  98  |  80.0<H>  ----------------------------<  146<H>  4.1   |  27.0  |  2.18<H>    Ca    9.6      2021 23:30    TPro  7.8  /  Alb  3.9  /  TBili  2.3<H>  /  DBili  x   /  AST  317<H>  /  ALT  229<H>  /  AlkPhos  497<H>          Urinalysis Basic - ( 2021 23:34 )    Color: Yellow / Appearance: Clear / S.010 / pH: x  Gluc: x / Ketone: Trace  / Bili: Negative / Urobili: Negative mg/dL   Blood: x / Protein: 30 mg/dL / Nitrite: Negative   Leuk Esterase: Trace / RBC: 0-2 /HPF / WBC 0-2   Sq Epi: x / Non Sq Epi: Occasional / Bacteria: Occasional    PT/INR - ( 2021 23:30 )   PT: 13.5 sec;   INR: 1.17 ratio       PTT - ( 2021 23:30 )  PTT:29.3 sec    Lactate Trend    CARDIAC MARKERS ( 2021 23:30 )  x     / 0.04 ng/mL / x     / x     / x        CAPILLARY BLOOD GLUCOSE    POCT Blood Glucose.: 181 mg/dL (2021 20:59)  Culture Results:   <10,000 CFU/mL Normal Urogenital Brandi ( @ 03:18)      RADIOLOGY, EKG & ADDITIONAL TESTS: Reviewed.    < from: CT Chest No Cont (21 @ 04:53) >    IMPRESSION:  No interval change in lung findings compared to prior exam from 2021. Rounded chronic atelectasis at the lung bases and chronic small bilateral pleural effusions with associated pleural thickening.    Mildly distended gallbladder with mild pericholecystic edema. Correlate with LFTs. Sonographic evaluation may be considered, as clinically indicated.    Mild thickening of the urinary bladder walls despite underdistention with slight infiltration of the perivesicular fat which may in part be due to chronic outlet obstruction in the setting of an enlarged prostate gland, however, perivesicular fatty infiltration raises question of cystitis. Correlate with urinalysis.    Mild colonic diverticulosis without evidence of diverticulitis.    < end of copied text >    < from: CT Head No Cont (21 @ 01:35) >      IMPRESSION:    No acute intracranial hemorrhage, mass effect, or CT evidence of an acute vascular territorial infarct. Mild chronic ischemic changes in the frontoparietal white matter.    < end of copied text >    < from: US Abdomen Upper Quadrant Right (21 @ 01:13) >      IMPRESSION:    Distended gallbladder with trace sludge/stones and mild gallbladder wall thickening. Negative sonographic Simpson sign. HIDA scan may be considered for further evaluation.    < end of copied text >

## 2021-04-28 NOTE — H&P ADULT - NSHPPHYSICALEXAM_GEN_ALL_CORE
Vital Signs Last 24 Hrs  T(C): 37.3 (28 Apr 2021 03:30), Max: 39.1 (27 Apr 2021 22:15)  T(F): 99.1 (28 Apr 2021 03:30), Max: 102.4 (27 Apr 2021 22:15)  HR: 93 (28 Apr 2021 03:30) (89 - 97)  BP: 111/58 (28 Apr 2021 03:30) (106/59 - 111/58)  BP(mean): --  RR: 17 (28 Apr 2021 03:30) (17 - 20)  SpO2: 98% (28 Apr 2021 03:30) (98% - 99%)    GENERAL: NAD  HEENT:  Atraumatic, Normocephalic, MMM, no oropharyngeal lesions  EYES: EOMI, PERRLA, conjunctiva and sclera clear  NECK: Supple, No JVD, no throat tenderness.  CHEST/LUNG: rales at right lower lung, otherwise clear  HEART: Regular rate and rhythm; No murmurs, rubs, or gallops  ABDOMEN: Soft, Nontender, Nondistended; Bowel sounds present  EXTREMITIES:  2+ Peripheral Pulses, No clubbing, cyanosis, or edema  PSYCH: AAOx1-2, answers most questions appropriately  NEUROLOGY: moves all extremities spontaneously. no sensory deficits  SKIN: No rashes or lesions

## 2021-04-28 NOTE — CONSULT NOTE ADULT - ASSESSMENT
86y  Male with h/o vascular dementia, afib (not on ac), CKD, DM2, COPD. Patient presents to the ER with Chills, lethergy and worsening mental status of sudden onset. Patient was in normal state of health and went to cardiologist appointment. Following appointment he took a nap and was difficult to wake up afterwards. He was found to have a fever of 102F at home, was weak and lethargic and mildly confused.  He was recently started on metoclopramide for gastroparesis. Patient denies acute complaint including cough, sob, back pain, dysuria, or diarrhea. In the ER he was noted to be febrile to 102.4F, leukocytosis to 15k. US with distended GB, evaluated by surgery, but unimpressed and did not think acute cholecystitis. Started on ceftriaxone.       Fever  Leukocytosis  SIRS   ALMAS  Transaminitis       - Blood cultures pending  - Repeat blood cultures if febrile   - RVP negative   - CT C/A/P reporting no acute findings  - HIDA scan reporting no acute cholecystitis   - UA negative for UTI  - D/C Azithromycin  - Continue Ceftriaxone  - Check Viral hepatitis profile   - Follow up cultures   - Trend Fever  - Trend Leukocytosis      Will Follow      d/w Dr Villegas

## 2021-04-28 NOTE — CONSULT NOTE ADULT - ASSESSMENT
The etiology of his SIRS is not clear.  Fever in the setting of metoclopramide occurs in the setting of NMS, which he does not appear to have.  Whether the abdominal pain, nausea and vomiting that he has been experiencing is related to his current presentation or the findings on CT, which are not particularly impressive as compared to his CT A/P from 4/19, is not clear.  He may have acalculous cholecystitis, and I agree with HIDA.  If HIDA is suggestive of cholecystitis, he will likely need a percutaneous kateryna drain.  If HIDA is suggestive of bilary obstruction, he should undergo an MRCP.  Agree with broad spectrum antibiotics.  Will follow up.

## 2021-04-28 NOTE — CONSULT NOTE ADULT - ASSESSMENT
85 yo M w/ pmh CKD (fluid overload managed w/ torsemide PRN), Dementia, DM, COPD, Afib (rate controlled on metoprolol), presents to ED with undifferentiated sepsis w/ leukocytosis with neutrophilic shift and fever to 102. US GB showed mild thickening, trace stones, and distension, however exam negative for RUQ tenderness and negative duenas sign makes acute cholecystitis unlikely source of sepsis/laboratory abnormalities. Patient with many medical co-morbidities including CKD5, COPD w/ difficult to control fluid balance.     - Recommend medical evaluation for admission for undifferentiated sepsis  - Surgery will continue to follow - Recommend HIDA to r/o biliary pathology/acute cholecystitis as source of sepsis  - recommend renal and cardiology evaluation  - no acute surgical intervention at this time 87 yo M w/ pmh CKD (fluid overload managed w/ torsemide PRN), Dementia, DM, COPD, Afib (rate controlled on metoprolol), presents to ED with undifferentiated sepsis w/ leukocytosis with neutrophilic shift and fever to 102. US GB showed mild thickening, trace stones, and distension, however exam negative for RUQ tenderness and negative duenas sign makes acute cholecystitis unlikely source of sepsis/laboratory abnormalities. Patient with many medical co-morbidities including CKD5, COPD w/ difficult to control fluid balance.     - Recommend medical evaluation for admission for undifferentiated sepsis  - Surgery will continue to follow - Recommend HIDA to r/o biliary pathology/acute cholecystitis as source of sepsis  - recommend renal and cardiology evaluation  - no acute surgical intervention at this time    Surgery consulted at 1:55 am  patient seen at 2:20 am 87 yo M w/ pmh CKD (fluid overload managed w/ torsemide PRN), Dementia, DM, COPD, Afib (rate controlled on metoprolol), presents to ED with undifferentiated sepsis w/ leukocytosis with neutrophilic shift and fever to 102. US GB showed mild thickening, trace stones, and distension, however exam negative for RUQ tenderness and negative duenas sign makes acute cholecystitis unlikely source of sepsis/laboratory abnormalities, however, given patient's baseline dementia and potentially unreliable exam, unable to rule out cholecystitis. Patient with many medical co-morbidities including CKD5, COPD w/ difficult to control fluid balance.     - Recommend medical evaluation for admission for undifferentiated sepsis  - Surgery will continue to follow - Recommend HIDA to r/o biliary pathology/acute cholecystitis as source of sepsis  - recommend renal and cardiology evaluation  - no acute surgical intervention at this time    Surgery consulted at 1:55 am  patient seen at 2:20 am

## 2021-04-29 LAB
A1C WITH ESTIMATED AVERAGE GLUCOSE RESULT: 8 % — HIGH (ref 4–5.6)
ALBUMIN SERPL ELPH-MCNC: 3.4 G/DL — SIGNIFICANT CHANGE UP (ref 3.3–5.2)
ALP SERPL-CCNC: 428 U/L — HIGH (ref 40–120)
ALT FLD-CCNC: 122 U/L — HIGH
ANION GAP SERPL CALC-SCNC: 12 MMOL/L — SIGNIFICANT CHANGE UP (ref 5–17)
AST SERPL-CCNC: 82 U/L — HIGH
BILIRUB DIRECT SERPL-MCNC: 0.7 MG/DL — HIGH (ref 0–0.3)
BILIRUB INDIRECT FLD-MCNC: 0.3 MG/DL — SIGNIFICANT CHANGE UP (ref 0.2–1)
BILIRUB SERPL-MCNC: 1 MG/DL — SIGNIFICANT CHANGE UP (ref 0.4–2)
BUN SERPL-MCNC: 71 MG/DL — HIGH (ref 8–20)
CALCIUM SERPL-MCNC: 9.4 MG/DL — SIGNIFICANT CHANGE UP (ref 8.6–10.2)
CHLORIDE SERPL-SCNC: 98 MMOL/L — SIGNIFICANT CHANGE UP (ref 98–107)
CO2 SERPL-SCNC: 28 MMOL/L — SIGNIFICANT CHANGE UP (ref 22–29)
COVID-19 SPIKE DOMAIN AB INTERP: POSITIVE
COVID-19 SPIKE DOMAIN ANTIBODY RESULT: >250 U/ML — HIGH
CREAT SERPL-MCNC: 2.35 MG/DL — HIGH (ref 0.5–1.3)
CULTURE RESULTS: SIGNIFICANT CHANGE UP
ESTIMATED AVERAGE GLUCOSE: 183 MG/DL — HIGH (ref 68–114)
GLUCOSE BLDC GLUCOMTR-MCNC: 203 MG/DL — HIGH (ref 70–99)
GLUCOSE BLDC GLUCOMTR-MCNC: 204 MG/DL — HIGH (ref 70–99)
GLUCOSE BLDC GLUCOMTR-MCNC: 234 MG/DL — HIGH (ref 70–99)
GLUCOSE BLDC GLUCOMTR-MCNC: 274 MG/DL — HIGH (ref 70–99)
GLUCOSE SERPL-MCNC: 200 MG/DL — HIGH (ref 70–99)
HCT VFR BLD CALC: 33.2 % — LOW (ref 39–50)
HGB BLD-MCNC: 10.9 G/DL — LOW (ref 13–17)
MCHC RBC-ENTMCNC: 29.6 PG — SIGNIFICANT CHANGE UP (ref 27–34)
MCHC RBC-ENTMCNC: 32.8 GM/DL — SIGNIFICANT CHANGE UP (ref 32–36)
MCV RBC AUTO: 90.2 FL — SIGNIFICANT CHANGE UP (ref 80–100)
NT-PROBNP SERPL-SCNC: 2653 PG/ML — HIGH (ref 0–300)
PLATELET # BLD AUTO: 147 K/UL — LOW (ref 150–400)
POTASSIUM SERPL-MCNC: 3.8 MMOL/L — SIGNIFICANT CHANGE UP (ref 3.5–5.3)
POTASSIUM SERPL-SCNC: 3.8 MMOL/L — SIGNIFICANT CHANGE UP (ref 3.5–5.3)
PROT SERPL-MCNC: 6.9 G/DL — SIGNIFICANT CHANGE UP (ref 6.6–8.7)
RBC # BLD: 3.68 M/UL — LOW (ref 4.2–5.8)
RBC # FLD: 14.5 % — SIGNIFICANT CHANGE UP (ref 10.3–14.5)
SARS-COV-2 IGG+IGM SERPL QL IA: >250 U/ML — HIGH
SARS-COV-2 IGG+IGM SERPL QL IA: POSITIVE
SODIUM SERPL-SCNC: 138 MMOL/L — SIGNIFICANT CHANGE UP (ref 135–145)
SPECIMEN SOURCE: SIGNIFICANT CHANGE UP
WBC # BLD: 8.24 K/UL — SIGNIFICANT CHANGE UP (ref 3.8–10.5)
WBC # FLD AUTO: 8.24 K/UL — SIGNIFICANT CHANGE UP (ref 3.8–10.5)

## 2021-04-29 PROCEDURE — 99233 SBSQ HOSP IP/OBS HIGH 50: CPT

## 2021-04-29 PROCEDURE — 99232 SBSQ HOSP IP/OBS MODERATE 35: CPT

## 2021-04-29 RX ORDER — ASPIRIN/CALCIUM CARB/MAGNESIUM 324 MG
81 TABLET ORAL DAILY
Refills: 0 | Status: DISCONTINUED | OUTPATIENT
Start: 2021-04-29 | End: 2021-05-05

## 2021-04-29 RX ORDER — INSULIN LISPRO 100/ML
4 VIAL (ML) SUBCUTANEOUS
Refills: 0 | Status: DISCONTINUED | OUTPATIENT
Start: 2021-04-29 | End: 2021-05-05

## 2021-04-29 RX ADMIN — MONTELUKAST 10 MILLIGRAM(S): 4 TABLET, CHEWABLE ORAL at 11:19

## 2021-04-29 RX ADMIN — Medication 3 MILLILITER(S): at 21:19

## 2021-04-29 RX ADMIN — Medication 3 MILLILITER(S): at 13:09

## 2021-04-29 RX ADMIN — Medication 4 UNIT(S): at 16:52

## 2021-04-29 RX ADMIN — DONEPEZIL HYDROCHLORIDE 5 MILLIGRAM(S): 10 TABLET, FILM COATED ORAL at 21:59

## 2021-04-29 RX ADMIN — Medication 10 MILLIGRAM(S): at 11:19

## 2021-04-29 RX ADMIN — MEMANTINE HYDROCHLORIDE 5 MILLIGRAM(S): 10 TABLET ORAL at 11:21

## 2021-04-29 RX ADMIN — Medication 12.5 MILLIGRAM(S): at 16:51

## 2021-04-29 RX ADMIN — INSULIN GLARGINE 10 UNIT(S): 100 INJECTION, SOLUTION SUBCUTANEOUS at 21:58

## 2021-04-29 RX ADMIN — Medication 12.5 MILLIGRAM(S): at 06:34

## 2021-04-29 RX ADMIN — Medication 3 MILLILITER(S): at 08:36

## 2021-04-29 RX ADMIN — CEFTRIAXONE 100 MILLIGRAM(S): 500 INJECTION, POWDER, FOR SOLUTION INTRAMUSCULAR; INTRAVENOUS at 22:44

## 2021-04-29 RX ADMIN — Medication 1 TABLET(S): at 11:19

## 2021-04-29 RX ADMIN — Medication 4: at 11:19

## 2021-04-29 RX ADMIN — Medication 81 MILLIGRAM(S): at 13:10

## 2021-04-29 RX ADMIN — HEPARIN SODIUM 5000 UNIT(S): 5000 INJECTION INTRAVENOUS; SUBCUTANEOUS at 06:34

## 2021-04-29 RX ADMIN — HEPARIN SODIUM 5000 UNIT(S): 5000 INJECTION INTRAVENOUS; SUBCUTANEOUS at 13:10

## 2021-04-29 RX ADMIN — Medication 6: at 16:52

## 2021-04-29 RX ADMIN — HEPARIN SODIUM 5000 UNIT(S): 5000 INJECTION INTRAVENOUS; SUBCUTANEOUS at 21:59

## 2021-04-29 RX ADMIN — PANTOPRAZOLE SODIUM 40 MILLIGRAM(S): 20 TABLET, DELAYED RELEASE ORAL at 06:34

## 2021-04-29 RX ADMIN — POLYETHYLENE GLYCOL 3350 17 GRAM(S): 17 POWDER, FOR SOLUTION ORAL at 11:19

## 2021-04-29 RX ADMIN — Medication 75 MICROGRAM(S): at 06:34

## 2021-04-29 RX ADMIN — Medication 4: at 08:02

## 2021-04-29 NOTE — PHYSICAL THERAPY INITIAL EVALUATION ADULT - ADDITIONAL COMMENTS
Pt lives in a house with 4 LEIGHTON with a HR and a flight of stairs to bedroom with a HR. Has a cane and RW but doesn't always use them. Daughter is home during the day.

## 2021-04-29 NOTE — PROGRESS NOTE ADULT - SUBJECTIVE AND OBJECTIVE BOX
NEPHROLOGY INTERVAL HPI/OVERNIGHT EVENTS:  pt lethargic but arousable  no acute sob, cp, n/v/d  Daughter at bedside    MEDICATIONS  (STANDING):  albuterol/ipratropium for Nebulization 3 milliLiter(s) Nebulizer every 6 hours  aspirin enteric coated 325 milliGRAM(s) Oral daily  budesonide 160 MICROgram(s)/formoterol 4.5 MICROgram(s) Inhaler 2 Puff(s) Inhalation two times a day  cefTRIAXone   IVPB 1000 milliGRAM(s) IV Intermittent every 24 hours  dextrose 40% Gel 15 Gram(s) Oral once  dextrose 5%. 1000 milliLiter(s) (50 mL/Hr) IV Continuous <Continuous>  dextrose 5%. 1000 milliLiter(s) (100 mL/Hr) IV Continuous <Continuous>  dextrose 50% Injectable 25 Gram(s) IV Push once  dextrose 50% Injectable 12.5 Gram(s) IV Push once  dextrose 50% Injectable 25 Gram(s) IV Push once  donepezil 5 milliGRAM(s) Oral at bedtime  glucagon  Injectable 1 milliGRAM(s) IntraMuscular once  heparin   Injectable 5000 Unit(s) SubCutaneous every 8 hours  insulin glargine Injectable (LANTUS) 10 Unit(s) SubCutaneous at bedtime  insulin lispro (ADMELOG) corrective regimen sliding scale   SubCutaneous three times a day before meals  insulin lispro (ADMELOG) corrective regimen sliding scale   SubCutaneous at bedtime  levothyroxine 75 MICROGram(s) Oral daily  melatonin 5 milliGRAM(s) Oral at bedtime  memantine 5 milliGRAM(s) Oral daily  metoprolol tartrate 12.5 milliGRAM(s) Oral two times a day  montelukast 10 milliGRAM(s) Oral daily  multivitamin 1 Tablet(s) Oral daily  pantoprazole    Tablet 40 milliGRAM(s) Oral before breakfast  polyethylene glycol 3350 17 Gram(s) Oral daily  tiotropium 18 MICROgram(s) Capsule 1 Capsule(s) Inhalation daily    MEDICATIONS  (PRN):      Allergies    No Known Allergies          Vital Signs Last 24 Hrs  T(C): 37 (2021 07:16), Max: 37.4 (2021 15:31)  T(F): 98.6 (2021 07:16), Max: 99.4 (2021 19:15)  HR: 78 (2021 08:38) (71 - 97)  BP: 108/66 (2021 07:16) (104/61 - 143/81)  BP(mean): --  RR: 18 (2021 07:16) (18 - 18)  SpO2: 97% (2021 08:38) (92% - 100%)    PHYSICAL EXAM:  GENERAL: Appears weak and acutely ill  EYES: Conjunctiva and sclera clear  ENMT:  Moist mucous membranes; no periorbital edema  NECK: Supple, No JVD  NERVOUS SYSTEM: Lethargic, demented  CHEST/LUNG: Clear bilaterally  HEART: IRR, no rub  ABDOMEN: Soft, NT BS+  EXTREMITIES:  2+ Peripheral Pulses, No LE edema  SKIN: No rashes or lesions    LABS:                        10.9   8.24  )-----------( 147      ( 2021 07:26 )             33.2         138  |  98  |  71.0<H>  ----------------------------<  200<H>  3.8   |  28.0  |  2.35<H>    Ca    9.4      2021 07:26  Mg     2.6         TPro  6.9  /  Alb  3.4  /  TBili  1.0  /  DBili  0.7<H>  /  AST  82<H>  /  ALT  122<H>  /  AlkPhos  428<H>      PT/INR - ( 2021 23:30 )   PT: 13.5 sec;   INR: 1.17 ratio         PTT - ( 2021 23:30 )  PTT:29.3 sec  Urinalysis Basic - ( 2021 23:34 )    Color: Yellow / Appearance: Clear / S.010 / pH: x  Gluc: x / Ketone: Trace  / Bili: Negative / Urobili: Negative mg/dL   Blood: x / Protein: 30 mg/dL / Nitrite: Negative   Leuk Esterase: Trace / RBC: 0-2 /HPF / WBC 0-2   Sq Epi: x / Non Sq Epi: Occasional / Bacteria: Occasional          RADIOLOGY & ADDITIONAL TESTS:  < from: CT Head No Cont (21 @ 20:15) >   EXAM:  CT BRAIN                          PROCEDURE DATE:  2021          INTERPRETATION:  CLINICAL HISTORY: Tremors.    TECHNIQUE:  CT of the head without contrast.  Contiguous transaxial images of the head were acquired from the skull base to the vertex without the administration of iodinated contrast. Coronal and sagittal reformatted images are provided.    COMPARISON: CT head from 2021 at 1:33 AM    FINDINGS:    There is no acute intracranial hemorrhage, vasogenic edema or evidence for acute large vascular territory infarct. Patchy and confluent areas of low-attenuation in the bihemispheric white matter are stable and nonspecific but likely the sequela of chronic microvascular change.    The ventricles, sulci and cisternal spaces are stable in size and evaluation demonstrating age-appropriate cerebral volume loss.  There is no midline shift or abnormal extra-axial fluid collection.    The calvarium is intact.  There are no osteoblastic or lytic calvarial or skull base lesions.  The paranasal sinuses and mastoid air cells are clear.    IMPRESSION:  Stable head CT. No acute intracranial hemorrhage, vasogenic edema, midline shift or extra-axial collection. Mild chronic microvascular changes.    < end of copied text >

## 2021-04-29 NOTE — PROGRESS NOTE ADULT - SUBJECTIVE AND OBJECTIVE BOX
Pt seen and examined. He appears to be less lethargic but is still not himself according to his daughter who was at the pt's bedside this AM. Still     REVIEW OF SYSTEMS:  Constitutional: No fever, weight loss or fatigue  Cardiovascular: No chest pain, palpitations, dizziness or leg swelling  Gastrointestinal: No abdominal or epigastric pain. No nausea, vomiting or hematemesis; No diarrhea or constipation. No melena or hematochezia.  Skin: No itching, burning, rashes or lesions       MEDICATIONS:  MEDICATIONS  (STANDING):  albuterol/ipratropium for Nebulization 3 milliLiter(s) Nebulizer every 6 hours  aspirin enteric coated 325 milliGRAM(s) Oral daily  bisacodyl Suppository 10 milliGRAM(s) Rectal once  budesonide 160 MICROgram(s)/formoterol 4.5 MICROgram(s) Inhaler 2 Puff(s) Inhalation two times a day  cefTRIAXone   IVPB 1000 milliGRAM(s) IV Intermittent every 24 hours  dextrose 40% Gel 15 Gram(s) Oral once  dextrose 5%. 1000 milliLiter(s) (50 mL/Hr) IV Continuous <Continuous>  dextrose 5%. 1000 milliLiter(s) (100 mL/Hr) IV Continuous <Continuous>  dextrose 50% Injectable 25 Gram(s) IV Push once  dextrose 50% Injectable 12.5 Gram(s) IV Push once  dextrose 50% Injectable 25 Gram(s) IV Push once  donepezil 5 milliGRAM(s) Oral at bedtime  glucagon  Injectable 1 milliGRAM(s) IntraMuscular once  heparin   Injectable 5000 Unit(s) SubCutaneous every 8 hours  insulin glargine Injectable (LANTUS) 10 Unit(s) SubCutaneous at bedtime  insulin lispro (ADMELOG) corrective regimen sliding scale   SubCutaneous three times a day before meals  insulin lispro (ADMELOG) corrective regimen sliding scale   SubCutaneous at bedtime  levothyroxine 75 MICROGram(s) Oral daily  melatonin 5 milliGRAM(s) Oral at bedtime  memantine 5 milliGRAM(s) Oral daily  metoprolol tartrate 12.5 milliGRAM(s) Oral two times a day  montelukast 10 milliGRAM(s) Oral daily  multivitamin 1 Tablet(s) Oral daily  pantoprazole    Tablet 40 milliGRAM(s) Oral before breakfast  polyethylene glycol 3350 17 Gram(s) Oral daily  tiotropium 18 MICROgram(s) Capsule 1 Capsule(s) Inhalation daily    MEDICATIONS  (PRN):      Allergies    No Known Allergies    Intolerances        Vital Signs Last 24 Hrs  T(C): 36.4 (2021 11:02), Max: 37.4 (2021 15:31)  T(F): 97.6 (2021 11:02), Max: 99.4 (2021 19:15)  HR: 89 (2021 11:) (71 - 97)  BP: 113/68 (2021 11:02) (104/61 - 143/81)  BP(mean): --  RR: 18 (2021 11:02) (18 - 18)  SpO2: 95% (2021 11:) (92% - 100%)      PHYSICAL EXAM:    General: Well developed; well nourished; in no acute distress  HEENT: MMM, conjunctiva and sclera clear  Lungs: clear to auscultation and percussion.  Cor: RRR S1, S2 only w/o mrg or clicks  Gastrointestinal:Abdomen: Soft non-tender non-distended; Normal bowel sounds; No hepatosplenomegaly  no surgical scars.  REESE: Good sphincter tone, no flakita-anal pathology, no masses or tenderness, brown, hemoccult negative stool.  Extremities: no cyanosis, clubbing or edema.  Skin: Warm and dry. No obvious rash  Neuro: Pt. a + o x 3, no tremulousness or asterixsis    LABS:      CBC Full  -  ( 2021 07:26 )  WBC Count : 8.24 K/uL  RBC Count : 3.68 M/uL  Hemoglobin : 10.9 g/dL  Hematocrit : 33.2 %  Platelet Count - Automated : 147 K/uL  Mean Cell Volume : 90.2 fl  Mean Cell Hemoglobin : 29.6 pg  Mean Cell Hemoglobin Concentration : 32.8 gm/dL  Auto Neutrophil # : x  Auto Lymphocyte # : x  Auto Monocyte # : x  Auto Eosinophil # : x  Auto Basophil # : x  Auto Neutrophil % : x  Auto Lymphocyte % : x  Auto Monocyte % : x  Auto Eosinophil % : x  Auto Basophil % : x    04-    138  |  98  |  71.0<H>  ----------------------------<  200<H>  3.8   |  28.0  |  2.35<H>    Ca    9.4      2021 07:26  Mg     2.6         TPro  6.9  /  Alb  3.4  /  TBili  1.0  /  DBili  0.7<H>  /  AST  82<H>  /  ALT  122<H>  /  AlkPhos  428<H>      PT/INR - ( 2021 23:30 )   PT: 13.5 sec;   INR: 1.17 ratio         PTT - ( 2021 23:30 )  PTT:29.3 sec      Urinalysis Basic - ( 2021 23:34 )    Color: Yellow / Appearance: Clear / S.010 / pH: x  Gluc: x / Ketone: Trace  / Bili: Negative / Urobili: Negative mg/dL   Blood: x / Protein: 30 mg/dL / Nitrite: Negative   Leuk Esterase: Trace / RBC: 0-2 /HPF / WBC 0-2   Sq Epi: x / Non Sq Epi: Occasional / Bacteria: Occasional                RADIOLOGY & ADDITIONAL STUDIES (The following images were personally reviewed):

## 2021-04-29 NOTE — PROGRESS NOTE ADULT - ASSESSMENT
CKD(IV): febrile illness, elevated LFTs  Renal function at baseline  - avoid potential nephrotoxins  - cont to hold diuretics while not eating well  - antibiotics as per ID; follow cultures  - follow labs

## 2021-04-29 NOTE — PROGRESS NOTE ADULT - SUBJECTIVE AND OBJECTIVE BOX
MINO STEPHENS    [id number]    86y      Male    CC: Lethargy   daughter at bedside provides history   tolerating some food, no other issues   per daughter he had c/o pain in his rt thigh - at present - denies any pain.     INTERVAL HPI/OVERNIGHT EVENTS: no acute events     REVIEW OF SYSTEMS:    CONSTITUTIONAL: No fever, +lethargy   RESPIRATORY: No cough, wheezing, No shortness of breath  CARDIOVASCULAR: No chest pain, palpitations  GASTROINTESTINAL: No abdominal or epigastric pain. No nausea, vomiting        Vital Signs Last 24 Hrs  T(C): 36.4 (2021 11:02), Max: 37.4 (2021 15:31)  T(F): 97.6 (2021 11:02), Max: 99.4 (2021 19:15)  HR: 89 (:) (71 - 97)  BP: 113/68 (2021 11:02) (104/61 - 143/81)  BP(mean): --  RR: 18 (2021 11:) (18 - 18)  SpO2: 95% (2021 11:02) (92% - 100%)    PHYSICAL EXAM:    GENERAL: NAD, well-groomed  HEENT: PERRL, +EOMI  NECK: soft, Supple  CHEST/LUNG: Clear to percussion bilaterally; No wheezing  HEART: S1S2+, Regular rate and rhythm; No murmurs  ABDOMEN: Soft, Nontender, Nondistended; Bowel sounds present  EXTREMITIES:  No clubbing, cyanosis, or edema  SKIN: No rashes or lesions  NEURO: AAOX2        LABS:                        10.9   8.24  )-----------( 147      ( 2021 07:26 )             33.2         138  |  98  |  71.0<H>  ----------------------------<  200<H>  3.8   |  28.0  |  2.35<H>    Ca    9.4      2021 07:26  Mg     2.6         TPro  6.9  /  Alb  3.4  /  TBili  1.0  /  DBili  0.7<H>  /  AST  82<H>  /  ALT  122<H>  /  AlkPhos  428<H>      PT/INR - ( 2021 23:30 )   PT: 13.5 sec;   INR: 1.17 ratio         PTT - ( 2021 23:30 )  PTT:29.3 sec  Urinalysis Basic - ( 2021 23:34 )    Color: Yellow / Appearance: Clear / S.010 / pH: x  Gluc: x / Ketone: Trace  / Bili: Negative / Urobili: Negative mg/dL   Blood: x / Protein: 30 mg/dL / Nitrite: Negative   Leuk Esterase: Trace / RBC: 0-2 /HPF / WBC 0-2   Sq Epi: x / Non Sq Epi: Occasional / Bacteria: Occasional          MEDICATIONS  (STANDING):  albuterol/ipratropium for Nebulization 3 milliLiter(s) Nebulizer every 6 hours  aspirin enteric coated 325 milliGRAM(s) Oral daily  budesonide 160 MICROgram(s)/formoterol 4.5 MICROgram(s) Inhaler 2 Puff(s) Inhalation two times a day  cefTRIAXone   IVPB 1000 milliGRAM(s) IV Intermittent every 24 hours  dextrose 40% Gel 15 Gram(s) Oral once  dextrose 5%. 1000 milliLiter(s) (50 mL/Hr) IV Continuous <Continuous>  dextrose 5%. 1000 milliLiter(s) (100 mL/Hr) IV Continuous <Continuous>  dextrose 50% Injectable 25 Gram(s) IV Push once  dextrose 50% Injectable 12.5 Gram(s) IV Push once  dextrose 50% Injectable 25 Gram(s) IV Push once  donepezil 5 milliGRAM(s) Oral at bedtime  glucagon  Injectable 1 milliGRAM(s) IntraMuscular once  heparin   Injectable 5000 Unit(s) SubCutaneous every 8 hours  insulin glargine Injectable (LANTUS) 10 Unit(s) SubCutaneous at bedtime  insulin lispro (ADMELOG) corrective regimen sliding scale   SubCutaneous three times a day before meals  insulin lispro (ADMELOG) corrective regimen sliding scale   SubCutaneous at bedtime  levothyroxine 75 MICROGram(s) Oral daily  melatonin 5 milliGRAM(s) Oral at bedtime  memantine 5 milliGRAM(s) Oral daily  metoprolol tartrate 12.5 milliGRAM(s) Oral two times a day  montelukast 10 milliGRAM(s) Oral daily  multivitamin 1 Tablet(s) Oral daily  pantoprazole    Tablet 40 milliGRAM(s) Oral before breakfast  polyethylene glycol 3350 17 Gram(s) Oral daily  tiotropium 18 MICROgram(s) Capsule 1 Capsule(s) Inhalation daily    MEDICATIONS  (PRN):      RADIOLOGY & ADDITIONAL TESTS:    HIDA Scan  -   IMPRESSION: Normal hepatobiliary scan.    No scan evidence of acute cholecystitis.    No significant change compared to the previous study dated 10/21/2019.

## 2021-04-29 NOTE — PROGRESS NOTE ADULT - SUBJECTIVE AND OBJECTIVE BOX
[facility]  INFECTIOUS DISEASES AND INTERNAL MEDICINE at [place]  =======================================================  Sabino Guevara MD  Diplomates American Board of Internal Medicine and Infectious Diseases  Tel: [phone]      Fax: [phone]  =======================================================      [facility]-[id number]  [place]    Follow up: Fever    No fever since 4/27       REVIEW OF SYSTEMS:  CONSTITUTIONAL:  No Fever No chills  HEENT:  No diplopia or blurred vision.  No earache, sore throat or runny nose.  CARDIOVASCULAR:  No pressure, squeezing, strangling, tightness, heaviness or aching about the chest, neck, axilla or epigastrium.  RESPIRATORY:  No cough, shortness of breath  GASTROINTESTINAL:  No nausea, vomiting or diarrhea.  GENITOURINARY:  No dysuria, frequency or urgency.   MUSCULOSKELETAL:  no joint aches, no muscle pain  SKIN:  No change in skin, hair or nails.  NEUROLOGIC:  No Headaches, seizures   PSYCHIATRIC:  No disorder of thought or mood.  ENDOCRINE:  No heat or cold intolerance  HEMATOLOGICAL:  No easy bruising or bleeding.       Physical Exam:  GEN: NAD, pleasant  HEENT: normocephalic and atraumatic. EOMI. PERRL.  Anicteric  NECK: Supple.   LUNGS: Clear to auscultation.  HEART: Regular rate and rhythm   ABDOMEN: Soft, nontender, and nondistended.  Positive bowel sounds.    : No CVA tenderness  EXTREMITIES: Without any edema.  MSK: No joint swelling  NEUROLOGIC: No Focal Deficits  PSYCHIATRIC: Appropriate affect . forgetful   SKIN: No Rash    Height (cm): 172.7 (04-27 @ 20:59)  Weight (kg): 86.2 (04-27 @ 20:59)  BMI (kg/m2): 28.9 (04-27 @ 20:59)  BSA (m2): 2 (04-27 @ 20:59)      Vitals:  T(F): 97.6 (29 Apr 2021 11:02), Max: 99.4 (28 Apr 2021 19:15)  HR: 89 (29 Apr 2021 11:02)  BP: 113/68 (29 Apr 2021 11:02)  RR: 18 (29 Apr 2021 11:02)  SpO2: 95% (29 Apr 2021 11:02) (92% - 100%)  temp max in last 48H T(F): , Max: 102.4 (04-27-21 @ 22:15)      Current Antibiotics:  cefTRIAXone   IVPB 1000 milliGRAM(s) IV Intermittent every 24 hours    Other medications:  albuterol/ipratropium for Nebulization 3 milliLiter(s) Nebulizer every 6 hours  aspirin enteric coated 325 milliGRAM(s) Oral daily  bisacodyl Suppository 10 milliGRAM(s) Rectal once  budesonide 160 MICROgram(s)/formoterol 4.5 MICROgram(s) Inhaler 2 Puff(s) Inhalation two times a day  dextrose 40% Gel 15 Gram(s) Oral once  dextrose 5%. 1000 milliLiter(s) IV Continuous <Continuous>  dextrose 5%. 1000 milliLiter(s) IV Continuous <Continuous>  dextrose 50% Injectable 25 Gram(s) IV Push once  dextrose 50% Injectable 12.5 Gram(s) IV Push once  dextrose 50% Injectable 25 Gram(s) IV Push once  donepezil 5 milliGRAM(s) Oral at bedtime  glucagon  Injectable 1 milliGRAM(s) IntraMuscular once  heparin   Injectable 5000 Unit(s) SubCutaneous every 8 hours  insulin glargine Injectable (LANTUS) 10 Unit(s) SubCutaneous at bedtime  insulin lispro (ADMELOG) corrective regimen sliding scale   SubCutaneous three times a day before meals  insulin lispro (ADMELOG) corrective regimen sliding scale   SubCutaneous at bedtime  levothyroxine 75 MICROGram(s) Oral daily  melatonin 5 milliGRAM(s) Oral at bedtime  memantine 5 milliGRAM(s) Oral daily  metoprolol tartrate 12.5 milliGRAM(s) Oral two times a day  montelukast 10 milliGRAM(s) Oral daily  multivitamin 1 Tablet(s) Oral daily  pantoprazole    Tablet 40 milliGRAM(s) Oral before breakfast  polyethylene glycol 3350 17 Gram(s) Oral daily  tiotropium 18 MICROgram(s) Capsule 1 Capsule(s) Inhalation daily                            10.9   8.24  )-----------( 147      ( 29 Apr 2021 07:26 )             33.2     04-29    138  |  98  |  71.0<H>  ----------------------------<  200<H>  3.8   |  28.0  |  2.35<H>    Ca    9.4      29 Apr 2021 07:26  Mg     2.6     04-28    TPro  6.9  /  Alb  3.4  /  TBili  1.0  /  DBili  0.7<H>  /  AST  82<H>  /  ALT  122<H>  /  AlkPhos  428<H>  04-29    RECENT CULTURES:  04-27 @ 23:01    RVP  NotDetec    04-20 @ 03:18 .Urine Clean Catch (Midstream)     <10,000 CFU/mL Normal Urogenital Brandi    WBC Count: 8.24 K/uL (04-29-21 @ 07:26)  WBC Count: 14.57 K/uL (04-28-21 @ 08:13)  WBC Count: 15.38 K/uL (04-27-21 @ 23:30)    Creatinine, Serum: 2.35 mg/dL (04-29-21 @ 07:26)  Creatinine, Serum: 2.12 mg/dL (04-28-21 @ 08:13)  Creatinine, Serum: 2.18 mg/dL (04-27-21 @ 23:30)    C-Reactive Protein, Serum: 39 mg/L (04-27-21 @ 23:30)    Ferritin, Serum: 821 ng/mL (04-27-21 @ 23:30)    Procalcitonin, Serum: 0.26 ng/mL (04-27-21 @ 23:30)     SARS-CoV-2: NotDetec (04-27-21 @ 23:01)  Rapid RVP Result: NotDetec (04-27-21 @ 23:01)      Acute Hepatitis Panel (04.28.21 @ 13:16)    Hepatitis C Virus Interpretation: Nonreact: Hepatitis C AB  S/CO Ratio                        Interpretation  < 1.00                                   Non-Reactive  1.00 - 4.99                         Weakly-Reactive  >= 5.00                                Reactive  Non-Reactive: A person witha non-reactive HCV antibody result is  considered uninfected.  No further action is needed unless recent  infection is suspected.  In these cases, consider repeat testing later to  detect seroconversion..  Weakly-Reactive: HCV antibody test is abnormal, HCV RNA Qualitative test  will follow.  Reactive: HCV antibody test is abnormal, HCV RNA Qualitative test will  follow.  Note: HCV antibody testing is performed on the Abbott  system.    Hepatitis C Virus S/CO Ratio: 0.19 S/CO    Hepatitis B Core IgM Antibody: Nonreact    Hepatitis B Surface Antigen: Nonreact    Hepatitis A IgM Antibody: Nonreact          < from: CT Abdomen and Pelvis No Cont (04.28.21 @ 04:52) >  EXAM:  CT ABDOMEN AND PELVIS                        EXAM:  CT CHEST                          PROCEDURE DATE:  04/28/2021      INTERPRETATION:  CLINICAL INFORMATION: Sepsis.    COMPARISON: CT the abdomen and pelvis from 4/19/2021 and CT the chest, abdomen, and pelvis from 10/17/2019.    CONTRAST/COMPLICATIONS:  IV Contrast: None.  Oral Contrast: None.  Complications: None.    PROCEDURE:  CT of the Chest, Abdomen and Pelvis was performed.  Sagittal and coronal reformats were performed.    FINDINGS:  CHEST:  LUNGS AND LARGE AIRWAYS: Patent central airways. Rounded chronic atelectasis at the right and left lung base similar to that seen on 4/19/2021 and 10/17/2019. Mild emphysema. Mild biapical parenchymal scarring.  PLEURA: Small bilateral pleural effusions with associated pleural thickening, unchanged.  VESSELS: Normal caliber thoracic aorta. Atherosclerotic calcifications of the thoracic aorta, great vessels, and coronary arteries.  HEART: Heart size is normal. Small pericardial effusion, unchanged.  MEDIASTINUM AND MARLEEN: No lymphadenopathy.  CHEST WALL AND LOWER NECK: Surgical clips in the right side of the neck. Mild symmetric bilateral gynecomastia.    ABDOMEN AND PELVIS:  LIVER: Within normal limits.  BILE DUCTS: Normal caliber.  GALLBLADDER: Gallbladder mildly distended. No radiodense gallstones. Mild pericholecystic edema.  SPLEEN: Within normal limits.  PANCREAS: Mild pancreatic atrophy. Punctate calcification in the pancreatic head likely the sequela of chronic pancreatitis.  ADRENALS: Within normal limits.  KIDNEYS/URETERS: No right or left hydroureteronephrosis or nephrolithiasis. Nonspecific bilateral symmetric perinephric stranding.    BLADDER: Mild thickening of the urinary bladder walls despite underdistention with slight infiltration of the perivesicular fat.  REPRODUCTIVE ORGANS: Enlarged prostate gland.    BOWEL: No bowel obstruction. Appendix is normal. Mild colonic diverticulosis without evidence of diverticulitis.  PERITONEUM: No ascites or pneumoperitoneum. No mesenteric lymphadenopathy.  VESSELS: Atherosclerotic calcifications of the aortoiliac tree. Normal caliber abdominal aorta.  RETROPERITONEUM/LYMPH NODES: No lymphadenopathy.  ABDOMINAL WALL: Small fat-containing right inguinal hernia.  BONES: Degenerative changes of the spine.    IMPRESSION:  No interval change in lung findings compared to prior exam from 4/19/2021. Rounded chronic atelectasis at the lung bases and chronic small bilateral pleural effusions with associated pleural thickening.    Mildly distended gallbladder with mild pericholecystic edema. Correlate with LFTs. Sonographic evaluation may be considered, as clinically indicated.    Mild thickening of the urinary bladder walls despite underdistention with slight infiltration of the perivesicular fat which may in part be due to chronic outlet obstruction in the setting of an enlarged prostate gland, however, perivesicular fatty infiltration raises question of cystitis. Correlate with urinalysis.    Mild colonic diverticulosis without evidence of diverticulitis.    < end of copied text >      < from: NM Hepatobiliary Imaging (04.28.21 @ 12:49) >  EXAM:  NM HEPATOBILIARY IMG                          PROCEDURE DATE:  04/28/2021      INTERPRETATION:  RADIOPHARMACEUTICAL:  99mTc-Mebrofenin  DOSE: 3.3 mCi IV    CLINICAL INFORMATION: 86 year old male with fever, sepsis, lethargy, distended gallbladder, body aches, referred to evaluate for acute cholecystitis    TECHNIQUE: Dynamic images of the anterior abdomen were obtained for 1 hour immediately following radiotracer injection. Static images of the abdomen in the anterior, right anterior oblique and right lateral projections were also obtained.    COMPARISON: Previous hepatobiliary scan dated 10/21/2019.    FINDINGS: There is prompt uptake of the injected radiotracer by the hepatocytes. The gallbladder is first visualized at 40 minutes post tracer injection and bowel activity by 35 minutes. There is normal tracer clearance from the liver by the end of the study.    IMPRESSION: Normal hepatobiliary scan.    No scan evidence of acute cholecystitis.    < end of copied text >

## 2021-04-29 NOTE — PROGRESS NOTE ADULT - ASSESSMENT
86M hx vascular dementia afib (not on ac), CKD, DM2, COPD presents with fever and worsening mental status found to have sirs criteria. Found to have leucocutosis, elevated LFTs.     #abdominal pain   - w/ sirs present on admission - resolved now   - w/ worsening mental status from baseline   - hida normal   - w/ transaminitis downtrending   - w/ leukocytosis on admission - improving   - ID following - on rocephin empirically   - monitor cultures  - Surgery - signed off - no intervention   - GI followin g- hep panel - negative      #COPD   - not in exacerbation   - spiriva, singulair, duoneb    #dementia  - nameneda, aricept    #HFpEF  - not in exacerbation currently  - echo 10/2019 lvef >75  - cardio consult pending  - torsemide daughter stating takes torsemide bid if weight greater then 148     #CKD4  - appears to be near baseline  - avoid nephrotoxic meds   - monitor cr  - nephro following     #Afib  - not on ac  - metoprolol    #dm2  - lantus, iss  - monitor fingersticks   - a1c pending    #DVT Prophylaxis  - Heparin SC    spoke to patient daughter bedside regarding hospital course. all questions answered

## 2021-04-29 NOTE — PHYSICAL THERAPY INITIAL EVALUATION ADULT - PLANNED THERAPY INTERVENTIONS, PT EVAL
stairs/balance training/bed mobility training/gait training/postural re-education/strengthening/transfer training

## 2021-04-29 NOTE — PROGRESS NOTE ADULT - ASSESSMENT
86y  Male with h/o vascular dementia, afib (not on ac), CKD, DM2, COPD. Patient presents to the ER with Chills, lethergy and worsening mental status of sudden onset. Patient was in normal state of health and went to cardiologist appointment. Following appointment he took a nap and was difficult to wake up afterwards. He was found to have a fever of 102F at home, was weak and lethargic and mildly confused.  He was recently started on metoclopramide for gastroparesis. Patient denies acute complaint including cough, sob, back pain, dysuria, or diarrhea. In the ER he was noted to be febrile to 102.4F, leukocytosis to 15k. US with distended GB, evaluated by surgery, but unimpressed and did not think acute cholecystitis. Started on ceftriaxone.       Fever  Leukocytosis  SIRS   ALMAS  Transaminitis       - Blood cultures pending  - Repeat blood cultures if febrile   - RVP negative   - CT C/A/P reporting no acute findings  - HIDA scan reporting no acute cholecystitis   - UA negative for UTI  - Continue Ceftriaxone  - Viral hepatitis profile negative  - Follow up cultures   - Trend Fever  - Trend Leukocytosis      Will Follow

## 2021-04-29 NOTE — PHYSICAL THERAPY INITIAL EVALUATION ADULT - PRECAUTIONS/LIMITATIONS, REHAB EVAL
----- Message from Melanie Llanes MD sent at 12/31/2020  2:54 PM CST -----  I sent pt a my chart message -  I reviewed your labs and your COVID antibody test was negative.   Your Total cholesterol was high because your good cholesterol (HDL) was high. This is a good thing. Your bad cholesterol (LDL) was borderline. I recommend you follow a low cholesterol diet and exercise. We will continue to monitor this due to your strong family history of heart disease.     Dr. LOUIS     fall precautions

## 2021-04-30 ENCOUNTER — TRANSCRIPTION ENCOUNTER (OUTPATIENT)
Age: 86
End: 2021-04-30

## 2021-04-30 LAB
GLUCOSE BLDC GLUCOMTR-MCNC: 116 MG/DL — HIGH (ref 70–99)
GLUCOSE BLDC GLUCOMTR-MCNC: 167 MG/DL — HIGH (ref 70–99)
GLUCOSE BLDC GLUCOMTR-MCNC: 173 MG/DL — HIGH (ref 70–99)
GLUCOSE BLDC GLUCOMTR-MCNC: 213 MG/DL — HIGH (ref 70–99)

## 2021-04-30 PROCEDURE — 99232 SBSQ HOSP IP/OBS MODERATE 35: CPT

## 2021-04-30 PROCEDURE — 99233 SBSQ HOSP IP/OBS HIGH 50: CPT

## 2021-04-30 RX ORDER — INSULIN GLARGINE 100 [IU]/ML
10 INJECTION, SOLUTION SUBCUTANEOUS
Qty: 0 | Refills: 0 | DISCHARGE

## 2021-04-30 RX ORDER — UMECLIDINIUM BROMIDE AND VILANTEROL TRIFENATATE 62.5; 25 UG/1; UG/1
1 POWDER RESPIRATORY (INHALATION)
Qty: 0 | Refills: 0 | DISCHARGE

## 2021-04-30 RX ORDER — LANOLIN ALCOHOL/MO/W.PET/CERES
1 CREAM (GRAM) TOPICAL
Qty: 0 | Refills: 0 | DISCHARGE

## 2021-04-30 RX ORDER — ROSUVASTATIN CALCIUM 5 MG/1
1 TABLET ORAL
Qty: 0 | Refills: 0 | DISCHARGE

## 2021-04-30 RX ORDER — INSULIN GLARGINE 100 [IU]/ML
15 INJECTION, SOLUTION SUBCUTANEOUS AT BEDTIME
Refills: 0 | Status: DISCONTINUED | OUTPATIENT
Start: 2021-04-30 | End: 2021-05-05

## 2021-04-30 RX ORDER — METOCLOPRAMIDE HCL 10 MG
1 TABLET ORAL
Qty: 0 | Refills: 0 | DISCHARGE

## 2021-04-30 RX ORDER — DONEPEZIL HYDROCHLORIDE 10 MG/1
1 TABLET, FILM COATED ORAL
Qty: 0 | Refills: 0 | DISCHARGE

## 2021-04-30 RX ORDER — MEMANTINE HYDROCHLORIDE 10 MG/1
1 TABLET ORAL
Qty: 0 | Refills: 0 | DISCHARGE

## 2021-04-30 RX ORDER — MONTELUKAST 4 MG/1
1 TABLET, CHEWABLE ORAL
Qty: 0 | Refills: 0 | DISCHARGE

## 2021-04-30 RX ORDER — LEVOTHYROXINE SODIUM 125 MCG
1 TABLET ORAL
Qty: 0 | Refills: 0 | DISCHARGE

## 2021-04-30 RX ORDER — OMEPRAZOLE 10 MG/1
1 CAPSULE, DELAYED RELEASE ORAL
Qty: 0 | Refills: 0 | DISCHARGE

## 2021-04-30 RX ORDER — FEXOFENADINE HCL 30 MG
1 TABLET ORAL
Qty: 0 | Refills: 0 | DISCHARGE

## 2021-04-30 RX ORDER — BUDESONIDE, MICRONIZED 100 %
2 POWDER (GRAM) MISCELLANEOUS
Qty: 0 | Refills: 0 | DISCHARGE

## 2021-04-30 RX ADMIN — HEPARIN SODIUM 5000 UNIT(S): 5000 INJECTION INTRAVENOUS; SUBCUTANEOUS at 05:58

## 2021-04-30 RX ADMIN — Medication 2: at 08:33

## 2021-04-30 RX ADMIN — Medication 4: at 11:26

## 2021-04-30 RX ADMIN — Medication 75 MICROGRAM(S): at 06:01

## 2021-04-30 RX ADMIN — Medication 12.5 MILLIGRAM(S): at 06:00

## 2021-04-30 RX ADMIN — PANTOPRAZOLE SODIUM 40 MILLIGRAM(S): 20 TABLET, DELAYED RELEASE ORAL at 06:00

## 2021-04-30 RX ADMIN — Medication 4 UNIT(S): at 16:53

## 2021-04-30 RX ADMIN — INSULIN GLARGINE 15 UNIT(S): 100 INJECTION, SOLUTION SUBCUTANEOUS at 22:56

## 2021-04-30 RX ADMIN — POLYETHYLENE GLYCOL 3350 17 GRAM(S): 17 POWDER, FOR SOLUTION ORAL at 11:14

## 2021-04-30 RX ADMIN — HEPARIN SODIUM 5000 UNIT(S): 5000 INJECTION INTRAVENOUS; SUBCUTANEOUS at 22:56

## 2021-04-30 RX ADMIN — Medication 4 UNIT(S): at 08:33

## 2021-04-30 RX ADMIN — Medication 3 MILLILITER(S): at 19:26

## 2021-04-30 RX ADMIN — Medication 12.5 MILLIGRAM(S): at 17:02

## 2021-04-30 RX ADMIN — DONEPEZIL HYDROCHLORIDE 5 MILLIGRAM(S): 10 TABLET, FILM COATED ORAL at 22:56

## 2021-04-30 RX ADMIN — MONTELUKAST 10 MILLIGRAM(S): 4 TABLET, CHEWABLE ORAL at 11:14

## 2021-04-30 RX ADMIN — Medication 81 MILLIGRAM(S): at 11:14

## 2021-04-30 RX ADMIN — HEPARIN SODIUM 5000 UNIT(S): 5000 INJECTION INTRAVENOUS; SUBCUTANEOUS at 15:51

## 2021-04-30 RX ADMIN — MEMANTINE HYDROCHLORIDE 5 MILLIGRAM(S): 10 TABLET ORAL at 11:14

## 2021-04-30 RX ADMIN — Medication 1 TABLET(S): at 11:14

## 2021-04-30 RX ADMIN — Medication 4 UNIT(S): at 11:26

## 2021-04-30 RX ADMIN — Medication 5 MILLIGRAM(S): at 22:56

## 2021-04-30 RX ADMIN — Medication 2: at 16:54

## 2021-04-30 RX ADMIN — Medication 3 MILLILITER(S): at 09:00

## 2021-04-30 RX ADMIN — Medication 3 MILLILITER(S): at 19:22

## 2021-04-30 RX ADMIN — Medication 3 MILLILITER(S): at 02:38

## 2021-04-30 NOTE — DISCHARGE NOTE PROVIDER - NSDCMRMEDTOKEN_GEN_ALL_CORE_FT
Rolling walker : Dx - Unsteady gait    Anoro Ellipta 62.5 mcg-25 mcg/inh inhalation powder: 1 puff(s) inhaled once a day    aspirin 81 mg oral tablet, chewable: 1 tab(s) orally once a day  bisacodyl 10 mg rectal suppository: 1 suppository(ies) rectal once a day, As needed, Constipation not responding to oral stool softeners   donepezil 5 mg oral tablet: 1 tab(s) orally once a day (at bedtime)  insulin glargine: 15 unit(s) subcutaneous once a day (at bedtime)  insulin lispro 100 units/mL injectable solution: 4 unit(s) injectable 3 times a day (before meals)  levothyroxine 75 mcg (0.075 mg) oral tablet: 1 tab(s) orally once a day  melatonin 5 mg oral tablet: 1 tab(s) orally once a day (at bedtime)  memantine 5 mg oral tablet: 1 tab(s) orally once a day  metoprolol tartrate 25 mg oral tablet: 0.5 tab(s) orally 2 times a day   montelukast 10 mg oral tablet: 1 tab(s) orally once a day  Multiple Vitamins oral tablet: 1 tab(s) orally once a day  polyethylene glycol 3350 oral powder for reconstitution: 17 gram(s) orally once a day  Protonix 40 mg oral delayed release tablet: 1 tab(s) orally once a day (before a meal)  Reglan 10 mg oral tablet: 1 tab(s) orally 4 times a day (before meals and at bedtime)  Rolling walker : Dx - Unsteady gait   senna oral tablet: 2 tab(s) orally once a day (at bedtime)

## 2021-04-30 NOTE — DISCHARGE NOTE PROVIDER - PROVIDER TOKENS
PROVIDER:[TOKEN:[[id number]],FOLLOWUP:[1 month]],PROVIDER:[TOKEN:[[id number]],FOLLOWUP:[2 weeks]] PROVIDER:[TOKEN:[[id number]],FOLLOWUP:[1 month]],PROVIDER:[TOKEN:[[id number]]]

## 2021-04-30 NOTE — DISCHARGE NOTE PROVIDER - HOSPITAL COURSE
86M hx vascular dementia afib (not on ac), CKD, DM2, COPD presents with fever and worsening mental status found to have sirs criteria. Found to have leucocytosis, elevated LFTs. Gi, Surgery, ID was consulted - empirically started on rocephin, Cx were negative. CTabd, HIDA scan - neg for acute cholecystitis - Sx signed off. pt was advised MRCP.  Clinically patient improved, tolerating diet. PT cleared for home with Home PT.       #abdominal pain with SIRS with acute metabolic encephalopathy   - w/ sirs present on admission - resolved now   - w/ worsening mental status from baseline - now back to baseline   - hida normal   - w/ transaminitis downtrending   - w/ leukocytosis on admission - improving   - ID following -was on rocephin empirically - CX negative - advised to DC ABs   - Surgery - signed off - no intervention   - GI following - hep panel - negative  - Awaiting MRCP     #COPD   - not in exacerbation   - spiriva, singulair, duoneb    #dementia  - nameneda, aricept    #HFpEF  - not in exacerbation currently  - echo 10/2019 lvef >75  - cardio consult pending  - torsemide daughter stating takes torsemide bid if weight greater then 148   - resume in am if toleratign diet and reamins afebrile    #CKD4  - appears to be near baseline  - avoid nephrotoxic meds   - monitor cr  - nephro following     #Afib  - not on ac  - metoprolol    #dm2  - lantus, iss   Brief Hospital course   86 year old male with dementia (vascular), atrial fibrillation (not on AC), CKD, DM2, COPD admitted with fever and worsening of mental status. Initially was started on empiric IV antibiotics and was evaluated by ID, GI/Surgery (for LFTs) and renal (for uremia). Initial imaging negative for acute cholecystitis, however MRCP was concerning for a possible stricture present in the CBD (distal). Uremia and elevated LFTs have gradually improved with improvement of mental status (now at baseline cognitive function. Ethics consulted to help coordinate pt care). Empiric antimicrobials discontinued (neg infectious w/u). EGD/EUS and ERCP 5/4/21 with sphincterotomy with stone retrieval.      Discharge exam :  CONSTITUTIONAL: NAD, Elderly male, well-developed, well-groomed  ENMT: Moist oral mucosa, no pharyngeal injection or exudates; normal dentition  RESPIRATORY: Normal respiratory effort; lungs are clear to auscultation bilaterally  CARDIOVASCULAR: Irregularly irregular rate and rhythm, murmur/rub/gallop; No lower extremity edema; Peripheral pulses are 2+ bilaterally  ABDOMEN: Nontender to palpation, normoactive bowel sounds, no rebound/guarding; No hepatosplenomegaly  MUSCLOSKELETAL:  no clubbing or cyanosis of digits; no joint swelling or tenderness to palpation  PSYCH: A+O to person, place only; affect appropriate  NEUROLOGY: CN 2-12 are intact and symmetric; no gross sensory deficits;   SKIN: No rashes; no palpable lesions          Pertinent labs and imaging :  4/27 Urine culture : <10K normal lindsay  4/27 Blood cultures : Neg  4/27 Covid 19 PCR : Neg     4/28/21 US RUQ  Distended gallbladder with trace sludge/stones and mild gallbladder wall thickening. Negative sonographic Simpson sign. HIDA scan may be considered for further evaluation.    4/28/21 CT Head  No acute intracranial hemorrhage, mass effect, or CT evidence of an acute vascular territorial infarct. Mild chronic ischemic changes in the frontoparietal white matter.    5/1/21 MRCP  No evidence for choledocholithiasis. Mildly dilated common bile duct measures up to 10 mm in caliber with an abrupt transition at the very distal common bile duct. This may represent a common bile duct stricture. A malignant stricture cannot be excluded.  No evidence for gallstone. Nonspecific slightly thickened gallbladder wall.  Nodular contour of the liver, suggestive of cirrhosis. No suspicious lesion is identified.  Apparent chronic focal type B infrarenal abdominal aortic dissection with calcification at the dissection flap, unchanged since the previous abdominal CT dated 10/17/2019.    5/4/21 EGD/EUS/ERCP  EGD: Retained food in stomach. Gastritis.   EUS: CBD sludge, stone. Gall bladder sludge  ERCP: Bile duct cannulated with ease. Then biliary sphincterotomy performed. Balloon sweep extraction of small stone          Dsc Diagnosis  Abd pain  : CBD stricture   Acute metabolic encephalopathy : Multifactorial (Worsening uremia from CKD, dementia)  : Resolved  Decreased gastric emptying : Suspect gastroparesis   COPD : Stable   Atrial fibrillation : chronic, stable   HFpEF : chronic, not in acute decompensation            Time spent on dsc : 40 minutes                                             86 year old male with dementia (vascular), atrial fibrillation (not on AC), CKD, DM2, COPD admitted with fever and worsening of mental status. Initially was started on empiric IV antibiotics and was evaluated by ID, GI/Surgery (for LFTs) and renal (for uremia). Initial imaging negative for acute cholecystitis, however MRCP was concerning for a possible stricture present in the CBD (distal). Uremia and elevated LFTs have gradually improved with improvement of mental status (now at baseline cognitive function). Empiric antimicrobials discontinued (neg infectious w/u). EGD/EUS and ERCP 5/4/21 with sphincterotomy with stone retrieval.      Brief Hospital course   86 year old male with dementia (vascular), atrial fibrillation (not on AC), CKD, DM2, COPD admitted with fever and worsening of mental status. Initially was started on empiric IV antibiotics and was evaluated by ID, GI/Surgery (for LFTs) and renal (for uremia). Initial imaging negative for acute cholecystitis, however MRCP was concerning for a possible stricture present in the CBD (distal). Uremia and elevated LFTs have gradually improved with improvement of mental status (now at baseline cognitive function. Ethics consulted to help coordinate pt care). Empiric antimicrobials discontinued (neg infectious w/u). EGD/EUS and ERCP 5/4/21 with sphincterotomy with stone retrieval.      Discharge exam :  CONSTITUTIONAL: NAD, Elderly male, well-developed, well-groomed  ENMT: Moist oral mucosa, no pharyngeal injection or exudates; normal dentition  RESPIRATORY: Normal respiratory effort; lungs are clear to auscultation bilaterally  CARDIOVASCULAR: Irregularly irregular rate and rhythm, murmur/rub/gallop; No lower extremity edema; Peripheral pulses are 2+ bilaterally  ABDOMEN: Nontender to palpation, normoactive bowel sounds, no rebound/guarding; No hepatosplenomegaly  MUSCLOSKELETAL:  no clubbing or cyanosis of digits; no joint swelling or tenderness to palpation  PSYCH: A+O to person, place only; affect appropriate  NEUROLOGY: CN 2-12 are intact and symmetric; no gross sensory deficits;   SKIN: No rashes; no palpable lesions          Pertinent labs and imaging :  4/27 Urine culture : <10K normal lindsay  4/27 Blood cultures : Neg  4/27 Covid 19 PCR : Neg     4/28/21 US RUQ  Distended gallbladder with trace sludge/stones and mild gallbladder wall thickening. Negative sonographic Simpson sign. HIDA scan may be considered for further evaluation.    4/28/21 CT Head  No acute intracranial hemorrhage, mass effect, or CT evidence of an acute vascular territorial infarct. Mild chronic ischemic changes in the frontoparietal white matter.    5/1/21 MRCP  No evidence for choledocholithiasis. Mildly dilated common bile duct measures up to 10 mm in caliber with an abrupt transition at the very distal common bile duct. This may represent a common bile duct stricture. A malignant stricture cannot be excluded.  No evidence for gallstone. Nonspecific slightly thickened gallbladder wall.  Nodular contour of the liver, suggestive of cirrhosis. No suspicious lesion is identified.  Apparent chronic focal type B infrarenal abdominal aortic dissection with calcification at the dissection flap, unchanged since the previous abdominal CT dated 10/17/2019.    5/4/21 EGD/EUS/ERCP  EGD: Retained food in stomach. Gastritis.   EUS: CBD sludge, stone. Gall bladder sludge  ERCP: Bile duct cannulated with ease. Then biliary sphincterotomy performed. Balloon sweep extraction of small stone          Dsc Diagnosis  Abd pain  : CBD stricture   Acute metabolic encephalopathy : Multifactorial (Worsening uremia from CKD, dementia)  : Resolved  Decreased gastric emptying : Suspect gastroparesis   COPD : Stable   Atrial fibrillation : chronic, stable   HFpEF : chronic, not in acute decompensation            Time spent on dsc : 40 minutes

## 2021-04-30 NOTE — PROGRESS NOTE ADULT - ASSESSMENT
CKD(IV): febrile illness, elevated LFTs  Renal function at baseline  - avoid potential nephrotoxins  - cont to hold diuretics while not eating well  - Torsemide on hold for now   - antibiotics as per ID; follow cultures  - follow labs  - No hydro on CT abdomen 4/28   - Clinically feels better   - MRCP pending   - GI and ID follow up noted

## 2021-04-30 NOTE — PROGRESS NOTE ADULT - ASSESSMENT
86M hx vascular dementia afib (not on ac), CKD, DM2, COPD presents with fever and worsening mental status found to have sirs criteria. Found to have leucocutosis, elevated LFTs.     #abdominal pain   - w/ sirs present on admission - resolved now   - w/ worsening mental status from basline - now back to baseline   - hida normal   - w/ transaminitis downtrending   - w/ leukocytosis on admission - improving   - ID following -was on rocephin empirically - CX negative - advised to DC ABs   - Surgery - signed off - no intervention   - GI followin g- hep panel - negative  - Awaiting MRCP     #COPD   - not in exacerbation   - spiriva, singulair, duoneb    #dementia  - nameneda, aricept    #HFpEF  - not in exacerbation currently  - echo 10/2019 lvef >75  - cardio consult pending  - torsemide daughter stating takes torsemide bid if weight greater then 148   - resume in am if toleratign diet and reamins afebrile    #CKD4  - appears to be near baseline  - avoid nephrotoxic meds   - monitor cr  - nephro following     #Afib  - not on ac  - metoprolol    #dm2  - lantus, iss  - monitor fingersticks   - a1c pending    #DVT Prophylaxis  - Heparin SC    spoke to patient daughter bedside - awaiting MRCP, am labs and further plans from GI   Patient and daughter requesting to go home later today pending above.  86M hx vascular dementia afib (not on ac), CKD, DM2, COPD presents with fever and worsening mental status found to have sirs criteria. Found to have leucocutosis, elevated LFTs.     #abdominal pain with SIRS with acute metabolic encephalopathy  - w/ sirs present on admission - resolved now   - w/ worsening mental status from basline - now back to baseline   - hida normal   - w/ transaminitis downtrending   - w/ leukocytosis on admission - improving   - ID following -was on rocephin empirically - CX negative - advised to DC ABs   - Surgery - signed off - no intervention   - GI followin g- hep panel - negative  - Awaiting MRCP   - suspect pain 2/2 ?GB stone cholangitis     #COPD   - not in exacerbation   - spiriva, singulair, duoneb    #dementia  - nameneda, aricept    #HFpEF  - not in exacerbation currently  - echo 10/2019 lvef >75  - cardio consult pending  - torsemide daughter stating takes torsemide bid if weight greater then 148   - resume in am if toleratign diet and reamins afebrile    #CKD4  - appears to be near baseline  - avoid nephrotoxic meds   - monitor cr  - nephro following     #Afib  - not on ac  - metoprolol    #dm2  - lantus, iss  - monitor fingersticks   - a1c pending    #DVT Prophylaxis  - Heparin SC    spoke to patient daughter bedside - awaiting MRCP, am labs and further plans from GI   Patient and daughter requesting to go home later today pending above.

## 2021-04-30 NOTE — DISCHARGE NOTE PROVIDER - DETAILS OF MALNUTRITION DIAGNOSIS/DIAGNOSES
This patient has been assessed with a concern for Malnutrition and was treated during this hospitalization for the following Nutrition diagnosis/diagnoses:     -  05/03/2021: Severe protein-calorie malnutrition

## 2021-04-30 NOTE — PROGRESS NOTE ADULT - SUBJECTIVE AND OBJECTIVE BOX
Chief Complaint: This is an 86y old man patient being seen in follow-up consultation for SIRS.    HPI / 24H events:  Patient significantly improved today.  Seen at the bedside with his daughter.  Has been OOB and improved mental status.  Still awaiting MRCP.    ROS: A 14-point review of systems was reviewed and was otherwise negative save what was reported in the HPI.    PAST MEDICAL/SURGICAL HISTORY:  DM (diabetes mellitus)  Afib  Dementia  COPD (chronic obstructive pulmonary disease)  CKD (chronic kidney disease)  S/P carotid endarterectomy  History of lung biopsy    MEDICATIONS  (STANDING):  albuterol/ipratropium for Nebulization 3 milliLiter(s) Nebulizer every 6 hours  aspirin  chewable 81 milliGRAM(s) Oral daily  budesonide 160 MICROgram(s)/formoterol 4.5 MICROgram(s) Inhaler 2 Puff(s) Inhalation two times a day  dextrose 40% Gel 15 Gram(s) Oral once  dextrose 5%. 1000 milliLiter(s) (50 mL/Hr) IV Continuous <Continuous>  dextrose 5%. 1000 milliLiter(s) (100 mL/Hr) IV Continuous <Continuous>  dextrose 50% Injectable 25 Gram(s) IV Push once  dextrose 50% Injectable 12.5 Gram(s) IV Push once  dextrose 50% Injectable 25 Gram(s) IV Push once  donepezil 5 milliGRAM(s) Oral at bedtime  glucagon  Injectable 1 milliGRAM(s) IntraMuscular once  heparin   Injectable 5000 Unit(s) SubCutaneous every 8 hours  insulin glargine Injectable (LANTUS) 15 Unit(s) SubCutaneous at bedtime  insulin lispro (ADMELOG) corrective regimen sliding scale   SubCutaneous three times a day before meals  insulin lispro (ADMELOG) corrective regimen sliding scale   SubCutaneous at bedtime  insulin lispro Injectable (ADMELOG) 4 Unit(s) SubCutaneous three times a day before meals  levothyroxine 75 MICROGram(s) Oral daily  melatonin 5 milliGRAM(s) Oral at bedtime  memantine 5 milliGRAM(s) Oral daily  metoprolol tartrate 12.5 milliGRAM(s) Oral two times a day  montelukast 10 milliGRAM(s) Oral daily  multivitamin 1 Tablet(s) Oral daily  pantoprazole    Tablet 40 milliGRAM(s) Oral before breakfast  polyethylene glycol 3350 17 Gram(s) Oral daily  tiotropium 18 MICROgram(s) Capsule 1 Capsule(s) Inhalation daily    MEDICATIONS  (PRN):    No Known Allergies    T(C): 37.1 (04-30-21 @ 15:10), Max: 37.1 (04-29-21 @ 23:43)  HR: 83 (04-30-21 @ 15:10) (73 - 97)  BP: 128/74 (04-30-21 @ 15:10) (97/67 - 128/74)  RR: 18 (04-30-21 @ 15:10) (18 - 18)  SpO2: 96% (04-30-21 @ 15:10) (93% - 97%)    I&O's Summary    PHYSICAL EXAM:  Constitutional: Well-developed, malnourished  man in no apparent distress  Eyes: Sclerae anicteric, conjunctivae normal  ENMT: Mucus membranes moist, no oropharyngeal thrush noted  Neck: No thyroid nodules appreciated, no significant cervical or supraclavicular lymphadenopathy  Respiratory: Breathing nonlabored; clear to auscultation  Cardiovascular: Regular rate and rhythm  Gastrointestinal: Soft, nontender, nondistended, normoactive bowel sounds; no hepatosplenomegaly appreciated; no rebound tenderness or involuntary guarding  Extremities: No clubbing, cyanosis or edema  Neurological: No asterixis  Skin: No jaundice  Lymph Nodes: No significant lymphadenopathy  Musculoskeletal: No significant peripheral atrophy      LABS:               10.9   8.24  )-----------( 147      ( 04-29 @ 07:26 )             33.2                11.9   14.57 )-----------( 172      ( 04-28 @ 08:13 )             37.5                12.2   15.38 )-----------( 171      ( 04-27 @ 23:30 )             37.5       04-29    138  |  98  |  71.0<H>  ----------------------------<  200<H>  3.8   |  28.0  |  2.35<H>    Ca    9.4      29 Apr 2021 07:26    TPro  6.9  /  Alb  3.4  /  TBili  1.0  /  DBili  0.7<H>  /  AST  82<H>  /  ALT  122<H>  /  AlkPhos  428<H>  04-29    LIVER FUNCTIONS - ( 29 Apr 2021 07:26 )  Alb: 3.4 g/dL / Pro: 6.9 g/dL / ALK PHOS: 428 U/L / ALT: 122 U/L / AST: 82 U/L / GGT: x           Lipase, Serum: 223 U/L (04-28-21 @ 08:01)    Ferritin, Serum: 821 ng/mL *H* (04-27-21 @ 23:30)    Culture - Urine (collected 28 Apr 2021 05:50)  Source: .Urine Clean Catch (Midstream)  Final Report (29 Apr 2021 15:30):    <10,000 CFU/mL Normal Urogenital Brandi    Culture - Blood (collected 27 Apr 2021 23:29)  Source: .Blood Blood-Peripheral  Preliminary Report (30 Apr 2021 01:00):    No growth at 48 hours    Culture - Blood (collected 27 Apr 2021 23:29)  Source: .Blood Blood-Peripheral  Preliminary Report (30 Apr 2021 01:00):    No growth at 48 hours

## 2021-04-30 NOTE — DISCHARGE NOTE PROVIDER - NSDCCPCAREPLAN_GEN_ALL_CORE_FT
PRINCIPAL DISCHARGE DIAGNOSIS  Diagnosis: Common bile duct (CBD) stricture  Assessment and Plan of Treatment: Follow up with GI      SECONDARY DISCHARGE DIAGNOSES  Diagnosis: Dementia without behavioral disturbance, unspecified dementia type  Assessment and Plan of Treatment: Continue routine home assessment and frequent reorientation    Diagnosis: Stage 3a chronic kidney disease  Assessment and Plan of Treatment: Avoid medications that are nephrotoxic (may damange kidneys) unless advised by your physican    Diagnosis: (HFpEF) heart failure with preserved ejection fraction  Assessment and Plan of Treatment: Monitor fluid intake and daily weights

## 2021-04-30 NOTE — PROGRESS NOTE ADULT - SUBJECTIVE AND OBJECTIVE BOX
NEPHROLOGY INTERVAL HPI/OVERNIGHT EVENTS:    Feels better   Awaits MRCP  Family at bedside     MEDICATIONS  (STANDING):  albuterol/ipratropium for Nebulization 3 milliLiter(s) Nebulizer every 6 hours  aspirin  chewable 81 milliGRAM(s) Oral daily  budesonide 160 MICROgram(s)/formoterol 4.5 MICROgram(s) Inhaler 2 Puff(s) Inhalation two times a day  cefTRIAXone   IVPB 1000 milliGRAM(s) IV Intermittent every 24 hours  dextrose 40% Gel 15 Gram(s) Oral once  dextrose 5%. 1000 milliLiter(s) (50 mL/Hr) IV Continuous <Continuous>  dextrose 5%. 1000 milliLiter(s) (100 mL/Hr) IV Continuous <Continuous>  dextrose 50% Injectable 25 Gram(s) IV Push once  dextrose 50% Injectable 12.5 Gram(s) IV Push once  dextrose 50% Injectable 25 Gram(s) IV Push once  donepezil 5 milliGRAM(s) Oral at bedtime  glucagon  Injectable 1 milliGRAM(s) IntraMuscular once  heparin   Injectable 5000 Unit(s) SubCutaneous every 8 hours  insulin glargine Injectable (LANTUS) 15 Unit(s) SubCutaneous at bedtime  insulin lispro (ADMELOG) corrective regimen sliding scale   SubCutaneous three times a day before meals  insulin lispro (ADMELOG) corrective regimen sliding scale   SubCutaneous at bedtime  insulin lispro Injectable (ADMELOG) 4 Unit(s) SubCutaneous three times a day before meals  levothyroxine 75 MICROGram(s) Oral daily  melatonin 5 milliGRAM(s) Oral at bedtime  memantine 5 milliGRAM(s) Oral daily  metoprolol tartrate 12.5 milliGRAM(s) Oral two times a day  montelukast 10 milliGRAM(s) Oral daily  multivitamin 1 Tablet(s) Oral daily  pantoprazole    Tablet 40 milliGRAM(s) Oral before breakfast  polyethylene glycol 3350 17 Gram(s) Oral daily  tiotropium 18 MICROgram(s) Capsule 1 Capsule(s) Inhalation daily    MEDICATIONS  (PRN):      Allergies    No Known Allergies    Intolerances          Vital Signs Last 24 Hrs  T(C): 36.8 (30 Apr 2021 11:42), Max: 37.1 (29 Apr 2021 23:43)  T(F): 98.3 (30 Apr 2021 11:42), Max: 98.7 (29 Apr 2021 23:43)  HR: 83 (30 Apr 2021 11:42) (73 - 97)  BP: 99/63 (30 Apr 2021 11:42) (97/67 - 115/76)  BP(mean): --  RR: 18 (30 Apr 2021 11:42) (18 - 18)  SpO2: 97% (30 Apr 2021 11:42) (93% - 97%)  Daily     Daily   I&O's Detail    I&O's Summary      PHYSICAL EXAM  GENERAL: Appears weak and acutely ill  EYES: Conjunctiva and sclera clear  ENMT:  Moist mucous membranes; no periorbital edema  NECK: Supple, No JVD  NERVOUS SYSTEM: Lethargic, demented  CHEST/LUNG: Clear bilaterally  HEART: IRR, no rub  ABDOMEN: Soft, NT BS+  EXTREMITIES:  2+ Peripheral Pulses, No LE edema  SKIN: No rashes or lesions    LABS:                        10.9   8.24  )-----------( 147      ( 29 Apr 2021 07:26 )             33.2     04-29    138  |  98  |  71.0<H>  ----------------------------<  200<H>  3.8   |  28.0  |  2.35<H>    Ca    9.4      29 Apr 2021 07:26    TPro  6.9  /  Alb  3.4  /  TBili  1.0  /  DBili  0.7<H>  /  AST  82<H>  /  ALT  122<H>  /  AlkPhos  428<H>  04-29                RADIOLOGY & ADDITIONAL TESTS:

## 2021-04-30 NOTE — PROGRESS NOTE ADULT - ASSESSMENT
The etiology of his SIRS is not clear.  Whether the abdominal pain, nausea and vomiting that he has been experiencing is related to his current presentation or the findings on CT, which are not particularly impressive as compared to his CT A/P from 4/19, is not clear.  Awaiting MRCP for further evaluation.

## 2021-04-30 NOTE — DISCHARGE NOTE PROVIDER - CARE PROVIDER_API CALL
Amor Avalos (DO)  Medicine  [address] [facility], [place] [address]  Phone: [phone]  Fax: [phone]  Follow Up Time: 1 month    Andrade Horner)  Gastroenterology; Internal Medicine  [address] [facility], [address] [place] [address]  Phone: [phone]  Fax: [phone]  Follow Up Time: 2 weeks   Amor Avalos (DO)  Medicine  [address] [place] [address] [place] [address]  Phone: [phone]  Fax: [phone]  Follow Up Time: 1 month    KAUR POWERS  [id number]  [address] [place] [address]  Phone: [phone]  Fax: ()-  Follow Up Time:

## 2021-04-30 NOTE — PROGRESS NOTE ADULT - ASSESSMENT
86y  Male with h/o vascular dementia, afib (not on ac), CKD, DM2, COPD. Patient presents to the ER with Chills, lethergy and worsening mental status of sudden onset. Patient was in normal state of health and went to cardiologist appointment. Following appointment he took a nap and was difficult to wake up afterwards. He was found to have a fever of 102F at home, was weak and lethargic and mildly confused.  He was recently started on metoclopramide for gastroparesis. Patient denies acute complaint including cough, sob, back pain, dysuria, or diarrhea. In the ER he was noted to be febrile to 102.4F, leukocytosis to 15k. US with distended GB, evaluated by surgery, but unimpressed and did not think acute cholecystitis. Started on ceftriaxone.       Fever - resolved   Leukocytosis - resolved   SIRS   ALMAS  Transaminitis       - Blood cultures no growth   - If Febrile Repeat blood cultures and re-call ID  - RVP negative   - CT C/A/P reporting no acute findings  - HIDA scan reporting no acute cholecystitis   - UA negative for UTI  - D/C Ceftriaxone  - Viral hepatitis profile negative  - Rest of transaminitis work up per GI  - Trend Fever  - Trend WBC  - Discussed patients case with patient and daughter       Will Sign off. Please call PRN.       D/W Ricardo OLIVIA

## 2021-04-30 NOTE — DISCHARGE NOTE PROVIDER - CARE PROVIDERS DIRECT ADDRESSES
,DirectAddress_Unknown,[email or web address] ,DirectAddress_Unknown,DirectAddress_Unknown

## 2021-04-30 NOTE — PROGRESS NOTE ADULT - SUBJECTIVE AND OBJECTIVE BOX
MINO STEPHENS    [id number]    86y      Male    CC: Lethargy   daughter at bedside - Pt looks better, reports no pain   tolerating food, no other issues   ambulated with PT      INTERVAL HPI/OVERNIGHT EVENTS: no acute events     REVIEW OF SYSTEMS:    CONSTITUTIONAL: No fever,    RESPIRATORY: No cough, wheezing, No shortness of breath  CARDIOVASCULAR: No chest pain, palpitations  GASTROINTESTINAL: No abdominal or epigastric pain. No nausea, vomiting      Vital Signs Last 24 Hrs  T(C): 36.6 (30 Apr 2021 08:03), Max: 37.1 (29 Apr 2021 23:43)  T(F): 97.8 (30 Apr 2021 08:03), Max: 98.7 (29 Apr 2021 23:43)  HR: 83 (30 Apr 2021 08:03) (73 - 97)  BP: 97/67 (30 Apr 2021 08:03) (97/67 - 115/76)  BP(mean): --  RR: 18 (30 Apr 2021 08:03) (18 - 18)  SpO2: 95% (30 Apr 2021 08:03) (93% - 97%)    PHYSICAL EXAM:    GENERAL: NAD, well-groomed  HEENT: PERRL, +EOMI  NECK: soft, Supple  CHEST/LUNG: Clear to percussion bilaterally; No wheezing  HEART: S1S2+, Regular rate and rhythm; No murmurs  ABDOMEN: Soft, Nontender, Nondistended; Bowel sounds present  EXTREMITIES:  No clubbing, cyanosis, or edema  SKIN: No rashes or lesions  NEURO: AAOX3 forgetful; does not remember the circumstances that brought him to hospital         LABS:                                            10.9   8.24  )-----------( 147      ( 29 Apr 2021 07:26 )             33.2   04-29    138  |  98  |  71.0<H>  ----------------------------<  200<H>  3.8   |  28.0  |  2.35<H>    Ca    9.4      29 Apr 2021 07:26    TPro  6.9  /  Alb  3.4  /  TBili  1.0  /  DBili  0.7<H>  /  AST  82<H>  /  ALT  122<H>  /  AlkPhos  428<H>  04-29        MEDICATIONS  (STANDING):  albuterol/ipratropium for Nebulization 3 milliLiter(s) Nebulizer every 6 hours  aspirin  chewable 81 milliGRAM(s) Oral daily  budesonide 160 MICROgram(s)/formoterol 4.5 MICROgram(s) Inhaler 2 Puff(s) Inhalation two times a day  cefTRIAXone   IVPB 1000 milliGRAM(s) IV Intermittent every 24 hours  dextrose 40% Gel 15 Gram(s) Oral once  dextrose 5%. 1000 milliLiter(s) (50 mL/Hr) IV Continuous <Continuous>  dextrose 5%. 1000 milliLiter(s) (100 mL/Hr) IV Continuous <Continuous>  dextrose 50% Injectable 25 Gram(s) IV Push once  dextrose 50% Injectable 12.5 Gram(s) IV Push once  dextrose 50% Injectable 25 Gram(s) IV Push once  donepezil 5 milliGRAM(s) Oral at bedtime  glucagon  Injectable 1 milliGRAM(s) IntraMuscular once  heparin   Injectable 5000 Unit(s) SubCutaneous every 8 hours  insulin glargine Injectable (LANTUS) 15 Unit(s) SubCutaneous at bedtime  insulin lispro (ADMELOG) corrective regimen sliding scale   SubCutaneous three times a day before meals  insulin lispro (ADMELOG) corrective regimen sliding scale   SubCutaneous at bedtime  insulin lispro Injectable (ADMELOG) 4 Unit(s) SubCutaneous three times a day before meals  levothyroxine 75 MICROGram(s) Oral daily  melatonin 5 milliGRAM(s) Oral at bedtime  memantine 5 milliGRAM(s) Oral daily  metoprolol tartrate 12.5 milliGRAM(s) Oral two times a day  montelukast 10 milliGRAM(s) Oral daily  multivitamin 1 Tablet(s) Oral daily  pantoprazole    Tablet 40 milliGRAM(s) Oral before breakfast  polyethylene glycol 3350 17 Gram(s) Oral daily  tiotropium 18 MICROgram(s) Capsule 1 Capsule(s) Inhalation daily    MEDICATIONS  (PRN):        RADIOLOGY & ADDITIONAL TESTS:    HIDA Scan  -   IMPRESSION: Normal hepatobiliary scan.    No scan evidence of acute cholecystitis.    No significant change compared to the previous study dated 10/21/2019.

## 2021-04-30 NOTE — PROGRESS NOTE ADULT - SUBJECTIVE AND OBJECTIVE BOX
[facility]  INFECTIOUS DISEASES AND INTERNAL MEDICINE at [place]  =======================================================  Sabino Guevara MD  Diplomates American Board of Internal Medicine and Infectious Diseases  Tel: [phone]      Fax: [phone]  =======================================================      [facility]-[id number]  [place]    Follow up: Fever    No fever since 4/27       REVIEW OF SYSTEMS:  CONSTITUTIONAL:  No Fever No chills  HEENT:  No diplopia or blurred vision.  No earache, sore throat or runny nose.  CARDIOVASCULAR:  No pressure, squeezing, strangling, tightness, heaviness or aching about the chest, neck, axilla or epigastrium.  RESPIRATORY:  No cough, shortness of breath  GASTROINTESTINAL:  No nausea, vomiting or diarrhea.  GENITOURINARY:  No dysuria, frequency or urgency.   MUSCULOSKELETAL:  no joint aches, no muscle pain  SKIN:  No change in skin, hair or nails.  NEUROLOGIC:  No Headaches, seizures   PSYCHIATRIC:  No disorder of thought or mood.  ENDOCRINE:  No heat or cold intolerance  HEMATOLOGICAL:  No easy bruising or bleeding.       Physical Exam:  GEN: NAD, pleasant  HEENT: normocephalic and atraumatic. EOMI. PERRL.  Anicteric  NECK: Supple.   LUNGS: Clear to auscultation.  HEART: Regular rate and rhythm   ABDOMEN: Soft, nontender, and nondistended.  Positive bowel sounds.    : No CVA tenderness  EXTREMITIES: Without any edema.  MSK: No joint swelling  NEUROLOGIC: No Focal Deficits  PSYCHIATRIC: Appropriate affect . forgetful   SKIN: No Rash    Height (cm): 172.7 (04-27 @ 20:59)  Weight (kg): 86.2 (04-27 @ 20:59)  BMI (kg/m2): 28.9 (04-27 @ 20:59)  BSA (m2): 2 (04-27 @ 20:59)      Vitals:  T(F): 97.6 (29 Apr 2021 11:02), Max: 99.4 (28 Apr 2021 19:15)  HR: 89 (29 Apr 2021 11:02)  BP: 113/68 (29 Apr 2021 11:02)  RR: 18 (29 Apr 2021 11:02)  SpO2: 95% (29 Apr 2021 11:02) (92% - 100%)  temp max in last 48H T(F): , Max: 102.4 (04-27-21 @ 22:15)      Current Antibiotics:  cefTRIAXone   IVPB 1000 milliGRAM(s) IV Intermittent every 24 hours    Other medications:  albuterol/ipratropium for Nebulization 3 milliLiter(s) Nebulizer every 6 hours  aspirin enteric coated 325 milliGRAM(s) Oral daily  bisacodyl Suppository 10 milliGRAM(s) Rectal once  budesonide 160 MICROgram(s)/formoterol 4.5 MICROgram(s) Inhaler 2 Puff(s) Inhalation two times a day  dextrose 40% Gel 15 Gram(s) Oral once  dextrose 5%. 1000 milliLiter(s) IV Continuous <Continuous>  dextrose 5%. 1000 milliLiter(s) IV Continuous <Continuous>  dextrose 50% Injectable 25 Gram(s) IV Push once  dextrose 50% Injectable 12.5 Gram(s) IV Push once  dextrose 50% Injectable 25 Gram(s) IV Push once  donepezil 5 milliGRAM(s) Oral at bedtime  glucagon  Injectable 1 milliGRAM(s) IntraMuscular once  heparin   Injectable 5000 Unit(s) SubCutaneous every 8 hours  insulin glargine Injectable (LANTUS) 10 Unit(s) SubCutaneous at bedtime  insulin lispro (ADMELOG) corrective regimen sliding scale   SubCutaneous three times a day before meals  insulin lispro (ADMELOG) corrective regimen sliding scale   SubCutaneous at bedtime  levothyroxine 75 MICROGram(s) Oral daily  melatonin 5 milliGRAM(s) Oral at bedtime  memantine 5 milliGRAM(s) Oral daily  metoprolol tartrate 12.5 milliGRAM(s) Oral two times a day  montelukast 10 milliGRAM(s) Oral daily  multivitamin 1 Tablet(s) Oral daily  pantoprazole    Tablet 40 milliGRAM(s) Oral before breakfast  polyethylene glycol 3350 17 Gram(s) Oral daily  tiotropium 18 MICROgram(s) Capsule 1 Capsule(s) Inhalation daily                            10.9   8.24  )-----------( 147      ( 29 Apr 2021 07:26 )             33.2     04-29    138  |  98  |  71.0<H>  ----------------------------<  200<H>  3.8   |  28.0  |  2.35<H>    Ca    9.4      29 Apr 2021 07:26  Mg     2.6     04-28    TPro  6.9  /  Alb  3.4  /  TBili  1.0  /  DBili  0.7<H>  /  AST  82<H>  /  ALT  122<H>  /  AlkPhos  428<H>  04-29    RECENT CULTURES:  04-27 @ 23:01    RVP  NotDetec    04-20 @ 03:18 .Urine Clean Catch (Midstream)     <10,000 CFU/mL Normal Urogenital Brandi    WBC Count: 8.24 K/uL (04-29-21 @ 07:26)  WBC Count: 14.57 K/uL (04-28-21 @ 08:13)  WBC Count: 15.38 K/uL (04-27-21 @ 23:30)    Creatinine, Serum: 2.35 mg/dL (04-29-21 @ 07:26)  Creatinine, Serum: 2.12 mg/dL (04-28-21 @ 08:13)  Creatinine, Serum: 2.18 mg/dL (04-27-21 @ 23:30)    C-Reactive Protein, Serum: 39 mg/L (04-27-21 @ 23:30)    Ferritin, Serum: 821 ng/mL (04-27-21 @ 23:30)    Procalcitonin, Serum: 0.26 ng/mL (04-27-21 @ 23:30)     SARS-CoV-2: NotDetec (04-27-21 @ 23:01)  Rapid RVP Result: NotDetec (04-27-21 @ 23:01)      Acute Hepatitis Panel (04.28.21 @ 13:16)    Hepatitis C Virus Interpretation: Nonreact: Hepatitis C AB  S/CO Ratio                        Interpretation  < 1.00                                   Non-Reactive  1.00 - 4.99                         Weakly-Reactive  >= 5.00                                Reactive  Non-Reactive: A person witha non-reactive HCV antibody result is  considered uninfected.  No further action is needed unless recent  infection is suspected.  In these cases, consider repeat testing later to  detect seroconversion..  Weakly-Reactive: HCV antibody test is abnormal, HCV RNA Qualitative test  will follow.  Reactive: HCV antibody test is abnormal, HCV RNA Qualitative test will  follow.  Note: HCV antibody testing is performed on the [employer]  system.    Hepatitis C Virus S/CO Ratio: 0.19 S/CO    Hepatitis B Core IgM Antibody: Nonreact    Hepatitis B Surface Antigen: Nonreact    Hepatitis A IgM Antibody: Nonreact          < from: CT Abdomen and Pelvis No Cont (04.28.21 @ 04:52) >  EXAM:  CT ABDOMEN AND PELVIS                        EXAM:  CT CHEST                          PROCEDURE DATE:  04/28/2021      INTERPRETATION:  CLINICAL INFORMATION: Sepsis.    COMPARISON: CT the abdomen and pelvis from 4/19/2021 and CT the chest, abdomen, and pelvis from 10/17/2019.    CONTRAST/COMPLICATIONS:  IV Contrast: None.  Oral Contrast: None.  Complications: None.    PROCEDURE:  CT of the Chest, Abdomen and Pelvis was performed.  Sagittal and coronal reformats were performed.    FINDINGS:  CHEST:  LUNGS AND LARGE AIRWAYS: Patent central airways. Rounded chronic atelectasis at the right and left lung base similar to that seen on 4/19/2021 and 10/17/2019. Mild emphysema. Mild biapical parenchymal scarring.  PLEURA: Small bilateral pleural effusions with associated pleural thickening, unchanged.  VESSELS: Normal caliber thoracic aorta. Atherosclerotic calcifications of the thoracic aorta, great vessels, and coronary arteries.  HEART: Heart size is normal. Small pericardial effusion, unchanged.  MEDIASTINUM AND MARLEEN: No lymphadenopathy.  CHEST WALL AND LOWER NECK: Surgical clips in the right side of the neck. Mild symmetric bilateral gynecomastia.    ABDOMEN AND PELVIS:  LIVER: Within normal limits.  BILE DUCTS: Normal caliber.  GALLBLADDER: Gallbladder mildly distended. No radiodense gallstones. Mild pericholecystic edema.  SPLEEN: Within normal limits.  PANCREAS: Mild pancreatic atrophy. Punctate calcification in the pancreatic head likely the sequela of chronic pancreatitis.  ADRENALS: Within normal limits.  KIDNEYS/URETERS: No right or left hydroureteronephrosis or nephrolithiasis. Nonspecific bilateral symmetric perinephric stranding.    BLADDER: Mild thickening of the urinary bladder walls despite underdistention with slight infiltration of the perivesicular fat.  REPRODUCTIVE ORGANS: Enlarged prostate gland.    BOWEL: No bowel obstruction. Appendix is normal. Mild colonic diverticulosis without evidence of diverticulitis.  PERITONEUM: No ascites or pneumoperitoneum. No mesenteric lymphadenopathy.  VESSELS: Atherosclerotic calcifications of the aortoiliac tree. Normal caliber abdominal aorta.  RETROPERITONEUM/LYMPH NODES: No lymphadenopathy.  ABDOMINAL WALL: Small fat-containing right inguinal hernia.  BONES: Degenerative changes of the spine.    IMPRESSION:  No interval change in lung findings compared to prior exam from 4/19/2021. Rounded chronic atelectasis at the lung bases and chronic small bilateral pleural effusions with associated pleural thickening.    Mildly distended gallbladder with mild pericholecystic edema. Correlate with LFTs. Sonographic evaluation may be considered, as clinically indicated.    Mild thickening of the urinary bladder walls despite underdistention with slight infiltration of the perivesicular fat which may in part be due to chronic outlet obstruction in the setting of an enlarged prostate gland, however, perivesicular fatty infiltration raises question of cystitis. Correlate with urinalysis.    Mild colonic diverticulosis without evidence of diverticulitis.    < end of copied text >      < from: NM Hepatobiliary Imaging (04.28.21 @ 12:49) >  EXAM:  NM HEPATOBILIARY IMG                          PROCEDURE DATE:  04/28/2021      INTERPRETATION:  RADIOPHARMACEUTICAL:  99mTc-Mebrofenin  DOSE: 3.3 mCi IV    CLINICAL INFORMATION: 86 year old male with fever, sepsis, lethargy, distended gallbladder, body aches, referred to evaluate for acute cholecystitis    TECHNIQUE: Dynamic images of the anterior abdomen were obtained for 1 hour immediately following radiotracer injection. Static images of the abdomen in the anterior, right anterior oblique and right lateral projections were also obtained.    COMPARISON: Previous hepatobiliary scan dated 10/21/2019.    FINDINGS: There is prompt uptake of the injected radiotracer by the hepatocytes. The gallbladder is first visualized at 40 minutes post tracer injection and bowel activity by 35 minutes. There is normal tracer clearance from the liver by the end of the study.    IMPRESSION: Normal hepatobiliary scan.    No scan evidence of acute cholecystitis.    < end of copied text >

## 2021-05-01 DIAGNOSIS — R74.8 ABNORMAL LEVELS OF OTHER SERUM ENZYMES: ICD-10-CM

## 2021-05-01 DIAGNOSIS — R93.5 ABNORMAL FINDINGS ON DIAGNOSTIC IMAGING OF OTHER ABDOMINAL REGIONS, INCLUDING RETROPERITONEUM: ICD-10-CM

## 2021-05-01 LAB
ALBUMIN SERPL ELPH-MCNC: 3.2 G/DL — LOW (ref 3.3–5.2)
ALP SERPL-CCNC: 383 U/L — HIGH (ref 40–120)
ALT FLD-CCNC: 66 U/L — HIGH
ANION GAP SERPL CALC-SCNC: 12 MMOL/L — SIGNIFICANT CHANGE UP (ref 5–17)
AST SERPL-CCNC: 32 U/L — SIGNIFICANT CHANGE UP
BILIRUB SERPL-MCNC: 0.8 MG/DL — SIGNIFICANT CHANGE UP (ref 0.4–2)
BUN SERPL-MCNC: 55 MG/DL — HIGH (ref 8–20)
CALCIUM SERPL-MCNC: 9.4 MG/DL — SIGNIFICANT CHANGE UP (ref 8.6–10.2)
CHLORIDE SERPL-SCNC: 104 MMOL/L — SIGNIFICANT CHANGE UP (ref 98–107)
CO2 SERPL-SCNC: 26 MMOL/L — SIGNIFICANT CHANGE UP (ref 22–29)
CREAT SERPL-MCNC: 2.02 MG/DL — HIGH (ref 0.5–1.3)
GLUCOSE BLDC GLUCOMTR-MCNC: 119 MG/DL — HIGH (ref 70–99)
GLUCOSE BLDC GLUCOMTR-MCNC: 126 MG/DL — HIGH (ref 70–99)
GLUCOSE BLDC GLUCOMTR-MCNC: 157 MG/DL — HIGH (ref 70–99)
GLUCOSE BLDC GLUCOMTR-MCNC: 223 MG/DL — HIGH (ref 70–99)
GLUCOSE SERPL-MCNC: 103 MG/DL — HIGH (ref 70–99)
HCT VFR BLD CALC: 34.6 % — LOW (ref 39–50)
HGB BLD-MCNC: 11.3 G/DL — LOW (ref 13–17)
MCHC RBC-ENTMCNC: 29.6 PG — SIGNIFICANT CHANGE UP (ref 27–34)
MCHC RBC-ENTMCNC: 32.7 GM/DL — SIGNIFICANT CHANGE UP (ref 32–36)
MCV RBC AUTO: 90.6 FL — SIGNIFICANT CHANGE UP (ref 80–100)
PLATELET # BLD AUTO: 162 K/UL — SIGNIFICANT CHANGE UP (ref 150–400)
POTASSIUM SERPL-MCNC: 4.1 MMOL/L — SIGNIFICANT CHANGE UP (ref 3.5–5.3)
POTASSIUM SERPL-SCNC: 4.1 MMOL/L — SIGNIFICANT CHANGE UP (ref 3.5–5.3)
PROT SERPL-MCNC: 6.9 G/DL — SIGNIFICANT CHANGE UP (ref 6.6–8.7)
RBC # BLD: 3.82 M/UL — LOW (ref 4.2–5.8)
RBC # FLD: 14.5 % — SIGNIFICANT CHANGE UP (ref 10.3–14.5)
SODIUM SERPL-SCNC: 142 MMOL/L — SIGNIFICANT CHANGE UP (ref 135–145)
WBC # BLD: 8.15 K/UL — SIGNIFICANT CHANGE UP (ref 3.8–10.5)
WBC # FLD AUTO: 8.15 K/UL — SIGNIFICANT CHANGE UP (ref 3.8–10.5)

## 2021-05-01 PROCEDURE — 74181 MRI ABDOMEN W/O CONTRAST: CPT | Mod: 26

## 2021-05-01 PROCEDURE — 99233 SBSQ HOSP IP/OBS HIGH 50: CPT

## 2021-05-01 RX ORDER — SENNA PLUS 8.6 MG/1
2 TABLET ORAL AT BEDTIME
Refills: 0 | Status: DISCONTINUED | OUTPATIENT
Start: 2021-05-01 | End: 2021-05-05

## 2021-05-01 RX ORDER — ONDANSETRON 8 MG/1
4 TABLET, FILM COATED ORAL ONCE
Refills: 0 | Status: COMPLETED | OUTPATIENT
Start: 2021-05-01 | End: 2021-05-01

## 2021-05-01 RX ADMIN — HEPARIN SODIUM 5000 UNIT(S): 5000 INJECTION INTRAVENOUS; SUBCUTANEOUS at 06:24

## 2021-05-01 RX ADMIN — HEPARIN SODIUM 5000 UNIT(S): 5000 INJECTION INTRAVENOUS; SUBCUTANEOUS at 22:55

## 2021-05-01 RX ADMIN — Medication 75 MICROGRAM(S): at 06:24

## 2021-05-01 RX ADMIN — SENNA PLUS 2 TABLET(S): 8.6 TABLET ORAL at 22:55

## 2021-05-01 RX ADMIN — INSULIN GLARGINE 15 UNIT(S): 100 INJECTION, SOLUTION SUBCUTANEOUS at 22:55

## 2021-05-01 RX ADMIN — Medication 3 MILLILITER(S): at 20:21

## 2021-05-01 RX ADMIN — MONTELUKAST 10 MILLIGRAM(S): 4 TABLET, CHEWABLE ORAL at 12:28

## 2021-05-01 RX ADMIN — Medication 5 MILLIGRAM(S): at 22:55

## 2021-05-01 RX ADMIN — Medication 4 UNIT(S): at 18:19

## 2021-05-01 RX ADMIN — Medication 12.5 MILLIGRAM(S): at 06:24

## 2021-05-01 RX ADMIN — Medication 81 MILLIGRAM(S): at 12:28

## 2021-05-01 RX ADMIN — Medication 2: at 11:58

## 2021-05-01 RX ADMIN — MEMANTINE HYDROCHLORIDE 5 MILLIGRAM(S): 10 TABLET ORAL at 12:27

## 2021-05-01 RX ADMIN — ONDANSETRON 4 MILLIGRAM(S): 8 TABLET, FILM COATED ORAL at 14:00

## 2021-05-01 RX ADMIN — Medication 3 MILLILITER(S): at 15:14

## 2021-05-01 RX ADMIN — DONEPEZIL HYDROCHLORIDE 5 MILLIGRAM(S): 10 TABLET, FILM COATED ORAL at 22:55

## 2021-05-01 RX ADMIN — PANTOPRAZOLE SODIUM 40 MILLIGRAM(S): 20 TABLET, DELAYED RELEASE ORAL at 06:24

## 2021-05-01 RX ADMIN — Medication 1 TABLET(S): at 12:27

## 2021-05-01 RX ADMIN — POLYETHYLENE GLYCOL 3350 17 GRAM(S): 17 POWDER, FOR SOLUTION ORAL at 12:35

## 2021-05-01 RX ADMIN — Medication 4: at 18:18

## 2021-05-01 RX ADMIN — HEPARIN SODIUM 5000 UNIT(S): 5000 INJECTION INTRAVENOUS; SUBCUTANEOUS at 13:55

## 2021-05-01 RX ADMIN — Medication 3 MILLILITER(S): at 10:22

## 2021-05-01 RX ADMIN — Medication 4 UNIT(S): at 11:58

## 2021-05-01 RX ADMIN — Medication 12.5 MILLIGRAM(S): at 18:19

## 2021-05-01 NOTE — PROGRESS NOTE ADULT - ASSESSMENT
CKD(IV): febrile illness, elevated LFTs  Renal function better   - avoid potential nephrotoxins  - cont to hold diuretics while not eating well  - Torsemide on hold for now   - antibiotics as per ID; follow cultures  - follow labs  - No hydro on CT abdomen 4/28   - Clinically feels better   - GI and ID follow up noted

## 2021-05-01 NOTE — PROGRESS NOTE ADULT - SUBJECTIVE AND OBJECTIVE BOX
Patient is a 86y old  Male who presents with a chief complaint of sirs, weakness (01 May 2021 18:39)      HPI:  86M hx vascular dementia afib (not on ac), CKD, DM2, COPD - spoke to wife and daughter at bedside. NO fevers, pt seems to be tolerating diet. No complaints of pain.  LFT improving. MRCP shows abrupt cut of at the distal CBD.     REVIEW OF SYSTEMS:  Constitutional: No fever, weight loss or fatigue  ENMT:  No difficulty hearing, tinnitus, vertigo; No sinus or throat pain  Respiratory: No cough, wheezing, chills or hemoptysis  Cardiovascular: No chest pain, palpitations, dizziness or leg swelling  Gastrointestinal: No abdominal or epigastric pain. No nausea, vomiting or hematemesis; No diarrhea or constipation. No melena or hematochezia.  Skin: No itching, burning, rashes or lesions   Musculoskeletal: No joint pain or swelling; No muscle, back or extremity pain    PAST MEDICAL & SURGICAL HISTORY:  DM (diabetes mellitus)    Afib    Dementia    COPD (chronic obstructive pulmonary disease)    CKD (chronic kidney disease)    S/P carotid endarterectomy    History of lung biopsy        FAMILY HISTORY:  No pertinent family history in first degree relatives        SOCIAL HISTORY:  Smoking Status: [ ] Current, [ ] Former, [ ] Never  Pack Years:  [  ] EtOH  [  ] IVDA    MEDICATIONS:  MEDICATIONS  (STANDING):  albuterol/ipratropium for Nebulization 3 milliLiter(s) Nebulizer every 6 hours  aspirin  chewable 81 milliGRAM(s) Oral daily  budesonide 160 MICROgram(s)/formoterol 4.5 MICROgram(s) Inhaler 2 Puff(s) Inhalation two times a day  dextrose 40% Gel 15 Gram(s) Oral once  dextrose 5%. 1000 milliLiter(s) (50 mL/Hr) IV Continuous <Continuous>  dextrose 5%. 1000 milliLiter(s) (100 mL/Hr) IV Continuous <Continuous>  dextrose 50% Injectable 25 Gram(s) IV Push once  dextrose 50% Injectable 12.5 Gram(s) IV Push once  dextrose 50% Injectable 25 Gram(s) IV Push once  donepezil 5 milliGRAM(s) Oral at bedtime  glucagon  Injectable 1 milliGRAM(s) IntraMuscular once  heparin   Injectable 5000 Unit(s) SubCutaneous every 8 hours  insulin glargine Injectable (LANTUS) 15 Unit(s) SubCutaneous at bedtime  insulin lispro (ADMELOG) corrective regimen sliding scale   SubCutaneous three times a day before meals  insulin lispro (ADMELOG) corrective regimen sliding scale   SubCutaneous at bedtime  insulin lispro Injectable (ADMELOG) 4 Unit(s) SubCutaneous three times a day before meals  levothyroxine 75 MICROGram(s) Oral daily  melatonin 5 milliGRAM(s) Oral at bedtime  memantine 5 milliGRAM(s) Oral daily  metoprolol tartrate 12.5 milliGRAM(s) Oral two times a day  montelukast 10 milliGRAM(s) Oral daily  multivitamin 1 Tablet(s) Oral daily  pantoprazole    Tablet 40 milliGRAM(s) Oral before breakfast  polyethylene glycol 3350 17 Gram(s) Oral daily  senna 2 Tablet(s) Oral at bedtime  tiotropium 18 MICROgram(s) Capsule 1 Capsule(s) Inhalation daily  torsemide 5 milliGRAM(s) Oral once    MEDICATIONS  (PRN):      Allergies    No Known Allergies    Intolerances        Vital Signs Last 24 Hrs  T(C): 36.8 (01 May 2021 18:18), Max: 36.8 (01 May 2021 18:18)  T(F): 98.2 (01 May 2021 18:18), Max: 98.2 (01 May 2021 18:18)  HR: 87 (01 May 2021 18:18) (76 - 87)  BP: 113/61 (01 May 2021 18:18) (108/70 - 113/61)  BP(mean): --  RR: 18 (01 May 2021 18:18) (18 - 18)  SpO2: 94% (01 May 2021 18:18) (94% - 100%)        PHYSICAL EXAM:    General: Well developed; well nourished; in no acute distress  HEENT: MMM, conjunctiva and sclera clear  H- RRR  L- CT A  Gastrointestinal: Soft, non-tender non-distended; Normal bowel sounds; No rebound or guarding  Extremities: Normal range of motion, No clubbing, cyanosis or edema  Neurological: Alert and oriented x3  Skin: Warm and dry. No obvious rash      LABS:                        11.3   8.15  )-----------( 162      ( 01 May 2021 08:21 )             34.6     01 May 2021 08:21    142    |  104    |  55.0   ----------------------------<  103    4.1     |  26.0   |  2.02     Ca    9.4        01 May 2021 08:21    TPro  6.9    /  Alb  3.2    /  TBili  0.8    /  DBili  x      /  AST  32     /  ALT  66     /  AlkPhos  383    / Amylase x      /Lipase x      01 May 2021 08:21              RADIOLOGY & ADDITIONAL STUDIES:     < from: MR MRCP No Cont (05.01.21 @ 08:27) >  MPRESSION:  No evidence for choledocholithiasis. Mildly dilated common bile duct measures up to 10 mm in caliber with an abrupt transition at the very distal common bile duct. This may represent a common bile duct stricture. A malignant stricture cannot be excluded.    No evidence for gallstone. Nonspecific slightly thickened gallbladder wall.    Nodular contour of the liver, suggestive of cirrhosis. No suspicious lesion is identified.    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    < end of copied text >

## 2021-05-01 NOTE — PROGRESS NOTE ADULT - ASSESSMENT
86M with  vascular dementia afib (not on ac), CKD, DM2, COPD presents with fever and worsening mental status found to have sirs criteria. Found to have leucocytosis elevated LFTs.     Abdominal pain and acute metabolic encephalopathy  Suspect may be due to stricture noted on MRCP   Transaminitis now downtrending   Initial AMS now back to baseline   HIDA normal   Leucocytosis on admission, now resolved  Empiric ceftriaxone dc'd. Will watch off abx  Surgery - signed off - no intervention   GI following , may need EUS. Will follow up offical recs       COPD   not in exacerbation   Spiriva singular duoneb    Dementia  Namenda aricept    HFpEF  not in exacerbation currently  echo 10/2019 LVEF  >75  Cardiology consult pending will need clearance prior to procedure   cardio consult pending  Will restart Torsemide 5 mg x 1 today (pt on 20 mg BID, as per daughter pt starts when > 148)    CKD4  appears to be near baseline  avoid nephrotoxic meds   monitor cr  nephro following     Afib  not on ac  Metoprolol    DM2  A1C 8.0%  lantus, iss  monitor fingersticks     DVT Prophylaxis  Heparin SC    Dispo : Pending GI recs, possible EUS 5/4/21  spoke to patient daughter bedside -

## 2021-05-01 NOTE — PROGRESS NOTE ADULT - SUBJECTIVE AND OBJECTIVE BOX
NEPHROLOGY INTERVAL HPI/OVERNIGHT EVENTS:    Feels better   may require EUS     MEDICATIONS  (STANDING):  albuterol/ipratropium for Nebulization 3 milliLiter(s) Nebulizer every 6 hours  aspirin  chewable 81 milliGRAM(s) Oral daily  budesonide 160 MICROgram(s)/formoterol 4.5 MICROgram(s) Inhaler 2 Puff(s) Inhalation two times a day  dextrose 40% Gel 15 Gram(s) Oral once  dextrose 5%. 1000 milliLiter(s) (50 mL/Hr) IV Continuous <Continuous>  dextrose 5%. 1000 milliLiter(s) (100 mL/Hr) IV Continuous <Continuous>  dextrose 50% Injectable 25 Gram(s) IV Push once  dextrose 50% Injectable 12.5 Gram(s) IV Push once  dextrose 50% Injectable 25 Gram(s) IV Push once  donepezil 5 milliGRAM(s) Oral at bedtime  glucagon  Injectable 1 milliGRAM(s) IntraMuscular once  heparin   Injectable 5000 Unit(s) SubCutaneous every 8 hours  insulin glargine Injectable (LANTUS) 15 Unit(s) SubCutaneous at bedtime  insulin lispro (ADMELOG) corrective regimen sliding scale   SubCutaneous three times a day before meals  insulin lispro (ADMELOG) corrective regimen sliding scale   SubCutaneous at bedtime  insulin lispro Injectable (ADMELOG) 4 Unit(s) SubCutaneous three times a day before meals  levothyroxine 75 MICROGram(s) Oral daily  melatonin 5 milliGRAM(s) Oral at bedtime  memantine 5 milliGRAM(s) Oral daily  metoprolol tartrate 12.5 milliGRAM(s) Oral two times a day  montelukast 10 milliGRAM(s) Oral daily  multivitamin 1 Tablet(s) Oral daily  pantoprazole    Tablet 40 milliGRAM(s) Oral before breakfast  polyethylene glycol 3350 17 Gram(s) Oral daily  senna 2 Tablet(s) Oral at bedtime  tiotropium 18 MICROgram(s) Capsule 1 Capsule(s) Inhalation daily  torsemide 5 milliGRAM(s) Oral once    MEDICATIONS  (PRN):      Allergies    No Known Allergies    Intolerances          Vital Signs Last 24 Hrs  T(C): 36.8 (01 May 2021 18:18), Max: 36.8 (01 May 2021 18:18)  T(F): 98.2 (01 May 2021 18:18), Max: 98.2 (01 May 2021 18:18)  HR: 87 (01 May 2021 18:18) (75 - 87)  BP: 113/61 (01 May 2021 18:18) (108/70 - 113/61)  BP(mean): --  RR: 18 (01 May 2021 18:18) (18 - 18)  SpO2: 94% (01 May 2021 18:18) (94% - 100%)  Daily     Daily   I&O's Detail    I&O's Summary      PHYSICAL EXAM  GENERAL: Appears weak and acutely ill  EYES: Conjunctiva and sclera clear  ENMT:  Moist mucous membranes; no periorbital edema  NECK: Supple, No JVD  NERVOUS SYSTEM: Lethargic, demented  CHEST/LUNG: Clear bilaterally  HEART: IRR, no rub  ABDOMEN: Soft, NT BS+  EXTREMITIES:  2+ Peripheral Pulses, No LE edema    LABS:                        11.3   8.15  )-----------( 162      ( 01 May 2021 08:21 )             34.6     05-01    142  |  104  |  55.0<H>  ----------------------------<  103<H>  4.1   |  26.0  |  2.02<H>    Ca    9.4      01 May 2021 08:21    TPro  6.9  /  Alb  3.2<L>  /  TBili  0.8  /  DBili  x   /  AST  32  /  ALT  66<H>  /  AlkPhos  383<H>  05-01                RADIOLOGY & ADDITIONAL TESTS:

## 2021-05-01 NOTE — PROGRESS NOTE ADULT - SUBJECTIVE AND OBJECTIVE BOX
[facility] Division of Hospital Medicine    Chief Complaint:  Abd pain     SUBJECTIVE / OVERNIGHT EVENTS:  Pt examined lying in bed  s/p MRCP this am  D/W GI, possible need for EUS based on report findings  D/W pt and family, pt with mild dementia but able to express wishes and ask questions w/o difficulty. Wished to go home, advised to stay for GI f/u and recommendations as EUS may be needed to determine etiology of MRCP findings Pt direct question answered with possibilities including benign vs malignancy.   Daughter at bedside does not wish for pt to be informed of suspicions. However given pts high level of cognitive function level of functioning, explained inappropriate to misrepresent findings to pt.   Reportedly nauseous this am. No BM x 2 days as per daughter   Patient denies chest pain, SOB, abd pain, fever, chills, dysuria or any other complaints. All remainder ROS negative.     MEDICATIONS  (STANDING):  albuterol/ipratropium for Nebulization 3 milliLiter(s) Nebulizer every 6 hours  aspirin  chewable 81 milliGRAM(s) Oral daily  budesonide 160 MICROgram(s)/formoterol 4.5 MICROgram(s) Inhaler 2 Puff(s) Inhalation two times a day  dextrose 40% Gel 15 Gram(s) Oral once  dextrose 5%. 1000 milliLiter(s) (50 mL/Hr) IV Continuous <Continuous>  dextrose 5%. 1000 milliLiter(s) (100 mL/Hr) IV Continuous <Continuous>  dextrose 50% Injectable 25 Gram(s) IV Push once  dextrose 50% Injectable 12.5 Gram(s) IV Push once  dextrose 50% Injectable 25 Gram(s) IV Push once  donepezil 5 milliGRAM(s) Oral at bedtime  glucagon  Injectable 1 milliGRAM(s) IntraMuscular once  heparin   Injectable 5000 Unit(s) SubCutaneous every 8 hours  insulin glargine Injectable (LANTUS) 15 Unit(s) SubCutaneous at bedtime  insulin lispro (ADMELOG) corrective regimen sliding scale   SubCutaneous three times a day before meals  insulin lispro (ADMELOG) corrective regimen sliding scale   SubCutaneous at bedtime  insulin lispro Injectable (ADMELOG) 4 Unit(s) SubCutaneous three times a day before meals  levothyroxine 75 MICROGram(s) Oral daily  melatonin 5 milliGRAM(s) Oral at bedtime  memantine 5 milliGRAM(s) Oral daily  metoprolol tartrate 12.5 milliGRAM(s) Oral two times a day  montelukast 10 milliGRAM(s) Oral daily  multivitamin 1 Tablet(s) Oral daily  pantoprazole    Tablet 40 milliGRAM(s) Oral before breakfast  polyethylene glycol 3350 17 Gram(s) Oral daily  senna 2 Tablet(s) Oral at bedtime  tiotropium 18 MICROgram(s) Capsule 1 Capsule(s) Inhalation daily      PHYSICAL EXAM:  Vital Signs Last 24 Hrs  T(C): 36.6 (01 May 2021 05:11), Max: 36.6 (01 May 2021 05:11)  T(F): 97.9 (01 May 2021 05:11), Max: 97.9 (01 May 2021 05:11)  HR: 76 (01 May 2021 15:17) (75 - 80)  BP: 108/70 (01 May 2021 05:11) (108/70 - 108/70)  BP(mean): --  RR: 18 (01 May 2021 05:11) (18 - 18)  SpO2: 98% (01 May 2021 15:17) (95% - 100%)        CONSTITUTIONAL: NAD, Elderly male, well-developed, well-groomed  ENMT: Moist oral mucosa, no pharyngeal injection or exudates; normal dentition  RESPIRATORY: Normal respiratory effort; lungs are clear to auscultation bilaterally  CARDIOVASCULAR: Regular rate and rhythm, normal S1 and S2, no murmur/rub/gallop; No lower extremity edema; Peripheral pulses are 2+ bilaterally  ABDOMEN: Nontender to palpation, normoactive bowel sounds, no rebound/guarding; No hepatosplenomegaly  MUSCLOSKELETAL:  no clubbing or cyanosis of digits; no joint swelling or tenderness to palpation  PSYCH: A+O to person, place. Believes it is Sept 2020; affect appropriate  NEUROLOGY: CN 2-12 are intact and symmetric; no gross sensory deficits;   SKIN: No rashes; no palpable lesions    LABS:                        11.3   8.15  )-----------( 162      ( 01 May 2021 08:21 )             34.6     05-01    142  |  104  |  55.0<H>  ----------------------------<  103<H>  4.1   |  26.0  |  2.02<H>    Ca    9.4      01 May 2021 08:21    TPro  6.9  /  Alb  3.2<L>  /  TBili  0.8  /  DBili  x   /  AST  32  /  ALT  66<H>  /  AlkPhos  383<H>  05-01              CAPILLARY BLOOD GLUCOSE      POCT Blood Glucose.: 157 mg/dL (01 May 2021 11:18)  POCT Blood Glucose.: 119 mg/dL (01 May 2021 08:50)  POCT Blood Glucose.: 116 mg/dL (30 Apr 2021 22:54)        RADIOLOGY & ADDITIONAL TESTS:  5/1/21  No evidence for choledocholithiasis. Mildly dilated common bile duct measures up to 10 mm in caliber with an abrupt transition at the very distal common bile duct. This may represent a common bile duct stricture. A malignant stricture cannot be excluded.  No evidence for gallstone. Nonspecific slightly thickened gallbladder wall.  Nodular contour of the liver, suggestive of cirrhosis. No suspicious lesion is identified.  Apparent chronic focal type B infrarenal abdominal aortic dissection with calcification at the dissection flap, unchanged since the previous abdominal CT dated 10/17/2019.

## 2021-05-02 LAB
ALBUMIN SERPL ELPH-MCNC: 3.1 G/DL — LOW (ref 3.3–5.2)
ALP SERPL-CCNC: 359 U/L — HIGH (ref 40–120)
ALT FLD-CCNC: 54 U/L — HIGH
ANION GAP SERPL CALC-SCNC: 9 MMOL/L — SIGNIFICANT CHANGE UP (ref 5–17)
AST SERPL-CCNC: 28 U/L — SIGNIFICANT CHANGE UP
BILIRUB DIRECT SERPL-MCNC: 0.4 MG/DL — HIGH (ref 0–0.3)
BILIRUB INDIRECT FLD-MCNC: 0.4 MG/DL — SIGNIFICANT CHANGE UP (ref 0.2–1)
BILIRUB SERPL-MCNC: 0.8 MG/DL — SIGNIFICANT CHANGE UP (ref 0.4–2)
BUN SERPL-MCNC: 50 MG/DL — HIGH (ref 8–20)
CALCIUM SERPL-MCNC: 9.3 MG/DL — SIGNIFICANT CHANGE UP (ref 8.6–10.2)
CHLORIDE SERPL-SCNC: 105 MMOL/L — SIGNIFICANT CHANGE UP (ref 98–107)
CO2 SERPL-SCNC: 27 MMOL/L — SIGNIFICANT CHANGE UP (ref 22–29)
CREAT SERPL-MCNC: 2.23 MG/DL — HIGH (ref 0.5–1.3)
GLUCOSE BLDC GLUCOMTR-MCNC: 117 MG/DL — HIGH (ref 70–99)
GLUCOSE BLDC GLUCOMTR-MCNC: 151 MG/DL — HIGH (ref 70–99)
GLUCOSE BLDC GLUCOMTR-MCNC: 187 MG/DL — HIGH (ref 70–99)
GLUCOSE BLDC GLUCOMTR-MCNC: 228 MG/DL — HIGH (ref 70–99)
GLUCOSE SERPL-MCNC: 115 MG/DL — HIGH (ref 70–99)
HCT VFR BLD CALC: 34.4 % — LOW (ref 39–50)
HGB BLD-MCNC: 10.7 G/DL — LOW (ref 13–17)
MCHC RBC-ENTMCNC: 29.1 PG — SIGNIFICANT CHANGE UP (ref 27–34)
MCHC RBC-ENTMCNC: 31.1 GM/DL — LOW (ref 32–36)
MCV RBC AUTO: 93.5 FL — SIGNIFICANT CHANGE UP (ref 80–100)
PLATELET # BLD AUTO: 158 K/UL — SIGNIFICANT CHANGE UP (ref 150–400)
POTASSIUM SERPL-MCNC: 4.1 MMOL/L — SIGNIFICANT CHANGE UP (ref 3.5–5.3)
POTASSIUM SERPL-SCNC: 4.1 MMOL/L — SIGNIFICANT CHANGE UP (ref 3.5–5.3)
PROT SERPL-MCNC: 6.8 G/DL — SIGNIFICANT CHANGE UP (ref 6.6–8.7)
RBC # BLD: 3.68 M/UL — LOW (ref 4.2–5.8)
RBC # FLD: 14.4 % — SIGNIFICANT CHANGE UP (ref 10.3–14.5)
SODIUM SERPL-SCNC: 141 MMOL/L — SIGNIFICANT CHANGE UP (ref 135–145)
WBC # BLD: 8.37 K/UL — SIGNIFICANT CHANGE UP (ref 3.8–10.5)
WBC # FLD AUTO: 8.37 K/UL — SIGNIFICANT CHANGE UP (ref 3.8–10.5)

## 2021-05-02 PROCEDURE — 99233 SBSQ HOSP IP/OBS HIGH 50: CPT

## 2021-05-02 PROCEDURE — 99232 SBSQ HOSP IP/OBS MODERATE 35: CPT

## 2021-05-02 RX ORDER — LACTULOSE 10 G/15ML
10 SOLUTION ORAL ONCE
Refills: 0 | Status: COMPLETED | OUTPATIENT
Start: 2021-05-02 | End: 2021-05-02

## 2021-05-02 RX ADMIN — HEPARIN SODIUM 5000 UNIT(S): 5000 INJECTION INTRAVENOUS; SUBCUTANEOUS at 21:37

## 2021-05-02 RX ADMIN — INSULIN GLARGINE 15 UNIT(S): 100 INJECTION, SOLUTION SUBCUTANEOUS at 21:39

## 2021-05-02 RX ADMIN — Medication 1 TABLET(S): at 11:47

## 2021-05-02 RX ADMIN — Medication 3 MILLILITER(S): at 03:33

## 2021-05-02 RX ADMIN — MONTELUKAST 10 MILLIGRAM(S): 4 TABLET, CHEWABLE ORAL at 11:48

## 2021-05-02 RX ADMIN — Medication 4 UNIT(S): at 08:00

## 2021-05-02 RX ADMIN — MEMANTINE HYDROCHLORIDE 5 MILLIGRAM(S): 10 TABLET ORAL at 11:48

## 2021-05-02 RX ADMIN — SENNA PLUS 2 TABLET(S): 8.6 TABLET ORAL at 21:38

## 2021-05-02 RX ADMIN — HEPARIN SODIUM 5000 UNIT(S): 5000 INJECTION INTRAVENOUS; SUBCUTANEOUS at 06:12

## 2021-05-02 RX ADMIN — Medication 3 MILLILITER(S): at 20:57

## 2021-05-02 RX ADMIN — Medication 12.5 MILLIGRAM(S): at 18:09

## 2021-05-02 RX ADMIN — Medication 75 MICROGRAM(S): at 06:12

## 2021-05-02 RX ADMIN — Medication 81 MILLIGRAM(S): at 11:47

## 2021-05-02 RX ADMIN — Medication 4 UNIT(S): at 11:46

## 2021-05-02 RX ADMIN — Medication 12.5 MILLIGRAM(S): at 06:12

## 2021-05-02 RX ADMIN — Medication 5 MILLIGRAM(S): at 21:37

## 2021-05-02 RX ADMIN — LACTULOSE 10 GRAM(S): 10 SOLUTION ORAL at 11:46

## 2021-05-02 RX ADMIN — DONEPEZIL HYDROCHLORIDE 5 MILLIGRAM(S): 10 TABLET, FILM COATED ORAL at 21:37

## 2021-05-02 RX ADMIN — POLYETHYLENE GLYCOL 3350 17 GRAM(S): 17 POWDER, FOR SOLUTION ORAL at 11:48

## 2021-05-02 RX ADMIN — Medication 2: at 16:43

## 2021-05-02 RX ADMIN — PANTOPRAZOLE SODIUM 40 MILLIGRAM(S): 20 TABLET, DELAYED RELEASE ORAL at 06:12

## 2021-05-02 RX ADMIN — Medication 4 UNIT(S): at 16:43

## 2021-05-02 RX ADMIN — HEPARIN SODIUM 5000 UNIT(S): 5000 INJECTION INTRAVENOUS; SUBCUTANEOUS at 13:48

## 2021-05-02 RX ADMIN — Medication 3 MILLILITER(S): at 08:18

## 2021-05-02 RX ADMIN — Medication 3 MILLILITER(S): at 15:27

## 2021-05-02 NOTE — PROGRESS NOTE ADULT - SUBJECTIVE AND OBJECTIVE BOX
Patient is a 86y old  Male who presents with a chief complaint of sirs, weakness, Preoperative cardiac optimization prior to ERCP (02 May 2021 15:04)      HPI:  86M hx vascular dementia afib (not on ac), CKD, DM2, COPD .  Patient has no abdominal pain. No fevers. Tolerating diet.    REVIEW OF SYSTEMS:  Constitutional: No fever, weight loss or fatigue  ENMT:  No difficulty hearing, tinnitus, vertigo; No sinus or throat pain  Respiratory: No cough, wheezing, chills or hemoptysis  Cardiovascular: No chest pain, palpitations, dizziness or leg swelling  Gastrointestinal: No abdominal or epigastric pain. No nausea, vomiting or hematemesis; No diarrhea or constipation. No melena or hematochezia.  Skin: No itching, burning, rashes or lesions   Musculoskeletal: No joint pain or swelling; No muscle, back or extremity pain    PAST MEDICAL & SURGICAL HISTORY:  DM (diabetes mellitus)    Afib    Dementia    COPD (chronic obstructive pulmonary disease)    CKD (chronic kidney disease)    S/P carotid endarterectomy    History of lung biopsy        FAMILY HISTORY:  No pertinent family history in first degree relatives        SOCIAL HISTORY:  Smoking Status: [ ] Current, [ ] Former, [ ] Never  Pack Years:  [  ] EtOH-no  [  ] IVDA    MEDICATIONS:  MEDICATIONS  (STANDING):  albuterol/ipratropium for Nebulization 3 milliLiter(s) Nebulizer every 6 hours  aspirin  chewable 81 milliGRAM(s) Oral daily  budesonide 160 MICROgram(s)/formoterol 4.5 MICROgram(s) Inhaler 2 Puff(s) Inhalation two times a day  dextrose 40% Gel 15 Gram(s) Oral once  dextrose 5%. 1000 milliLiter(s) (50 mL/Hr) IV Continuous <Continuous>  dextrose 5%. 1000 milliLiter(s) (100 mL/Hr) IV Continuous <Continuous>  dextrose 50% Injectable 25 Gram(s) IV Push once  dextrose 50% Injectable 12.5 Gram(s) IV Push once  dextrose 50% Injectable 25 Gram(s) IV Push once  donepezil 5 milliGRAM(s) Oral at bedtime  glucagon  Injectable 1 milliGRAM(s) IntraMuscular once  heparin   Injectable 5000 Unit(s) SubCutaneous every 8 hours  insulin glargine Injectable (LANTUS) 15 Unit(s) SubCutaneous at bedtime  insulin lispro (ADMELOG) corrective regimen sliding scale   SubCutaneous three times a day before meals  insulin lispro (ADMELOG) corrective regimen sliding scale   SubCutaneous at bedtime  insulin lispro Injectable (ADMELOG) 4 Unit(s) SubCutaneous three times a day before meals  levothyroxine 75 MICROGram(s) Oral daily  melatonin 5 milliGRAM(s) Oral at bedtime  memantine 5 milliGRAM(s) Oral daily  metoprolol tartrate 12.5 milliGRAM(s) Oral two times a day  montelukast 10 milliGRAM(s) Oral daily  multivitamin 1 Tablet(s) Oral daily  pantoprazole    Tablet 40 milliGRAM(s) Oral before breakfast  polyethylene glycol 3350 17 Gram(s) Oral daily  senna 2 Tablet(s) Oral at bedtime  tiotropium 18 MICROgram(s) Capsule 1 Capsule(s) Inhalation daily    MEDICATIONS  (PRN):  bisacodyl Suppository 10 milliGRAM(s) Rectal daily PRN Constipation      Allergies    No Known Allergies    Intolerances        Vital Signs Last 24 Hrs  T(C): 36.8 (02 May 2021 17:16), Max: 36.8 (02 May 2021 17:16)  T(F): 98.2 (02 May 2021 17:16), Max: 98.2 (02 May 2021 17:16)  HR: 87 (02 May 2021 18:08) (70 - 92)  BP: 118/70 (02 May 2021 18:08) (104/69 - 121/71)  BP(mean): --  RR: 18 (02 May 2021 17:16) (18 - 18)  SpO2: 95% (02 May 2021 17:16) (95% - 99%)        PHYSICAL EXAM:    General: Well developed; well nourished; in no acute distress  HEENT: MMM, conjunctiva and sclera clear  Gastrointestinal: Soft, non-tender non-distended; Normal bowel sounds; No rebound or guarding  Extremities: Normal range of motion, No clubbing, cyanosis or edema  Neurological: Alert and oriented x3  Skin: Warm and dry. No obvious rash      LABS:                        10.7   8.37  )-----------( 158      ( 02 May 2021 08:11 )             34.4     02 May 2021 08:11    141    |  105    |  50.0   ----------------------------<  115    4.1     |  27.0   |  2.23     Ca    9.3        02 May 2021 08:11    TPro  6.8    /  Alb  3.1    /  TBili  0.8    /  DBili  0.4    /  AST  28     /  ALT  54     /  AlkPhos  359    / Amylase x      /Lipase x      02 May 2021 08:11              RADIOLOGY & ADDITIONAL STUDIES:     < from: MR MRCP No Cont (05.01.21 @ 08:27) >  MPRESSION:  No evidence for choledocholithiasis. Mildly dilated common bile duct measures up to 10 mm in caliber with an abrupt transition at the very distal common bile duct. This may represent a common bile duct stricture. A malignant stricture cannot be excluded.    No evidence for gallstone. Nonspecific slightly thickened gallbladder wall.    Nodular contour of the liver, suggestive of cirrhosis. No suspicious lesion is identified.    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            FADI RYAN MD; Attending Radiologist    < end of copied text >

## 2021-05-02 NOTE — PROGRESS NOTE ADULT - ASSESSMENT
A/P: 87 y/o M with a PMHx of AFib, paroxysmal (not on AC), Vascular dementia, CKD, DMII, COPD, and HFpEF who presented to the ER with complaints of AMS and fever. Patient's daughter Felicita at bedside who states that he went to see Dr. Wilkes yesterday, and everything appeared well. Following the appointment and a nap, he woke up with a fever of 102, was weak, confused, and shaking. Patient was complaining of some severe abdominal pain yesterday. Patient with normal lactate, and CT with some gallbladder thickening. Patient was evaluated by surgery, no consideration of acute cholecystitis. Patient is currently resting comfortably, no complaints. Patient denies fevers, chills, CP, SOB, orthopnea, N/V/D, headache, or dizziness.     1 ) Perioperative assessment prior to ERCP/EUS   - patient currently denies any chest pain or shortness of breath   - EKG reviewed and no acute ST T segment changes consistent with acute ischemia   - TTE done 4/2021 shows normal LVEF of 60-65% with normal LVEF   - patient has a RCRI of 2 Class III Risk 10.1 % with 30-day risk of death, MI, or cardiac arrest, patinet is thus considered elevated risk for a low to intermediate risk proceudre and at this time the benefit outweighs and currently cardiovascularly optimized for planned procedure and requires no further cardiovascular testing   - monitor intra procedurally   - mindful of fluid shift and avoid volume overload during procedure     2) HFpEF   - EF normal on last echo.   - Appears euvolemic on exam.   - Will hold torsemide as concern for sepsis.   - IV fluids judiciously as needed.   - Monitor on telemetry.    - Strict i/o and daily weights.    - Keep K > 4, Mg > 2.    - Monitor renal function with ongoing diuresis.    3) Atrial Fibrillation   - Currently in NSR.   - Continue metoprolol 12.5mg PO BID.  - Not on AC due to advanced age, frail, and risk of falls. And based on family discussion have declined AC   - Continue telemetry monitoring.     4) SIRs   - noted ot have stricture of CBD on MRCP  - Leucocytosis on admission, now resolved  -  Empiric ceftriaxone dc'd. Will watch off abx    Payton NEELY.[facility]  Cardiology/Vascular Cardiology -[facility]   Telephone # [phone]

## 2021-05-02 NOTE — PROGRESS NOTE ADULT - SUBJECTIVE AND OBJECTIVE BOX
[facility] Division of Hospital Medicine    Chief Complaint:  Abd pain     SUBJECTIVE / OVERNIGHT EVENTS:  Pt examined lying in bed  Reports that he had abd pain this am but resolved prior to exam   Pts daughter in agreement with current plan, however does not wish for pt to be informed of potential risks regardless whether minor or major with proposed plan of treatment/w/u  Advised that given wide spectrum of insight and cognitive functioning with dementia capacity varies from issue to issue and pt should be informed of proposed w/u / treatment Daughter verbalizes understanding   Will d/w ethics   Patient denies chest pain, SOB, abd pain, fever, chills, dysuria or any other complaints. All remainder ROS negative.     MEDICATIONS  (STANDING):  albuterol/ipratropium for Nebulization 3 milliLiter(s) Nebulizer every 6 hours  aspirin  chewable 81 milliGRAM(s) Oral daily  budesonide 160 MICROgram(s)/formoterol 4.5 MICROgram(s) Inhaler 2 Puff(s) Inhalation two times a day  dextrose 40% Gel 15 Gram(s) Oral once  dextrose 5%. 1000 milliLiter(s) (50 mL/Hr) IV Continuous <Continuous>  dextrose 5%. 1000 milliLiter(s) (100 mL/Hr) IV Continuous <Continuous>  dextrose 50% Injectable 25 Gram(s) IV Push once  dextrose 50% Injectable 12.5 Gram(s) IV Push once  dextrose 50% Injectable 25 Gram(s) IV Push once  donepezil 5 milliGRAM(s) Oral at bedtime  glucagon  Injectable 1 milliGRAM(s) IntraMuscular once  heparin   Injectable 5000 Unit(s) SubCutaneous every 8 hours  insulin glargine Injectable (LANTUS) 15 Unit(s) SubCutaneous at bedtime  insulin lispro (ADMELOG) corrective regimen sliding scale   SubCutaneous three times a day before meals  insulin lispro (ADMELOG) corrective regimen sliding scale   SubCutaneous at bedtime  insulin lispro Injectable (ADMELOG) 4 Unit(s) SubCutaneous three times a day before meals  levothyroxine 75 MICROGram(s) Oral daily  melatonin 5 milliGRAM(s) Oral at bedtime  memantine 5 milliGRAM(s) Oral daily  metoprolol tartrate 12.5 milliGRAM(s) Oral two times a day  montelukast 10 milliGRAM(s) Oral daily  multivitamin 1 Tablet(s) Oral daily  pantoprazole    Tablet 40 milliGRAM(s) Oral before breakfast  polyethylene glycol 3350 17 Gram(s) Oral daily  senna 2 Tablet(s) Oral at bedtime  tiotropium 18 MICROgram(s) Capsule 1 Capsule(s) Inhalation daily      PHYSICAL EXAM:  Vital Signs Last 24 Hrs  T(C): 36.6 (01 May 2021 05:11), Max: 36.6 (01 May 2021 05:11)  T(F): 97.9 (01 May 2021 05:11), Max: 97.9 (01 May 2021 05:11)  HR: 76 (01 May 2021 15:17) (75 - 80)  BP: 108/70 (01 May 2021 05:11) (108/70 - 108/70)  BP(mean): --  RR: 18 (01 May 2021 05:11) (18 - 18)  SpO2: 98% (01 May 2021 15:17) (95% - 100%)        CONSTITUTIONAL: NAD, Elderly male, well-developed, well-groomed  ENMT: Moist oral mucosa, no pharyngeal injection or exudates; normal dentition  RESPIRATORY: Normal respiratory effort; lungs are clear to auscultation bilaterally  CARDIOVASCULAR: Regular rate and rhythm, normal S1 and S2, no murmur/rub/gallop; No lower extremity edema; Peripheral pulses are 2+ bilaterally  ABDOMEN: Nontender to palpation, normoactive bowel sounds, no rebound/guarding; No hepatosplenomegaly  MUSCLOSKELETAL:  no clubbing or cyanosis of digits; no joint swelling or tenderness to palpation  PSYCH: A+O to person, place. Believes it is Sept 2020; affect appropriate  NEUROLOGY: CN 2-12 are intact and symmetric; no gross sensory deficits;   SKIN: No rashes; no palpable lesions    LABS:                        11.3   8.15  )-----------( 162      ( 01 May 2021 08:21 )             34.6     05-01    142  |  104  |  55.0<H>  ----------------------------<  103<H>  4.1   |  26.0  |  2.02<H>    Ca    9.4      01 May 2021 08:21    TPro  6.9  /  Alb  3.2<L>  /  TBili  0.8  /  DBili  x   /  AST  32  /  ALT  66<H>  /  AlkPhos  383<H>  05-01              CAPILLARY BLOOD GLUCOSE      POCT Blood Glucose.: 157 mg/dL (01 May 2021 11:18)  POCT Blood Glucose.: 119 mg/dL (01 May 2021 08:50)  POCT Blood Glucose.: 116 mg/dL (30 Apr 2021 22:54)        RADIOLOGY & ADDITIONAL TESTS:  5/1/21  No evidence for choledocholithiasis. Mildly dilated common bile duct measures up to 10 mm in caliber with an abrupt transition at the very distal common bile duct. This may represent a common bile duct stricture. A malignant stricture cannot be excluded.  No evidence for gallstone. Nonspecific slightly thickened gallbladder wall.  Nodular contour of the liver, suggestive of cirrhosis. No suspicious lesion is identified.  Apparent chronic focal type B infrarenal abdominal aortic dissection with calcification at the dissection flap, unchanged since the previous abdominal CT dated 10/17/2019.

## 2021-05-02 NOTE — PROGRESS NOTE ADULT - ASSESSMENT
86M with  vascular dementia afib (not on ac), CKD, DM2, COPD presents with fever and worsening mental status found to have sirs criteria. Found to have leucocytosis elevated LFTs.     Abdominal pain and acute metabolic encephalopathy  Suspect may be due to stricture noted on MRCP   Transaminitis now downtrending   Initial AMS now back to baseline   HIDA normal   Leucocytosis on admission, now resolved  Empiric ceftriaxone dc'd. Will watch off abx  Surgery - signed off - no intervention   GI following , may need EUS. Will follow up offical recs       COPD   not in exacerbation   Spiriva singular duoneb    Dementia  Namenda aricept    HFpEF  not in exacerbation currently  echo 10/2019 LVEF  >75  Cardiology consult pending will need clearance prior to procedure   cardio consult pending  Will restart Torsemide 5 mg x 1 today (pt on 20 mg BID, as per daughter pt starts when > 148)    CKD4  appears to be near baseline  avoid nephrotoxic meds   monitor cr  nephro following     Afib  not on ac  Metoprolol    DM2  A1C 8.0%  lantus, iss  monitor fingersticks     DVT Prophylaxis  Heparin SC    Dispo : Pending GI recs, possible EUS 5/4/21  spoke to patient daughter bedside -    86M with  vascular dementia afib (not on ac), CKD, DM2, COPD presents with fever and worsening mental status found to have sirs criteria. Found to have leucocytosis elevated LFTs.     Abdominal pain and acute metabolic encephalopathy  Suspect may be due to stricture noted on MRCP   Transaminitis now downtrending   Initial AMS now back to baseline   HIDA normal   Leucocytosis on admission, now resolved  Empiric ceftriaxone dc'd. Will watch off abx  Surgery - signed off - no intervention   GI following , may need EUS. Will follow up offical recs       COPD   not in exacerbation   Spiriva singular duoneb    Dementia  Namenda aricept    HFpEF  not in exacerbation currently  echo 10/2019 LVEF  >75  Cardiology consult pending will need clearance prior to procedure   cardio consult pending  D/W renal, will f/u recs for resuming Torsemide  (pt on 20 mg BID, as per daughter when pt weight  > 148)    CKD4  appears to be near baseline  avoid nephrotoxic meds   monitor cr  nephro following     Afib  not on ac  Metoprolol    DM2  A1C 8.0%  lantus, iss  monitor fingersticks     DVT Prophylaxis  Heparin SC    Dispo : Pending GI recs, possible EUS 5/4/21  spoke to patient daughter bedside -    86M with  vascular dementia afib (not on ac), CKD, DM2, COPD presents with fever and worsening mental status found to have sirs criteria. Found to have leucocytosis elevated LFTs.     Abdominal pain and acute metabolic encephalopathy  Suspect may be due to stricture noted on MRCP   Transaminitis now downtrending   Initial AMS now back to baseline   HIDA normal   Leucocytosis on admission, now resolved  Empiric ceftriaxone dc'd. Will watch off abx  Surgery - signed off - no intervention   GI f/u appreciated, will need EUS (possible 54/21)  Cardiology consulted for preop clearance     COPD   not in exacerbation   Spiriva singular duoneb    Dementia  Namenda aricept    HFpEF  not in exacerbation currently  echo 10/2019 LVEF  >75  D/W renal, will f/u recs for resuming Torsemide  (pt on 20 mg BID, as per daughter when pt weight  > 148)    CKD4  appears to be near baseline  avoid nephrotoxic meds   monitor cr  nephro following     Afib  not on ac  Metoprolol    DM2  A1C 8.0%  lantus, iss  monitor fingersticks     DVT Prophylaxis  Heparin SC    Dispo : pending  EUS 5/4/21  spoke to patient daughter bedside - will d/w Ethics given daughter (HCP) wishes for pt to not be informed of treatment plan risks

## 2021-05-02 NOTE — PROGRESS NOTE ADULT - SUBJECTIVE AND OBJECTIVE BOX
[place] [facility]                                                               Office: [address] [address]                                                              Telephone: [phone]. Fax:[phone]                                                                             PROGRESS NOTE  Reason for follow up: Perioperative cardiovascular assessment for ERCP   Overnight: No new events.   Update:   MRCP shows abrupt cut of at the distal CBD and plan for /EUS/ERCP     Subjective: "  ___patient was sleeping during the examination ___________________"      	  Vitals:  T(C): 36.6 (05-02-21 @ 13:14), Max: 36.8 (05-01-21 @ 18:18)  HR: 79 (05-02-21 @ 13:14) (70 - 92)  BP: 104/69 (05-02-21 @ 13:14) (104/69 - 121/71)  RR: 18 (05-02-21 @ 13:14) (18 - 18)  SpO2: 95% (05-02-21 @ 13:14) (94% - 99%)  Wt(kg): --  I&O's Summary    Weight (kg): 86.2 (04-27 @ 20:59)      PHYSICAL EXAM:  Appearance: Comfortable. No acute distress  HEENT:  Head and neck: Atraumatic. Normocephalic.  Normal oral mucosa, PERRL, Neck is supple. No JVD, No carotid bruit.   Neurologic: A & O x 3, no focal deficits. EOMI.  Lymphatic: No cervical lymphadenopathy  Cardiovascular: Normal S1 S2, No murmur, rubs/gallops. No JVD, No edema  Respiratory: Lungs clear to auscultation  Gastrointestinal:  Soft, Non-tender, + BS  Lower Extremities: No edema  Psychiatry: Patient is calm. No agitation. Mood & affect appropriate  Skin: No rashes/ ecchymoses/cyanosis/ulcers visualized on the face, hands or feet.      CURRENT MEDICATIONS:  metoprolol tartrate 12.5 milliGRAM(s) Oral two times a day    albuterol/ipratropium for Nebulization  budesonide 160 MICROgram(s)/formoterol 4.5 MICROgram(s) Inhaler  montelukast  tiotropium 18 MICROgram(s) Capsule  donepezil  melatonin  memantine  pantoprazole    Tablet  polyethylene glycol 3350  senna  dextrose 40% Gel  dextrose 50% Injectable  dextrose 50% Injectable  dextrose 50% Injectable  glucagon  Injectable  insulin glargine Injectable (LANTUS)  insulin lispro (ADMELOG) corrective regimen sliding scale  insulin lispro (ADMELOG) corrective regimen sliding scale  insulin lispro Injectable (ADMELOG)  levothyroxine  aspirin  chewable  dextrose 5%.  dextrose 5%.  heparin   Injectable  multivitamin      DIAGNOSTIC TESTING:  [ x] Echocardiogram: TTE 4/9/2021  LVEF 65-70% with normal RV function and size   Sclerotic aortic valve with normal opening   no other significant valvular abnormalities   no evidence of pericardial effusion     [ x]  Catheterization:    [ ] Stress Test:    OTHER: 	      LABS:	 	                            10.7   8.37  )-----------( 158      ( 02 May 2021 08:11 )             34.4     05-02    141  |  105  |  50.0<H>  ----------------------------<  115<H>  4.1   |  27.0  |  2.23<H>    Ca    9.3      02 May 2021 08:11    TPro  6.8  /  Alb  3.1<L>  /  TBili  0.8  /  DBili  0.4<H>  /  AST  28  /  ALT  54<H>  /  AlkPhos  359<H>  05-02    proBNP: Serum Pro-Brain Natriuretic Peptide: 2653 pg/mL (04-29 @ 07:26)    Lipid Profile: Date: 04-27 @ 23:30  Total cholesterol 134; Direct LDL: --; HDL: 50; Triglycerides:106    HgA1c:   TSH: Thyroid Stimulating Hormone, Serum: 0.98 uIU/mL        TELEMETRY: Reviewed  not on telemetry   ECG:  Reviewed by me. 	NSR @ with a 1st degree AV block, T wave inversions inferiorly, anterolaterally

## 2021-05-02 NOTE — PROGRESS NOTE ADULT - SUBJECTIVE AND OBJECTIVE BOX
NEPHROLOGY INTERVAL HPI/OVERNIGHT EVENTS:    Interim noted   feels better   GI follow up noted     MEDICATIONS  (STANDING):  albuterol/ipratropium for Nebulization 3 milliLiter(s) Nebulizer every 6 hours  aspirin  chewable 81 milliGRAM(s) Oral daily  budesonide 160 MICROgram(s)/formoterol 4.5 MICROgram(s) Inhaler 2 Puff(s) Inhalation two times a day  dextrose 40% Gel 15 Gram(s) Oral once  dextrose 5%. 1000 milliLiter(s) (50 mL/Hr) IV Continuous <Continuous>  dextrose 5%. 1000 milliLiter(s) (100 mL/Hr) IV Continuous <Continuous>  dextrose 50% Injectable 25 Gram(s) IV Push once  dextrose 50% Injectable 12.5 Gram(s) IV Push once  dextrose 50% Injectable 25 Gram(s) IV Push once  donepezil 5 milliGRAM(s) Oral at bedtime  glucagon  Injectable 1 milliGRAM(s) IntraMuscular once  heparin   Injectable 5000 Unit(s) SubCutaneous every 8 hours  insulin glargine Injectable (LANTUS) 15 Unit(s) SubCutaneous at bedtime  insulin lispro (ADMELOG) corrective regimen sliding scale   SubCutaneous three times a day before meals  insulin lispro (ADMELOG) corrective regimen sliding scale   SubCutaneous at bedtime  insulin lispro Injectable (ADMELOG) 4 Unit(s) SubCutaneous three times a day before meals  levothyroxine 75 MICROGram(s) Oral daily  melatonin 5 milliGRAM(s) Oral at bedtime  memantine 5 milliGRAM(s) Oral daily  metoprolol tartrate 12.5 milliGRAM(s) Oral two times a day  montelukast 10 milliGRAM(s) Oral daily  multivitamin 1 Tablet(s) Oral daily  pantoprazole    Tablet 40 milliGRAM(s) Oral before breakfast  polyethylene glycol 3350 17 Gram(s) Oral daily  senna 2 Tablet(s) Oral at bedtime  tiotropium 18 MICROgram(s) Capsule 1 Capsule(s) Inhalation daily    MEDICATIONS  (PRN):  bisacodyl Suppository 10 milliGRAM(s) Rectal daily PRN Constipation      Allergies    No Known Allergies    Intolerances      Vital Signs Last 24 Hrs  T(C): 36.8 (02 May 2021 17:16), Max: 36.8 (02 May 2021 17:16)  T(F): 98.2 (02 May 2021 17:16), Max: 98.2 (02 May 2021 17:16)  HR: 87 (02 May 2021 18:08) (70 - 92)  BP: 118/70 (02 May 2021 18:08) (104/69 - 121/71)  BP(mean): --  RR: 18 (02 May 2021 17:16) (18 - 18)  SpO2: 95% (02 May 2021 17:16) (95% - 99%)  Daily     Daily   I&O's Detail    I&O's Summary    GENERAL: feels betetr   EYES: Conjunctiva and sclera clear  ENMT:  Moist mucous membranes; no periorbital edema  NECK: Supple, No JVD  NERVOUS SYSTEM: Lethargic, demented  CHEST/LUNG: Clear bilaterally  HEART: IRR, no rub  ABDOMEN: Soft, NT BS+  EXTREMITIES:  2+ Peripheral Pulses, No LE edema    LABS:                        10.7   8.37  )-----------( 158      ( 02 May 2021 08:11 )             34.4     05-02    141  |  105  |  50.0<H>  ----------------------------<  115<H>  4.1   |  27.0  |  2.23<H>    Ca    9.3      02 May 2021 08:11    TPro  6.8  /  Alb  3.1<L>  /  TBili  0.8  /  DBili  0.4<H>  /  AST  28  /  ALT  54<H>  /  AlkPhos  359<H>  05-02                RADIOLOGY & ADDITIONAL TESTS:

## 2021-05-03 ENCOUNTER — TRANSCRIPTION ENCOUNTER (OUTPATIENT)
Age: 86
End: 2021-05-03

## 2021-05-03 LAB
ALBUMIN SERPL ELPH-MCNC: 3.3 G/DL — SIGNIFICANT CHANGE UP (ref 3.3–5.2)
ALP SERPL-CCNC: 358 U/L — HIGH (ref 40–120)
ALT FLD-CCNC: 50 U/L — HIGH
ANION GAP SERPL CALC-SCNC: 12 MMOL/L — SIGNIFICANT CHANGE UP (ref 5–17)
AST SERPL-CCNC: 30 U/L — SIGNIFICANT CHANGE UP
BILIRUB DIRECT SERPL-MCNC: 0.3 MG/DL — SIGNIFICANT CHANGE UP (ref 0–0.3)
BILIRUB INDIRECT FLD-MCNC: 0.3 MG/DL — SIGNIFICANT CHANGE UP (ref 0.2–1)
BILIRUB SERPL-MCNC: 0.7 MG/DL — SIGNIFICANT CHANGE UP (ref 0.4–2)
BUN SERPL-MCNC: 45 MG/DL — HIGH (ref 8–20)
CALCIUM SERPL-MCNC: 9.7 MG/DL — SIGNIFICANT CHANGE UP (ref 8.6–10.2)
CHLORIDE SERPL-SCNC: 102 MMOL/L — SIGNIFICANT CHANGE UP (ref 98–107)
CO2 SERPL-SCNC: 25 MMOL/L — SIGNIFICANT CHANGE UP (ref 22–29)
CREAT SERPL-MCNC: 2.1 MG/DL — HIGH (ref 0.5–1.3)
CULTURE RESULTS: SIGNIFICANT CHANGE UP
CULTURE RESULTS: SIGNIFICANT CHANGE UP
GLUCOSE BLDC GLUCOMTR-MCNC: 121 MG/DL — HIGH (ref 70–99)
GLUCOSE BLDC GLUCOMTR-MCNC: 141 MG/DL — HIGH (ref 70–99)
GLUCOSE BLDC GLUCOMTR-MCNC: 151 MG/DL — HIGH (ref 70–99)
GLUCOSE BLDC GLUCOMTR-MCNC: 98 MG/DL — SIGNIFICANT CHANGE UP (ref 70–99)
GLUCOSE SERPL-MCNC: 111 MG/DL — HIGH (ref 70–99)
HCT VFR BLD CALC: 32.6 % — LOW (ref 39–50)
HGB BLD-MCNC: 10.4 G/DL — LOW (ref 13–17)
MCHC RBC-ENTMCNC: 29.2 PG — SIGNIFICANT CHANGE UP (ref 27–34)
MCHC RBC-ENTMCNC: 31.9 GM/DL — LOW (ref 32–36)
MCV RBC AUTO: 91.6 FL — SIGNIFICANT CHANGE UP (ref 80–100)
PLATELET # BLD AUTO: 175 K/UL — SIGNIFICANT CHANGE UP (ref 150–400)
POTASSIUM SERPL-MCNC: 4.9 MMOL/L — SIGNIFICANT CHANGE UP (ref 3.5–5.3)
POTASSIUM SERPL-SCNC: 4.9 MMOL/L — SIGNIFICANT CHANGE UP (ref 3.5–5.3)
PROT SERPL-MCNC: 6.9 G/DL — SIGNIFICANT CHANGE UP (ref 6.6–8.7)
RBC # BLD: 3.56 M/UL — LOW (ref 4.2–5.8)
RBC # FLD: 14.6 % — HIGH (ref 10.3–14.5)
SARS-COV-2 RNA SPEC QL NAA+PROBE: SIGNIFICANT CHANGE UP
SODIUM SERPL-SCNC: 139 MMOL/L — SIGNIFICANT CHANGE UP (ref 135–145)
SPECIMEN SOURCE: SIGNIFICANT CHANGE UP
SPECIMEN SOURCE: SIGNIFICANT CHANGE UP
WBC # BLD: 7.67 K/UL — SIGNIFICANT CHANGE UP (ref 3.8–10.5)
WBC # FLD AUTO: 7.67 K/UL — SIGNIFICANT CHANGE UP (ref 3.8–10.5)

## 2021-05-03 PROCEDURE — 99233 SBSQ HOSP IP/OBS HIGH 50: CPT

## 2021-05-03 RX ORDER — INDOMETHACIN 50 MG
100 CAPSULE ORAL ONCE
Refills: 0 | Status: COMPLETED | OUTPATIENT
Start: 2021-05-04 | End: 2021-05-04

## 2021-05-03 RX ADMIN — Medication 81 MILLIGRAM(S): at 11:55

## 2021-05-03 RX ADMIN — HEPARIN SODIUM 5000 UNIT(S): 5000 INJECTION INTRAVENOUS; SUBCUTANEOUS at 13:46

## 2021-05-03 RX ADMIN — HEPARIN SODIUM 5000 UNIT(S): 5000 INJECTION INTRAVENOUS; SUBCUTANEOUS at 06:42

## 2021-05-03 RX ADMIN — Medication 3 MILLILITER(S): at 15:15

## 2021-05-03 RX ADMIN — DONEPEZIL HYDROCHLORIDE 5 MILLIGRAM(S): 10 TABLET, FILM COATED ORAL at 23:06

## 2021-05-03 RX ADMIN — Medication 3 MILLILITER(S): at 20:04

## 2021-05-03 RX ADMIN — POLYETHYLENE GLYCOL 3350 17 GRAM(S): 17 POWDER, FOR SOLUTION ORAL at 11:55

## 2021-05-03 RX ADMIN — Medication 3 MILLILITER(S): at 09:15

## 2021-05-03 RX ADMIN — PANTOPRAZOLE SODIUM 40 MILLIGRAM(S): 20 TABLET, DELAYED RELEASE ORAL at 06:42

## 2021-05-03 RX ADMIN — Medication 12.5 MILLIGRAM(S): at 06:42

## 2021-05-03 RX ADMIN — Medication 5 MILLIGRAM(S): at 23:06

## 2021-05-03 RX ADMIN — Medication 75 MICROGRAM(S): at 06:42

## 2021-05-03 RX ADMIN — Medication 3 MILLILITER(S): at 01:16

## 2021-05-03 RX ADMIN — MEMANTINE HYDROCHLORIDE 5 MILLIGRAM(S): 10 TABLET ORAL at 11:55

## 2021-05-03 RX ADMIN — Medication 4 UNIT(S): at 08:04

## 2021-05-03 RX ADMIN — SENNA PLUS 2 TABLET(S): 8.6 TABLET ORAL at 23:06

## 2021-05-03 RX ADMIN — MONTELUKAST 10 MILLIGRAM(S): 4 TABLET, CHEWABLE ORAL at 11:55

## 2021-05-03 RX ADMIN — Medication 1 TABLET(S): at 11:55

## 2021-05-03 RX ADMIN — INSULIN GLARGINE 15 UNIT(S): 100 INJECTION, SOLUTION SUBCUTANEOUS at 23:06

## 2021-05-03 RX ADMIN — Medication 4 UNIT(S): at 16:26

## 2021-05-03 RX ADMIN — Medication 4 UNIT(S): at 11:54

## 2021-05-03 NOTE — PROGRESS NOTE ADULT - SUBJECTIVE AND OBJECTIVE BOX
Patient is a 86y old  Male who presents with a chief complaint of sirs, weakness (02 May 2021 21:00)      HPI:  86M hx vascular dementia afib (not on ac), CKD, DM2, COPD -  Patient seen with hospitalist . Daughter is at bedside. NO abdominal pain , no fevers. LFT improving     REVIEW OF SYSTEMS:  Constitutional: No fever, weight loss or fatigue  ENMT:  No difficulty hearing, tinnitus, vertigo; No sinus or throat pain  Respiratory: No cough, wheezing, chills or hemoptysis  Cardiovascular: No chest pain, palpitations, dizziness or leg swelling  Gastrointestinal: No abdominal or epigastric pain. No nausea, vomiting or hematemesis; No diarrhea or constipation. No melena or hematochezia.  Skin: No itching, burning, rashes or lesions   Musculoskeletal: No joint pain or swelling; No muscle, back or extremity pain    PAST MEDICAL & SURGICAL HISTORY:  DM (diabetes mellitus)    Afib    Dementia    COPD (chronic obstructive pulmonary disease)    CKD (chronic kidney disease)    S/P carotid endarterectomy    History of lung biopsy        FAMILY HISTORY:  No pertinent family history in first degree relatives        SOCIAL HISTORY:  Smoking Status: [ ] Current, [ ] Former, [ ] Never  Pack Years:  [  ] EtOH-no  [  ] IVDA    MEDICATIONS:  MEDICATIONS  (STANDING):  albuterol/ipratropium for Nebulization 3 milliLiter(s) Nebulizer every 6 hours  aspirin  chewable 81 milliGRAM(s) Oral daily  budesonide 160 MICROgram(s)/formoterol 4.5 MICROgram(s) Inhaler 2 Puff(s) Inhalation two times a day  dextrose 40% Gel 15 Gram(s) Oral once  dextrose 5%. 1000 milliLiter(s) (50 mL/Hr) IV Continuous <Continuous>  dextrose 5%. 1000 milliLiter(s) (100 mL/Hr) IV Continuous <Continuous>  dextrose 50% Injectable 25 Gram(s) IV Push once  dextrose 50% Injectable 12.5 Gram(s) IV Push once  dextrose 50% Injectable 25 Gram(s) IV Push once  donepezil 5 milliGRAM(s) Oral at bedtime  glucagon  Injectable 1 milliGRAM(s) IntraMuscular once  heparin   Injectable 5000 Unit(s) SubCutaneous every 8 hours  insulin glargine Injectable (LANTUS) 15 Unit(s) SubCutaneous at bedtime  insulin lispro (ADMELOG) corrective regimen sliding scale   SubCutaneous three times a day before meals  insulin lispro (ADMELOG) corrective regimen sliding scale   SubCutaneous at bedtime  insulin lispro Injectable (ADMELOG) 4 Unit(s) SubCutaneous three times a day before meals  levothyroxine 75 MICROGram(s) Oral daily  melatonin 5 milliGRAM(s) Oral at bedtime  memantine 5 milliGRAM(s) Oral daily  metoprolol tartrate 12.5 milliGRAM(s) Oral two times a day  montelukast 10 milliGRAM(s) Oral daily  multivitamin 1 Tablet(s) Oral daily  pantoprazole    Tablet 40 milliGRAM(s) Oral before breakfast  polyethylene glycol 3350 17 Gram(s) Oral daily  senna 2 Tablet(s) Oral at bedtime  tiotropium 18 MICROgram(s) Capsule 1 Capsule(s) Inhalation daily    MEDICATIONS  (PRN):  bisacodyl Suppository 10 milliGRAM(s) Rectal daily PRN Constipation      Allergies    No Known Allergies    Intolerances        Vital Signs Last 24 Hrs  T(C): 36.9 (03 May 2021 05:19), Max: 37.2 (02 May 2021 21:58)  T(F): 98.5 (03 May 2021 05:19), Max: 98.9 (02 May 2021 21:58)  HR: 74 (03 May 2021 09:17) (68 - 87)  BP: 116/69 (03 May 2021 05:19) (104/69 - 118/70)  BP(mean): --  RR: 19 (03 May 2021 05:19) (18 - 19)  SpO2: 100% (03 May 2021 09:17) (95% - 100%)        PHYSICAL EXAM:    General: Well developed; well nourished; in no acute distress  HEENT: MMM, conjunctiva and sclera clear  H- RRR  L- CTA   Gastrointestinal: Soft, non-tender non-distended; Normal bowel sounds; No rebound or guarding  Extremities: Normal range of motion, No clubbing, cyanosis or edema  Neurological: Alert and oriented x3  Skin: Warm and dry. No obvious rash      LABS:                        10.4   7.67  )-----------( 175      ( 03 May 2021 07:42 )             32.6     03 May 2021 07:42    139    |  102    |  45.0   ----------------------------<  111    4.9     |  25.0   |  2.10     Ca    9.7        03 May 2021 07:42    TPro  6.9    /  Alb  3.3    /  TBili  0.7    /  DBili  0.3    /  AST  30     /  ALT  50     /  AlkPhos  358    / Amylase x      /Lipase x      03 May 2021 07:42              RADIOLOGY & ADDITIONAL STUDIES:    < from: MR MRCP No Cont (05.01.21 @ 08:27) >  IMPRESSION:  No evidence for choledocholithiasis. Mildly dilated common bile duct measures up to 10 mm in caliber with an abrupt transition at the very distal common bile duct. This may represent a common bile duct stricture. A malignant stricture cannot be excluded.    No evidence for gallstone. Nonspecific slightly thickened gallbladder wall.    Nodular contour of the liver, suggestive of cirrhosis. No suspicious lesion is identified.    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    < end of copied text >

## 2021-05-03 NOTE — DIETITIAN INITIAL EVALUATION ADULT. - ETIOLOGY
related to inability to tolerate sufficient protein-energy needs 2/2 abdominal pain due to CBD stricture, poss gastroparesis, worsening mental status 2/2 dementia, COPD

## 2021-05-03 NOTE — PROGRESS NOTE ADULT - ASSESSMENT
CKD(IV): febrile illness, elevated LFTs  Renal function stable   - avoid potential nephrotoxins  - Torsemide on hold for now   - antibiotics as per ID; completed  - No hydro on CT abdomen 4/28   - Clinically feels better   - GI and ID ( signed off 4/30 )  follow up noted

## 2021-05-03 NOTE — PROGRESS NOTE ADULT - SUBJECTIVE AND OBJECTIVE BOX
NEPHROLOGY INTERVAL HPI/OVERNIGHT EVENTS:    Interim noted   Comfortable   GI follow up noted   Daughter at bedside     MEDICATIONS  (STANDING):  albuterol/ipratropium for Nebulization 3 milliLiter(s) Nebulizer every 6 hours  aspirin  chewable 81 milliGRAM(s) Oral daily  budesonide 160 MICROgram(s)/formoterol 4.5 MICROgram(s) Inhaler 2 Puff(s) Inhalation two times a day  dextrose 40% Gel 15 Gram(s) Oral once  dextrose 5%. 1000 milliLiter(s) (50 mL/Hr) IV Continuous <Continuous>  dextrose 5%. 1000 milliLiter(s) (100 mL/Hr) IV Continuous <Continuous>  dextrose 50% Injectable 25 Gram(s) IV Push once  dextrose 50% Injectable 12.5 Gram(s) IV Push once  dextrose 50% Injectable 25 Gram(s) IV Push once  donepezil 5 milliGRAM(s) Oral at bedtime  glucagon  Injectable 1 milliGRAM(s) IntraMuscular once  heparin   Injectable 5000 Unit(s) SubCutaneous every 8 hours  insulin glargine Injectable (LANTUS) 15 Unit(s) SubCutaneous at bedtime  insulin lispro (ADMELOG) corrective regimen sliding scale   SubCutaneous three times a day before meals  insulin lispro (ADMELOG) corrective regimen sliding scale   SubCutaneous at bedtime  insulin lispro Injectable (ADMELOG) 4 Unit(s) SubCutaneous three times a day before meals  levothyroxine 75 MICROGram(s) Oral daily  melatonin 5 milliGRAM(s) Oral at bedtime  memantine 5 milliGRAM(s) Oral daily  metoprolol tartrate 12.5 milliGRAM(s) Oral two times a day  montelukast 10 milliGRAM(s) Oral daily  multivitamin 1 Tablet(s) Oral daily  pantoprazole    Tablet 40 milliGRAM(s) Oral before breakfast  polyethylene glycol 3350 17 Gram(s) Oral daily  senna 2 Tablet(s) Oral at bedtime  tiotropium 18 MICROgram(s) Capsule 1 Capsule(s) Inhalation daily    MEDICATIONS  (PRN):  bisacodyl Suppository 10 milliGRAM(s) Rectal daily PRN Constipation      Allergies    No Known Allergies    Intolerances        Vital Signs Last 24 Hrs  T(C): 36.3 (03 May 2021 11:10), Max: 37.2 (02 May 2021 21:58)  T(F): 97.4 (03 May 2021 11:10), Max: 98.9 (02 May 2021 21:58)  HR: 65 (03 May 2021 11:10) (65 - 87)  BP: 97/65 (03 May 2021 11:10) (97/65 - 118/70)  BP(mean): --  RR: 18 (03 May 2021 11:10) (18 - 19)  SpO2: 95% (03 May 2021 11:10) (95% - 100%)  Daily     Daily   I&O's Detail    I&O's Summary      PHYSICAL EXAM:  EYES: Conjunctiva and sclera clear  ENMT:  Moist mucous membranes; no periorbital edema  NECK: Supple, No JVD  NERVOUS SYSTEM: Lethargic, demented  CHEST/LUNG: Clear bilaterally  HEART: IRR, no rub  ABDOMEN: Soft, NT BS+  EXTREMITIES:  2+ Peripheral Pulses, No LE edema    LABS:                        10.4   7.67  )-----------( 175      ( 03 May 2021 07:42 )             32.6     05-03    139  |  102  |  45.0<H>  ----------------------------<  111<H>  4.9   |  25.0  |  2.10<H>    Ca    9.7      03 May 2021 07:42    TPro  6.9  /  Alb  3.3  /  TBili  0.7  /  DBili  0.3  /  AST  30  /  ALT  50<H>  /  AlkPhos  358<H>  05-03                RADIOLOGY & ADDITIONAL TESTS:

## 2021-05-03 NOTE — PROGRESS NOTE ADULT - TIME BILLING
reviewed lad, notes and mri images
Reviewed MRCP images, labs  answered  daughter's questions
reviewed MRCP images, labs, notes -  long discussion with daughter and wife regarding the plan

## 2021-05-03 NOTE — DIETITIAN NUTRITION RISK NOTIFICATION - TREATMENT: THE FOLLOWING DIET HAS BEEN RECOMMENDED
Diet, NPO after Midnight:      NPO Start Date: 03-May-2021,   NPO Start Time: 23:59 (05-03-21 @ 11:02) [Active]  Diet, Consistent Carbohydrate w/Evening Snack:   DASH/TLC {Sodium & Cholesterol Restricted} (DASH)  Supplement Feeding Modality:  Oral  Glucerna Shake Cans or Servings Per Day:  1       Frequency:  Three Times a day (04-28-21 @ 11:09) [Active]

## 2021-05-03 NOTE — DIETITIAN INITIAL EVALUATION ADULT. - PERTINENT LABORATORY DATA
05-03 Na139 mmol/L Glu 111 mg/dL<H> K+ 4.9 mmol/L Cr  2.10 mg/dL<H> BUN 45.0 mg/dL<H> Phos n/a   Alb 3.3 g/dL PAB n/a

## 2021-05-03 NOTE — PROGRESS NOTE ADULT - PROBLEM SELECTOR PLAN 2
Abrupt cut off of the distal CBD. Unlikely this is tumor if LFT better. Will discuss EUS/ERCp with Dr Horner who will be in [place]
-- will likely need EUS/ERCP will discuss case with Dr Horner  ( he will be hospital Tuesday I believe)  will need cardiac clearance
- abrupt cut off in the distal CBD. LFT improving. Likely CBD stone  will schedule for EUS/ERCP tomorrow  - case discussed with Dr Horner and Dr Simmons

## 2021-05-03 NOTE — DIETITIAN INITIAL EVALUATION ADULT. - SIGNS/SYMPTOMS
as evidenced by 46lb (24%) wt loss within a year, sev musce/fat loss, meet <75%EER >1mo,1+ gen edema

## 2021-05-03 NOTE — PROGRESS NOTE ADULT - ASSESSMENT
86M with  vascular dementia afib (not on ac), CKD, DM2, COPD presents with fever and worsening mental status found to have sirs criteria. Found to have leucocytosis elevated LFTs.     Abdominal pain and acute metabolic encephalopathy  Suspect may be due to stricture noted on MRCP   Transaminitis now downtrending   Initial AMS now back to baseline   HIDA normal   Leucocytosis on admission, now resolved  Empiric ceftriaxone dc'd. Will watch off abx  Surgery - signed off - no intervention   GI f/u appreciated, will need EUS (possible tomorrow)  Cardiology eval appreciated     COPD   not in exacerbation   Spiriva singular duoneb    Dementia  Namenda aricept    HFpEF  not in exacerbation currently  echo 10/2019 LVEF  >75  D/W renal, will f/u recs for resuming Torsemide  (pt on 20 mg BID, as per daughter when pt weight  > 148)    CKD4  Improved uremia  avoid nephrotoxic meds   monitor cr  nephro following     Afib  not on ac  Metoprolol    DM2  A1C 8.0%  lantus, iss  monitor fngersticks     DVT Prophylaxis  Heparin SC    Dispo : pending  EUS/ERCP in am  spoke to patient daughter bedside

## 2021-05-03 NOTE — DIETITIAN INITIAL EVALUATION ADULT. - OTHER INFO
86M hx vascular dementia afib (not on ac), CKD, DM2, COPD presents with fever and worsening mental status. Daughter reports yesterday pt was in normal state of health and went to cardiologist appointment. Following appointment pt took a nap and was difficult to wake up afterwards. He was found to have a fever, weak, lethargic and mildly confused. Daughter states pt has been being worked up for chronic abdominal pain and vomiting by GI. He was recently started on metoclopramide as thought 2/2 gastroparesis. Pt tolerating diet in house though with minimal PO intake per daughter. Aware outpatient work up being done for possible gastroparesis vs. CBD stricture (likely stone per GI), EUS planned (5/4).

## 2021-05-03 NOTE — DIETITIAN INITIAL EVALUATION ADULT. - ORAL INTAKE PTA/DIET HISTORY
Per daughter at bedside, Pt eats a lot of cold cuts at canned vegetables at home, high fat foods frequently. Per daughter Pt UBW 6mo-1 year ago 190lbs, current weight 144lbs PTA, 46lb weight loss noted 2/2 fluid accumulation and decreased appetite/PO intake. Pt takes Glucerna TID at home. Daughter requested nutrition education for gastroparesis/low fat diet for family, verbal and written literature provided. Provided resources for outpatient RD follow-up.

## 2021-05-03 NOTE — PROGRESS NOTE ADULT - SUBJECTIVE AND OBJECTIVE BOX
[facility] Division of Hospital Medicine    Chief Complaint:  Abd pain     SUBJECTIVE / OVERNIGHT EVENTS:  Pt examined lying in bed  Large BM today  D/W GI, planned for Eus/ERCP in am  Patient denies chest pain, SOB, abd pain, fever, chills, dysuria or any other complaints. All remainder ROS negative.     MEDICATIONS  (STANDING):  albuterol/ipratropium for Nebulization 3 milliLiter(s) Nebulizer every 6 hours  aspirin  chewable 81 milliGRAM(s) Oral daily  budesonide 160 MICROgram(s)/formoterol 4.5 MICROgram(s) Inhaler 2 Puff(s) Inhalation two times a day  dextrose 40% Gel 15 Gram(s) Oral once  dextrose 5%. 1000 milliLiter(s) (50 mL/Hr) IV Continuous <Continuous>  dextrose 5%. 1000 milliLiter(s) (100 mL/Hr) IV Continuous <Continuous>  dextrose 50% Injectable 25 Gram(s) IV Push once  dextrose 50% Injectable 12.5 Gram(s) IV Push once  dextrose 50% Injectable 25 Gram(s) IV Push once  donepezil 5 milliGRAM(s) Oral at bedtime  glucagon  Injectable 1 milliGRAM(s) IntraMuscular once  heparin   Injectable 5000 Unit(s) SubCutaneous every 8 hours  insulin glargine Injectable (LANTUS) 15 Unit(s) SubCutaneous at bedtime  insulin lispro (ADMELOG) corrective regimen sliding scale   SubCutaneous three times a day before meals  insulin lispro (ADMELOG) corrective regimen sliding scale   SubCutaneous at bedtime  insulin lispro Injectable (ADMELOG) 4 Unit(s) SubCutaneous three times a day before meals  levothyroxine 75 MICROGram(s) Oral daily  melatonin 5 milliGRAM(s) Oral at bedtime  memantine 5 milliGRAM(s) Oral daily  metoprolol tartrate 12.5 milliGRAM(s) Oral two times a day  montelukast 10 milliGRAM(s) Oral daily  multivitamin 1 Tablet(s) Oral daily  pantoprazole    Tablet 40 milliGRAM(s) Oral before breakfast  polyethylene glycol 3350 17 Gram(s) Oral daily  senna 2 Tablet(s) Oral at bedtime  tiotropium 18 MICROgram(s) Capsule 1 Capsule(s) Inhalation daily      PHYSICAL EXAM:  Vital Signs Last 24 Hrs  T(C): 36.4 (03 May 2021 17:06), Max: 37.2 (02 May 2021 21:58)  T(F): 97.6 (03 May 2021 17:06), Max: 98.9 (02 May 2021 21:58)  HR: 78 (03 May 2021 17:06) (65 - 87)  BP: 98/60 (03 May 2021 16:57) (97/65 - 118/70)  BP(mean): --  RR: 18 (03 May 2021 17:06) (18 - 19)  SpO2: 91% (03 May 2021 17:06) (91% - 100%)    CONSTITUTIONAL: NAD, Elderly male, well-developed, well-groomed  ENMT: Moist oral mucosa, no pharyngeal injection or exudates; normal dentition  RESPIRATORY: Normal respiratory effort; lungs are clear to auscultation bilaterally  CARDIOVASCULAR: Regular rate and rhythm, normal S1 and S2, no murmur/rub/gallop; No lower extremity edema; Peripheral pulses are 2+ bilaterally  ABDOMEN: Nontender to palpation, normoactive bowel sounds, no rebound/guarding; No hepatosplenomegaly  MUSCLOSKELETAL:  no clubbing or cyanosis of digits; no joint swelling or tenderness to palpation  PSYCH: A+O to person, place only; affect appropriate  NEUROLOGY: CN 2-12 are intact and symmetric; no gross sensory deficits;   SKIN: No rashes; no palpable lesions    LABS:                                   10.4   7.67  )-----------( 175      ( 03 May 2021 07:42 )             32.6   05-03    139  |  102  |  45.0<H>  ----------------------------<  111<H>  4.9   |  25.0  |  2.10<H>    Ca    9.7      03 May 2021 07:42    TPro  6.9  /  Alb  3.3  /  TBili  0.7  /  DBili  0.3  /  AST  30  /  ALT  50<H>  /  AlkPhos  358<H>  05-03              CAPILLARY BLOOD GLUCOSE      POCT Blood Glucose.: 157 mg/dL (01 May 2021 11:18)  POCT Blood Glucose.: 119 mg/dL (01 May 2021 08:50)  POCT Blood Glucose.: 116 mg/dL (30 Apr 2021 22:54)        RADIOLOGY & ADDITIONAL TESTS:  5/1/21  No evidence for choledocholithiasis. Mildly dilated common bile duct measures up to 10 mm in caliber with an abrupt transition at the very distal common bile duct. This may represent a common bile duct stricture. A malignant stricture cannot be excluded.  No evidence for gallstone. Nonspecific slightly thickened gallbladder wall.  Nodular contour of the liver, suggestive of cirrhosis. No suspicious lesion is identified.  Apparent chronic focal type B infrarenal abdominal aortic dissection with calcification at the dissection flap, unchanged since the previous abdominal CT dated 10/17/2019.

## 2021-05-03 NOTE — DIETITIAN INITIAL EVALUATION ADULT. - ADD RECOMMEND
Encourage small, frequent meals; Continue Glucerna TID and MVI supplementation daily; RD to remain available.

## 2021-05-04 ENCOUNTER — RESULT REVIEW (OUTPATIENT)
Age: 86
End: 2021-05-04

## 2021-05-04 LAB
ALBUMIN SERPL ELPH-MCNC: 3.6 G/DL — SIGNIFICANT CHANGE UP (ref 3.3–5.2)
ALP SERPL-CCNC: 360 U/L — HIGH (ref 40–120)
ALT FLD-CCNC: 44 U/L — HIGH
ANION GAP SERPL CALC-SCNC: 10 MMOL/L — SIGNIFICANT CHANGE UP (ref 5–17)
APTT BLD: 31.3 SEC — SIGNIFICANT CHANGE UP (ref 27.5–35.5)
AST SERPL-CCNC: 30 U/L — SIGNIFICANT CHANGE UP
BILIRUB DIRECT SERPL-MCNC: 0.3 MG/DL — SIGNIFICANT CHANGE UP (ref 0–0.3)
BILIRUB INDIRECT FLD-MCNC: 0.3 MG/DL — SIGNIFICANT CHANGE UP (ref 0.2–1)
BILIRUB SERPL-MCNC: 0.7 MG/DL — SIGNIFICANT CHANGE UP (ref 0.4–2)
BUN SERPL-MCNC: 46 MG/DL — HIGH (ref 8–20)
CALCIUM SERPL-MCNC: 9.4 MG/DL — SIGNIFICANT CHANGE UP (ref 8.6–10.2)
CHLORIDE SERPL-SCNC: 103 MMOL/L — SIGNIFICANT CHANGE UP (ref 98–107)
CO2 SERPL-SCNC: 25 MMOL/L — SIGNIFICANT CHANGE UP (ref 22–29)
CREAT SERPL-MCNC: 2.06 MG/DL — HIGH (ref 0.5–1.3)
GLUCOSE BLDC GLUCOMTR-MCNC: 124 MG/DL — HIGH (ref 70–99)
GLUCOSE BLDC GLUCOMTR-MCNC: 210 MG/DL — HIGH (ref 70–99)
GLUCOSE BLDC GLUCOMTR-MCNC: 337 MG/DL — HIGH (ref 70–99)
GLUCOSE SERPL-MCNC: 107 MG/DL — HIGH (ref 70–99)
HCT VFR BLD CALC: 34.7 % — LOW (ref 39–50)
HGB BLD-MCNC: 11.1 G/DL — LOW (ref 13–17)
INR BLD: 0.97 RATIO — SIGNIFICANT CHANGE UP (ref 0.88–1.16)
MCHC RBC-ENTMCNC: 29.3 PG — SIGNIFICANT CHANGE UP (ref 27–34)
MCHC RBC-ENTMCNC: 32 GM/DL — SIGNIFICANT CHANGE UP (ref 32–36)
MCV RBC AUTO: 91.6 FL — SIGNIFICANT CHANGE UP (ref 80–100)
PLATELET # BLD AUTO: 193 K/UL — SIGNIFICANT CHANGE UP (ref 150–400)
POTASSIUM SERPL-MCNC: 5.3 MMOL/L — SIGNIFICANT CHANGE UP (ref 3.5–5.3)
POTASSIUM SERPL-SCNC: 5.3 MMOL/L — SIGNIFICANT CHANGE UP (ref 3.5–5.3)
PROT SERPL-MCNC: 7 G/DL — SIGNIFICANT CHANGE UP (ref 6.6–8.7)
PROTHROM AB SERPL-ACNC: 11.3 SEC — SIGNIFICANT CHANGE UP (ref 10.6–13.6)
RBC # BLD: 3.79 M/UL — LOW (ref 4.2–5.8)
RBC # FLD: 14.6 % — HIGH (ref 10.3–14.5)
SODIUM SERPL-SCNC: 138 MMOL/L — SIGNIFICANT CHANGE UP (ref 135–145)
WBC # BLD: 8.1 K/UL — SIGNIFICANT CHANGE UP (ref 3.8–10.5)
WBC # FLD AUTO: 8.1 K/UL — SIGNIFICANT CHANGE UP (ref 3.8–10.5)

## 2021-05-04 PROCEDURE — 99232 SBSQ HOSP IP/OBS MODERATE 35: CPT | Mod: 25

## 2021-05-04 PROCEDURE — 43264 ERCP REMOVE DUCT CALCULI: CPT

## 2021-05-04 PROCEDURE — 88305 TISSUE EXAM BY PATHOLOGIST: CPT | Mod: 26

## 2021-05-04 PROCEDURE — 43259 EGD US EXAM DUODENUM/JEJUNUM: CPT | Mod: 59

## 2021-05-04 PROCEDURE — 99232 SBSQ HOSP IP/OBS MODERATE 35: CPT

## 2021-05-04 PROCEDURE — 88342 IMHCHEM/IMCYTCHM 1ST ANTB: CPT | Mod: 26

## 2021-05-04 RX ORDER — METOCLOPRAMIDE HCL 10 MG
10 TABLET ORAL
Refills: 0 | Status: DISCONTINUED | OUTPATIENT
Start: 2021-05-04 | End: 2021-05-05

## 2021-05-04 RX ADMIN — Medication 10 MILLIGRAM(S): at 21:05

## 2021-05-04 RX ADMIN — PANTOPRAZOLE SODIUM 40 MILLIGRAM(S): 20 TABLET, DELAYED RELEASE ORAL at 05:30

## 2021-05-04 RX ADMIN — INSULIN GLARGINE 15 UNIT(S): 100 INJECTION, SOLUTION SUBCUTANEOUS at 21:06

## 2021-05-04 RX ADMIN — Medication 3 MILLILITER(S): at 14:50

## 2021-05-04 RX ADMIN — MEMANTINE HYDROCHLORIDE 5 MILLIGRAM(S): 10 TABLET ORAL at 13:38

## 2021-05-04 RX ADMIN — Medication 75 MICROGRAM(S): at 05:29

## 2021-05-04 RX ADMIN — Medication 12.5 MILLIGRAM(S): at 21:03

## 2021-05-04 RX ADMIN — SENNA PLUS 2 TABLET(S): 8.6 TABLET ORAL at 21:05

## 2021-05-04 RX ADMIN — Medication 3 MILLILITER(S): at 21:30

## 2021-05-04 RX ADMIN — DONEPEZIL HYDROCHLORIDE 5 MILLIGRAM(S): 10 TABLET, FILM COATED ORAL at 21:05

## 2021-05-04 RX ADMIN — Medication 100 MILLIGRAM(S): at 18:43

## 2021-05-04 RX ADMIN — Medication 5 MILLIGRAM(S): at 21:05

## 2021-05-04 RX ADMIN — Medication 4: at 21:07

## 2021-05-04 RX ADMIN — Medication 4: at 16:14

## 2021-05-04 RX ADMIN — Medication 3 MILLILITER(S): at 08:12

## 2021-05-04 NOTE — CONSULT NOTE ADULT - ASSESSMENT
Recommendation:    Ethics commends the team for their virtue in the care and support of Mr. Lopez. In this case, a patient without capacity does retain the right to refuse interventions. His autonomy is protected by his daughter/HCP. Adhering to the principle of nonmaleficence, we must carefully balance truth telling with not causing unnecessary stress to the patient. Thank you for this challenging case.   Ethics Service will remain available for further conversation about the patient’s current condition and decision-points that may occur.     CASE DISCUSSED with ATTENDING: CARLOS FOX, MAIRA, SHIRIN-C (Director of Medical Ethics)  CASE REPORT WRITTEN BY: QUETA MCCULLOUGH MD, MS, MPH, [facility]    More than 50% of the time of this consultation was spent in coordination of Care of Patient.  REFERENCES  1 Srinath TL & Maryann JF. Principles of Biomedical Ethics. [place]: [employer]; 2019. 2 [place] Pub Health law § 2981 3 [facility]. "Trust and Distrust in Professional Ethics" in [place], Chente MAKI & Whit HOLLAND. Ethics, Trust, and the Professions: Philosophical and Cultural Aspects. [place], [place]: [employer]; 1991. 4 Diane EK, [place] H, et al. Surviving Surrogate Decision-Making: What Helps and Hampers the Experience of Making Medical Decisions for Others. Soc Gen Int Med 2007;22:8206-3559.    5 AILYN Giron. (1972). The tactics and ethics of persuasion. Attitudes, conflict, and social change, 81-99.

## 2021-05-04 NOTE — PROGRESS NOTE ADULT - ASSESSMENT
86M with  vascular dementia afib (not on ac), CKD, DM2, COPD presents with fever and worsening mental status found to have sirs criteria. Found to have leucocytosis elevated LFTs.     Abdominal pain and acute metabolic encephalopathy  Suspect may be due to stricture noted on MRCP   Transaminitis improving  Leucocytosis on admission, now resolved  Empiric ceftriaxone dc'd. Will watch off abx  Surgery - signed off - no intervention   HIDA normal   EGD/EUS and ERCP :  s/p sphincterotomy with stone retrieval     COPD   not in exacerbation   Spiriva singular duoneb    Dementia  Namenda aricept    HFpEF  not in exacerbation currently  echo 10/2019 LVEF  >75  D/W renal, will f/u recs for resuming Torsemide  (pt on 20 mg BID, as per daughter when pt weight  > 148)    CKD4  Improved uremia  avoid nephrotoxic meds   monitor cr  nephro following     Afib  not on ac  Metoprolol    DM2  A1C 8.0%  lantus, iss  monitor fngersticks     DVT Prophylaxis  Heparin SC    Dispo : possible home tomorrow   spoke to patient daughter bedside multiple times  86M with  vascular dementia afib (not on ac), CKD, DM2, COPD presents with fever and worsening mental status found to have sirs criteria. Found to have leucocytosis elevated LFTs.     Abdominal pain and acute metabolic encephalopathy  Suspect may be due to stricture noted on MRCP   Transaminitis improving  Leucocytosis on admission, now resolved  Empiric ceftriaxone dc'd. Will watch off abx  Surgery - signed off - no intervention   HIDA normal   EGD/EUS and ERCP :  s/p sphincterotomy with stone retrieval     Decreased stomach emptying  ? gastroparesis  Given stomach content noted on EGD   Will start Metoclopramide 10 mg pre meal and QHS    COPD   not in exacerbation   Spiriva singular duoneb    Dementia  Namenda aricept    HFpEF  not in exacerbation currently  echo 10/2019 LVEF  >75  D/W renal, will f/u recs for resuming Torsemide  (pt on 20 mg BID, as per daughter when pt weight  > 148)    CKD4  Improved uremia  avoid nephrotoxic meds   monitor cr  nephro following     Afib  not on ac  Metoprolol    DM2  A1C 8.0%  lantus, iss  monitor fngersticks     DVT Prophylaxis  Heparin SC    Dispo : possible home tomorrow   spoke to patient daughter bedside multiple times

## 2021-05-04 NOTE — CONSULT NOTE ADULT - CONSULT REASON
Assist the team in ethical dilemma posed by an 86 year old man with complex medical history including vascular dementia who presented with fever and abdominal pain, regarding decision making.
SIRS
r/o acute cholecystitis
CKD
Abnormal liver chemistries
CHF

## 2021-05-04 NOTE — CONSULT NOTE ADULT - SUBJECTIVE AND OBJECTIVE BOX
[facility]                                                               Office:  [address] [address]                                                              Telephone: [phone]. Fax:[phone]                                                                        CARDIOLOGY CONSULTATION NOTE                                                                                             Consult requested by:  Dr. Villegas  Reason for Consultation: CHF  History obtained by: Patient and medical record   obtained: No    Covid Status: Negative 21    Chief complaint:    Patient is a 86y old  Male who presents with a chief complaint of sirs, weakness (2021 08:16)      HPI: 85 y/o M with a PMHx of AFib (not on AC), Vascular dementia, CKD, DMII, COPD, and HFpEF who presented to the ER with complaints of AMS and fever. Patient's daughter Felicita at bedside who states that he went to see Dr. Wilkes yesterday, and everything appeared well. Following the appointment and a nap, he woke up with a fever of 102, was weak, confused, and shaking. Patient was complaining of some severe abdominal pain yesterday. Patient with normal lactate, and CT with some gallbladder thickening. Patient was evaluated by surgery, no consideration of acute cholecystitis. Patient is currently resting comfortably, no complaints. Patient denies fevers, chills, CP, SOB, orthopnea, N/V/D, headache, or dizziness.     REVIEW OF SYMPTOMS:     CONSTITUTIONAL: No fever, weight loss, or fatigue  ENMT:  No difficulty hearing, tinnitus, vertigo; No sinus or throat pain  NECK: No pain or stiffness  CARDIOVASCULAR: AS PER HPI  RESPIRATORY: No Dyspnea on exertion, Shortness of breath, cough, wheezing  : No dysuria, no hematuria   GI: AS PER HPI  NEURO: No headache, no dizziness, no slurred speech   MUSCULOSKELETAL: No joint pain or swelling; No muscle, back, or extremity pain  PSYCH: No agitation, no anxiety.    ALL OTHER REVIEW OF SYSTEMS ARE NEGATIVE.      PREVIOUS DIAGNOSTIC TESTING  ECHO FINDINGS:  < from: TTE Echo Complete w/Doppler (10.19.19 @ 14:45) >  PHYSICIAN INTERPRETATION:  Left Ventricle: The left ventricular internal cavity size is normal.  Global LV systolic function was hyperdynamic. Left ventricular ejection   fraction, by visual estimation, is >75%. Normal LV filling pressures.  Right Ventricle: Normal right ventricular size and function. The right   ventricular size is normal. RV systolic function is normal.  Pericardium: A small pericardial effusion is present. The pericardial   effusion is anterior to the right ventricle and localized near the right   atrium. There is no evidence of cardiac tamponade.  Tricuspid Valve: Mild-moderate tricuspid regurgitation is visualized.   Estimated pulmonary artery systolic pressure is 48.4 mmHg assuming a   right atrial pressure of 15 mmHg, which is consistent with mild pulmonary   hypertension.  Venous: The inferior vena cava was dilated, with respiratory size   variation less than 50%.       Summary:   1. Patient tachycardic during the entire study.   2. Hyperdynamic wall motion.   3. Left ventricular ejectionfraction, by visual estimation, is >75%.   4. Normal right ventricular size and function.   5. Mild-moderate tricuspid regurgitation.   6. Estimated pulmonary artery systolic pressure is 48 mmHg -mild   pulmonary hypertension.   7. Small pericardial effusion. No echo evidence of tamponade.    MD Zo, RPVI Electronically signed on 10/19/2019 at 4:41:21   PM       < end of copied text >      ALLERGIES: Allergies    No Known Allergies    Intolerances      PAST MEDICAL HISTORY  DM (diabetes mellitus)    Afib    Dementia    COPD (chronic obstructive pulmonary disease)    CKD (chronic kidney disease)        PAST SURGICAL HISTORY  S/P carotid endarterectomy    History of lung biopsy        FAMILY HISTORY:  No pertinent family history in first degree relatives      SOCIAL HISTORY:   CIGARETTES: Former smoker, smoked multiple packs per day x 40 years. Quit 20 years ago  ALCOHOL: Denies      CURRENT MEDICATIONS:  metoprolol tartrate 12.5 milliGRAM(s) Oral two times a day  torsemide 20 milliGRAM(s) Oral daily    albuterol/ipratropium for Nebulization  budesonide 160 MICROgram(s)/formoterol 4.5 MICROgram(s) Inhaler  montelukast  tiotropium 18 MICROgram(s) Capsule   donepezil  melatonin  memantine  pantoprazole    Tablet  polyethylene glycol 3350  aspirin enteric coated  atorvastatin  azithromycin  IVPB  cefTRIAXone   IVPB  dextrose 40% Gel  dextrose 5%.  dextrose 5%.  dextrose 50% Injectable  dextrose 50% Injectable  dextrose 50% Injectable  glucagon  Injectable  heparin   Injectable  insulin glargine Injectable (LANTUS)  insulin glargine Injectable (LANTUS)  insulin lispro (ADMELOG) corrective regimen sliding scale  insulin lispro (ADMELOG) corrective regimen sliding scale  levothyroxine  multivitamin      HOME MEDICATIONS:  Home Medications:  Anoro Ellipta 62.5 mcg-25 mcg/inh inhalation powder: 1 puff(s) inhaled once a day (2021 05:13)  Aricept 5 mg oral tablet: 1 tab(s) orally once a day (at bedtime) (2021 05:13)  aspirin 325 mg oral delayed release tablet: 1 tab(s) orally once a day (2021 05:13)  budesonide 0.5 mg/2 mL inhalation suspension: 2 milliliter(s) inhaled 2 times a day (:13)  docusate sodium 100 mg oral capsule: 1 cap(s) orally 2 times a day (2021 05:13)  ipratropium-albuterol 0.5 mg-2.5 mg/3 mLinhalation solution: 3 milliliter(s) inhaled every 6 hours (:13)  melatonin 5 mg oral tablet: 1 tab(s) orally once a day (at bedtime) (2021 05:13)  memantine 7 mg oral capsule, extended release: 1 cap(s) orally once a day (2021 05:13)  metoclopramide 5 mg oral tablet: 1 tab(s) orally once a day (2021 05:13)  metOLazone 5 mg oral tablet: 1 tab(s) orally once a day (2021 05:13)  metoprolol: 12.5 milligram(s) orally 2 times a day hold for SBP less than 90  (2021 05:13)  montelukast 10 mg oral tablet: 1 tab(s) orally once a day (2021 05:13)  Mucinex Allergy 180 mg oral tablet: 1 tab(s) orally 2 times a day (2021 05:13)  Multiple Vitamins oral tablet: 1 tab(s) orally once a day (2021 05:13)  ocular lubricant ophthalmic solution: 1 drop(s) to each affected eye 4 times a day (2021 05:13)  omeprazole 40 mg oral delayed release capsule: 1 cap(s) orally once a day (2021 05:13)  rosuvastatin 10 mg oral tablet: 1 tab(s) orally once a day (2021 05:13)  Synthroid 75 mcg (0.075 mg) oral tablet: 1 tab(s) orally once a day (2021 05:13)  torsemide 20 mg oral tablet: 1 tab(s) orally 2 times a day (2021 05:13)  Toujeo SoloStar 300 units/mL subcutaneous solution: 10 unit(s) subcutaneous once a day (at bedtime) (2021 05:13)      Vital Signs Last 24 Hrs  T(C): 36.9 (2021 08:03), Max: 39.1 (2021 22:15)  T(F): 98.4 (2021 08:03), Max: 102.4 (2021 22:15)  HR: 70 (2021 08:03) (70 - 97)  BP: 93/55 (2021 08:03) (93/55 - 127/65)  RR: 18 (2021 08:03) (17 - 20)  SpO2: 96% (2021 08:03) (96% - 99%)      PHYSICAL EXAM:  Constitutional: Comfortable . No acute distress.   HEENT: Atraumatic and normocephalic , neck is supple . no JVD. No carotid bruit. PEERL   CNS: A&Ox1-2. No focal deficits. EOMI. Cranial nerves II-IX are intact.   Lymph Nodes: Cervical : Not palpable.  Respiratory: CTAB  Cardiovascular: S1S2 RRR. No murmur/rubs or gallop.  Gastrointestinal: Soft, mild tenderness to RUQ, and non distended . +Bowel sounds. negative Simpson's sign.  Extremities: No edema  Psychiatric: Calm . no agitation.  Skin: No skin rash/ulcers visualized to face, hands or feet.    Intake and output:     LABS:                        11.9   14.57 )-----------( 172      ( 2021 08:13 )             37.5     04-    139  |  98  |  76.0<H>  ----------------------------<  169<H>  3.8   |  28.0  |  2.12<H>    Ca    9.4      2021 08:13  Mg     2.6         TPro  7.6  /  Alb  3.8  /  TBili  2.3<H>  /  DBili  x   /  AST  222<H>  /  ALT  201<H>  /  AlkPhos  463<H>      CARDIAC MARKERS ( 2021 23:30 )  x     / 0.04 ng/mL / x     / x     / x        ;p-BNP=  PT/INR - ( 2021 23:30 )   PT: 13.5 sec;   INR: 1.17 ratio         PTT - ( 2021 23:30 )  PTT:29.3 sec  Urinalysis Basic - ( 2021 23:34 )    Color: Yellow / Appearance: Clear / S.010 / pH: x  Gluc: x / Ketone: Trace  / Bili: Negative / Urobili: Negative mg/dL   Blood: x / Protein: 30 mg/dL / Nitrite: Negative   Leuk Esterase: Trace / RBC: 0-2 /HPF / WBC 0-2   Sq Epi: x / Non Sq Epi: Occasional / Bacteria: Occasional        INTERPRETATION OF TELEMETRY: Reviewed by me. SR  ECG: Reviewed by me. SR with a 1st degree AV block, T wave inversions inferiorly, anterolaterally     RADIOLOGY & ADDITIONAL STUDIES:    X-ray:  reviewed by me.   CT scan:   < from: CT Chest No Cont (21 @ 04:53) >    FINDINGS:  CHEST:  LUNGS AND LARGE AIRWAYS: Patent central airways. Rounded chronic atelectasis at the right and left lung base similar to that seen on 2021 and 10/17/2019. Mild emphysema. Mild biapical parenchymal scarring.  PLEURA: Small bilateral pleural effusions with associated pleural thickening, unchanged.  VESSELS: Normal caliber thoracic aorta. Atherosclerotic calcifications of the thoracic aorta, great vessels, and coronary arteries.  HEART: Heart size is normal. Small pericardial effusion, unchanged.  MEDIASTINUM AND MARLEEN: No lymphadenopathy.  CHEST WALL AND LOWER NECK: Surgical clips in the right side of the neck. Mild symmetric bilateral gynecomastia.    ABDOMEN AND PELVIS:  LIVER: Within normal limits.  BILE DUCTS: Normal caliber.  GALLBLADDER: Gallbladder mildly distended. No radiodense gallstones. Mild pericholecystic edema.  SPLEEN: Within normal limits.  PANCREAS: Mild pancreatic atrophy. Punctate calcification in the pancreatic head likely the sequela of chronic pancreatitis.  ADRENALS: Within normal limits.  KIDNEYS/URETERS: No right or left hydroureteronephrosis or nephrolithiasis. Nonspecific bilateral symmetric perinephric stranding.    BLADDER: Mild thickening of the urinary bladder walls despite underdistention with slight infiltration of the perivesicular fat.  REPRODUCTIVE ORGANS: Enlarged prostate gland.    BOWEL: No bowel obstruction. Appendix is normal. Mild colonic diverticulosis without evidence of diverticulitis.  PERITONEUM: No ascites or pneumoperitoneum. No mesenteric lymphadenopathy.  VESSELS: Atherosclerotic calcifications of the aortoiliac tree. Normal caliber abdominal aorta.  RETROPERITONEUM/LYMPH NODES: No lymphadenopathy.  ABDOMINAL WALL: Small fat-containing right inguinal hernia.  BONES: Degenerative changes of the spine.    IMPRESSION:  No interval change in lung findings compared to prior exam from 2021. Rounded chronic atelectasis at the lung bases and chronic small bilateral pleural effusions with associated pleural thickening.    Mildly distended gallbladder with mild pericholecystic edema. Correlate with LFTs. Sonographic evaluation may be considered, as clinically indicated.    Mild thickening of the urinary bladder walls despite underdistention with slight infiltration of the perivesicular fat which may in part be due to chronic outlet obstruction in the setting of an enlarged prostate gland, however, perivesicular fatty infiltration raises question of cystitis. Correlate with urinalysis.    Mild colonic diverticulosis without evidence of diverticulitis.    < end of copied text >    MRI:     
Consult requested by: ROGELIO Simmons MD   Role: Attending   Service: Medicine         Other Consultants: KARUNA Horner MD, GI  Consultant: Miesha Romo MS, MD, MPH, [facility] Medical Ethicist Contact # [phone] (c) [phone]    Consult purpose:  Assist the team in ethical dilemma posed by an 86 year old man with complex medical history including vascular dementia who presented with fever and abdominal pain, regarding decision making.  							  Clinical summary: This is an 86 year old man with PMH of vascular dementia, atrial fibrillation (not on AC), CKD, DM2, COPD who presented with abdominal pain, fever and worsening mental status found to meet SIRS criteria. As per patient’s daughter, patient has been undergoing work up for chronic abdominal pain and vomiting by GI. He was recently started on metoclopramide as the prognostication was towards gastroparesis. In the ED, US revealed distended GB. Patient evaluated by Surgery and noted no acute surgical intervention at this time, recommend medical evaluation for admission for undifferentiated sepsis and HIDA scan to rule out biliary pathology/acute cholecystitis as source of sepsis. Patient admitted to Medicine for treatment and management of SIRS, transaminitis, altered mental status, CKD and abdominal pain. Cardiology, GI, Nephrology, ID, and Surgery on consult. IV antibiotics initiated. Head CT Scan on 4/28/21, revealed no intracranial hemorrhage, mass effect or acute vascular territorial infarct, noted mild chronic ischemic changes in frontoparietal white matter. Initial imaging negative for acute cholecystitis, however MRCP concerning for possible stricture present in the distal CBD. EGD/EUS and ERCP with sphincterotomy and stone retrieval planned for 5/4/21. Ethics consult was initiated to facilitate optimization of interdisciplinary communication with the patient and his family given the patient’s current state and his undetermined prognosis.    Prognosis Estimate (survival in hrs, days, wks, mos, yrs): Prognosis is guarded  Patient Decision-Making Capacity:  Has capacity    Lacks capacity for complex medical decisions due to dementia (patient waxes and wanes)  Patient Aware of:  Diagnosis:   Yes    No   Unknown    Prognosis:   Yes    No   Unknown       Name of medical decision-maker should patient lack capacity: Felicita Lopez, [phone]; paperwork in alpha from 2019, signed HCP 2014  Other ‘stakeholders’: Tony Lopez, Son, named alternate HCP; MARK Lopez, Wife   Other Decision-Maker (i.e., HCA or Surrogate) Aware of:  Diagnosis: Yes   No      Prognosis:   Yes     No   Evidence of Patient’s Preference of Life-Sustaining Treatment (Written or Oral): none  Resuscitation status:   DNR:  Yes   No      DNI: Yes   No       Discussion with ROGELIO Simmons MD (Medicine Attending) on 5/2/21: Case discussed in detail. Patient does have dementia, but still able to participate in some discussions regarding diagnosis and treatment options. Concern is for truth telling, that family is not allowing team to communicate with patient. Another concern is that patient has been refusing some interventions intermittently, such as blood draws.  Discussion with MARK Lopez (Patient’s wife) and Tony RosadoJessica (Patient) on 5/3/21 at bedside: Introduced role of ethicist. Brief conversation with patient. He likes to tell jokes, which is a cover for his dementia. Separate conversation with Mrs. Lopez. We talked specifically about team using certain words around her . She stated that "cancer" upsets him. She stated after the team leaves he perseverates on this word for hours and the family is left to calm him down. Left my number for his daughter/HCP, Felicita if she has any questions.   Discussion with ROGELIO Simmons MD (Medicine Attending) on 5/3/21: Discussed conversation with wife. That patient perseverates on certain words, which causes stress therefore harm.    Discussion with RAISSA Jessica (Daughter/HCP) via phone on [phone] on 5/3/21: Very long discussion with Felicita. Felicita very upset that Ethics was called. Explained role. She understood. We discussed her father in detail. Up until 2017 he worked more than full time. She told me that in 2018 he was on hospice, but he rallied. She stated that she is grateful for every moment since then. We discussed how certain words frighten him (as discussed earlier with her step-mom): cancer, surgery and intubation. She stated that she does not want to withhold information from her dad. She stated that if he does have cancer, they will address with him as a family, but not frighten him before knowing the result. We discussed consent and assent. Since her father is not end stage dementia, he still participates in his care/decisions. We discussed that patients without capacity still have the right to refuse. Felicita said she wants to preserve her father’s dignity. Answered all questions. Reassured Felicita that I am always available for any questions.   Discussion with ROGELIO Simmons MD (Medicine Attending) on 5/4/21: Discussed my conversation with daughter and his follow-up conversation with daughter this morning. Patient scheduled for procedure today.     Bioethics analysis: THE CENTRAL ETHICAL ISSUES PRESENTED IN THIS CASE ARE A) RESPECTING PATIENT AUTONOMY AND B) PROVIDER BENEFICENCE/NON-MALEFICENCE.    The principle of respect for autonomous persons acknowledges a patient’s right to hold views, to make choices and to take actions based on their values and beliefs(i). Furthermore, this principle acknowledges the patient’s dignity and bodily integrity(i). Essential to this principle is the capacity for autonomous choice(i). In other words, the patient’s capacity to decide what can and cannot be done to him according to his set of values. When a patient does not have the ability to make decisions for himself, we must protect his autonomy by finding an appropriate surrogate decision maker. In this case, the patient completed a health care proxy (HCP) form in 2014, naming his daughter, Felicita as primary HCP and his son, Tony as alternate.   Under the [place] Public Health Law (2), a Health Care Proxy shall make decisions in accordance with the patient’s wishes; or if the patient’s wishes are not reasonably known and cannot be ascertained with reasonable diligence, in accordance with the patient’s best interests. A HCP shall take into account:  – the dignity and uniqueness of every person;  – the possibility and extent of preserving the patient’s life;  – the preservation, improvement or restoration of the patient’s health or functioning;  – the relief of the patient’s suffering; and  – any medical condition and such other concerns and values as a reasonable person in the patient’s circumstances would wish to consider.(2)    In all cases, the HCP’s assessment of the patient’s best interests shall be patient-centered; health care decisions shall be made on an individualized basis and shall be consistent with the values of the patient, including the patient’s Yazidism and moral beliefs, to the extent reasonably possible.(2)     Fundamental to the patient’s autonomy is his moral status. This can be thought of as rights or the functional equivalence or rights that give the patient an intrinsic worth. These rights can be understood as a protection to the patient’s distinct personhood, interpretation of suffering, future self and goals, and life-story. In this case, the patient may not be able to make complex decision, but he is able to participate in some discussions regarding his healthcare. His HCP, the kassie of his autonomy, has stated that his dignity is most important.   We should also adhere to the principles of beneficence and nonmaleficence. Nonmaleficence obligates us to refrain from causing harm to patients while treating them, while beneficence requires us to promote the best possible health or quality of life –in other words, to remediate a harm and to maximize benefit for the patient’s health and well-being.(3) The continuing task is to responsibly attend to each patient’s specific concerns, to be responsive to each individual within his unique circumstances and condition and to be responsible for whatever is then said or done on behalf of that patient (to the extent possible).(3)  Directly interconnected to a patient’s autonomy and the medical team’s beneficence and nonmaleficence’s duties is the trust that must permeate the clinician-patient relationship. In practical terms, the clinicians have a duty to place themselves in the lived experience of the patient’s illness, in order to appreciate the situation as the patient understands, perceives, and feels it (it is both to understand and to be understanding).(3) It is also to be mindful of the power imbalance that can determine negatively the practitioner-patient relationship and thus erode trust in a much-needed moment. We should approach every task that involves the patient with humility to ameliorate that power imbalance.  In this case, certain terminology frightens the patient and causes him unnecessary stress. His family is very involved and have requested the team use caution with certain terminology such as cancer, surgery and intubation.     Communication is central in the practitioner-patient relationship.(4) This is not only owed to patients. It also extends to a patient’s surrogate decision-maker when a patient loses capacity. Sometimes surrogate decision makers can create several concerns for the medical team. However, surrogates are sometimes thrown to a difficult situation and can bring their fears and anxieties to an already delicate situation. Understanding the root of the problem can help diffuse these difficult moments. Practical tools such extensive communication with the surrogate and active listening can help in this regard.  In this case, the patient intermittently refuses some interventions. A patient without capacity still retains the right to refuse an intervention. The issue becomes whether it is appropriate to compel medical interventions. In this case, the patient has dementia, but still participates in decisions regarding his treatment. Regardless of capacity, a patient who is actively refusing treatment cannot be forced against his will to undergo the treatment. He should not be coerced. Ethically, to compel or coerce a patient absent his assent requires an imminent life-threatening condition, as compelling or coercing treatment directly conflicts with autonomy. It is reasonable to believe that most people accept and find comfort in the thought that they will be provided medical care if they become too ill to consent to the care needed, and that it is therefore in line with their autonomous will as healthy individuals(5). Coercive treatment is thus acceptable only on the condition that:  (1) The patient cannot make an autonomous decision about treatment and (2) Treatment is in the patient’s authentic interest  Coercive treatment cannot be defended when the patient has capacity and makes an autonomous decision against treatment, or when treatment is not in the patient’s genuine interest. Mr. Lopez’s daughter and health care proxy understands this. She is available to assist in discussion with her father before interventions take place.   
Acute Care Surgery Consult Note    HPI:   86yMale w/ PMH DM, HTN, COPD, CKD, A fib (rate controlled on metoprolol) presents on 04-28-21 after an episode of right sided pain with b/l shaking and subsequent lethargy. Patient presented to ED 4/19/21 w/ abdominal pain. workup at that time with CT a/p negative for acute intraabdominal process. Patient brought in by daughter today with concern for persistent lethargy after episode. Daughter reports patient recently started metoclopromide for gastroperesis by GI/PCP. patient also closely followed by Dr. Calhoun with renal and is being treated for fluid overload with torsemide as needed to maintain stable weight. Daughter denies any current abdominal pain with this episode. Patient has dementia at baseline but is able to cooperate with exam and follow commands. daughter reports patient is at baseline mental status currently but more tired and lethargic.    On admission to ED. patient febrile to 102. UA was negative for UTI. Blood cultures were drawn. ED labs showed elevated LFTs and t bili and leukocytosis. US GB showed trace sludge and stones, distended GB, mild wall thickening without pericholecystic fluid. Surgery was consulted to r/o acute cholecystitis as potential source of sepsis.    PMH:  DM (diabetes mellitus)    Afib    Dementia    COPD (chronic obstructive pulmonary disease)    CKD (chronic kidney disease)      PSH:  S/P carotid endarterectomy    History of lung biopsy      Home Medications:  synthroid, omeprazol, metoprolol, metolzone, torsemide (titrated to patients weight), memantine, metoclopramide, mucinex, ASA 81, albuterol neb, anoro, colace, trajeo 10 U nightly, donepezil, montelukast      acetaminophen 325 mg oral tablet: 2 tab(s) orally every 6 hours, As needed, Temp greater or equal to 38C (100.4F), Mild Pain (1 - 3)  aluminum hydroxide-magnesium hydroxide 200 mg-200 mg/5 mL oral suspension: 30 milliliter(s) orally every 4 hours, As needed, Dyspepsia  Anoro Ellipta 62.5 mcg-25 mcg/inh inhalation powder: 1 puff(s) inhaled once a day  Aricept 5 mg oral tablet: 1 tab(s) orally once a day (at bedtime)  aspirin 325 mg oral delayed release tablet: 1 tab(s) orally once a day  budesonide 0.5 mg/2 mL inhalation suspension: 2 milliliter(s) inhaled 2 times a day  Co Q-10 100 mg oral capsule: 1 cap(s) orally once a day  divalproex sodium 500 mg oral tablet, extended release: 1 tab(s) orally once a day (at bedtime)  docusate sodium 100 mg oral capsule: 1 cap(s) orally 2 times a day  ferrous sulfate 325 mg (65 mg elemental iron) oral tablet: 1 tab(s) orally once a day  folic acid 1 mg oral tablet: 1 tab(s) orally once a day  heparin: 5000 unit(s) subcutaneous every 8 hours  insulin glargine: 25 unit(s) subcutaneous once a day (at bedtime)  insulin lispro: Accuchecks AC/HS with Insulin Sliding Scale  ipratropium-albuterol 0.5 mg-2.5 mg/3 mLinhalation solution: 3 milliliter(s) inhaled every 6 hours  lactulose 10 g/15 mL oral syrup: 30 milliliter(s) orally 2 times a day, As needed, constipation  melatonin 5 mg oral tablet: 1 tab(s) orally once a day (at bedtime)  memantine 7 mg oral capsule, extended release: 1 cap(s) orally once a day  metoprolol: 12.5 milligram(s) orally 2 times a day hold for SBP less than 90   montelukast 10 mg oral tablet: 1 tab(s) orally once a day  Multiple Vitamins oral tablet: 1 tab(s) orally once a day  ocular lubricant ophthalmic solution: 1 drop(s) to each affected eye 4 times a day  pantoprazole 40 mg oral delayed release tablet: 1 tab(s) orally every 12 hours  pitavastatin (as calcium) 2 mg oral tablet: 1 tab(s) orally to be given at 2200 on Mon, Tues, Wed, Thurs, Fri.   polyethylene glycol 3350 oral powder for reconstitution: 17 gram(s) orally once a day, As needed, if no bM for 2 days  senna oral tablet: 2 tab(s) orally once a day (at bedtime)  Synthroid 50 mcg (0.05 mg) oral tablet: 1 tab(s) orally once a day  torsemide 20 mg oral tablet: 1 tab(s) orally 2 times a day    ALL: denies  FMH:  Denies family history of gastrointestinal malignancy     SOC Hx:   Denies etoh, tobacco, or drug use     Physical Exam:   Gen: well appearing male, NAD. resting comfortably  Neuro: Alert. baseline dementia. responds and follows commands, EOMI, no gross deficit on exam. strenght 5/5 in b/l upper and lower extremities  HEENT: No oral/mucosal lesions, no neck masses or lymphadenopathy  RESP: CTABL, nonlabored breathing  CVS: RR  GI: abd soft, NT, ND, no rebound/guarding. no RUQ tenderness. negative simpson sign.   Extremities: 2+ peripheral pulses      LABS:  cret                        12.2   15.38 )-----------( 171      ( 27 Apr 2021 23:30 )             37.5     04-27    136  |  98  |  80.0<H>  ----------------------------<  146<H>  4.1   |  27.0  |  2.18<H>    Ca    9.6      27 Apr 2021 23:30    TPro  7.8  /  Alb  3.9  /  TBili  2.3<H>  /  DBili  x   /  AST  317<H>  /  ALT  229<H>  /  AlkPhos  497<H>  04-27    PT/INR - ( 27 Apr 2021 23:30 )   PT: 13.5 sec;   INR: 1.17 ratio         PTT - ( 27 Apr 2021 23:30 )  PTT:29.3 sec        < from: US Abdomen Upper Quadrant Right (04.28.21 @ 01:13) >  IMPRESSION:    Distended gallbladder with trace sludge/stones and mild gallbladder wall thickening. Negative sonographic Simpson sign. HIDA scan may be considered for further evaluation.    < end of copied text >  < from: CT Abdomen and Pelvis w/ Oral Cont (04.19.21 @ 23:42) >  IMPRESSION:  No acute abdominal pathology.    Stable pleural effusions with adjacent rounded atelectasis. Stable small pericardial effusion.    < end of copied text >  
HISTORY OF PRESENT ILLNESS: This is an 86y old man with a past medical history significant for dementia, atrial fibrillation, COPD, DM, and CKD who presented to the [facility] ED with worsening mental status.  He was in his usual state of health yesterday and was taken to his scheduled appointment with cardiology.  Later that day, he was very difficult to arouse after taking a nap.  In the ED, his Tm was 102.4 (with rigors), pulse 97, /59, and SaO2 98% on room air.  A CXR and UA were negative.  The patient has been complaining of RLQ abdominal pain for a while and had recent ED visit for the same and has been seeing Dr. Jayjay Stewart in the office, most recently on .  His abdominal pain has been accompanied by nausea and vomiting that occurs often an hour or so after eating lunch.  Given his underlying comorbidities, a diagnosis of gastroparesis was thought to be possible, but it was not though that the patient could tolerate a four hour nuclear medicine evaluation.  Thus, he was empirically treated with low-dose metoclopramide.  Imaging showed a mildly distended gallbladder and pericholecystic fluid along with pancreas calcifications that appear to be the sequela of chronic pancreatitis.      ROS: Not obtainable in this patient due to advanced dementia.  The above history was acquired via chart review and his daughter at the bedside.  He is planned for a NM HIDA today.    PAST MEDICAL/SURGICAL HISTORY:  DM (diabetes mellitus)  Afib  Dementia  COPD (chronic obstructive pulmonary disease)  CKD (chronic kidney disease)  S/P carotid endarterectomy  History of lung biopsy    SOCIAL HISTORY:  - TOBACCO: Denies  - ALCOHOL: Denies  - ILLICIT DRUG USE: Denies    FAMILY HISTORY:  No known history of gastrointestinal or liver disease;  No pertinent family history in first degree relatives    HOME MEDICATIONS:  Anoro Ellipta 62.5 mcg-25 mcg/inh inhalation powder: 1 puff(s) inhaled once a day (2021 05:13)  Aricept 5 mg oral tablet: 1 tab(s) orally once a day (at bedtime) (2021 05:13)  aspirin 325 mg oral delayed release tablet: 1 tab(s) orally once a day (2021 05:13)  budesonide 0.5 mg/2 mL inhalation suspension: 2 milliliter(s) inhaled 2 times a day (2021 05:13)  docusate sodium 100 mg oral capsule: 1 cap(s) orally 2 times a day (2021 05:13)  ipratropium-albuterol 0.5 mg-2.5 mg/3 mLinhalation solution: 3 milliliter(s) inhaled every 6 hours (:13)  melatonin 5 mg oral tablet: 1 tab(s) orally once a day (at bedtime) (2021 05:13)  memantine 7 mg oral capsule, extended release: 1 cap(s) orally once a day (:13)  metoclopramide 5 mg oral tablet: 1 tab(s) orally once a day (:13)  metOLazone 5 mg oral tablet: 1 tab(s) orally once a day (:13)  metoprolol: 12.5 milligram(s) orally 2 times a day hold for SBP less than 90  (:13)  montelukast 10 mg oral tablet: 1 tab(s) orally once a day (2021 05:13)  Mucinex Allergy 180 mg oral tablet: 1 tab(s) orally 2 times a day (2021 05:13)  Multiple Vitamins oral tablet: 1 tab(s) orally once a day (:13)  ocular lubricant ophthalmic solution: 1 drop(s) to each affected eye 4 times a day (2021 05:13)  omeprazole 40 mg oral delayed release capsule: 1 cap(s) orally once a day (2021 05:13)  rosuvastatin 10 mg oral tablet: 1 tab(s) orally once a day (2021 05:13)  Synthroid 75 mcg (0.075 mg) oral tablet: 1 tab(s) orally once a day (2021 05:13)  torsemide 20 mg oral tablet: 1 tab(s) orally 2 times a day (:13)  Toujeo SoloStar 300 units/mL subcutaneous solution: 10 unit(s) subcutaneous once a day (at bedtime) (2021 05:13)    INPATIENT MEDICATIONS:  MEDICATIONS  (STANDING):  albuterol/ipratropium for Nebulization 3 milliLiter(s) Nebulizer every 6 hours  aspirin enteric coated 325 milliGRAM(s) Oral daily  atorvastatin 40 milliGRAM(s) Oral at bedtime  azithromycin  IVPB 500 milliGRAM(s) IV Intermittent every 24 hours  budesonide 160 MICROgram(s)/formoterol 4.5 MICROgram(s) Inhaler 2 Puff(s) Inhalation two times a day  cefTRIAXone   IVPB 1000 milliGRAM(s) IV Intermittent every 24 hours  dextrose 40% Gel 15 Gram(s) Oral once  dextrose 5%. 1000 milliLiter(s) (50 mL/Hr) IV Continuous <Continuous>  dextrose 5%. 1000 milliLiter(s) (100 mL/Hr) IV Continuous <Continuous>  dextrose 50% Injectable 25 Gram(s) IV Push once  dextrose 50% Injectable 12.5 Gram(s) IV Push once  dextrose 50% Injectable 25 Gram(s) IV Push once  donepezil 5 milliGRAM(s) Oral at bedtime  glucagon  Injectable 1 milliGRAM(s) IntraMuscular once  heparin   Injectable 5000 Unit(s) SubCutaneous every 8 hours  insulin glargine Injectable (LANTUS) 10 Unit(s) SubCutaneous at bedtime  insulin glargine Injectable (LANTUS) 10 Unit(s) SubCutaneous once  insulin lispro (ADMELOG) corrective regimen sliding scale   SubCutaneous three times a day before meals  insulin lispro (ADMELOG) corrective regimen sliding scale   SubCutaneous at bedtime  levothyroxine 75 MICROGram(s) Oral daily  melatonin 5 milliGRAM(s) Oral at bedtime  memantine 5 milliGRAM(s) Oral daily  metoprolol tartrate 12.5 milliGRAM(s) Oral two times a day  montelukast 10 milliGRAM(s) Oral daily  multivitamin 1 Tablet(s) Oral daily  pantoprazole    Tablet 40 milliGRAM(s) Oral before breakfast  polyethylene glycol 3350 17 Gram(s) Oral daily  tiotropium 18 MICROgram(s) Capsule 1 Capsule(s) Inhalation daily    MEDICATIONS  (PRN):    ALLERGIES:  No Known Allergies    T(C): 36.9 (21 @ 08:03), Max: 39.1 (21 @ 22:15)  HR: 70 (21 @ 08:03) (70 - 97)  BP: 93/55 (21 @ 08:03) (93/55 - 127/65)  RR: 18 (21 @ 08:03) (17 - 20)  SpO2: 96% (21 @ 08:03) (96% - 99%)      PHYSICAL EXAM:  Constitutional: Well-developed, malnourished  man in no apparent distress  Eyes: Sclerae anicteric, conjunctivae normal  ENMT: Mucus membranes moist, no oropharyngeal thrush noted  Neck: No thyroid nodules appreciated, no significant cervical or supraclavicular lymphadenopathy  Respiratory: Breathing nonlabored; clear to auscultation  Cardiovascular: Regular rate and rhythm  Gastrointestinal: Soft, nontender, nondistended, normoactive bowel sounds; no hepatosplenomegaly appreciated; no rebound tenderness or involuntary guarding  Extremities: No clubbing, cyanosis or edema  Neurological: No asterixis  Skin: No jaundice  Lymph Nodes: No significant lymphadenopathy  Musculoskeletal: No significant peripheral atrophy      LABS:             11.9   14.57 )-----------( 172      (  @ 08:13 )             37.5                12.2   15.38 )-----------( 171      (  @ 23:30 )             37.5       PT/INR - ( 2021 23:30 )   PT: 13.5 sec;   INR: 1.17 ratio         PTT - ( 2021 23:30 )  PTT:29.3 sec      139  |  98  |  76.0<H>  ----------------------------<  169<H>  3.8   |  28.0  |  2.12<H>    Ca    9.4      2021 08:13  Mg     2.6         TPro  7.6  /  Alb  3.8  /  TBili  2.3<H>  /  DBili  x   /  AST  222<H>  /  ALT  201<H>  /  AlkPhos  463<H>      LIVER FUNCTIONS - ( 2021 08:13 )  Alb: 3.8 g/dL / Pro: 7.6 g/dL / ALK PHOS: 463 U/L / ALT: 201 U/L / AST: 222 U/L / GGT: x           Lipase, Serum: 223 U/L (21 @ 08:01)    Ferritin, Serum: 821 ng/mL *H* (21 @ 23:30)    Urinalysis Basic - ( 2021 23:34 )    Color: Yellow / Appearance: Clear / S.010 / pH: x  Gluc: x / Ketone: Trace  / Bili: Negative / Urobili: Negative mg/dL   Blood: x / Protein: 30 mg/dL / Nitrite: Negative   Leuk Esterase: Trace / RBC: 0-2 /HPF / WBC 0-2   Sq Epi: x / Non Sq Epi: Occasional / Bacteria: Occasional      IMAGING: I personally reviewed the CT A/P, and I agree with the radiologist's interpretation as described below:    < from: CT Chest No Cont (21 @ 04:53) >   EXAM:  CT ABDOMEN AND PELVIS                         EXAM:  CT CHEST                          PROCEDURE DATE:  2021          INTERPRETATION:  CLINICAL INFORMATION: Sepsis.    COMPARISON: CT the abdomen and pelvis from 2021 and CT the chest, abdomen, and pelvis from 10/17/2019.    CONTRAST/COMPLICATIONS:  IV Contrast: None.  Oral Contrast: None.  Complications: None.    PROCEDURE:  CT of the Chest, Abdomen and Pelvis was performed.  Sagittal and coronal reformats were performed.    FINDINGS:  CHEST:  LUNGS AND LARGE AIRWAYS: Patent central airways. Rounded chronic atelectasis at the right and left lung base similar to that seen on 2021 and 10/17/2019. Mild emphysema. Mild biapical parenchymal scarring.  PLEURA: Small bilateral pleural effusions with associated pleural thickening, unchanged.  VESSELS: Normal caliber thoracic aorta. Atherosclerotic calcifications of the thoracic aorta, great vessels, and coronary arteries.  HEART: Heart size is normal. Small pericardial effusion, unchanged.  MEDIASTINUM AND MARLEEN: No lymphadenopathy.  CHEST WALL AND LOWER NECK: Surgical clips in the right side of the neck. Mild symmetric bilateral gynecomastia.    ABDOMEN AND PELVIS:  LIVER: Within normal limits.  BILE DUCTS: Normal caliber.  GALLBLADDER: Gallbladder mildly distended. No radiodense gallstones. Mild pericholecystic edema.  SPLEEN: Within normal limits.  PANCREAS: Mild pancreatic atrophy. Punctate calcification in the pancreatic head likely the sequela of chronic pancreatitis.  ADRENALS: Within normal limits.  KIDNEYS/URETERS: No right or left hydroureteronephrosis or nephrolithiasis. Nonspecific bilateral symmetric perinephric stranding.    BLADDER: Mild thickening of the urinary bladder walls despite underdistention with slight infiltration of the perivesicular fat.  REPRODUCTIVE ORGANS: Enlarged prostate gland.    BOWEL: No bowel obstruction. Appendix is normal. Mild colonic diverticulosis without evidence of diverticulitis.  PERITONEUM: No ascites or pneumoperitoneum. No mesenteric lymphadenopathy.  VESSELS: Atherosclerotic calcifications of the aortoiliac tree. Normal caliber abdominal aorta.  RETROPERITONEUM/LYMPH NODES: No lymphadenopathy.  ABDOMINAL WALL: Small fat-containing right inguinal hernia.  BONES: Degenerative changes of the spine.    IMPRESSION:  No interval change in lung findings compared to prior exam from 2021. Rounded chronic atelectasis at the lung bases and chronic small bilateral pleural effusions with associated pleural thickening.    Mildly distended gallbladder with mild pericholecystic edema. Correlate with LFTs. Sonographic evaluation may be considered, as clinically indicated.    Mild thickening of the urinary bladder walls despite underdistention with slight infiltration of the perivesicular fat which may in part be due to chronic outlet obstruction in the setting of an enlarged prostate gland, however, perivesicular fatty infiltration raises question of cystitis. Correlate with urinalysis.    Mild colonic diverticulosis without evidence of diverticulitis.      < end of copied text >    
[facility]  INFECTIOUS DISEASES AND INTERNAL MEDICINE at [place]  =======================================================  Sabino Guevara MD  Diplomates American Board of Internal Medicine and Infectious Diseases  Tel: [phone]      Fax: [phone]  =======================================================      [id number]  MINO SEBASTIANANGELO    CC: Patient is a 86y old  Male who presents with a chief complaint of sirs, weakness (2021 11:38)      86y  Male with h/o vascular dementia, afib (not on ac), CKD, DM2, COPD. Patient presents to the ER with Chills, lethergy and worsening mental status of sudden onset. Patient was in normal state of health and went to cardiologist appointment. Following appointment he took a nap and was difficult to wake up afterwards. He was found to have a fever of 102F at home, was weak and lethargic and mildly confused.  He was recently started on metoclopramide for gastroparesis. Patient denies acute complaint including cough, sob, back pain, dysuria, or diarrhea. In the ER he was noted to be febrile to 102.4F, leukocytosis to 15k. US with distended GB, evaluated by surgery, but unimpressed and did not think acute cholecystitis. Started on ceftriaxone. ID input requested.       Past Medical & Surgical Hx:  DM (diabetes mellitus)  Afib  Dementia  COPD (chronic obstructive pulmonary disease)  CKD (chronic kidney disease)  S/P carotid endarterectomy  History of lung biopsy      Social Hx:  Former smoker       FAMILY HISTORY:  DM (diabetes mellitus) - Sister       Allergies  No Known Allergies       REVIEW OF SYSTEMS:  CONSTITUTIONAL:  + Fever + chills  HEENT:  No diplopia or blurred vision.  No earache, sore throat or runny nose.  CARDIOVASCULAR:  No pressure, squeezing, strangling, tightness, heaviness or aching about the chest, neck, axilla or epigastrium.  RESPIRATORY:  No cough, shortness of breath  GASTROINTESTINAL:  No nausea, vomiting or diarrhea.  GENITOURINARY:  No dysuria, frequency or urgency.   MUSCULOSKELETAL:  no joint aches, no muscle pain  SKIN:  No change in skin, hair or nails.  NEUROLOGIC:  No Headaches, seizures   PSYCHIATRIC:  No disorder of thought or mood.  ENDOCRINE:  No heat or cold intolerance  HEMATOLOGICAL:  No easy bruising or bleeding.       Physical Exam:  GEN: NAD, pleasant  HEENT: normocephalic and atraumatic. EOMI. PERRL.  Anicteric  NECK: Supple.   LUNGS: Clear to auscultation.  HEART: Regular rate and rhythm   ABDOMEN: Soft, nontender, and nondistended.  Positive bowel sounds.    : No CVA tenderness  EXTREMITIES: Without any edema.  MSK: No joint swelling  NEUROLOGIC: No Focal Deficits  PSYCHIATRIC: Appropriate affect . forgetful   SKIN: No Rash    Height (cm): 172.7 ( @ 20:59)  Weight (kg): 86.2 ( @ 20:59)  BMI (kg/m2): 28.9 ( @ 20:59)  BSA (m2): 2 ( @ 20:59)      Vitals:  T(F): 98.4 (2021 08:03), Max: 102.4 (2021 22:15)  HR: 70 (2021 08:03)  BP: 93/55 (2021 08:03)  RR: 18 (2021 08:03)  SpO2: 96% (2021 08:03) (96% - 99%)  temp max in last 48H T(F): , Max: 102.4 (21 @ 22:15)      Current Antibiotics:  azithromycin  IVPB 500 milliGRAM(s) IV Intermittent every 24 hours  cefTRIAXone   IVPB 1000 milliGRAM(s) IV Intermittent every 24 hours    Other medications:  albuterol/ipratropium for Nebulization 3 milliLiter(s) Nebulizer every 6 hours  aspirin enteric coated 325 milliGRAM(s) Oral daily  atorvastatin 40 milliGRAM(s) Oral at bedtime  budesonide 160 MICROgram(s)/formoterol 4.5 MICROgram(s) Inhaler 2 Puff(s) Inhalation two times a day  dextrose 40% Gel 15 Gram(s) Oral once  dextrose 5%. 1000 milliLiter(s) IV Continuous <Continuous>  dextrose 5%. 1000 milliLiter(s) IV Continuous <Continuous>  dextrose 50% Injectable 25 Gram(s) IV Push once  dextrose 50% Injectable 12.5 Gram(s) IV Push once  dextrose 50% Injectable 25 Gram(s) IV Push once  donepezil 5 milliGRAM(s) Oral at bedtime  glucagon  Injectable 1 milliGRAM(s) IntraMuscular once  heparin   Injectable 5000 Unit(s) SubCutaneous every 8 hours  insulin glargine Injectable (LANTUS) 10 Unit(s) SubCutaneous at bedtime  insulin glargine Injectable (LANTUS) 10 Unit(s) SubCutaneous once  insulin lispro (ADMELOG) corrective regimen sliding scale   SubCutaneous three times a day before meals  insulin lispro (ADMELOG) corrective regimen sliding scale   SubCutaneous at bedtime  levothyroxine 75 MICROGram(s) Oral daily  melatonin 5 milliGRAM(s) Oral at bedtime  memantine 5 milliGRAM(s) Oral daily  metoprolol tartrate 12.5 milliGRAM(s) Oral two times a day  montelukast 10 milliGRAM(s) Oral daily  multivitamin 1 Tablet(s) Oral daily  pantoprazole    Tablet 40 milliGRAM(s) Oral before breakfast  polyethylene glycol 3350 17 Gram(s) Oral daily  tiotropium 18 MICROgram(s) Capsule 1 Capsule(s) Inhalation daily                 11.9   14.57 )-----------( 172      ( 2021 08:13 )             37.5         139  |  98  |  76.0<H>  ----------------------------<  169<H>  3.8   |  28.0  |  2.12<H>    Ca    9.4      2021 08:13  Mg     2.6         TPro  7.6  /  Alb  3.8  /  TBili  2.3<H>  /  DBili  x   /  AST  222<H>  /  ALT  201<H>  /  AlkPhos  463<H>      RECENT CULTURES:   @ 23:01    [facility]     @ 03:18 .Urine Clean Catch (Midstream)     <10,000 CFU/mL Normal Urogenital Brandi      WBC Count: 14.57 K/uL (21 @ 08:13)  WBC Count: 15.38 K/uL (21 @ 23:30)    Creatinine, Serum: 2.12 mg/dL (21 @ 08:13)  Creatinine, Serum: 2.18 mg/dL (21 @ 23:30)    C-Reactive Protein, Serum: 39 mg/L (21 @ 23:30)    Ferritin, Serum: 821 ng/mL (21 @ 23:30)    Procalcitonin, Serum: 0.26 ng/mL (21 @ 23:30)     SARS-CoV-2: NotDetec (21 @ 23:01)  Rapid RVP Result: NotDetec (21 @ 23:01)    Urinalysis Basic - ( 2021 23:34 )    Color: Yellow / Appearance: Clear / S.010 / pH: x  Gluc: x / Ketone: Trace  / Bili: Negative / Urobili: Negative mg/dL   Blood: x / Protein: 30 mg/dL / Nitrite: Negative   Leuk Esterase: Trace / RBC: 0-2 /HPF / WBC 0-2   Sq Epi: x / Non Sq Epi: Occasional / Bacteria: Occasional        < from: CT Abdomen and Pelvis No Cont (21 @ 04:52) >  EXAM:  CT ABDOMEN AND PELVIS                        EXAM:  CT CHEST                          PROCEDURE DATE:  2021      INTERPRETATION:  CLINICAL INFORMATION: Sepsis.    COMPARISON: CT the abdomen and pelvis from 2021 and CT the chest, abdomen, and pelvis from 10/17/2019.    CONTRAST/COMPLICATIONS:  IV Contrast: None.  Oral Contrast: None.  Complications: None.    PROCEDURE:  CT of the Chest, Abdomen and Pelvis was performed.  Sagittal and coronal reformats were performed.    FINDINGS:  CHEST:  LUNGS AND LARGE AIRWAYS: Patent central airways. Rounded chronic atelectasis at the right and left lung base similar to that seen on 2021 and 10/17/2019. Mild emphysema. Mild biapical parenchymal scarring.  PLEURA: Small bilateral pleural effusions with associated pleural thickening, unchanged.  VESSELS: Normal caliber thoracic aorta. Atherosclerotic calcifications of the thoracic aorta, great vessels, and coronary arteries.  HEART: Heart size is normal. Small pericardial effusion, unchanged.  MEDIASTINUM AND MARLEEN: No lymphadenopathy.  CHEST WALL AND LOWER NECK: Surgical clips in the right side of the neck. Mild symmetric bilateral gynecomastia.    ABDOMEN AND PELVIS:  LIVER: Within normal limits.  BILE DUCTS: Normal caliber.  GALLBLADDER: Gallbladder mildly distended. No radiodense gallstones. Mild pericholecystic edema.  SPLEEN: Within normal limits.  PANCREAS: Mild pancreatic atrophy. Punctate calcification in the pancreatic head likely the sequela of chronic pancreatitis.  ADRENALS: Within normal limits.  KIDNEYS/URETERS: No right or left hydroureteronephrosis or nephrolithiasis. Nonspecific bilateral symmetric perinephric stranding.    BLADDER: Mild thickening of the urinary bladder walls despite underdistention with slight infiltration of the perivesicular fat.  REPRODUCTIVE ORGANS: Enlarged prostate gland.    BOWEL: No bowel obstruction. Appendix is normal. Mild colonic diverticulosis without evidence of diverticulitis.  PERITONEUM: No ascites or pneumoperitoneum. No mesenteric lymphadenopathy.  VESSELS: Atherosclerotic calcifications of the aortoiliac tree. Normal caliber abdominal aorta.  RETROPERITONEUM/LYMPH NODES: No lymphadenopathy.  ABDOMINAL WALL: Small fat-containing right inguinal hernia.  BONES: Degenerative changes of the spine.    IMPRESSION:  No interval change in lung findings compared to prior exam from 2021. Rounded chronic atelectasis at the lung bases and chronic small bilateral pleural effusions with associated pleural thickening.    Mildly distended gallbladder with mild pericholecystic edema. Correlate with LFTs. Sonographic evaluation may be considered, as clinically indicated.    Mild thickening of the urinary bladder walls despite underdistention with slight infiltration of the perivesicular fat which may in part be due to chronic outlet obstruction in the setting of an enlarged prostate gland, however, perivesicular fatty infiltration raises question of cystitis. Correlate with urinalysis.    Mild colonic diverticulosis without evidence of diverticulitis.    < end of copied text >      < from: NM Hepatobiliary Imaging (21 @ 12:49) >  EXAM:  NM HEPATOBILIARY IMG                          PROCEDURE DATE:  2021      INTERPRETATION:  RADIOPHARMACEUTICAL:  99mTc-Mebrofenin  DOSE: 3.3 mCi IV    CLINICAL INFORMATION: 86 year old male with fever, sepsis, lethargy, distended gallbladder, body aches, referred to evaluate for acute cholecystitis    TECHNIQUE: Dynamic images of the anterior abdomen were obtained for 1 hour immediately following radiotracer injection. Static images of the abdomen in the anterior, right anterior oblique and right lateral projections were also obtained.    COMPARISON: Previous hepatobiliary scan dated 10/21/2019.    FINDINGS: There is prompt uptake of the injected radiotracer by the hepatocytes. The gallbladder is first visualized at 40 minutes post tracer injection and bowel activity by 35 minutes. There is normal tracer clearance from the liver by the end of the study.    IMPRESSION: Normal hepatobiliary scan.    No scan evidence of acute cholecystitis.    < end of copied text >          
HPI: 85 yo WM (well known to me) PMH +CKD (IV) + dementia, afib, CHF, DM2, COPD who presented to the ED with fever (> 102), lethargy and worsening mental status. Pt was recently c/o N/V and saw GI who made diagnosis of gastroparesis.          PAST MEDICAL & SURGICAL HISTORY:  DM (diabetes mellitus)    Afib    Dementia    COPD (chronic obstructive pulmonary disease)    CKD (chronic kidney disease)    S/P carotid endarterectomy    History of lung biopsy        FAMILY HISTORY:  No CKD noted      Social History:  No current tobacco; no etoh nor drug abuse    MEDICATIONS  (STANDING):  albuterol/ipratropium for Nebulization 3 milliLiter(s) Nebulizer every 6 hours  aspirin enteric coated 325 milliGRAM(s) Oral daily  atorvastatin 40 milliGRAM(s) Oral at bedtime  azithromycin  IVPB 500 milliGRAM(s) IV Intermittent every 24 hours  budesonide 160 MICROgram(s)/formoterol 4.5 MICROgram(s) Inhaler 2 Puff(s) Inhalation two times a day  cefTRIAXone   IVPB 1000 milliGRAM(s) IV Intermittent every 24 hours  dextrose 40% Gel 15 Gram(s) Oral once  dextrose 5%. 1000 milliLiter(s) (50 mL/Hr) IV Continuous <Continuous>  dextrose 5%. 1000 milliLiter(s) (100 mL/Hr) IV Continuous <Continuous>  dextrose 50% Injectable 25 Gram(s) IV Push once  dextrose 50% Injectable 12.5 Gram(s) IV Push once  dextrose 50% Injectable 25 Gram(s) IV Push once  donepezil 5 milliGRAM(s) Oral at bedtime  glucagon  Injectable 1 milliGRAM(s) IntraMuscular once  heparin   Injectable 5000 Unit(s) SubCutaneous every 8 hours  insulin glargine Injectable (LANTUS) 10 Unit(s) SubCutaneous at bedtime  insulin glargine Injectable (LANTUS) 10 Unit(s) SubCutaneous once  insulin lispro (ADMELOG) corrective regimen sliding scale   SubCutaneous three times a day before meals  insulin lispro (ADMELOG) corrective regimen sliding scale   SubCutaneous at bedtime  levothyroxine 75 MICROGram(s) Oral daily  melatonin 5 milliGRAM(s) Oral at bedtime  memantine 5 milliGRAM(s) Oral daily  metoprolol tartrate 12.5 milliGRAM(s) Oral two times a day  montelukast 10 milliGRAM(s) Oral daily  multivitamin 1 Tablet(s) Oral daily  pantoprazole    Tablet 40 milliGRAM(s) Oral before breakfast  polyethylene glycol 3350 17 Gram(s) Oral daily  tiotropium 18 MICROgram(s) Capsule 1 Capsule(s) Inhalation daily    MEDICATIONS  (PRN):      Allergies    No Known Allergies    Intolerances        REVIEW OF SYSTEMS:    CONSTITUTIONAL: + fever, +weight loss, + fatigue  EYES: No eye pain, visual disturbances, or discharge  ENMT:  No difficulty hearing, tinnitus, vertigo; No sinus or throat pain  NECK: No pain or stiffness  BREASTS: No pain, masses, or nipple discharge  RESPIRATORY: No cough, wheezing, chills or hemoptysis; No shortness of breath  CARDIOVASCULAR: No chest pain, palpitations, dizziness, or leg swelling  GASTROINTESTINAL: + abdominal pain. +nausea, vomiting  GENITOURINARY: No dysuria, frequency, hematuria, or incontinence  NEUROLOGICAL: No headaches, memory loss, loss of strength, numbness, or tremors  SKIN: No itching, burning, rashes, or lesions   ENDOCRINE: No heat or cold intolerance; No hair loss  MUSCULOSKELETAL: No joint pain or swelling; No muscle, back, or extremity pain  PSYCHIATRIC: +mood swings        Vital Signs Last 24 Hrs  T(C): 36.9 (2021 08:03), Max: 39.1 (2021 22:15)  T(F): 98.4 (2021 08:03), Max: 102.4 (2021 22:15)  HR: 70 (2021 08:03) (70 - 97)  BP: 93/55 (2021 08:03) (93/55 - 127/65)  BP(mean): --  RR: 18 (2021 08:03) (17 - 20)  SpO2: 96% (2021 08:03) (96% - 99%)    PHYSICAL EXAM:    GENERAL: Appears chronically ill  HEAD:  Atraumatic, Normocephalic  EYES: Conjunctiva and sclera clear  ENMT:  Moist mucous membranes; no periorbital edema  NECK: Supple, No JVD  NERVOUS SYSTEM:  Awake, alert; demented;  intact and symmetric  CHEST/LUNG: Clear bilaterally  HEART: IRR, no rub  ABDOMEN: Soft, NT BS+  EXTREMITIES:  2+ Peripheral Pulses, No LE edema  SKIN: No rashes or lesions      LABS:                        11.9   14.57 )-----------( 172      ( 2021 08:13 )             37.5         139  |  98  |  76.0<H>  ----------------------------<  169<H>  3.8   |  28.0  |  2.12<H>    Ca    9.4      2021 08:13  Mg     2.6         TPro  7.6  /  Alb  3.8  /  TBili  2.3<H>  /  DBili  x   /  AST  222<H>  /  ALT  201<H>  /  AlkPhos  463<H>      PT/INR - ( 2021 23:30 )   PT: 13.5 sec;   INR: 1.17 ratio         PTT - ( 2021 23:30 )  PTT:29.3 sec  Urinalysis Basic - ( 2021 23:34 )    Color: Yellow / Appearance: Clear / S.010 / pH: x  Gluc: x / Ketone: Trace  / Bili: Negative / Urobili: Negative mg/dL   Blood: x / Protein: 30 mg/dL / Nitrite: Negative   Leuk Esterase: Trace / RBC: 0-2 /HPF / WBC 0-2   Sq Epi: x / Non Sq Epi: Occasional / Bacteria: Occasional      Magnesium, Serum: 2.6 mg/dL ( @ 08:13)      RADIOLOGY & ADDITIONAL TESTS:  < from: CT Chest No Cont (21 @ 04:53) >     EXAM:  CT ABDOMEN AND PELVIS                         EXAM:  CT CHEST                          PROCEDURE DATE:  2021          INTERPRETATION:  CLINICAL INFORMATION: Sepsis.    COMPARISON: CT the abdomen and pelvis from 2021 and CT the chest, abdomen, and pelvis from 10/17/2019.    CONTRAST/COMPLICATIONS:  IV Contrast: None.  Oral Contrast: None.  Complications: None.    PROCEDURE:  CT of the Chest, Abdomen and Pelvis was performed.  Sagittal and coronal reformats were performed.    FINDINGS:  CHEST:  LUNGS AND LARGE AIRWAYS: Patent central airways. Rounded chronic atelectasis at the right and left lung base similar to that seen on 2021 and 10/17/2019. Mild emphysema. Mild biapical parenchymal scarring.  PLEURA: Small bilateral pleural effusions with associated pleural thickening, unchanged.  VESSELS: Normal caliber thoracic aorta. Atherosclerotic calcifications of the thoracic aorta, great vessels, and coronary arteries.  HEART: Heart size is normal. Small pericardial effusion, unchanged.  MEDIASTINUM AND MARLEEN: No lymphadenopathy.  CHEST WALL AND LOWER NECK: Surgical clips in the right side of the neck. Mild symmetric bilateral gynecomastia.    ABDOMEN AND PELVIS:  LIVER: Within normal limits.  BILE DUCTS: Normal caliber.  GALLBLADDER: Gallbladder mildly distended. No radiodense gallstones. Mild pericholecystic edema.  SPLEEN: Within normal limits.  PANCREAS: Mild pancreatic atrophy. Punctate calcification in the pancreatic head likely the sequela of chronic pancreatitis.  ADRENALS: Within normal limits.  KIDNEYS/URETERS: No right or left hydroureteronephrosis or nephrolithiasis. Nonspecific bilateral symmetric perinephric stranding.    BLADDER: Mild thickening of the urinary bladder walls despite underdistention with slight infiltration of the perivesicular fat.  REPRODUCTIVE ORGANS: Enlarged prostate gland.    BOWEL: No bowel obstruction. Appendix is normal. Mild colonic diverticulosis without evidence of diverticulitis.  PERITONEUM: No ascites or pneumoperitoneum. No mesenteric lymphadenopathy.  VESSELS: Atherosclerotic calcifications of the aortoiliac tree. Normal caliber abdominal aorta.  RETROPERITONEUM/LYMPH NODES: No lymphadenopathy.  ABDOMINAL WALL: Small fat-containing right inguinal hernia.  BONES: Degenerative changes of the spine.    IMPRESSION:  No interval change in lung findings compared to prior exam from 2021. Rounded chronic atelectasis at the lung bases and chronic small bilateral pleural effusions with associated pleural thickening.    Mildly distended gallbladder with mild pericholecystic edema. Correlate with LFTs. Sonographic evaluation may be considered, as clinically indicated.    Mild thickening of the urinary bladder walls despite underdistention with slight infiltration of the perivesicular fat which may in part be due to chronic outlet obstruction in the setting of an enlarged prostate gland, however, perivesicular fatty infiltration raises question of cystitis. Correlate with urinalysis.    Mild colonic diverticulosis without evidence of diverticulitis.    < end of copied text >

## 2021-05-04 NOTE — BRIEF OPERATIVE NOTE - OPERATION/FINDINGS
EGD: Retained food in stomach. Gastritis.   EUS: CBD sludge, stone. Gall bladder sludge  ERCP: Bile duct cannulated with ease. Then biliary sphincterotomy performed. Balloon sweep extraction of small stone

## 2021-05-04 NOTE — BRIEF OPERATIVE NOTE - NSICDXBRIEFPROCEDURE_GEN_ALL_CORE_FT
PROCEDURES:  EGD with biopsy 04-May-2021 13:02:27  Andrade Horner  Endoscopic ultrasound of bile duct 04-May-2021 13:02:41  Andrade Horner  ERCP, with sphincterotomy and calculus removal 04-May-2021 13:02:56  Andrade Horner

## 2021-05-04 NOTE — PROGRESS NOTE ADULT - ASSESSMENT
Patient with abdominal pain, elevated LFTs, abnormal MRI with abrupt cut off. LFTs are improving. Will proceed with EGD/EUS/ERCP if needed.

## 2021-05-04 NOTE — BRIEF OPERATIVE NOTE - NSICDXBRIEFPOSTOP_GEN_ALL_CORE_FT
POST-OP DIAGNOSIS:  Retained food in stomach 04-May-2021 13:03:41  Andrade Horner  Bile duct stone 04-May-2021 13:04:30  Andrade Horner  Gallbladder sludge 04-May-2021 13:05:45  Andrade Horner

## 2021-05-04 NOTE — PROGRESS NOTE ADULT - SUBJECTIVE AND OBJECTIVE BOX
INTERVAL HPI/OVERNIGHT EVENTS: FU for abnormal LFTs. No complaints. Spoke to daughter at length.    MEDICATIONS  (STANDING):  albuterol/ipratropium for Nebulization 3 milliLiter(s) Nebulizer every 6 hours  aspirin  chewable 81 milliGRAM(s) Oral daily  budesonide 160 MICROgram(s)/formoterol 4.5 MICROgram(s) Inhaler 2 Puff(s) Inhalation two times a day  dextrose 40% Gel 15 Gram(s) Oral once  dextrose 5%. 1000 milliLiter(s) (50 mL/Hr) IV Continuous <Continuous>  dextrose 5%. 1000 milliLiter(s) (100 mL/Hr) IV Continuous <Continuous>  dextrose 50% Injectable 25 Gram(s) IV Push once  dextrose 50% Injectable 12.5 Gram(s) IV Push once  dextrose 50% Injectable 25 Gram(s) IV Push once  donepezil 5 milliGRAM(s) Oral at bedtime  glucagon  Injectable 1 milliGRAM(s) IntraMuscular once  indomethacin Suppository 100 milliGRAM(s) Rectal once  insulin glargine Injectable (LANTUS) 15 Unit(s) SubCutaneous at bedtime  insulin lispro (ADMELOG) corrective regimen sliding scale   SubCutaneous three times a day before meals  insulin lispro (ADMELOG) corrective regimen sliding scale   SubCutaneous at bedtime  insulin lispro Injectable (ADMELOG) 4 Unit(s) SubCutaneous three times a day before meals  levothyroxine 75 MICROGram(s) Oral daily  melatonin 5 milliGRAM(s) Oral at bedtime  memantine 5 milliGRAM(s) Oral daily  metoprolol tartrate 12.5 milliGRAM(s) Oral two times a day  montelukast 10 milliGRAM(s) Oral daily  multivitamin 1 Tablet(s) Oral daily  pantoprazole    Tablet 40 milliGRAM(s) Oral before breakfast  polyethylene glycol 3350 17 Gram(s) Oral daily  senna 2 Tablet(s) Oral at bedtime  tiotropium 18 MICROgram(s) Capsule 1 Capsule(s) Inhalation daily    MEDICATIONS  (PRN):  bisacodyl Suppository 10 milliGRAM(s) Rectal daily PRN Constipation      Allergies    No Known Allergies    Intolerances        Vital Signs Last 24 Hrs  T(C): 36.8 (04 May 2021 08:32), Max: 37.2 (04 May 2021 04:31)  T(F): 98.3 (04 May 2021 08:32), Max: 98.9 (04 May 2021 04:31)  HR: 82 (04 May 2021 08:32) (65 - 85)  BP: 112/64 (04 May 2021 08:32) (97/61 - 121/71)  BP(mean): --  RR: 18 (04 May 2021 08:32) (18 - 18)  SpO2: 96% (04 May 2021 08:32) (91% - 100%)    LABS:                        11.1   8.10  )-----------( 193      ( 04 May 2021 08:45 )             34.7     05-03    139  |  102  |  45.0<H>  ----------------------------<  111<H>  4.9   |  25.0  |  2.10<H>    Ca    9.7      03 May 2021 07:42    TPro  6.9  /  Alb  3.3  /  TBili  0.7  /  DBili  0.3  /  AST  30  /  ALT  50<H>  /  AlkPhos  358<H>  05-03          RADIOLOGY & ADDITIONAL TESTS:  < from: MR MRCP No Cont (05.01.21 @ 08:27) >     EXAM:  MR MRCP                          PROCEDURE DATE:  05/01/2021          INTERPRETATION:  CLINICAL INFORMATION: Elevated liver enzymes. Right sided abdominal pain. Negative HIDA scan.    COMPARISON: No prior abdominal is available for comparison. Reference is made with a previous abdominal/pelvic CT dated 4/28/2021.    CONTRAST/COMPLICATIONS:  IV Contrast: None  Oral Contrast: None  Complications: None    PROCEDURE:  MRI/MRCP was performed. Radial and 3D MRCP sequences were obtained.    FINDINGS:  LOWER CHEST: Abnormal signal in the lung bases bilaterally; please see the previous CT for further detail.    LIVER: Nodular contour of the liver, suggestive of cirrhosis. No suspicious lesion is identified. Tiny brightly T2 hyperintense lesion in the left hepatic lobe, suggestive of a cyst or hemangioma.  BILE DUCTS: Mildly dilated common bile duct measures up to 10 mm in caliber with an abrupt transition at the very distal common bile duct. No evidence for choledocholithiasis.  GALLBLADDER: No evidence for gallstone. Nonspecific slightly thickened gallbladder wall.  SPLEEN: Within normal limits.  PANCREAS: A few brightly T2 hyperintense lesions in the pancreas with the largest the measuring up to 1.0 cm (image 15 series 5). These may represent nonspecific cystic neoplasm of the pancreas. If clinically indicated, follow-up abdominal MR in 12 months may be pursued to ensure stability. The main pancreatic duct maintains normal caliber without dilatation.  ADRENALS: Within normal limits.  KIDNEYS/URETERS: Unremarkable except for a tiny T2 hyperintense lesion in the left kidney, representing a probable cyst.    VISUALIZED PORTIONS:  BOWEL: Within normal limits.  PERITONEUM: No ascites.  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  RETROPERITONEUM/LYMPH NODES: No lymphadenopathy.  ABDOMINAL WALL: Within normal limits.  BONES: Within normal limits.    IMPRESSION:  No evidence for choledocholithiasis. Mildly dilated common bile duct measures up to 10 mm in caliber with an abrupt transition at the very distal common bile duct. This may represent a common bile duct stricture. A malignant stricture cannot be excluded.    No evidence for gallstone. Nonspecific slightly thickened gallbladder wall.    Nodular contour of the liver, suggestive of cirrhosis. No suspicious lesion is identified.    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            FADI RYAN MD; Attending Radiologist  This document has been electronically signed. May  1 2021  8:56AM    < end of copied text >

## 2021-05-04 NOTE — CONSULT NOTE ADULT - CONSULT REQUESTED DATE/TIME
28-Apr-2021 11:38
28-Apr-2021 03:14
28-Apr-2021 09:14
28-Apr-2021 11:16
28-Apr-2021 13:34
02-May-2021

## 2021-05-04 NOTE — PROGRESS NOTE ADULT - SUBJECTIVE AND OBJECTIVE BOX
NEPHROLOGY INTERVAL HPI/OVERNIGHT EVENTS:  Seen earlier today   GI follow up and ERCP  noted from today    Operative Findings:  · Operative Findings	EGD: Retained food in stomach. Gastritis.   EUS: CBD sludge, stone. Gall bladder sludge  ERCP: Bile duct cannulated with ease. Then biliary sphincterotomy performed. Balloon sweep extraction of small stone    MEDICATIONS  (STANDING):  albuterol/ipratropium for Nebulization 3 milliLiter(s) Nebulizer every 6 hours  aspirin  chewable 81 milliGRAM(s) Oral daily  budesonide 160 MICROgram(s)/formoterol 4.5 MICROgram(s) Inhaler 2 Puff(s) Inhalation two times a day  dextrose 40% Gel 15 Gram(s) Oral once  dextrose 5%. 1000 milliLiter(s) (50 mL/Hr) IV Continuous <Continuous>  dextrose 5%. 1000 milliLiter(s) (100 mL/Hr) IV Continuous <Continuous>  dextrose 50% Injectable 25 Gram(s) IV Push once  dextrose 50% Injectable 12.5 Gram(s) IV Push once  dextrose 50% Injectable 25 Gram(s) IV Push once  donepezil 5 milliGRAM(s) Oral at bedtime  glucagon  Injectable 1 milliGRAM(s) IntraMuscular once  indomethacin Suppository 100 milliGRAM(s) Rectal once  insulin glargine Injectable (LANTUS) 15 Unit(s) SubCutaneous at bedtime  insulin lispro (ADMELOG) corrective regimen sliding scale   SubCutaneous three times a day before meals  insulin lispro (ADMELOG) corrective regimen sliding scale   SubCutaneous at bedtime  insulin lispro Injectable (ADMELOG) 4 Unit(s) SubCutaneous three times a day before meals  levothyroxine 75 MICROGram(s) Oral daily  melatonin 5 milliGRAM(s) Oral at bedtime  memantine 5 milliGRAM(s) Oral daily  metoprolol tartrate 12.5 milliGRAM(s) Oral two times a day  montelukast 10 milliGRAM(s) Oral daily  multivitamin 1 Tablet(s) Oral daily  pantoprazole    Tablet 40 milliGRAM(s) Oral before breakfast  polyethylene glycol 3350 17 Gram(s) Oral daily  senna 2 Tablet(s) Oral at bedtime  tiotropium 18 MICROgram(s) Capsule 1 Capsule(s) Inhalation daily    MEDICATIONS  (PRN):  bisacodyl Suppository 10 milliGRAM(s) Rectal daily PRN Constipation      Allergies    No Known Allergies    Intolerances        Vital Signs Last 24 Hrs  T(C): 36.6 (04 May 2021 13:09), Max: 37.2 (04 May 2021 04:31)  T(F): 97.9 (04 May 2021 13:09), Max: 98.9 (04 May 2021 04:31)  HR: 76 (04 May 2021 13:09) (71 - 85)  BP: 152/82 (04 May 2021 13:09) (97/61 - 152/82)  BP(mean): --  RR: 18 (04 May 2021 13:09) (18 - 18)  SpO2: 97% (04 May 2021 13:09) (91% - 99%)  Daily     Daily   I&O's Detail    I&O's Summary      PHYSICAL EXAM:    EYES: Conjunctiva and sclera clear  ENMT:  Moist mucous membranes; no periorbital edema  NECK: Supple, No JVD  NERVOUS SYSTEM: Lethargic, demented  CHEST/LUNG: Clear bilaterally  HEART: IRR, no rub  ABDOMEN: Soft, NT BS+  EXTREMITIES:  2+ Peripheral Pulses, No LE edema    LABS:                        11.1   8.10  )-----------( 193      ( 04 May 2021 08:45 )             34.7     05-04    138  |  103  |  46.0<H>  ----------------------------<  107<H>  5.3   |  25.0  |  2.06<H>    Ca    9.4      04 May 2021 08:45    TPro  7.0  /  Alb  3.6  /  TBili  0.7  /  DBili  0.3  /  AST  30  /  ALT  44<H>  /  AlkPhos  360<H>  05-04    PT/INR - ( 04 May 2021 08:45 )   PT: 11.3 sec;   INR: 0.97 ratio         PTT - ( 04 May 2021 08:45 )  PTT:31.3 sec            RADIOLOGY & ADDITIONAL TESTS:

## 2021-05-04 NOTE — PROGRESS NOTE ADULT - ASSESSMENT
CKD(IV): febrile illness, elevated LFTs  Renal function stable   - avoid potential nephrotoxins  - Torsemide on hold for now   - antibiotics as per ID; completed  - No hydro on CT abdomen 4/28   - Clinically feels better   - GI   follow up and ERCP results noted   Renal function stable

## 2021-05-05 ENCOUNTER — TRANSCRIPTION ENCOUNTER (OUTPATIENT)
Age: 86
End: 2021-05-05

## 2021-05-05 VITALS
TEMPERATURE: 98 F | OXYGEN SATURATION: 100 % | DIASTOLIC BLOOD PRESSURE: 69 MMHG | RESPIRATION RATE: 18 BRPM | HEART RATE: 73 BPM | SYSTOLIC BLOOD PRESSURE: 109 MMHG

## 2021-05-05 LAB
ALBUMIN SERPL ELPH-MCNC: 3.5 G/DL — SIGNIFICANT CHANGE UP (ref 3.3–5.2)
ALP SERPL-CCNC: 327 U/L — HIGH (ref 40–120)
ALT FLD-CCNC: 44 U/L — HIGH
ANION GAP SERPL CALC-SCNC: 11 MMOL/L — SIGNIFICANT CHANGE UP (ref 5–17)
AST SERPL-CCNC: 28 U/L — SIGNIFICANT CHANGE UP
BILIRUB SERPL-MCNC: 0.6 MG/DL — SIGNIFICANT CHANGE UP (ref 0.4–2)
BUN SERPL-MCNC: 48 MG/DL — HIGH (ref 8–20)
CALCIUM SERPL-MCNC: 9.6 MG/DL — SIGNIFICANT CHANGE UP (ref 8.6–10.2)
CHLORIDE SERPL-SCNC: 103 MMOL/L — SIGNIFICANT CHANGE UP (ref 98–107)
CO2 SERPL-SCNC: 23 MMOL/L — SIGNIFICANT CHANGE UP (ref 22–29)
CREAT SERPL-MCNC: 2.24 MG/DL — HIGH (ref 0.5–1.3)
GLUCOSE BLDC GLUCOMTR-MCNC: 125 MG/DL — HIGH (ref 70–99)
GLUCOSE BLDC GLUCOMTR-MCNC: 228 MG/DL — HIGH (ref 70–99)
GLUCOSE SERPL-MCNC: 157 MG/DL — HIGH (ref 70–99)
HCT VFR BLD CALC: 33.4 % — LOW (ref 39–50)
HGB BLD-MCNC: 10.5 G/DL — LOW (ref 13–17)
MCHC RBC-ENTMCNC: 29 PG — SIGNIFICANT CHANGE UP (ref 27–34)
MCHC RBC-ENTMCNC: 31.4 GM/DL — LOW (ref 32–36)
MCV RBC AUTO: 92.3 FL — SIGNIFICANT CHANGE UP (ref 80–100)
PLATELET # BLD AUTO: 197 K/UL — SIGNIFICANT CHANGE UP (ref 150–400)
POTASSIUM SERPL-MCNC: 4.8 MMOL/L — SIGNIFICANT CHANGE UP (ref 3.5–5.3)
POTASSIUM SERPL-SCNC: 4.8 MMOL/L — SIGNIFICANT CHANGE UP (ref 3.5–5.3)
PROT SERPL-MCNC: 7 G/DL — SIGNIFICANT CHANGE UP (ref 6.6–8.7)
RBC # BLD: 3.62 M/UL — LOW (ref 4.2–5.8)
RBC # FLD: 14.2 % — SIGNIFICANT CHANGE UP (ref 10.3–14.5)
SODIUM SERPL-SCNC: 137 MMOL/L — SIGNIFICANT CHANGE UP (ref 135–145)
WBC # BLD: 11.56 K/UL — HIGH (ref 3.8–10.5)
WBC # FLD AUTO: 11.56 K/UL — HIGH (ref 3.8–10.5)

## 2021-05-05 PROCEDURE — 80074 ACUTE HEPATITIS PANEL: CPT

## 2021-05-05 PROCEDURE — A9537: CPT

## 2021-05-05 PROCEDURE — 76705 ECHO EXAM OF ABDOMEN: CPT

## 2021-05-05 PROCEDURE — 96366 THER/PROPH/DIAG IV INF ADDON: CPT

## 2021-05-05 PROCEDURE — 81001 URINALYSIS AUTO W/SCOPE: CPT

## 2021-05-05 PROCEDURE — 74176 CT ABD & PELVIS W/O CONTRAST: CPT

## 2021-05-05 PROCEDURE — 82803 BLOOD GASES ANY COMBINATION: CPT

## 2021-05-05 PROCEDURE — 83605 ASSAY OF LACTIC ACID: CPT

## 2021-05-05 PROCEDURE — 70450 CT HEAD/BRAIN W/O DYE: CPT

## 2021-05-05 PROCEDURE — 85027 COMPLETE CBC AUTOMATED: CPT

## 2021-05-05 PROCEDURE — 84443 ASSAY THYROID STIM HORMONE: CPT

## 2021-05-05 PROCEDURE — 74181 MRI ABDOMEN W/O CONTRAST: CPT

## 2021-05-05 PROCEDURE — 87635 SARS-COV-2 COVID-19 AMP PRB: CPT

## 2021-05-05 PROCEDURE — 80053 COMPREHEN METABOLIC PANEL: CPT

## 2021-05-05 PROCEDURE — 71045 X-RAY EXAM CHEST 1 VIEW: CPT

## 2021-05-05 PROCEDURE — 86140 C-REACTIVE PROTEIN: CPT

## 2021-05-05 PROCEDURE — C1773: CPT

## 2021-05-05 PROCEDURE — 78226 HEPATOBILIARY SYSTEM IMAGING: CPT

## 2021-05-05 PROCEDURE — 74330 X-RAY BILE/PANC ENDOSCOPY: CPT

## 2021-05-05 PROCEDURE — 80076 HEPATIC FUNCTION PANEL: CPT

## 2021-05-05 PROCEDURE — 97116 GAIT TRAINING THERAPY: CPT

## 2021-05-05 PROCEDURE — 94640 AIRWAY INHALATION TREATMENT: CPT

## 2021-05-05 PROCEDURE — 99232 SBSQ HOSP IP/OBS MODERATE 35: CPT

## 2021-05-05 PROCEDURE — 36415 COLL VENOUS BLD VENIPUNCTURE: CPT

## 2021-05-05 PROCEDURE — 0225U NFCT DS DNA&RNA 21 SARSCOV2: CPT

## 2021-05-05 PROCEDURE — 83735 ASSAY OF MAGNESIUM: CPT

## 2021-05-05 PROCEDURE — 80061 LIPID PANEL: CPT

## 2021-05-05 PROCEDURE — 97530 THERAPEUTIC ACTIVITIES: CPT

## 2021-05-05 PROCEDURE — 96365 THER/PROPH/DIAG IV INF INIT: CPT

## 2021-05-05 PROCEDURE — 84145 PROCALCITONIN (PCT): CPT

## 2021-05-05 PROCEDURE — 82728 ASSAY OF FERRITIN: CPT

## 2021-05-05 PROCEDURE — 83036 HEMOGLOBIN GLYCOSYLATED A1C: CPT

## 2021-05-05 PROCEDURE — 87040 BLOOD CULTURE FOR BACTERIA: CPT

## 2021-05-05 PROCEDURE — 85025 COMPLETE CBC W/AUTO DIFF WBC: CPT

## 2021-05-05 PROCEDURE — C9399: CPT

## 2021-05-05 PROCEDURE — 99239 HOSP IP/OBS DSCHRG MGMT >30: CPT

## 2021-05-05 PROCEDURE — 87086 URINE CULTURE/COLONY COUNT: CPT

## 2021-05-05 PROCEDURE — 85730 THROMBOPLASTIN TIME PARTIAL: CPT

## 2021-05-05 PROCEDURE — 83690 ASSAY OF LIPASE: CPT

## 2021-05-05 PROCEDURE — 82962 GLUCOSE BLOOD TEST: CPT

## 2021-05-05 PROCEDURE — 85610 PROTHROMBIN TIME: CPT

## 2021-05-05 PROCEDURE — 86769 SARS-COV-2 COVID-19 ANTIBODY: CPT

## 2021-05-05 PROCEDURE — 83880 ASSAY OF NATRIURETIC PEPTIDE: CPT

## 2021-05-05 PROCEDURE — 71250 CT THORAX DX C-: CPT

## 2021-05-05 PROCEDURE — 88342 IMHCHEM/IMCYTCHM 1ST ANTB: CPT

## 2021-05-05 PROCEDURE — 84484 ASSAY OF TROPONIN QUANT: CPT

## 2021-05-05 PROCEDURE — 80048 BASIC METABOLIC PNL TOTAL CA: CPT

## 2021-05-05 PROCEDURE — 80307 DRUG TEST PRSMV CHEM ANLYZR: CPT

## 2021-05-05 PROCEDURE — 82248 BILIRUBIN DIRECT: CPT

## 2021-05-05 PROCEDURE — 93005 ELECTROCARDIOGRAM TRACING: CPT

## 2021-05-05 PROCEDURE — 88305 TISSUE EXAM BY PATHOLOGIST: CPT

## 2021-05-05 PROCEDURE — 99285 EMERGENCY DEPT VISIT HI MDM: CPT | Mod: 25

## 2021-05-05 RX ORDER — INSULIN GLARGINE 100 [IU]/ML
15 INJECTION, SOLUTION SUBCUTANEOUS
Qty: 0 | Refills: 0 | DISCHARGE
Start: 2021-05-05

## 2021-05-05 RX ORDER — POLYETHYLENE GLYCOL 3350 17 G/17G
17 POWDER, FOR SOLUTION ORAL
Qty: 238 | Refills: 0
Start: 2021-05-05 | End: 2021-05-18

## 2021-05-05 RX ORDER — LANOLIN ALCOHOL/MO/W.PET/CERES
1 CREAM (GRAM) TOPICAL
Qty: 14 | Refills: 0
Start: 2021-05-05 | End: 2021-05-18

## 2021-05-05 RX ORDER — DONEPEZIL HYDROCHLORIDE 10 MG/1
1 TABLET, FILM COATED ORAL
Qty: 14 | Refills: 0
Start: 2021-05-05 | End: 2021-05-18

## 2021-05-05 RX ORDER — LEVOTHYROXINE SODIUM 125 MCG
1 TABLET ORAL
Qty: 14 | Refills: 0
Start: 2021-05-05 | End: 2021-05-18

## 2021-05-05 RX ORDER — METOPROLOL TARTRATE 50 MG
0.5 TABLET ORAL
Qty: 14 | Refills: 0
Start: 2021-05-05 | End: 2021-05-18

## 2021-05-05 RX ORDER — SENNA PLUS 8.6 MG/1
2 TABLET ORAL
Qty: 28 | Refills: 0
Start: 2021-05-05 | End: 2021-05-18

## 2021-05-05 RX ORDER — INSULIN LISPRO 100/ML
4 VIAL (ML) SUBCUTANEOUS
Qty: 0 | Refills: 0 | DISCHARGE
Start: 2021-05-05

## 2021-05-05 RX ORDER — ROSUVASTATIN CALCIUM 5 MG/1
1 TABLET ORAL
Qty: 0 | Refills: 0 | DISCHARGE
Start: 2021-05-05

## 2021-05-05 RX ORDER — UMECLIDINIUM BROMIDE AND VILANTEROL TRIFENATATE 62.5; 25 UG/1; UG/1
1 POWDER RESPIRATORY (INHALATION)
Qty: 1 | Refills: 0
Start: 2021-05-05 | End: 2021-05-18

## 2021-05-05 RX ORDER — PANTOPRAZOLE SODIUM 20 MG/1
1 TABLET, DELAYED RELEASE ORAL
Qty: 14 | Refills: 0
Start: 2021-05-05 | End: 2021-05-18

## 2021-05-05 RX ORDER — MONTELUKAST 4 MG/1
1 TABLET, CHEWABLE ORAL
Qty: 14 | Refills: 0
Start: 2021-05-05 | End: 2021-05-18

## 2021-05-05 RX ORDER — ASPIRIN/CALCIUM CARB/MAGNESIUM 324 MG
1 TABLET ORAL
Qty: 0 | Refills: 0 | DISCHARGE
Start: 2021-05-05

## 2021-05-05 RX ORDER — LANOLIN ALCOHOL/MO/W.PET/CERES
1 CREAM (GRAM) TOPICAL
Qty: 0 | Refills: 0 | DISCHARGE
Start: 2021-05-05

## 2021-05-05 RX ORDER — MEMANTINE HYDROCHLORIDE 10 MG/1
1 TABLET ORAL
Qty: 14 | Refills: 0
Start: 2021-05-05 | End: 2021-05-18

## 2021-05-05 RX ORDER — ASPIRIN/CALCIUM CARB/MAGNESIUM 324 MG
1 TABLET ORAL
Qty: 14 | Refills: 0
Start: 2021-05-05 | End: 2021-05-18

## 2021-05-05 RX ORDER — METOCLOPRAMIDE HCL 10 MG
1 TABLET ORAL
Qty: 56 | Refills: 0
Start: 2021-05-05 | End: 2021-05-18

## 2021-05-05 RX ADMIN — Medication 10 MILLIGRAM(S): at 11:29

## 2021-05-05 RX ADMIN — MEMANTINE HYDROCHLORIDE 5 MILLIGRAM(S): 10 TABLET ORAL at 11:29

## 2021-05-05 RX ADMIN — Medication 3 MILLILITER(S): at 15:01

## 2021-05-05 RX ADMIN — Medication 75 MICROGRAM(S): at 06:26

## 2021-05-05 RX ADMIN — PANTOPRAZOLE SODIUM 40 MILLIGRAM(S): 20 TABLET, DELAYED RELEASE ORAL at 06:25

## 2021-05-05 RX ADMIN — Medication 4: at 07:54

## 2021-05-05 RX ADMIN — Medication 12.5 MILLIGRAM(S): at 06:26

## 2021-05-05 RX ADMIN — Medication 1 TABLET(S): at 11:29

## 2021-05-05 RX ADMIN — Medication 4 UNIT(S): at 11:30

## 2021-05-05 RX ADMIN — Medication 10 MILLIGRAM(S): at 06:26

## 2021-05-05 RX ADMIN — MONTELUKAST 10 MILLIGRAM(S): 4 TABLET, CHEWABLE ORAL at 11:29

## 2021-05-05 RX ADMIN — Medication 81 MILLIGRAM(S): at 11:29

## 2021-05-05 RX ADMIN — Medication 4 UNIT(S): at 07:55

## 2021-05-05 RX ADMIN — POLYETHYLENE GLYCOL 3350 17 GRAM(S): 17 POWDER, FOR SOLUTION ORAL at 11:30

## 2021-05-05 NOTE — PROGRESS NOTE ADULT - ATTENDING COMMENTS
The patient was seen and examined today.  No particular complaints offered.  The patient is denying any symptoms I discussed with the patient daughter at length.  Low-fat diet recommended.  Continue Reglan for gastroparesis.  Follow-up with Dr. Stewart.

## 2021-05-05 NOTE — CHART NOTE - NSCHARTNOTEFT_GEN_A_CORE
HIDA reviewed.   HIDA Neg.  No concern for acute cholecystitis.  Surgery will sign off
Pt examined sitting up in bed eating comfortably  No distress, LFTs continue to improve, tolerating Metoclopramide without any dysdiadochokinesia or tremors   Discussed discharge instructions with pt and with pts daughter at bedside as well as GI  Planned for dsc home with outpt follow up with GI  Hemodynamically stable, dsc home with home services
Ethics consult initiated. Case discussed with Dr. Simmons. This is an 86 year old male with vascular dementia, afib (not on ac), CKD, DM2, COPD who presented with abdominal pain, fever and worsening mental status found to have SIRS criteria. Negative HIDA scan. As per GI, MRCP revealed abrupt cut off in the distal CBD with LFTs improving, likely CBD stone. Patient scheduled for EUS/ERCP on 5/4/21 to evaluate further. Ethics requested for interdisciplinary communication between patient/family and team.      Discussions with MARK Lopez (Patient’s wife) and Tony Lopez (Patient) on 5/3/21: Introduced role of ethicist. Brief conversation with patient. He likes to tell jokes, which is a cover for his dementia. Separate conversation with Mrs. Lopez. We talked specifically about team using certain words around her . She stated that “cancer” upsets him. That after the team leaves he perseverates on this word for hours and the family is left to calm him down. Left my number for his daughter/HCP, Felicita if she has any questions.      Discussion with RAISSA Lopez (Daughter/HCP) via phone on [phone] on 5/3/21: Very long discussion with Felicita. Felicita very upset that Ethics was called. Explained role. She understood. We discussed her father in detail. How up until 2017 he worked more than full time. She told me that in 2018 he was on hospice, but he rallied. She stated that she is grateful for every moment since then. We discussed how certain words frighten him (as discussed earlier with her step-mom): cancer, surgery and intubation. She stated that she does not want to withhold information from her dad. She stated that if he does have cancer, they will address with him as a family, but not frighten him before knowing the result. We discussed consent and assent. Since her father is not end stage dementia, he still participates in his care/decisions. We discussed that patients without capacity still have the right to refuse. Felicita said she wants to preserve her father’s dignity. Answered all questions. Reassured Felicita that I am always available for any questions.      We should also adhere to the principles of beneficence and nonmaleficence. Nonmaleficence obligates us to refrain from causing harm to patients while treating them. In this case, the patient is frightened by certain terminology, which is causes more harm than benefit as it causes him anxiety. Communication is central in the practitioner-patient relationship. This is not only owed to patients. It also extends to a patient’s surrogate decision-maker when a patient loses capacity. Sometimes surrogate decision makers can create several concerns for the medical team. However, surrogates are sometimes thrown to a difficult situation and can bring their fears and anxieties to an already delicate situation. Understanding the root of the problem can help diffuse these difficult moments. Practical tools such extensive communication with the surrogate and active listening can help in this regard.     Ethics will continue to assist with interdisciplinary communication.      Full consult to follow.
Source: Patient [ ]  Family [ ]   other [x] RN    Current Diet:   Diet, Consistent Carbohydrate w/Evening Snack (05-05-21 @ 09:41)    Patient reports [ ] nausea  [ ] vomiting [ ] diarrhea [ ] constipation  [ ]chewing problems [ ] swallowing issues  [ ] other:     PO intake:  < 50% [ ]   50-75%  [ ]   %  [ ]  other:  ?accuracy of PO. Per RN Pt eating well, per family Pt eating poorly. Pt unable to provide history    Source for PO intake [ ] Patient [x] family [x] chart [x] staff [ ] other    Current Weight:   (5/3)  144lbs per family     % Weight Change: No recent weight documented     Pertinent Medications: MEDICATIONS  (STANDING):  albuterol/ipratropium for Nebulization 3 milliLiter(s) Nebulizer every 6 hours  aspirin  chewable 81 milliGRAM(s) Oral daily  budesonide 160 MICROgram(s)/formoterol 4.5 MICROgram(s) Inhaler 2 Puff(s) Inhalation two times a day  dextrose 40% Gel 15 Gram(s) Oral once  dextrose 5%. 1000 milliLiter(s) (50 mL/Hr) IV Continuous <Continuous>  dextrose 5%. 1000 milliLiter(s) (100 mL/Hr) IV Continuous <Continuous>  dextrose 50% Injectable 25 Gram(s) IV Push once  dextrose 50% Injectable 12.5 Gram(s) IV Push once  dextrose 50% Injectable 25 Gram(s) IV Push once  donepezil 5 milliGRAM(s) Oral at bedtime  glucagon  Injectable 1 milliGRAM(s) IntraMuscular once  insulin glargine Injectable (LANTUS) 15 Unit(s) SubCutaneous at bedtime  insulin lispro (ADMELOG) corrective regimen sliding scale   SubCutaneous three times a day before meals  insulin lispro (ADMELOG) corrective regimen sliding scale   SubCutaneous at bedtime  insulin lispro Injectable (ADMELOG) 4 Unit(s) SubCutaneous three times a day before meals  levothyroxine 75 MICROGram(s) Oral daily  melatonin 5 milliGRAM(s) Oral at bedtime  memantine 5 milliGRAM(s) Oral daily  metoclopramide 10 milliGRAM(s) Oral Before meals and at bedtime  metoprolol tartrate 12.5 milliGRAM(s) Oral two times a day  montelukast 10 milliGRAM(s) Oral daily  multivitamin 1 Tablet(s) Oral daily  pantoprazole    Tablet 40 milliGRAM(s) Oral before breakfast  polyethylene glycol 3350 17 Gram(s) Oral daily  senna 2 Tablet(s) Oral at bedtime  tiotropium 18 MICROgram(s) Capsule 1 Capsule(s) Inhalation daily    MEDICATIONS  (PRN):  bisacodyl Suppository 10 milliGRAM(s) Rectal daily PRN Constipation    Pertinent Labs: CBC Full  -  ( 05 May 2021 10:25 )  WBC Count : 11.56 K/uL  RBC Count : 3.62 M/uL  Hemoglobin : 10.5 g/dL  Hematocrit : 33.4 %  Platelet Count - Automated : 197 K/uL  Mean Cell Volume : 92.3 fl  Mean Cell Hemoglobin : 29.0 pg  Mean Cell Hemoglobin Concentration : 31.4 gm/dL  05-05 Na137 mmol/L Glu 157 mg/dL<H> K+ 4.8 mmol/L Cr  2.24 mg/dL<H> BUN 48.0 mg/dL<H> Phos n/a   Alb 3.5 g/dL PAB n/a       Skin: No skin breakdown/edema noted     Nutrition focused physical exam conducted - found signs of malnutrition [ ]absent [x]present    Subcutaneous fat loss: [x] Orbital fat pads region, [x]Buccal fat region, [ ]Triceps region,  [ ]Ribs region    Muscle wasting: [x]Temples region, [ ]Clavicle region, [ ]Shoulder region, [ ]Scapula region, [ ]Interosseous region,  [ ]thigh region, [ ]Calf region    Estimated Needs:   [x] no change since previous assessment  [ ] recalculated:     Current Nutrition Diagnosis: Pt remains at high nutrition risk secondary to malnutrition (severe chronic) related to inability to tolerate sufficient protein-energy needs 2/2 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 Pt s/p ERCP with sphincterotomy and calculus removal, EUS showed CBD sludge, stone. Aware ethics following regarding decision making 2/2 complex care.     Recommendations:   1) Encourage small, frequent meals  2) Continue Glucerna TID and MVI supplementation daily  3) RD to remain available    Monitoring and Evaluation:   [x] PO intake [x] Tolerance to diet prescription [X] Weights  [X] Follow up per protocol [X] Labs:

## 2021-05-05 NOTE — PROGRESS NOTE ADULT - PROBLEM SELECTOR PLAN 1
Most likely secondary to CBD stones.  Patient sitting comfortably, tolerating oral intake, s/p EGD revealing retained food in stomach, and gastritis. ERCP with sphincterotomy and balloon sweep, extracting one small stone. Gallbladder sludge noted.   Awaiting AM labs.  Continue Reglan for gastroparesis.  Continue Pantoprazole for mucosal cytoprotection  Advance diet as tolerated.
-resolving
decreasing. Check in am

## 2021-05-05 NOTE — PROGRESS NOTE ADULT - NUTRITIONAL ASSESSMENT
This patient has been assessed with a concern for Malnutrition and has been determined to have a diagnosis/diagnoses of Severe protein-calorie malnutrition.    This patient is being managed with:   Diet NPO after Midnight-     NPO Start Date: 03-May-2021   NPO Start Time: 23:59  Entered: May  3 2021 11:02AM    Diet Consistent Carbohydrate w/Evening Snack-  DASH/TLC {Sodium & Cholesterol Restricted} (DASH)  Supplement Feeding Modality:  Oral  Glucerna Shake Cans or Servings Per Day:  1       Frequency:  Three Times a day  Entered: Apr 28 2021 11:10AM    
This patient has been assessed with a concern for Malnutrition and has been determined to have a diagnosis/diagnoses of Severe protein-calorie malnutrition.    This patient is being managed with:   Diet Clear Liquid-  Entered: May  4 2021  1:24PM    
This patient has been assessed with a concern for Malnutrition and has been determined to have a diagnosis/diagnoses of Severe protein-calorie malnutrition.    This patient is being managed with:   Diet NPO after Midnight-     NPO Start Date: 03-May-2021   NPO Start Time: 23:59  Entered: May  3 2021  5:22PM    Diet NPO after Midnight-     NPO Start Date: 03-May-2021   NPO Start Time: 23:59  Entered: May  3 2021 11:02AM    Diet Consistent Carbohydrate w/Evening Snack-  DASH/TLC {Sodium & Cholesterol Restricted} (DASH)  Supplement Feeding Modality:  Oral  Glucerna Shake Cans or Servings Per Day:  1       Frequency:  Three Times a day  Entered: Apr 28 2021 11:10AM    
This patient has been assessed with a concern for Malnutrition and has been determined to have a diagnosis/diagnoses of Severe protein-calorie malnutrition.    This patient is being managed with:   Diet Clear Liquid-  Entered: May  4 2021  1:24PM    
This patient has been assessed with a concern for Malnutrition and has been determined to have a diagnosis/diagnoses of Severe protein-calorie malnutrition.    This patient is being managed with:   Diet Consistent Carbohydrate Clear Liquid-  Entered: May  4 2021  2:53PM    
This patient has been assessed with a concern for Malnutrition and has been determined to have a diagnosis/diagnoses of Severe protein-calorie malnutrition.    This patient is being managed with:   Diet Consistent Carbohydrate Clear Liquid-  Entered: May  4 2021  2:53PM

## 2021-05-05 NOTE — PROGRESS NOTE ADULT - PROVIDER SPECIALTY LIST ADULT
Nephrology
Hospitalist
Infectious Disease
Infectious Disease
Nephrology
Gastroenterology
Hospitalist
Cardiology
Hospitalist
Nephrology
Gastroenterology
Hospitalist
Gastroenterology
Gastroenterology
Nephrology
Gastroenterology
Hospitalist
Gastroenterology
Gastroenterology

## 2021-05-05 NOTE — PROGRESS NOTE ADULT - SUBJECTIVE AND OBJECTIVE BOX
NEPHROLOGY INTERVAL HPI/OVERNIGHT EVENTS:      No new events today, did well post procedure.      MEDICATIONS  (STANDING):  albuterol/ipratropium for Nebulization 3 milliLiter(s) Nebulizer every 6 hours  aspirin  chewable 81 milliGRAM(s) Oral daily  budesonide 160 MICROgram(s)/formoterol 4.5 MICROgram(s) Inhaler 2 Puff(s) Inhalation two times a day  dextrose 40% Gel 15 Gram(s) Oral once  dextrose 5%. 1000 milliLiter(s) (50 mL/Hr) IV Continuous <Continuous>  dextrose 5%. 1000 milliLiter(s) (100 mL/Hr) IV Continuous <Continuous>  dextrose 50% Injectable 25 Gram(s) IV Push once  dextrose 50% Injectable 12.5 Gram(s) IV Push once  dextrose 50% Injectable 25 Gram(s) IV Push once  donepezil 5 milliGRAM(s) Oral at bedtime  glucagon  Injectable 1 milliGRAM(s) IntraMuscular once  indomethacin Suppository 100 milliGRAM(s) Rectal once  insulin glargine Injectable (LANTUS) 15 Unit(s) SubCutaneous at bedtime  insulin lispro (ADMELOG) corrective regimen sliding scale   SubCutaneous three times a day before meals  insulin lispro (ADMELOG) corrective regimen sliding scale   SubCutaneous at bedtime  insulin lispro Injectable (ADMELOG) 4 Unit(s) SubCutaneous three times a day before meals  levothyroxine 75 MICROGram(s) Oral daily  melatonin 5 milliGRAM(s) Oral at bedtime  memantine 5 milliGRAM(s) Oral daily  metoprolol tartrate 12.5 milliGRAM(s) Oral two times a day  montelukast 10 milliGRAM(s) Oral daily  multivitamin 1 Tablet(s) Oral daily  pantoprazole    Tablet 40 milliGRAM(s) Oral before breakfast  polyethylene glycol 3350 17 Gram(s) Oral daily  senna 2 Tablet(s) Oral at bedtime  tiotropium 18 MICROgram(s) Capsule 1 Capsule(s) Inhalation daily    MEDICATIONS  (PRN):  bisacodyl Suppository 10 milliGRAM(s) Rectal daily PRN Constipation      Allergies    No Known Allergies        Vital Signs Last 24 Hrs  T(C): 36.3 (05 May 2021 11:37), Max: 36.8 (05 May 2021 04:36)  T(F): 97.4 (05 May 2021 11:37), Max: 98.2 (05 May 2021 04:36)  HR: 73 (05 May 2021 11:37) (73 - 86)  BP: 111/69 (05 May 2021 11:37) (102/67 - 152/82)  BP(mean): --  RR: 20 (05 May 2021 11:37) (16 - 20)  SpO2: 97% (05 May 2021 11:37) (96% - 98%)  T(C): 36.6 (04 May 2021 13:09), Max: 37.2 (04 May 2021 04:31)  T(F): 97.9 (04 May 2021 13:09), Max: 98.9 (04 May 2021 04:31)  HR: 76 (04 May 2021 13:09) (71 - 85)  BP: 152/82 (04 May 2021 13:09) (97/61 - 152/82)      PHYSICAL EXAM:    EYES: Open  ENMT:  Moist mucous membranes; no periorbital edema  NECK: Supple, No JVD  NERVOUS SYSTEM: Awake, demented  CHEST/LUNG: Clear bilaterally  HEART: IRR, no rub  ABDOMEN: Soft, NT BS+  EXTREMITIES:  2+ Peripheral Pulses, No LE edema    LABS:    05-05    137  |  103  |  48.0<H>  ----------------------------<  157<H>  4.8   |  23.0  |  2.24<H>    Ca    9.6      05 May 2021 10:25    TPro  7.0  /  Alb  3.5  /  TBili  0.6  /  DBili  x   /  AST  28  /  ALT  44<H>  /  AlkPhos  327<H>  05-05                          11.1   8.10  )-----------( 193      ( 04 May 2021 08:45 )             34.7     05-04    138  |  103  |  46.0<H>  ----------------------------<  107<H>  5.3   |  25.0  |  2.06<H>    Ca    9.4      04 May 2021 08:45    TPro  7.0  /  Alb  3.6  /  TBili  0.7  /  DBili  0.3  /  AST  30  /  ALT  44<H>  /  AlkPhos  360<H>  05-04    PT/INR - ( 04 May 2021 08:45 )   PT: 11.3 sec;   INR: 0.97 ratio         PTT - ( 04 May 2021 08:45 )  PTT:31.3 sec            RADIOLOGY & ADDITIONAL TESTS:

## 2021-05-05 NOTE — DISCHARGE NOTE NURSING/CASE MANAGEMENT/SOCIAL WORK - PATIENT PORTAL LINK FT
You can access the FollowMyHealth Patient Portal offered by [facility] by registering at the following website: [email or web address]. By joining [facility]’s FollowMyHealth portal, you will also be able to view your health information using other applications (apps) compatible with our system.

## 2021-05-05 NOTE — PROGRESS NOTE ADULT - SUBJECTIVE AND OBJECTIVE BOX
Chief Complaint: This is a 86y old man patient being seen in follow-up consultation for abnormal LFT's.    HPI / 24H events:  Patient seeing and evaluated at bedside, sitting comfortably, tolerating oral intake, reporting no complains, no overnight events. Daughter at bedside. Now s/p EGD/ERCP with sphincterotomy and calculus removed. Denies nausea, vomiting, abdominal pain, chest pain, shortness of breath, hematemesis, hematochezia, melena.  ROS: A 14-point review of systems was reviewed and was otherwise negative save what was reported in the HPI.    PAST MEDICAL/SURGICAL HISTORY:  DM (diabetes mellitus)    Afib    Dementia    COPD (chronic obstructive pulmonary disease)    CKD (chronic kidney disease)    S/P carotid endarterectomy    History of lung biopsy      MEDICATIONS  (STANDING):  albuterol/ipratropium for Nebulization 3 milliLiter(s) Nebulizer every 6 hours  aspirin  chewable 81 milliGRAM(s) Oral daily  budesonide 160 MICROgram(s)/formoterol 4.5 MICROgram(s) Inhaler 2 Puff(s) Inhalation two times a day  dextrose 40% Gel 15 Gram(s) Oral once  dextrose 5%. 1000 milliLiter(s) (50 mL/Hr) IV Continuous <Continuous>  dextrose 5%. 1000 milliLiter(s) (100 mL/Hr) IV Continuous <Continuous>  dextrose 50% Injectable 25 Gram(s) IV Push once  dextrose 50% Injectable 12.5 Gram(s) IV Push once  dextrose 50% Injectable 25 Gram(s) IV Push once  donepezil 5 milliGRAM(s) Oral at bedtime  glucagon  Injectable 1 milliGRAM(s) IntraMuscular once  insulin glargine Injectable (LANTUS) 15 Unit(s) SubCutaneous at bedtime  insulin lispro (ADMELOG) corrective regimen sliding scale   SubCutaneous three times a day before meals  insulin lispro (ADMELOG) corrective regimen sliding scale   SubCutaneous at bedtime  insulin lispro Injectable (ADMELOG) 4 Unit(s) SubCutaneous three times a day before meals  levothyroxine 75 MICROGram(s) Oral daily  melatonin 5 milliGRAM(s) Oral at bedtime  memantine 5 milliGRAM(s) Oral daily  metoclopramide 10 milliGRAM(s) Oral Before meals and at bedtime  metoprolol tartrate 12.5 milliGRAM(s) Oral two times a day  montelukast 10 milliGRAM(s) Oral daily  multivitamin 1 Tablet(s) Oral daily  pantoprazole    Tablet 40 milliGRAM(s) Oral before breakfast  polyethylene glycol 3350 17 Gram(s) Oral daily  senna 2 Tablet(s) Oral at bedtime  tiotropium 18 MICROgram(s) Capsule 1 Capsule(s) Inhalation daily    MEDICATIONS  (PRN):  bisacodyl Suppository 10 milliGRAM(s) Rectal daily PRN Constipation    No Known Allergies    T(C): 36.8 (05-05-21 @ 04:36), Max: 36.8 (05-05-21 @ 04:36)  HR: 86 (05-05-21 @ 04:36) (75 - 86)  BP: 102/67 (05-05-21 @ 04:36) (102/67 - 152/82)  RR: 18 (05-05-21 @ 04:36) (16 - 18)  SpO2: 96% (05-05-21 @ 04:36) (96% - 98%)    I&O's Summary    PHYSICAL EXAM:  Constitutional: Pleasantly confused, elderly looking man, in no apparent distress  Eyes: Sclerae anicteric, conjunctivae normal  ENMT: Mucus membranes moist, no oropharyngeal thrush noted  Neck: No thyroid nodules appreciated, no significant cervical or supraclavicular lymphadenopathy  Respiratory: Breathing nonlabored; clear to auscultation  Cardiovascular: Regular rate and rhythm  Gastrointestinal: Soft, nontender, nondistended, normoactive bowel sounds.  Extremities: No clubbing, cyanosis or edema  Neurological: Awake and alert.  Skin: No jaundice  Musculoskeletal: No significant peripheral atrophy  Psychiatric: Pleasantly confused.                   11.1   8.10  )-----------( 193      ( 05-04 @ 08:45 )             34.7                10.4   7.67  )-----------( 175      ( 05-03 @ 07:42 )             32.6       05-04    138  |  103  |  46.0<H>  ----------------------------<  107<H>  5.3   |  25.0  |  2.06<H>    Ca    9.4      04 May 2021 08:45    TPro  7.0  /  Alb  3.6  /  TBili  0.7  /  DBili  0.3  /  AST  30  /  ALT  44<H>  /  AlkPhos  360<H>  05-04    LIVER FUNCTIONS - ( 04 May 2021 08:45 )  Alb: 3.6 g/dL / Pro: 7.0 g/dL / ALK PHOS: 360 U/L / ALT: 44 U/L / AST: 30 U/L / GGT: x               PT/INR - ( 04 May 2021 08:45 )   PT: 11.3 sec;   INR: 0.97 ratio         PTT - ( 04 May 2021 08:45 )  PTT:31.3 sec

## 2021-05-05 NOTE — PROGRESS NOTE ADULT - NSICDXPILOT_GEN_ALL_CORE
[place]

## 2021-05-05 NOTE — PROGRESS NOTE ADULT - REASON FOR ADMISSION
sirs, weakness
sirs, weakness, Preoperative cardiac optimization prior to ERCP
sirs, weakness

## 2021-05-06 ENCOUNTER — RX CHANGE (OUTPATIENT)
Age: 86
End: 2021-05-06

## 2021-05-07 LAB — SURGICAL PATHOLOGY STUDY: SIGNIFICANT CHANGE UP

## 2021-05-25 ENCOUNTER — APPOINTMENT (OUTPATIENT)
Dept: CARDIOLOGY | Facility: CLINIC | Age: 86
End: 2021-05-25
Payer: MEDICARE

## 2021-05-25 ENCOUNTER — NON-APPOINTMENT (OUTPATIENT)
Age: 86
End: 2021-05-25

## 2021-05-25 VITALS
HEART RATE: 55 BPM | BODY MASS INDEX: 22.91 KG/M2 | HEIGHT: 67 IN | DIASTOLIC BLOOD PRESSURE: 66 MMHG | WEIGHT: 146 LBS | SYSTOLIC BLOOD PRESSURE: 88 MMHG | OXYGEN SATURATION: 96 %

## 2021-05-25 PROCEDURE — 99214 OFFICE O/P EST MOD 30 MIN: CPT

## 2021-05-25 PROCEDURE — 93000 ELECTROCARDIOGRAM COMPLETE: CPT

## 2021-05-25 RX ORDER — DOCUSATE SODIUM 100 MG/1
100 CAPSULE ORAL 3 TIMES DAILY
Refills: 0 | Status: ACTIVE | COMMUNITY

## 2021-05-25 RX ORDER — METOCLOPRAMIDE HYDROCHLORIDE 5 MG/1
5 TABLET, ORALLY DISINTEGRATING ORAL 3 TIMES DAILY
Qty: 90 | Refills: 1 | Status: DISCONTINUED | COMMUNITY
Start: 2021-04-21 | End: 2021-05-25

## 2021-05-25 RX ORDER — METOPROLOL TARTRATE 25 MG/1
25 TABLET, FILM COATED ORAL TWICE DAILY
Qty: 90 | Refills: 3 | Status: DISCONTINUED | COMMUNITY
Start: 2021-03-09 | End: 2021-05-25

## 2021-06-21 ENCOUNTER — APPOINTMENT (OUTPATIENT)
Dept: PULMONOLOGY | Facility: CLINIC | Age: 86
End: 2021-06-21
Payer: MEDICARE

## 2021-06-21 VITALS
DIASTOLIC BLOOD PRESSURE: 56 MMHG | HEIGHT: 64 IN | SYSTOLIC BLOOD PRESSURE: 110 MMHG | BODY MASS INDEX: 26.29 KG/M2 | OXYGEN SATURATION: 95 % | HEART RATE: 83 BPM | WEIGHT: 154 LBS

## 2021-06-21 PROCEDURE — 99204 OFFICE O/P NEW MOD 45 MIN: CPT

## 2021-06-21 NOTE — PHYSICAL EXAM
[No Acute Distress] : no acute distress [Normal Appearance] : normal appearance [Normal Rate/Rhythm] : normal rate/rhythm [Normal S1, S2] : normal s1, s2 [No Murmurs] : no murmurs [No Rubs] : no rubs [No Gallops] : no gallops [No Abnormalities] : no abnormalities [Normal Gait] : normal gait [No Clubbing] : no clubbing [No Cyanosis] : no cyanosis [No Edema] : no edema [FROM] : FROM [Normal Color/ Pigmentation] : normal color/ pigmentation [No Focal Deficits] : no focal deficits

## 2021-06-21 NOTE — DISCUSSION/SUMMARY
[FreeTextEntry1] : COPD Gold class II based on old PFTs, complicated by Dementia, A fib, HFpEF\par Post admission [facility] for biliary sepsis, post ERCP, sphincterotomy and stone extraction\par Recent Cardiology note appreciated, last echocardiogram 4/21 normal LV no pH\par Lung exam is normal, normal sp02\par Tolerating Anoro daily, dyspnea at baseline, no new pulmonary complaints\par Recent CT scan 4/21 reviewed and compared, stable small eff/round atelectasis x yrs\par Had covid vaccine\par Refuses pneumonia or flu\par Will attempt repeat spirometry - limited by Dementia\par Follow up 4 months or sooner if needed

## 2021-06-21 NOTE — REVIEW OF SYSTEMS
[Poor Appetite] : poor appetite [Anemia] : anemia [Negative] : Dermatologic [TextBox_30] : see HPI [TextBox_134] : Dementia

## 2021-06-21 NOTE — HISTORY OF PRESENT ILLNESS
[TextBox_4] : former 50 pack yr smoker\par quit 30 yrs ago\par dyspnea at baseline\par recent hospital admission and cardiology note appreciated\par pt has Dementia\par had covid vaccine Pfizor\par refuses flu and pna\par no fever, chill, chest pain'\par no sputum\par dyspnea at baseline

## 2021-06-21 NOTE — CONSULT LETTER
[Dear  ___] : Dear  [unfilled], [Consult Letter:] : I had the pleasure of evaluating your patient, [unfilled]. [Please see my note below.] : Please see my note below. [Sincerely,] : Sincerely, [FreeTextEntry3] : Mauro Russell [facility]\par Pulmonary Critical Care\par Director Pulmonary Division\par Medical Director Respiratory Therapy\par [facility]\par \par

## 2021-06-21 NOTE — PROCEDURE
[FreeTextEntry1] : Hospital records [facility] \par CT scan 4/21: no change round atelectasis, small pl eff

## 2021-07-19 ENCOUNTER — APPOINTMENT (OUTPATIENT)
Dept: PULMONOLOGY | Facility: CLINIC | Age: 86
End: 2021-07-19
Payer: MEDICARE

## 2021-07-19 PROCEDURE — 94010 BREATHING CAPACITY TEST: CPT

## 2021-09-16 ENCOUNTER — APPOINTMENT (OUTPATIENT)
Dept: CARDIOLOGY | Facility: CLINIC | Age: 86
End: 2021-09-16

## 2021-09-28 NOTE — PROGRESS NOTE ADULT - PROBLEM SELECTOR PLAN 1
pt with shock, unclear etiology, most likely cardiogenic.  continue to monitor vitals Q4  avoid antihypertensives for now electronic

## 2021-10-05 ENCOUNTER — APPOINTMENT (OUTPATIENT)
Dept: CARDIOLOGY | Facility: CLINIC | Age: 86
End: 2021-10-05
Payer: MEDICARE

## 2021-10-05 ENCOUNTER — NON-APPOINTMENT (OUTPATIENT)
Age: 86
End: 2021-10-05

## 2021-10-05 VITALS
SYSTOLIC BLOOD PRESSURE: 130 MMHG | BODY MASS INDEX: 25.78 KG/M2 | HEIGHT: 64 IN | OXYGEN SATURATION: 96 % | DIASTOLIC BLOOD PRESSURE: 78 MMHG | HEART RATE: 73 BPM | WEIGHT: 151 LBS | TEMPERATURE: 98.3 F

## 2021-10-05 PROCEDURE — 93000 ELECTROCARDIOGRAM COMPLETE: CPT

## 2021-10-05 PROCEDURE — 99214 OFFICE O/P EST MOD 30 MIN: CPT

## 2021-10-05 RX ORDER — UMECLIDINIUM BROMIDE AND VILANTEROL TRIFENATATE 62.5; 25 UG/1; UG/1
62.5-25 POWDER RESPIRATORY (INHALATION)
Refills: 0 | Status: DISCONTINUED | COMMUNITY
End: 2021-10-05

## 2021-10-05 RX ORDER — MEMANTINE HYDROCHLORIDE AND DONEPEZIL HYDROCHLORIDE 21; 10 MG/1; MG/1
21-10 CAPSULE ORAL DAILY
Refills: 0 | Status: DISCONTINUED | COMMUNITY
End: 2021-10-05

## 2021-10-25 ENCOUNTER — APPOINTMENT (OUTPATIENT)
Dept: PULMONOLOGY | Facility: CLINIC | Age: 86
End: 2021-10-25

## 2021-12-29 ENCOUNTER — NON-APPOINTMENT (OUTPATIENT)
Age: 86
End: 2021-12-29

## 2022-01-04 ENCOUNTER — NON-APPOINTMENT (OUTPATIENT)
Age: 87
End: 2022-01-04

## 2022-01-04 ENCOUNTER — APPOINTMENT (OUTPATIENT)
Dept: CARDIOLOGY | Facility: CLINIC | Age: 87
End: 2022-01-04
Payer: MEDICARE

## 2022-01-04 VITALS
OXYGEN SATURATION: 96 % | TEMPERATURE: 97.4 F | BODY MASS INDEX: 26.8 KG/M2 | DIASTOLIC BLOOD PRESSURE: 84 MMHG | WEIGHT: 157 LBS | HEIGHT: 64 IN | SYSTOLIC BLOOD PRESSURE: 146 MMHG | HEART RATE: 88 BPM

## 2022-01-04 LAB — NT-PROBNP SERPL-MCNC: 1792 PG/ML

## 2022-01-04 PROCEDURE — 99215 OFFICE O/P EST HI 40 MIN: CPT

## 2022-01-04 PROCEDURE — 93000 ELECTROCARDIOGRAM COMPLETE: CPT

## 2022-01-04 RX ORDER — MONTELUKAST 10 MG/1
10 TABLET, FILM COATED ORAL
Qty: 90 | Refills: 3 | Status: DISCONTINUED | COMMUNITY
End: 2022-01-04

## 2022-01-04 RX ORDER — ROSUVASTATIN CALCIUM 10 MG/1
10 TABLET, FILM COATED ORAL DAILY
Qty: 30 | Refills: 4 | Status: DISCONTINUED | COMMUNITY
Start: 2021-04-27 | End: 2022-01-04

## 2022-02-01 ENCOUNTER — APPOINTMENT (OUTPATIENT)
Dept: CARDIOLOGY | Facility: CLINIC | Age: 87
End: 2022-02-01

## 2022-02-01 ENCOUNTER — APPOINTMENT (OUTPATIENT)
Dept: CARDIOLOGY | Facility: CLINIC | Age: 87
End: 2022-02-01
Payer: MEDICARE

## 2022-02-01 VITALS
HEART RATE: 85 BPM | BODY MASS INDEX: 26.29 KG/M2 | HEIGHT: 64 IN | WEIGHT: 154 LBS | SYSTOLIC BLOOD PRESSURE: 115 MMHG | DIASTOLIC BLOOD PRESSURE: 70 MMHG | OXYGEN SATURATION: 96 %

## 2022-02-01 PROCEDURE — 99214 OFFICE O/P EST MOD 30 MIN: CPT

## 2022-02-01 RX ORDER — METOLAZONE 2.5 MG/1
2.5 TABLET ORAL DAILY
Qty: 30 | Refills: 0 | Status: DISCONTINUED | COMMUNITY
Start: 2022-01-04 | End: 2022-02-01

## 2022-02-02 ENCOUNTER — RX RENEWAL (OUTPATIENT)
Age: 87
End: 2022-02-02

## 2022-03-24 ENCOUNTER — APPOINTMENT (OUTPATIENT)
Dept: GASTROENTEROLOGY | Facility: CLINIC | Age: 87
End: 2022-03-24
Payer: MEDICARE

## 2022-03-24 VITALS
TEMPERATURE: 98 F | HEART RATE: 80 BPM | OXYGEN SATURATION: 98 % | WEIGHT: 154 LBS | HEIGHT: 64 IN | SYSTOLIC BLOOD PRESSURE: 120 MMHG | BODY MASS INDEX: 26.29 KG/M2 | RESPIRATION RATE: 16 BRPM | DIASTOLIC BLOOD PRESSURE: 70 MMHG

## 2022-03-24 DIAGNOSIS — N18.30 CHRONIC KIDNEY DISEASE, STAGE 3 UNSPECIFIED: ICD-10-CM

## 2022-03-24 PROCEDURE — 99214 OFFICE O/P EST MOD 30 MIN: CPT

## 2022-03-24 NOTE — PHYSICAL EXAM
[General Appearance - Alert] : alert [General Appearance - In No Acute Distress] : in no acute distress [Auscultation Breath Sounds / Voice Sounds] : lungs were clear to auscultation bilaterally [Heart Rate And Rhythm] : heart rate was normal and rhythm regular [Heart Sounds] : normal S1 and S2 [Heart Sounds Gallop] : no gallops [Murmurs] : no murmurs [Heart Sounds Pericardial Friction Rub] : no pericardial rub [Bowel Sounds] : normal bowel sounds [Abdomen Soft] : soft [Abdomen Tenderness] : non-tender [] : no hepato-splenomegaly [Abdomen Mass (___ Cm)] : no abdominal mass palpated [FreeTextEntry1] : Not oriented to place and time and unable to answer questions appropriately

## 2022-03-24 NOTE — HISTORY OF PRESENT ILLNESS
[de-identified] : Patient presents today with recurrent symptoms to those he had last year.  At that time was felt that he may have gastroparesis but he was not a candidate for a gastric emptying test because he would not sit still in the camera.  He was treated empirically with metoclopramide 5 mg 3 times daily.  Subsequent to that he was hospitalized for sepsis and had a common duct stone which was removed via ERCP.  Gallbladder itself was normal.  At the time of the endoscopy was noted that he had retained food.  Post hospitalization there was a question of some mild depression so the metoclopramide was stopped.  Over the last couple months he has episodes several times a week where especially after lunch she will get upper abdominal pain and vomit.  There is no hematemesis and sometimes there is no food in the vomitus.  Sometimes is just mucus.\par \par In addition patient strains to move his bowels.  His wife gave him MiraLAX but he basically sips a few cc all day long.\par The patient does have dementia and also has renal failure with a creatinine of twos and a BUN of 57.  He is followed by a nephrologist.

## 2022-03-24 NOTE — ASSESSMENT
[FreeTextEntry1] : I discussed with the patient as well his his wife and daughter that I felt his symptoms were related to gastroparesis which was documented in the endoscopy had last spring.  We discussed various ways of handling this including only having clear liquids during the day which they thought he would not tolerate.  I personally recommended that he go back on the metoclopramide 5 mg 3 times daily.  Is unclear whether his mild depression symptoms last being were related to the medication it was worthwhile trying it again.  We discussed the benefits and side effects and how the medication works.. I also discussed that this may be related to his CKD.\par \par In addition, we discussed his constipation and since the straining is a quality-of-life issue with him I thought it was reasonable for him to take the MiraLAX but to take it all at once says 17 g in 8 ounces of water or other clear liquids.  They will call me in a few weeks to let me know the success of both therapies.  I reviewed his recent blood work and he has a mild chronic anemia related to his kidney disease as well as abnormal kidney function as mentioned above.\par

## 2022-03-31 RX ORDER — MEMANTINE HYDROCHLORIDE 7 MG/1
7 CAPSULE, EXTENDED RELEASE ORAL
Refills: 0 | Status: ACTIVE | COMMUNITY

## 2022-05-05 ENCOUNTER — APPOINTMENT (OUTPATIENT)
Dept: CARDIOLOGY | Facility: CLINIC | Age: 87
End: 2022-05-05
Payer: MEDICARE

## 2022-05-05 ENCOUNTER — NON-APPOINTMENT (OUTPATIENT)
Age: 87
End: 2022-05-05

## 2022-05-05 VITALS
HEIGHT: 64 IN | TEMPERATURE: 97.9 F | SYSTOLIC BLOOD PRESSURE: 122 MMHG | RESPIRATION RATE: 14 BRPM | DIASTOLIC BLOOD PRESSURE: 68 MMHG | HEART RATE: 89 BPM | BODY MASS INDEX: 26.46 KG/M2 | WEIGHT: 155 LBS | OXYGEN SATURATION: 95 %

## 2022-05-05 DIAGNOSIS — E11.9 TYPE 2 DIABETES MELLITUS W/OUT COMPLICATIONS: ICD-10-CM

## 2022-05-05 PROCEDURE — 99215 OFFICE O/P EST HI 40 MIN: CPT

## 2022-05-05 PROCEDURE — 93000 ELECTROCARDIOGRAM COMPLETE: CPT

## 2022-05-05 RX ORDER — METOCLOPRAMIDE 5 MG/1
5 TABLET ORAL 3 TIMES DAILY
Qty: 90 | Refills: 2 | Status: DISCONTINUED | COMMUNITY
Start: 2022-04-07 | End: 2022-05-05

## 2022-05-05 RX ORDER — METOCLOPRAMIDE HYDROCHLORIDE 5 MG/1
5 TABLET, ORALLY DISINTEGRATING ORAL 3 TIMES DAILY
Qty: 90 | Refills: 1 | Status: DISCONTINUED | COMMUNITY
Start: 2022-03-24 | End: 2022-05-05

## 2022-05-22 NOTE — PATIENT PROFILE ADULT - HAS THE PATIENT USED TOBACCO IN THE PAST 30 DAYS?
Problem: Potential for Falls  Goal: Patient will remain free of falls  Description: INTERVENTIONS:  - Educate patient/family on patient safety including physical limitations  - Instruct patient to call for assistance with activity   - Consult OT/PT to assist with strengthening/mobility   - Keep Call bell within reach  - Keep bed low and locked with side rails adjusted as appropriate  - Keep care items and personal belongings within reach  - Initiate and maintain comfort rounds  - Make Fall Risk Sign visible to staff    - Apply yellow socks and bracelet for high fall risk patients  - Consider moving patient to room near nurses station  Outcome: Not Progressing     Problem: HEMATOLOGIC - ADULT  Goal: Maintains hematologic stability  Description: INTERVENTIONS  - Assess for signs and symptoms of bleeding or hemorrhage  - Monitor labs  - Administer supportive blood products/factors as ordered and appropriate  Outcome: Not Progressing     Problem: MOBILITY - ADULT  Goal: Maintain or return to baseline ADL function  Description: INTERVENTIONS:  -  Assess patient's ability to carry out ADLs; assess patient's baseline for ADL function and identify physical deficits which impact ability to perform ADLs (bathing, care of mouth/teeth, toileting, grooming, dressing, etc )  - Assess/evaluate cause of self-care deficits   - Assess range of motion  - Assess patient's mobility; develop plan if impaired  - Assess patient's need for assistive devices and provide as appropriate  - Encourage maximum independence but intervene and supervise when necessary  - Involve family in performance of ADLs  - Assess for home care needs following discharge   - Consider OT consult to assist with ADL evaluation and planning for discharge  - Provide patient education as appropriate  Outcome: Not Progressing  Goal: Maintains/Returns to pre admission functional level  Description: INTERVENTIONS:  - Perform BMAT or MOVE assessment daily    - Set and communicate daily mobility goal to care team and patient/family/caregiver     - Collaborate with rehabilitation services on mobility goals if consulted    - Out of bed for toileting  - Record patient progress and toleration of activity level   Outcome: Not Progressing No

## 2022-06-09 NOTE — SWALLOW BEDSIDE ASSESSMENT ADULT - CONSISTENCIES ADMINISTERED
Detail Level: Detailed Quality 402: Tobacco Use And Help With Quitting Among Adolescents: Patient screened for tobacco and never smoked Quality 226: Preventive Care And Screening: Tobacco Use: Screening And Cessation Intervention: Tobacco Screening not Performed for Medical Reasons Quality 128: Preventive Care And Screening: Body Mass Index (Bmi) Screening And Follow-Up Plan: BMI is documented within normal parameters and no follow-up plan is required. Quality 431: Preventive Care And Screening: Unhealthy Alcohol Use - Screening: Patient identified as an unhealthy alcohol user when screened for unhealthy alcohol use using a systematic screening method and received brief counseling puree mech soft soft solid solid thin liquid

## 2022-06-23 NOTE — H&P ADULT - BIRTH SEX
Chief Complaint:  Anxiety    History of Present Illness: Ebony Hamilton is a 39 y.o. female who presents to the office today for follow-up of depression and anxiety.  Patient reports taking meds as prescribed and tolerating them well without any complications.  Patient denies feeling depressed.  No SI or HI.  Patient reports resolved panic of nightmares to taking prazosin 2 mg.  She continues to have some anxiety, more so in the afternoon.  Patient reports taking BuSpar which helps although is not consistent.  Patient has had a recent increase in stress since last office visit although feels like she is managing it well.  Patient states she feels back to her more normal self.  Pt has had some worsening constipation, but has improved.       Medical Record Review: Reviewed office visit note from 4/7/22, She has anxiety.  She was previously treated with hydroxyzine and Lexapro.  She states the Lexapro caused her to sweat profusely daily.  She was also given hydroxyzine to help out with her anxiety and her insomnia.  She states the medication did not help much.      ROS:  Review of Systems   Constitutional: Positive for diaphoresis and fatigue. Negative for appetite change and unexpected weight change.   HENT: Negative for drooling, tinnitus and trouble swallowing.    Eyes: Negative for visual disturbance.   Respiratory: Negative for cough, chest tightness and shortness of breath.    Cardiovascular: Negative for chest pain and palpitations.   Gastrointestinal: Positive for abdominal pain, constipation, diarrhea and nausea. Negative for vomiting.   Endocrine: Negative for cold intolerance and heat intolerance.   Genitourinary: Negative for difficulty urinating.   Musculoskeletal: Positive for myalgias. Negative for arthralgias.   Skin: Negative for rash.   Allergic/Immunologic: Negative for immunocompromised state.   Neurological: Positive for headaches. Negative for dizziness, tremors and seizures.  Male   Psychiatric/Behavioral: Positive for agitation and sleep disturbance. Negative for dysphoric mood, hallucinations, self-injury and suicidal ideas. The patient is nervous/anxious.        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 90 tablet 2   • DULoxetine (Cymbalta) 60 MG capsule Take 1 cap PO QD. Take with 30mg cap for total dose of 90mg. 30 capsule 2   • prazosin (MINIPRESS) 1 MG capsule Take 2 capsules by mouth Every Night for 30 days. 90 capsule 2   • cetirizine (ZyrTEC) 10 MG tablet Take 10 mg by mouth daily.     • DULoxetine (Cymbalta) 30 MG capsule Take 1 cap PO QD. Take with 60mg for total dose of 90mg 30 capsule 2   • fexofenadine (ALLEGRA) 30 MG tablet Take 30 mg by mouth 2 (two) times a day.     • HYDROcodone-acetaminophen (NORCO)  MG per tablet Take 1 tablet by mouth Every 6 (Six) Hours As Needed for Moderate Pain .     • TiZANidine (Zanaflex) 4 MG capsule Take 4 mg by mouth 3 (Three) Times a Day.     • tiZANidine (ZANAFLEX) 4 MG tablet TAKE 2 TABLETS BY MOUTH 3 TIMES DAILY AS NEEDED DISCONTINUE CYCLOBENZAPRINE     • triamcinolone (KENALOG) 0.1 % cream Apply 1  application topically to the appropriate area as directed 2 (Two) Times a Day. 80 g 1   • vitamin C (ASCORBIC ACID) 500 MG tablet Take 500 mg by mouth daily.       No current facility-administered medications for this visit.       Discontinued Medications:  Medications Discontinued During This Encounter   Medication Reason   • DULoxetine (Cymbalta) 60 MG capsule Reorder   • busPIRone (BUSPAR) 7.5 MG tablet Reorder   • prazosin (MINIPRESS) 1 MG capsule Reorder       Allergy:   Allergies   Allergen Reactions   • Sulfa Antibiotics Anaphylaxis   • Ciprofloxacin Hallucinations   • Codeine Rash   • Gold-Containing Drug Products Rash   • Latex Rash   • Nickel Rash        Past Medical History:  Past Medical History:   Diagnosis Date   • Anxiety    • Atrial fibrillation (HCC)    • Chronic pain disorder    • Depression    • Endometriosis    • Injury of shoulder, right 2009   • Panic disorder        Past Surgical History:  Past Surgical History:   Procedure Laterality Date   • CERVICAL ARTHRODESIS  01/14/2015    Donaldo Lolawolf   • CERVICAL FUSION     • COLONOSCOPY N/A 5/31/2022    Procedure: COLONOSCOPY;  Surgeon: Shabbir Baird MD;  Location: [facility] ENDOSCOPY;  Service: General;  Laterality: N/A;  HEMORRHOIDS   • EXPLORATORY LAPAROTOMY     • HEMORRHOIDECTOMY N/A 5/31/2022    Procedure: HEMORRHOID BANDING;  Surgeon: Shabbir Baird MD;  Location: [facility] ENDOSCOPY;  Service: General;  Laterality: N/A;  BANDS X 2   • TUBAL ABDOMINAL LIGATION         Past Psychiatric History:  Began Treatment: Initially 20 years ago and then 1 to 2 years ago.  Diagnoses: Anxiety  Psychiatrist: Denies  Therapist: Denies  Admission History: Denies  Medications/Treatment: Patient was initially put on Xanax (irritable the following day), Lexapro (sweating, GI symptoms, headache), gabapentin (dystonia)  Self Harm: Denies  Suicide Attempts: Denies  Postpartum depression: Denies    Family Psychiatric History:   Diagnoses: Her mother has a  history of depression, anxiety, and bipolar disorder (patient is not completely sure about bipolar diagnosis)  Substance use: Denies  Suicide Attempts/Completions: Denies    Family History   Problem Relation Age of Onset   • Depression Mother    • Bipolar disorder Mother    • Anxiety disorder Mother    • Cancer Mother    • Heart disease Mother    • Hypertension Mother    • Anxiety disorder Father    • Hypertension Father    • Dementia Paternal Uncle    • Dementia Paternal Grandmother    • Heart disease Other    • Colon cancer Neg Hx        Substance Abuse History:   Alcohol use: Denies  Nicotine: Denies  Illicit Drug Use: Denies  Longest Period Sober: Denies  Rehab/AA/NA: Denies    Social History:  Living Situation: Patient lives with  and their 17-year-old daughter.  Marital/Relationship History: Patient has been  to  for 15 years.  No abuse.  Children: She has a 17-year-old daughter and a 21-year-old daughter.  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  Education: Patient received her GED and attended school for EMS.   History: Denies  Legal: Denies    Social History     Socioeconomic History   • Marital status:    Tobacco Use   • Smoking status: Former Smoker     Packs/day: 1.00     Years: 20.00     Pack years: 20.00     Quit date: 2019     Years since quitting: 3.4   • Smokeless tobacco: Never Used   Vaping Use   • Vaping Use: Never used   Substance and Sexual Activity   • Alcohol use: No   • Drug use: Never   • Sexual activity: Yes       Developmental History:   Place of birth: Patient was born in [place].  Siblings: Denies  Childhood: Patient notes having trust issues since childhood.      Physical Exam:  Physical Exam    Appearance: Well-groomed with adequate hygiene, appears to be of stated age. Casually and neatly dressed, maintains good eye contact.   Behavior: Appropriate, cooperative. No acute distress.  Motor: No abnormal movements, tics or  "tremors are noted. No psychomotor agitation or retardation.  Speech: Coherent, spontaneous, appropriate with normal rate, volume, rhythm, and tone. Normal reaction time to questions. No hyperverbal or pressured speech.   Mood: \"I'm good\"  Affect: Full range, appropriate, congruent with spontaneous emotional reactivity. Normal intensity. No emotional blunting. Pt is pleasant. Pt does not appear depressed or anxious.  Thought content: Negative suicidal ideations, negative homicidal ideations. Patient denies any obsession, compulsion, or phobia. No evidence of delusions.  Perceptions: Negative auditory hallucinations, negative visual hallucinations. Pt does not appear to be actively responding to internal stimuli.   Thought process: Logical, goal-directed, coherent, and linear with no evidence of flight of ideas, looseness of associations, thought blocking, circumstantiality, or tangentiality.   Insight/Judgement: Fair/fair  Cognition: Alert and oriented to person, place, and date. Memory intact for recent and remote events. Attention and concentration intact.     Vital Signs:   There were no vitals taken for this visit.     Lab Results:   Office Visit on 04/07/2022   Component Date Value Ref Range Status   • Glucose 04/07/2022 78  65 - 99 mg/dL Final   • BUN 04/07/2022 16  6 - 20 mg/dL Final   • Creatinine 04/07/2022 0.77  0.57 - 1.00 mg/dL Final   • Sodium 04/07/2022 138  136 - 145 mmol/L Final   • Potassium 04/07/2022 4.1  3.5 - 5.2 mmol/L Final   • Chloride 04/07/2022 103  98 - 107 mmol/L Final   • CO2 04/07/2022 23.4  22.0 - 29.0 mmol/L Final   • Calcium 04/07/2022 9.5  8.6 - 10.5 mg/dL Final   • Total Protein 04/07/2022 7.4  6.0 - 8.5 g/dL Final   • Albumin 04/07/2022 4.60  3.50 - 5.20 g/dL Final   • ALT (SGPT) 04/07/2022 14  1 - 33 U/L Final   • AST (SGOT) 04/07/2022 12  1 - 32 U/L Final   • Alkaline Phosphatase 04/07/2022 50  39 - 117 U/L Final   • Total Bilirubin 04/07/2022 0.2  0.0 - 1.2 mg/dL Final   • " Globulin 04/07/2022 2.8  gm/dL Final   • A/G Ratio 04/07/2022 1.6  g/dL Final   • BUN/Creatinine Ratio 04/07/2022 20.8  7.0 - 25.0 Final   • Anion Gap 04/07/2022 11.6  5.0 - 15.0 mmol/L Final   • eGFR 04/07/2022 100.8  >60.0 mL/min/1.73 Final    National Kidney Foundation and American Society of Nephrology (ASN) Task Force recommended calculation based on the Chronic Kidney Disease Epidemiology Collaboration (CKD-EPI) equation refit without adjustment for race.   • Total Cholesterol 04/07/2022 189  0 - 200 mg/dL Final   • Triglycerides 04/07/2022 126  0 - 150 mg/dL Final   • HDL Cholesterol 04/07/2022 44  40 - 60 mg/dL Final   • LDL Cholesterol  04/07/2022 122 (A) 0 - 100 mg/dL Final   • VLDL Cholesterol 04/07/2022 23  5 - 40 mg/dL Final   • LDL/HDL Ratio 04/07/2022 2.72   Final   • TSH 04/07/2022 1.630  0.270 - 4.200 uIU/mL Final   • Ferritin 04/07/2022 22.20  13.00 - 150.00 ng/mL Final   • Iron 04/07/2022 91  37 - 145 mcg/dL Final   • Iron Saturation 04/07/2022 23  20 - 50 % Final   • Transferrin 04/07/2022 271  200 - 360 mg/dL Final   • TIBC 04/07/2022 404  298 - 536 mcg/dL Final   • 25 Hydroxy, Vitamin D 04/07/2022 28.2 (A) 30.0 - 100.0 ng/ml Final   • Folate 04/07/2022 7.68  4.78 - 24.20 ng/mL Final   • Vitamin B-12 04/07/2022 459  211 - 946 pg/mL Final   • WBC 04/07/2022 5.30  3.40 - 10.80 10*3/mm3 Final   • RBC 04/07/2022 4.42  3.77 - 5.28 10*6/mm3 Final   • Hemoglobin 04/07/2022 12.7  12.0 - 15.9 g/dL Final   • Hematocrit 04/07/2022 39.0  34.0 - 46.6 % Final   • MCV 04/07/2022 88.2  79.0 - 97.0 fL Final   • MCH 04/07/2022 28.7  26.6 - 33.0 pg Final   • MCHC 04/07/2022 32.6  31.5 - 35.7 g/dL Final   • RDW 04/07/2022 12.3  12.3 - 15.4 % Final   • RDW-SD 04/07/2022 40.0  37.0 - 54.0 fl Final   • MPV 04/07/2022 10.1  6.0 - 12.0 fL Final   • Platelets 04/07/2022 466 (A) 140 - 450 10*3/mm3 Final   • Neutrophil % 04/07/2022 64.1  42.7 - 76.0 % Final   • Lymphocyte % 04/07/2022 26.4  19.6 - 45.3 % Final   • Monocyte  % 04/07/2022 6.4  5.0 - 12.0 % Final   • Eosinophil % 04/07/2022 1.9  0.3 - 6.2 % Final   • Basophil % 04/07/2022 0.8  0.0 - 1.5 % Final   • Immature Grans % 04/07/2022 0.4  0.0 - 0.5 % Final   • Neutrophils, Absolute 04/07/2022 3.40  1.70 - 7.00 10*3/mm3 Final   • Lymphocytes, Absolute 04/07/2022 1.40  0.70 - 3.10 10*3/mm3 Final   • Monocytes, Absolute 04/07/2022 0.34  0.10 - 0.90 10*3/mm3 Final   • Eosinophils, Absolute 04/07/2022 0.10  0.00 - 0.40 10*3/mm3 Final   • Basophils, Absolute 04/07/2022 0.04  0.00 - 0.20 10*3/mm3 Final   • Immature Grans, Absolute 04/07/2022 0.02  0.00 - 0.05 10*3/mm3 Final   • nRBC 04/07/2022 0.0  0.0 - 0.2 /100 WBC Final       EKG Results:  No orders to display       Imaging Results:  No Images in the past 120 days found..      Assessment & Plan   Diagnoses and all orders for this visit:    1. Generalized anxiety disorder  -     busPIRone (BUSPAR) 7.5 MG tablet; Take 2 tablets by mouth 3 (Three) Times a Day.  Dispense: 90 tablet; Refill: 2  -     DULoxetine (Cymbalta) 60 MG capsule; Take 1 cap PO QD. Take with 30mg cap for total dose of 90mg.  Dispense: 30 capsule; Refill: 2  -     DULoxetine (Cymbalta) 30 MG capsule; Take 1 cap PO QD. Take with 60mg for total dose of 90mg  Dispense: 30 capsule; Refill: 2    2. Moderate episode of recurrent major depressive disorder (HCC)  -     busPIRone (BUSPAR) 7.5 MG tablet; Take 2 tablets by mouth 3 (Three) Times a Day.  Dispense: 90 tablet; Refill: 2  -     DULoxetine (Cymbalta) 60 MG capsule; Take 1 cap PO QD. Take with 30mg cap for total dose of 90mg.  Dispense: 30 capsule; Refill: 2  -     DULoxetine (Cymbalta) 30 MG capsule; Take 1 cap PO QD. Take with 60mg for total dose of 90mg  Dispense: 30 capsule; Refill: 2    3. Nightmares  -     prazosin (MINIPRESS) 1 MG capsule; Take 2 capsules by mouth Every Night for 30 days.  Dispense: 90 capsule; Refill: 2    Presentation seems most consistent with NICHOLE, MDD, and nightmares.  We will increase  Cymbalta to 90 mg to target depression, anxiety, and overall mood.  We will continue BuSpar at current dose to target depression, anxiety.  We will continue prazosin at current dose for management of nightmares.  Consider Abilify or Lyrica at future visit if appropriate.  Follow-up in 1 month.  Addressed all questions and concerns.    Visit Diagnoses:    ICD-10-CM ICD-9-CM   1. Generalized anxiety disorder  F41.1 300.02   2. Moderate episode of recurrent major depressive disorder (HCC)  F33.1 296.32   3. Nightmares  F51.5 307.47       PLAN:  1. Safety: No acute safety concerns at this time.   2. Therapy: Will refer to Faiza Small LCSW.   3. Risk Assessment: Risk of self-harm acutely is low to moderate.  Risk factors include anxiety disorder, mood disorder, access to firearms, and recent psychosocial stressors (pandemic). Protective factors include no family history, no present SI, no history of suicide attempts or self-harm in the past, minimal AODA, healthcare seeking, future orientation, willingness to engage in care.  Risk of self-harm chronically is also low to moderate, but could be further elevated in the event of treatment noncompliance and/or AODA.  4. Labs/Diagnostics Ordered:   No orders of the defined types were placed in this encounter.    5. Medications:   New Medications Ordered This Visit   Medications   • busPIRone (BUSPAR) 7.5 MG tablet     Sig: Take 2 tablets by mouth 3 (Three) Times a Day.     Dispense:  90 tablet     Refill:  2   • DULoxetine (Cymbalta) 60 MG capsule     Sig: Take 1 cap PO QD. Take with 30mg cap for total dose of 90mg.     Dispense:  30 capsule     Refill:  2   • prazosin (MINIPRESS) 1 MG capsule     Sig: Take 2 capsules by mouth Every Night for 30 days.     Dispense:  90 capsule     Refill:  2   • DULoxetine (Cymbalta) 30 MG capsule     Sig: Take 1 cap PO QD. Take with 60mg for total dose of 90mg     Dispense:  30 capsule     Refill:  2       Discussed all risks, benefits,  alternatives, and side effects of Buspirone including but not limited to GI upset, dizziness, sedation, HA, nervousness, restlessness, and serotonin syndrome.  Pt educated on the need to practice safe sex while taking this med. Discussed the need for pt to immediately call the office for any new or worsening symptoms, and all other concerns. Pt educated on med compliance. Pt verbalized understanding and is agreeable to taking Buspirone. Addressed all questions and concerns.     Discussed all risks, benefits, alternatives, and side effects of Duloxetine including but not limited to GI upset, sexual dysfunction, bleeding risk, seizure risk, weight loss, insomnia, diaphoresis, drowsiness, headache, dizziness, fatigue, activation of vernon or hypomania, increased fragility fracture risk, hyponatremia, increased BP, hepatotoxicity, ocular effects, withdrawal syndrome following abrupt discontinuation, serotonin syndrome, and activation of suicidal ideation and behavior.  Pt educated on the need to practice safe sex while taking this med. Discussed the need for pt to immediately call the office for any new or worsening symptoms, such as worsening depression; feeling nervous or restless; suicidal thoughts or actions; or other changes changes in mood or behavior, and all other concerns. Pt educated on med compliance and the risks of suddenly stopping this medication or missing doses. Pt verbalized understanding and is agreeable to taking Duloxetine. Addressed all questions and concerns.     Prazosin, Risks, benefits, alternatives, and side effects discussed with patient including sedation, dizziness/falls risk, hypotension, GI upset. After discussion of these risks and benefits, the patient voiced understanding and agreed to proceed.      6. Follow up:   F/u in 1 month.     TREATMENT PLAN/GOALS: Continue supportive psychotherapy efforts and medications as indicated. Treatment and medication options discussed during today's  visit. Patient ackowledged and verbally consented to continue with current treatment plan and was educated on the importance of compliance with treatment and follow-up appointments.    MEDICATION ISSUES:  JESÚS reviewed as expected.  Discussed medication options and treatment plan of prescribed medication as well as the risks, benefits, and side effects including potential falls, possible impaired driving and metabolic adversities among others. Patient is agreeable to call the office with any worsening of symptoms or onset of side effects. Patient is agreeable to call 911 or go to the nearest ER should he/she begin having SI/HI. No medication side effects or related complaints today.            This document has been electronically signed by Kelly Lindsay PA-C  June 23, 2022 14:37 EDT      Part of this note may be an electronic transcription/translation of spoken language to printed text using the Dragon Dictation System.

## 2022-07-01 ENCOUNTER — APPOINTMENT (OUTPATIENT)
Dept: PULMONOLOGY | Facility: CLINIC | Age: 87
End: 2022-07-01

## 2022-07-01 VITALS
RESPIRATION RATE: 16 BRPM | DIASTOLIC BLOOD PRESSURE: 62 MMHG | HEART RATE: 78 BPM | WEIGHT: 152 LBS | HEIGHT: 64 IN | OXYGEN SATURATION: 92 % | SYSTOLIC BLOOD PRESSURE: 102 MMHG | BODY MASS INDEX: 25.95 KG/M2

## 2022-07-01 PROCEDURE — 99215 OFFICE O/P EST HI 40 MIN: CPT

## 2022-07-01 RX ORDER — LEVOTHYROXINE SODIUM 50 UG/1
50 TABLET ORAL DAILY
Refills: 0 | Status: DISCONTINUED | COMMUNITY
End: 2022-07-01

## 2022-07-01 NOTE — REASON FOR VISIT
[Follow-Up] : a follow-up visit [COPD] : COPD [Abnormal CXR/ Chest CT] : an abnormal CXR/ chest CT [Pulmonary Nodules] : pulmonary nodules

## 2022-07-05 NOTE — CONSULT LETTER
[Dear  ___] : Dear  [unfilled], [Consult Letter:] : I had the pleasure of evaluating your patient, [unfilled]. [Please see my note below.] : Please see my note below. [Sincerely,] : Sincerely, [FreeTextEntry3] : [facility]\par Pulmonary Critical Care\par Director Pulmonary Division\par Medical Director Respiratory Therapy\par [facility]\par \par

## 2022-07-05 NOTE — PROCEDURE
[FreeTextEntry1] : Hospital records [facility] \par CT scan 4/21: no change round atelectasis, small pl eff

## 2022-07-05 NOTE — DISCUSSION/SUMMARY
[FreeTextEntry1] : COPD Gold class II based on old PFTs, complicated by Dementia, A fib, HFpEF\par Post admission [facility] for biliary sepsis, post ERCP, sphincterotomy and stone extraction\par Recent Cardiology note appreciated, last echocardiogram 4/21 normal LV no pH\par Lung exam is normal, normal sp02\par Tolerating Anoro daily, dyspnea at baseline, no new pulmonary complaints\par Recent CT scan 4/21 reviewed and compared, stable small eff/round atelectasis x yrs\par Had covid vaccine\par Refuses pneumonia or flu\par Unable to perform lung function testing - limited by Dementia\par had covid vaccine Pfizer x 3\par Follow up 2 months or sooner if needed

## 2022-07-05 NOTE — HISTORY OF PRESENT ILLNESS
[Former] : former [Initial Eval - Existing Diagnosis] : an initial evaluation of an existing diagnosis of [Dyspnea on Exertion] : dyspnea on exertion [Currently Experiencing] : The patient is currently experiencing symptoms. [Short-Acting Beta Agonists] : short-acting beta agonists [Long-Acting Beta Agonists] : long-acting beta agonists [Anticholinergics (Inhalation)] : inhaled anticholinergics [Good Compliance] : good compliance with treatment [Good Tolerance] : good tolerance of treatment [Good Symptom Control] : good symptom control [On ___] : performed on [unfilled] [Patient] : the patient [Indication ___] : for an indication of [unfilled] [None] : no new symptoms reported [TextBox_4] : Former 50 pack yr smoker, quit 1992\par Reports [place] exposure\par Poor historian due to dementia\par Baseline SOBOE, no other symptoms\par \par refuses flu and pna\par dyspnea at baseline\par Pt s/p lung surgery\par On diuretic therapy per renal and cardiology depending on weight\par On Anoro daily and Albuterol nebulizer TID for COPD\par On Mucinex as needed\par Abnormal CT in 2018 [TextBox_11] : 1 [TextBox_13] : 50 [YearQuit] : 1992 [FreeTextEntry9] : Chest CT [FreeTextEntry8] : Bibasilar rounded atelectasis, small effusions, biapical scarring and emphysema

## 2022-07-05 NOTE — REVIEW OF SYSTEMS
[Edema] : edema [Seasonal Allergies] : seasonal allergies [Anemia] : anemia [Diabetes] : diabetes [Thyroid Problem] : thyroid problem [Negative] : Psychiatric [TextBox_30] : see HPI [TextBox_69] : gastroparesis followed with GI [TextBox_134] : Dementia

## 2022-07-08 ENCOUNTER — APPOINTMENT (OUTPATIENT)
Dept: CARDIOLOGY | Facility: CLINIC | Age: 87
End: 2022-07-08

## 2022-07-08 ENCOUNTER — NON-APPOINTMENT (OUTPATIENT)
Age: 87
End: 2022-07-08

## 2022-07-08 VITALS
OXYGEN SATURATION: 93 % | BODY MASS INDEX: 26.12 KG/M2 | SYSTOLIC BLOOD PRESSURE: 100 MMHG | TEMPERATURE: 98 F | HEIGHT: 64 IN | WEIGHT: 153 LBS | DIASTOLIC BLOOD PRESSURE: 58 MMHG | HEART RATE: 79 BPM

## 2022-07-08 PROCEDURE — 99214 OFFICE O/P EST MOD 30 MIN: CPT

## 2022-07-08 PROCEDURE — 93000 ELECTROCARDIOGRAM COMPLETE: CPT

## 2022-07-08 RX ORDER — COVID-19 TEST SPECIMEN COLLECT
MISCELLANEOUS MISCELLANEOUS
Qty: 1 | Refills: 0 | Status: DISCONTINUED | COMMUNITY
Start: 2021-08-30 | End: 2022-07-08

## 2022-07-12 ENCOUNTER — NON-APPOINTMENT (OUTPATIENT)
Age: 87
End: 2022-07-12

## 2022-07-13 ENCOUNTER — RX RENEWAL (OUTPATIENT)
Age: 87
End: 2022-07-13

## 2022-07-19 ENCOUNTER — NON-APPOINTMENT (OUTPATIENT)
Age: 87
End: 2022-07-19

## 2022-07-20 ENCOUNTER — APPOINTMENT (OUTPATIENT)
Dept: CT IMAGING | Facility: CLINIC | Age: 87
End: 2022-07-20

## 2022-07-21 ENCOUNTER — APPOINTMENT (OUTPATIENT)
Dept: CARDIOLOGY | Facility: CLINIC | Age: 87
End: 2022-07-21

## 2022-07-21 ENCOUNTER — APPOINTMENT (OUTPATIENT)
Dept: ULTRASOUND IMAGING | Facility: CLINIC | Age: 87
End: 2022-07-21

## 2022-07-21 ENCOUNTER — NON-APPOINTMENT (OUTPATIENT)
Age: 87
End: 2022-07-21

## 2022-07-21 ENCOUNTER — OUTPATIENT (OUTPATIENT)
Dept: OUTPATIENT SERVICES | Facility: HOSPITAL | Age: 87
LOS: 1 days | End: 2022-07-21
Payer: MEDICARE

## 2022-07-21 VITALS
WEIGHT: 154 LBS | TEMPERATURE: 98.4 F | SYSTOLIC BLOOD PRESSURE: 96 MMHG | OXYGEN SATURATION: 95 % | BODY MASS INDEX: 26.43 KG/M2 | DIASTOLIC BLOOD PRESSURE: 58 MMHG | HEART RATE: 71 BPM

## 2022-07-21 DIAGNOSIS — H04.123 DRY EYE SYNDROME OF BILATERAL LACRIMAL GLANDS: ICD-10-CM

## 2022-07-21 DIAGNOSIS — Z98.890 OTHER SPECIFIED POSTPROCEDURAL STATES: Chronic | ICD-10-CM

## 2022-07-21 DIAGNOSIS — G89.29 EPIGASTRIC PAIN: ICD-10-CM

## 2022-07-21 DIAGNOSIS — R10.13 EPIGASTRIC PAIN: ICD-10-CM

## 2022-07-21 DIAGNOSIS — Z00.8 ENCOUNTER FOR OTHER GENERAL EXAMINATION: ICD-10-CM

## 2022-07-21 PROCEDURE — 76856 US EXAM PELVIC COMPLETE: CPT | Mod: 26

## 2022-07-21 PROCEDURE — 76856 US EXAM PELVIC COMPLETE: CPT

## 2022-07-21 PROCEDURE — 93306 TTE W/DOPPLER COMPLETE: CPT

## 2022-07-21 PROCEDURE — 99215 OFFICE O/P EST HI 40 MIN: CPT

## 2022-07-21 PROCEDURE — 76700 US EXAM ABDOM COMPLETE: CPT

## 2022-07-21 PROCEDURE — 76700 US EXAM ABDOM COMPLETE: CPT | Mod: 26

## 2022-07-21 RX ORDER — LANCETS 33 GAUGE
EACH MISCELLANEOUS
Qty: 300 | Refills: 0 | Status: ACTIVE | COMMUNITY
Start: 2021-08-13

## 2022-07-21 RX ORDER — BLOOD SUGAR DIAGNOSTIC
STRIP MISCELLANEOUS
Qty: 300 | Refills: 0 | Status: ACTIVE | COMMUNITY
Start: 2021-05-23

## 2022-07-25 ENCOUNTER — OUTPATIENT (OUTPATIENT)
Dept: OUTPATIENT SERVICES | Facility: HOSPITAL | Age: 87
LOS: 1 days | End: 2022-07-25

## 2022-07-25 ENCOUNTER — APPOINTMENT (OUTPATIENT)
Dept: MRI IMAGING | Facility: CLINIC | Age: 87
End: 2022-07-25

## 2022-07-25 ENCOUNTER — APPOINTMENT (OUTPATIENT)
Dept: GASTROENTEROLOGY | Facility: CLINIC | Age: 87
End: 2022-07-25

## 2022-07-25 VITALS
DIASTOLIC BLOOD PRESSURE: 60 MMHG | WEIGHT: 154 LBS | HEIGHT: 64 IN | TEMPERATURE: 98 F | SYSTOLIC BLOOD PRESSURE: 100 MMHG | HEART RATE: 70 BPM | OXYGEN SATURATION: 98 % | RESPIRATION RATE: 16 BRPM | BODY MASS INDEX: 26.29 KG/M2

## 2022-07-25 DIAGNOSIS — Z98.890 OTHER SPECIFIED POSTPROCEDURAL STATES: Chronic | ICD-10-CM

## 2022-07-25 DIAGNOSIS — K21.9 GASTRO-ESOPHAGEAL REFLUX DISEASE W/OUT ESOPHAGITIS: ICD-10-CM

## 2022-07-25 DIAGNOSIS — R10.11 RIGHT UPPER QUADRANT PAIN: ICD-10-CM

## 2022-07-25 DIAGNOSIS — K59.00 CONSTIPATION, UNSPECIFIED: ICD-10-CM

## 2022-07-25 DIAGNOSIS — K31.84 GASTROPARESIS: ICD-10-CM

## 2022-07-25 PROCEDURE — 99214 OFFICE O/P EST MOD 30 MIN: CPT

## 2022-07-25 PROCEDURE — 74181 MRI ABDOMEN W/O CONTRAST: CPT | Mod: 26

## 2022-07-25 NOTE — ASSESSMENT
[FreeTextEntry1] : Patient is a 87 year old male, with PMH as noted below, who presents for evaluation of abdominal pain and recurrent vomiting. Pt is brought in by his daughter and wife. Patient with h/o dementia, history by wife and daughter. \par \par Possibly 2/2 gastroparesis. Daughter in concerned given his h/o choledocholithiasis. Will order MRCP. She has appt for CT chest and CT a/p without IV contrast.\par \par If all testing negative, supportive care recommended. \par \par Possibly 2/2 poorly managed gastroparesis, particularly larger meal at lunch. He has 2 eggs and a protein shake - very high protein. Lunch is just 2-3 hours after breakfast. Recommend splitting lunch in half and doing eggs first then shake a few hours later. \par \par Will wait for results before scheduling follow up visit.

## 2022-07-25 NOTE — PHYSICAL EXAM
[General Appearance - In No Acute Distress] : in no acute distress [Sclera] : the sclera and conjunctiva were normal [PERRL With Normal Accommodation] : pupils were equal in size, round, and reactive to light [Extraocular Movements] : extraocular movements were intact [Outer Ear] : the ears and nose were normal in appearance [] : no respiratory distress [Exaggerated Use Of Accessory Muscles For Inspiration] : no accessory muscle use [Heart Rate And Rhythm] : heart rate was normal and rhythm regular [Heart Sounds] : normal S1 and S2 [Bowel Sounds] : normal bowel sounds [Abdomen Soft] : soft [FreeTextEntry1] : Difficult to assess tenderness due to dementia. When palpating with stethoscope, pt did not c/o pain but when palpating with my hand, he is c/o pain to right and left side of abdomen. No rebound, no guarding

## 2022-07-26 ENCOUNTER — OUTPATIENT (OUTPATIENT)
Dept: OUTPATIENT SERVICES | Facility: HOSPITAL | Age: 87
LOS: 1 days | End: 2022-07-26
Payer: MEDICARE

## 2022-07-26 ENCOUNTER — APPOINTMENT (OUTPATIENT)
Dept: CT IMAGING | Facility: CLINIC | Age: 87
End: 2022-07-26

## 2022-07-26 ENCOUNTER — NON-APPOINTMENT (OUTPATIENT)
Age: 87
End: 2022-07-26

## 2022-07-26 ENCOUNTER — OUTPATIENT (OUTPATIENT)
Dept: OUTPATIENT SERVICES | Facility: HOSPITAL | Age: 87
LOS: 1 days | End: 2022-07-26

## 2022-07-26 DIAGNOSIS — Z98.890 OTHER SPECIFIED POSTPROCEDURAL STATES: Chronic | ICD-10-CM

## 2022-07-26 DIAGNOSIS — R91.8 OTHER NONSPECIFIC ABNORMAL FINDING OF LUNG FIELD: ICD-10-CM

## 2022-07-26 DIAGNOSIS — R10.30 LOWER ABDOMINAL PAIN, UNSPECIFIED: ICD-10-CM

## 2022-07-26 PROCEDURE — 74176 CT ABD & PELVIS W/O CONTRAST: CPT | Mod: MH

## 2022-07-26 PROCEDURE — 74176 CT ABD & PELVIS W/O CONTRAST: CPT | Mod: 26,MH

## 2022-07-26 PROCEDURE — 71250 CT THORAX DX C-: CPT | Mod: 26,MH

## 2022-07-26 PROCEDURE — 71250 CT THORAX DX C-: CPT

## 2022-07-28 ENCOUNTER — APPOINTMENT (OUTPATIENT)
Dept: CT IMAGING | Facility: CLINIC | Age: 87
End: 2022-07-28

## 2022-08-05 ENCOUNTER — NON-APPOINTMENT (OUTPATIENT)
Age: 87
End: 2022-08-05

## 2022-08-17 NOTE — PROGRESS NOTE ADULT - SUBJECTIVE AND OBJECTIVE BOX
"  [facility]  PROGRESS NOTE    Date of Admission: 7/27/2022  Date of Service: 08/17/22    Primary Care Physician: Farooq Painter MD    Chief Complaint: Following for HTN, Acute CHF    Subjective      Resting in bed, awakens fairly easily.  Did not sleep well.  Wife at bedside.  Breathing is \"fair.\" No chest pain.      Objective   Vitals: /74 (BP Location: Right leg, Patient Position: Lying)   Pulse 69   Temp 97.6 °F (36.4 °C) (Axillary)   Resp 17   Ht 190.5 cm (75\")   Wt 116 kg (255 lb)   SpO2 96%   BMI 31.87 kg/m²       Physical Exam:  GENERAL: Alert, cooperative, in no acute distress.   HEENT: Normocephalic, no jugular venous distention  HEART: Regular rhythm, normal rate, and no murmurs, gallops, or rubs.   LUNGS: Audible wheezing throughout all lung fields with coarse lung sounds, On 4L NC.   ABDOMEN: Soft, bowel sounds present, nontender   NEUROLOGIC: No focal abnormalities involving strength or sensation are noted.   EXTREMITIES: No clubbing, cyanosis.  Trace edema noted.     Results:  Results from last 7 days   Lab Units 08/15/22  0615 08/13/22  2310 08/13/22  1224   WBC 10*3/mm3 9.63 14.07* 14.42*   HEMOGLOBIN g/dL 10.6* 10.2* 10.5*   HEMATOCRIT % 32.6* 31.2* 31.4*   PLATELETS 10*3/mm3 199 226 209     Results from last 7 days   Lab Units 08/17/22  0507 08/15/22  0615 08/13/22  2310   SODIUM mmol/L 137 138 139   POTASSIUM mmol/L 4.6 4.9 4.0   CHLORIDE mmol/L 96* 97* 99   CO2 mmol/L 28.0 27.0 27.0   BUN mg/dL 78* 64* 51*   CREATININE mg/dL 2.66* 2.16* 2.07*   GLUCOSE mg/dL 232* 225* 151*      Lab Results   Component Value Date    CHOL 180 08/04/2022    TRIG 160 (H) 08/04/2022    HDL 39 (L) 08/04/2022     (H) 08/04/2022    AST 12 08/13/2022    ALT 13 08/13/2022         Results from last 7 days   Lab Units 08/13/22  1224 08/11/22  0023   PROTIME Seconds 13.8 13.6   INR  1.07 1.05   APTT seconds 29.7* 33.1*     Results from last 7 days   Lab Units " 08/15/22  0615 08/13/22  2310 08/13/22  1224 08/13/22  0730   CK TOTAL U/L 171  --   --   --    TROPONIN T ng/mL  --  0.230* 0.229* 0.230*     Results from last 7 days   Lab Units 08/13/22  2310   PROBNP pg/mL 31,919.0*       Intake/Output Summary (Last 24 hours) at 8/17/2022 0915  Last data filed at 8/17/2022 0712  Gross per 24 hour   Intake 115 ml   Output 500 ml   Net -385 ml       I personally reviewed the patient's EKG/Telemetry data    Radiology Data:   CXR 8/14/22:  IMPRESSION:     Increased patchy bibasilar opacities     Cardiomegaly and pulmonary vascular congestion.    Current Medications:  amLODIPine, 10 mg, Oral, Daily  ampicillin-sulbactam, 3 g, Intravenous, Q8H  apixaban, 10 mg, Oral, Q12H   Followed by  [START ON 8/21/2022] apixaban, 5 mg, Oral, Q12H  aspirin, 81 mg, Oral, Daily  cetirizine, 5 mg, Oral, Daily  insulin detemir, 24 Units, Subcutaneous, Nightly  insulin lispro, 0-9 Units, Subcutaneous, TID AC  levothyroxine, 50 mcg, Oral, Q AM  metoprolol succinate XL, 50 mg, Oral, Q24H  polyethylene glycol, 17 g, Oral, Daily  senna-docusate sodium, 2 tablet, Oral, BID  sodium chloride, 10 mL, Intravenous, Q12H  sodium chloride, 10 mL, Intravenous, Q12H  vitamin D3, 5,000 Units, Oral, Daily               ASSESSMENT:  PVDz with remote Soy iliac stenting, good collateral flow.  Osteomyelitis left Great toe  CAD  HTN  [place]  Acute CHF  Hypoxia  DMII  BLE DVTs      PLAN:  -  POD 15 p Left grt toe transmetatarsal amputation.  Continue PT/OT, antibiotics per ID.  Now on Ampicillin-sulbactam.  -  Mildly elevated troponin from demand ischemia with acute CHF. No acute EKG changes.  Continue Beta Blocker, CCB.  Repeat echo, EF likely closer to 40%.  -  Continued rise in Cr.  Patient needs diuresis, maybe even CRRT. Nephrology following and should manage diuresis.  Renal ultrasound shows no obstruction and NO WILMA by ultrasound.  - Eliquis per DVT protocol.      Megan Shukla PA-C working with Dr. Gamboa  Patient seen and examined    I&O's Summary    29 Oct 2019 07:01  -  30 Oct 2019 07:00  --------------------------------------------------------  IN: 0 mL / OUT: 1600 mL / NET: -1600 mL    30 Oct 2019 07:01  -  30 Oct 2019 16:06  --------------------------------------------------------  IN: 0 mL / OUT: 650 mL / NET: -650 mL    OOB/Recliner; Family present    REVIEW OF SYSTEMS:    CONSTITUTIONAL: No F/C  RESPIRATORY: No cough,  Still with some SOB, better  CARDIOVASCULAR: No CP/palpitations,    GASTROINTESTINAL: No abdominal pain  or NVD   GENITOURINARY: No UTI sx  NEUROLOGICAL: No headaches, NO wk/numbness  MUSCULOSKELETAL:   EXTREMITIES : Mild swelling,    Vital Signs Last 24 Hrs  T(C): 36.7 (30 Oct 2019 08:43), Max: 36.9 (30 Oct 2019 00:00)  T(F): 98 (30 Oct 2019 08:43), Max: 98.5 (30 Oct 2019 00:00)  HR: 86 (30 Oct 2019 15:11) (78 - 97)  BP: 110/64 (30 Oct 2019 08:43) (104/66 - 135/82)  BP(mean): --  RR: 18 (30 Oct 2019 08:43) (16 - 18)  SpO2: 97% (30 Oct 2019 15:11) (93% - 97%)    PHYSICAL EXAM:    GENERAL: NAD,   EYES:  conjunctiva and sclera clear  NECK: Supple, No JVD/Bruit  NERVOUS SYSTEM:  A/O x3,   CHEST:  No rales occ rhonchi  HEART:  RRR, No murmur  ABDOMEN: Soft, NT/ND BS+  EXTREMITIES:  Mild Edema;  SKIN: No rashes    LABS:                          9.0    10.66 )-----------( 268      ( 29 Oct 2019 07:55 )             29.6     10-29    141  |  98  |  81.0<H>  ----------------------------<  133<H>  4.3   |  28.0  |  3.11<H>    Ca    9.1      29 Oct 2019 07:55  Phos  4.6     10-29    TPro  6.3<L>  /  Alb  2.7<L>  /  TBili  0.3<L>  /  DBili  x   /  AST  10  /  ALT  32  /  AlkPhos  64  10-29      MEDICATIONS  (STANDING):  acetaminophen   Tablet .. PRN  ALBUTerol/ipratropium for Nebulization  ALBUTerol/ipratropium for Nebulization.  aluminum hydroxide/magnesium hydroxide/simethicone Suspension PRN  artificial  tears Solution  aspirin enteric coated  buDESOnide    Inhalation Suspension  dextrose 40% Gel PRN  dextrose 5%.  dextrose 50% Injectable  dextrose 50% Injectable  dextrose 50% Injectable  diVALproex ER  docusate sodium  donepezil  epoetin triny Injectable  folic acid  glucagon  Injectable PRN  heparin  Injectable  insulin glargine Injectable (LANTUS)  insulin lispro (HumaLOG) corrective regimen sliding scale  iron sucrose IVPB  lactulose Syrup PRN  levothyroxine  melatonin  memantine ER  metoprolol tartrate  montelukast  multivitamin  ondansetron Injectable PRN  pantoprazole    Tablet  piperacillin/tazobactam IVPB..  pitavastatin  polyethylene glycol 3350 PRN  saccharomyces boulardii  senna  sodium chloride 0.9% lock flush PRN  torsemide  umeclidinium 62.5 MICROgram(s)/vilanterol 25 MICROgram(s) Inhaler Paulie  08/15/2022  09:18 AM EDT

## 2022-08-18 ENCOUNTER — APPOINTMENT (OUTPATIENT)
Dept: DERMATOLOGY | Facility: CLINIC | Age: 87
End: 2022-08-18

## 2022-08-18 PROCEDURE — 99213 OFFICE O/P EST LOW 20 MIN: CPT

## 2022-09-13 ENCOUNTER — APPOINTMENT (OUTPATIENT)
Dept: PULMONOLOGY | Facility: CLINIC | Age: 87
End: 2022-09-13

## 2022-09-13 VITALS
DIASTOLIC BLOOD PRESSURE: 72 MMHG | RESPIRATION RATE: 16 BRPM | HEART RATE: 67 BPM | OXYGEN SATURATION: 96 % | SYSTOLIC BLOOD PRESSURE: 118 MMHG

## 2022-09-13 PROCEDURE — 99214 OFFICE O/P EST MOD 30 MIN: CPT

## 2022-09-13 NOTE — REASON FOR VISIT
[Follow-Up] : a follow-up visit [Abnormal CXR/ Chest CT] : an abnormal CXR/ chest CT [COPD] : COPD [Pulmonary Nodules] : pulmonary nodules [Shortness of Breath] : shortness of breath

## 2022-09-13 NOTE — CONSULT LETTER
[Dear  ___] : Dear  [unfilled], [Consult Letter:] : I had the pleasure of evaluating your patient, [unfilled]. [Please see my note below.] : Please see my note below. [Sincerely,] : Sincerely, [FreeTextEntry3] : [facility] Russell DO [facility]\par Pulmonary Critical Care\par Director Pulmonary Division\par Medical Director Respiratory Therapy\par [facility]\par \par

## 2022-09-13 NOTE — DISCUSSION/SUMMARY
[FreeTextEntry1] : COPD Gold class II based on old PFTs, complicated by Dementia, A fib, HFpEF\par Post admission [facility] for biliary sepsis, post ERCP, sphincterotomy and stone extraction\par Prior echocardiogram 4/21 normal LV no pH\par Lung exam is normal, normal sp02\par Tolerating Anoro daily and nebulizer up to TID prn, dyspnea at baseline, no new pulmonary complaints\par Recent CT scan 7/26/22 reviewed and compared, stable small eff/round atelectasis x yrs; repeat as needed\par Cardiology f/u for LE edema and adjustment of diuretic therapy\par Renal f/u for CRI\par Had covid vaccine; refuses pneumonia or flu\par Unable to perform lung function testing - limited by Dementia\par Had covid vaccine Pfizer x 3\par Follow up 3 months or sooner if needed\par \par The patient expressed understanding and agreement with the above recommendations/plan and accepts responsibility to be compliant with recommended testing, therapies, and f/u visits.\par All relevant questions and concerns were addressed. \par Discussed above with patient and wife and daughter who were also present.

## 2022-09-13 NOTE — PHYSICAL EXAM
[No Acute Distress] : no acute distress [Normal Appearance] : normal appearance [Normal Rate/Rhythm] : normal rate/rhythm [Normal S1, S2] : normal s1, s2 [No Edema] : no edema [No Murmurs] : no murmurs [No Rubs] : no rubs [No Gallops] : no gallops [No Abnormalities] : no abnormalities [Normal Gait] : normal gait [No Clubbing] : no clubbing [No Cyanosis] : no cyanosis [FROM] : FROM [Normal Color/ Pigmentation] : normal color/ pigmentation [No Focal Deficits] : no focal deficits [No Resp Distress] : no resp distress [No Acc Muscle Use] : no acc muscle use [Normal Rhythm and Effort] : normal rhythm and effort [Clear to Auscultation Bilaterally] : clear to auscultation bilaterally [2+ Pitting] : 2+ pitting

## 2022-09-13 NOTE — HISTORY OF PRESENT ILLNESS
[Former] : former [Follow-Up - Routine Clinic] : a routine clinic follow-up of [Dyspnea on Exertion] : dyspnea on exertion [Currently Experiencing] : The patient is currently experiencing symptoms. [Short-Acting Beta Agonists] : short-acting beta agonists [Long-Acting Beta Agonists] : long-acting beta agonists [Anticholinergics (Inhalation)] : inhaled anticholinergics [Good Compliance] : good compliance with treatment [Good Tolerance] : good tolerance of treatment [Good Symptom Control] : good symptom control [On ___] : performed on [unfilled] [Patient] : the patient [Indication ___] : for an indication of [unfilled] [None] : no new symptoms reported [TextBox_4] : Former 50 pack yr smoker, quit 1992\par Reports [place] exposure\par Poor historian due to dementia\par Baseline SOBOE, no other symptoms\par \par refuses flu and pna\par dyspnea at baseline\par Pt s/p lung surgery\par On diuretic therapy per renal and cardiology depending on weight\par On Anoro daily and Albuterol nebulizer TID for COPD\par On Mucinex as needed\par Abnormal CT in 2018 [TextBox_11] : 1 [TextBox_13] : 50 [YearQuit] : 1992 [FreeTextEntry9] : Chest CT [FreeTextEntry8] : Bibasilar rounded atelectasis, small effusions, biapical scarring and emphysema

## 2022-10-20 ENCOUNTER — NON-APPOINTMENT (OUTPATIENT)
Age: 87
End: 2022-10-20

## 2022-10-20 ENCOUNTER — APPOINTMENT (OUTPATIENT)
Dept: CARDIOLOGY | Facility: CLINIC | Age: 87
End: 2022-10-20

## 2022-10-20 VITALS
TEMPERATURE: 97.8 F | HEIGHT: 64 IN | HEART RATE: 71 BPM | DIASTOLIC BLOOD PRESSURE: 66 MMHG | BODY MASS INDEX: 26.63 KG/M2 | OXYGEN SATURATION: 93 % | WEIGHT: 156 LBS | SYSTOLIC BLOOD PRESSURE: 122 MMHG

## 2022-10-20 PROCEDURE — 99214 OFFICE O/P EST MOD 30 MIN: CPT

## 2022-10-20 PROCEDURE — 93000 ELECTROCARDIOGRAM COMPLETE: CPT

## 2022-10-20 RX ORDER — ZINC OXIDE 13 %
CREAM (GRAM) TOPICAL DAILY
Refills: 0 | Status: ACTIVE | COMMUNITY

## 2022-10-20 RX ORDER — LEVOTHYROXINE SODIUM 75 UG/1
75 TABLET ORAL DAILY
Refills: 0 | Status: ACTIVE | COMMUNITY

## 2022-10-20 RX ORDER — DARBEPOETIN ALFA IN ALBUMN SOL 150MCG/0.3
SYRINGE (ML) INJECTION
Refills: 0 | Status: ACTIVE | COMMUNITY

## 2022-10-20 RX ORDER — ELECTROLYTES/DEXTROSE
10 SOLUTION, ORAL ORAL
Refills: 0 | Status: ACTIVE | COMMUNITY

## 2022-10-20 RX ORDER — TORSEMIDE 10 MG/1
10 TABLET ORAL TWICE DAILY
Refills: 0 | Status: ACTIVE | COMMUNITY
Start: 2022-10-16

## 2022-10-20 RX ORDER — INSULIN ASPART 100 [IU]/ML
100 INJECTION, SOLUTION INTRAVENOUS; SUBCUTANEOUS
Refills: 0 | Status: ACTIVE | COMMUNITY
Start: 2022-07-15

## 2022-10-20 RX ORDER — MONTELUKAST SODIUM 10 MG/1
10 TABLET, FILM COATED ORAL DAILY
Refills: 0 | Status: ACTIVE | COMMUNITY

## 2022-10-20 RX ORDER — SIMETHICONE 125 MG
CAPSULE ORAL DAILY
Refills: 0 | Status: ACTIVE | COMMUNITY

## 2022-11-17 ENCOUNTER — APPOINTMENT (OUTPATIENT)
Dept: CARDIOLOGY | Facility: CLINIC | Age: 87
End: 2022-11-17

## 2022-12-14 ENCOUNTER — APPOINTMENT (OUTPATIENT)
Dept: PULMONOLOGY | Facility: CLINIC | Age: 87
End: 2022-12-14

## 2022-12-14 VITALS
HEART RATE: 82 BPM | HEIGHT: 64 IN | RESPIRATION RATE: 16 BRPM | WEIGHT: 156 LBS | SYSTOLIC BLOOD PRESSURE: 116 MMHG | BODY MASS INDEX: 26.63 KG/M2 | DIASTOLIC BLOOD PRESSURE: 68 MMHG | OXYGEN SATURATION: 92 %

## 2022-12-14 PROCEDURE — 99214 OFFICE O/P EST MOD 30 MIN: CPT

## 2022-12-14 NOTE — PHYSICAL EXAM
[No Acute Distress] : no acute distress [Normal Appearance] : normal appearance [Normal Rate/Rhythm] : normal rate/rhythm [Normal S1, S2] : normal s1, s2 [No Murmurs] : no murmurs [No Resp Distress] : no resp distress [No Acc Muscle Use] : no acc muscle use [Normal Rhythm and Effort] : normal rhythm and effort [Clear to Auscultation Bilaterally] : clear to auscultation bilaterally [No Abnormalities] : no abnormalities [Normal Gait] : normal gait [No Clubbing] : no clubbing [No Cyanosis] : no cyanosis [2+ Pitting] : 2+ pitting [Normal Color/ Pigmentation] : normal color/ pigmentation [No Focal Deficits] : no focal deficits [Benign] : benign [Not Tender] : not tender [Soft] : soft [TextBox_68] : Few basilar crackles

## 2022-12-14 NOTE — HISTORY OF PRESENT ILLNESS
[Follow-Up - Routine Clinic] : a routine clinic follow-up of [Dyspnea on Exertion] : dyspnea on exertion [Currently Experiencing] : The patient is currently experiencing symptoms. [Short-Acting Beta Agonists] : short-acting beta agonists [Long-Acting Beta Agonists] : long-acting beta agonists [Anticholinergics (Inhalation)] : inhaled anticholinergics [Good Compliance] : good compliance with treatment [Good Tolerance] : good tolerance of treatment [Good Symptom Control] : good symptom control [On ___] : performed on [unfilled] [Patient] : the patient [Indication ___] : for an indication of [unfilled] [None] : no new symptoms reported [TextBox_4] : Former 50 pack yr smoker, quit 1992\par Reports [place] exposure\par Poor historian due to dementia\par Baseline SOBOE, no other symptoms\par \par dyspnea at baseline\par Pt s/p lung surgery\par On diuretic therapy per renal and cardiology depending on weight\par On Anoro daily and Albuterol nebulizer BID for COPD\par On Mucinex as needed\par Abnormal CT in 2018 but last CT with stable changes; repeat as needed [FreeTextEntry9] : Chest CT [FreeTextEntry8] : Bibasilar rounded atelectasis, small effusions, biapical scarring and emphysema

## 2022-12-14 NOTE — CONSULT LETTER
[Dear  ___] : Dear  [unfilled], [Consult Letter:] : I had the pleasure of evaluating your patient, [unfilled]. [Please see my note below.] : Please see my note below. [Sincerely,] : Sincerely, [FreeTextEntry3] : Mauro Russell DO [facility]\par Pulmonary Critical Care\par Director Pulmonary Division\par Medical Director Respiratory Therapy\par [facility]\par \par

## 2022-12-14 NOTE — DISCUSSION/SUMMARY
[FreeTextEntry1] : \par #1. Pt with COPD Gold class II based on old PFTs, complicated by Dementia, A fib, HFpEF\par #2. Post admission [facility] for biliary sepsis, post ERCP, sphincterotomy and stone extraction\par #3. Prior echocardiogram 4/21 normal LV no pH\par #4. Diurese as tolerates for LE edema and mild rales on exam\par #5. Tolerating Anoro daily and nebulizer up to 2-3 x daily prn, dyspnea at baseline\par #6. Recent CT scan 7/26/22 with stable small eff/round atelectasis x yrs; repeat as needed\par #7. Renal and cardiology f/u for LE edema and adjustment of diuretic therapy\par #8. Renal f/u for CRI\par #9. Pt had both Covid vaccines \par #10. Unable to perform lung function testing - limited by Dementia\par #11. F/u in 3 months or sooner if needed\par \par The patient expressed understanding and agreement with the above recommendations/plan and accepts responsibility to be compliant with recommended testing, therapies, and f/u visits.\par All relevant questions and concerns were addressed. \par Discussed above with patient and wife and daughter who were also present.

## 2022-12-20 NOTE — PHYSICAL THERAPY INITIAL EVALUATION ADULT - MD/RN NOTIFIED
[facility]  Unit in [facility] pass  Uses [facility] 
yes
yes

## 2022-12-23 ENCOUNTER — APPOINTMENT (OUTPATIENT)
Dept: RADIOLOGY | Facility: CLINIC | Age: 87
End: 2022-12-23

## 2022-12-23 ENCOUNTER — APPOINTMENT (OUTPATIENT)
Dept: PULMONOLOGY | Facility: CLINIC | Age: 87
End: 2022-12-23

## 2022-12-23 ENCOUNTER — OUTPATIENT (OUTPATIENT)
Dept: OUTPATIENT SERVICES | Facility: HOSPITAL | Age: 87
LOS: 1 days | End: 2022-12-23
Payer: MEDICARE

## 2022-12-23 VITALS
OXYGEN SATURATION: 95 % | BODY MASS INDEX: 26.63 KG/M2 | WEIGHT: 156 LBS | RESPIRATION RATE: 16 BRPM | HEART RATE: 80 BPM | DIASTOLIC BLOOD PRESSURE: 60 MMHG | SYSTOLIC BLOOD PRESSURE: 100 MMHG | HEIGHT: 64 IN

## 2022-12-23 DIAGNOSIS — Z00.8 ENCOUNTER FOR OTHER GENERAL EXAMINATION: ICD-10-CM

## 2022-12-23 DIAGNOSIS — R06.02 SHORTNESS OF BREATH: ICD-10-CM

## 2022-12-23 PROCEDURE — 71046 X-RAY EXAM CHEST 2 VIEWS: CPT | Mod: 26

## 2022-12-23 PROCEDURE — 99214 OFFICE O/P EST MOD 30 MIN: CPT

## 2022-12-23 PROCEDURE — 71046 X-RAY EXAM CHEST 2 VIEWS: CPT

## 2022-12-23 NOTE — DISCUSSION/SUMMARY
[FreeTextEntry1] : \par #1. Pt with COPD Gold class II based on old PFTs, complicated by Dementia, A fib, HFpEF\par #2. Post admission [facility] for biliary sepsis, post ERCP, sphincterotomy and stone extraction\par #3. Prior echocardiogram 4/21 normal LV no pH\par #4. Diurese as tolerates for LE edema and mild rales on exam\par #5. Tolerating Anoro daily and nebulizer up to 2-3 x daily prn, dyspnea near baseline but with congested cough so will complete course of Zithromax as given to him by  and will add Medrol dose mili for congestion possibly related to inflammation\par #6. Recent CT scan 7/26/22 with stable small eff/round atelectasis x yrs; repeat as needed\par #7. Renal and cardiology f/u for LE edema and adjustment of diuretic therapy\par #8. Renal f/u for CRI\par #9. Pt had both Covid vaccines \par #10. Unable to perform lung function testing - limited by Dementia\par #11. F/u in 3 weeks if pt continues to have symptoms otherwise in 3 months as scheduled\par \par The patient expressed understanding and agreement with the above recommendations/plan and accepts responsibility to be compliant with recommended testing, therapies, and f/u visits.\par All relevant questions and concerns were addressed. \par Discussed above with patient and wife and daughter who were also present.

## 2022-12-23 NOTE — REASON FOR VISIT
[Acute] : an acute visit [Abnormal CXR/ Chest CT] : an abnormal CXR/ chest CT [COPD] : COPD [Shortness of Breath] : shortness of breath [Pulmonary Nodules] : pulmonary nodules

## 2022-12-23 NOTE — CONSULT LETTER
[Dear  ___] : Dear  [unfilled], [Consult Letter:] : I had the pleasure of evaluating your patient, [unfilled]. [Please see my note below.] : Please see my note below. [Sincerely,] : Sincerely, [FreeTextEntry3] : Jayesh Rivera MD, FCCP, D. ABSM\par Pulmonary and Sleep Medicine\par [facility] [place]

## 2022-12-23 NOTE — PHYSICAL EXAM
[No Acute Distress] : no acute distress [Normal Appearance] : normal appearance [Normal Rate/Rhythm] : normal rate/rhythm [Normal S1, S2] : normal s1, s2 [No Murmurs] : no murmurs [No Resp Distress] : no resp distress [No Acc Muscle Use] : no acc muscle use [Normal Rhythm and Effort] : normal rhythm and effort [Clear to Auscultation Bilaterally] : clear to auscultation bilaterally [No Abnormalities] : no abnormalities [Benign] : benign [Not Tender] : not tender [Soft] : soft [Normal Gait] : normal gait [No Clubbing] : no clubbing [No Cyanosis] : no cyanosis [2+ Pitting] : 2+ pitting [Normal Color/ Pigmentation] : normal color/ pigmentation [No Focal Deficits] : no focal deficits [TextBox_68] : Few basilar crackles

## 2022-12-23 NOTE — HISTORY OF PRESENT ILLNESS
[Follow-Up - Routine Clinic] : a routine clinic follow-up of [Dyspnea on Exertion] : dyspnea on exertion [Currently Experiencing] : The patient is currently experiencing symptoms. [Short-Acting Beta Agonists] : short-acting beta agonists [Anticholinergics (Inhalation)] : inhaled anticholinergics [Long-Acting Beta Agonists] : long-acting beta agonists [Good Compliance] : good compliance with treatment [Good Tolerance] : good tolerance of treatment [Good Symptom Control] : good symptom control [On ___] : performed on [unfilled] [Patient] : the patient [Indication ___] : for an indication of [unfilled] [None] : no new symptoms reported [Former] : former [>= 20 pack years] : >= 20 pack years [TextBox_4] : Former 50 pack yr smoker, quit 1992\par Reports [place] exposure\par Poor historian due to dementia\par Baseline SOBOE, no other symptoms\par \par dyspnea at baseline\par Pt s/p lung surgery\par On diuretic therapy per renal and cardiology depending on weight\par On Anoro daily and Albuterol nebulizer BID for COPD\par On Mucinex as needed\par Abnormal CT in 2018 but last CT with stable changes; repeat as needed\par \par Pt with 2 day h/o increased SOB and congested cough but no fevers, chills, sweats. Pt seen in UC and RVP reportedly neg. [FreeTextEntry9] : Chest CT [FreeTextEntry8] : Bibasilar rounded atelectasis, small effusions, biapical scarring and emphysema

## 2023-01-01 ENCOUNTER — INPATIENT (INPATIENT)
Facility: HOSPITAL | Age: 88
LOS: 3 days | Discharge: HOME | DRG: 375 | End: 2023-01-05
Attending: FAMILY MEDICINE | Admitting: HOSPITALIST
Payer: MEDICARE

## 2023-01-01 VITALS
HEART RATE: 75 BPM | WEIGHT: 147.93 LBS | OXYGEN SATURATION: 98 % | SYSTOLIC BLOOD PRESSURE: 95 MMHG | DIASTOLIC BLOOD PRESSURE: 51 MMHG | TEMPERATURE: 98 F | RESPIRATION RATE: 18 BRPM

## 2023-01-01 DIAGNOSIS — Z98.890 OTHER SPECIFIED POSTPROCEDURAL STATES: Chronic | ICD-10-CM

## 2023-01-01 DIAGNOSIS — K62.5 HEMORRHAGE OF ANUS AND RECTUM: ICD-10-CM

## 2023-01-01 LAB
ALBUMIN SERPL ELPH-MCNC: 3.5 G/DL — SIGNIFICANT CHANGE UP (ref 3.3–5.2)
ALP SERPL-CCNC: 144 U/L — HIGH (ref 40–120)
ALT FLD-CCNC: 21 U/L — SIGNIFICANT CHANGE UP
ANION GAP SERPL CALC-SCNC: 9 MMOL/L — SIGNIFICANT CHANGE UP (ref 5–17)
APTT BLD: 28.5 SEC — SIGNIFICANT CHANGE UP (ref 27.5–35.5)
AST SERPL-CCNC: 20 U/L — SIGNIFICANT CHANGE UP
BASOPHILS # BLD AUTO: 0.01 K/UL — SIGNIFICANT CHANGE UP (ref 0–0.2)
BASOPHILS NFR BLD AUTO: 0.1 % — SIGNIFICANT CHANGE UP (ref 0–2)
BILIRUB SERPL-MCNC: 0.4 MG/DL — SIGNIFICANT CHANGE UP (ref 0.4–2)
BLD GP AB SCN SERPL QL: SIGNIFICANT CHANGE UP
BUN SERPL-MCNC: 70.4 MG/DL — HIGH (ref 8–20)
CALCIUM SERPL-MCNC: 8.8 MG/DL — SIGNIFICANT CHANGE UP (ref 8.4–10.5)
CHLORIDE SERPL-SCNC: 97 MMOL/L — SIGNIFICANT CHANGE UP (ref 96–108)
CO2 SERPL-SCNC: 31 MMOL/L — HIGH (ref 22–29)
CREAT SERPL-MCNC: 2.09 MG/DL — HIGH (ref 0.5–1.3)
EGFR: 30 ML/MIN/1.73M2 — LOW
EOSINOPHIL # BLD AUTO: 0.3 K/UL — SIGNIFICANT CHANGE UP (ref 0–0.5)
EOSINOPHIL NFR BLD AUTO: 2.8 % — SIGNIFICANT CHANGE UP (ref 0–6)
FOLATE SERPL-MCNC: >20 NG/ML — SIGNIFICANT CHANGE UP
GLUCOSE BLDC GLUCOMTR-MCNC: 313 MG/DL — HIGH (ref 70–99)
GLUCOSE BLDC GLUCOMTR-MCNC: 363 MG/DL — HIGH (ref 70–99)
GLUCOSE SERPL-MCNC: 165 MG/DL — HIGH (ref 70–99)
HCT VFR BLD CALC: 29.4 % — LOW (ref 39–50)
HGB BLD-MCNC: 9.2 G/DL — LOW (ref 13–17)
IMM GRANULOCYTES NFR BLD AUTO: 0.4 % — SIGNIFICANT CHANGE UP (ref 0–0.9)
INR BLD: 1.13 RATIO — SIGNIFICANT CHANGE UP (ref 0.88–1.16)
IRON SATN MFR SERPL: 12 % — LOW (ref 16–55)
IRON SATN MFR SERPL: 48 UG/DL — LOW (ref 59–158)
LACTATE BLDV-MCNC: 1.6 MMOL/L — SIGNIFICANT CHANGE UP (ref 0.5–2)
LYMPHOCYTES # BLD AUTO: 1.34 K/UL — SIGNIFICANT CHANGE UP (ref 1–3.3)
LYMPHOCYTES # BLD AUTO: 12.3 % — LOW (ref 13–44)
MCHC RBC-ENTMCNC: 24.9 PG — LOW (ref 27–34)
MCHC RBC-ENTMCNC: 31.3 GM/DL — LOW (ref 32–36)
MCV RBC AUTO: 79.7 FL — LOW (ref 80–100)
MONOCYTES # BLD AUTO: 0.91 K/UL — HIGH (ref 0–0.9)
MONOCYTES NFR BLD AUTO: 8.4 % — SIGNIFICANT CHANGE UP (ref 2–14)
NEUTROPHILS # BLD AUTO: 8.28 K/UL — HIGH (ref 1.8–7.4)
NEUTROPHILS NFR BLD AUTO: 76 % — SIGNIFICANT CHANGE UP (ref 43–77)
PLATELET # BLD AUTO: 179 K/UL — SIGNIFICANT CHANGE UP (ref 150–400)
POTASSIUM SERPL-MCNC: 4.5 MMOL/L — SIGNIFICANT CHANGE UP (ref 3.5–5.3)
POTASSIUM SERPL-SCNC: 4.5 MMOL/L — SIGNIFICANT CHANGE UP (ref 3.5–5.3)
PROT SERPL-MCNC: 6.9 G/DL — SIGNIFICANT CHANGE UP (ref 6.6–8.7)
PROTHROM AB SERPL-ACNC: 13.1 SEC — SIGNIFICANT CHANGE UP (ref 10.5–13.4)
RAPID RVP RESULT: SIGNIFICANT CHANGE UP
RBC # BLD: 3.69 M/UL — LOW (ref 4.2–5.8)
RBC # FLD: 17.9 % — HIGH (ref 10.3–14.5)
SARS-COV-2 RNA SPEC QL NAA+PROBE: SIGNIFICANT CHANGE UP
SODIUM SERPL-SCNC: 136 MMOL/L — SIGNIFICANT CHANGE UP (ref 135–145)
TIBC SERPL-MCNC: 393 UG/DL — SIGNIFICANT CHANGE UP (ref 220–430)
TRANSFERRIN SERPL-MCNC: 275 MG/DL — SIGNIFICANT CHANGE UP (ref 180–329)
TSH SERPL-MCNC: 2.14 UIU/ML — SIGNIFICANT CHANGE UP (ref 0.27–4.2)
VIT B12 SERPL-MCNC: 1312 PG/ML — HIGH (ref 232–1245)
WBC # BLD: 10.88 K/UL — HIGH (ref 3.8–10.5)
WBC # FLD AUTO: 10.88 K/UL — HIGH (ref 3.8–10.5)

## 2023-01-01 PROCEDURE — 99285 EMERGENCY DEPT VISIT HI MDM: CPT

## 2023-01-01 PROCEDURE — 99223 1ST HOSP IP/OBS HIGH 75: CPT

## 2023-01-01 PROCEDURE — 71045 X-RAY EXAM CHEST 1 VIEW: CPT | Mod: 26

## 2023-01-01 RX ORDER — MEMANTINE HYDROCHLORIDE 10 MG/1
5 TABLET ORAL DAILY
Refills: 0 | Status: DISCONTINUED | OUTPATIENT
Start: 2023-01-01 | End: 2023-01-05

## 2023-01-01 RX ORDER — GLUCAGON INJECTION, SOLUTION 0.5 MG/.1ML
1 INJECTION, SOLUTION SUBCUTANEOUS ONCE
Refills: 0 | Status: DISCONTINUED | OUTPATIENT
Start: 2023-01-01 | End: 2023-01-05

## 2023-01-01 RX ORDER — LANOLIN ALCOHOL/MO/W.PET/CERES
5 CREAM (GRAM) TOPICAL AT BEDTIME
Refills: 0 | Status: DISCONTINUED | OUTPATIENT
Start: 2023-01-01 | End: 2023-01-05

## 2023-01-01 RX ORDER — INSULIN GLARGINE 100 [IU]/ML
8 INJECTION, SOLUTION SUBCUTANEOUS ONCE
Refills: 0 | Status: COMPLETED | OUTPATIENT
Start: 2023-01-01 | End: 2023-01-01

## 2023-01-01 RX ORDER — METOCLOPRAMIDE HCL 10 MG
5 TABLET ORAL
Refills: 0 | Status: DISCONTINUED | OUTPATIENT
Start: 2023-01-01 | End: 2023-01-03

## 2023-01-01 RX ORDER — INSULIN LISPRO 100/ML
VIAL (ML) SUBCUTANEOUS
Refills: 0 | Status: DISCONTINUED | OUTPATIENT
Start: 2023-01-01 | End: 2023-01-05

## 2023-01-01 RX ORDER — DEXTROSE 50 % IN WATER 50 %
25 SYRINGE (ML) INTRAVENOUS ONCE
Refills: 0 | Status: DISCONTINUED | OUTPATIENT
Start: 2023-01-01 | End: 2023-01-05

## 2023-01-01 RX ORDER — DONEPEZIL HYDROCHLORIDE 10 MG/1
5 TABLET, FILM COATED ORAL AT BEDTIME
Refills: 0 | Status: DISCONTINUED | OUTPATIENT
Start: 2023-01-01 | End: 2023-01-05

## 2023-01-01 RX ORDER — ALBUTEROL 90 UG/1
2.5 AEROSOL, METERED ORAL
Refills: 0 | Status: DISCONTINUED | OUTPATIENT
Start: 2023-01-01 | End: 2023-01-02

## 2023-01-01 RX ORDER — INSULIN LISPRO 100/ML
5 VIAL (ML) SUBCUTANEOUS
Refills: 0 | Status: DISCONTINUED | OUTPATIENT
Start: 2023-01-01 | End: 2023-01-02

## 2023-01-01 RX ORDER — IPRATROPIUM/ALBUTEROL SULFATE 18-103MCG
3 AEROSOL WITH ADAPTER (GRAM) INHALATION ONCE
Refills: 0 | Status: COMPLETED | OUTPATIENT
Start: 2023-01-01 | End: 2023-01-01

## 2023-01-01 RX ORDER — DEXTROSE 50 % IN WATER 50 %
12.5 SYRINGE (ML) INTRAVENOUS ONCE
Refills: 0 | Status: DISCONTINUED | OUTPATIENT
Start: 2023-01-01 | End: 2023-01-05

## 2023-01-01 RX ORDER — MONTELUKAST 4 MG/1
10 TABLET, CHEWABLE ORAL DAILY
Refills: 0 | Status: DISCONTINUED | OUTPATIENT
Start: 2023-01-01 | End: 2023-01-05

## 2023-01-01 RX ORDER — DEXTROSE 50 % IN WATER 50 %
15 SYRINGE (ML) INTRAVENOUS ONCE
Refills: 0 | Status: DISCONTINUED | OUTPATIENT
Start: 2023-01-01 | End: 2023-01-05

## 2023-01-01 RX ORDER — LEVOTHYROXINE SODIUM 125 MCG
75 TABLET ORAL DAILY
Refills: 0 | Status: DISCONTINUED | OUTPATIENT
Start: 2023-01-01 | End: 2023-01-05

## 2023-01-01 RX ORDER — INSULIN LISPRO 100/ML
3 VIAL (ML) SUBCUTANEOUS ONCE
Refills: 0 | Status: COMPLETED | OUTPATIENT
Start: 2023-01-01 | End: 2023-01-01

## 2023-01-01 RX ORDER — PANTOPRAZOLE SODIUM 20 MG/1
40 TABLET, DELAYED RELEASE ORAL
Refills: 0 | Status: DISCONTINUED | OUTPATIENT
Start: 2023-01-01 | End: 2023-01-04

## 2023-01-01 RX ORDER — SODIUM CHLORIDE 9 MG/ML
1000 INJECTION, SOLUTION INTRAVENOUS
Refills: 0 | Status: DISCONTINUED | OUTPATIENT
Start: 2023-01-01 | End: 2023-01-05

## 2023-01-01 RX ORDER — INSULIN GLARGINE 100 [IU]/ML
8 INJECTION, SOLUTION SUBCUTANEOUS AT BEDTIME
Refills: 0 | Status: DISCONTINUED | OUTPATIENT
Start: 2023-01-01 | End: 2023-01-01

## 2023-01-01 RX ORDER — INSULIN GLARGINE 100 [IU]/ML
10 INJECTION, SOLUTION SUBCUTANEOUS AT BEDTIME
Refills: 0 | Status: DISCONTINUED | OUTPATIENT
Start: 2023-01-01 | End: 2023-01-02

## 2023-01-01 RX ORDER — IRON SUCROSE 20 MG/ML
200 INJECTION, SOLUTION INTRAVENOUS EVERY 24 HOURS
Refills: 0 | Status: COMPLETED | OUTPATIENT
Start: 2023-01-01 | End: 2023-01-03

## 2023-01-01 RX ADMIN — DONEPEZIL HYDROCHLORIDE 5 MILLIGRAM(S): 10 TABLET, FILM COATED ORAL at 21:43

## 2023-01-01 RX ADMIN — PANTOPRAZOLE SODIUM 40 MILLIGRAM(S): 20 TABLET, DELAYED RELEASE ORAL at 21:18

## 2023-01-01 RX ADMIN — Medication 3 UNIT(S): at 21:17

## 2023-01-01 RX ADMIN — IRON SUCROSE 220 MILLIGRAM(S): 20 INJECTION, SOLUTION INTRAVENOUS at 21:16

## 2023-01-01 RX ADMIN — INSULIN GLARGINE 8 UNIT(S): 100 INJECTION, SOLUTION SUBCUTANEOUS at 21:41

## 2023-01-01 NOTE — ED PROVIDER NOTE - OBJECTIVE STATEMENT
Anesthesia Evaluation     Patient summary reviewed and Nursing notes reviewed   history of anesthetic complications (hx of difficult intubation however most recently noted easy with glidescope):  NPO Solid Status: > 8 hours  NPO Liquid Status: > 8 hours           Airway   Mallampati: IV  TM distance: <3 FB  Neck ROM: full  possible difficult intubation  Dental      Pulmonary - negative pulmonary ROS    breath sounds clear to auscultation  (-) not a smoker  Cardiovascular     ECG reviewed  Rhythm: regular  Rate: normal    (+) hypertension well controlled,       Neuro/Psych- negative ROS  GI/Hepatic/Renal/Endo    (+)   thyroid problem hypothyroidism  (-) GERD, liver disease, diabetes    ROS Comment: cholelithiasis    Musculoskeletal     (+) arthralgias, back pain, chronic pain, myalgias,   Abdominal    Substance History - negative use     OB/GYN          Other - negative ROS                         Anesthesia Plan    ASA 3     general   (Glidescope   Labs/studies reviewed)  intravenous induction     Anesthetic plan, all risks, benefits, and alternatives have been provided, discussed and informed consent has been obtained with: patient.  Use of blood products discussed with patient  Consented to blood products.   Plan discussed with CRNA.       87 yo F pmh constipation, DM, dementia CHF, renal failure, COPD, cirrhosis, afib on ASA p/w BRBPR x 2 days with loose stool, no clots. Family showed this interviewer pictures which confirmed BRBPR in toilet. Also endorses abdominal pain. Has been intermittent since bile duct stone/ercp 2021. unable to say whether he has lightheadedness/dizziness/chest pain or sob 2/2 dementia  Berhart [facility]   [facility]

## 2023-01-01 NOTE — ED ADULT NURSE NOTE - NSIMPLEMENTINTERV_GEN_ALL_ED
Implemented All Fall with Harm Risk Interventions:  [place] to call system. Call bell, personal items and telephone within reach. Instruct patient to call for assistance. Room bathroom lighting operational. Non-slip footwear when patient is off stretcher. Physically safe environment: no spills, clutter or unnecessary equipment. Stretcher in lowest position, wheels locked, appropriate side rails in place. Provide visual cue, wrist band, yellow gown, etc. Monitor gait and stability. Monitor for mental status changes and reorient to person, place, and time. Review medications for side effects contributing to fall risk. Reinforce activity limits and safety measures with patient and family. Provide visual clues: red socks.

## 2023-01-01 NOTE — H&P ADULT - NSHPLABSRESULTS_GEN_ALL_CORE
9.2    10.88 )-----------( 179      ( 01 Jan 2023 14:50 )             29.4   01-01    136  |  97  |  70.4<H>  ----------------------------<  165<H>  4.5   |  31.0<H>  |  2.09<H>    Ca    8.8      01 Jan 2023 14:50    TPro  6.9  /  Alb  3.5  /  TBili  0.4  /  DBili  x   /  AST  20  /  ALT  21  /  AlkPhos  144<H>  01-01

## 2023-01-01 NOTE — H&P ADULT - CONVERSATION DETAILS
PT is with severe dmentia , daughter , HCP Felicita and his wife present at the bedside   pt's unable to participate in conversation due to his dementia   his wofe and daughter states that in the past they had done the MOLST form , however they do not have the copy and do not remember the details   discussed the wishes re life support  , his overall prognosis and treatment , explained to them what DNr and DNI are , and options   wife does not want him to suffer and being intubated CPR if he is very sick   daughter wishes to talk to her brother sister and discuss further with pt's wife re decision     asked them yo bring the previous form completed   they wishes full code at present

## 2023-01-01 NOTE — H&P ADULT - ASSESSMENT
89 yo male with PMHx of CHF , Diabetes Mellitus type 2 , COPD , Dementia presented with rectal bleed on off x2 days       1- Rectal bleed   monitor hb , BM   GI consulted   clear liquid diet  PPI iv bid   pt's daughter may agree for colonoscopy / EGD if needed likely   monitor vitals , BP closely     2- Anemia of cronic dx , microcytic   iron studies ordered   pt's Hb range 9-10 outpatinet lab result ( last one in dec 10 , wife has the result reviewed   cont to monitor closely   iron studies reviewed low iron   add venofer 200 mg iv x3 doses   folic acid     3- CKD stage 3b  prerenal   will monitor   hold diuretics     4- Diabetes Mellitus type 2   continue insulin lantus and premeals insulin   ISS   monitor BG     5- h/o CHF   no sign of fluid overload   resume torsemide in 1-2 days of BUn /cr is stable     6- Dementia   on namenda and aricept   at baseline     7- h/o COPD   stable   cont neb prn , inhaler       dvt prophylaxis   no chemical prophlaxis due to GI bleed     d/w wife and daughter     OOB to chair   PT     home in few days likely  87 yo male with PMHx of CHF , Diabetes Mellitus type 2 , COPD , Dementia presented with rectal bleed on off x2 days       1- Rectal bleed   monitor hb , BM   GI consulted   clear liquid diet  PPI iv bid   pt's daughter may agree for colonoscopy / EGD if needed likely   monitor vitals , BP closely     2- Anemia of cronic dx , microcytic   iron studies ordered   pt's Hb range 9-10 outpatinet lab result ( last one in dec 10 , wife has the result reviewed   cont to monitor closely   iron studies reviewed low iron   add venofer 200 mg iv x3 doses   folic acid     3- CKD stage 3b  prerenal   will monitor   hold diuretics     4- Diabetes Mellitus type 2   continue insulin lantus and premeals insulin   ISS   monitor BG     5- h/o CHF   no sign of fluid overload   resume torsemide in 1-2 days of BUn /cr is stable     6- Dementia   on namenda and aricept   at baseline     7- h/o COPD   stable   cont neb prn , inhaler     8- Recent Covid 19 infection   treated   asymptomatic currently     dvt prophylaxis   no chemical prophlaxis due to GI bleed     d/w wife and daughter     OOB to chair   PT     home in few days likely

## 2023-01-01 NOTE — ED PROVIDER NOTE - CCCP TRG CHIEF CMPLNT
Orders to Rashawn James Pt aware to get labs done no sooner than 2 weeks prior to the appt. Pt aware to fast.  No call back required.     Future Appointments   Date Time Provider Wayne Barney   2/25/2022  7:30 AM REFERENCE [id number]   3/4/2022  3:20 PM Majo Richey MD [facility] [address] [facility] rectal bleeding

## 2023-01-01 NOTE — ED ADULT NURSE NOTE - OBJECTIVE STATEMENT
as per family, "he had some red blood in his stool a few times. He said some abdominal pains a few times too." alert, confused, calm, cooperative, follows basic commands. (baseline as per family, has dementia). breathing even and unlabored. nsr to afib on cm.

## 2023-01-01 NOTE — ED PROVIDER NOTE - TEST CONSIDERED BUT NOT PERFORMED
Tests Considered But Not Performed CTAngio abd/pelvis, not performed given elevated Cr and no profuse bleeding on rectal exam

## 2023-01-01 NOTE — ED PROVIDER NOTE - CLINICAL SUMMARY MEDICAL DECISION MAKING FREE TEXT BOX
hx and physical as noted above. rectal bleeding on ASA without abdominal tenderness, brown stool in rectal vault but rectal bleeding confirmed with pictures showed by family and melanotic stool around anus. No utility in CTA at this point - also patient with CKD baseline Cr around 2.24 per chart review, possibly diverticulosis vs internal hemorrhoids. will check labs and transfuse prn. will likely require admission

## 2023-01-01 NOTE — ED PROVIDER NOTE - PROGRESS NOTE DETAILS
Hgb at baseline per outpatient chart review. discussed admission with wife and daughter at bedside which they are agreeable to.

## 2023-01-01 NOTE — H&P ADULT - HISTORY OF PRESENT ILLNESS
87 yo F h/o  constipation, DM, dementia CHF, renal failure, COPD, afib on ASA presented to the ER with family reporting  BRBPR  noted on FRiday after BM x2  , yesterday night had another episode per wife and this am had blood in his underwear noted by family , pt is with dementia not reliable historian jose raul per daughter he was c/o pain in hid abdomen , mid area , now he denies any abdominal pajn   good appetite and no further symptoms , has been on colace daily for cronic constipation , no nausea , no vomiting    Family showed this interviewer pictures which confirmed BRBPR in toilet. Abd pain intermittent since bile duct stone/ercp 2021. unable to say whether he has lightheadedness/dizziness/chest pain or sob 2/2 dementia

## 2023-01-01 NOTE — H&P ADULT - NSHPPHYSICALEXAM_GEN_ALL_CORE
Vital Signs Last 24 Hrs  T(C): 36.7 (01 Jan 2023 14:57), Max: 36.7 (01 Jan 2023 14:57)  T(F): 98.1 (01 Jan 2023 14:57), Max: 98.1 (01 Jan 2023 14:57)  HR: 70 (01 Jan 2023 14:57) (70 - 75)  BP: 105/69 (01 Jan 2023 14:57) (95/51 - 105/69)  BP(mean): --  RR: 18 (01 Jan 2023 14:57) (18 - 18)  SpO2: 97% (01 Jan 2023 14:57) (97% - 98%)    Parameters below as of 01 Jan 2023 14:57  Patient On (Oxygen Delivery Method): room air

## 2023-01-01 NOTE — ED PROVIDER NOTE - PHYSICAL EXAMINATION
PHYSICAL EXAM:   General: well-appearing, appears stated age, not in extremis   HEENT: NC/AT, airway patent  Cardiovascular: regular rate   Respiratory: nonlabored respirations, 98% RA  Abdominal: soft, nontender, nondistended, no rebound, guarding or rigidity  Rectal: chaperoned by KORI Luna: no external hemorrhoids, dark stool noted around rectum, brown stool in vault  Neuro: Awake and alert but confused  Skin: appropriate color, warm, dry.   -Giselle Fletcher MD Attending Physician

## 2023-01-01 NOTE — H&P ADULT - NSICDXFAMILYHX_GEN_ALL_CORE_FT
FAMILY HISTORY:  No pertinent family history in first degree relatives     FAMILY HISTORY:  Father  Still living? Unknown  Patient unable to provide medical history, Age at diagnosis: Age Unknown    Mother  Still living? No  Patient unable to provide medical history, Age at diagnosis: Age Unknown

## 2023-01-01 NOTE — PATIENT PROFILE ADULT - FALL HARM RISK - HARM RISK INTERVENTIONS

## 2023-01-02 LAB
A1C WITH ESTIMATED AVERAGE GLUCOSE RESULT: 8.2 % — HIGH (ref 4–5.6)
ANION GAP SERPL CALC-SCNC: 10 MMOL/L — SIGNIFICANT CHANGE UP (ref 5–17)
APPEARANCE UR: CLEAR — SIGNIFICANT CHANGE UP
BACTERIA # UR AUTO: ABNORMAL
BILIRUB UR-MCNC: NEGATIVE — SIGNIFICANT CHANGE UP
BUN SERPL-MCNC: 65.1 MG/DL — HIGH (ref 8–20)
CALCIUM SERPL-MCNC: 8.5 MG/DL — SIGNIFICANT CHANGE UP (ref 8.4–10.5)
CHLORIDE SERPL-SCNC: 100 MMOL/L — SIGNIFICANT CHANGE UP (ref 96–108)
CO2 SERPL-SCNC: 28 MMOL/L — SIGNIFICANT CHANGE UP (ref 22–29)
COLOR SPEC: YELLOW — SIGNIFICANT CHANGE UP
CREAT SERPL-MCNC: 2 MG/DL — HIGH (ref 0.5–1.3)
DIFF PNL FLD: ABNORMAL
EGFR: 32 ML/MIN/1.73M2 — LOW
EPI CELLS # UR: SIGNIFICANT CHANGE UP
ESTIMATED AVERAGE GLUCOSE: 189 MG/DL — HIGH (ref 68–114)
GLUCOSE BLDC GLUCOMTR-MCNC: 141 MG/DL — HIGH (ref 70–99)
GLUCOSE BLDC GLUCOMTR-MCNC: 171 MG/DL — HIGH (ref 70–99)
GLUCOSE BLDC GLUCOMTR-MCNC: 316 MG/DL — HIGH (ref 70–99)
GLUCOSE BLDC GLUCOMTR-MCNC: 75 MG/DL — SIGNIFICANT CHANGE UP (ref 70–99)
GLUCOSE SERPL-MCNC: 60 MG/DL — LOW (ref 70–99)
GLUCOSE UR QL: NEGATIVE MG/DL — SIGNIFICANT CHANGE UP
HCT VFR BLD CALC: 27.6 % — LOW (ref 39–50)
HGB BLD-MCNC: 8.4 G/DL — LOW (ref 13–17)
KETONES UR-MCNC: NEGATIVE — SIGNIFICANT CHANGE UP
LEUKOCYTE ESTERASE UR-ACNC: ABNORMAL
MCHC RBC-ENTMCNC: 24.1 PG — LOW (ref 27–34)
MCHC RBC-ENTMCNC: 30.4 GM/DL — LOW (ref 32–36)
MCV RBC AUTO: 79.3 FL — LOW (ref 80–100)
NITRITE UR-MCNC: NEGATIVE — SIGNIFICANT CHANGE UP
NT-PROBNP SERPL-SCNC: 2738 PG/ML — HIGH (ref 0–300)
PH UR: 7 — SIGNIFICANT CHANGE UP (ref 5–8)
PLATELET # BLD AUTO: 173 K/UL — SIGNIFICANT CHANGE UP (ref 150–400)
POTASSIUM SERPL-MCNC: 4.2 MMOL/L — SIGNIFICANT CHANGE UP (ref 3.5–5.3)
POTASSIUM SERPL-SCNC: 4.2 MMOL/L — SIGNIFICANT CHANGE UP (ref 3.5–5.3)
PROT UR-MCNC: NEGATIVE — SIGNIFICANT CHANGE UP
RBC # BLD: 3.48 M/UL — LOW (ref 4.2–5.8)
RBC # FLD: 18.1 % — HIGH (ref 10.3–14.5)
RBC CASTS # UR COMP ASSIST: NEGATIVE /HPF — SIGNIFICANT CHANGE UP (ref 0–4)
SODIUM SERPL-SCNC: 138 MMOL/L — SIGNIFICANT CHANGE UP (ref 135–145)
SP GR SPEC: 1.01 — SIGNIFICANT CHANGE UP (ref 1.01–1.02)
UROBILINOGEN FLD QL: NEGATIVE MG/DL — SIGNIFICANT CHANGE UP
WBC # BLD: 14.14 K/UL — HIGH (ref 3.8–10.5)
WBC # FLD AUTO: 14.14 K/UL — HIGH (ref 3.8–10.5)
WBC UR QL: SIGNIFICANT CHANGE UP /HPF (ref 0–5)

## 2023-01-02 PROCEDURE — 99233 SBSQ HOSP IP/OBS HIGH 50: CPT

## 2023-01-02 PROCEDURE — 99223 1ST HOSP IP/OBS HIGH 75: CPT

## 2023-01-02 RX ORDER — INSULIN GLARGINE 100 [IU]/ML
5 INJECTION, SOLUTION SUBCUTANEOUS AT BEDTIME
Refills: 0 | Status: DISCONTINUED | OUTPATIENT
Start: 2023-01-02 | End: 2023-01-03

## 2023-01-02 RX ORDER — SOD SULF/SODIUM/NAHCO3/KCL/PEG
4000 SOLUTION, RECONSTITUTED, ORAL ORAL ONCE
Refills: 0 | Status: COMPLETED | OUTPATIENT
Start: 2023-01-02 | End: 2023-01-02

## 2023-01-02 RX ORDER — ALBUTEROL 90 UG/1
2.5 AEROSOL, METERED ORAL THREE TIMES A DAY
Refills: 0 | Status: DISCONTINUED | OUTPATIENT
Start: 2023-01-02 | End: 2023-01-04

## 2023-01-02 RX ADMIN — MEMANTINE HYDROCHLORIDE 5 MILLIGRAM(S): 10 TABLET ORAL at 13:52

## 2023-01-02 RX ADMIN — DONEPEZIL HYDROCHLORIDE 5 MILLIGRAM(S): 10 TABLET, FILM COATED ORAL at 21:28

## 2023-01-02 RX ADMIN — PANTOPRAZOLE SODIUM 40 MILLIGRAM(S): 20 TABLET, DELAYED RELEASE ORAL at 05:05

## 2023-01-02 RX ADMIN — PANTOPRAZOLE SODIUM 40 MILLIGRAM(S): 20 TABLET, DELAYED RELEASE ORAL at 20:34

## 2023-01-02 RX ADMIN — Medication 4000 MILLILITER(S): at 20:34

## 2023-01-02 RX ADMIN — Medication 75 MICROGRAM(S): at 05:05

## 2023-01-02 RX ADMIN — ALBUTEROL 2.5 MILLIGRAM(S): 90 AEROSOL, METERED ORAL at 13:52

## 2023-01-02 RX ADMIN — Medication 1 TABLET(S): at 13:51

## 2023-01-02 RX ADMIN — Medication 5 MILLIGRAM(S): at 21:28

## 2023-01-02 RX ADMIN — ALBUTEROL 2.5 MILLIGRAM(S): 90 AEROSOL, METERED ORAL at 18:50

## 2023-01-02 RX ADMIN — MONTELUKAST 10 MILLIGRAM(S): 4 TABLET, CHEWABLE ORAL at 13:52

## 2023-01-02 RX ADMIN — IRON SUCROSE 220 MILLIGRAM(S): 20 INJECTION, SOLUTION INTRAVENOUS at 22:48

## 2023-01-02 RX ADMIN — Medication 3 MILLILITER(S): at 00:34

## 2023-01-02 RX ADMIN — Medication 8: at 13:49

## 2023-01-02 RX ADMIN — INSULIN GLARGINE 5 UNIT(S): 100 INJECTION, SOLUTION SUBCUTANEOUS at 21:28

## 2023-01-02 NOTE — PROGRESS NOTE ADULT - ASSESSMENT
The patient is an 88 year old male with a past medical history of dementia, CKD stage 4, chronic diastolic CHF, diabetes mellitus type 2, COPD and gastritis who presented after an episode of hematochezia at home. Admitted for LGIB    Assessment/plan:    1. Lower GI bleed  - CLD  - STAT CBC  - Seen by GI, plan for colonoscopy in AM       2. History of gastritis  - PPI BID    3. Microcytic anemia  - baseline ~9-10     4. CKD Stage 4   - Stable   - Diuretics held while on CLD     5.  Diabetes Mellitus type 2   - lantus QHS  - Admelog   - Ademlog sliding scale     6. Chronic diastolic CHF  - no sign of fluid overload   -resume torsemide in 1-2 days of BUn /cr is stable     7.  Dementia   -  Namenda and aricept     8. Recent COVID infection  -treated     VTE_ SCDS for LGIB    Discharge disposition: 48 hours pending colonoscopy in AM    The patient is an 88 year old male with a past medical history of dementia, CKD stage 4, chronic diastolic CHF, diabetes mellitus type 2, COPD and gastritis who presented after an episode of hematochezia at home. Admitted for LGIB    Assessment/plan:    1. Lower GI bleed  - CLD  - Hb with drop to 8.4  - Transfuse hb<7.0  - Seen by GI, plan for colonoscopy in AM       2. History of gastritis  - PPI BID    3. Microcytic anemia  - baseline ~9-10     4. CKD Stage 4   - Stable   - Diuretics held while on CLD, resume torsemide in AM     5.  Diabetes Mellitus type 2   - lantus QHS  - Admelog   - Ademlog sliding scale     6. Chronic diastolic CHF  - no sign of fluid overload   -resume torsemide in 1-2 days of BUn /cr is stable     7.  Dementia   -  Namenda and aricept     8. Recent COVID infection  -treated     VTE_ SCDS for LGIB    Discharge disposition: 48 hours pending colonoscopy in AM. Ambulates with a cane at baseline. Out of bed with assistance     Plan discussed in detail with daughter at bedside and RN

## 2023-01-03 LAB
ANION GAP SERPL CALC-SCNC: 13 MMOL/L — SIGNIFICANT CHANGE UP (ref 5–17)
APTT BLD: 31.2 SEC — SIGNIFICANT CHANGE UP (ref 27.5–35.5)
BASOPHILS # BLD AUTO: 0.02 K/UL — SIGNIFICANT CHANGE UP (ref 0–0.2)
BASOPHILS NFR BLD AUTO: 0.2 % — SIGNIFICANT CHANGE UP (ref 0–2)
BUN SERPL-MCNC: 64.1 MG/DL — HIGH (ref 8–20)
CALCIUM SERPL-MCNC: 8.9 MG/DL — SIGNIFICANT CHANGE UP (ref 8.4–10.5)
CHLORIDE SERPL-SCNC: 100 MMOL/L — SIGNIFICANT CHANGE UP (ref 96–108)
CO2 SERPL-SCNC: 26 MMOL/L — SIGNIFICANT CHANGE UP (ref 22–29)
CREAT SERPL-MCNC: 2.17 MG/DL — HIGH (ref 0.5–1.3)
EGFR: 29 ML/MIN/1.73M2 — LOW
EOSINOPHIL # BLD AUTO: 0.1 K/UL — SIGNIFICANT CHANGE UP (ref 0–0.5)
EOSINOPHIL NFR BLD AUTO: 1 % — SIGNIFICANT CHANGE UP (ref 0–6)
GLUCOSE BLDC GLUCOMTR-MCNC: 168 MG/DL — HIGH (ref 70–99)
GLUCOSE BLDC GLUCOMTR-MCNC: 178 MG/DL — HIGH (ref 70–99)
GLUCOSE BLDC GLUCOMTR-MCNC: 187 MG/DL — HIGH (ref 70–99)
GLUCOSE BLDC GLUCOMTR-MCNC: 52 MG/DL — CRITICAL LOW (ref 70–99)
GLUCOSE BLDC GLUCOMTR-MCNC: 62 MG/DL — LOW (ref 70–99)
GLUCOSE SERPL-MCNC: 36 MG/DL — CRITICAL LOW (ref 70–99)
HCT VFR BLD CALC: 31.1 % — LOW (ref 39–50)
HGB BLD-MCNC: 9.5 G/DL — LOW (ref 13–17)
IMM GRANULOCYTES NFR BLD AUTO: 0.6 % — SIGNIFICANT CHANGE UP (ref 0–0.9)
INR BLD: 1.18 RATIO — HIGH (ref 0.88–1.16)
LYMPHOCYTES # BLD AUTO: 0.77 K/UL — LOW (ref 1–3.3)
LYMPHOCYTES # BLD AUTO: 7.9 % — LOW (ref 13–44)
MCHC RBC-ENTMCNC: 24.2 PG — LOW (ref 27–34)
MCHC RBC-ENTMCNC: 30.5 GM/DL — LOW (ref 32–36)
MCV RBC AUTO: 79.3 FL — LOW (ref 80–100)
MONOCYTES # BLD AUTO: 1.07 K/UL — HIGH (ref 0–0.9)
MONOCYTES NFR BLD AUTO: 11 % — SIGNIFICANT CHANGE UP (ref 2–14)
NEUTROPHILS # BLD AUTO: 7.71 K/UL — HIGH (ref 1.8–7.4)
NEUTROPHILS NFR BLD AUTO: 79.3 % — HIGH (ref 43–77)
PLATELET # BLD AUTO: 156 K/UL — SIGNIFICANT CHANGE UP (ref 150–400)
POTASSIUM SERPL-MCNC: 3.6 MMOL/L — SIGNIFICANT CHANGE UP (ref 3.5–5.3)
POTASSIUM SERPL-SCNC: 3.6 MMOL/L — SIGNIFICANT CHANGE UP (ref 3.5–5.3)
PROTHROM AB SERPL-ACNC: 13.7 SEC — HIGH (ref 10.5–13.4)
RBC # BLD: 3.92 M/UL — LOW (ref 4.2–5.8)
RBC # FLD: 18.2 % — HIGH (ref 10.3–14.5)
SODIUM SERPL-SCNC: 139 MMOL/L — SIGNIFICANT CHANGE UP (ref 135–145)
WBC # BLD: 9.73 K/UL — SIGNIFICANT CHANGE UP (ref 3.8–10.5)
WBC # FLD AUTO: 9.73 K/UL — SIGNIFICANT CHANGE UP (ref 3.8–10.5)

## 2023-01-03 PROCEDURE — 99233 SBSQ HOSP IP/OBS HIGH 50: CPT

## 2023-01-03 RX ORDER — ACETAMINOPHEN 500 MG
650 TABLET ORAL EVERY 6 HOURS
Refills: 0 | Status: DISCONTINUED | OUTPATIENT
Start: 2023-01-03 | End: 2023-01-05

## 2023-01-03 RX ORDER — DEXTROSE 50 % IN WATER 50 %
25 SYRINGE (ML) INTRAVENOUS ONCE
Refills: 0 | Status: COMPLETED | OUTPATIENT
Start: 2023-01-03 | End: 2023-01-03

## 2023-01-03 RX ORDER — SOD SULF/SODIUM/NAHCO3/KCL/PEG
1000 SOLUTION, RECONSTITUTED, ORAL ORAL EVERY 4 HOURS
Refills: 0 | Status: COMPLETED | OUTPATIENT
Start: 2023-01-03 | End: 2023-01-03

## 2023-01-03 RX ORDER — ONDANSETRON 8 MG/1
4 TABLET, FILM COATED ORAL EVERY 6 HOURS
Refills: 0 | Status: DISCONTINUED | OUTPATIENT
Start: 2023-01-03 | End: 2023-01-05

## 2023-01-03 RX ORDER — SODIUM CHLORIDE 9 MG/ML
1000 INJECTION, SOLUTION INTRAVENOUS
Refills: 0 | Status: DISCONTINUED | OUTPATIENT
Start: 2023-01-04 | End: 2023-01-04

## 2023-01-03 RX ADMIN — Medication 75 MICROGRAM(S): at 06:28

## 2023-01-03 RX ADMIN — ALBUTEROL 2.5 MILLIGRAM(S): 90 AEROSOL, METERED ORAL at 21:36

## 2023-01-03 RX ADMIN — MONTELUKAST 10 MILLIGRAM(S): 4 TABLET, CHEWABLE ORAL at 11:59

## 2023-01-03 RX ADMIN — Medication 1 TABLET(S): at 12:00

## 2023-01-03 RX ADMIN — PANTOPRAZOLE SODIUM 40 MILLIGRAM(S): 20 TABLET, DELAYED RELEASE ORAL at 06:28

## 2023-01-03 RX ADMIN — Medication 2: at 16:47

## 2023-01-03 RX ADMIN — Medication 1000 MILLILITER(S): at 21:26

## 2023-01-03 RX ADMIN — Medication 2: at 11:58

## 2023-01-03 RX ADMIN — Medication 5 MILLIGRAM(S): at 11:59

## 2023-01-03 RX ADMIN — IRON SUCROSE 220 MILLIGRAM(S): 20 INJECTION, SOLUTION INTRAVENOUS at 22:34

## 2023-01-03 RX ADMIN — DONEPEZIL HYDROCHLORIDE 5 MILLIGRAM(S): 10 TABLET, FILM COATED ORAL at 22:34

## 2023-01-03 RX ADMIN — MEMANTINE HYDROCHLORIDE 5 MILLIGRAM(S): 10 TABLET ORAL at 11:59

## 2023-01-03 RX ADMIN — ALBUTEROL 2.5 MILLIGRAM(S): 90 AEROSOL, METERED ORAL at 14:10

## 2023-01-03 RX ADMIN — Medication 1000 MILLILITER(S): at 16:48

## 2023-01-03 RX ADMIN — Medication 25 GRAM(S): at 08:18

## 2023-01-03 RX ADMIN — PANTOPRAZOLE SODIUM 40 MILLIGRAM(S): 20 TABLET, DELAYED RELEASE ORAL at 16:47

## 2023-01-03 NOTE — CHART NOTE - NSCHARTNOTEFT_GEN_A_CORE
Notified that pt. has not completed his bowel prep.  Family had been offered NG tube insertion for the prep by provider, but refused.    only 1/2 of the prep was completed

## 2023-01-03 NOTE — PROGRESS NOTE ADULT - ASSESSMENT
88 year old male with a past medical history of dementia, CKD stage 4, chronic diastolic CHF, diabetes mellitus type 2, COPD and gastritis who presented after an episode of hematochezia at home. Admitted for LGIB    1) Lower GI bleed  - H&H stable   - CLD  - Colonoscopy was cancelled today as he did not finish bowel prep. Plan for tomorrow.   - GI following     2) History of gastritis  - PPI BID    3) Microcytic anemia  - baseline ~9-10   - Check Ferritin   - Continue Venofer     4) CKD III/IV   - Stable   - Torsemide on hold for now   - Euvolemic  - Renal Consult noted      5) DM 2  - HbA1c 8.2  - Accu checks and ISS  - Hold Lantus     6) Chronic diastolic CHF  - No signs of fluid overload   - Resume torsemide in 1-2 days    7)  Dementia   -  Continue Namenda and Aricept     8) Recent COVID infection  - Treated     9) Hypothyroidism  - Continue Synthroid     DVT Prophylaxis -- Venodyne    Dispo: Likely home in 24-48 hours.    Updated patient's wife and daughter at bedside.

## 2023-01-03 NOTE — PHYSICAL THERAPY INITIAL EVALUATION ADULT - GAIT TRAINING, PT EVAL
Time Frame:  2-3   days   Goal: supervision with RW x 200 feet / Stairs: pt will navigate 5 stairs with CGA

## 2023-01-03 NOTE — PROGRESS NOTE ADULT - ASSESSMENT
88 year old male with a past medical history of dementia, CKD stage 4, chronic diastolic CHF, diabetes mellitus type 2, COPD and gastritis who presented after an episode of hematochezia at home.    Hematochezia  Rectal bleed now resolved, hemoglobin 9.5 gm this morning. No evidence of overt GI bleed. REESE shows yellow liquid stool, internal hemorrhoid. Most likley diverticular bleed or hemorrhoidal bleed.   Patient was scheduled for colonoscopy today, however will reschedule for tomorrow  as patient did not drink the prep. REESE with yellow liquid stool.   Will reschedule for colonoscopy tomorrow.   Clear liquid diet. Keep NPO after midnight.   Avoid dehydration   Continue to trend CBC, transfuse to Hgb 8 or higher   Active INR, COVID PCR, type and screen  Plan discussed with the patient and daughter at the bedside.

## 2023-01-03 NOTE — CONSULT NOTE ADULT - SUBJECTIVE AND OBJECTIVE BOX
Patient is a 88y old  Male who presents with a chief complaint of rectal bleed (01 Jan 2023 17:02)      HPI:  89 yo F h/o  constipation, DM, dementia CHF, renal failure, COPD, afib on ASA presented to the ER with family reporting  BRBPR  noted on FRiday after BM x2  , yesterday night had another episode per wife and this am had blood in his underwear noted by family , pt is with dementia not reliable historian , earlier per daughter he was c/o pain in his abdomen , mid area , now he denies any abdominal pajn good appetite and no further symptoms , has been on colace daily for cronic constipation , no nausea , no vomiting Family showed this interviewer pictures which confirmed BRBPR in toilet. Abd pain intermittent since bile duct stone/ercp 2021. unable to say whether he has lightheadedness/dizziness/chest pain or sob 2/2 dementia. Pt's daughter was at the bedside. His last colonoscopy was  more than ten years ago. He follows with Dr. Stewart in our office and was last seen by him a few months ago. Recent [facility] MRCP was negative for choledocholithiasis. Pt. has had further hematochezia while her in the ED.      REVIEW OF SYSTEMS:  Constitutional: No fever, weight loss or fatigue  ENMT:  No difficulty hearing, tinnitus, vertigo; No sinus or throat pain  Respiratory: No cough, wheezing, chills or hemoptysis  Cardiovascular: No chest pain, palpitations, dizziness or leg swelling  Gastrointestinal: As per HPI.  Skin: No itching, burning, rashes or lesions   Musculoskeletal: No joint pain or swelling; No muscle, back or extremity pain  Patient has no cardiopulmonary, peripheral vascular, musculoskeletal, dermatological, neurological, or psychological symptoms or complaints at this time.    PAST MEDICAL & SURGICAL HISTORY:  DM (diabetes mellitus)      Afib      Dementia      COPD (chronic obstructive pulmonary disease)      CKD (chronic kidney disease)      S/P carotid endarterectomy      History of lung biopsy          FAMILY HISTORY:  Patient unable to provide medical history (Father, Mother)        SOCIAL HISTORY:  Smoking Status: [ ] Current, [ ] Former, [ x] Never  Pack Years: N/A. No ETOH or drug abuse history.    MEDICATIONS:  MEDICATIONS  (STANDING):  dextrose 5%. 1000 milliLiter(s) (50 mL/Hr) IV Continuous <Continuous>  dextrose 5%. 1000 milliLiter(s) (100 mL/Hr) IV Continuous <Continuous>  dextrose 50% Injectable 25 Gram(s) IV Push once  dextrose 50% Injectable 12.5 Gram(s) IV Push once  dextrose 50% Injectable 25 Gram(s) IV Push once  donepezil 5 milliGRAM(s) Oral at bedtime  glucagon  Injectable 1 milliGRAM(s) IntraMuscular once  insulin glargine Injectable (LANTUS) 10 Unit(s) SubCutaneous at bedtime  insulin lispro (ADMELOG) corrective regimen sliding scale   SubCutaneous three times a day before meals  insulin lispro Injectable (ADMELOG) 5 Unit(s) SubCutaneous three times a day before meals  iron sucrose IVPB 200 milliGRAM(s) IV Intermittent every 24 hours  levothyroxine 75 MICROGram(s) Oral daily  memantine 5 milliGRAM(s) Oral daily  metoclopramide 5 milliGRAM(s) Oral Before meals and at bedtime  montelukast 10 milliGRAM(s) Oral daily  multivitamin 1 Tablet(s) Oral daily  pantoprazole  Injectable 40 milliGRAM(s) IV Push two times a day    MEDICATIONS  (PRN):  albuterol   0.5% 2.5 milliGRAM(s) Nebulizer four times a day PRN Shortness of Breath and/or Wheezing  dextrose Oral Gel 15 Gram(s) Oral once PRN Blood Glucose LESS THAN 70 milliGRAM(s)/deciliter  melatonin 5 milliGRAM(s) Oral at bedtime PRN Insomnia      Allergies    No Known Allergies    Intolerances        Vital Signs Last 24 Hrs  T(C): 36.6 (01 Jan 2023 19:18), Max: 36.7 (01 Jan 2023 14:57)  T(F): 97.8 (01 Jan 2023 19:18), Max: 98.1 (01 Jan 2023 14:57)  HR: 62 (01 Jan 2023 19:18) (62 - 75)  BP: 122/64 (01 Jan 2023 19:18) (95/51 - 122/64)  BP(mean): --  RR: 18 (01 Jan 2023 19:18) (18 - 18)  SpO2: 96% (01 Jan 2023 19:18) (96% - 98%)    Parameters below as of 01 Jan 2023 19:18  Patient On (Oxygen Delivery Method): room air            PHYSICAL EXAM:    General: Well developed; elderly appearing; in no acute distress  HEENT: MMM, conjunctiva pink and sclera anicteric.  Lungs: Clear bilaterally.  Cor: RRR S1, S2 only.  Gastrointestinal: Abdomen: Soft, non-tender non-distended; Normal bowel sounds; No rebound or guarding or HSM.  REESE: Decreased sphincter tone. Light brownish green, non-melenic stool in the rectal vault w/o blood.  Extremities: Normal range of motion, No clubbing, cyanosis or edema  Neurological: Alert and oriented to person and place.  Skin: Warm and dry. No obvious rash      LABS:                        9.2    10.88 )-----------( 179      ( 01 Jan 2023 14:50 )             29.4     01-01    136  |  97  |  70.4<H>  ----------------------------<  165<H>  4.5   |  31.0<H>  |  2.09<H>    Ca    8.8      01 Jan 2023 14:50    TPro  6.9  /  Alb  3.5  /  TBili  0.4  /  DBili  x   /  AST  20  /  ALT  21  /  AlkPhos  144<H>  01-01          RADIOLOGY & ADDITIONAL STUDIES:  < from: Xray Chest 2 Views PA/Lat (12.23.22 @ 14:41) >  EXAM: [id number] - XR CHEST PA LAT 2V  - ORDERED BY:  SUN MONTANEZ      PROCEDURE DATE:  12/23/2022           INTERPRETATION:  INDICATION: SOB    COMPARISON: 4/7/21 plain chest. 7/26/22 CT scan of chest.    Technique: PA and lateral chest films    FINDINGS:  Heart/Vascular: Cardiomediastinal silhouette is within normal limits.  Pulmonary: Pulmonary vascularity is normal. Small bilateral pleural   effusions associated bibasilar atelectasis. No focal infiltrate. No   pneumothorax.  Bones: No acute bony finding.    Impression:  Stable small bilateral pleural effusions with associated bibasilar   atelectasis.        --- End of Report ---               MARCELA GREY MD; Attending Radiologist   This document has been electronically signed. Dec 23 2022  2:46PM    < end of copied text >   
Patient is a 88y old  Male who presents with a chief complaint of rectal bleed (2023 09:06)   Acute  renal failure.    HPI:  87 yo F h/o  constipation, DM, dementia CHF, renal failure, COPD, afib on ASA presented to the ER with family reporting  BRBPR  noted on FRiday after BM x2  , yesterday night had another episode per wife and this am had blood in his underwear noted by family , pt is with dementia not reliable historian , jose raul per daughter he was c/o pain in hid abdomen , mid area , now he denies any abdominal pajn   good appetite and no further symptoms , has been on colace daily for cronic constipation , no nausea , no vomiting    Family showed this interviewer pictures which confirmed BRBPR in toilet. Abd pain intermittent since bile duct stone/ercp . unable to say whether he has lightheadedness/dizziness/chest pain or sob 2/2 dementia     Currently sittin gup in bed   Feels ok dtr and wife at bedside  Unable to finish Bowel prep for colonoscopy  Denies HA CP no SOB    PAST MEDICAL & SURGICAL HISTORY:  DM (diabetes mellitus)      Afib      Dementia      COPD (chronic obstructive pulmonary disease)      CKD (chronic kidney disease)      S/P carotid endarterectomy      History of lung biopsy    FAMILY HISTORY:  Patient unable to provide medical history (Father, Mother)    NC    Social History:Non smoker    MEDICATIONS  (STANDING):  albuterol   0.5% 2.5 milliGRAM(s) Nebulizer three times a day  dextrose 5%. 1000 milliLiter(s) (50 mL/Hr) IV Continuous <Continuous>  dextrose 5%. 1000 milliLiter(s) (100 mL/Hr) IV Continuous <Continuous>  dextrose 50% Injectable 25 Gram(s) IV Push once  dextrose 50% Injectable 12.5 Gram(s) IV Push once  dextrose 50% Injectable 25 Gram(s) IV Push once  donepezil 5 milliGRAM(s) Oral at bedtime  glucagon  Injectable 1 milliGRAM(s) IntraMuscular once  insulin lispro (ADMELOG) corrective regimen sliding scale   SubCutaneous three times a day before meals  iron sucrose IVPB 200 milliGRAM(s) IV Intermittent every 24 hours  levothyroxine 75 MICROGram(s) Oral daily  memantine 5 milliGRAM(s) Oral daily  montelukast 10 milliGRAM(s) Oral daily  multivitamin 1 Tablet(s) Oral daily  pantoprazole  Injectable 40 milliGRAM(s) IV Push two times a day  polyethylene glycol/electrolyte Solution 1000 milliLiter(s) Oral every 4 hours    MEDICATIONS  (PRN):  dextrose Oral Gel 15 Gram(s) Oral once PRN Blood Glucose LESS THAN 70 milliGRAM(s)/deciliter  melatonin 5 milliGRAM(s) Oral at bedtime PRN Insomnia   Meds reviewed    Vital Signs Last 24 Hrs  T(C): 36.4 (2023 11:22), Max: 36.8 (2023 16:04)  T(F): 97.6 (2023 11:22), Max: 98.2 (2023 16:04)  HR: 59 (2023 11:22) (59 - 69)  BP: 116/73 (2023 11:22) (108/66 - 137/79)  BP(mean): --  RR: 18 (2023 11:22) (16 - 18)  SpO2: 97% (2023 11:22) (95% - 97%)    Parameters below as of 2023 11:22  Patient On (Oxygen Delivery Method): room air      PHYSICAL EXAM:    GENERAL: appears chronically ill  HEAD: NACT  EYES: EOMI  NECK: Supple  NERVOUS SYSTEM:  Alert & Oriented X3  CHEST/LUNG: Clear to percussion bilaterally  HEART: Regular rate and rhythm  ABDOMEN: Soft, Nontender, Nondistended; +BS  EXTREMITIES:  no edema    LABS:                        9.5    9.73  )-----------( 156      ( 2023 05:23 )             31.1     -    139  |  100  |  64.1<H>  ----------------------------<  36<LL>  3.6   |  26.0  |  2.17<H>    Ca    8.9      2023 05:23    TPro  6.9  /  Alb  3.5  /  TBili  0.4  /  DBili  x   /  AST  20  /  ALT  21  /  AlkPhos  144<H>  01-    PT/INR - ( 2023 05:23 )   PT: 13.7 sec;   INR: 1.18 ratio         PTT - ( 2023 05:23 )  PTT:31.2 sec  Urinalysis Basic - ( 2023 10:00 )    Color: Yellow / Appearance: Clear / S.010 / pH: x  Gluc: x / Ketone: Negative  / Bili: Negative / Urobili: Negative mg/dL   Blood: x / Protein: Negative / Nitrite: Negative   Leuk Esterase: Trace / RBC: Negative /HPF / WBC 0-2 /HPF   Sq Epi: x / Non Sq Epi: Occasional / Bacteria: Occasional              RADIOLOGY & ADDITIONAL TESTS:

## 2023-01-03 NOTE — CONSULT NOTE ADULT - ASSESSMENT
Hematochezia that has resolved and is likely diverticular in etiology. Will prep pt. tonight for colonoscopy tomorrow. Bowel prep ordered. Continue CLD. Pantoprazole for GI mucosal cytoprotection. Keep Hb @ 8 grams or higher. Repeat labs ordered for the AM.
89 yo F h/o  constipation, DM, dementia CHF, renal failure, COPD, afib on ASA   presented to the ER with family reporting  BRBPR  noted on FRiday after BM    CKD stage III   Follows w Dr Calhoun in our office  Renal function appears to be stable compared to office labs  BP volume status electrolytes are ok  Avoid nephrotoxic agents     Anemia etiology  GIB plans for colonoscopy tomorrow  will check iron studies    AM labs will follow

## 2023-01-03 NOTE — PHYSICAL THERAPY INITIAL EVALUATION ADULT - ADDITIONAL COMMENTS
Pt lives in a house with 5 steps to enter with  rails and  12 stairs inside with  rails.  Pt owns medical equipment: SAC, Shower Chair, W/C  Pt lives with: SPouse  Someone is always available to provide assist.

## 2023-01-04 ENCOUNTER — TRANSCRIPTION ENCOUNTER (OUTPATIENT)
Age: 88
End: 2023-01-04

## 2023-01-04 ENCOUNTER — RESULT REVIEW (OUTPATIENT)
Age: 88
End: 2023-01-04

## 2023-01-04 LAB
ANION GAP SERPL CALC-SCNC: 11 MMOL/L — SIGNIFICANT CHANGE UP (ref 5–17)
BUN SERPL-MCNC: 53.3 MG/DL — HIGH (ref 8–20)
CALCIUM SERPL-MCNC: 8.3 MG/DL — LOW (ref 8.4–10.5)
CHLORIDE SERPL-SCNC: 103 MMOL/L — SIGNIFICANT CHANGE UP (ref 96–108)
CO2 SERPL-SCNC: 27 MMOL/L — SIGNIFICANT CHANGE UP (ref 22–29)
CREAT SERPL-MCNC: 1.91 MG/DL — HIGH (ref 0.5–1.3)
EGFR: 33 ML/MIN/1.73M2 — LOW
FERRITIN SERPL-MCNC: 531 NG/ML — HIGH (ref 30–400)
GLUCOSE BLDC GLUCOMTR-MCNC: 130 MG/DL — HIGH (ref 70–99)
GLUCOSE BLDC GLUCOMTR-MCNC: 162 MG/DL — HIGH (ref 70–99)
GLUCOSE BLDC GLUCOMTR-MCNC: 171 MG/DL — HIGH (ref 70–99)
GLUCOSE BLDC GLUCOMTR-MCNC: 246 MG/DL — HIGH (ref 70–99)
GLUCOSE BLDC GLUCOMTR-MCNC: 298 MG/DL — HIGH (ref 70–99)
GLUCOSE SERPL-MCNC: 152 MG/DL — HIGH (ref 70–99)
HCT VFR BLD CALC: 28.6 % — LOW (ref 39–50)
HGB BLD-MCNC: 8.8 G/DL — LOW (ref 13–17)
IRON SATN MFR SERPL: 295 UG/DL — HIGH (ref 59–158)
IRON SATN MFR SERPL: 92 % — HIGH (ref 16–55)
MAGNESIUM SERPL-MCNC: 2.5 MG/DL — SIGNIFICANT CHANGE UP (ref 1.6–2.6)
MCHC RBC-ENTMCNC: 24.5 PG — LOW (ref 27–34)
MCHC RBC-ENTMCNC: 30.8 GM/DL — LOW (ref 32–36)
MCV RBC AUTO: 79.7 FL — LOW (ref 80–100)
PLATELET # BLD AUTO: 178 K/UL — SIGNIFICANT CHANGE UP (ref 150–400)
POTASSIUM SERPL-MCNC: 3.6 MMOL/L — SIGNIFICANT CHANGE UP (ref 3.5–5.3)
POTASSIUM SERPL-SCNC: 3.6 MMOL/L — SIGNIFICANT CHANGE UP (ref 3.5–5.3)
RBC # BLD: 3.59 M/UL — LOW (ref 4.2–5.8)
RBC # FLD: 18.1 % — HIGH (ref 10.3–14.5)
SODIUM SERPL-SCNC: 141 MMOL/L — SIGNIFICANT CHANGE UP (ref 135–145)
TIBC SERPL-MCNC: 322 UG/DL — SIGNIFICANT CHANGE UP (ref 220–430)
TRANSFERRIN SERPL-MCNC: 225 MG/DL — SIGNIFICANT CHANGE UP (ref 180–329)
WBC # BLD: 9.19 K/UL — SIGNIFICANT CHANGE UP (ref 3.8–10.5)
WBC # FLD AUTO: 9.19 K/UL — SIGNIFICANT CHANGE UP (ref 3.8–10.5)

## 2023-01-04 PROCEDURE — 88305 TISSUE EXAM BY PATHOLOGIST: CPT | Mod: 26

## 2023-01-04 PROCEDURE — 88341 IMHCHEM/IMCYTCHM EA ADD ANTB: CPT | Mod: 26

## 2023-01-04 PROCEDURE — 45380 COLONOSCOPY AND BIOPSY: CPT

## 2023-01-04 PROCEDURE — 88342 IMHCHEM/IMCYTCHM 1ST ANTB: CPT | Mod: 26

## 2023-01-04 PROCEDURE — 99233 SBSQ HOSP IP/OBS HIGH 50: CPT

## 2023-01-04 RX ORDER — SODIUM CHLORIDE 9 MG/ML
1000 INJECTION, SOLUTION INTRAVENOUS
Refills: 0 | Status: DISCONTINUED | OUTPATIENT
Start: 2023-01-04 | End: 2023-01-04

## 2023-01-04 RX ORDER — IPRATROPIUM/ALBUTEROL SULFATE 18-103MCG
3 AEROSOL WITH ADAPTER (GRAM) INHALATION EVERY 8 HOURS
Refills: 0 | Status: DISCONTINUED | OUTPATIENT
Start: 2023-01-04 | End: 2023-01-05

## 2023-01-04 RX ORDER — ERYTHROPOIETIN 10000 [IU]/ML
10000 INJECTION, SOLUTION INTRAVENOUS; SUBCUTANEOUS ONCE
Refills: 0 | Status: COMPLETED | OUTPATIENT
Start: 2023-01-04 | End: 2023-01-04

## 2023-01-04 RX ORDER — PANTOPRAZOLE SODIUM 20 MG/1
40 TABLET, DELAYED RELEASE ORAL DAILY
Refills: 0 | Status: DISCONTINUED | OUTPATIENT
Start: 2023-01-05 | End: 2023-01-05

## 2023-01-04 RX ORDER — SENNA PLUS 8.6 MG/1
2 TABLET ORAL AT BEDTIME
Refills: 0 | Status: DISCONTINUED | OUTPATIENT
Start: 2023-01-04 | End: 2023-01-05

## 2023-01-04 RX ADMIN — Medication 1 TABLET(S): at 11:24

## 2023-01-04 RX ADMIN — Medication 3 MILLILITER(S): at 21:44

## 2023-01-04 RX ADMIN — DONEPEZIL HYDROCHLORIDE 5 MILLIGRAM(S): 10 TABLET, FILM COATED ORAL at 22:53

## 2023-01-04 RX ADMIN — PANTOPRAZOLE SODIUM 40 MILLIGRAM(S): 20 TABLET, DELAYED RELEASE ORAL at 06:37

## 2023-01-04 RX ADMIN — Medication 75 MICROGRAM(S): at 06:38

## 2023-01-04 RX ADMIN — MONTELUKAST 10 MILLIGRAM(S): 4 TABLET, CHEWABLE ORAL at 11:25

## 2023-01-04 RX ADMIN — MEMANTINE HYDROCHLORIDE 5 MILLIGRAM(S): 10 TABLET ORAL at 11:24

## 2023-01-04 RX ADMIN — ERYTHROPOIETIN 10000 UNIT(S): 10000 INJECTION, SOLUTION INTRAVENOUS; SUBCUTANEOUS at 23:21

## 2023-01-04 RX ADMIN — Medication 2: at 11:24

## 2023-01-04 RX ADMIN — Medication 6: at 16:20

## 2023-01-04 RX ADMIN — SENNA PLUS 2 TABLET(S): 8.6 TABLET ORAL at 23:21

## 2023-01-04 NOTE — PROGRESS NOTE ADULT - ASSESSMENT
88 year old male with a past medical history of dementia, CKD stage 4, chronic diastolic CHF, diabetes mellitus type 2, COPD and gastritis who presented after an episode of hematochezia at home. Admitted for LGIB    1) Lower GI bleed  - H&H stable   - CLD  - s/p Colonoscopy: 3 cm cratered mass in the rectum, immediately proximal to the verge, concerning for neoplasm, Six cold forceps bx obtained. 7 mm polyp in the ascending colon, cold snared off. Scattered left tics. Retroflexion not performed due to proximity of lesion to rectal verge.   - GI follow up noted    2) Rectal Mass  - s/p biopsy  - Family to follow up with GI (Dr. Stewart) and possible Oncology as an outpatient for further recommendations      3) History of gastritis  - Continue Protonix     4) Microcytic anemia  - baseline ~9-10   - s/p Venofer   - Patient gets Retacrit as an outpatient, family is requesting a dose prior to discharge, will order    5) CKD III/IV   - Stable   - Torsemide on hold for now   - Euvolemic  - Renal Consult noted      6) DM 2  - HbA1c 8.2  - Accu checks and ISS  - Hold Lantus     7) Chronic diastolic CHF  - No signs of fluid overload   - Resume torsemide on discharge     8)  Dementia   -  Continue Namenda and Aricept     9) Recent COVID infection  - Treated     10) Hypothyroidism  - Continue Synthroid     DVT Prophylaxis -- Venodyne    Dispo: Likely home in 24 hours.    Updated patient's wife and daughter at bedside.

## 2023-01-05 ENCOUNTER — TRANSCRIPTION ENCOUNTER (OUTPATIENT)
Age: 88
End: 2023-01-05

## 2023-01-05 VITALS
TEMPERATURE: 98 F | DIASTOLIC BLOOD PRESSURE: 67 MMHG | RESPIRATION RATE: 18 BRPM | HEART RATE: 71 BPM | SYSTOLIC BLOOD PRESSURE: 122 MMHG | OXYGEN SATURATION: 96 %

## 2023-01-05 DIAGNOSIS — K92.1 MELENA: ICD-10-CM

## 2023-01-05 LAB
ANION GAP SERPL CALC-SCNC: 8 MMOL/L — SIGNIFICANT CHANGE UP (ref 5–17)
BUN SERPL-MCNC: 53.2 MG/DL — HIGH (ref 8–20)
CALCIUM SERPL-MCNC: 8.4 MG/DL — SIGNIFICANT CHANGE UP (ref 8.4–10.5)
CANCER AG19-9 SERPL-ACNC: 26 U/ML — SIGNIFICANT CHANGE UP
CEA SERPL-MCNC: 4.4 NG/ML — HIGH (ref 0–3.8)
CHLORIDE SERPL-SCNC: 102 MMOL/L — SIGNIFICANT CHANGE UP (ref 96–108)
CO2 SERPL-SCNC: 26 MMOL/L — SIGNIFICANT CHANGE UP (ref 22–29)
CREAT SERPL-MCNC: 2.07 MG/DL — HIGH (ref 0.5–1.3)
EGFR: 30 ML/MIN/1.73M2 — LOW
GLUCOSE BLDC GLUCOMTR-MCNC: 190 MG/DL — HIGH (ref 70–99)
GLUCOSE BLDC GLUCOMTR-MCNC: 191 MG/DL — HIGH (ref 70–99)
GLUCOSE SERPL-MCNC: 179 MG/DL — HIGH (ref 70–99)
HCT VFR BLD CALC: 28.2 % — LOW (ref 39–50)
HGB BLD-MCNC: 8.6 G/DL — LOW (ref 13–17)
MCHC RBC-ENTMCNC: 24.6 PG — LOW (ref 27–34)
MCHC RBC-ENTMCNC: 30.5 GM/DL — LOW (ref 32–36)
MCV RBC AUTO: 80.6 FL — SIGNIFICANT CHANGE UP (ref 80–100)
PLATELET # BLD AUTO: 183 K/UL — SIGNIFICANT CHANGE UP (ref 150–400)
POTASSIUM SERPL-MCNC: 3.5 MMOL/L — SIGNIFICANT CHANGE UP (ref 3.5–5.3)
POTASSIUM SERPL-SCNC: 3.5 MMOL/L — SIGNIFICANT CHANGE UP (ref 3.5–5.3)
RBC # BLD: 3.5 M/UL — LOW (ref 4.2–5.8)
RBC # FLD: 18.3 % — HIGH (ref 10.3–14.5)
SODIUM SERPL-SCNC: 136 MMOL/L — SIGNIFICANT CHANGE UP (ref 135–145)
WBC # BLD: 9.71 K/UL — SIGNIFICANT CHANGE UP (ref 3.8–10.5)
WBC # FLD AUTO: 9.71 K/UL — SIGNIFICANT CHANGE UP (ref 3.8–10.5)

## 2023-01-05 PROCEDURE — 86301 IMMUNOASSAY TUMOR CA 19-9: CPT

## 2023-01-05 PROCEDURE — 85610 PROTHROMBIN TIME: CPT

## 2023-01-05 PROCEDURE — 86850 RBC ANTIBODY SCREEN: CPT

## 2023-01-05 PROCEDURE — 81001 URINALYSIS AUTO W/SCOPE: CPT

## 2023-01-05 PROCEDURE — 80048 BASIC METABOLIC PNL TOTAL CA: CPT

## 2023-01-05 PROCEDURE — 84443 ASSAY THYROID STIM HORMONE: CPT

## 2023-01-05 PROCEDURE — 36415 COLL VENOUS BLD VENIPUNCTURE: CPT

## 2023-01-05 PROCEDURE — 0225U NFCT DS DNA&RNA 21 SARSCOV2: CPT

## 2023-01-05 PROCEDURE — 83880 ASSAY OF NATRIURETIC PEPTIDE: CPT

## 2023-01-05 PROCEDURE — 99239 HOSP IP/OBS DSCHRG MGMT >30: CPT

## 2023-01-05 PROCEDURE — 83605 ASSAY OF LACTIC ACID: CPT

## 2023-01-05 PROCEDURE — 99285 EMERGENCY DEPT VISIT HI MDM: CPT | Mod: 25

## 2023-01-05 PROCEDURE — 84466 ASSAY OF TRANSFERRIN: CPT

## 2023-01-05 PROCEDURE — 85025 COMPLETE CBC W/AUTO DIFF WBC: CPT

## 2023-01-05 PROCEDURE — 82962 GLUCOSE BLOOD TEST: CPT

## 2023-01-05 PROCEDURE — 94640 AIRWAY INHALATION TREATMENT: CPT

## 2023-01-05 PROCEDURE — 82378 CARCINOEMBRYONIC ANTIGEN: CPT

## 2023-01-05 PROCEDURE — 88342 IMHCHEM/IMCYTCHM 1ST ANTB: CPT

## 2023-01-05 PROCEDURE — 83735 ASSAY OF MAGNESIUM: CPT

## 2023-01-05 PROCEDURE — 86900 BLOOD TYPING SEROLOGIC ABO: CPT

## 2023-01-05 PROCEDURE — 83036 HEMOGLOBIN GLYCOSYLATED A1C: CPT

## 2023-01-05 PROCEDURE — 83550 IRON BINDING TEST: CPT

## 2023-01-05 PROCEDURE — 85027 COMPLETE CBC AUTOMATED: CPT

## 2023-01-05 PROCEDURE — 85730 THROMBOPLASTIN TIME PARTIAL: CPT

## 2023-01-05 PROCEDURE — 99233 SBSQ HOSP IP/OBS HIGH 50: CPT | Mod: FS

## 2023-01-05 PROCEDURE — 71045 X-RAY EXAM CHEST 1 VIEW: CPT

## 2023-01-05 PROCEDURE — 83540 ASSAY OF IRON: CPT

## 2023-01-05 PROCEDURE — 86901 BLOOD TYPING SEROLOGIC RH(D): CPT

## 2023-01-05 PROCEDURE — 82607 VITAMIN B-12: CPT

## 2023-01-05 PROCEDURE — 80053 COMPREHEN METABOLIC PANEL: CPT

## 2023-01-05 PROCEDURE — 82728 ASSAY OF FERRITIN: CPT

## 2023-01-05 PROCEDURE — 88305 TISSUE EXAM BY PATHOLOGIST: CPT

## 2023-01-05 PROCEDURE — 88341 IMHCHEM/IMCYTCHM EA ADD ANTB: CPT

## 2023-01-05 PROCEDURE — 82746 ASSAY OF FOLIC ACID SERUM: CPT

## 2023-01-05 RX ORDER — POTASSIUM CHLORIDE 20 MEQ
20 PACKET (EA) ORAL ONCE
Refills: 0 | Status: COMPLETED | OUTPATIENT
Start: 2023-01-05 | End: 2023-01-05

## 2023-01-05 RX ORDER — POLYETHYLENE GLYCOL 3350 17 G/17G
17 POWDER, FOR SOLUTION ORAL
Qty: 119 | Refills: 0
Start: 2023-01-05 | End: 2023-01-11

## 2023-01-05 RX ADMIN — Medication 2: at 11:23

## 2023-01-05 RX ADMIN — Medication 3 MILLILITER(S): at 09:34

## 2023-01-05 RX ADMIN — PANTOPRAZOLE SODIUM 40 MILLIGRAM(S): 20 TABLET, DELAYED RELEASE ORAL at 13:48

## 2023-01-05 RX ADMIN — MEMANTINE HYDROCHLORIDE 5 MILLIGRAM(S): 10 TABLET ORAL at 13:48

## 2023-01-05 RX ADMIN — MONTELUKAST 10 MILLIGRAM(S): 4 TABLET, CHEWABLE ORAL at 13:48

## 2023-01-05 RX ADMIN — Medication 20 MILLIEQUIVALENT(S): at 13:48

## 2023-01-05 RX ADMIN — Medication 2: at 08:23

## 2023-01-05 RX ADMIN — Medication 75 MICROGRAM(S): at 06:41

## 2023-01-05 RX ADMIN — Medication 1 TABLET(S): at 13:48

## 2023-01-05 NOTE — DISCHARGE NOTE NURSING/CASE MANAGEMENT/SOCIAL WORK - PATIENT PORTAL LINK FT
You can access the FollowMyHealth Patient Portal offered by [facility] by registering at the following website: [email or web address]. By joining [employer]’s FollowMyHealth portal, you will also be able to view your health information using other applications (apps) compatible with our system.

## 2023-01-05 NOTE — DISCHARGE NOTE PROVIDER - ATTENDING DISCHARGE PHYSICAL EXAMINATION:
Vital Signs   T(C): 36.7 (05 Jan 2023 11:38), Max: 37.1 (04 Jan 2023 21:15)  T(F): 98.1 (05 Jan 2023 11:38), Max: 98.7 (04 Jan 2023 21:15)  HR: 71 (05 Jan 2023 11:38) (71 - 88)  BP: 122/67 (05 Jan 2023 11:38) (122/67 - 148/78)  RR: 18 (05 Jan 2023 11:38) (17 - 18)  SpO2: 96% (05 Jan 2023 11:38) (89% - 97%)  Parameters below as of 05 Jan 2023 11:38  Patient On (Oxygen Delivery Method): room air  General: Elderly male lying in bed comfortably. No acute distress  HEENT: EOMI. Clear conjunctivae. Moist mucus membrane  Neck: Supple.   Chest: Good air entry. No wheezing, rales or rhonchi.   Heart: Normal S1 & S2. RRR.   Abdomen: Non distended. Soft. Non-tender. + BS  Ext: No pedal edema. No calf tenderness   Neuro: Active and alert. No focal deficit. No speech disorder  Skin: Warm and Dry  Psychiatry: Calm at the time of exam

## 2023-01-05 NOTE — PROGRESS NOTE ADULT - SUBJECTIVE AND OBJECTIVE BOX
Chief Complaint: This is a 88y old man patient being seen in follow-up consultation for hematochezia.       HPI/ 24 hr events: Patient seen and examined at bedside, no overnight events. S/p Colonoscopy yesterday 3 cm cratered mass in the rectum, immediately proximal to the verge, concerning for neoplasm. This morning patient doing well. Daughter at the bedside. Tolerating diet. Vitals are stable, no leukocytosis, Hemoglobin stable. Denies nausea, vomiting, abdominal pain.       REVIEW OF SYSTEMS:   General: Negative  HEENT: Negative  CV: Negative  Respiratory: Negative  GI: See HPI  : Negative  MSK: Negative  Hematologic: Negative  Skin: Negative    MEDICATIONS:   MEDICATIONS  (STANDING):  albuterol/ipratropium for Nebulization 3 milliLiter(s) Nebulizer every 8 hours  dextrose 5%. 1000 milliLiter(s) (50 mL/Hr) IV Continuous <Continuous>  dextrose 5%. 1000 milliLiter(s) (100 mL/Hr) IV Continuous <Continuous>  dextrose 50% Injectable 25 Gram(s) IV Push once  dextrose 50% Injectable 12.5 Gram(s) IV Push once  dextrose 50% Injectable 25 Gram(s) IV Push once  donepezil 5 milliGRAM(s) Oral at bedtime  glucagon  Injectable 1 milliGRAM(s) IntraMuscular once  insulin lispro (ADMELOG) corrective regimen sliding scale   SubCutaneous three times a day before meals  levothyroxine 75 MICROGram(s) Oral daily  memantine 5 milliGRAM(s) Oral daily  montelukast 10 milliGRAM(s) Oral daily  multivitamin 1 Tablet(s) Oral daily  pantoprazole    Tablet 40 milliGRAM(s) Oral daily  potassium chloride    Tablet ER 20 milliEquivalent(s) Oral once  senna 2 Tablet(s) Oral at bedtime    MEDICATIONS  (PRN):  acetaminophen     Tablet .. 650 milliGRAM(s) Oral every 6 hours PRN Temp greater or equal to 38C (100.4F), Mild Pain (1 - 3), Moderate Pain (4 - 6)  dextrose Oral Gel 15 Gram(s) Oral once PRN Blood Glucose LESS THAN 70 milliGRAM(s)/deciliter  melatonin 5 milliGRAM(s) Oral at bedtime PRN Insomnia  ondansetron Injectable 4 milliGRAM(s) IV Push every 6 hours PRN Nausea and/or Vomiting      ALLERGIES:   Allergies    No Known Allergies    Intolerances        VITAL SIGNS:   Vital Signs Last 24 Hrs  T(C): 36.4 (05 Jan 2023 04:38), Max: 37.1 (04 Jan 2023 21:15)  T(F): 97.5 (05 Jan 2023 04:38), Max: 98.7 (04 Jan 2023 21:15)  HR: 75 (05 Jan 2023 04:38) (75 - 88)  BP: 148/78 (04 Jan 2023 21:15) (137/80 - 148/78)  BP(mean): --  RR: 17 (05 Jan 2023 04:38) (17 - 18)  SpO2: 94% (05 Jan 2023 04:38) (89% - 97%)    Parameters below as of 05 Jan 2023 04:38  Patient On (Oxygen Delivery Method): room air      I&O's Summary      PHYSICAL EXAM:   GENERAL: Elderly male, No acute distress  HEENT:  NC/AT, conjunctiva clear, sclera anicteric  CHEST:  No increased effort, breath sounds diminished b/l  HEART:  Regular rhythm, S1, S2,   ABDOMEN:  Soft, non-tender, non-distended, normoactive bowel sounds, no rebound or guarding  EXTREMITIES: No edema  SKIN:  Warm, dry  NEURO:  Calm, cooperative      LABS:  CBC Full  -  ( 05 Jan 2023 05:55 )  WBC Count : 9.71 K/uL  RBC Count : 3.50 M/uL  Hemoglobin : 8.6 g/dL  Hematocrit : 28.2 %  Platelet Count - Automated : 183 K/uL  Mean Cell Volume : 80.6 fl  Mean Cell Hemoglobin : 24.6 pg  Mean Cell Hemoglobin Concentration : 30.5 gm/dL  Auto Neutrophil # : x  Auto Lymphocyte # : x  Auto Monocyte # : x  Auto Eosinophil # : x  Auto Basophil # : x  Auto Neutrophil % : x  Auto Lymphocyte % : x  Auto Monocyte % : x  Auto Eosinophil % : x  Auto Basophil % : x    01-05    136  |  102  |  53.2<H>  ----------------------------<  179<H>  3.5   |  26.0  |  2.07<H>    Ca    8.4      05 Jan 2023 05:55  Mg     2.5     01-04            RADIOLOGY & ADDITIONAL STUDIES:      Colonoscopy Report    Date: 1/4/2023 8:18 AM        Findings:        Additional findings 3 cm cratered mass in the rectum, immediately proximal to    the verge, concerning for neoplasm, Six cold forceps bx obtained. 7 mm polyp in    the ascending colon, cold snared off. Scattered left tics. Retroflexion not    performed due to proximity of lesion to rectal verge..        Other Interventions:        Impressions:        3 cm cratered mass in the rectum, immediately proximal to the verge,    concerning for neoplasm, Six cold forceps bx obtained. 7 mm polyp in the    ascending colon, cold snared off. Scattered left tics. Retroflexion not    performed due to proximity of lesion to rectal verge. .        Plan:        Await pathology results.      Return to floor for further management. Advance diet to regular.      Specimens:      Additional Notes:        Pathology:      Attending Participation:      Viola Martinez MD      Version 1, Electronically signed on 1/4/2023 10:33:26 AM by Viola Martinez MD  
Chief Complaint: This is a 88y old man patient being seen in follow-up consultation for hematochezia.     Interval HPI / 24H events:  Patient is seen and examined at the bedside, in no acute distress. Patient's daughter at the bedside, patient unable to complete the colonoscopy prep overnight. Denies abdominal pain, nausea, vomiting, fever, chills.     Review of Systems:  . Constitutional: No fever, chills  . HEENT: Negative  · Respiratory and Thorax: No shortness of breath, no cough, no wheezing  · Cardiovascular: No chest pain, palpitation, no dizziness, no orthopnea,   · Gastrointestinal: see above.  · Genitourinary: No hematuria  · Musculoskeletal: Negative  · Neurological: no headache, no vision changes, no slurred speech  · Psychiatric: no agitation, no anxiety  · Hematology/Lymphatics: negative  · Endocrine: negative      PAST MEDICAL/SURGICAL HISTORY:  DM (diabetes mellitus)    Afib    Dementia    COPD (chronic obstructive pulmonary disease)    CKD (chronic kidney disease)    S/P carotid endarterectomy    History of lung biopsy      MEDICATIONS  (STANDING):  albuterol   0.5% 2.5 milliGRAM(s) Nebulizer three times a day  dextrose 5%. 1000 milliLiter(s) (50 mL/Hr) IV Continuous <Continuous>  dextrose 5%. 1000 milliLiter(s) (100 mL/Hr) IV Continuous <Continuous>  dextrose 50% Injectable 25 Gram(s) IV Push once  dextrose 50% Injectable 12.5 Gram(s) IV Push once  dextrose 50% Injectable 25 Gram(s) IV Push once  donepezil 5 milliGRAM(s) Oral at bedtime  glucagon  Injectable 1 milliGRAM(s) IntraMuscular once  insulin lispro (ADMELOG) corrective regimen sliding scale   SubCutaneous three times a day before meals  iron sucrose IVPB 200 milliGRAM(s) IV Intermittent every 24 hours  levothyroxine 75 MICROGram(s) Oral daily  memantine 5 milliGRAM(s) Oral daily  metoclopramide 5 milliGRAM(s) Oral Before meals and at bedtime  montelukast 10 milliGRAM(s) Oral daily  multivitamin 1 Tablet(s) Oral daily  pantoprazole  Injectable 40 milliGRAM(s) IV Push two times a day    MEDICATIONS  (PRN):  dextrose Oral Gel 15 Gram(s) Oral once PRN Blood Glucose LESS THAN 70 milliGRAM(s)/deciliter  melatonin 5 milliGRAM(s) Oral at bedtime PRN Insomnia    No Known Allergies    T(C): 36.3 (01-03-23 @ 04:45), Max: 37.2 (01-02-23 @ 11:33)  HR: 69 (01-03-23 @ 04:45) (65 - 83)  BP: 137/79 (01-03-23 @ 04:45) (104/60 - 137/79)  RR: 18 (01-03-23 @ 04:45) (16 - 18)  SpO2: 96% (01-03-23 @ 04:45) (94% - 96%)      PHYSICAL EXAM:    Constitutional: Elderly man in no acute distress  Neuro: Awake alert, oriented   HEENT: anicteric sclerae  Neck: supple, no JVD  CV: regular rate, regular rhythm, +S1S2,   Pulm/chest: lung sounds diminished bilaterally, no accessory muscle use noted. On room air  Abd: soft, NT, ND, +BS  Rectal exam: Yellow liquid stool  Ext: no Cyanosis, clubbing, trace bilateral pedal edema  Skin: warm, no jaundice   Psych: calm, cooperative      LABS:               9.5    9.73  )-----------( 156      ( 01-03 @ 05:23 )             31.1                8.4    14.14 )-----------( 173      ( 01-02 @ 08:35 )             27.6                9.2    10.88 )-----------( 179      ( 01-01 @ 14:50 )             29.4       01-03    139  |  100  |  64.1<H>  ----------------------------<  36<LL>  3.6   |  26.0  |  2.17<H>    Ca    8.9      03 Jan 2023 05:23    TPro  6.9  /  Alb  3.5  /  TBili  0.4  /  DBili  x   /  AST  20  /  ALT  21  /  AlkPhos  144<H>  01-01    LIVER FUNCTIONS - ( 01 Jan 2023 14:50 )  Alb: 3.5 g/dL / Pro: 6.9 g/dL / ALK PHOS: 144 U/L / ALT: 21 U/L / AST: 20 U/L / GGT: x               PT/INR - ( 03 Jan 2023 05:23 )   PT: 13.7 sec;   INR: 1.18 ratio         PTT - ( 03 Jan 2023 05:23 )  PTT:31.2 sec  Iron Total, Serum: 48 ug/dL *L* (01-01-23 @ 14:50)  Iron - Total Binding Capacity.: 393 ug/dL (01-01-23 @ 14:50)  % Saturation, Iron: 12 % *L* (01-01-23 @ 14:50)    
Hemorrhage of rectum and anus    HPI:  89 yo F h/o  constipation, DM, dementia CHF, renal failure, COPD, afib on ASA presented to the ER with family reporting  BRBPR  noted on FRiday after BM x2  , yesterday night had another episode per wife and this am had blood in his underwear noted by family , pt is with dementia not reliable historian , jose raul per daughter he was c/o pain in hid abdomen , mid area , now he denies any abdominal pajn   good appetite and no further symptoms , has been on colace daily for cronic constipation , no nausea , no vomiting   Family showed this interviewer pictures which confirmed BRBPR in toilet. Abd pain intermittent since bile duct stone/ercp 2021. unable to say whether he has lightheadedness/dizziness/chest pain or sob 2/2 dementia (01 Jan 2023 17:02)    Interval History:  Patient was seen and examined at bedside around 12:30 pm.  Had just came up from Endo Suite.   No active complaints.     ROS:  As per interval history otherwise unremarkable.    PHYSICAL EXAM:  Vital Signs   T(C): 36.6 (04 Jan 2023 06:09), Max: 36.9 (04 Jan 2023 04:37)  T(F): 97.9 (04 Jan 2023 06:09), Max: 98.4 (04 Jan 2023 04:37)  HR: 67 (04 Jan 2023 06:09) (64 - 76)  BP: 142/73 (04 Jan 2023 06:09) (130/65 - 143/80)  RR: 16 (04 Jan 2023 06:09) (16 - 18)  SpO2: 95% (04 Jan 2023 06:09) (95% - 98%)  Parameters below as of 04 Jan 2023 06:09  Patient On (Oxygen Delivery Method): room air  General: Elderly male lying in bed comfortably. No acute distress  HEENT: EOMI. Clear conjunctivae. Moist mucus membrane  Neck: Supple.   Chest: Good air entry. No wheezing, rales or rhonchi.   Heart: Normal S1 & S2. RRR.   Abdomen: Non distended. Soft. Non-tender. + BS  Ext: No pedal edema. No calf tenderness   Neuro: Active and alert. No focal deficit. No speech disorder  Skin: Warm and Dry  Psychiatry: Calm at the time of exam     LABS:  CAPILLARY BLOOD GLUCOSE  POCT Blood Glucose.: 162 mg/dL (04 Jan 2023 11:06)  POCT Blood Glucose.: 171 mg/dL (04 Jan 2023 06:27)  POCT Blood Glucose.: 130 mg/dL (04 Jan 2023 00:05)  POCT Blood Glucose.: 187 mg/dL (03 Jan 2023 16:41)                      8.8    9.19  )-----------( 178      ( 04 Jan 2023 05:36 )             28.6     01-04    141  |  103  |  53.3<H>  ----------------------------<  152<H>  3.6   |  27.0  |  1.91<H>    Ca    8.3<L>      04 Jan 2023 05:36  Mg     2.5     01-04    PT/INR - ( 03 Jan 2023 05:23 )   PT: 13.7 sec;   INR: 1.18 ratio       PTT - ( 03 Jan 2023 05:23 )  PTT:31.2 sec    RADIOLOGY & ADDITIONAL STUDIES:  Reviewed     MEDICATIONS  (STANDING):  albuterol   0.5% 2.5 milliGRAM(s) Nebulizer three times a day  dextrose 5%. 1000 milliLiter(s) (50 mL/Hr) IV Continuous <Continuous>  dextrose 5%. 1000 milliLiter(s) (100 mL/Hr) IV Continuous <Continuous>  dextrose 50% Injectable 25 Gram(s) IV Push once  dextrose 50% Injectable 12.5 Gram(s) IV Push once  dextrose 50% Injectable 25 Gram(s) IV Push once  donepezil 5 milliGRAM(s) Oral at bedtime  epoetin triny-epbx (RETACRIT) Injectable 61096 Unit(s) SubCutaneous once  glucagon  Injectable 1 milliGRAM(s) IntraMuscular once  insulin lispro (ADMELOG) corrective regimen sliding scale   SubCutaneous three times a day before meals  levothyroxine 75 MICROGram(s) Oral daily  memantine 5 milliGRAM(s) Oral daily  montelukast 10 milliGRAM(s) Oral daily  multivitamin 1 Tablet(s) Oral daily  pantoprazole  Injectable 40 milliGRAM(s) IV Push two times a day    MEDICATIONS  (PRN):  acetaminophen     Tablet .. 650 milliGRAM(s) Oral every 6 hours PRN Temp greater or equal to 38C (100.4F), Mild Pain (1 - 3), Moderate Pain (4 - 6)  dextrose Oral Gel 15 Gram(s) Oral once PRN Blood Glucose LESS THAN 70 milliGRAM(s)/deciliter  melatonin 5 milliGRAM(s) Oral at bedtime PRN Insomnia  ondansetron Injectable 4 milliGRAM(s) IV Push every 6 hours PRN Nausea and/or Vomiting          
CC: Follow up     INTERVAL HPI/OVERNIGHT EVENTS: Patient seen and examined, daughter at bedside. Patient awake and alert to self in no distress.       Vital Signs Last 24 Hrs  T(C): 36.8 (2023 10:03), Max: 38.2 (2023 08:29)  T(F): 98.3 (2023 10:03), Max: 100.7 (2023 08:29)  HR: 91 (2023 08:29) (62 - 91)  BP: 98/53 (2023 08:29) (95/51 - 122/64)  BP(mean): --  RR: 18 (2023 08:29) (18 - 18)  SpO2: 95% (2023 08:29) (95% - 98%)    Parameters below as of 2023 08:29  Patient On (Oxygen Delivery Method): room air        PHYSICAL EXAM:    GENERAL: NAD, AOX1  HEAD:  Atraumatic, Normocephalic  EYES: conjunctiva and sclera clear  ENMT: Moist mucous membranes  NECK: Supple  CHEST/LUNG: Clear to auscultation bilaterally; No rales, rhonchi, wheezing, or rubs  HEART: Regular rate and rhythm; No murmurs, rubs, or gallops  ABDOMEN: Soft, Nontender, Nondistended; Bowel sounds present  EXTREMITIES:  2+ Peripheral Pulses, No clubbing, cyanosis, trace pedal edema        MEDICATIONS  (STANDING):  dextrose 5%. 1000 milliLiter(s) (50 mL/Hr) IV Continuous <Continuous>  dextrose 5%. 1000 milliLiter(s) (100 mL/Hr) IV Continuous <Continuous>  dextrose 50% Injectable 25 Gram(s) IV Push once  dextrose 50% Injectable 12.5 Gram(s) IV Push once  dextrose 50% Injectable 25 Gram(s) IV Push once  donepezil 5 milliGRAM(s) Oral at bedtime  glucagon  Injectable 1 milliGRAM(s) IntraMuscular once  insulin glargine Injectable (LANTUS) 5 Unit(s) SubCutaneous at bedtime  insulin lispro (ADMELOG) corrective regimen sliding scale   SubCutaneous three times a day before meals  iron sucrose IVPB 200 milliGRAM(s) IV Intermittent every 24 hours  levothyroxine 75 MICROGram(s) Oral daily  memantine 5 milliGRAM(s) Oral daily  metoclopramide 5 milliGRAM(s) Oral Before meals and at bedtime  montelukast 10 milliGRAM(s) Oral daily  multivitamin 1 Tablet(s) Oral daily  pantoprazole  Injectable 40 milliGRAM(s) IV Push two times a day  polyethylene glycol/electrolyte Solution. 4000 milliLiter(s) Oral once  torsemide 10 milliGRAM(s) Oral daily    MEDICATIONS  (PRN):  albuterol   0.5% 2.5 milliGRAM(s) Nebulizer four times a day PRN Shortness of Breath and/or Wheezing  dextrose Oral Gel 15 Gram(s) Oral once PRN Blood Glucose LESS THAN 70 milliGRAM(s)/deciliter  melatonin 5 milliGRAM(s) Oral at bedtime PRN Insomnia      Allergies    No Known Allergies    Intolerances          LABS:                          8.4    14.14 )-----------( 173      ( 2023 08:35 )             27.6         138  |  100  |  65.1<H>  ----------------------------<  60<L>  4.2   |  28.0  |  2.00<H>    Ca    8.5      2023 08:35    TPro  6.9  /  Alb  3.5  /  TBili  0.4  /  DBili  x   /  AST  20  /  ALT  21  /  AlkPhos  144<H>  -    PT/INR - ( 2023 14:50 )   PT: 13.1 sec;   INR: 1.13 ratio         PTT - ( 2023 14:50 )  PTT:28.5 sec  Urinalysis Basic - ( 2023 10:00 )    Color: Yellow / Appearance: Clear / S.010 / pH: x  Gluc: x / Ketone: Negative  / Bili: Negative / Urobili: Negative mg/dL   Blood: x / Protein: Negative / Nitrite: Negative   Leuk Esterase: Trace / RBC: Negative /HPF / WBC 0-2 /HPF   Sq Epi: x / Non Sq Epi: Occasional / Bacteria: Occasional        RADIOLOGY & ADDITIONAL TESTS:  
Hemorrhage of rectum and anus    HPI:  89 yo F h/o  constipation, DM, dementia CHF, renal failure, COPD, afib on ASA presented to the ER with family reporting  BRBPR  noted on FRiday after BM x2  , yesterday night had another episode per wife and this am had blood in his underwear noted by family , pt is with dementia not reliable historian , jose raul per daughter he was c/o pain in hid abdomen , mid area , now he denies any abdominal pajn   good appetite and no further symptoms , has been on colace daily for cronic constipation , no nausea , no vomiting   Family showed this interviewer pictures which confirmed BRBPR in toilet. Abd pain intermittent since bile duct stone/ercp . unable to say whether he has lightheadedness/dizziness/chest pain or sob 2/2 dementia (2023 17:02)    Interval History:  Patient was seen and examined at bedside around 12:15 pm.  Hypoglycemic this morning.  Could not finish bowel prep last night so Colonoscopy was cancelled today.   No active complaints at this time.     ROS:  As per interval history otherwise unremarkable.    PHYSICAL EXAM:  Vital Signs   T(C): 36.4 (2023 11:22), Max: 36.8 (2023 16:04)  T(F): 97.6 (2023 11:22), Max: 98.2 (2023 16:04)  HR: 59 (2023 11:22) (59 - 69)  BP: 116/73 (2023 11:22) (108/66 - 137/79)  RR: 18 (2023 11:22) (16 - 18)  SpO2: 97% (2023 11:22) (95% - 97%)  Parameters below as of 2023 11:22  Patient On (Oxygen Delivery Method): room air  General: Elderly male lying in bed comfortably. No acute distress  HEENT: EOMI. Clear conjunctivae. Moist mucus membrane  Neck: Supple.   Chest: Good air entry. No wheezing, rales or rhonchi.   Heart: Normal S1 & S2. RRR.   Abdomen: Non distended. Soft. Non-tender. + BS  Ext: No pedal edema. No calf tenderness   Neuro: Active and alert. No focal deficit. No speech disorder  Skin: Warm and Dry  Psychiatry: Calm at the time of exam     LABS:  CAPILLARY BLOOD GLUCOSE  POCT Blood Glucose.: 178 mg/dL (2023 11:51)  POCT Blood Glucose.: 168 mg/dL (2023 08:31)  POCT Blood Glucose.: 62 mg/dL (2023 08:10)  POCT Blood Glucose.: 52 mg/dL (2023 07:46)  POCT Blood Glucose.: 171 mg/dL (2023 21:26)  POCT Blood Glucose.: 141 mg/dL (2023 16:36)                      9.5    9.73  )-----------( 156      ( 2023 05:23 )             31.1     -    139  |  100  |  64.1<H>  ----------------------------<  36<LL>  3.6   |  26.0  |  2.17<H>    Ca    8.9      2023 05:23    PT/INR - ( 2023 05:23 )   PT: 13.7 sec;   INR: 1.18 ratio       PTT - ( 2023 05:23 )  PTT:31.2 sec  Urinalysis Basic - ( 2023 10:00 )    Color: Yellow / Appearance: Clear / S.010 / pH: x  Gluc: x / Ketone: Negative  / Bili: Negative / Urobili: Negative mg/dL   Blood: x / Protein: Negative / Nitrite: Negative   Leuk Esterase: Trace / RBC: Negative /HPF / WBC 0-2 /HPF   Sq Epi: x / Non Sq Epi: Occasional / Bacteria: Occasional    RADIOLOGY & ADDITIONAL STUDIES:  Reviewed     MEDICATIONS  (STANDING):  albuterol   0.5% 2.5 milliGRAM(s) Nebulizer three times a day  dextrose 5%. 1000 milliLiter(s) (50 mL/Hr) IV Continuous <Continuous>  dextrose 5%. 1000 milliLiter(s) (100 mL/Hr) IV Continuous <Continuous>  dextrose 50% Injectable 25 Gram(s) IV Push once  dextrose 50% Injectable 12.5 Gram(s) IV Push once  dextrose 50% Injectable 25 Gram(s) IV Push once  donepezil 5 milliGRAM(s) Oral at bedtime  glucagon  Injectable 1 milliGRAM(s) IntraMuscular once  insulin lispro (ADMELOG) corrective regimen sliding scale   SubCutaneous three times a day before meals  iron sucrose IVPB 200 milliGRAM(s) IV Intermittent every 24 hours  levothyroxine 75 MICROGram(s) Oral daily  memantine 5 milliGRAM(s) Oral daily  montelukast 10 milliGRAM(s) Oral daily  multivitamin 1 Tablet(s) Oral daily  pantoprazole  Injectable 40 milliGRAM(s) IV Push two times a day  polyethylene glycol/electrolyte Solution 1000 milliLiter(s) Oral every 4 hours    MEDICATIONS  (PRN):  dextrose Oral Gel 15 Gram(s) Oral once PRN Blood Glucose LESS THAN 70 milliGRAM(s)/deciliter  melatonin 5 milliGRAM(s) Oral at bedtime PRN Insomnia

## 2023-01-05 NOTE — DISCHARGE NOTE PROVIDER - PROVIDER TOKENS
PROVIDER:[[id number]],FOLLOWUP:[1 week],ESTABLISHEDPATIENT:[T]],PROVIDER:[TOKEN:[[id number]],SCHEDULEDAPPT:[01/12/2023],ESTABLISHEDPATIENT:[T]],PROVIDER:[TOKEN:[[id number]],SCHEDULEDAPPT:[03/09/2023],ESTABLISHEDPATIENT:[T]],PROVIDER:[TOKEN:[[id number]],FOLLOWUP:[Routine],ESTABLISHEDPATIENT:[T]],PROVIDER:[TOKEN:[[id number]],FOLLOWUP:[2 weeks]]

## 2023-01-05 NOTE — PROGRESS NOTE ADULT - NS ATTEND AMEND GEN_ALL_CORE FT
Agree with above. Patient s/p colonoscopy with rectal mass path pending. Recommend colorectal surgery evaluation. CT chest, A/P for staging send CEA   Diet as tolerated
88 year old male with a past medical history of dementia, CKD stage 4, chronic diastolic CHF, diabetes mellitus type 2, COPD and gastroparesis, also tics seen on imaging 4/21, here with rectal bleeding.   Hb corrected on its own from 8.4 to 9.5 today  Pt with yellow stool  Spoke at some length with daughter at bedside - colon will be low yield at this point and difficult for patient to prep for  She understands, but mom and her were v scared by red stool and "want to make sure it won't happen again at home". explained that this colonoscopy won't be able to guarantee no recurrence of bleeding. She is thinking about it but for now wants the colon while here.   Clears today, Moviprep today

## 2023-01-05 NOTE — DISCHARGE NOTE PROVIDER - CARE PROVIDERS DIRECT ADDRESSES
,[email or web address],DirectAddress_Unknown,DirectAddress_Unknown

## 2023-01-05 NOTE — DISCHARGE NOTE PROVIDER - CARE PROVIDER_API CALL
Jayjay Stewart)  Gastroenterology; Internal Medicine  [address], [address]  [place] [address]  Phone: [phone]  Fax: [phone]  Established Patient  Follow Up Time: 1 week    Jayesh Rivera)  Critical Care Medicine; Pulmonary Disease; Sleep Medicine  [address], [address]  [place] [address]  Phone: [phone]  Fax: [phone]  Established Patient  Scheduled Appointment: 01/12/2023    Melvin Wilkes)  Cardiovascular Disease; Interventional Cardiology  [address], [address]  [place] [address]  Phone: [phone]  Fax: [phone]  Established Patient  Scheduled Appointment: 03/09/2023    Benito Calhoun)  Nephrology  [address] [place] [address], [place] [address]  Phone: [phone]  Fax: [phone]  Established Patient  Follow Up Time: Routine    Di Sargent)  ColonRectal Surgery; Surgery  [address] [place] [address]  Phone: [phone]  Fax: [phone]  Follow Up Time: 2 weeks

## 2023-01-05 NOTE — DISCHARGE NOTE PROVIDER - HOSPITAL COURSE
88 year old male with a past medical history of dementia, CKD stage 4, chronic diastolic CHF, diabetes mellitus type 2, COPD and gastritis who presented after an episode of hematochezia at home. Admitted for LGIB. Seen by GI and had Colonoscopy:  3 cm cratered mass in the rectum, immediately proximal to the verge, concerning for neoplasm, Six cold forceps bx obtained. 7 mm polyp in the ascending colon, cold snared off. Scattered left tics. Retroflexion not performed due to proximity of lesion to rectal verge.   Colorectal Surgery evaluation was done prior to discharge.  H&H remained stable during hospitalization. Family wants to take him home and follow up with GI / Colorectal Surgery for further recommendations as an outpatient.   He is stable at this time for discharge.

## 2023-01-05 NOTE — PROGRESS NOTE ADULT - ASSESSMENT
88 year old male with a past medical history of dementia, CKD stage 4, chronic diastolic CHF, diabetes mellitus type 2, COPD and gastritis who presented after an episode of hematochezia at home.    Hematochezia  S/p Colonoscopy yesterday 3 cm cratered mass in the rectum, immediately proximal to the verge, concerning for neoplasm.   Tolerating diet. Hemoglobin stable at 8.6gm. Scant blood in BM overnight.    Diet as tolerated.   Continue to trend CBC, transfuse to Hgb 8 or higher   Will await pathology   Will recommend Colorectal consult for rectal mass findings in rectum.   Rest of care as per primary

## 2023-01-05 NOTE — DISCHARGE NOTE PROVIDER - NSDCFUSCHEDAPPT_GEN_ALL_CORE_FT
Jayesh Rivera  [facility]  PULMMED [address]  Scheduled Appointment: 01/12/2023    Melvin Wilkes  [facility]  CARDIOLOGY [address]  Scheduled Appointment: 03/09/2023

## 2023-01-05 NOTE — DISCHARGE NOTE PROVIDER - NSDCCPCAREPLAN_GEN_ALL_CORE_FT
PRINCIPAL DISCHARGE DIAGNOSIS  Diagnosis: Rectal bleeding  Assessment and Plan of Treatment: Likely from rectal mass.  Follow up with GI / Colorectal Surgery in 1-2 weeks for further recommendations.

## 2023-01-05 NOTE — DISCHARGE NOTE PROVIDER - NSDCMRMEDTOKEN_GEN_ALL_CORE_FT
3 in 1 Commode : prn   albuterol 1.25 mg/3 mL (0.042%) inhalation solution: 3 milliliter(s) inhaled 3 times a day, As Needed  Anoro Ellipta 62.5 mcg-25 mcg/inh inhalation powder: 1 puff(s) inhaled once a day  Colace 100 mg oral capsule: 1 cap(s) orally 3 times a day  insulin lispro 100 units/mL injectable solution: 4 unit(s) injectable 3 times a day (before meals)  levothyroxine 75 mcg (0.075 mg) oral tablet: 1 tab(s) orally once a day  melatonin 5 mg oral tablet: 1 tab(s) orally once a day (at bedtime)  memantine 7 mg oral capsule, extended release: 1 cap(s) orally once a day  MiraLax oral powder for reconstitution: 17 gram(s) orally once a day, As Needed for constipation.   montelukast 10 mg oral tablet: 1 tab(s) orally once a day  Multiple Vitamins oral tablet: 1 tab(s) orally once a day  Protonix 40 mg oral delayed release tablet: 1 tab(s) orally once a day (before a meal)  Rollator     Diagnosis: Ambulatory Dysfunction: prn   torsemide 10 mg oral tablet: 1 tab(s) orally once a day  Toujeo SoloStar 300 units/mL subcutaneous solution: 8 unit(s) subcutaneous once a day (at bedtime)

## 2023-01-06 ENCOUNTER — RX RENEWAL (OUTPATIENT)
Age: 88
End: 2023-01-06

## 2023-01-06 LAB — SURGICAL PATHOLOGY STUDY: SIGNIFICANT CHANGE UP

## 2023-01-09 ENCOUNTER — NON-APPOINTMENT (OUTPATIENT)
Age: 88
End: 2023-01-09

## 2023-01-10 ENCOUNTER — NON-APPOINTMENT (OUTPATIENT)
Age: 88
End: 2023-01-10

## 2023-01-12 ENCOUNTER — APPOINTMENT (OUTPATIENT)
Dept: PULMONOLOGY | Facility: CLINIC | Age: 88
End: 2023-01-12
Payer: MEDICARE

## 2023-01-12 VITALS
HEART RATE: 69 BPM | OXYGEN SATURATION: 97 % | WEIGHT: 159 LBS | HEIGHT: 64 IN | SYSTOLIC BLOOD PRESSURE: 94 MMHG | RESPIRATION RATE: 16 BRPM | BODY MASS INDEX: 27.14 KG/M2 | DIASTOLIC BLOOD PRESSURE: 66 MMHG

## 2023-01-12 PROCEDURE — 99214 OFFICE O/P EST MOD 30 MIN: CPT

## 2023-01-12 NOTE — DISCUSSION/SUMMARY
[FreeTextEntry1] : \par #1. Pt with COPD Gold class II based on old PFTs, complicated by Dementia, A fib, HFpEF\par #2. Post admission [facility] for biliary sepsis, post ERCP, sphincterotomy and stone extraction\par #3. Prior echocardiogram 4/21 normal LV no pH\par #4. Diurese as tolerates for LE edema and mild rales on exam\par #5. Tolerating Anoro daily and nebulizer up to 2-3 x daily prn, dyspnea near baseline but with congested cough so will complete course of Zithromax as given to him by  and will add Medrol dose mili for congestion possibly related to inflammation\par #6. Recent CT scan 7/26/22 with stable small eff/round atelectasis x yrs; repeat as needed; current CXR also with stable small b/l effusions\par #7. Renal and cardiology f/u for LE edema and adjustment of diuretic therapy\par #8. Renal f/u for CRI\par #9. Pt had both Covid vaccines \par #10. Unable to perform lung function testing - limited by Dementia\par #11. F/u in 3 weeks if pt continues to have symptoms otherwise in 3 months as scheduled\par \par The patient expressed understanding and agreement with the above recommendations/plan and accepts responsibility to be compliant with recommended testing, therapies, and f/u visits.\par All relevant questions and concerns were addressed. \par Discussed above with patient and wife and daughter who were also present.

## 2023-01-12 NOTE — HISTORY OF PRESENT ILLNESS
[Former] : former [>= 20 pack years] : >= 20 pack years [Follow-Up - Routine Clinic] : a routine clinic follow-up of [Dyspnea on Exertion] : dyspnea on exertion [Currently Experiencing] : The patient is currently experiencing symptoms. [Short-Acting Beta Agonists] : short-acting beta agonists [Long-Acting Beta Agonists] : long-acting beta agonists [Anticholinergics (Inhalation)] : inhaled anticholinergics [Good Compliance] : good compliance with treatment [Good Tolerance] : good tolerance of treatment [Good Symptom Control] : good symptom control [On ___] : performed on [unfilled] [Patient] : the patient [Indication ___] : for an indication of [unfilled] [None] : no new symptoms reported [TextBox_4] : Former 50 pack yr smoker, quit 1992\par Reports [place] exposure\par Poor historian due to dementia\par Baseline SOBOE, no other symptoms\par \par Dyspnea at baseline\par Pt s/p lung surgery\par On Anoro daily and Albuterol nebulizer BID for COPD\par On Mucinex as needed\par Abnormal CT in 2018 but last CT with stable changes and persistent small effusions; repeat as needed\par \par Pt with 2 day h/o increased SOB and congested cough but no fevers, chills, sweats. Pt seen in UC and RVP reportedly neg but subsequent PCR was + and was treated with Paxlovid. He is back to baseline.\par \par Daughter reports recent hospitalization for rectal bleeding and current concern regarding malignancy; undergoing w/u with GI but dementia may preclude aggressive w/u and management.\par On diuretic therapy per renal and cardiology depending on weight.\par  [FreeTextEntry9] : Chest CT [FreeTextEntry8] : Bibasilar rounded atelectasis, small effusions, biapical scarring and emphysema

## 2023-01-12 NOTE — CONSULT LETTER
[Dear  ___] : Dear  [unfilled], [Consult Letter:] : I had the pleasure of evaluating your patient, [unfilled]. [Please see my note below.] : Please see my note below. [Sincerely,] : Sincerely, [FreeTextEntry3] : Jayesh Rivera MD, FCCP, D. ABSM\par Pulmonary and Sleep Medicine\par [facility]monary and Sleep Medicine at [place]

## 2023-01-17 ENCOUNTER — OFFICE (OUTPATIENT)
Dept: URBAN - METROPOLITAN AREA CLINIC 94 | Facility: CLINIC | Age: 88
Setting detail: OPHTHALMOLOGY
End: 2023-01-17
Payer: MEDICARE

## 2023-01-17 DIAGNOSIS — E11.3292: ICD-10-CM

## 2023-01-17 DIAGNOSIS — E11.3211: ICD-10-CM

## 2023-01-17 PROCEDURE — 67210 TREATMENT OF RETINAL LESION: CPT | Performed by: OPHTHALMOLOGY

## 2023-01-17 PROCEDURE — 92134 CPTRZ OPH DX IMG PST SGM RTA: CPT | Performed by: OPHTHALMOLOGY

## 2023-01-17 ASSESSMENT — AXIALLENGTH_DERIVED
OS_AL: 23.6407
OD_AL: 23.306

## 2023-01-17 ASSESSMENT — LID EXAM ASSESSMENTS
OS_BLEPHARITIS: LUL 3+
OD_BLEPHARITIS: RUL 3+

## 2023-01-17 ASSESSMENT — REFRACTION_AUTOREFRACTION
OD_AXIS: 081
OS_AXIS: 105
OD_CYLINDER: -0.75
OS_CYLINDER: -1.25
OS_SPHERE: +0.50
OD_SPHERE: +1.00

## 2023-01-17 ASSESSMENT — KERATOMETRY
OS_AXISANGLE_DEGREES: 012
OD_K2POWER_DIOPTERS: 44.00
OS_K2POWER_DIOPTERS: 44.00
OS_K1POWER_DIOPTERS: 43.00
OD_AXISANGLE_DEGREES: 174
OD_K1POWER_DIOPTERS: 43.25

## 2023-01-17 ASSESSMENT — VISUAL ACUITY
OD_BCVA: 20/25-1
OS_BCVA: 20/30-1

## 2023-01-17 ASSESSMENT — SPHEQUIV_DERIVED
OD_SPHEQUIV: 0.625
OS_SPHEQUIV: -0.125

## 2023-01-19 ENCOUNTER — NON-APPOINTMENT (OUTPATIENT)
Age: 88
End: 2023-01-19

## 2023-01-19 ENCOUNTER — APPOINTMENT (OUTPATIENT)
Dept: CARDIOLOGY | Facility: CLINIC | Age: 88
End: 2023-01-19
Payer: MEDICARE

## 2023-01-19 VITALS
OXYGEN SATURATION: 97 % | HEIGHT: 64 IN | HEART RATE: 70 BPM | SYSTOLIC BLOOD PRESSURE: 138 MMHG | WEIGHT: 159 LBS | TEMPERATURE: 96.9 F | BODY MASS INDEX: 27.14 KG/M2 | DIASTOLIC BLOOD PRESSURE: 70 MMHG

## 2023-01-19 DIAGNOSIS — F03.90 UNSPECIFIED DEMENTIA W/OUT BEHAVIORAL DISTURBANCE: ICD-10-CM

## 2023-01-19 PROCEDURE — 99214 OFFICE O/P EST MOD 30 MIN: CPT

## 2023-01-19 PROCEDURE — 93000 ELECTROCARDIOGRAM COMPLETE: CPT

## 2023-01-19 RX ORDER — ASPIRIN ENTERIC COATED TABLETS 81 MG 81 MG/1
81 TABLET, DELAYED RELEASE ORAL DAILY
Qty: 30 | Refills: 3 | Status: DISCONTINUED | COMMUNITY
Start: 2021-04-27 | End: 2023-01-19

## 2023-01-27 ENCOUNTER — OFFICE (OUTPATIENT)
Dept: URBAN - METROPOLITAN AREA CLINIC 94 | Facility: CLINIC | Age: 88
Setting detail: OPHTHALMOLOGY
End: 2023-01-27
Payer: MEDICARE

## 2023-01-27 DIAGNOSIS — H01.001: ICD-10-CM

## 2023-01-27 DIAGNOSIS — H10.89: ICD-10-CM

## 2023-01-27 DIAGNOSIS — H01.004: ICD-10-CM

## 2023-01-27 PROCEDURE — 99213 OFFICE O/P EST LOW 20 MIN: CPT | Performed by: OPHTHALMOLOGY

## 2023-01-27 ASSESSMENT — KERATOMETRY
OS_AXISANGLE_DEGREES: 012
OS_K1POWER_DIOPTERS: 43.00
OD_K1POWER_DIOPTERS: 43.25
OD_AXISANGLE_DEGREES: 174
OD_K2POWER_DIOPTERS: 44.00
OS_K2POWER_DIOPTERS: 44.00

## 2023-01-27 ASSESSMENT — AXIALLENGTH_DERIVED
OS_AL: 23.6407
OD_AL: 23.306

## 2023-01-27 ASSESSMENT — CONFRONTATIONAL VISUAL FIELD TEST (CVF)
OD_FINDINGS: FULL
OS_FINDINGS: FULL

## 2023-01-27 ASSESSMENT — VISUAL ACUITY
OD_BCVA: 20/20
OS_BCVA: 20/25-1

## 2023-01-27 ASSESSMENT — REFRACTION_AUTOREFRACTION
OD_CYLINDER: -0.75
OS_AXIS: 105
OS_CYLINDER: -1.25
OS_SPHERE: +0.50
OD_SPHERE: +1.00
OD_AXIS: 081

## 2023-01-27 ASSESSMENT — SPHEQUIV_DERIVED
OS_SPHEQUIV: -0.125
OD_SPHEQUIV: 0.625

## 2023-01-27 ASSESSMENT — TONOMETRY
OS_IOP_MMHG: 12
OD_IOP_MMHG: 11

## 2023-01-27 ASSESSMENT — LID EXAM ASSESSMENTS
OS_BLEPHARITIS: LUL 3+
OD_BLEPHARITIS: RUL 3+

## 2023-02-14 ENCOUNTER — RX ONLY (RX ONLY)
Age: 88
End: 2023-02-14

## 2023-02-14 ENCOUNTER — OFFICE (OUTPATIENT)
Dept: URBAN - METROPOLITAN AREA CLINIC 115 | Facility: CLINIC | Age: 88
Setting detail: OPHTHALMOLOGY
End: 2023-02-14
Payer: MEDICARE

## 2023-02-14 DIAGNOSIS — H10.89: ICD-10-CM

## 2023-02-14 DIAGNOSIS — H01.001: ICD-10-CM

## 2023-02-14 DIAGNOSIS — H01.004: ICD-10-CM

## 2023-02-14 PROCEDURE — 99213 OFFICE O/P EST LOW 20 MIN: CPT | Performed by: OPHTHALMOLOGY

## 2023-02-14 ASSESSMENT — REFRACTION_AUTOREFRACTION
OD_AXIS: 081
OS_SPHERE: +0.50
OD_SPHERE: +1.00
OS_CYLINDER: -1.25
OS_AXIS: 105
OD_CYLINDER: -0.75

## 2023-02-14 ASSESSMENT — KERATOMETRY
OS_AXISANGLE_DEGREES: 012
OS_K1POWER_DIOPTERS: 43.00
OD_AXISANGLE_DEGREES: 174
OD_K2POWER_DIOPTERS: 44.00
OD_K1POWER_DIOPTERS: 43.25
OS_K2POWER_DIOPTERS: 44.00

## 2023-02-14 ASSESSMENT — LID EXAM ASSESSMENTS
OD_BLEPHARITIS: RUL 3+
OS_BLEPHARITIS: LUL 3+

## 2023-02-14 ASSESSMENT — SPHEQUIV_DERIVED
OS_SPHEQUIV: -0.125
OD_SPHEQUIV: 0.625

## 2023-02-14 ASSESSMENT — CONFRONTATIONAL VISUAL FIELD TEST (CVF)
OS_FINDINGS: FULL
OD_FINDINGS: FULL

## 2023-02-14 ASSESSMENT — VISUAL ACUITY
OD_BCVA: 20/30-2
OS_BCVA: 20/25

## 2023-02-14 ASSESSMENT — TONOMETRY
OS_IOP_MMHG: 12
OD_IOP_MMHG: 10

## 2023-02-14 ASSESSMENT — AXIALLENGTH_DERIVED
OS_AL: 23.6407
OD_AL: 23.306

## 2023-02-17 RX ORDER — ALBUTEROL SULFATE 2.5 MG/3ML
(2.5 MG/3ML) SOLUTION RESPIRATORY (INHALATION)
Qty: 1620 | Refills: 3 | Status: ACTIVE | COMMUNITY
Start: 2021-04-19 | End: 1900-01-01

## 2023-02-22 NOTE — PATIENT PROFILE ADULT - NSPROHMDIABETHBA1C_GEN_A_NUR
PATIENT DISCHARGED TO [facility]. VS WNL. TEACHING PROVIDED AND ANSWERED
QUESTIONS. EXPLAINED BOARD STATUS. BONDING WELL WITH . FBP POSTPARTUM
FOLLOW-UP APPT MADE. SPOUSE TO  OTC MEDICATIONS RECOMMENDED BY
PROVIDER. NO CONCERNS AT THIS TIME. unknown

## 2023-03-09 ENCOUNTER — NON-APPOINTMENT (OUTPATIENT)
Age: 88
End: 2023-03-09

## 2023-03-09 ENCOUNTER — APPOINTMENT (OUTPATIENT)
Dept: CARDIOLOGY | Facility: CLINIC | Age: 88
End: 2023-03-09
Payer: MEDICARE

## 2023-03-09 VITALS
DIASTOLIC BLOOD PRESSURE: 68 MMHG | HEIGHT: 64 IN | TEMPERATURE: 97.8 F | SYSTOLIC BLOOD PRESSURE: 122 MMHG | WEIGHT: 147 LBS | OXYGEN SATURATION: 95 % | HEART RATE: 65 BPM | BODY MASS INDEX: 25.1 KG/M2

## 2023-03-09 VITALS — SYSTOLIC BLOOD PRESSURE: 122 MMHG | DIASTOLIC BLOOD PRESSURE: 68 MMHG

## 2023-03-09 PROCEDURE — 99214 OFFICE O/P EST MOD 30 MIN: CPT

## 2023-03-09 RX ORDER — TORSEMIDE 20 MG/1
20 TABLET ORAL
Refills: 0 | Status: DISCONTINUED | COMMUNITY
End: 2023-03-09

## 2023-03-09 RX ORDER — METHYLPREDNISOLONE 4 MG/1
4 TABLET ORAL
Qty: 1 | Refills: 0 | Status: DISCONTINUED | COMMUNITY
Start: 2022-12-23 | End: 2023-03-09

## 2023-03-09 RX ORDER — TOBRAMYCIN AND DEXAMETHASONE 3; 1 MG/ML; MG/ML
0.3-0.1 SUSPENSION/ DROPS OPHTHALMIC
Refills: 0 | Status: DISCONTINUED | COMMUNITY
Start: 2022-06-19 | End: 2023-03-09

## 2023-03-09 RX ORDER — BUDESONIDE 0.5 MG/2ML
0.5 INHALANT ORAL
Qty: 540 | Refills: 1 | Status: DISCONTINUED | COMMUNITY
Start: 2019-12-19 | End: 2023-03-09

## 2023-03-09 NOTE — DISCUSSION/SUMMARY
[FreeTextEntry1] : 1. CHF- stable. \par 2. Afib- Not on AC. Frail and concern for falls. Discussed watchman. Now in sinus. No Aspirin either due to recent GI bleeding. Discussed risk of stroke with family. \par 3.Htn: stable. \par 4. Carotid arterial disease: previous CEA on right side. Left with significant plaque. Discussed with family. Plan for medical management.  ? of fatigue due to crestor, at family request, have been holding statin. \par Family knows risk regarding CVA. They will plan for red yeast rice.  \par 5. Volume overload: Stable. \par 6. Rectal mass- New diagnosis. Had a very lengthy discussion with family regarding goals of care. Patient high risk from cardiac perspective for any invasive abd/rectal surgeries considering his previous history of NSTEMI- medically managed. \par 7. + edema. Plan for 2 doses of metolazone in addition to torsemide. One friday and one monday, 30 minutes before torsemide. \par 8. Follow up in 4 months. \par \par

## 2023-03-09 NOTE — CARDIOLOGY SUMMARY
[de-identified] : 7/8/2022: SR 1st Degree AV block with PAC, nonspecific ST depression and negative T waves- consistent with prior EKG\par \par 10/2021- Sinus with lateral t wave inversions.

## 2023-03-09 NOTE — HISTORY OF PRESENT ILLNESS
[FreeTextEntry1] : Patient with history of ckd, dementia, afib, CHF, COPD. CEA on right carotid, Initially presented to [facility] and noted to have afib with RVR which was medically managed. Converted to sinus rhythm spontaneously. \par Noted to have mildly elevated troponins with normal ejection fraction. Due to his dementia, advanced age, ckd, medical management was suggested.\par \par 5/2021- Patient had sepsis, ? of secondary to gallstone. Underwent ERCP. Relative hypotension and fatigue. \par \par 10/2022- LE edema. More active with PT. \par \par 1/2023- Volume overloaded in the hospital. Now lost ~20 lbs. Getting PT. Rectal mass diagnosed. \par \par 3/2023- Goal weight 145. Undergoing radiation for rectal mass. Bring treated at [facility]. 20 mg bid torsemide for now.

## 2023-03-09 NOTE — PHYSICAL EXAM
[No Acute Distress] : no acute distress [No Carotid Bruit] : no carotid bruit [Normal S1, S2] : normal S1, S2 [No Murmur] : no murmur [Clear Lung Fields] : clear lung fields [No Respiratory Distress] : no respiratory distress  [Soft] : abdomen soft [Normal Bowel Sounds] : normal bowel sounds [Normal Radial B/L] : normal radial B/L [Normal PT B/L] : normal PT B/L [Moves all extremities] : moves all extremities [Person] : oriented to person [Place] : oriented to place [Time] : disoriented to time [de-identified] : fatigued [de-identified] : Deferred- wearing a mask  [de-identified] : using cane  [de-identified] : + edema.

## 2023-03-09 NOTE — REVIEW OF SYSTEMS
[Fever] : no fever [Feeling Fatigued] : feeling fatigued [SOB] : no shortness of breath [Dyspnea on exertion] : not dyspnea during exertion [Chest Discomfort] : no chest discomfort [Lower Ext Edema] : no extremity edema [Palpitations] : no palpitations [Orthopnea] : no orthopnea [PND] : no PND [Syncope] : no syncope [Abdominal Pain] : no abdominal pain [Cough] : no cough [Nausea] : no nausea [Vomiting] : no vomiting [Diarrhea] : diarrhea [Dizziness] : no dizziness [Weakness] : weakness [Memory Lapses Or Loss] : memory lapses or loss

## 2023-04-11 ENCOUNTER — OFFICE (OUTPATIENT)
Dept: URBAN - METROPOLITAN AREA CLINIC 115 | Facility: CLINIC | Age: 88
Setting detail: OPHTHALMOLOGY
End: 2023-04-11
Payer: MEDICARE

## 2023-04-11 DIAGNOSIS — H01.001: ICD-10-CM

## 2023-04-11 DIAGNOSIS — H01.004: ICD-10-CM

## 2023-04-11 PROCEDURE — 99024 POSTOP FOLLOW-UP VISIT: CPT | Performed by: OPHTHALMOLOGY

## 2023-04-11 ASSESSMENT — SPHEQUIV_DERIVED
OS_SPHEQUIV: -0.125
OD_SPHEQUIV: 0.625

## 2023-04-11 ASSESSMENT — REFRACTION_AUTOREFRACTION
OD_CYLINDER: -0.75
OS_AXIS: 105
OS_SPHERE: +0.50
OD_SPHERE: +1.00
OS_CYLINDER: -1.25
OD_AXIS: 081

## 2023-04-11 ASSESSMENT — CONFRONTATIONAL VISUAL FIELD TEST (CVF)
OS_FINDINGS: FULL
OD_FINDINGS: FULL

## 2023-04-11 ASSESSMENT — KERATOMETRY
OD_K2POWER_DIOPTERS: 44.00
OD_AXISANGLE_DEGREES: 174
OS_K1POWER_DIOPTERS: 43.00
OS_K2POWER_DIOPTERS: 44.00
OS_AXISANGLE_DEGREES: 012
OD_K1POWER_DIOPTERS: 43.25

## 2023-04-11 ASSESSMENT — VISUAL ACUITY
OD_BCVA: 20/30-2
OS_BCVA: 20/25

## 2023-04-11 ASSESSMENT — AXIALLENGTH_DERIVED
OS_AL: 23.6407
OD_AL: 23.306

## 2023-04-11 ASSESSMENT — LID EXAM ASSESSMENTS
OD_BLEPHARITIS: RUL 3+
OS_BLEPHARITIS: LUL 3+

## 2023-04-18 ENCOUNTER — OFFICE (OUTPATIENT)
Dept: URBAN - METROPOLITAN AREA CLINIC 94 | Facility: CLINIC | Age: 88
Setting detail: OPHTHALMOLOGY
End: 2023-04-18
Payer: MEDICARE

## 2023-04-18 DIAGNOSIS — E11.3211: ICD-10-CM

## 2023-04-18 DIAGNOSIS — E11.3292: ICD-10-CM

## 2023-04-18 PROCEDURE — 92012 INTRM OPH EXAM EST PATIENT: CPT | Performed by: OPHTHALMOLOGY

## 2023-04-18 PROCEDURE — 92134 CPTRZ OPH DX IMG PST SGM RTA: CPT | Performed by: OPHTHALMOLOGY

## 2023-04-18 ASSESSMENT — AXIALLENGTH_DERIVED
OD_AL: 23.306
OS_AL: 23.6407

## 2023-04-18 ASSESSMENT — VISUAL ACUITY
OS_BCVA: 20/20
OD_BCVA: 20/20

## 2023-04-18 ASSESSMENT — SPHEQUIV_DERIVED
OD_SPHEQUIV: 0.625
OS_SPHEQUIV: -0.125

## 2023-04-18 ASSESSMENT — LID EXAM ASSESSMENTS
OD_BLEPHARITIS: RUL 3+
OS_BLEPHARITIS: LUL 3+

## 2023-04-18 ASSESSMENT — REFRACTION_AUTOREFRACTION
OS_CYLINDER: -1.25
OD_AXIS: 081
OS_AXIS: 105
OS_SPHERE: +0.50
OD_SPHERE: +1.00
OD_CYLINDER: -0.75

## 2023-04-18 ASSESSMENT — KERATOMETRY
OD_K1POWER_DIOPTERS: 43.25
OD_K2POWER_DIOPTERS: 44.00
OD_AXISANGLE_DEGREES: 174
OS_K2POWER_DIOPTERS: 44.00
OS_K1POWER_DIOPTERS: 43.00
OS_AXISANGLE_DEGREES: 012

## 2023-04-18 ASSESSMENT — CONFRONTATIONAL VISUAL FIELD TEST (CVF)
OD_FINDINGS: FULL
OS_FINDINGS: FULL

## 2023-04-25 ENCOUNTER — APPOINTMENT (OUTPATIENT)
Dept: PULMONOLOGY | Facility: CLINIC | Age: 88
End: 2023-04-25

## 2023-05-05 ENCOUNTER — APPOINTMENT (OUTPATIENT)
Dept: PULMONOLOGY | Facility: CLINIC | Age: 88
End: 2023-05-05
Payer: MEDICARE

## 2023-05-05 VITALS
HEART RATE: 65 BPM | SYSTOLIC BLOOD PRESSURE: 120 MMHG | DIASTOLIC BLOOD PRESSURE: 70 MMHG | OXYGEN SATURATION: 95 % | RESPIRATION RATE: 16 BRPM

## 2023-05-05 PROCEDURE — 99214 OFFICE O/P EST MOD 30 MIN: CPT

## 2023-05-05 NOTE — DISCUSSION/SUMMARY
[FreeTextEntry1] : COPD Gold grade II, CT 7/22 stable round atelectasis from 4/21\par Mild exacerbation, cough with sputum clear/thick, no fever, chest pain\par Exam without bronchospasm, good sp02\par Recent CT abd /pelvis [place]- no change per family\par Low dose prednisone, they will watch glucose\par increase neb use 3-4 x daily for now, continue Anoro\par Doxycycline x 5 days\par 3 months with DK, to call /ER if any change in status

## 2023-05-05 NOTE — PHYSICAL EXAM
[No Acute Distress] : no acute distress [Normal Appearance] : normal appearance [Normal Rate/Rhythm] : normal rate/rhythm [Normal S1, S2] : normal s1, s2 [No Murmurs] : no murmurs [No Rubs] : no rubs [No Resp Distress] : no resp distress [No Acc Muscle Use] : no acc muscle use [Normal Rhythm and Effort] : normal rhythm and effort [Clear to Auscultation Bilaterally] : clear to auscultation bilaterally [Normal to Percussion] : normal to percussion [No Abnormalities] : no abnormalities [No Clubbing] : no clubbing [No Cyanosis] : no cyanosis [No Edema] : no edema [FROM] : FROM [Normal Color/ Pigmentation] : normal color/ pigmentation [No Focal Deficits] : no focal deficits [Normal Affect] : normal affect [TextBox_68] : moderate air entry bilat [TextBox_140] : dementia

## 2023-05-05 NOTE — CONSULT LETTER
[Dear  ___] : Dear  [unfilled], Geoff KAUR MD PGY2: 38 M no PMH here for low back pain x 1 week worsening x 2 days after lifting an airconditioner last week. Woke up at 4 am with terrible pain, patient thinks he fell without LOC. According to patient's wife, he was slightly confused when she encountered him in the bathroom after she heard the fall. Had an episode of syncope this morning associated with confusion  . No loss of sensation in lower extremities, no urinary or bowel incontinence, no saddle anesthesia. No IVDU. No fevers, chills. [Consult Letter:] : I had the pleasure of evaluating your patient, [unfilled]. [Please see my note below.] : Please see my note below. [Sincerely,] : Sincerely, [FreeTextEntry3] : Mauro Russell DO [facility]\par Pulmonary Critical Care\par Director Pulmonary Division\par Medical Director Respiratory Therapy\par [facility]\par \par

## 2023-05-05 NOTE — HISTORY OF PRESENT ILLNESS
[Former] : former [TextBox_4] : worsening cough and sputum\par no cp or change in dyspnea\par uses anoro daily, albuterol neb bid and prn\par Pt has Dementia, here with wife and daughter\par He offers no complaints\par They do not appreciate any wheezing\par in wheelchair

## 2023-05-23 ENCOUNTER — OFFICE (OUTPATIENT)
Dept: URBAN - METROPOLITAN AREA CLINIC 112 | Facility: CLINIC | Age: 88
Setting detail: OPHTHALMOLOGY
End: 2023-05-23
Payer: MEDICARE

## 2023-05-23 DIAGNOSIS — H01.004: ICD-10-CM

## 2023-05-23 DIAGNOSIS — H01.001: ICD-10-CM

## 2023-05-23 PROCEDURE — 92012 INTRM OPH EXAM EST PATIENT: CPT | Performed by: OPHTHALMOLOGY

## 2023-05-23 ASSESSMENT — LID EXAM ASSESSMENTS
OD_BLEPHARITIS: RUL 3+
OS_BLEPHARITIS: LUL 3+

## 2023-05-23 ASSESSMENT — REFRACTION_AUTOREFRACTION
OD_CYLINDER: -0.75
OS_SPHERE: +0.50
OS_CYLINDER: -1.25
OD_SPHERE: +1.00
OS_AXIS: 105
OD_AXIS: 081

## 2023-05-23 ASSESSMENT — KERATOMETRY
OD_K2POWER_DIOPTERS: 44.00
OS_K2POWER_DIOPTERS: 44.00
OD_K1POWER_DIOPTERS: 43.25
OS_K1POWER_DIOPTERS: 43.00
OS_AXISANGLE_DEGREES: 012
OD_AXISANGLE_DEGREES: 174

## 2023-05-23 ASSESSMENT — VISUAL ACUITY
OS_BCVA: 20/20
OD_BCVA: 20/20

## 2023-05-23 ASSESSMENT — SPHEQUIV_DERIVED
OD_SPHEQUIV: 0.625
OS_SPHEQUIV: -0.125

## 2023-05-23 ASSESSMENT — AXIALLENGTH_DERIVED
OD_AL: 23.306
OS_AL: 23.6407

## 2023-05-27 ENCOUNTER — EMERGENCY (EMERGENCY)
Facility: HOSPITAL | Age: 88
LOS: 1 days | Discharge: HOME | End: 2023-05-27
Attending: EMERGENCY MEDICINE
Payer: MEDICARE

## 2023-05-27 VITALS
SYSTOLIC BLOOD PRESSURE: 106 MMHG | WEIGHT: 136.91 LBS | RESPIRATION RATE: 18 BRPM | DIASTOLIC BLOOD PRESSURE: 61 MMHG | TEMPERATURE: 98 F | HEART RATE: 84 BPM | OXYGEN SATURATION: 98 %

## 2023-05-27 DIAGNOSIS — Z98.890 OTHER SPECIFIED POSTPROCEDURAL STATES: Chronic | ICD-10-CM

## 2023-05-27 PROCEDURE — 99284 EMERGENCY DEPT VISIT MOD MDM: CPT

## 2023-05-27 PROCEDURE — 72125 CT NECK SPINE W/O DYE: CPT | Mod: MA

## 2023-05-27 PROCEDURE — 99285 EMERGENCY DEPT VISIT HI MDM: CPT | Mod: 25

## 2023-05-27 PROCEDURE — 70450 CT HEAD/BRAIN W/O DYE: CPT | Mod: MA

## 2023-05-27 NOTE — ED PROVIDER NOTE - CLINICAL SUMMARY MEDICAL DECISION MAKING FREE TEXT BOX
89 yo M with dementia p/w  unwitnessed fall. DNR/DNI, family declined blood work, will get CT head and cervical spine

## 2023-05-27 NOTE — ED PROVIDER NOTE - OBJECTIVE STATEMENT
89 yo M hx of dementia, CKD stage 4, chronic diastolic CHF, diabetes mellitus type 2, COPD and gastritis p/w unwitnessed fall around 3 pm today. no loc. no nausea or vomiting. no AC use. patient had left periorbital ecchymosis. patient is DNR/DNI. family doesn't want any blood work, just CT scan.

## 2023-05-27 NOTE — ED PROVIDER NOTE - PATIENT PORTAL LINK FT
You can access the FollowMyHealth Patient Portal offered by [facility] by registering at the following website: [email or web address]. By joining [email or web address]’s FollowMyHealth portal, you will also be able to view your health information using other applications (apps) compatible with our system.

## 2023-05-27 NOTE — ED ADULT NURSE NOTE - NSFALLHARMRISKINTERV_ED_ALL_ED
Assistance OOB with selected safe patient handling equipment if applicable/Communicate risk of Fall with Harm to all staff, patient, and family/Provide visual cue: red socks, yellow wristband, yellow gown, etc/Reinforce activity limits and safety measures with patient and family/Bed in lowest position, wheels locked, appropriate side rails in place/Call bell, personal items and telephone in reach/Instruct patient to call for assistance before getting out of bed/chair/stretcher/Non-slip footwear applied when patient is off stretcher/[place] to call system/Physically safe environment - no spills, clutter or unnecessary equipment/Purposeful Proactive Rounding/Room/bathroom lighting operational, light cord in reach

## 2023-05-27 NOTE — ED ADULT NURSE NOTE - OBJECTIVE STATEMENT
Pt presents to ED s/p fall. As per family at bedside, pt fell and hit his head, pt with bruising to left side of face. Denies blood thinner use, LOC, dizziness, numbness, or tingling. PMH Dementia. Pt awaiting CT scan at this time. Family educated on plan of care and expresses understanding.

## 2023-05-27 NOTE — ED ADULT TRIAGE NOTE - WEIGHT METHOD
July 1, 2019      Sanaz Cam MD  [address] [address]           [facility] - Pre Op Consult  [address] [address]  Phone: [phone]          Patient: Aguilar Waldrop   MR Number: [id number]   YOB: 1947   Date of Visit: 7/1/2019       Dear Dr. Sanaz Cam:    Thank you for referring Aguilar Waldrop to me for evaluation. Attached you will find relevant portions of my assessment and plan of care.    If you have questions, please do not hesitate to call me. I look forward to following Aguilar Waldrop along with you.    Sincerely,    Sharon Sneed MD    Enclosure  CC:  MD Leonel Alvarez MD    If you would like to receive this communication electronically, please contact [email or web address] or [phone] to request more information on [employer] access.    For providers and/or their staff who would like to refer a patient to [facility], please contact us through our one-stop-shop provider referral line, [facility], at [phone].    If you feel you have received this communication in error or would no longer like to receive these types of communications, please e-mail [email or web address]         
stated

## 2023-05-28 VITALS
TEMPERATURE: 97 F | DIASTOLIC BLOOD PRESSURE: 63 MMHG | SYSTOLIC BLOOD PRESSURE: 101 MMHG | RESPIRATION RATE: 17 BRPM | HEART RATE: 72 BPM | OXYGEN SATURATION: 96 %

## 2023-05-28 PROCEDURE — 72125 CT NECK SPINE W/O DYE: CPT | Mod: 26,MA

## 2023-05-28 PROCEDURE — 70450 CT HEAD/BRAIN W/O DYE: CPT | Mod: 26,MA

## 2023-06-20 ENCOUNTER — OFFICE (OUTPATIENT)
Dept: URBAN - METROPOLITAN AREA CLINIC 94 | Facility: CLINIC | Age: 88
Setting detail: OPHTHALMOLOGY
End: 2023-06-20
Payer: MEDICARE

## 2023-06-20 DIAGNOSIS — H01.001: ICD-10-CM

## 2023-06-20 DIAGNOSIS — H01.004: ICD-10-CM

## 2023-06-20 PROCEDURE — 99213 OFFICE O/P EST LOW 20 MIN: CPT | Performed by: OPHTHALMOLOGY

## 2023-06-20 ASSESSMENT — AXIALLENGTH_DERIVED
OS_AL: 23.6407
OD_AL: 23.306

## 2023-06-20 ASSESSMENT — CONFRONTATIONAL VISUAL FIELD TEST (CVF)
OD_FINDINGS: FULL
OS_FINDINGS: FULL

## 2023-06-20 ASSESSMENT — KERATOMETRY
OD_AXISANGLE_DEGREES: 174
OS_K1POWER_DIOPTERS: 43.00
OS_K2POWER_DIOPTERS: 44.00
OS_AXISANGLE_DEGREES: 012
OD_K2POWER_DIOPTERS: 44.00
OD_K1POWER_DIOPTERS: 43.25

## 2023-06-20 ASSESSMENT — REFRACTION_AUTOREFRACTION
OD_CYLINDER: -0.75
OS_AXIS: 105
OD_AXIS: 081
OS_CYLINDER: -1.25
OS_SPHERE: +0.50
OD_SPHERE: +1.00

## 2023-06-20 ASSESSMENT — TONOMETRY
OD_IOP_MMHG: 10
OS_IOP_MMHG: 11

## 2023-06-20 ASSESSMENT — VISUAL ACUITY
OS_BCVA: 20/20-1
OD_BCVA: 20/20-1

## 2023-06-20 ASSESSMENT — LID EXAM ASSESSMENTS
OD_BLEPHARITIS: RUL 1+
OS_BLEPHARITIS: LUL 1+

## 2023-06-20 ASSESSMENT — SPHEQUIV_DERIVED
OD_SPHEQUIV: 0.625
OS_SPHEQUIV: -0.125

## 2023-06-21 ENCOUNTER — APPOINTMENT (OUTPATIENT)
Dept: PULMONOLOGY | Facility: CLINIC | Age: 88
End: 2023-06-21
Payer: MEDICARE

## 2023-06-21 VITALS
OXYGEN SATURATION: 93 % | DIASTOLIC BLOOD PRESSURE: 64 MMHG | SYSTOLIC BLOOD PRESSURE: 128 MMHG | RESPIRATION RATE: 14 BRPM | BODY MASS INDEX: 25.44 KG/M2 | HEIGHT: 64 IN | HEART RATE: 64 BPM | WEIGHT: 149 LBS

## 2023-06-21 PROCEDURE — 99214 OFFICE O/P EST MOD 30 MIN: CPT

## 2023-06-21 NOTE — DISCUSSION/SUMMARY
[FreeTextEntry1] : \par #1. Pt with COPD Gold class II based on old PFTs, complicated by Dementia, A fib, HFpEF\par #2. Post admission [facility] for biliary sepsis, post ERCP, sphincterotomy and stone extraction\par #3. Prior echocardiogram 4/21 normal LV no pH\par #4. Diurese as tolerates for LE edema and mild rales on exam\par #5. Tolerating Anoro daily and nebulizer up to 2-3 x daily prn, dyspnea near baseline but with congested cough so will complete course of Zithromax as given to him by  and will add Medrol dose mili for congestion possibly related to inflammation\par #6. Recent CT scan 7/26/22 and PET CT from 5/2023 with stable small eff/round atelectasis x yrs; repeat as needed; last CXR also with stable small b/l effusions\par #7. Renal and cardiology f/u for LE edema and adjustment of diuretic therapy\par #8. Renal f/u for CRI\par #9. Pt had both Covid vaccines \par #10. Unable to perform lung function testing - limited by Dementia\par #11. F/u in 4 months\par \par The patient expressed understanding and agreement with the above recommendations/plan and accepts responsibility to be compliant with recommended testing, therapies, and f/u visits.\par All relevant questions and concerns were addressed. \par Discussed above with patient and wife and daughter who were also present.

## 2023-06-21 NOTE — CONSULT LETTER
[Dear  ___] : Dear  [unfilled], [Consult Letter:] : I had the pleasure of evaluating your patient, [unfilled]. [Please see my note below.] : Please see my note below. [Sincerely,] : Sincerely, [FreeTextEntry3] : Jayesh Rivera MD, FCCP, D. ABSM\par Pulmonary and Sleep Medicine\par [facility] Pulmonary and Sleep Medicine at [place]

## 2023-06-21 NOTE — REASON FOR VISIT
[Abnormal CXR/ Chest CT] : an abnormal CXR/ chest CT [COPD] : COPD [Shortness of Breath] : shortness of breath [Pulmonary Nodules] : pulmonary nodules [Follow-Up] : a follow-up visit

## 2023-06-30 NOTE — HISTORY OF PRESENT ILLNESS
[de-identified] : atient with a few week history of right-sided abdominal discomfortmore to the midabdomen with some occasional nausea and vomiting x2. He also has some issues of constipation. He's had no obvious bleeding black stools or bloody stools or diarrhea although his daughter states that he's had a GI bleed in the past. Patientdenies any complaints at all but he does have dementia. He had a CT scan of couple years ago which showed an insignificant pancreatic cysts.
Never

## 2023-07-18 ENCOUNTER — APPOINTMENT (OUTPATIENT)
Dept: CARDIOLOGY | Facility: CLINIC | Age: 88
End: 2023-07-18
Payer: MEDICARE

## 2023-07-18 ENCOUNTER — NON-APPOINTMENT (OUTPATIENT)
Age: 88
End: 2023-07-18

## 2023-07-18 VITALS
OXYGEN SATURATION: 95 % | WEIGHT: 148 LBS | TEMPERATURE: 97.9 F | HEART RATE: 77 BPM | SYSTOLIC BLOOD PRESSURE: 118 MMHG | HEIGHT: 64 IN | DIASTOLIC BLOOD PRESSURE: 76 MMHG | BODY MASS INDEX: 25.27 KG/M2

## 2023-07-18 PROCEDURE — 93000 ELECTROCARDIOGRAM COMPLETE: CPT

## 2023-07-18 PROCEDURE — 99214 OFFICE O/P EST MOD 30 MIN: CPT

## 2023-07-18 RX ORDER — PREDNISONE 10 MG/1
10 TABLET ORAL
Qty: 10 | Refills: 0 | Status: DISCONTINUED | COMMUNITY
Start: 2023-05-05 | End: 2023-07-18

## 2023-07-18 RX ORDER — OMEPRAZOLE 40 MG/1
40 CAPSULE, DELAYED RELEASE ORAL TWICE DAILY
Refills: 0 | Status: ACTIVE | COMMUNITY

## 2023-07-18 RX ORDER — OMEPRAZOLE 40 MG/1
40 CAPSULE, DELAYED RELEASE ORAL
Qty: 90 | Refills: 1 | Status: DISCONTINUED | COMMUNITY
Start: 2020-07-23 | End: 2023-07-18

## 2023-07-18 NOTE — HISTORY OF PRESENT ILLNESS
[FreeTextEntry1] : Patient with history of ckd, dementia, afib, CHF, COPD. CEA on right carotid, Initially presented to [facility] and noted to have afib with RVR which was medically managed. Converted to sinus rhythm spontaneously. \par Noted to have mildly elevated troponins with normal ejection fraction. Due to his dementia, advanced age, ckd, medical management was suggested.\par \par 5/2021- Patient had sepsis, ? of secondary to gallstone. Underwent ERCP. Relative hypotension and fatigue. \par \par 1/2023- Volume overloaded in the hospital. Now lost ~20 lbs. Getting PT. Rectal mass diagnosed. \par \par 3/2023- Goal weight 145. Undergoing radiation for rectal mass. Bring treated at [facility]. 20 mg bid torsemide for now. \par \par 7/2023- Has had MRCP then ERCP. CA- rectal mass decreased in size.

## 2023-07-18 NOTE — DISCUSSION/SUMMARY
Observation notice provided in writing to patient and/or caregiver as well as verbal explanation of the policy. Patients who are in outpatient status also receive the Observation notice. Patient has received notice and or patient representative has received via secure email, fax, or certified mail based on patient representative's preference.    MYNOR Evans [FreeTextEntry1] : 1. CHF- stable. \par 2. Afib- Not on AC. Frail and concern for falls. Discussed watchman. Now in sinus. No Aspirin either due to recent GI bleeding. Discussed risk of stroke with family. \par 3.Htn: stable. \par 4. Carotid arterial disease: previous CEA on right side. Left with significant plaque. Discussed with family. Plan for medical management.  ? of fatigue due to crestor, at family request, have been holding statin. \par Family knows risk regarding CVA. \par 5. Volume overload: Stable. \par 6. Rectal mass- New diagnosis. Had a very lengthy discussion with family regarding goals of care. Patient high risk from cardiac perspective for any invasive abd/rectal surgeries considering his previous history of NSTEMI- medically managed. Undergoing radiation. \par 7. + edema. metalozone prn. \par 8. Follow up in 5-6 months. \par \par  [EKG obtained to assist in diagnosis and management of assessed problem(s)] : EKG obtained to assist in diagnosis and management of assessed problem(s)

## 2023-07-18 NOTE — CARDIOLOGY SUMMARY
[de-identified] : 7/8/2022: SR 1st Degree AV block with PAC, nonspecific ST depression and negative T waves- consistent with prior EKG\par \par 10/2021- Sinus with lateral t wave inversions.

## 2023-07-18 NOTE — REVIEW OF SYSTEMS
[Fever] : no fever [Feeling Fatigued] : feeling fatigued [SOB] : no shortness of breath [Dyspnea on exertion] : not dyspnea during exertion [Chest Discomfort] : no chest discomfort [Lower Ext Edema] : no extremity edema [Palpitations] : no palpitations [Orthopnea] : no orthopnea [PND] : no PND [Syncope] : no syncope [Cough] : no cough [Abdominal Pain] : no abdominal pain [Nausea] : no nausea [Vomiting] : no vomiting [Diarrhea] : diarrhea [Dizziness] : no dizziness [Weakness] : weakness [Memory Lapses Or Loss] : memory lapses or loss

## 2023-07-18 NOTE — PHYSICAL EXAM
[No Acute Distress] : no acute distress [No Carotid Bruit] : no carotid bruit [Normal S1, S2] : normal S1, S2 [No Murmur] : no murmur [Clear Lung Fields] : clear lung fields [No Respiratory Distress] : no respiratory distress  [Soft] : abdomen soft [Normal Bowel Sounds] : normal bowel sounds [Normal Radial B/L] : normal radial B/L [Normal PT B/L] : normal PT B/L [Moves all extremities] : moves all extremities [Person] : oriented to person [Place] : oriented to place [Time] : disoriented to time [de-identified] : fatigued [de-identified] : Deferred- wearing a mask  [de-identified] : using cane  [de-identified] : + edema.

## 2023-08-17 ENCOUNTER — APPOINTMENT (OUTPATIENT)
Dept: DERMATOLOGY | Facility: CLINIC | Age: 88
End: 2023-08-17
Payer: MEDICARE

## 2023-08-17 PROCEDURE — 99213 OFFICE O/P EST LOW 20 MIN: CPT

## 2023-09-11 ENCOUNTER — Encounter (OUTPATIENT)
Dept: URBAN - METROPOLITAN AREA CLINIC 94 | Facility: CLINIC | Age: 88
Setting detail: OPHTHALMOLOGY
End: 2023-09-11
Payer: MEDICARE

## 2023-09-23 NOTE — PHYSICAL EXAM
100 [No Acute Distress] : no acute distress [Normal Appearance] : normal appearance [Normal Rate/Rhythm] : normal rate/rhythm [Normal S1, S2] : normal s1, s2 [No Murmurs] : no murmurs [No Rubs] : no rubs [No Gallops] : no gallops [No Abnormalities] : no abnormalities [Normal Gait] : normal gait [No Clubbing] : no clubbing [No Cyanosis] : no cyanosis [No Edema] : no edema [FROM] : FROM [Normal Color/ Pigmentation] : normal color/ pigmentation [No Focal Deficits] : no focal deficits

## 2023-10-04 ENCOUNTER — APPOINTMENT (OUTPATIENT)
Dept: DERMATOLOGY | Facility: CLINIC | Age: 88
End: 2023-10-04
Payer: MEDICARE

## 2023-10-04 PROCEDURE — 99214 OFFICE O/P EST MOD 30 MIN: CPT

## 2023-10-12 ENCOUNTER — APPOINTMENT (OUTPATIENT)
Dept: PULMONOLOGY | Facility: CLINIC | Age: 88
End: 2023-10-12
Payer: MEDICARE

## 2023-10-12 VITALS
DIASTOLIC BLOOD PRESSURE: 70 MMHG | OXYGEN SATURATION: 97 % | SYSTOLIC BLOOD PRESSURE: 120 MMHG | HEART RATE: 77 BPM | RESPIRATION RATE: 16 BRPM

## 2023-10-12 VITALS — WEIGHT: 148 LBS | BODY MASS INDEX: 25.4 KG/M2

## 2023-10-12 DIAGNOSIS — Z85.048 PERSONAL HISTORY OF OTHER MALIGNANT NEOPLASM OF RECTUM, RECTOSIGMOID JUNCTION, AND ANUS: ICD-10-CM

## 2023-10-12 PROCEDURE — 99214 OFFICE O/P EST MOD 30 MIN: CPT

## 2023-10-16 ENCOUNTER — APPOINTMENT (OUTPATIENT)
Dept: DERMATOLOGY | Facility: CLINIC | Age: 88
End: 2023-10-16
Payer: MEDICARE

## 2023-10-16 PROCEDURE — 99213 OFFICE O/P EST LOW 20 MIN: CPT

## 2023-10-19 RX ORDER — ALBUTEROL SULFATE 90 UG/1
108 (90 BASE) INHALANT RESPIRATORY (INHALATION)
Qty: 3 | Refills: 1 | Status: ACTIVE | COMMUNITY
Start: 2023-10-19 | End: 1900-01-01

## 2023-10-31 NOTE — ED ADULT TRIAGE NOTE - NSTRIAGECARE_GEN_A_ER
Care Transitions Follow Up Call      Patient: Rodolfo Davis  Patient : 1926   MRN: [id number]  Reason for Admission: 6 days (DA from [facility] office) -> acute CHF, junctional bradycardia, chronic A Fib on Xarelto, elevated troponin, hx CAD, emphysema-no AE, urinary incontinence, GERD, Alzheimer's dementia -> home with [facility], AM-PAC 17, refused SNF, completed TCM 10/24  Discharge Date: 10/22/23 RARS: Readmission Risk Score: 10.5    CTN unable to reach or leave message at this time.      Follow Up  Future Appointments   Date Time Provider [address]   2023 11:00 AM TYLER Coates - CNP P CLER CAR [facility]   2024 12:00 PM TYLER Barraza - [address]     Lucie Rodarte RN  Care Transition Nurse  [phone] mobile Face Mask

## 2023-11-01 ENCOUNTER — APPOINTMENT (OUTPATIENT)
Dept: DERMATOLOGY | Facility: CLINIC | Age: 88
End: 2023-11-01
Payer: MEDICARE

## 2023-11-01 PROCEDURE — 99213 OFFICE O/P EST LOW 20 MIN: CPT

## 2023-11-21 ENCOUNTER — APPOINTMENT (OUTPATIENT)
Dept: CARDIOLOGY | Facility: CLINIC | Age: 88
End: 2023-11-21
Payer: MEDICARE

## 2023-11-21 ENCOUNTER — NON-APPOINTMENT (OUTPATIENT)
Age: 88
End: 2023-11-21

## 2023-11-21 VITALS — HEART RATE: 80 BPM | HEIGHT: 64 IN | WEIGHT: 150 LBS | BODY MASS INDEX: 25.61 KG/M2 | OXYGEN SATURATION: 93 %

## 2023-11-21 PROCEDURE — 93000 ELECTROCARDIOGRAM COMPLETE: CPT

## 2023-11-21 PROCEDURE — 99213 OFFICE O/P EST LOW 20 MIN: CPT

## 2023-11-21 RX ORDER — METOCLOPRAMIDE HYDROCHLORIDE 5 MG/1
5 TABLET, ORALLY DISINTEGRATING ORAL EVERY 8 HOURS
Refills: 0 | Status: DISCONTINUED | COMMUNITY
End: 2023-11-21

## 2023-11-21 RX ORDER — BERBERINE CHLOR/SEAWEED/CHROM 500-250 MG
CAPSULE ORAL DAILY
Refills: 0 | Status: DISCONTINUED | COMMUNITY
End: 2023-11-21

## 2023-11-21 RX ORDER — DOXYCYCLINE HYCLATE 100 MG/1
100 CAPSULE ORAL
Qty: 10 | Refills: 3 | Status: DISCONTINUED | COMMUNITY
Start: 2023-05-05 | End: 2023-11-21

## 2023-12-07 ENCOUNTER — APPOINTMENT (OUTPATIENT)
Dept: PULMONOLOGY | Facility: CLINIC | Age: 88
End: 2023-12-07
Payer: MEDICARE

## 2023-12-07 VITALS
OXYGEN SATURATION: 95 % | HEART RATE: 80 BPM | SYSTOLIC BLOOD PRESSURE: 106 MMHG | RESPIRATION RATE: 16 BRPM | HEIGHT: 64 IN | BODY MASS INDEX: 26.98 KG/M2 | DIASTOLIC BLOOD PRESSURE: 62 MMHG | WEIGHT: 158 LBS

## 2023-12-07 PROCEDURE — 99214 OFFICE O/P EST MOD 30 MIN: CPT

## 2024-01-09 NOTE — ED ADULT NURSE NOTE - AS SC BRADEN MOBILITY
(3) slightly limited Plan: follow up 3 months, then annually PRN Initiate Treatment: Tretinoin 0.05% full face/QHS\\nSA wash BID; Daily UV protection Render In Strict Bullet Format?: No Detail Level: Zone

## 2024-01-11 ENCOUNTER — APPOINTMENT (OUTPATIENT)
Dept: PULMONOLOGY | Facility: CLINIC | Age: 89
End: 2024-01-11
Payer: MEDICARE

## 2024-01-11 VITALS
WEIGHT: 155 LBS | RESPIRATION RATE: 16 BRPM | BODY MASS INDEX: 26.46 KG/M2 | HEART RATE: 73 BPM | OXYGEN SATURATION: 93 % | HEIGHT: 64 IN | SYSTOLIC BLOOD PRESSURE: 125 MMHG | DIASTOLIC BLOOD PRESSURE: 70 MMHG

## 2024-01-11 PROCEDURE — 99214 OFFICE O/P EST MOD 30 MIN: CPT

## 2024-01-11 RX ORDER — INSULIN GLARGINE 300 U/ML
300 INJECTION, SOLUTION SUBCUTANEOUS DAILY
Refills: 0 | Status: ACTIVE | COMMUNITY

## 2024-01-11 NOTE — HISTORY OF PRESENT ILLNESS
[Former] : former [>= 20 pack years] : >= 20 pack years [Follow-Up - Routine Clinic] : a routine clinic follow-up of [Dyspnea on Exertion] : dyspnea on exertion [Currently Experiencing] : The patient is currently experiencing symptoms. [Short-Acting Beta Agonists] : short-acting beta agonists [Long-Acting Beta Agonists] : long-acting beta agonists [Anticholinergics (Inhalation)] : inhaled anticholinergics [Good Compliance] : good compliance with treatment [Good Tolerance] : good tolerance of treatment [Good Symptom Control] : good symptom control [On ___] : performed on [unfilled] [Patient] : the patient [Indication ___] : for an indication of [unfilled] [None] : no new symptoms reported [TextBox_4] : Former 50 pack yr smoker, quit 1992; prior [employer] exposure. Poor historian due to dementia Pt s/p lung surgery On Anoro daily and Albuterol nebulizer BID for COPD On Mucinex as needed Abnormal CT in 2018 but last CT with stable changes and persistent small effusions; repeat as needed. Pt previously with + PCR for Covid infection and was treated with Paxlovid. He is back to baseline.  Daughter reports recent hospitalization for rectal bleeding and w/u revealed rectal cancer s/p brief XRT and now not on therapy. On diuretic therapy per renal and cardiology depending on weight. Presents with increased congestion and cough. Diuretics increased but concern for inflammation. Pt on Albuterol nebulizer 2-3 x daily though reportedly with improvement in symptoms with prednisone. Following with cardiology for CHF and LE edema.  [FreeTextEntry9] : Chest CT [FreeTextEntry8] : Bibasilar rounded atelectasis, small effusions, biapical scarring and emphysema [TextEntry] : Chest CT from 7/26/22 revealed essentially stable emphysematous changes with persistent small R>L effusions vs pleural thickening given small calcifications noted as well as bibasilar rounded atelectasis. PET CT from 5/18/23 revealed stable interlobular septal thickening with mild PVC and stable bibasilar opacities/consolidations with low FDG uptake with small effusions thought to be due to rounded atelectasis per report.

## 2024-01-11 NOTE — DISCUSSION/SUMMARY
[FreeTextEntry1] : #1. Pt with COPD Gold class II based on old PFTs, complicated by Dementia, A fib, HFpEF #2. Post admission [facility] for biliary sepsis, post ERCP, sphincterotomy and stone extraction. Pt s/p XRT for rectal cancer. #3. Prior echocardiogram 4/21 normal LV no pH #4. Diurese as tolerates for LE edema and mild rales on exam. #5. Reportedly tolerating Anoro daily and nebulizer up to 2-3 x daily prn, dyspnea near baseline. Will try Duoneb 4 x daily as unclear how much Anoro he is actually getting given dementia and limited cooperation. #6. Prior CT scan 7/26/22 and PET CT from 5/2023 with stable small eff/round atelectasis x yrs; repeat as needed; last CXR also with stable small b/l effusions. #7. Renal and cardiology f/u for LE edema and adjustment of diuretic therapy as needed. #8. Renal f/u for CRI. #9. Pt had both Covid vaccines. #10. Unable to perform lung function testing - limited by dementia. #11. Zyrtec for allergies and Flonase for PNDS as these conditions may also be contributing to cough. #12. Trial of prednisone taper again. If improved again, consider adding Pulmicort respules or changing Anoro to Trelegy. #13. F/u in 6 weeks after prednisone taper and trial of Duoneb.  The patient expressed understanding and agreement with the above recommendations/plan and accepts responsibility to be compliant with recommended testing, therapies, and f/u visits. All relevant questions and concerns were addressed.  Discussed above with patient and wife and daughter who were also present.

## 2024-01-11 NOTE — RESULTS/DATA
[TextEntry] : Imaging as above. Echo from 4/8/21 revealed a normal EF without significant valvular abnormalities. CXR from 12/23/22 revealed stable small b/l effusions. CXR from 1/1/23 again with stable changes/effusions.

## 2024-01-11 NOTE — END OF VISIT
[Time Spent: ___ minutes] : I have spent [unfilled] minutes of time on the encounter. [TextEntry] :  Discussed with pt at length regarding COPD, cough, soboe, smoking hx, abnormal CT; reviewed w/u with pt, daughter, and wife as above.

## 2024-01-11 NOTE — PHYSICAL EXAM
[No Acute Distress] : no acute distress [Normal Appearance] : normal appearance [Normal Rate/Rhythm] : normal rate/rhythm [Normal S1, S2] : normal s1, s2 [No Murmurs] : no murmurs [No Resp Distress] : no resp distress [No Acc Muscle Use] : no acc muscle use [Normal Rhythm and Effort] : normal rhythm and effort [Clear to Auscultation Bilaterally] : clear to auscultation bilaterally [No Abnormalities] : no abnormalities [Benign] : benign [Not Tender] : not tender [Soft] : soft [Normal Gait] : normal gait [No Clubbing] : no clubbing [No Cyanosis] : no cyanosis [2+ Pitting] : 2+ pitting [Normal Color/ Pigmentation] : normal color/ pigmentation [No Focal Deficits] : no focal deficits [TextBox_68] : Few basilar crackles with mild rhonchi

## 2024-01-11 NOTE — CONSULT LETTER
[Dear  ___] : Dear  [unfilled], [Consult Letter:] : I had the pleasure of evaluating your patient, [unfilled]. [Please see my note below.] : Please see my note below. [Sincerely,] : Sincerely, [FreeTextEntry3] : Jayesh Rivera MD, FCCP, D. ABSM\par  Pulmonary and Sleep Medicine\par  [facility] at [place]

## 2024-01-26 ENCOUNTER — OFFICE (OUTPATIENT)
Dept: URBAN - METROPOLITAN AREA CLINIC 94 | Facility: CLINIC | Age: 89
Setting detail: OPHTHALMOLOGY
End: 2024-01-26
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
## 2024-01-26 PROCEDURE — NO SHOW FE NO SHOW FEE: Performed by: OPHTHALMOLOGY

## 2024-02-06 ENCOUNTER — APPOINTMENT (OUTPATIENT)
Dept: CARDIOLOGY | Facility: CLINIC | Age: 89
End: 2024-02-06
Payer: MEDICARE

## 2024-02-06 ENCOUNTER — NON-APPOINTMENT (OUTPATIENT)
Age: 89
End: 2024-02-06

## 2024-02-06 VITALS
DIASTOLIC BLOOD PRESSURE: 70 MMHG | BODY MASS INDEX: 25.78 KG/M2 | WEIGHT: 151 LBS | HEIGHT: 64 IN | SYSTOLIC BLOOD PRESSURE: 134 MMHG | OXYGEN SATURATION: 94 % | HEART RATE: 86 BPM

## 2024-02-06 DIAGNOSIS — I77.9 DISORDER OF ARTERIES AND ARTERIOLES, UNSPECIFIED: ICD-10-CM

## 2024-02-06 PROCEDURE — 99214 OFFICE O/P EST MOD 30 MIN: CPT

## 2024-02-06 PROCEDURE — 93000 ELECTROCARDIOGRAM COMPLETE: CPT

## 2024-02-06 NOTE — HISTORY OF PRESENT ILLNESS
[FreeTextEntry1] : Patient with history of ckd, dementia, afib, CHF, COPD. CEA on right carotid, Initially presented to [facility] and noted to have afib with RVR which was medically managed. Converted to sinus rhythm spontaneously.  Noted to have mildly elevated troponins with normal ejection fraction. Due to his dementia, advanced age, ckd, medical management was suggested.  5/2021- Patient had sepsis, ? of secondary to gallstone. Underwent ERCP. Relative hypotension and fatigue.   1/2023- Volume overloaded in the hospital. Now lost ~20 lbs. Getting PT. Rectal mass diagnosed.   3/2023- Goal weight 145. Undergoing radiation for rectal mass. Bring treated at [facility]. 20 mg bid torsemide for now.   7/2023- Has had MRCP then ERCP. CA- rectal mass decreased in size.   11/2023- Hypotension. Holding torsemide.   2/2024- Stable..

## 2024-02-06 NOTE — DISCUSSION/SUMMARY
[EKG obtained to assist in diagnosis and management of assessed problem(s)] : EKG obtained to assist in diagnosis and management of assessed problem(s) [FreeTextEntry1] : 1. CHF- stable.  2. Afib- Not on AC. Frail and concern for falls. Discussed watchman. Now in sinus. No Aspirin either due to recent GI bleeding. Discussed risk of stroke with family.  3.Htn: stable.  4. Carotid arterial disease: previous CEA on right side. Left with significant plaque. Discussed with family. Plan for medical management.  ? of fatigue due to crestor, at family request, have been holding statin.  Family knows risk regarding CVA.  5. Volume overload: Stable.  6. Rectal mass- New diagnosis. Had a very lengthy discussion with family regarding goals of care. Patient high risk from cardiac perspective for any invasive abd/rectal surgeries considering his previous history of NSTEMI- medically managed. Undergoing radiation.  7. improved edema. Now with hypotension episodes. Advised to hold torsemide till BP above ~110 systolic.  8. Mild wheezing on exam- Appears to be pulmonary related.  9. Follow up in 4 months.

## 2024-02-06 NOTE — CARDIOLOGY SUMMARY
[de-identified] : 2/2024: SR 1st Degree AV block with PAC, nonspecific ST depression and negative T waves- consistent with prior EKG

## 2024-02-06 NOTE — PHYSICAL EXAM
[No Acute Distress] : no acute distress [No Carotid Bruit] : no carotid bruit [Normal S1, S2] : normal S1, S2 [No Murmur] : no murmur [Clear Lung Fields] : clear lung fields [No Respiratory Distress] : no respiratory distress  [Soft] : abdomen soft [Normal Bowel Sounds] : normal bowel sounds [Normal Radial B/L] : normal radial B/L [Normal PT B/L] : normal PT B/L [Moves all extremities] : moves all extremities [Person] : oriented to person [Place] : oriented to place [Time] : disoriented to time [de-identified] : fatigued [de-identified] : Deferred- wearing a mask  [de-identified] : using cane  [de-identified] : + edema.

## 2024-02-15 ENCOUNTER — APPOINTMENT (OUTPATIENT)
Dept: PULMONOLOGY | Facility: CLINIC | Age: 89
End: 2024-02-15
Payer: MEDICARE

## 2024-02-15 VITALS
DIASTOLIC BLOOD PRESSURE: 60 MMHG | HEART RATE: 75 BPM | HEIGHT: 64 IN | BODY MASS INDEX: 26.12 KG/M2 | RESPIRATION RATE: 16 BRPM | OXYGEN SATURATION: 93 % | SYSTOLIC BLOOD PRESSURE: 110 MMHG | WEIGHT: 153 LBS

## 2024-02-15 PROCEDURE — 99214 OFFICE O/P EST MOD 30 MIN: CPT

## 2024-02-15 PROCEDURE — G2211 COMPLEX E/M VISIT ADD ON: CPT

## 2024-02-15 RX ORDER — IPRATROPIUM BROMIDE AND ALBUTEROL SULFATE 2.5; .5 MG/3ML; MG/3ML
0.5-2.5 (3) SOLUTION RESPIRATORY (INHALATION) 4 TIMES DAILY
Qty: 1 | Refills: 5 | Status: ACTIVE | COMMUNITY
Start: 2024-01-11 | End: 1900-01-01

## 2024-02-15 NOTE — DISCUSSION/SUMMARY
[FreeTextEntry1] : #1. Pt with COPD Gold class II based on old PFTs, complicated by Dementia, A fib, HFpEF #2. Post admission [facility] for biliary sepsis, post ERCP, sphincterotomy and stone extraction. Pt s/p XRT for rectal cancer. #3. Prior echocardiogram 4/21 normal LV no pH #4. Diurese as tolerates for LE edema and mild rales on exam. #5. Reportedly tolerating Anoro daily and nebulizer up to 2-3 x daily prn, dyspnea near baseline. Will try Duoneb 4 x daily as unclear how much Anoro he is actually getting given dementia and limited cooperation. #6. Prior CT scan 7/26/22 and PET CT from 5/2023 with stable small eff/round atelectasis x yrs; repeat as needed; last CXR also with stable small b/l effusions. #7. Renal and cardiology f/u for LE edema and adjustment of diuretic therapy as needed. #8. Renal f/u for CRI. #9. Pt had both Covid vaccines. #10. Unable to perform lung function testing - limited by dementia. #11. Zyrtec for allergies and Flonase for PNDS as these conditions may also be contributing to cough. #12. Completed trial of prednisone taper again. Could consider adding Pulmicort if needed. #13. F/u in 3-4 months  The patient expressed understanding and agreement with the above recommendations/plan and accepts responsibility to be compliant with recommended testing, therapies, and f/u visits. All relevant questions and concerns were addressed.  Discussed above with patient and wife and daughter who were also present.

## 2024-02-15 NOTE — HISTORY OF PRESENT ILLNESS
[Former] : former [>= 20 pack years] : >= 20 pack years [Follow-Up - Routine Clinic] : a routine clinic follow-up of [Dyspnea on Exertion] : dyspnea on exertion [Currently Experiencing] : The patient is currently experiencing symptoms. [Short-Acting Beta Agonists] : short-acting beta agonists [Long-Acting Beta Agonists] : long-acting beta agonists [Anticholinergics (Inhalation)] : inhaled anticholinergics [Good Compliance] : good compliance with treatment [Good Tolerance] : good tolerance of treatment [Good Symptom Control] : good symptom control [On ___] : performed on [unfilled] [Patient] : the patient [Indication ___] : for an indication of [unfilled] [None] : no new symptoms reported [TextBox_4] : Former 50 pack yr smoker, quit 1992; prior [place] exposure. Poor historian due to dementia. Pt s/p lung surgery. On Mucinex as needed Abnormal CT in 2018 but last CT with stable changes and persistent small effusions; repeat as needed. Pt previously with + PCR for Covid infection and was treated with Paxlovid. He is back to baseline.  Daughter reports recent hospitalization for rectal bleeding and w/u revealed rectal cancer s/p brief XRT and now not on therapy. On diuretic therapy per renal and cardiology depending on weight due to h/o CHF and LE edema. Presents with increased congestion and cough. Diuretics increased but concern for inflammation. Pt on Albuterol nebulizer 2-3 x daily though reportedly with improvement in symptoms with prednisone in the past. Currently at baseline.  [FreeTextEntry9] : Chest CT [FreeTextEntry8] : Bibasilar rounded atelectasis, small effusions, biapical scarring and emphysema [TextEntry] : Chest CT from 7/26/22 revealed essentially stable emphysematous changes with persistent small R>L effusions vs pleural thickening given small calcifications noted as well as bibasilar rounded atelectasis. PET CT from 5/18/23 revealed stable interlobular septal thickening with mild PVC and stable bibasilar opacities/consolidations with low FDG uptake with small effusions thought to be due to rounded atelectasis per report.

## 2024-02-15 NOTE — CONSULT LETTER
[Dear  ___] : Dear  [unfilled], [Consult Letter:] : I had the pleasure of evaluating your patient, [unfilled]. [Please see my note below.] : Please see my note below. [Sincerely,] : Sincerely, [FreeTextEntry3] : Jayesh Rivera MD, FCCP, D. ABSM\par  Pulmonary and Sleep Medicine\par  [facility] Sleep [facility] at [place]

## 2024-02-29 ENCOUNTER — APPOINTMENT (OUTPATIENT)
Dept: PULMONOLOGY | Facility: CLINIC | Age: 89
End: 2024-02-29

## 2024-03-05 ENCOUNTER — INPATIENT (INPATIENT)
Facility: HOSPITAL | Age: 89
LOS: 5 days | Discharge: HOME | DRG: 871 | End: 2024-03-11
Attending: HOSPITALIST | Admitting: HOSPITALIST
Payer: MEDICARE

## 2024-03-05 VITALS
TEMPERATURE: 100 F | WEIGHT: 153 LBS | SYSTOLIC BLOOD PRESSURE: 119 MMHG | OXYGEN SATURATION: 98 % | RESPIRATION RATE: 18 BRPM | HEART RATE: 86 BPM | DIASTOLIC BLOOD PRESSURE: 72 MMHG | HEIGHT: 65 IN

## 2024-03-05 DIAGNOSIS — Z98.890 OTHER SPECIFIED POSTPROCEDURAL STATES: Chronic | ICD-10-CM

## 2024-03-05 LAB
ALBUMIN SERPL ELPH-MCNC: 3.8 G/DL — SIGNIFICANT CHANGE UP (ref 3.3–5.2)
ALP SERPL-CCNC: 131 U/L — HIGH (ref 40–120)
ALT FLD-CCNC: 8 U/L — SIGNIFICANT CHANGE UP
ANION GAP SERPL CALC-SCNC: 12 MMOL/L — SIGNIFICANT CHANGE UP (ref 5–17)
APPEARANCE UR: CLEAR — SIGNIFICANT CHANGE UP
APTT BLD: 29.2 SEC — SIGNIFICANT CHANGE UP (ref 24.5–35.6)
AST SERPL-CCNC: 13 U/L — SIGNIFICANT CHANGE UP
BASE EXCESS BLDV CALC-SCNC: 2.3 MMOL/L — SIGNIFICANT CHANGE UP (ref -2–3)
BASOPHILS # BLD AUTO: 0.04 K/UL — SIGNIFICANT CHANGE UP (ref 0–0.2)
BASOPHILS NFR BLD AUTO: 0.2 % — SIGNIFICANT CHANGE UP (ref 0–2)
BILIRUB SERPL-MCNC: 0.5 MG/DL — SIGNIFICANT CHANGE UP (ref 0.4–2)
BILIRUB UR-MCNC: NEGATIVE — SIGNIFICANT CHANGE UP
BUN SERPL-MCNC: 55.9 MG/DL — HIGH (ref 8–20)
CA-I SERPL-SCNC: 1.23 MMOL/L — SIGNIFICANT CHANGE UP (ref 1.15–1.33)
CALCIUM SERPL-MCNC: 9.4 MG/DL — SIGNIFICANT CHANGE UP (ref 8.4–10.5)
CHLORIDE BLDV-SCNC: 103 MMOL/L — SIGNIFICANT CHANGE UP (ref 96–108)
CHLORIDE SERPL-SCNC: 98 MMOL/L — SIGNIFICANT CHANGE UP (ref 96–108)
CHOLEST SERPL-MCNC: 130 MG/DL — SIGNIFICANT CHANGE UP
CO2 SERPL-SCNC: 26 MMOL/L — SIGNIFICANT CHANGE UP (ref 22–29)
COLOR SPEC: YELLOW — SIGNIFICANT CHANGE UP
CREAT SERPL-MCNC: 2.09 MG/DL — HIGH (ref 0.5–1.3)
DIFF PNL FLD: NEGATIVE — SIGNIFICANT CHANGE UP
EGFR: 30 ML/MIN/1.73M2 — LOW
EOSINOPHIL # BLD AUTO: 0.02 K/UL — SIGNIFICANT CHANGE UP (ref 0–0.5)
EOSINOPHIL NFR BLD AUTO: 0.1 % — SIGNIFICANT CHANGE UP (ref 0–6)
ETHANOL SERPL-MCNC: <10 MG/DL — SIGNIFICANT CHANGE UP (ref 0–9)
GAS PNL BLDV: 138 MMOL/L — SIGNIFICANT CHANGE UP (ref 136–145)
GAS PNL BLDV: SIGNIFICANT CHANGE UP
GLUCOSE BLDV-MCNC: 148 MG/DL — HIGH (ref 70–99)
GLUCOSE SERPL-MCNC: 143 MG/DL — HIGH (ref 70–99)
GLUCOSE UR QL: NEGATIVE MG/DL — SIGNIFICANT CHANGE UP
HCO3 BLDV-SCNC: 28 MMOL/L — SIGNIFICANT CHANGE UP (ref 22–29)
HCT VFR BLD CALC: 31.8 % — LOW (ref 39–50)
HCT VFR BLDA CALC: 30 % — SIGNIFICANT CHANGE UP
HDLC SERPL-MCNC: 50 MG/DL — SIGNIFICANT CHANGE UP
HGB BLD CALC-MCNC: 9.9 G/DL — LOW (ref 12.6–17.4)
HGB BLD-MCNC: 10 G/DL — LOW (ref 13–17)
IMM GRANULOCYTES NFR BLD AUTO: 0.5 % — SIGNIFICANT CHANGE UP (ref 0–0.9)
INR BLD: 1.07 RATIO — SIGNIFICANT CHANGE UP (ref 0.85–1.18)
KETONES UR-MCNC: NEGATIVE MG/DL — SIGNIFICANT CHANGE UP
LACTATE BLDV-MCNC: 1.3 MMOL/L — SIGNIFICANT CHANGE UP (ref 0.5–2)
LEUKOCYTE ESTERASE UR-ACNC: NEGATIVE — SIGNIFICANT CHANGE UP
LIPID PNL WITH DIRECT LDL SERPL: 64 MG/DL — SIGNIFICANT CHANGE UP
LYMPHOCYTES # BLD AUTO: 1.06 K/UL — SIGNIFICANT CHANGE UP (ref 1–3.3)
LYMPHOCYTES # BLD AUTO: 5.2 % — LOW (ref 13–44)
MAGNESIUM SERPL-MCNC: 2.4 MG/DL — SIGNIFICANT CHANGE UP (ref 1.8–2.6)
MCHC RBC-ENTMCNC: 27.5 PG — SIGNIFICANT CHANGE UP (ref 27–34)
MCHC RBC-ENTMCNC: 31.4 GM/DL — LOW (ref 32–36)
MCV RBC AUTO: 87.6 FL — SIGNIFICANT CHANGE UP (ref 80–100)
MONOCYTES # BLD AUTO: 1.28 K/UL — HIGH (ref 0–0.9)
MONOCYTES NFR BLD AUTO: 6.2 % — SIGNIFICANT CHANGE UP (ref 2–14)
NEUTROPHILS # BLD AUTO: 18.06 K/UL — HIGH (ref 1.8–7.4)
NEUTROPHILS NFR BLD AUTO: 87.8 % — HIGH (ref 43–77)
NITRITE UR-MCNC: NEGATIVE — SIGNIFICANT CHANGE UP
NON HDL CHOLESTEROL: 80 MG/DL — SIGNIFICANT CHANGE UP
PCO2 BLDV: 49 MMHG — SIGNIFICANT CHANGE UP (ref 42–55)
PCP SPEC-MCNC: SIGNIFICANT CHANGE UP
PH BLDV: 7.36 — SIGNIFICANT CHANGE UP (ref 7.32–7.43)
PH UR: 6 — SIGNIFICANT CHANGE UP (ref 5–8)
PHOSPHATE SERPL-MCNC: 3 MG/DL — SIGNIFICANT CHANGE UP (ref 2.4–4.7)
PLATELET # BLD AUTO: 210 K/UL — SIGNIFICANT CHANGE UP (ref 150–400)
PO2 BLDV: 43 MMHG — SIGNIFICANT CHANGE UP (ref 25–45)
POTASSIUM BLDV-SCNC: 4.6 MMOL/L — SIGNIFICANT CHANGE UP (ref 3.5–5.1)
POTASSIUM SERPL-MCNC: 4.4 MMOL/L — SIGNIFICANT CHANGE UP (ref 3.5–5.3)
POTASSIUM SERPL-SCNC: 4.4 MMOL/L — SIGNIFICANT CHANGE UP (ref 3.5–5.3)
PROT SERPL-MCNC: 7.4 G/DL — SIGNIFICANT CHANGE UP (ref 6.6–8.7)
PROT UR-MCNC: NEGATIVE MG/DL — SIGNIFICANT CHANGE UP
PROTHROM AB SERPL-ACNC: 11.9 SEC — SIGNIFICANT CHANGE UP (ref 9.5–13)
RBC # BLD: 3.63 M/UL — LOW (ref 4.2–5.8)
RBC # FLD: 15.1 % — HIGH (ref 10.3–14.5)
SAO2 % BLDV: 69.6 % — SIGNIFICANT CHANGE UP
SODIUM SERPL-SCNC: 136 MMOL/L — SIGNIFICANT CHANGE UP (ref 135–145)
SP GR SPEC: 1.01 — SIGNIFICANT CHANGE UP (ref 1–1.03)
TRIGL SERPL-MCNC: 79 MG/DL — SIGNIFICANT CHANGE UP
TROPONIN T, HIGH SENSITIVITY RESULT: 57 NG/L — HIGH (ref 0–51)
UROBILINOGEN FLD QL: 0.2 MG/DL — SIGNIFICANT CHANGE UP (ref 0.2–1)
WBC # BLD: 20.57 K/UL — HIGH (ref 3.8–10.5)
WBC # FLD AUTO: 20.57 K/UL — HIGH (ref 3.8–10.5)

## 2024-03-05 PROCEDURE — 93010 ELECTROCARDIOGRAM REPORT: CPT

## 2024-03-05 PROCEDURE — 71045 X-RAY EXAM CHEST 1 VIEW: CPT | Mod: 26

## 2024-03-05 PROCEDURE — 70450 CT HEAD/BRAIN W/O DYE: CPT | Mod: 26,MC

## 2024-03-05 PROCEDURE — 99291 CRITICAL CARE FIRST HOUR: CPT

## 2024-03-05 RX ORDER — CEFTRIAXONE 500 MG/1
1000 INJECTION, POWDER, FOR SOLUTION INTRAMUSCULAR; INTRAVENOUS ONCE
Refills: 0 | Status: COMPLETED | OUTPATIENT
Start: 2024-03-05 | End: 2024-03-05

## 2024-03-05 RX ORDER — ACETAMINOPHEN 500 MG
975 TABLET ORAL ONCE
Refills: 0 | Status: COMPLETED | OUTPATIENT
Start: 2024-03-05 | End: 2024-03-05

## 2024-03-05 RX ORDER — AZITHROMYCIN 500 MG/1
500 TABLET, FILM COATED ORAL ONCE
Refills: 0 | Status: COMPLETED | OUTPATIENT
Start: 2024-03-05 | End: 2024-03-05

## 2024-03-05 NOTE — ED PROVIDER NOTE - OBJECTIVE STATEMENT
88 yo male with CHF, afib, DM, copd, dementia, rectal ca ( not on chemo and last radiation several months ago) presents with altered mental status noted persistent 2 pm today; described not answering quesiton at times, soiled himself several times, very weak. 89 yo male with CHF, afib, DM, copd, dementia, rectal ca ( not on chemo and last radiation several months ago) presents with altered mental status noted persistent 2 pm today; described not answering question at times, soiled himself several times, very weak.

## 2024-03-05 NOTE — ED ADULT TRIAGE NOTE - CHIEF COMPLAINT QUOTE
BIBEMS daughter report he has been more altered and incontinent to urine and stool since this afternoon. Hx of Dementia Pt is on radiation treatment was at [place] 2 days ago-rectal ca BIBEMS daughter report he has been more altered and incontinent to urine and stool, unable to walk since this afternoon. Hx of Dementia Pt is on radiation treatment was at [place] 2 days ago-rectal ca

## 2024-03-05 NOTE — ED PROVIDER NOTE - CARE PLAN
Principal Discharge DX:	Acute sepsis  Secondary Diagnosis:	RLL pneumonia  Secondary Diagnosis:	Renal insufficiency   1

## 2024-03-05 NOTE — ED PROVIDER NOTE - CLINICAL SUMMARY MEDICAL DECISION MAKING FREE TEXT BOX
88 yo male with CHF, afib, DM, copd, dementia, rectal ca ( not on chemo and last radiation several months ago) presents with altered mental status noted persistent 2 pm today; described not answering question at times, soiled himself several times, very weak.    Given further history and evaluation patient's altered mental status secondary to acute sepsis.

## 2024-03-06 DIAGNOSIS — A41.9 SEPSIS, UNSPECIFIED ORGANISM: ICD-10-CM

## 2024-03-06 LAB
AMPHET UR-MCNC: NEGATIVE — SIGNIFICANT CHANGE UP
ANION GAP SERPL CALC-SCNC: 13 MMOL/L — SIGNIFICANT CHANGE UP (ref 5–17)
BARBITURATES UR SCN-MCNC: NEGATIVE — SIGNIFICANT CHANGE UP
BENZODIAZ UR-MCNC: NEGATIVE — SIGNIFICANT CHANGE UP
BUN SERPL-MCNC: 57.7 MG/DL — HIGH (ref 8–20)
CALCIUM SERPL-MCNC: 8.7 MG/DL — SIGNIFICANT CHANGE UP (ref 8.4–10.5)
CHLORIDE SERPL-SCNC: 100 MMOL/L — SIGNIFICANT CHANGE UP (ref 96–108)
CO2 SERPL-SCNC: 23 MMOL/L — SIGNIFICANT CHANGE UP (ref 22–29)
COCAINE METAB.OTHER UR-MCNC: NEGATIVE — SIGNIFICANT CHANGE UP
CREAT SERPL-MCNC: 1.99 MG/DL — HIGH (ref 0.5–1.3)
EGFR: 32 ML/MIN/1.73M2 — LOW
GLUCOSE BLDC GLUCOMTR-MCNC: 151 MG/DL — HIGH (ref 70–99)
GLUCOSE BLDC GLUCOMTR-MCNC: 178 MG/DL — HIGH (ref 70–99)
GLUCOSE BLDC GLUCOMTR-MCNC: 259 MG/DL — HIGH (ref 70–99)
GLUCOSE BLDC GLUCOMTR-MCNC: 269 MG/DL — HIGH (ref 70–99)
GLUCOSE SERPL-MCNC: 164 MG/DL — HIGH (ref 70–99)
HCT VFR BLD CALC: 31.3 % — LOW (ref 39–50)
HGB BLD-MCNC: 9.9 G/DL — LOW (ref 13–17)
MAGNESIUM SERPL-MCNC: 2.3 MG/DL — SIGNIFICANT CHANGE UP (ref 1.6–2.6)
MCHC RBC-ENTMCNC: 27.9 PG — SIGNIFICANT CHANGE UP (ref 27–34)
MCHC RBC-ENTMCNC: 31.6 GM/DL — LOW (ref 32–36)
MCV RBC AUTO: 88.2 FL — SIGNIFICANT CHANGE UP (ref 80–100)
METHADONE UR-MCNC: NEGATIVE — SIGNIFICANT CHANGE UP
OPIATES UR-MCNC: NEGATIVE — SIGNIFICANT CHANGE UP
PCP UR-MCNC: NEGATIVE — SIGNIFICANT CHANGE UP
PHOSPHATE SERPL-MCNC: 3.6 MG/DL — SIGNIFICANT CHANGE UP (ref 2.4–4.7)
PLATELET # BLD AUTO: 172 K/UL — SIGNIFICANT CHANGE UP (ref 150–400)
POTASSIUM SERPL-MCNC: 4.3 MMOL/L — SIGNIFICANT CHANGE UP (ref 3.5–5.3)
POTASSIUM SERPL-SCNC: 4.3 MMOL/L — SIGNIFICANT CHANGE UP (ref 3.5–5.3)
RAPID RVP RESULT: SIGNIFICANT CHANGE UP
RBC # BLD: 3.55 M/UL — LOW (ref 4.2–5.8)
RBC # FLD: 15.1 % — HIGH (ref 10.3–14.5)
SARS-COV-2 RNA SPEC QL NAA+PROBE: SIGNIFICANT CHANGE UP
SODIUM SERPL-SCNC: 136 MMOL/L — SIGNIFICANT CHANGE UP (ref 135–145)
THC UR QL: NEGATIVE — SIGNIFICANT CHANGE UP
TROPONIN T, HIGH SENSITIVITY RESULT: 51 NG/L — SIGNIFICANT CHANGE UP (ref 0–51)
WBC # BLD: 13.81 K/UL — HIGH (ref 3.8–10.5)
WBC # FLD AUTO: 13.81 K/UL — HIGH (ref 3.8–10.5)

## 2024-03-06 PROCEDURE — 99497 ADVNCD CARE PLAN 30 MIN: CPT | Mod: 25

## 2024-03-06 PROCEDURE — 99223 1ST HOSP IP/OBS HIGH 75: CPT

## 2024-03-06 PROCEDURE — 99222 1ST HOSP IP/OBS MODERATE 55: CPT

## 2024-03-06 RX ORDER — UMECLIDINIUM BROMIDE AND VILANTEROL TRIFENATATE 62.5; 25 UG/1; UG/1
1 POWDER RESPIRATORY (INHALATION)
Qty: 0 | Refills: 0 | DISCHARGE

## 2024-03-06 RX ORDER — AZITHROMYCIN 500 MG/1
500 TABLET, FILM COATED ORAL DAILY
Refills: 0 | Status: DISCONTINUED | OUTPATIENT
Start: 2024-03-06 | End: 2024-03-10

## 2024-03-06 RX ORDER — ONDANSETRON 8 MG/1
4 TABLET, FILM COATED ORAL EVERY 8 HOURS
Refills: 0 | Status: DISCONTINUED | OUTPATIENT
Start: 2024-03-06 | End: 2024-03-11

## 2024-03-06 RX ORDER — INSULIN GLARGINE 100 [IU]/ML
6 INJECTION, SOLUTION SUBCUTANEOUS
Refills: 0 | DISCHARGE

## 2024-03-06 RX ORDER — PANTOPRAZOLE SODIUM 20 MG/1
40 TABLET, DELAYED RELEASE ORAL
Refills: 0 | Status: DISCONTINUED | OUTPATIENT
Start: 2024-03-06 | End: 2024-03-11

## 2024-03-06 RX ORDER — MEMANTINE HYDROCHLORIDE 10 MG/1
1 TABLET ORAL
Qty: 0 | Refills: 0 | DISCHARGE

## 2024-03-06 RX ORDER — SODIUM CHLORIDE 9 MG/ML
1000 INJECTION, SOLUTION INTRAVENOUS
Refills: 0 | Status: DISCONTINUED | OUTPATIENT
Start: 2024-03-06 | End: 2024-03-11

## 2024-03-06 RX ORDER — IPRATROPIUM/ALBUTEROL SULFATE 18-103MCG
3 AEROSOL WITH ADAPTER (GRAM) INHALATION EVERY 6 HOURS
Refills: 0 | Status: DISCONTINUED | OUTPATIENT
Start: 2024-03-06 | End: 2024-03-07

## 2024-03-06 RX ORDER — DEXTROSE 50 % IN WATER 50 %
25 SYRINGE (ML) INTRAVENOUS ONCE
Refills: 0 | Status: DISCONTINUED | OUTPATIENT
Start: 2024-03-06 | End: 2024-03-11

## 2024-03-06 RX ORDER — MONTELUKAST 4 MG/1
10 TABLET, CHEWABLE ORAL DAILY
Refills: 0 | Status: DISCONTINUED | OUTPATIENT
Start: 2024-03-06 | End: 2024-03-11

## 2024-03-06 RX ORDER — HEPARIN SODIUM 5000 [USP'U]/ML
5000 INJECTION INTRAVENOUS; SUBCUTANEOUS EVERY 12 HOURS
Refills: 0 | Status: DISCONTINUED | OUTPATIENT
Start: 2024-03-06 | End: 2024-03-11

## 2024-03-06 RX ORDER — UMECLIDINIUM BROMIDE AND VILANTEROL TRIFENATATE 62.5; 25 UG/1; UG/1
1 POWDER RESPIRATORY (INHALATION) DAILY
Refills: 0 | Status: DISCONTINUED | OUTPATIENT
Start: 2024-03-06 | End: 2024-03-11

## 2024-03-06 RX ORDER — DONEPEZIL HYDROCHLORIDE 10 MG/1
5 TABLET, FILM COATED ORAL AT BEDTIME
Refills: 0 | Status: DISCONTINUED | OUTPATIENT
Start: 2024-03-06 | End: 2024-03-11

## 2024-03-06 RX ORDER — HEPARIN SODIUM 5000 [USP'U]/ML
5000 INJECTION INTRAVENOUS; SUBCUTANEOUS EVERY 12 HOURS
Refills: 0 | Status: DISCONTINUED | OUTPATIENT
Start: 2024-03-06 | End: 2024-03-06

## 2024-03-06 RX ORDER — DOCUSATE SODIUM 100 MG
1 CAPSULE ORAL
Qty: 0 | Refills: 0 | DISCHARGE

## 2024-03-06 RX ORDER — DEXTROSE 50 % IN WATER 50 %
15 SYRINGE (ML) INTRAVENOUS ONCE
Refills: 0 | Status: DISCONTINUED | OUTPATIENT
Start: 2024-03-06 | End: 2024-03-11

## 2024-03-06 RX ORDER — GLUCAGON INJECTION, SOLUTION 0.5 MG/.1ML
1 INJECTION, SOLUTION SUBCUTANEOUS ONCE
Refills: 0 | Status: DISCONTINUED | OUTPATIENT
Start: 2024-03-06 | End: 2024-03-11

## 2024-03-06 RX ORDER — BUDESONIDE, MICRONIZED 100 %
0.5 POWDER (GRAM) MISCELLANEOUS
Refills: 0 | Status: DISCONTINUED | OUTPATIENT
Start: 2024-03-06 | End: 2024-03-11

## 2024-03-06 RX ORDER — INSULIN LISPRO 100/ML
VIAL (ML) SUBCUTANEOUS
Refills: 0 | Status: DISCONTINUED | OUTPATIENT
Start: 2024-03-06 | End: 2024-03-11

## 2024-03-06 RX ORDER — POTASSIUM CHLORIDE 20 MEQ
0 PACKET (EA) ORAL
Refills: 0 | DISCHARGE

## 2024-03-06 RX ORDER — INSULIN GLARGINE 100 [IU]/ML
8 INJECTION, SOLUTION SUBCUTANEOUS
Qty: 0 | Refills: 0 | DISCHARGE

## 2024-03-06 RX ORDER — LEVOTHYROXINE SODIUM 125 MCG
75 TABLET ORAL DAILY
Refills: 0 | Status: DISCONTINUED | OUTPATIENT
Start: 2024-03-06 | End: 2024-03-11

## 2024-03-06 RX ORDER — LANOLIN ALCOHOL/MO/W.PET/CERES
3 CREAM (GRAM) TOPICAL AT BEDTIME
Refills: 0 | Status: DISCONTINUED | OUTPATIENT
Start: 2024-03-06 | End: 2024-03-11

## 2024-03-06 RX ORDER — PIPERACILLIN AND TAZOBACTAM 4; .5 G/20ML; G/20ML
3.38 INJECTION, POWDER, LYOPHILIZED, FOR SOLUTION INTRAVENOUS EVERY 8 HOURS
Refills: 0 | Status: DISCONTINUED | OUTPATIENT
Start: 2024-03-06 | End: 2024-03-11

## 2024-03-06 RX ORDER — MEMANTINE HYDROCHLORIDE 10 MG/1
5 TABLET ORAL AT BEDTIME
Refills: 0 | Status: DISCONTINUED | OUTPATIENT
Start: 2024-03-06 | End: 2024-03-11

## 2024-03-06 RX ORDER — ACETAMINOPHEN 500 MG
650 TABLET ORAL EVERY 6 HOURS
Refills: 0 | Status: DISCONTINUED | OUTPATIENT
Start: 2024-03-06 | End: 2024-03-11

## 2024-03-06 RX ORDER — POTASSIUM CHLORIDE 20 MEQ
20 PACKET (EA) ORAL DAILY
Refills: 0 | Status: DISCONTINUED | OUTPATIENT
Start: 2024-03-06 | End: 2024-03-09

## 2024-03-06 RX ORDER — PANTOPRAZOLE SODIUM 20 MG/1
1 TABLET, DELAYED RELEASE ORAL
Refills: 0 | DISCHARGE

## 2024-03-06 RX ADMIN — Medication 0.5 MILLIGRAM(S): at 22:55

## 2024-03-06 RX ADMIN — PIPERACILLIN AND TAZOBACTAM 25 GRAM(S): 4; .5 INJECTION, POWDER, LYOPHILIZED, FOR SOLUTION INTRAVENOUS at 05:47

## 2024-03-06 RX ADMIN — Medication 10 MILLIGRAM(S): at 13:31

## 2024-03-06 RX ADMIN — Medication 75 MICROGRAM(S): at 05:45

## 2024-03-06 RX ADMIN — Medication 3 MILLILITER(S): at 22:55

## 2024-03-06 RX ADMIN — MONTELUKAST 10 MILLIGRAM(S): 4 TABLET, CHEWABLE ORAL at 13:30

## 2024-03-06 RX ADMIN — Medication 0.5 MILLIGRAM(S): at 08:26

## 2024-03-06 RX ADMIN — Medication 650 MILLIGRAM(S): at 19:30

## 2024-03-06 RX ADMIN — Medication 20 MILLIEQUIVALENT(S): at 13:31

## 2024-03-06 RX ADMIN — Medication 600 MILLIGRAM(S): at 05:45

## 2024-03-06 RX ADMIN — Medication 2: at 09:35

## 2024-03-06 RX ADMIN — MEMANTINE HYDROCHLORIDE 5 MILLIGRAM(S): 10 TABLET ORAL at 21:25

## 2024-03-06 RX ADMIN — AZITHROMYCIN 255 MILLIGRAM(S): 500 TABLET, FILM COATED ORAL at 00:47

## 2024-03-06 RX ADMIN — Medication 6: at 17:20

## 2024-03-06 RX ADMIN — DONEPEZIL HYDROCHLORIDE 5 MILLIGRAM(S): 10 TABLET, FILM COATED ORAL at 21:26

## 2024-03-06 RX ADMIN — Medication 6: at 21:26

## 2024-03-06 RX ADMIN — HEPARIN SODIUM 5000 UNIT(S): 5000 INJECTION INTRAVENOUS; SUBCUTANEOUS at 17:18

## 2024-03-06 RX ADMIN — PANTOPRAZOLE SODIUM 40 MILLIGRAM(S): 20 TABLET, DELAYED RELEASE ORAL at 17:19

## 2024-03-06 RX ADMIN — CEFTRIAXONE 1000 MILLIGRAM(S): 500 INJECTION, POWDER, FOR SOLUTION INTRAMUSCULAR; INTRAVENOUS at 00:46

## 2024-03-06 RX ADMIN — PIPERACILLIN AND TAZOBACTAM 25 GRAM(S): 4; .5 INJECTION, POWDER, LYOPHILIZED, FOR SOLUTION INTRAVENOUS at 21:26

## 2024-03-06 RX ADMIN — AZITHROMYCIN 500 MILLIGRAM(S): 500 TABLET, FILM COATED ORAL at 17:00

## 2024-03-06 RX ADMIN — Medication 600 MILLIGRAM(S): at 17:19

## 2024-03-06 RX ADMIN — PIPERACILLIN AND TAZOBACTAM 25 GRAM(S): 4; .5 INJECTION, POWDER, LYOPHILIZED, FOR SOLUTION INTRAVENOUS at 13:30

## 2024-03-06 RX ADMIN — HEPARIN SODIUM 5000 UNIT(S): 5000 INJECTION INTRAVENOUS; SUBCUTANEOUS at 05:46

## 2024-03-06 RX ADMIN — Medication 975 MILLIGRAM(S): at 00:55

## 2024-03-06 RX ADMIN — PANTOPRAZOLE SODIUM 40 MILLIGRAM(S): 20 TABLET, DELAYED RELEASE ORAL at 05:44

## 2024-03-06 NOTE — CONSULT NOTE ADULT - SUBJECTIVE AND OBJECTIVE BOX
89 y/o elderly male with hx of Rectal cancer diagnosed in 1/23 s/p XRT from 3/6-3/10/23, recent MRI in 2/24 with local progression, and recent f/u at [facility] for discussion for treatment options with systemic chemo which was decided too high risk for patient. He also has hx of COPD not on home 02, DM-2, Afib not on AC, chronic diastolic CHF, CKD-4, hypothyroidism, dementia. Patient resides at home with Wife/Daughter uses a cane at baseline and is currently going to o/p PT. Patient Daughter called [facility] yesterday to notify them that patient was more confused than baseline. No reports of chills, fever, N/V/D. No changes in medications. No hx of aspiration, falls, HA, focal weakness. Patient Daughter advised to take him to the ED by [facility]. In the ED vitals with low grade temp at 99.8. BP stable, not hypoxic. No rectal temp done per Daughter request due to hx of rectal cancer. Blood cx sent, given empiric abx. History obtained from daughter at bedside.     CT brain 3/5/2024 MPRESSION: No large territory acute infarct, intracranial hemorrhage, or mass effect.  Stable appearance of chronic microangiopathic white matter changes and  parenchymal volume loss.  CXR 3/5/2024 On January 1, 2023 there was a left lower lobe infiltrate and presently this infiltrate is again noted essentially unchanged.  The present film also shows a mild left perihilar infiltrate and an increasing right lower perihilar infiltrate.    Home Medications:  albuterol 1.25 mg/3 mL (0.042%) inhalation solution: 3 milliliter(s) inhaled 3 times a day, As Needed (06 Mar 2024 03:39)  Anoro Ellipta 62.5 mcg-25 mcg/inh inhalation powder: 1 puff(s) inhaled once a day (06 Mar 2024 03:39)  Colace 100 mg oral capsule: 1 cap(s) orally 3 times a day (06 Mar 2024 03:39)  insulin lispro 100 units/mL injectable solution: 4 unit(s) injectable 3 times a day (before meals) (06 Mar 2024 03:39)  memantine 7 mg oral capsule, extended release: 1 cap(s) orally once a day (06 Mar 2024 03:39)  potassium chloride 20 mEq oral granule, extended release: orally 2 times a day (06 Mar 2024 03:41)  Protonix 40 mg oral delayed release tablet: 1 tab(s) orally 2 times a day (06 Mar 2024 03:41)  torsemide 10 mg oral tablet: 1 tab(s) orally 2 times a day (06 Mar 2024 03:39)  Toujeo SoloStar 300 units/mL subcutaneous solution: 6 unit(s) subcutaneous once a day (at bedtime) (06 Mar 2024 03:39)    PAST MEDICAL & SURGICAL HISTORY:  DM (diabetes mellitus)      Afib      Dementia      COPD (chronic obstructive pulmonary disease)      CKD (chronic kidney disease)      Rectal cancer      S/P carotid endarterectomy      History of lung biopsy      Social History:  lives with spouse/Daughter (06 Mar 2024 03:02)          MEDICATIONS  (STANDING):  azithromycin   Tablet 500 milliGRAM(s) Oral daily  buDESOnide    Inhalation Suspension 0.5 milliGRAM(s) Inhalation two times a day  dextrose 5%. 1000 milliLiter(s) (50 mL/Hr) IV Continuous <Continuous>  dextrose 50% Injectable 25 Gram(s) IV Push once  donepezil 5 milliGRAM(s) Oral at bedtime  glucagon  Injectable 1 milliGRAM(s) IntraMuscular once  guaiFENesin  milliGRAM(s) Oral every 12 hours  heparin   Injectable 5000 Unit(s) SubCutaneous every 12 hours  insulin lispro (ADMELOG) corrective regimen sliding scale   SubCutaneous Before meals and at bedtime  levothyroxine 75 MICROGram(s) Oral daily  memantine 5 milliGRAM(s) Oral at bedtime  montelukast 10 milliGRAM(s) Oral daily  pantoprazole    Tablet 40 milliGRAM(s) Oral two times a day  piperacillin/tazobactam IVPB.. 3.375 Gram(s) IV Intermittent every 8 hours  potassium chloride    Tablet ER 20 milliEquivalent(s) Oral daily  torsemide 10 milliGRAM(s) Oral two times a day  umeclidinium 62.5 MICROgram(s)/vilanterol 25 MICROgram(s) Inhaler 1 Puff(s) Inhalation daily    MEDICATIONS  (PRN):  acetaminophen     Tablet .. 650 milliGRAM(s) Oral every 6 hours PRN Temp greater or equal to 38C (100.4F), Mild Pain (1 - 3)  albuterol/ipratropium for Nebulization 3 milliLiter(s) Nebulizer every 6 hours PRN Shortness of Breath and/or Wheezing  aluminum hydroxide/magnesium hydroxide/simethicone Suspension 30 milliLiter(s) Oral every 4 hours PRN Dyspepsia  dextrose Oral Gel 15 Gram(s) Oral once PRN Blood Glucose LESS THAN 70 milliGRAM(s)/deciliter  melatonin 3 milliGRAM(s) Oral at bedtime PRN Insomnia  ondansetron Injectable 4 milliGRAM(s) IV Push every 8 hours PRN Nausea and/or Vomiting      Vital Signs Last 24 Hrs  T(C): 36.3 (06 Mar 2024 14:31), Max: 37.7 (05 Mar 2024 21:46)  T(F): 97.4 (06 Mar 2024 14:31), Max: 99.8 (05 Mar 2024 21:46)  HR: 65 (06 Mar 2024 11:25) (62 - 86)  BP: 91/55 (06 Mar 2024 13:29) (90/53 - 119/72)  BP(mean): 77 (06 Mar 2024 03:02) (77 - 77)  RR: 18 (06 Mar 2024 11:25) (17 - 18)  SpO2: 89% (06 Mar 2024 11:25) (89% - 98%)    Parameters below as of 06 Mar 2024 11:25  Patient On (Oxygen Delivery Method): room air                            9.9    13.81 )-----------( 172      ( 06 Mar 2024 08:40 )    03-06    136  |  100  |  57.7<H>  ----------------------------<  164<H>  4.3   |  23.0  |  1.99<H>    Ca    8.7      06 Mar 2024 08:40  Phos  3.6     03-06  Mg     2.3     03-06    TPro  7.4  /  Alb  3.8  /  TBili  0.5  /  DBili  x   /  AST  13  /  ALT  8   /  AlkPhos  131<H>  03-05   31.3      Physical Exam:   Constitutional 	elderly male sitting up in bed in NAD  Eyes PERRLA conjunctiva clear   ENMT	 mucosa dry· Respiratory	  Resp no rhonchi; diminished breath sounds, L; diminished breath sounds, R  Cardiovascular	regular rate and rhythm; S1 S2 present  Gastrointestinal: soft; nontender  Mental Status	AOx1 person  Skin warm and dry   Musculoskeletal Comments +2 B/L LE edema

## 2024-03-06 NOTE — CONSULT NOTE ADULT - SUBJECTIVE AND OBJECTIVE BOX
HPI:  88 y/o male with hx of Rectal cancer diagnosed in 1/23 s/p XRT from 3/6-3/10/23, recent MRI in 2/24 with local progression, and recent f/u at [facility] for discussion for treatment options with systemic chemo which was decided too high risk for patient. He also has hx of COPD not on home 02, DM-2, Afib not on AC, chronic diastolic CHF, CKD-4, hypothyroidism, dementia. Patient resides at home with Wife/Daughter uses a cane at baseline and is currently going to o/p PT. Patient Daughter called [facility] yesterday to notify them that patient was more confused than baseline. No reports of chills, fever, N/V/D. No changes in medications. No hx of aspiration, falls, HA, focal weakness. Patient Daughter advised to take him to the ED by [facility]. In the ED vitals with low grade temp at 99.8. BP stable, not hypoxic. No rectal temp done per Daughter request due to hx of rectal cancer. CXR with mild left sided fullness compared to prior, UA neg, leucocytosis of 20 with left shift noted. RVP neg, CT head with microvascular changes. Blood cx sent, given empiric abx. History obtained from HCP/Daughter at bedside.  (06 Mar 2024 03:02)      PERTINENT PMH REVIEWED: Yes     PAST MEDICAL & SURGICAL HISTORY:  DM (diabetes mellitus)      Afib      Dementia      COPD (chronic obstructive pulmonary disease)      CKD (chronic kidney disease)      Rectal cancer      S/P carotid endarterectomy      History of lung biopsy          SOCIAL HISTORY:                      Substance history:                    Admitted from:  home                     Church/spirituality: Hinduism                    Cultural concerns:    Baseline ADLs (prior to admission):   Dependent                        Surrogate/HCP/Guardian:  Sophia Mims    FAMILY HISTORY:  Patient unable to provide medical history (Father, Mother)        Allergies    No Known Allergies    Intolerances        http://npcrc.org/files/news/palliative_performance_scale_[email or web address].pdf    Present Symptoms:   Dyspnea:  No   Nausea/Vomiting:  No    Anxiety:   No   Depression: No  Fatigue: Yes  Loss of appetite: Yes   Constipation:  Not reported     Pain:  rectal area - manageable            Location            Character            Duration            Factors            Severity            Effect    Pain AD Score:  [email or web address].pdf (press ctrl + left click to view)    Review of Systems: Reviewed    All other ROS negative  Unable  Limited  to obtain due to poor mentation historian      MEDICATIONS  (STANDING):  azithromycin   Tablet 500 milliGRAM(s) Oral daily  buDESOnide    Inhalation Suspension 0.5 milliGRAM(s) Inhalation two times a day  dextrose 5%. 1000 milliLiter(s) (50 mL/Hr) IV Continuous <Continuous>  dextrose 50% Injectable 25 Gram(s) IV Push once  donepezil 5 milliGRAM(s) Oral at bedtime  glucagon  Injectable 1 milliGRAM(s) IntraMuscular once  guaiFENesin  milliGRAM(s) Oral every 12 hours  heparin   Injectable 5000 Unit(s) SubCutaneous every 12 hours  insulin lispro (ADMELOG) corrective regimen sliding scale   SubCutaneous Before meals and at bedtime  levothyroxine 75 MICROGram(s) Oral daily  memantine 5 milliGRAM(s) Oral at bedtime  montelukast 10 milliGRAM(s) Oral daily  pantoprazole    Tablet 40 milliGRAM(s) Oral two times a day  piperacillin/tazobactam IVPB.. 3.375 Gram(s) IV Intermittent every 8 hours  potassium chloride    Tablet ER 20 milliEquivalent(s) Oral daily  torsemide 10 milliGRAM(s) Oral two times a day  umeclidinium 62.5 MICROgram(s)/vilanterol 25 MICROgram(s) Inhaler 1 Puff(s) Inhalation daily    MEDICATIONS  (PRN):  acetaminophen     Tablet .. 650 milliGRAM(s) Oral every 6 hours PRN Temp greater or equal to 38C (100.4F), Mild Pain (1 - 3)  albuterol/ipratropium for Nebulization 3 milliLiter(s) Nebulizer every 6 hours PRN Shortness of Breath and/or Wheezing  aluminum hydroxide/magnesium hydroxide/simethicone Suspension 30 milliLiter(s) Oral every 4 hours PRN Dyspepsia  dextrose Oral Gel 15 Gram(s) Oral once PRN Blood Glucose LESS THAN 70 milliGRAM(s)/deciliter  melatonin 3 milliGRAM(s) Oral at bedtime PRN Insomnia  ondansetron Injectable 4 milliGRAM(s) IV Push every 8 hours PRN Nausea and/or Vomiting      PHYSICAL EXAM:    Vital Signs Last 24 Hrs  T(C): 36.5 (06 Mar 2024 08:05), Max: 37.7 (05 Mar 2024 21:46)  T(F): 97.7 (06 Mar 2024 08:05), Max: 99.8 (05 Mar 2024 21:46)  HR: 65 (06 Mar 2024 11:25) (62 - 86)  BP: 91/55 (06 Mar 2024 13:29) (90/53 - 119/72)  BP(mean): 77 (06 Mar 2024 03:02) (77 - 77)  RR: 18 (06 Mar 2024 11:25) (17 - 18)  SpO2: 89% (06 Mar 2024 11:25) (89% - 98%)    Parameters below as of 06 Mar 2024 11:25  Patient On (Oxygen Delivery Method): room air      Karnofsky 40  %  General:  Elderly man NAD  HEENT: NCAT     Lungs: comfortable  CV: RR  GI:   soft NTND  [facility] weak    Neuro: no focal deficits  Skin: warm dry  Psych:  calm    LABS:                        9.9    13.81 )-----------( 172      ( 06 Mar 2024 08:40 )             31.3     03-06    136  |  100  |  57.7<H>  ----------------------------<  164<H>  4.3   |  23.0  |  1.99<H>    Ca    8.7      06 Mar 2024 08:40  Phos  3.6     03-06  Mg     2.3     03-06    TPro  7.4  /  Alb  3.8  /  TBili  0.5  /  DBili  x   /  AST  13  /  ALT  8   /  AlkPhos  131<H>  03-05    PT/INR - ( 05 Mar 2024 22:48 )   PT: 11.9 sec;   INR: 1.07 ratio         PTT - ( 05 Mar 2024 22:48 )  PTT:29.2 sec  Urinalysis Basic - ( 06 Mar 2024 08:40 )    Color: x / Appearance: x / SG: x / pH: x  Gluc: 164 mg/dL / Ketone: x  / Bili: x / Urobili: x   Blood: x / Protein: x / Nitrite: x   Leuk Esterase: x / RBC: x / WBC x   Sq Epi: x / Non Sq Epi: x / Bacteria: x      I&O's Summary      RADIOLOGY & ADDITIONAL STUDIES:  Imaging reviewed   < from: CT Brain Stroke Protocol (03.05.24 @ 22:09) >  ACC: [id number] EXAM:  CT BRAIN STROKE PROTOCOL   ORDERED BY: AKILA HURST     PROCEDURE DATE:  03/05/2024          INTERPRETATION:  CT HEAD STROKE PROTOCOL    INDICATIONS: Stroke Code, altered mental status, XCT    TECHNIQUE:  Serial axial images were obtained from the skull base to the vertex   without the use of intravenous contrast. RAPID artificial intelligence   was used for preliminary evaluation of intracranial hemorrhage.    COMPARISON EXAMINATION: 5/28/2023.    FINDINGS:  Brainparenchyma: Gray-white matter discrimination is well preserved.   Mild areas of periventricular hypoattenuation are seen. Punctate lacunar   infarcts identified within the bilateral basal ganglia and subinsular   regions. There is no mass effect or intracranial hemorrhage. Mild   evidence of parenchymal volume loss noted.    Extra-axial compartments: No extra-axial fluid collections are present.   The ventricles and sulci are slightly enlarged, compatible with volume   loss, although similar in appearance to the prior study from May 2023.   The basal cisterns are patent. Craniocervical junction and sella turcica   are within normal limits.    Calvarium, paranasal sinuses, and orbits: The calvarium is intact.   Paranasal sinuses and mastoid air cells are clear. The orbits demonstrate   lens replacements.      IMPRESSION:  No large territory acute infarct, intracranial hemorrhage, or mass effect.    Stable appearance of chronic microangiopathic white matter changes and   parenchymal volume loss.    Findings above were discussed directly by telephone by the ED radiologist   on call, Lionel Reich M.D., with the emergency Department ordering   physician, Akila Hurst M.D., at 10:13 PM on 3/5/2024.    --- End of Report ---            LIONEL REICH MD; Attending Radiologist  This document has been electronically signed. Mar  5 2024 10:20PM    < end of copied text >    < from: Xray Chest 1 View- PORTABLE-Urgent (03.05.24 @ 22:56) >    ACC: [id number] EXAM:  XR CHEST PORTABLE URGENT 1V   ORDERED BY:   AKILA HURST     PROCEDURE DATE:  03/05/2024          INTERPRETATION:  AP chest on March 5, 2024 at 10:25 PM. This is a stroke   code.    Heart size normal. Clips in the right neck again noted.    On January 1, 2023 there was a left lower lobe infiltrate and presently   this infiltrate is again noted essentially unchanged.    The present film also shows a mild left perihilar infiltrate and an   increasing right lower perihilar infiltrate.    IMPRESSION:  There are new and old left and right lung infiltrates as above.    --- End of Report ---      < end of copied text >            ADVANCE DIRECTIVES/TREATMENT PREFERENCES:  DNR/DNI -  continuing medical treatments

## 2024-03-06 NOTE — PATIENT PROFILE ADULT - FALL HARM RISK - RISK INTERVENTIONS

## 2024-03-06 NOTE — PHYSICAL THERAPY INITIAL EVALUATION ADULT - ADDITIONAL COMMENTS
as per pt: and daughter at the bedside: Patient resides at home with Wife/Daughter, 3 steps (+) rail to enter,  uses a cane at baseline, owns RW , is currently going to PT in outpatient settings.

## 2024-03-06 NOTE — PHYSICAL THERAPY INITIAL EVALUATION ADULT - IMPAIRMENTS FOUND, PT EVAL
aerobic capacity/endurance/cognitive impairment/gait, locomotion, and balance/gross motor/muscle strength/posture

## 2024-03-06 NOTE — H&P ADULT - NSICDXPASTMEDICALHX_GEN_ALL_CORE_FT
PAST MEDICAL HISTORY:  Afib     CKD (chronic kidney disease)     COPD (chronic obstructive pulmonary disease)     Dementia     DM (diabetes mellitus)     Rectal cancer

## 2024-03-06 NOTE — CONSULT NOTE ADULT - SUBJECTIVE AND OBJECTIVE BOX
Patient is a 89y old  Male who presents with a chief complaint of R/O bacteremia  Encephalopathy (06 Mar 2024 12:30)      HPI:  88 y/o male with hx of Rectal cancer diagnosed in 1/23 s/p XRT from 3/6-3/10/23, recent MRI in 2/24 with local progression, and recent f/u at [facility] for discussion for treatment options with systemic chemo which was decided too high risk for patient. He also has hx of COPD not on home 02, DM-2, Afib not on AC, chronic diastolic CHF, CKD-4, hypothyroidism, dementia. Patient resides at home with Wife/Daughter uses a cane at baseline and is currently going to o/p PT. Patient Daughter called [facility] yesterday to notify them that patient was more confused than baseline. No reports of chills, fever, N/V/D. No changes in medications. No hx of aspiration, falls, HA, focal weakness. Patient Daughter advised to take him to the ED by [facility]. In the ED vitals with low grade temp at 99.8. BP stable, not hypoxic. No rectal temp done per Daughter request due to hx of rectal cancer. CXR with mild left sided fullness compared to prior, UA neg, leucocytosis of 20 with left shift noted. RVP neg, CT head with microvascular changes. Blood cx sent, given empiric abx. History obtained from HCP/Daughter at bedside.  (06 Mar 2024 03:02)    Interim noted   Follows with Dr Unique lee CKD III   Hemodynamics and WBC better   Started on Abx   takes Torsemide for chronic edema           PAST MEDICAL & SURGICAL HISTORY:  DM (diabetes mellitus)      Afib      Dementia      COPD (chronic obstructive pulmonary disease)      CKD (chronic kidney disease)      Rectal cancer      S/P carotid endarterectomy      History of lung biopsy          FAMILY HISTORY:  Patient unable to provide medical history (Father, Mother)        Social History:    MEDICATIONS  (STANDING):  azithromycin   Tablet 500 milliGRAM(s) Oral daily  buDESOnide    Inhalation Suspension 0.5 milliGRAM(s) Inhalation two times a day  dextrose 5%. 1000 milliLiter(s) (50 mL/Hr) IV Continuous <Continuous>  dextrose 50% Injectable 25 Gram(s) IV Push once  donepezil 5 milliGRAM(s) Oral at bedtime  glucagon  Injectable 1 milliGRAM(s) IntraMuscular once  guaiFENesin  milliGRAM(s) Oral every 12 hours  heparin   Injectable 5000 Unit(s) SubCutaneous every 12 hours  insulin lispro (ADMELOG) corrective regimen sliding scale   SubCutaneous Before meals and at bedtime  levothyroxine 75 MICROGram(s) Oral daily  memantine 5 milliGRAM(s) Oral at bedtime  montelukast 10 milliGRAM(s) Oral daily  pantoprazole    Tablet 40 milliGRAM(s) Oral two times a day  piperacillin/tazobactam IVPB.. 3.375 Gram(s) IV Intermittent every 8 hours  potassium chloride    Tablet ER 20 milliEquivalent(s) Oral daily  torsemide 10 milliGRAM(s) Oral two times a day  umeclidinium 62.5 MICROgram(s)/vilanterol 25 MICROgram(s) Inhaler 1 Puff(s) Inhalation daily    MEDICATIONS  (PRN):  acetaminophen     Tablet .. 650 milliGRAM(s) Oral every 6 hours PRN Temp greater or equal to 38C (100.4F), Mild Pain (1 - 3)  albuterol/ipratropium for Nebulization 3 milliLiter(s) Nebulizer every 6 hours PRN Shortness of Breath and/or Wheezing  aluminum hydroxide/magnesium hydroxide/simethicone Suspension 30 milliLiter(s) Oral every 4 hours PRN Dyspepsia  dextrose Oral Gel 15 Gram(s) Oral once PRN Blood Glucose LESS THAN 70 milliGRAM(s)/deciliter  melatonin 3 milliGRAM(s) Oral at bedtime PRN Insomnia  ondansetron Injectable 4 milliGRAM(s) IV Push every 8 hours PRN Nausea and/or Vomiting      Allergies    No Known Allergies    Intolerances              Vital Signs Last 24 Hrs  T(C): 36.3 (06 Mar 2024 14:31), Max: 37.7 (05 Mar 2024 21:46)  T(F): 97.4 (06 Mar 2024 14:31), Max: 99.8 (05 Mar 2024 21:46)  HR: 65 (06 Mar 2024 11:25) (62 - 86)  BP: 91/55 (06 Mar 2024 13:29) (90/53 - 119/72)  BP(mean): 77 (06 Mar 2024 03:02) (77 - 77)  RR: 18 (06 Mar 2024 11:25) (17 - 18)  SpO2: 89% (06 Mar 2024 11:25) (89% - 98%)    Parameters below as of 06 Mar 2024 11:25  Patient On (Oxygen Delivery Method): room air      Daily Height in cm: 165.1 (05 Mar 2024 21:46)    Daily   I&O's Detail    I&O's Summary      PHYSICAL EXAM:        PHYSICAL EXAM:    GENERAL: appears chronically ill  HEAD: anicteric , no edema   NECK: Supple  CHEST/LUNG: EAE , Few basilar crackles . no wheeze   HEART: Regular rate and rhythm  ABDOMEN: Soft, Nontender, Nondistended; +BS  EXTREMITIES:  decreased edema     LABS:                        9.9    13.81 )-----------( 172      ( 06 Mar 2024 08:40 )             31.3     03-06    136  |  100  |  57.7<H>  ----------------------------<  164<H>  4.3   |  23.0  |  1.99<H>    Ca    8.7      06 Mar 2024 08:40  Phos  3.6     03-06  Mg     2.3     03-06    TPro  7.4  /  Alb  3.8  /  TBili  0.5  /  DBili  x   /  AST  13  /  ALT  8   /  AlkPhos  131<H>  03-05    PT/INR - ( 05 Mar 2024 22:48 )   PT: 11.9 sec;   INR: 1.07 ratio         PTT - ( 05 Mar 2024 22:48 )  PTT:29.2 sec  Urinalysis Basic - ( 06 Mar 2024 08:40 )    Color: x / Appearance: x / SG: x / pH: x  Gluc: 164 mg/dL / Ketone: x  / Bili: x / Urobili: x   Blood: x / Protein: x / Nitrite: x   Leuk Esterase: x / RBC: x / WBC x   Sq Epi: x / Non Sq Epi: x / Bacteria: x      Magnesium: 2.3 mg/dL (03-06 @ 08:40)  Phosphorus: 3.6 mg/dL (03-06 @ 08:40)  Magnesium: 2.4 mg/dL (03-05 @ 22:48)  Phosphorus: 3.0 mg/dL (03-05 @ 22:48)        < from: CT Brain Stroke Protocol (03.05.24 @ 22:09) >  ACC: [id number] EXAM:  CT BRAIN STROKE PROTOCOL   ORDERED BY: AKILA HURST     PROCEDURE DATE:  03/05/2024          INTERPRETATION:  CT HEAD STROKE PROTOCOL    INDICATIONS: Stroke Code, altered mental status, XCT    TECHNIQUE:  Serial axial images were obtained from the skull base to the vertex   without the use of intravenous contrast. RAPID artificial intelligence   was used for preliminary evaluation of intracranial hemorrhage.    COMPARISON EXAMINATION: 5/28/2023.    FINDINGS:  Brainparenchyma: Gray-white matter discrimination is well preserved.   Mild areas of periventricular hypoattenuation are seen. Punctate lacunar   infarcts identified within the bilateral basal ganglia and subinsular   regions. There is no mass effect or intracranial hemorrhage. Mild   evidence of parenchymal volume loss noted.    Extra-axial compartments: No extra-axial fluid collections are present.   The ventricles and sulci are slightly enlarged, compatible with volume   loss, although similar in appearance to the prior study from May 2023.   The basal cisterns are patent. Craniocervical junction and sella turcica   are within normal limits.    Calvarium, paranasal sinuses, and orbits: The calvarium is intact.   Paranasal sinuses and mastoid air cells are clear. The orbits demonstrate   lens replacements.      IMPRESSION:  No large territory acute infarct, intracranial hemorrhage, or mass effect.    Stable appearance of chronic microangiopathic white matter changes and   parenchymal volume loss.    Findings above were discussed directly by telephone by the ED radiologist   on call, Lionel Reich M.D., with the emergency Department ordering   physician, Akila uHrst M.D., at 10:13 PM on 3/5/2024.    --- End of Report ---            LIONEL REICH MD; Attending Radiologist  This docu    < end of copied text >    < from: Xray Chest 1 View- PORTABLE-Urgent (03.05.24 @ 22:56) >    ACC: [id number] EXAM:  XR CHEST PORTABLE URGENT 1V   ORDERED BY:   AKILA HURST     PROCEDURE DATE:  03/05/2024          INTERPRETATION:  AP chest on March 5, 2024 at 10:25 PM. This is a stroke   code.    Heart size normal. Clips in the right neck again noted.    On January 1, 2023 there was a left lower lobe infiltrate and presently   this infiltrate is again noted essentially unchanged.    The present film also shows a mild left perihilar infiltrate and an   increasing right lower perihilar infiltrate.    IMPRESSION:  There are new and old left and right lung infiltrates as above.    --- End of Report ---            FAWN HELTON MD; Attending Radiologist  This document has been electronically signed. Mar  6 2024 10:20AM    < end of copied text >  RADIOLOGY & ADDITIONAL TESTS:

## 2024-03-06 NOTE — ED ADULT NURSE NOTE - CHIEF COMPLAINT QUOTE
BIBEMS daughter report he has been more altered and incontinent to urine and stool, unable to walk since this afternoon. Hx of Dementia Pt is on radiation treatment was at [place] 2 days ago-rectal ca

## 2024-03-06 NOTE — CONSULT NOTE ADULT - REASON FOR ADMISSION
R/O bacteremia  Encephalopathy

## 2024-03-06 NOTE — H&P ADULT - BP NONINVASIVE DIASTOLIC (MM HG)
RP-FYI, chlamydia/gonnorhea still pending    Patient called office to further discuss recent lab results. Reviewed RP's lab result note from 3/10/23 (and recent [facility] regarding recent labs) with patient. Patient verbalized understanding. Patient confirms he picked-up and started to take metformin on 3/11/23. Patient asking what he can do to help lower both sugar and cholesterol levels. Reviewed diet/exercise modifications that can help. Patient verbalized understanding. Patient asking if RP can prescribe \"cream\" for his skin irritation on his penis. Explained according to RP's visit notes, need to see results for all STD testing first.  If negative cream will be ordered. Patient verbalized understanding. Transferred patient to front-desk to schedule 3-month follow-up. Denies further questions.
62

## 2024-03-06 NOTE — CONSULT NOTE ADULT - PROBLEM SELECTOR RECOMMENDATION 9
- There is no evidence of acute heart failure exacerbation  - no need for IV diuresis  - treat underlying PNA, ace wraps to legs  - GDMT as tolerated  - no further inpatient cardiology workup or recommendations

## 2024-03-06 NOTE — H&P ADULT - HISTORY OF PRESENT ILLNESS
88 y/o male with hx of Rectal cancer diagnosed in 1/23 s/p XRT from 3/6-3/10/23, recent MRI in 2/24 with local progression, and recent f/u at [facility] for discussion for treatment options with systemic chemo which was decided too high risk for patient. He also has hx of COPD not on home 02, DM-2, Afib not on AC, chronic diastolic CHF, CKD-4, hypothyroidism, dementia. Patient resides at home with Wife/Daughter uses a cane at baseline and is currently going to o/p PT. Patient Daughter called [facility] yesterday to notify them that patient was more confused than baseline. No reports of chills, fever, N/V/D. No changes in medications. No hx of aspiration, falls, HA, focal weakness. Patient Daughter advised to take him to the ED by [facility]. In the ED vitals with low grade temp at 99.8. BP stable, not hypoxic. No rectal temp done per Daughter request due to hx of rectal cancer. CXR with mild left sided fullness compared to prior, UA neg, leucocytosis of 20 with left shift noted. RVP neg, CT head with microvascular changes. Blood cx sent, given empiric abx. History obtained from HCP/Daughter at bedside.

## 2024-03-06 NOTE — CONSULT NOTE ADULT - TIME BILLING
greater than 50% of time spent reviewing labs, notes, orders and radiographs, coordinating care  discussed with nursing, primary team.
Chronic HFpEF, Sepsis

## 2024-03-06 NOTE — ED ADULT NURSE NOTE - SUICIDE SCREENING QUESTION 2
VACCINE ADMINISTRATION RECORD  PARENT / GUARDIAN APPROVAL  Date: 2021  Vaccine administered to: Yeni Rasmussen     : 3/24/2021    MRN: [id number]    A copy of the appropriate Centers for Disease Control and Prevention Vaccine Information stateme
No

## 2024-03-06 NOTE — ED ADULT NURSE NOTE - NSFALLRISKINTERV_ED_ALL_ED
Assistance OOB with selected safe patient handling equipment if applicable/Assistance with ambulation/Communicate fall risk and risk factors to all staff, patient, and family/Monitor gait and stability/Monitor for mental status changes and reorient to person, place, and time, as needed/Provide visual cue: yellow wristband, yellow gown, etc/Reinforce activity limits and safety measures with patient and family/Toileting schedule using arm’s reach rule for commode and bathroom/Use of alarms - bed, stretcher, chair and/or video monitoring/Call bell, personal items and telephone in reach/Instruct patient to call for assistance before getting out of bed/chair/stretcher/Non-slip footwear applied when patient is off stretcher/[place] to call system/Physically safe environment - no spills, clutter or unnecessary equipment/Purposeful Proactive Rounding/Room/bathroom lighting operational, light cord in reach

## 2024-03-06 NOTE — CONSULT NOTE ADULT - ASSESSMENT
89 y/o M w/ PMHx of AFib, paroxysmal (not on AC), Vascular dementia, CKD, DMII, COPD, and HFpEFhere for Acute Toxic Metabolic Encephalopathy, seen at bedside, lethargic, infectious workup, ambulate, PT, continue home meds, remaining plan as per nocturnist. Palliative consult was initiated. Cardiology was consulted upon request of the family for input.

## 2024-03-06 NOTE — CONSULT NOTE ADULT - CONSULT REQUESTED BY NAME
Dr. Cabrera
ET
ju Burks
ED
-Approaching euvolemia   - Follows with Dr. Ireland who stated patient had an echo back in 12/2020 with EF of 60% and trace MR, was started on Lasix at that time. Current home meds include Metolazone 5 mg daily, Furosemide 80 mg daily, Toprol XL 50 mg daily, and Losartan 50 mg daily  - Presented with b/l LE edema, hypoxia requiring 1L NC. CXR with mild pulmonary vascular congestion,   - EKG: NSR @ 83 bpm, non-specific ST changes, troponin peaked at 0.05, no longer trending   -Repeat Echocardiogram 3/15: normal BiV size/function, no sig valvular disease, no pHTN  -B/L LE Doppler showed no DVT  -Core measures, 1.5L fluid restriction, daily weight, strict I's and O's  -Net negative approx. 5L this admission, 1L/last 24 hrs  -diuresing with Lasix 40 mg IV BID, likely transition to PO diuretics on 3/21  -continue Toprol XL 25mg daily (half home dose), spironolactone 25mg daily
Hospitalist
-Approaching euvolemia   - Follows with Dr. Ireland who stated patient had an echo back in 12/2020 with EF of 60% and trace MR, was started on Lasix at that time. Current home meds include Metolazone 5 mg daily, Furosemide 80 mg daily, Toprol XL 50 mg daily, and Losartan 50 mg daily  - Presented with b/l LE edema, hypoxia requiring 1L NC. CXR with mild pulmonary vascular congestion,   - EKG: NSR @ 83 bpm, non-specific ST changes, troponin peaked at 0.05, no longer trending   -Repeat Echocardiogram 3/15: normal BiV size/function, no sig valvular disease, no pHTN  -B/L LE Doppler showed no DVT  -Core measures, 1.5L fluid restriction, daily weight, strict I's and O's  -Net negative approx. 5L this admission, 1L/last 24 hrs  -diuresing with Lasix 40 mg IV BID, likely transition to PO diuretics on 3/21  -continue Toprol XL 25mg daily (half home dose), spironolactone 25mg daily  -plan for ischemic eval via cardiac cath on 3/22 with Dr. Black

## 2024-03-06 NOTE — CONSULT NOTE ADULT - SUBJECTIVE AND OBJECTIVE BOX
Patient is a 89y old  Male who presents with a chief complaint of R/O bacteremia  Encephalopathy (06 Mar 2024 14:55)      BRIEF HOSPITAL COURSE:  per HPI:   88 y/o male with hx of Rectal cancer diagnosed in 1/23 s/p XRT from 3/6-3/10/23, recent MRI in 2/24 with local progression, and recent f/u at [facility] for discussion for treatment options with systemic chemo which was decided too high risk for patient. He also has hx of COPD not on home 02, DM-2, Afib not on AC, chronic diastolic CHF, CKD-4, hypothyroidism, dementia. Patient resides at home with Wife/Daughter uses a cane at baseline and is currently going to o/p PT. Patient Daughter called [facility] yesterday to notify them that patient was more confused than baseline. No reports of chills, fever, N/V/D. No changes in medications. No hx of aspiration, falls, HA, focal weakness. Patient Daughter advised to take him to the ED by [facility]. In the ED vitals with low grade temp at 99.8. BP stable, not hypoxic. No rectal temp done per Daughter request due to hx of rectal cancer. CXR with mild left sided fullness compared to prior, UA neg, leucocytosis of 20 with left shift noted. RVP neg, CT head with microvascular changes. Blood cx sent, given empiric abx. History obtained from HCP/Daughter at bedside.     Patient present to Ed for AMS, concern raised for possible infection, likely pneumonia.        Events last 24 hours: ***    PAST MEDICAL & SURGICAL HISTORY:  DM (diabetes mellitus)      Afib      Dementia      COPD (chronic obstructive pulmonary disease)      CKD (chronic kidney disease)      Rectal cancer      S/P carotid endarterectomy      History of lung biopsy        Allergies    No Known Allergies    Intolerances      FAMILY HISTORY:  Patient unable to provide medical history (Father, Mother)        Family history otherwise noncontributory.    Social History: ***    Review of Systems:  CONSTITUTIONAL:  No fevers, chills  HEAD: No headache  EYES: No blurry vision  ENT: No epistaxis, rhinorrhea, sore throat  CARDIOVASCULAR:  No chest pain, no palpitations  RESPIRATORY:  As per HPI  GASTROINTESTINAL:  No abdominal pain, N/V/D  GENITOURINARY:  No dysuria, frequency or urgency  NEUROLOGIC:  No seizures or headaches  EXTREMITIES: No leg swelling  PSYCHIATRIC:  No disorder of thought or mood    ALL OTHER REVIEW OF SYSTEMS EXCEPT PER HPI NEGATIVE.      Medications:  azithromycin   Tablet 500 milliGRAM(s) Oral daily  piperacillin/tazobactam IVPB.. 3.375 Gram(s) IV Intermittent every 8 hours    torsemide 10 milliGRAM(s) Oral two times a day    albuterol/ipratropium for Nebulization 3 milliLiter(s) Nebulizer every 6 hours PRN  buDESOnide    Inhalation Suspension 0.5 milliGRAM(s) Inhalation two times a day  guaiFENesin  milliGRAM(s) Oral every 12 hours  montelukast 10 milliGRAM(s) Oral daily  umeclidinium 62.5 MICROgram(s)/vilanterol 25 MICROgram(s) Inhaler 1 Puff(s) Inhalation daily    acetaminophen     Tablet .. 650 milliGRAM(s) Oral every 6 hours PRN  donepezil 5 milliGRAM(s) Oral at bedtime  melatonin 3 milliGRAM(s) Oral at bedtime PRN  memantine 5 milliGRAM(s) Oral at bedtime  ondansetron Injectable 4 milliGRAM(s) IV Push every 8 hours PRN      heparin   Injectable 5000 Unit(s) SubCutaneous every 12 hours    aluminum hydroxide/magnesium hydroxide/simethicone Suspension 30 milliLiter(s) Oral every 4 hours PRN  pantoprazole    Tablet 40 milliGRAM(s) Oral two times a day      dextrose 50% Injectable 25 Gram(s) IV Push once  dextrose Oral Gel 15 Gram(s) Oral once PRN  glucagon  Injectable 1 milliGRAM(s) IntraMuscular once  insulin lispro (ADMELOG) corrective regimen sliding scale   SubCutaneous Before meals and at bedtime  levothyroxine 75 MICROGram(s) Oral daily    dextrose 5%. 1000 milliLiter(s) IV Continuous <Continuous>  potassium chloride    Tablet ER 20 milliEquivalent(s) Oral daily                ICU Vital Signs Last 24 Hrs  T(C): 36.3 (06 Mar 2024 14:31), Max: 37.7 (05 Mar 2024 21:46)  T(F): 97.4 (06 Mar 2024 14:31), Max: 99.8 (05 Mar 2024 21:46)  HR: 65 (06 Mar 2024 11:25) (62 - 86)  BP: 91/55 (06 Mar 2024 13:29) (90/53 - 119/72)  BP(mean): 77 (06 Mar 2024 03:02) (77 - 77)  ABP: --  ABP(mean): --  RR: 18 (06 Mar 2024 11:25) (17 - 18)  SpO2: 89% (06 Mar 2024 11:25) (89% - 98%)    O2 Parameters below as of 06 Mar 2024 11:25  Patient On (Oxygen Delivery Method): room air          Vital Signs Last 24 Hrs  T(C): 36.3 (06 Mar 2024 14:31), Max: 37.7 (05 Mar 2024 21:46)  T(F): 97.4 (06 Mar 2024 14:31), Max: 99.8 (05 Mar 2024 21:46)  HR: 65 (06 Mar 2024 11:25) (62 - 86)  BP: 91/55 (06 Mar 2024 13:29) (90/53 - 119/72)  BP(mean): 77 (06 Mar 2024 03:02) (77 - 77)  RR: 18 (06 Mar 2024 11:25) (17 - 18)  SpO2: 89% (06 Mar 2024 11:25) (89% - 98%)    Parameters below as of 06 Mar 2024 11:25  Patient On (Oxygen Delivery Method): room air            I&O's Detail        LABS:                        9.9    13.81 )-----------( 172      ( 06 Mar 2024 08:40 )             31.3     03-06    136  |  100  |  57.7<H>  ----------------------------<  164<H>  4.3   |  23.0  |  1.99<H>    Ca    8.7      06 Mar 2024 08:40  Phos  3.6     03-06  Mg     2.3     03-06    TPro  7.4  /  Alb  3.8  /  TBili  0.5  /  DBili  x   /  AST  13  /  ALT  8   /  AlkPhos  131<H>  03-05          CAPILLARY BLOOD GLUCOSE      POCT Blood Glucose.: 151 mg/dL (06 Mar 2024 12:15)    PT/INR - ( 05 Mar 2024 22:48 )   PT: 11.9 sec;   INR: 1.07 ratio         PTT - ( 05 Mar 2024 22:48 )  PTT:29.2 sec  Urinalysis Basic - ( 06 Mar 2024 08:40 )    Color: x / Appearance: x / SG: x / pH: x  Gluc: 164 mg/dL / Ketone: x  / Bili: x / Urobili: x   Blood: x / Protein: x / Nitrite: x   Leuk Esterase: x / RBC: x / WBC x   Sq Epi: x / Non Sq Epi: x / Bacteria: x      CULTURES:      Physical Examination:  GENERAL: In NAD   HEENT: NC/AT  NECK: Supple, trachea midline  PULM: ***  CVS: +S1, S2, RRR  ABD: Soft, non-tender  EXTREMITIES: No pedal edema B/L  SKIN: No open wounds  NEURO: Grossly non-focal    DEVICES:     RADIOLOGY: ***   Patient is a 89y old  Male who presents with a chief complaint of R/O bacteremia  Encephalopathy (06 Mar 2024 14:55)      BRIEF HOSPITAL COURSE:  per HPI:   88 y/o male with hx of Rectal cancer diagnosed in 1/23 s/p XRT from 3/6-3/10/23, recent MRI in 2/24 with local progression, and recent f/u at [facility] for discussion for treatment options with systemic chemo which was decided too high risk for patient. He also has hx of COPD not on home 02, DM-2, Afib not on AC, chronic diastolic CHF, CKD-4, hypothyroidism, dementia. Patient resides at home with Wife/Daughter uses a cane at baseline and is currently going to o/p PT. Patient Daughter called [facility] yesterday to notify them that patient was more confused than baseline. No reports of chills, fever, N/V/D. No changes in medications. No hx of aspiration, falls, HA, focal weakness. Patient Daughter advised to take him to the ED by [facility]. In the ED vitals with low grade temp at 99.8. BP stable, not hypoxic. No rectal temp done per Daughter request due to hx of rectal cancer. CXR with mild left sided fullness compared to prior, UA neg, leucocytosis of 20 with left shift noted. RVP neg, CT head with microvascular changes. Blood cx sent, given empiric abx. History obtained from HCP/Daughter at bedside.     Patient present to Ed for AMS, concern raised for possible infection, likely pneumonia.    pulmonary consulted for copd management    patient saturating 94% on room air, not in respiratory distress,   patient denies acute complaint, except for back pain.     Patietn follow up with dr. Rivera for moderate COPD, unable to obtain pft due to dementia  patient on anoro, questionable compliance  on as needed duonebs        PAST MEDICAL & SURGICAL HISTORY:  DM (diabetes mellitus)      Afib      Dementia      COPD (chronic obstructive pulmonary disease)      CKD (chronic kidney disease)      Rectal cancer      S/P carotid endarterectomy      History of lung biopsy        Allergies    No Known Allergies    Intolerances      FAMILY HISTORY:  Patient unable to provide medical history (Father, Mother)        Family history otherwise noncontributory.    Social History:   Review of Systems:  negative except as above       ALL OTHER REVIEW OF SYSTEMS EXCEPT PER HPI NEGATIVE.    Medications:  azithromycin   Tablet 500 milliGRAM(s) Oral daily  piperacillin/tazobactam IVPB.. 3.375 Gram(s) IV Intermittent every 8 hours    torsemide 10 milliGRAM(s) Oral two times a day    albuterol/ipratropium for Nebulization 3 milliLiter(s) Nebulizer every 6 hours PRN  buDESOnide    Inhalation Suspension 0.5 milliGRAM(s) Inhalation two times a day  guaiFENesin  milliGRAM(s) Oral every 12 hours  montelukast 10 milliGRAM(s) Oral daily  umeclidinium 62.5 MICROgram(s)/vilanterol 25 MICROgram(s) Inhaler 1 Puff(s) Inhalation daily    acetaminophen     Tablet .. 650 milliGRAM(s) Oral every 6 hours PRN  donepezil 5 milliGRAM(s) Oral at bedtime  melatonin 3 milliGRAM(s) Oral at bedtime PRN  memantine 5 milliGRAM(s) Oral at bedtime  ondansetron Injectable 4 milliGRAM(s) IV Push every 8 hours PRN      heparin   Injectable 5000 Unit(s) SubCutaneous every 12 hours    aluminum hydroxide/magnesium hydroxide/simethicone Suspension 30 milliLiter(s) Oral every 4 hours PRN  pantoprazole    Tablet 40 milliGRAM(s) Oral two times a day      dextrose 50% Injectable 25 Gram(s) IV Push once  dextrose Oral Gel 15 Gram(s) Oral once PRN  glucagon  Injectable 1 milliGRAM(s) IntraMuscular once  insulin lispro (ADMELOG) corrective regimen sliding scale   SubCutaneous Before meals and at bedtime  levothyroxine 75 MICROGram(s) Oral daily    dextrose 5%. 1000 milliLiter(s) IV Continuous <Continuous>  potassium chloride    Tablet ER 20 milliEquivalent(s) Oral daily        Vital Signs Last 24 Hrs  T(C): 36.3 (06 Mar 2024 14:31), Max: 37.7 (05 Mar 2024 21:46)  T(F): 97.4 (06 Mar 2024 14:31), Max: 99.8 (05 Mar 2024 21:46)  HR: 65 (06 Mar 2024 11:25) (62 - 86)  BP: 91/55 (06 Mar 2024 13:29) (90/53 - 119/72)  BP(mean): 77 (06 Mar 2024 03:02) (77 - 77)  RR: 18 (06 Mar 2024 11:25) (17 - 18)  SpO2: 89% (06 Mar 2024 11:25) (89% - 98%)    Parameters below as of 06 Mar 2024 11:25  Patient On (Oxygen Delivery Method): room air            I&O's Detail        LABS:                        9.9    13.81 )-----------( 172      ( 06 Mar 2024 08:40 )             31.3     03-06    136  |  100  |  57.7<H>  ----------------------------<  164<H>  4.3   |  23.0  |  1.99<H>    Ca    8.7      06 Mar 2024 08:40  Phos  3.6     03-06  Mg     2.3     03-06    TPro  7.4  /  Alb  3.8  /  TBili  0.5  /  DBili  x   /  AST  13  /  ALT  8   /  AlkPhos  131<H>  03-05          CAPILLARY BLOOD GLUCOSE      POCT Blood Glucose.: 151 mg/dL (06 Mar 2024 12:15)    PT/INR - ( 05 Mar 2024 22:48 )   PT: 11.9 sec;   INR: 1.07 ratio         PTT - ( 05 Mar 2024 22:48 )  PTT:29.2 sec  Urinalysis Basic - ( 06 Mar 2024 08:40 )    Color: x / Appearance: x / SG: x / pH: x  Gluc: 164 mg/dL / Ketone: x  / Bili: x / Urobili: x   Blood: x / Protein: x / Nitrite: x   Leuk Esterase: x / RBC: x / WBC x   Sq Epi: x / Non Sq Epi: x / Bacteria: x      CULTURES:      Physical Examination:  GENERAL: In NAD   HEENT: NC/AT  NECK: Supple, trachea midline  PULM: cta b/l   CVS: +S1, S2, RRR  ABD: Soft, non-tender  EXTREMITIES: No pedal edema B/L  SKIN: No open wounds  NEURO: Grossly non-focal    DEVICES:     RADIOLOGY:   CXR:   IMPRESSION:  There are new and old left and right lung infiltrates as above.

## 2024-03-06 NOTE — CONSULT NOTE ADULT - ASSESSMENT
89 y/o elderly male with increased confusion. leukocytosis leucocytosis.  PMHx of  rectal cancer (follows at [facility]), Chronic diastolic CHF, CKD-4, DM-2, Afib, COPD, Dementia, Hypothyroidism     Leukocytosis mostly like due to pneumonia  -Low grade temp on arrival now resolved  - possible occult bacteremia from locally invasive rectal cancer  - 3/5/2024 CXR grossly unchanged from prior, UA neg, RVP neg  -Pending  blood cx  -WBC on arrival 20.57  WBC today improved 13.81  -Cont to trend WBC  -IV antibiotics per medicine    Rectal Cancer:  -Pt follows with [facility]  - S/P short course of RT 3/6-3/10/2023  -No plan for systemic therapy    DVT ppx  per medicine  Pt is a fall risk  Pt has dementia no mechanical DVT ppx due to fall risk      Chronic diastolic CHF:  -No evidence of decompensated CHF  -cont. out ptregimen as BP will tolerate  -DASH diet    CKD-4:  -Cr. at  baseline  creat on adm 2.99 toady 1.99  -Avoid nephrotoxic drugs    Afib:  -Not on AC due to dementia  -fall risk/ rectal cancer bleed risk          Keep [facility] inform of pts progress    [facility]  [phone]                 89 y/o elderly male with increased confusion. leukocytosis leucocytosis.  [place]  [place] [facility] [place] Chronic diastolic CHF, CKD-4, DM-2, Afib, COPD, Dementia, Hypothyroidism     Leukocytosis mostly like due to pneumonia  -Low grade temp on arrival now resolved  - possible occult bacteremia from locally invasive rectal cancer  - 3/5/2024 CXR grossly unchanged from prior, UA neg, RVP neg  -Pending  blood cx  -WBC on arrival 20.57  WBC today improved 13.81  -Cont to trend WBC  -IV antibiotics per medicine    Rectal Cancer:  -Pt follows with [facility]  - S/P short course of RT 3/6-3/10/2023  -No plan for systemic therapy    DVT ppx  per medicine  Pt is a fall risk  Pt has dementia no mechanical DVT ppx due to fall risk      Chronic diastolic CHF:  -No evidence of decompensated CHF  -cont. outpt regimen as BP will tolerate  -DASH diet    CKD-4:  -Cr. at baseline  creat on adm 2.99 toady 1.99  -Avoid nephrotoxic drugs    Afib:  -Not on AC due to dementia  -fall risk/ rectal cancer bleed risk        will update primary onc team on daily basis    [facility]  [phone]

## 2024-03-06 NOTE — CONSULT NOTE ADULT - NS ATTEND AMEND GEN_ALL_CORE FT
seen with above,    89M history significant for dementia, COPD, DM2, Afib not on AC due to fall/bleeding risk, CAD/MI (medically managed), HFpEF, CKD 4, rectal cancer dx 2023 s/p radiation not candidate for surgery follows at [facility], last seen Dr. Wilkes in the office 2/2024 on low dose Torsemide 10mg BID only home SBP >100s, presents wit encephelopathy found with leukocytosis and low grade temp 99, source of infection unclear on empiric antibiotic  -chronic trace LE edema but no signs of pulmonary edema, given SBP 90-100s and sepsis continue to hold Torsemide if BP <100s, recommend ACE bandage wraps for the leg  -he is DNR/DNI, overall prognosis guarded   -updated patient's wife and daughter at the bedside  -reconsult if new cardiac issue arise      Jose L Cote DO, [facility]  Faculty Non-Invasive Cardiologist  [phone]

## 2024-03-06 NOTE — ED ADULT NURSE NOTE - OBJECTIVE STATEMENT
BIBEMS daughter report he has been more altered and incontinent  unable to walk since this afternoon. Hx of Dementia Pt is on radiation treatment was at [facility] 2 days ago for rectal ca, pt a&ox2, follows commands, resp even unlabored, NSR on tele/ RA

## 2024-03-06 NOTE — ED ADULT NURSE NOTE - NSICDXFAMILYHX_GEN_ALL_CORE_FT
The patient is a 18y Male complaining of fall. FAMILY HISTORY:  Father  Still living? Unknown  Patient unable to provide medical history, Age at diagnosis: Age Unknown    Mother  Still living? No  Patient unable to provide medical history, Age at diagnosis: Age Unknown

## 2024-03-06 NOTE — CONSULT NOTE ADULT - SUBJECTIVE AND OBJECTIVE BOX
[facility]                                              CARDIOLOGY AT [place] [address]                                             Telephone: [phone]. Fax:[phone]                                                       CARDIOLOGY CONSULTATION NOTE                                                                                             History obtained by: Patient and medical record   Community Cardiologist: Chnug   obtained: Yes [  ] No [ x ]  Reason for Consultation: Cardiovascular examination  Available out pt records reviewed: Yes [  ] No [  ]    Chief complaint:    Patient is a 89y old  Male who presents with a chief complaint of R/O bacteremia  Encephalopathy (06 Mar 2024 14:55)      HPI:  88 y/o male with hx of Rectal cancer diagnosed in  s/p XRT from 3/6-3/10/23, recent MRI in  with local progression, and recent f/u at [facility] for discussion for treatment options with systemic chemo which was decided too high risk for patient. He also has hx of COPD not on home , DM-2, Afib not on AC, chronic diastolic CHF, CKD-4, hypothyroidism, dementia. Patient resides at home with Wife/Daughter uses a cane at baseline and is currently going to o/p PT. Patient Daughter called [facility] yesterday to notify them that patient was more confused than baseline. No reports of chills, fever, N/V/D. No changes in medications. No hx of aspiration, falls, HA, focal weakness. Patient Daughter advised to take him to the ED by [facility]. In the ED vitals with low grade temp at 99.8. BP stable, not hypoxic. No rectal temp done per Daughter request due to hx of rectal cancer. CXR with mild left sided fullness compared to prior, UA neg, leucocytosis of 20 with left shift noted. RVP neg, CT head with microvascular changes. Blood cx sent, given empiric abx. History obtained from HCP/Daughter at bedside.  (06 Mar 2024 03:02)      PAST MEDICAL HISTORY  DM (diabetes mellitus)    Afib    Dementia    COPD (chronic obstructive pulmonary disease)    CKD (chronic kidney disease)    Rectal cancer        PAST SURGICAL HISTORY  S/P carotid endarterectomy    History of lung biopsy        SUBSTANCE USE HISTORY  Denies current and previous substance use [  ]   CIGARETTES -   ALCOHOL -   DRUGS -     FAMILY HISTORY:  Patient unable to provide medical history (Father, Mother)        CARDIAC SPECIFIC FAMILY HX   No KNOWN family history of Cardiovascular disease, CAD, or sudden death in first degree relatives unless specified below  Family History of Cardiovascular Disease:  [  ]   Coronary Artery Disease in first degree relative:  [  ]   Sudden Cardiac Death in First degree relative: [  ]    HOME MEDICATIONS:  albuterol 1.25 mg/3 mL (0.042%) inhalation solution: 3 milliliter(s) inhaled 3 times a day, As Needed (06 Mar 2024 03:39)  Anoro Ellipta 62.5 mcg-25 mcg/inh inhalation powder: 1 puff(s) inhaled once a day (06 Mar 2024 03:39)  Colace 100 mg oral capsule: 1 cap(s) orally 3 times a day (06 Mar 2024 03:39)  insulin lispro 100 units/mL injectable solution: 4 unit(s) injectable 3 times a day (before meals) (06 Mar 2024 03:39)  memantine 7 mg oral capsule, extended release: 1 cap(s) orally once a day (06 Mar 2024 03:39)  potassium chloride 20 mEq oral granule, extended release: orally 2 times a day (06 Mar 2024 03:41)  Protonix 40 mg oral delayed release tablet: 1 tab(s) orally 2 times a day (06 Mar 2024 03:41)  torsemide 10 mg oral tablet: 1 tab(s) orally 2 times a day (06 Mar 2024 03:39)  Toujeo SoloStar 300 units/mL subcutaneous solution: 6 unit(s) subcutaneous once a day (at bedtime) (06 Mar 2024 03:39)      CURRENT CARDIAC MEDICATIONS:  torsemide 10 milliGRAM(s) Oral two times a day      CURRENT OTHER MEDICATIONS:  albuterol/ipratropium for Nebulization 3 milliLiter(s) Nebulizer every 6 hours PRN Shortness of Breath and/or Wheezing  buDESOnide    Inhalation Suspension 0.5 milliGRAM(s) Inhalation two times a day  guaiFENesin  milliGRAM(s) Oral every 12 hours  montelukast 10 milliGRAM(s) Oral daily  umeclidinium 62.5 MICROgram(s)/vilanterol 25 MICROgram(s) Inhaler 1 Puff(s) Inhalation daily  acetaminophen     Tablet .. 650 milliGRAM(s) Oral every 6 hours PRN Temp greater or equal to 38C (100.4F), Mild Pain (1 - 3)  donepezil 5 milliGRAM(s) Oral at bedtime  melatonin 3 milliGRAM(s) Oral at bedtime PRN Insomnia  memantine 5 milliGRAM(s) Oral at bedtime  ondansetron Injectable 4 milliGRAM(s) IV Push every 8 hours PRN Nausea and/or Vomiting  aluminum hydroxide/magnesium hydroxide/simethicone Suspension 30 milliLiter(s) Oral every 4 hours PRN Dyspepsia  pantoprazole    Tablet 40 milliGRAM(s) Oral two times a day  azithromycin   Tablet 500 milliGRAM(s) Oral daily  dextrose 5%. 1000 milliLiter(s) (50 mL/Hr) IV Continuous <Continuous>  dextrose 50% Injectable 25 Gram(s) IV Push once, Stop order after: 1 Doses  dextrose Oral Gel 15 Gram(s) Oral once, Stop order after: 1 Doses PRN Blood Glucose LESS THAN 70 milliGRAM(s)/deciliter  glucagon  Injectable 1 milliGRAM(s) IntraMuscular once, Stop order after: 1 Doses  heparin   Injectable 5000 Unit(s) SubCutaneous every 12 hours  insulin lispro (ADMELOG) corrective regimen sliding scale   SubCutaneous Before meals and at bedtime  levothyroxine 75 MICROGram(s) Oral daily  piperacillin/tazobactam IVPB.. 3.375 Gram(s) IV Intermittent every 8 hours, Stop order after: 8 Days  potassium chloride    Tablet ER 20 milliEquivalent(s) Oral daily      ALLERGIES:   No Known Allergies      VITAL SIGNS:  T(C): 36.3 (24 @ 14:31), Max: 37.7 (24 @ 21:46)  T(F): 97.4 (24 @ 14:31), Max: 99.8 (24 @ 21:46)  HR: 65 (24 @ 11:25) (62 - 86)  BP: 91/55 (24 @ 13:29) (90/53 - 119/72)  RR: 18 (24 @ 11:25) (17 - 18)  SpO2: 89% (24 @ 11:25) (89% - 98%)    INTAKE AND OUTPUT:       LABS:                            9.9    13.81 )-----------( 172      ( 06 Mar 2024 08:40 )             31.3         136  |  100  |  57.7<H>  ----------------------------<  164<H>  4.3   |  23.0  |  1.99<H>    Ca    8.7      06 Mar 2024 08:40  Phos  3.6       Mg     2.3         TPro  7.4  /  Alb  3.8  /  TBili  0.5  /  DBili  x   /  AST  13  /  ALT  8   /  AlkPhos  131<H>      PT/INR - ( 05 Mar 2024 22:48 )   PT: 11.9 sec;   INR: 1.07 ratio         PTT - ( 05 Mar 2024 22:48 )  PTT:29.2 sec  Urinalysis Basic - ( 06 Mar 2024 08:40 )    Color: x / Appearance: x / SG: x / pH: x  Gluc: 164 mg/dL / Ketone: x  / Bili: x / Urobili: x   Blood: x / Protein: x / Nitrite: x   Leuk Esterase: x / RBC: x / WBC x   Sq Epi: x / Non Sq Epi: x / Bacteria: x      24 @ 22:48  CHolesterol: 130,  HDL: 50,  LDL: 64, Triglycerides: 79           RADIOLOGY IMAGIN Lead ECG (24 @ 22:01) [Completed]  Xray Chest 1 View- PORTABLE-Urgent: Urgent   Indication: Stroke Code  Transport: Portable  Exam Completed (24 @ 22:01) [Results Available]

## 2024-03-06 NOTE — PHYSICAL THERAPY INITIAL EVALUATION ADULT - NSPTDISCHREC_GEN_A_CORE
with assistance as needed, rehab disposition recommendation may be change base on patient  functional progress and social support upon D/C/Home PT

## 2024-03-06 NOTE — CONSULT NOTE ADULT - ASSESSMENT
88 y/o male with hx of Rectal cancer diagnosed in 1/23 s/p XRT from 3/6-3/10/23, recent MRI in 2/24 with local progression, and recent f/u at [facility] for discussion for treatment options with systemic chemo which was decided too high risk for patient. He also has hx of COPD not on home 02, DM-2, Afib not on AC, chronic diastolic CHF, CKD-4, hypothyroidism, dementia. Patient resides at home with Wife/Daughter uses a cane at baseline and is currently going to o/p PT. Patient Daughter called [facility] yesterday to notify them that patient was more confused than baseline. No reports of chills, fever, N/V/D. No changes in medications. No hx of aspiration, falls, HA, focal weakness. Patient Daughter advised to take him to the ED by [facility]. In the ED vitals with low grade temp at 99.8. BP stable, not hypoxic. No rectal temp done per Daughter request due to hx of rectal cancer. CXR with mild left sided fullness compared to prior, UA neg, leucocytosis of 20 with left shift noted. RVP neg, CT head with microvascular changes. Blood cx sent, given empiric abx. History obtained from HCP/Daughter at bedside.  (06 Mar 2024 03:02)    CKD III - stable at baseline   Follow up cultures   Volume status OK on the current Torsemide   Zosyn / Zmax     Anemia - Avoid THERESA with ca history   Check TFT's     Will follow   Discussed on phone with daughter Felicita

## 2024-03-06 NOTE — H&P ADULT - TIME BILLING
Reviewing prior medical record, ED course, reviewing labs/imaging studies, discussing case with ED attending, examining patient, furnishing H&P, orders, doing medication reconciliation, discussing plan of care with ED staff/Daughter-HCP and answering all their questions.

## 2024-03-06 NOTE — ED ADULT NURSE REASSESSMENT NOTE - NS ED NURSE REASSESS COMMENT FT1
handoff care to KORI Hicks in CDU at this time. pt in NAD. RR even and unlabored on RA. placed on telebox. transported to cdu 5.

## 2024-03-06 NOTE — CONSULT NOTE ADULT - ASSESSMENT
89 y/o M with hx of dementia, copd, dCHF,CKD, hypothyroidism , rectal cancer,  present with altered mental status, admitted for infectiouis workup.   Current saturating well on room air     #AMS  #r/o pneumonia given new infiltrates on CXR   #hx of rectal cancer   #dCHF  #CKD  #dementia   #COPD without evidence of exacerbation     - agree with broad spectrum antibiotic  - pending culture result  - current no evidence of copd exacerbation  - continue duoneb PRN  - can get CT chest to evaluate for parenchymal disease given CXR finding   - rest of management per primary team   - pulmonary will follow

## 2024-03-06 NOTE — H&P ADULT - CONVERSATION DETAILS
Discussed plan for admission, discussed all relevant prior history regarding cancer diagnosis, and chronic medical conditions. Daughter/HCP aware patients cancer is progressing despite being treated with XRT last year based on recent MRI noted in [facility] f/u in [facility]IE. Patient now with possible bacteremia from invasive rectal cancer. Daughter wants to wait and see how his hospital course goes but is not interested in Hospice at this time. She is hoping he can get better and go home or to [facility] but is aware his time is limited and if he has bacteremia from his rectal cancer she wants Dr. Lozada to weigh in before any decisions are made. She is agreeable to DNR/DNI based on his wishes of not wanting any artifical means to keep him alive. QUINTIN executed and placed in chart.

## 2024-03-06 NOTE — PHYSICAL THERAPY INITIAL EVALUATION ADULT - PERTINENT HX OF CURRENT PROBLEM, REHAB EVAL
as per medical chart: , recent MRI in 2/24 with local progression, and recent f/u at [facility] for discussion for treatment options with systemic chemo which was decided too high risk for patient,  hx of COPD not on home 02, DM-2, Afib not on AC, chronic diastolic CHF, CKD-4, hypothyroidism, dementia.  PT. Patient Daughter called [facility] to notify them that patient was more confused than baseline. No reports of chills, fever, N/V/D. No changes in medications. No hx of aspiration, falls, HA, focal weakness. Patient Daughter advised to take him to the ED by [facility]

## 2024-03-06 NOTE — H&P ADULT - ASSESSMENT
89 y/o male with increased confusion/Leucocytosis, hx of rectal cancer, Chronic diastolic CHF, CKD-4, DM-2, Afib, COPD, Dementia, Hypothyroidism     Confusion/Leucocytosis:  -At baseline cognition in ED per Daughter  -No focal weakness   -No photophobia/neck stiffness   -Low grade temp resolved  -Concern for possible occult bacteremia from locally invasive rectal cancer  -At risk for sepsis  -CXR grossly unchanged from prior, UA neg, RVP neg  -F/U blood cx  -Trend Temp/WBC  -Cont. empiric abx to cover enterics and atypical   -DVT-P  -OOB, ambulate with cane/walker  -PT program   -Fall risk protocol    Rectal Cancer:  -[facility] notes reviewed in HIE  -No plan for systemic therapy  -Daughter wishes to inform Dr. Lozada if bacteremic from rectal cancer invasion   -Palliative care for ongoing GOC  -DNR/DNI    Chronic diastolic CHF:  -No evidence of decompensated CHF  -cont. O/P regimen as BP will tolerate  -DASH diet    CKD-4:  -Cr. at baseline  -Avoid nephrotoxic drugs  -Renally dose meds     Afib:  -Not on AC due to dementia/fall risk/Risk of rectal bleeding with invasive rectal cancer    COPD:  -Not on home 02  -Nebs prn  -Cont. Anoro Ellipta  -Budesonide  -Singulair      Dementia:  -Cont. Aricept/Memantine   -fall risk  -At risk for delirium while hospitalized     DM-2:  -ADA diet  -Coverage insulin  -Accu checks AC/HS  -start on basal insulin if BS elevated     Hypothyroidism:  -Cont. Synthroid     Discussed at length with Daughter/HCP at bedside who agrees with plan of care

## 2024-03-06 NOTE — CONSULT NOTE ADULT - ASSESSMENT
88 y/o male with hx of Rectal cancer diagnosed in 1/23 s/p XRT from 3/6-3/10/23  hx of COPD  DM-2, Afib not on AC, chronic diastolic CHF, CKD-4, dementia admitted with confusion and leukocytosis     Encephalopathy  MF - metabolic infectious   correct underlying issues  supportive care    PNA  IV abx, monitor for fevers    Rectal cancer  Daughter plan to pursue further tx if available  Tylenol for pain    Dementia  Described as forgetful, needs assistance with ADLs,   Reported baseline - verbal and ambulatory  Not End stage    CKD   Avoid nephrotoxins  Avoid Morphine for pain  If need for opioid, rec Oxycodone or Dilaudid    Encounter for Palliative Care  Palliative Care consulted to assist with goals of care  Sophia Mims at bedside.  She states she is the HCP.  Felicita states father sees Dr. De La Cruz at [facility] for cancer tx. Plan was to follow up after a PET scan to review further tx options.   She has been discussed hospice in the past but currently defers it.   For now, wants to continue txs for PNA  and requesting father's various specialist ( Pulmonary, Cardio, Nephro) follow up  with him during this hospitalization

## 2024-03-07 LAB
GLUCOSE BLDC GLUCOMTR-MCNC: 193 MG/DL — HIGH (ref 70–99)
GLUCOSE BLDC GLUCOMTR-MCNC: 205 MG/DL — HIGH (ref 70–99)
GLUCOSE BLDC GLUCOMTR-MCNC: 227 MG/DL — HIGH (ref 70–99)
GLUCOSE BLDC GLUCOMTR-MCNC: 280 MG/DL — HIGH (ref 70–99)

## 2024-03-07 PROCEDURE — 71250 CT THORAX DX C-: CPT | Mod: 26

## 2024-03-07 PROCEDURE — 99233 SBSQ HOSP IP/OBS HIGH 50: CPT

## 2024-03-07 RX ORDER — BENZOCAINE AND MENTHOL 5; 1 G/100ML; G/100ML
1 LIQUID ORAL
Refills: 0 | Status: DISCONTINUED | OUTPATIENT
Start: 2024-03-07 | End: 2024-03-11

## 2024-03-07 RX ORDER — MIDODRINE HYDROCHLORIDE 2.5 MG/1
5 TABLET ORAL THREE TIMES A DAY
Refills: 0 | Status: DISCONTINUED | OUTPATIENT
Start: 2024-03-07 | End: 2024-03-10

## 2024-03-07 RX ORDER — IPRATROPIUM/ALBUTEROL SULFATE 18-103MCG
3 AEROSOL WITH ADAPTER (GRAM) INHALATION EVERY 6 HOURS
Refills: 0 | Status: COMPLETED | OUTPATIENT
Start: 2024-03-07 | End: 2024-03-10

## 2024-03-07 RX ORDER — SENNA PLUS 8.6 MG/1
2 TABLET ORAL AT BEDTIME
Refills: 0 | Status: DISCONTINUED | OUTPATIENT
Start: 2024-03-07 | End: 2024-03-08

## 2024-03-07 RX ADMIN — Medication 4: at 08:39

## 2024-03-07 RX ADMIN — PANTOPRAZOLE SODIUM 40 MILLIGRAM(S): 20 TABLET, DELAYED RELEASE ORAL at 17:19

## 2024-03-07 RX ADMIN — Medication 2: at 12:08

## 2024-03-07 RX ADMIN — Medication 3 MILLILITER(S): at 13:49

## 2024-03-07 RX ADMIN — HEPARIN SODIUM 5000 UNIT(S): 5000 INJECTION INTRAVENOUS; SUBCUTANEOUS at 06:04

## 2024-03-07 RX ADMIN — Medication 600 MILLIGRAM(S): at 06:05

## 2024-03-07 RX ADMIN — PIPERACILLIN AND TAZOBACTAM 25 GRAM(S): 4; .5 INJECTION, POWDER, LYOPHILIZED, FOR SOLUTION INTRAVENOUS at 14:27

## 2024-03-07 RX ADMIN — Medication 4: at 22:04

## 2024-03-07 RX ADMIN — Medication 0.5 MILLIGRAM(S): at 21:51

## 2024-03-07 RX ADMIN — HEPARIN SODIUM 5000 UNIT(S): 5000 INJECTION INTRAVENOUS; SUBCUTANEOUS at 17:19

## 2024-03-07 RX ADMIN — SENNA PLUS 2 TABLET(S): 8.6 TABLET ORAL at 22:04

## 2024-03-07 RX ADMIN — UMECLIDINIUM BROMIDE AND VILANTEROL TRIFENATATE 1 PUFF(S): 62.5; 25 POWDER RESPIRATORY (INHALATION) at 11:00

## 2024-03-07 RX ADMIN — Medication 3 MILLILITER(S): at 13:45

## 2024-03-07 RX ADMIN — Medication 20 MILLIEQUIVALENT(S): at 08:40

## 2024-03-07 RX ADMIN — AZITHROMYCIN 500 MILLIGRAM(S): 500 TABLET, FILM COATED ORAL at 08:41

## 2024-03-07 RX ADMIN — PIPERACILLIN AND TAZOBACTAM 25 GRAM(S): 4; .5 INJECTION, POWDER, LYOPHILIZED, FOR SOLUTION INTRAVENOUS at 06:14

## 2024-03-07 RX ADMIN — Medication 6: at 17:26

## 2024-03-07 RX ADMIN — MONTELUKAST 10 MILLIGRAM(S): 4 TABLET, CHEWABLE ORAL at 08:41

## 2024-03-07 RX ADMIN — DONEPEZIL HYDROCHLORIDE 5 MILLIGRAM(S): 10 TABLET, FILM COATED ORAL at 22:05

## 2024-03-07 RX ADMIN — Medication 75 MICROGRAM(S): at 06:04

## 2024-03-07 RX ADMIN — Medication 3 MILLILITER(S): at 21:50

## 2024-03-07 RX ADMIN — Medication 600 MILLIGRAM(S): at 17:29

## 2024-03-07 RX ADMIN — MEMANTINE HYDROCHLORIDE 5 MILLIGRAM(S): 10 TABLET ORAL at 22:05

## 2024-03-07 RX ADMIN — Medication 3 MILLILITER(S): at 08:58

## 2024-03-07 RX ADMIN — Medication 0.5 MILLIGRAM(S): at 08:57

## 2024-03-07 RX ADMIN — Medication 3 MILLILITER(S): at 02:25

## 2024-03-07 RX ADMIN — PIPERACILLIN AND TAZOBACTAM 25 GRAM(S): 4; .5 INJECTION, POWDER, LYOPHILIZED, FOR SOLUTION INTRAVENOUS at 22:04

## 2024-03-07 RX ADMIN — PANTOPRAZOLE SODIUM 40 MILLIGRAM(S): 20 TABLET, DELAYED RELEASE ORAL at 06:04

## 2024-03-07 RX ADMIN — Medication 3 MILLIGRAM(S): at 22:07

## 2024-03-07 NOTE — PROGRESS NOTE ADULT - SUBJECTIVE AND OBJECTIVE BOX
MINO STEPHENS    [id number]    89y      Male    INTERVAL HPI/OVERNIGHT EVENTS:  patient being seen for ams and rectal cancer. Patient seen at bedside with daughter and is awake and alert and back at baseline as per daughter      REVIEW OF SYSTEMS:    CONSTITUTIONAL: No fever, weight loss, or fatigue  RESPIRATORY: No cough, wheezing, hemoptysis; No shortness of breath  CARDIOVASCULAR: No chest pain, palpitations  GASTROINTESTINAL: No abdominal or epigastric pain. No nausea, vomiting  NEUROLOGICAL: No headaches, memory loss, loss of strength.  MISCELLANEOUS:      Vital Signs Last 24 Hrs  T(C): 37 (07 Mar 2024 11:23), Max: 37.1 (07 Mar 2024 00:15)  T(F): 98.6 (07 Mar 2024 11:23), Max: 98.8 (07 Mar 2024 04:30)  HR: 78 (07 Mar 2024 13:51) (78 - 93)  BP: 105/58 (07 Mar 2024 11:23) (92/54 - 115/70)  BP(mean): --  RR: 18 (07 Mar 2024 11:23) (18 - 20)  SpO2: 94% (07 Mar 2024 13:51) (92% - 95%)    Parameters below as of 07 Mar 2024 13:51  Patient On (Oxygen Delivery Method): room air        PHYSICAL EXAM:    · Constitutional Comments	elderly male sitting up in bed in NAD  · Eyes	conjunctiva clear  · ENMT	mouth  · Respiratory	no rhonchi; diminished breath sounds, L; diminished breath sounds, R  · Cardiovascular	regular rate and rhythm; S1 S2 present  · Gastrointestinal	soft; nontender  · Mental Status	AAOx2 person and place  · Gait/Balance	not tested at this time  · Skin	warm and dry  · Lymphatics Comments	small left shin superficial ulcer  · Musculoskeletal Comments	+2 B/L LE edema no cords    LABS:                        9.9    13.81 )-----------( 172      ( 06 Mar 2024 08:40 )             31.3     03-06    136  |  100  |  57.7<H>  ----------------------------<  164<H>  4.3   |  23.0  |  1.99<H>    Ca    8.7      06 Mar 2024 08:40  Phos  3.6     03-06  Mg     2.3     03-06    TPro  7.4  /  Alb  3.8  /  TBili  0.5  /  DBili  x   /  AST  13  /  ALT  8   /  AlkPhos  131<H>  03-05    PT/INR - ( 05 Mar 2024 22:48 )   PT: 11.9 sec;   INR: 1.07 ratio         PTT - ( 05 Mar 2024 22:48 )  PTT:29.2 sec  Urinalysis Basic - ( 06 Mar 2024 08:40 )    Color: x / Appearance: x / SG: x / pH: x  Gluc: 164 mg/dL / Ketone: x  / Bili: x / Urobili: x   Blood: x / Protein: x / Nitrite: x   Leuk Esterase: x / RBC: x / WBC x   Sq Epi: x / Non Sq Epi: x / Bacteria: x          MEDICATIONS  (STANDING):  albuterol/ipratropium for Nebulization 3 milliLiter(s) Nebulizer every 6 hours  azithromycin   Tablet 500 milliGRAM(s) Oral daily  buDESOnide    Inhalation Suspension 0.5 milliGRAM(s) Inhalation two times a day  dextrose 5%. 1000 milliLiter(s) (50 mL/Hr) IV Continuous <Continuous>  dextrose 50% Injectable 25 Gram(s) IV Push once  donepezil 5 milliGRAM(s) Oral at bedtime  glucagon  Injectable 1 milliGRAM(s) IntraMuscular once  guaiFENesin  milliGRAM(s) Oral every 12 hours  heparin   Injectable 5000 Unit(s) SubCutaneous every 12 hours  insulin lispro (ADMELOG) corrective regimen sliding scale   SubCutaneous Before meals and at bedtime  levothyroxine 75 MICROGram(s) Oral daily  memantine 5 milliGRAM(s) Oral at bedtime  midodrine. 5 milliGRAM(s) Oral three times a day  montelukast 10 milliGRAM(s) Oral daily  pantoprazole    Tablet 40 milliGRAM(s) Oral two times a day  piperacillin/tazobactam IVPB.. 3.375 Gram(s) IV Intermittent every 8 hours  potassium chloride    Tablet ER 20 milliEquivalent(s) Oral daily  torsemide 10 milliGRAM(s) Oral two times a day  umeclidinium 62.5 MICROgram(s)/vilanterol 25 MICROgram(s) Inhaler 1 Puff(s) Inhalation daily    MEDICATIONS  (PRN):  acetaminophen     Tablet .. 650 milliGRAM(s) Oral every 6 hours PRN Temp greater or equal to 38C (100.4F), Mild Pain (1 - 3)  aluminum hydroxide/magnesium hydroxide/simethicone Suspension 30 milliLiter(s) Oral every 4 hours PRN Dyspepsia  dextrose Oral Gel 15 Gram(s) Oral once PRN Blood Glucose LESS THAN 70 milliGRAM(s)/deciliter  melatonin 3 milliGRAM(s) Oral at bedtime PRN Insomnia  ondansetron Injectable 4 milliGRAM(s) IV Push every 8 hours PRN Nausea and/or Vomiting      RADIOLOGY & ADDITIONAL TESTS:   MINO STEPHENS    [id number]    89y      Male    INTERVAL HPI/OVERNIGHT EVENTS:  patient being seen for ams and rectal cancer. Patient seen at bedside with daughter and is awake and alert and back at baseline as per daughter      REVIEW OF SYSTEMS:    CONSTITUTIONAL: No fever, weight loss, or fatigue  RESPIRATORY: No cough, wheezing, hemoptysis; No shortness of breath  CARDIOVASCULAR: No chest pain, palpitations  GASTROINTESTINAL: No abdominal or epigastric pain. No nausea, vomiting  NEUROLOGICAL: No headaches, memory loss, loss of strength.  MISCELLANEOUS:      Vital Signs Last 24 Hrs  T(C): 37 (07 Mar 2024 11:23), Max: 37.1 (07 Mar 2024 00:15)  T(F): 98.6 (07 Mar 2024 11:23), Max: 98.8 (07 Mar 2024 04:30)  HR: 78 (07 Mar 2024 13:51) (78 - 93)  BP: 105/58 (07 Mar 2024 11:23) (92/54 - 115/70)  BP(mean): --  RR: 18 (07 Mar 2024 11:23) (18 - 20)  SpO2: 94% (07 Mar 2024 13:51) (92% - 95%)    Parameters below as of 07 Mar 2024 13:51  Patient On (Oxygen Delivery Method): room air        PHYSICAL EXAM:    · Constitutional Comments	elderly male sitting up in bed in NAD  · Eyes	conjunctiva clear  · ENMT	mouth  · Respiratory	no rhonchi; diminished breath sounds, L; diminished breath sounds, R  · Cardiovascular	regular rate and rhythm; S1 S2 present  · Gastrointestinal	soft; nontender  · Mental Status	AAOx2 person and place, pleasant  · Gait/Balance	not tested at this time  · Skin	warm and dry  · Lymphatics Comments	small left shin superficial ulcer  · Musculoskeletal Comments	+2 B/L LE edema no cords    LABS:                        9.9    13.81 )-----------( 172      ( 06 Mar 2024 08:40 )             31.3     03-06    136  |  100  |  57.7<H>  ----------------------------<  164<H>  4.3   |  23.0  |  1.99<H>    Ca    8.7      06 Mar 2024 08:40  Phos  3.6     03-06  Mg     2.3     03-06    TPro  7.4  /  Alb  3.8  /  TBili  0.5  /  DBili  x   /  AST  13  /  ALT  8   /  AlkPhos  131<H>  03-05    PT/INR - ( 05 Mar 2024 22:48 )   PT: 11.9 sec;   INR: 1.07 ratio         PTT - ( 05 Mar 2024 22:48 )  PTT:29.2 sec  Urinalysis Basic - ( 06 Mar 2024 08:40 )    Color: x / Appearance: x / SG: x / pH: x  Gluc: 164 mg/dL / Ketone: x  / Bili: x / Urobili: x   Blood: x / Protein: x / Nitrite: x   Leuk Esterase: x / RBC: x / WBC x   Sq Epi: x / Non Sq Epi: x / Bacteria: x          MEDICATIONS  (STANDING):  albuterol/ipratropium for Nebulization 3 milliLiter(s) Nebulizer every 6 hours  azithromycin   Tablet 500 milliGRAM(s) Oral daily  buDESOnide    Inhalation Suspension 0.5 milliGRAM(s) Inhalation two times a day  dextrose 5%. 1000 milliLiter(s) (50 mL/Hr) IV Continuous <Continuous>  dextrose 50% Injectable 25 Gram(s) IV Push once  donepezil 5 milliGRAM(s) Oral at bedtime  glucagon  Injectable 1 milliGRAM(s) IntraMuscular once  guaiFENesin  milliGRAM(s) Oral every 12 hours  heparin   Injectable 5000 Unit(s) SubCutaneous every 12 hours  insulin lispro (ADMELOG) corrective regimen sliding scale   SubCutaneous Before meals and at bedtime  levothyroxine 75 MICROGram(s) Oral daily  memantine 5 milliGRAM(s) Oral at bedtime  midodrine. 5 milliGRAM(s) Oral three times a day  montelukast 10 milliGRAM(s) Oral daily  pantoprazole    Tablet 40 milliGRAM(s) Oral two times a day  piperacillin/tazobactam IVPB.. 3.375 Gram(s) IV Intermittent every 8 hours  potassium chloride    Tablet ER 20 milliEquivalent(s) Oral daily  torsemide 10 milliGRAM(s) Oral two times a day  umeclidinium 62.5 MICROgram(s)/vilanterol 25 MICROgram(s) Inhaler 1 Puff(s) Inhalation daily    MEDICATIONS  (PRN):  acetaminophen     Tablet .. 650 milliGRAM(s) Oral every 6 hours PRN Temp greater or equal to 38C (100.4F), Mild Pain (1 - 3)  aluminum hydroxide/magnesium hydroxide/simethicone Suspension 30 milliLiter(s) Oral every 4 hours PRN Dyspepsia  dextrose Oral Gel 15 Gram(s) Oral once PRN Blood Glucose LESS THAN 70 milliGRAM(s)/deciliter  melatonin 3 milliGRAM(s) Oral at bedtime PRN Insomnia  ondansetron Injectable 4 milliGRAM(s) IV Push every 8 hours PRN Nausea and/or Vomiting      RADIOLOGY & ADDITIONAL TESTS:

## 2024-03-07 NOTE — PROGRESS NOTE ADULT - ASSESSMENT
89 y/o male with hx of Rectal cancer diagnosed in 1/23 s/p XRT from 3/6-3/10/23, recent MRI in 2/24 with local progression, and recent f/u at [facility] for discussion for treatment options with systemic chemo which was decided too high risk for patient. He also has hx of COPD not on home 02, DM-2, Afib not on AC, chronic diastolic CHF, CKD-4, hypothyroidism, dementia. Patient resides at home with Wife/Daughter uses a cane at baseline and is currently going to o/p PT. Patient Daughter called [facility] yesterday to notify them that patient was more confused than baseline. No reports of chills, fever, N/V/D. No changes in medications. No hx of aspiration, falls, HA, focal weakness. Patient Daughter advised to take him to the ED by [facility]. In the ED vitals with low grade temp at 99.8. BP stable, not hypoxic. No rectal temp done per Daughter request due to hx of rectal cancer. CXR with mild left sided fullness compared to prior, UA neg, leucocytosis of 20 with left shift noted. RVP neg, CT head with microvascular changes. Blood cx sent, given empiric abx. History obtained from HCP/Daughter at bedside.  (06 Mar 2024 03:02)    CKD III - stable at baseline   Follow up cultures   Volume status OK on the current Torsemide   Zosyn / Zmax   Follow up CT chest     Anemia - Avoid THERESA with ca history   Check TFT's     Will follow   Discussed  with daughter Felicita

## 2024-03-07 NOTE — PROGRESS NOTE ADULT - ASSESSMENT
88 y/o elderly male with increased confusion. leukocytosis leucocytosis.  PMHx of  rectal cancer (follows at [facility]), Chronic diastolic CHF, CKD-4, DM-2, Afib, COPD, Dementia, Hypothyroidism     Leukocytosis mostly like due to pneumonia  -Low grade temp on arrival now resolved  - possible occult bacteremia from locally invasive rectal cancer  - 3/5/2024 CXR grossly unchanged from prior, UA neg, RVP neg  -Pending  blood cx  -WBC on arrival 20.57  WBC today improved 13.81  -Cont to trend WBC  -IV antibiotics per medicine    Rectal Cancer:  -Pt follows with [facility]  - S/P short course of RT 3/6-3/10/2023  -No plan for systemic therapy    DVT ppx  per medicine  Pt is a fall risk  Pt has dementia no mechanical DVT ppx due to fall risk    Chronic diastolic CHF:  -No evidence of decompensated CHF  -cont. out ptregimen as BP will tolerate  -DASH diet    CKD-4:  -Cr. at  baseline  creat on adm 2.99 toady 1.99  -Avoid nephrotoxic drugs    Afib:  -Not on AC due to dementia  -fall risk/ rectal cancer bleed risk          Keep [facility] inform of pts progress    Thank you for consulting [facility]  [phone]      Tim Wilkerson MD  [facility]  [phone]            89 y/o elderly male with increased confusion. leukocytosis leucocytosis.  PMHx of  rectal cancer (follows at [facility]), Chronic diastolic CHF, CKD-4, DM-2, Afib, COPD, Dementia, Hypothyroidism     Leukocytosis mostly like due to pneumonia  -Low grade temp on arrival now resolved  - possible occult bacteremia from locally invasive rectal cancer  - 3/5/2024 CXR grossly unchanged from prior, UA neg, RVP neg  - blood cx - negative   -WBC on arrival 20.57  WBC today improved 13.81  -Cont to trend WBC  -IV antibiotics per medicine    Rectal Cancer:  -Pt follows with [facility]  - S/P short course of RT 3/6-3/10/2023  -No plan for systemic therapy    DVT ppx  per medicine  Pt is a fall risk  Pt has dementia no mechanical DVT ppx due to fall risk    Chronic diastolic CHF:  -No evidence of decompensated CHF  -cont. out ptregimen as BP will tolerate  -DASH diet    CKD-4:  -Cr. at  baseline  creat on adm 2.99 toady 1.99  -Avoid nephrotoxic drugs    Afib:  -Not on AC due to dementia  -fall risk/ rectal cancer bleed risk          Keep [facility] inform of pts progress    Tim Wilkerson MD  [facility]  [phone]

## 2024-03-07 NOTE — PROGRESS NOTE ADULT - ASSESSMENT
88 y/o male with increased confusion/Leucocytosis, hx of rectal cancer, Chronic diastolic CHF, CKD-4, DM-2, Afib, COPD, Dementia, Hypothyroidism presents to [facility] on 3/6 for ams and weakness. Patient admitted to medicine and renal, cardio,     Confusion/Leucocytosis:  -At baseline cognition in ED per Daughter  -No focal weakness   -No photophobia/neck stiffness   -Low grade temp resolved  -Concern for possible occult bacteremia from locally invasive rectal cancer  -At risk for sepsis  -CXR grossly unchanged from prior, UA neg, RVP neg  -F/U blood cx  -Trend Temp/WBC  -Cont. empiric abx to cover enterics and atypical   -DVT-P  -OOB, ambulate with cane/walker  -PT program   -Fall risk protocol    Rectal Cancer:  -[facility] notes reviewed in HIE  -No plan for systemic therapy  -Daughter wishes to inform Dr. Lozada if bacteremic from rectal cancer invasion   -Palliative care for ongoing GOC  -DNR/DNI    Chronic diastolic CHF:  -No evidence of decompensated CHF  -cont. O/P regimen as BP will tolerate  -DASH diet    CKD-4:  -Cr. at baseline  -Avoid nephrotoxic drugs  -Renally dose meds     Afib:  -Not on AC due to dementia/fall risk/Risk of rectal bleeding with invasive rectal cancer    COPD:  -Not on home 02  -Nebs prn  -Cont. Anoro Ellipta  -Budesonide  -Singulair      Dementia:  -Cont. Aricept/Memantine   -fall risk  -At risk for delirium while hospitalized     DM-2:  -ADA diet  -Coverage insulin  -Accu checks AC/HS  -start on basal insulin if BS elevated     Hypothyroidism:  -Cont. Synthroid     Discussed at length with Daughter/HCP at bedside who agrees with plan of care   89 y/o male with increased confusion/Leucocytosis, hx of rectal cancer, Chronic diastolic CHF, CKD-4, DM-2, Afib, COPD, Dementia, Hypothyroidism presents to [facility] on 3/6 for ams and weakness. Patient admitted to medicine and renal, cardio, pulm and hem onc consulted on patient     acute metabolic encephaloapthy - ct brain negative  -At baseline cognition in ED per Daughter  -No focal weakness   -No photophobia/neck stiffness   -Low grade temp resolved  -Concern for possible occult bacteremia from locally invasive rectal cancer  -At risk for sepsis  -Cont. empiric abx to cover enterics and atypical   -c.w zosyn and azithro     Rectal Cancer:  -[facility] notes reviewed in HIE, not a candidate for further chemo  - hemonc following   -Daughter wishes to inform Dr. Lozada if bacteremic from rectal cancer invasion   -Palliative care for ongoing GOC  -DNR/DNI    Chronic diastolic CHF:  -No evidence of decompensated CHF  -cont. O/P regimen as BP will tolerate  -DASH diet    CKD-4:  -Cr. at baseline  -Avoid nephrotoxic drugs  -Renally dose meds     Afib:  -Not on AC due to dementia/fall risk/Risk of rectal bleeding with invasive rectal cancer    COPD:  -Not on home 02  -Nebs prn  -Cont. Anoro Ellipta  -Budesonide  -Singulair      Dementia:  -Cont. Aricept/Memantine   -fall risk  -At risk for delirium while hospitalized     DM-2:  -ADA diet  -Coverage insulin  -Accu checks AC/HS    Hypothyroidism:  -Cont. Synthroid     Discussed with Daughter  [facility]

## 2024-03-07 NOTE — PROGRESS NOTE ADULT - ASSESSMENT
89 y/o male with hx of Rectal cancer diagnosed in 1/23 s/p XRT from 3/6-3/10/23  hx of COPD  DM-2, Afib not on AC, chronic diastolic CHF, CKD-4, dementia admitted with confusion and leukocytosis     Encephalopathy  improving  MF - metabolic infectious   correct underlying issues  supportive care    PNA  IV abx, monitor for fevers    Rectal cancer  Daughter plan to pursue further tx if available  Tylenol for pain    Dementia  Described as forgetful, needs assistance with ADLs,   Reported baseline - verbal and ambulatory  Not End stage    CKD   Avoid nephrotoxins  Avoid Morphine and NSAIDs for pain  If need for increase in pain regimen, rec Tramadol 25mg prn    Encounter for Palliative Care  Palliative Care consulted to assist with goals of care  Daughter Felicita at bedside.  She states she is the HCP. She has a form indicating such - states should be on file in our hospital.   Will look into EMR, but also requested daughter a copy of HCP form if  we are unable to locate. Discussed [place]  [facility]  She confirmed DNR/I orders.  She states she made that decision in conjunction with her stepmother.   Patient with low symptom burden  Daughter wishes to follow up with oncology to  pursue further oncologic treatments  Palliative Care to sign off

## 2024-03-07 NOTE — PROGRESS NOTE ADULT - SUBJECTIVE AND OBJECTIVE BOX
CC:  Follow up GOC , Symptoms    OVERNIGHT EVENTS:  no reported events.   Daughter reports father is improving    Present Symptoms:   Dyspnea:  No    Nausea/Vomiting:  No   Anxiety:  No    Depression:  No    Fatigue:  Yes     Loss of appetite:  No  Constipation: Not Reported     Pain: No              Location            Duration            Character            Severity            Factors            Effect    Pain AD Score:  [email or web address] (press ctrl + left click to view)    Review of Systems: Reviewed as above  All others negative      MEDICATIONS  (STANDING):  albuterol/ipratropium for Nebulization 3 milliLiter(s) Nebulizer every 6 hours  azithromycin   Tablet 500 milliGRAM(s) Oral daily  buDESOnide    Inhalation Suspension 0.5 milliGRAM(s) Inhalation two times a day  dextrose 5%. 1000 milliLiter(s) (50 mL/Hr) IV Continuous <Continuous>  dextrose 50% Injectable 25 Gram(s) IV Push once  donepezil 5 milliGRAM(s) Oral at bedtime  glucagon  Injectable 1 milliGRAM(s) IntraMuscular once  guaiFENesin  milliGRAM(s) Oral every 12 hours  heparin   Injectable 5000 Unit(s) SubCutaneous every 12 hours  insulin lispro (ADMELOG) corrective regimen sliding scale   SubCutaneous Before meals and at bedtime  levothyroxine 75 MICROGram(s) Oral daily  memantine 5 milliGRAM(s) Oral at bedtime  midodrine. 5 milliGRAM(s) Oral three times a day  montelukast 10 milliGRAM(s) Oral daily  pantoprazole    Tablet 40 milliGRAM(s) Oral two times a day  piperacillin/tazobactam IVPB.. 3.375 Gram(s) IV Intermittent every 8 hours  potassium chloride    Tablet ER 20 milliEquivalent(s) Oral daily  torsemide 10 milliGRAM(s) Oral two times a day  umeclidinium 62.5 MICROgram(s)/vilanterol 25 MICROgram(s) Inhaler 1 Puff(s) Inhalation daily    MEDICATIONS  (PRN):  acetaminophen     Tablet .. 650 milliGRAM(s) Oral every 6 hours PRN Temp greater or equal to 38C (100.4F), Mild Pain (1 - 3)  aluminum hydroxide/magnesium hydroxide/simethicone Suspension 30 milliLiter(s) Oral every 4 hours PRN Dyspepsia  dextrose Oral Gel 15 Gram(s) Oral once PRN Blood Glucose LESS THAN 70 milliGRAM(s)/deciliter  melatonin 3 milliGRAM(s) Oral at bedtime PRN Insomnia  ondansetron Injectable 4 milliGRAM(s) IV Push every 8 hours PRN Nausea and/or Vomiting      PHYSICAL EXAM:    Vital Signs Last 24 Hrs  T(C): 37 (07 Mar 2024 11:23), Max: 37.1 (07 Mar 2024 00:15)  T(F): 98.6 (07 Mar 2024 11:23), Max: 98.8 (07 Mar 2024 04:30)  HR: 93 (07 Mar 2024 11:23) (81 - 93)  BP: 105/58 (07 Mar 2024 11:23) (91/55 - 115/70)  BP(mean): --  RR: 18 (07 Mar 2024 11:23) (18 - 20)  SpO2: 95% (07 Mar 2024 11:23) (92% - 95%)    Parameters below as of 07 Mar 2024 11:23  Patient On (Oxygen Delivery Method): room air    Karnofsky :40 %  General: Elderly man NAD    HEENT:  NCAT        Lungs: comfortable  non labored  CV:  RR  MSK: moves all extremities  Skin:  warm/dry  Neuro  sleeping, awakens to voice      LABS:                          9.9    13.81 )-----------( 172      ( 06 Mar 2024 08:40 )             31.3     03-06    136  |  100  |  57.7<H>  ----------------------------<  164<H>  4.3   |  23.0  |  1.99<H>    Ca    8.7      06 Mar 2024 08:40  Phos  3.6     03-06  Mg     2.3     03-06    TPro  7.4  /  Alb  3.8  /  TBili  0.5  /  DBili  x   /  AST  13  /  ALT  8   /  AlkPhos  131<H>  03-05    PT/INR - ( 05 Mar 2024 22:48 )   PT: 11.9 sec;   INR: 1.07 ratio         PTT - ( 05 Mar 2024 22:48 )  PTT:29.2 sec  Urinalysis Basic - ( 06 Mar 2024 08:40 )    Color: x / Appearance: x / SG: x / pH: x  Gluc: 164 mg/dL / Ketone: x  / Bili: x / Urobili: x   Blood: x / Protein: x / Nitrite: x   Leuk Esterase: x / RBC: x / WBC x   Sq Epi: x / Non Sq Epi: x / Bacteria: x      I&O's Summary      RADIOLOGY & ADDITIONAL STUDIES:  Imaging Reviewed  (  x )   < from: CT Brain Stroke Protocol (03.05.24 @ 22:09) >        INTERPRETATION:  CT HEAD STROKE PROTOCOL    INDICATIONS: Stroke Code, altered mental status, XCT    TECHNIQUE:  Serial axial images were obtained from the skull base to the vertex   without the use of intravenous contrast. RAPID artificial intelligence   was used for preliminary evaluation of intracranial hemorrhage.    COMPARISON EXAMINATION: 5/28/2023.    FINDINGS:  Brainparenchyma: Gray-white matter discrimination is well preserved.   Mild areas of periventricular hypoattenuation are seen. Punctate lacunar   infarcts identified within the bilateral basal ganglia and subinsular   regions. There is no mass effect or intracranial hemorrhage. Mild   evidence of parenchymal volume loss noted.    Extra-axial compartments: No extra-axial fluid collections are present.   The ventricles and sulci are slightly enlarged, compatible with volume   loss, although similar in appearance to the prior study from May 2023.   The basal cisterns are patent. Craniocervical junction and sella turcica   are within normal limits.    Calvarium, paranasal sinuses, and orbits: The calvarium is intact.   Paranasal sinuses and mastoid air cells are clear. The orbits demonstrate   lens replacements.      IMPRESSION:  No large territory acute infarct, intracranial hemorrhage, or mass effect.    Stable appearance of chronic microangiopathic white matter changes and   parenchymal volume loss.    Findings above were discussed directly by telephone by the ED radiologist   on call, Alphonse Reich M.D., with the emergency Department ordering   physician, Kenya Gee M.D., at 10:13 PM on 3/5/2024.    --- End of Report ---            ALPHONSE REICH MD; Attending Radiologist  This document has been electronically signed. Mar  5 2024 10:20PM    < end of copied text >        ADVANCE DIRECTIVE PREFERENCES    DNR/I  and continuing medical treatments

## 2024-03-07 NOTE — PROGRESS NOTE ADULT - SUBJECTIVE AND OBJECTIVE BOX
NEPHROLOGY INTERVAL HPI/OVERNIGHT EVENTS:    Feels better   No new events         MEDICATIONS  (STANDING):  albuterol/ipratropium for Nebulization 3 milliLiter(s) Nebulizer every 6 hours  azithromycin   Tablet 500 milliGRAM(s) Oral daily  buDESOnide    Inhalation Suspension 0.5 milliGRAM(s) Inhalation two times a day  dextrose 5%. 1000 milliLiter(s) (50 mL/Hr) IV Continuous <Continuous>  dextrose 50% Injectable 25 Gram(s) IV Push once  donepezil 5 milliGRAM(s) Oral at bedtime  glucagon  Injectable 1 milliGRAM(s) IntraMuscular once  guaiFENesin  milliGRAM(s) Oral every 12 hours  heparin   Injectable 5000 Unit(s) SubCutaneous every 12 hours  insulin lispro (ADMELOG) corrective regimen sliding scale   SubCutaneous Before meals and at bedtime  levothyroxine 75 MICROGram(s) Oral daily  memantine 5 milliGRAM(s) Oral at bedtime  midodrine. 5 milliGRAM(s) Oral three times a day  montelukast 10 milliGRAM(s) Oral daily  pantoprazole    Tablet 40 milliGRAM(s) Oral two times a day  piperacillin/tazobactam IVPB.. 3.375 Gram(s) IV Intermittent every 8 hours  potassium chloride    Tablet ER 20 milliEquivalent(s) Oral daily  torsemide 10 milliGRAM(s) Oral two times a day  umeclidinium 62.5 MICROgram(s)/vilanterol 25 MICROgram(s) Inhaler 1 Puff(s) Inhalation daily    MEDICATIONS  (PRN):  acetaminophen     Tablet .. 650 milliGRAM(s) Oral every 6 hours PRN Temp greater or equal to 38C (100.4F), Mild Pain (1 - 3)  aluminum hydroxide/magnesium hydroxide/simethicone Suspension 30 milliLiter(s) Oral every 4 hours PRN Dyspepsia  dextrose Oral Gel 15 Gram(s) Oral once PRN Blood Glucose LESS THAN 70 milliGRAM(s)/deciliter  melatonin 3 milliGRAM(s) Oral at bedtime PRN Insomnia  ondansetron Injectable 4 milliGRAM(s) IV Push every 8 hours PRN Nausea and/or Vomiting      Allergies    No Known Allergies    Intolerances        Vital Signs Last 24 Hrs  T(C): 37 (07 Mar 2024 11:23), Max: 37.1 (07 Mar 2024 00:15)  T(F): 98.6 (07 Mar 2024 11:23), Max: 98.8 (07 Mar 2024 04:30)  HR: 93 (07 Mar 2024 11:23) (81 - 93)  BP: 105/58 (07 Mar 2024 11:23) (91/55 - 115/70)  BP(mean): --  RR: 18 (07 Mar 2024 11:23) (18 - 20)  SpO2: 95% (07 Mar 2024 11:23) (92% - 95%)    Parameters below as of 07 Mar 2024 11:23  Patient On (Oxygen Delivery Method): room air      Daily     Daily   I&O's Detail    I&O's Summary      PHYSICAL EXAM:        GENERAL: appears chronically ill  HEAD: anicteric , no edema   NECK: Supple  CHEST/LUNG: EAE , Few basilar crackles . no wheeze   HEART: Regular rate and rhythm  ABDOMEN: Soft, Nontender, Nondistended; +BS  EXTREMITIES:  decreased edema   LABS:                        9.9    13.81 )-----------( 172      ( 06 Mar 2024 08:40 )             31.3     03-06    136  |  100  |  57.7<H>  ----------------------------<  164<H>  4.3   |  23.0  |  1.99<H>    Ca    8.7      06 Mar 2024 08:40  Phos  3.6     03-06  Mg     2.3     03-06    TPro  7.4  /  Alb  3.8  /  TBili  0.5  /  DBili  x   /  AST  13  /  ALT  8   /  AlkPhos  131<H>  03-05    PT/INR - ( 05 Mar 2024 22:48 )   PT: 11.9 sec;   INR: 1.07 ratio         PTT - ( 05 Mar 2024 22:48 )  PTT:29.2 sec  Urinalysis Basic - ( 06 Mar 2024 08:40 )    Color: x / Appearance: x / SG: x / pH: x  Gluc: 164 mg/dL / Ketone: x  / Bili: x / Urobili: x   Blood: x / Protein: x / Nitrite: x   Leuk Esterase: x / RBC: x / WBC x   Sq Epi: x / Non Sq Epi: x / Bacteria: x              RADIOLOGY & ADDITIONAL TESTS:

## 2024-03-07 NOTE — PROGRESS NOTE ADULT - SUBJECTIVE AND OBJECTIVE BOX
88 y/o elderly male with hx of Rectal cancer diagnosed in 1/23 s/p XRT from 3/6-3/10/23, recent MRI in 2/24 with local progression, and recent f/u at [facility] for discussion for treatment options with systemic chemo which was decided too high risk for patient. He also has hx of COPD not on home 02, DM-2, Afib not on AC, chronic diastolic CHF, CKD-4, hypothyroidism, dementia. Patient resides at home with Wife/Daughter uses a cane at baseline and is currently going to o/p PT. Patient Daughter called [facility] yesterday to notify them that patient was more confused than baseline. No reports of chills, fever, N/V/D. No changes in medications. No hx of aspiration, falls, HA, focal weakness. Patient Daughter advised to take him to the ED by [facility]. In the ED vitals with low grade temp at 99.8. BP stable, not hypoxic. No rectal temp done per Daughter request due to hx of rectal cancer. Blood cx sent, given empiric abx. History obtained from daughter at bedside.     CT brain 3/5/2024 MPRESSION: No large territory acute infarct, intracranial hemorrhage, or mass effect.  Stable appearance of chronic microangiopathic white matter changes and  parenchymal volume loss.  CXR 3/5/2024 On January 1, 2023 there was a left lower lobe infiltrate and presently this infiltrate is again noted essentially unchanged.  The present film also shows a mild left perihilar infiltrate and an increasing right lower perihilar infiltrate.    pt on 2L NC   lethargic     Home Medications:  albuterol 1.25 mg/3 mL (0.042%) inhalation solution: 3 milliliter(s) inhaled 3 times a day, As Needed (06 Mar 2024 03:39)  Anoro Ellipta 62.5 mcg-25 mcg/inh inhalation powder: 1 puff(s) inhaled once a day (06 Mar 2024 03:39)  Colace 100 mg oral capsule: 1 cap(s) orally 3 times a day (06 Mar 2024 03:39)  insulin lispro 100 units/mL injectable solution: 4 unit(s) injectable 3 times a day (before meals) (06 Mar 2024 03:39)  memantine 7 mg oral capsule, extended release: 1 cap(s) orally once a day (06 Mar 2024 03:39)  potassium chloride 20 mEq oral granule, extended release: orally 2 times a day (06 Mar 2024 03:41)  Protonix 40 mg oral delayed release tablet: 1 tab(s) orally 2 times a day (06 Mar 2024 03:41)  torsemide 10 mg oral tablet: 1 tab(s) orally 2 times a day (06 Mar 2024 03:39)  Toujeo SoloStar 300 units/mL subcutaneous solution: 6 unit(s) subcutaneous once a day (at bedtime) (06 Mar 2024 03:39)    PAST MEDICAL & SURGICAL HISTORY:  DM (diabetes mellitus)      Afib      Dementia      COPD (chronic obstructive pulmonary disease)      CKD (chronic kidney disease)      Rectal cancer      S/P carotid endarterectomy      History of lung biopsy      Social History:  lives with spouse/Daughter (06 Mar 2024 03:02)    MEDICATIONS  (STANDING):  azithromycin   Tablet 500 milliGRAM(s) Oral daily  buDESOnide    Inhalation Suspension 0.5 milliGRAM(s) Inhalation two times a day  dextrose 5%. 1000 milliLiter(s) (50 mL/Hr) IV Continuous <Continuous>  dextrose 50% Injectable 25 Gram(s) IV Push once  donepezil 5 milliGRAM(s) Oral at bedtime  glucagon  Injectable 1 milliGRAM(s) IntraMuscular once  guaiFENesin  milliGRAM(s) Oral every 12 hours  heparin   Injectable 5000 Unit(s) SubCutaneous every 12 hours  insulin lispro (ADMELOG) corrective regimen sliding scale   SubCutaneous Before meals and at bedtime  levothyroxine 75 MICROGram(s) Oral daily  memantine 5 milliGRAM(s) Oral at bedtime  midodrine. 5 milliGRAM(s) Oral three times a day  montelukast 10 milliGRAM(s) Oral daily  pantoprazole    Tablet 40 milliGRAM(s) Oral two times a day  piperacillin/tazobactam IVPB.. 3.375 Gram(s) IV Intermittent every 8 hours  potassium chloride    Tablet ER 20 milliEquivalent(s) Oral daily  torsemide 10 milliGRAM(s) Oral two times a day  umeclidinium 62.5 MICROgram(s)/vilanterol 25 MICROgram(s) Inhaler 1 Puff(s) Inhalation daily    MEDICATIONS  (PRN):  acetaminophen     Tablet .. 650 milliGRAM(s) Oral every 6 hours PRN Temp greater or equal to 38C (100.4F), Mild Pain (1 - 3)  albuterol/ipratropium for Nebulization 3 milliLiter(s) Nebulizer every 6 hours PRN Shortness of Breath and/or Wheezing  aluminum hydroxide/magnesium hydroxide/simethicone Suspension 30 milliLiter(s) Oral every 4 hours PRN Dyspepsia  dextrose Oral Gel 15 Gram(s) Oral once PRN Blood Glucose LESS THAN 70 milliGRAM(s)/deciliter  melatonin 3 milliGRAM(s) Oral at bedtime PRN Insomnia  ondansetron Injectable 4 milliGRAM(s) IV Push every 8 hours PRN Nausea and/or Vomiting    Vital Signs Last 24 Hrs  T(C): 36.9 (07 Mar 2024 08:00), Max: 37.1 (07 Mar 2024 00:15)  T(F): 98.5 (07 Mar 2024 08:00), Max: 98.8 (07 Mar 2024 04:30)  HR: 86 (07 Mar 2024 08:00) (65 - 92)  BP: 92/54 (07 Mar 2024 08:00) (90/53 - 115/70)  BP(mean): --  RR: 19 (07 Mar 2024 08:00) (18 - 20)  SpO2: 92% (07 Mar 2024 09:01) (89% - 94%)    Parameters below as of 07 Mar 2024 09:01  Patient On (Oxygen Delivery Method): room air                            9.9    13.81 )-----------( 172      ( 06 Mar 2024 08:40 )             31.3                           9.9    13.81 )-----------( 172      ( 06 Mar 2024 08:40 )    03-06    136  |  100  |  57.7<H>  ----------------------------<  164<H>  4.3   |  23.0  |  1.99<H>    Ca    8.7      06 Mar 2024 08:40  Phos  3.6     03-06  Mg     2.3     03-06    TPro  7.4  /  Alb  3.8  /  TBili  0.5  /  DBili  x   /  AST  13  /  ALT  8   /  AlkPhos  131<H>  03-05   31.3      Physical Exam:   Constitutional 	elderly male sitting up in bed in NAD  Eyes PERRLA conjunctiva clear   ENMT	 mucosa dry· Respiratory	  Resp no rhonchi; diminished breath sounds, L; diminished breath sounds, R  Cardiovascular	regular rate and rhythm; S1 S2 present  Gastrointestinal: soft; nontender  Mental Status	AOx1 person  Skin warm and dry   Musculoskeletal Comments +2 B/L LE edema      89 y/o elderly male with hx of Rectal cancer diagnosed in 1/23 s/p XRT from 3/6-3/10/23, recent MRI in 2/24 with local progression, and recent f/u at [facility] for discussion for treatment options with systemic chemo which was decided too high risk for patient. He also has hx of COPD not on home 02, DM-2, Afib not on AC, chronic diastolic CHF, CKD-4, hypothyroidism, dementia. Patient resides at home with Wife/Daughter uses a cane at baseline and is currently going to o/p PT. Patient Daughter called [facility] yesterday to notify them that patient was more confused than baseline. No reports of chills, fever, N/V/D. No changes in medications. No hx of aspiration, falls, HA, focal weakness. Patient Daughter advised to take him to the ED by [facility]. In the ED vitals with low grade temp at 99.8. BP stable, not hypoxic. No rectal temp done per Daughter request due to hx of rectal cancer. Blood cx sent, given empiric abx. History obtained from daughter at bedside.     CT brain 3/5/2024 MPRESSION: No large territory acute infarct, intracranial hemorrhage, or mass effect.  Stable appearance of chronic microangiopathic white matter changes and  parenchymal volume loss.  CXR 3/5/2024 On January 1, 2023 there was a left lower lobe infiltrate and presently this infiltrate is again noted essentially unchanged.  The present film also shows a mild left perihilar infiltrate and an increasing right lower perihilar infiltrate.    now on RA       Home Medications:  albuterol 1.25 mg/3 mL (0.042%) inhalation solution: 3 milliliter(s) inhaled 3 times a day, As Needed (06 Mar 2024 03:39)  Anoro Ellipta 62.5 mcg-25 mcg/inh inhalation powder: 1 puff(s) inhaled once a day (06 Mar 2024 03:39)  Colace 100 mg oral capsule: 1 cap(s) orally 3 times a day (06 Mar 2024 03:39)  insulin lispro 100 units/mL injectable solution: 4 unit(s) injectable 3 times a day (before meals) (06 Mar 2024 03:39)  memantine 7 mg oral capsule, extended release: 1 cap(s) orally once a day (06 Mar 2024 03:39)  potassium chloride 20 mEq oral granule, extended release: orally 2 times a day (06 Mar 2024 03:41)  Protonix 40 mg oral delayed release tablet: 1 tab(s) orally 2 times a day (06 Mar 2024 03:41)  torsemide 10 mg oral tablet: 1 tab(s) orally 2 times a day (06 Mar 2024 03:39)  Toujeo SoloStar 300 units/mL subcutaneous solution: 6 unit(s) subcutaneous once a day (at bedtime) (06 Mar 2024 03:39)    PAST MEDICAL & SURGICAL HISTORY:  DM (diabetes mellitus)      Afib      Dementia      COPD (chronic obstructive pulmonary disease)      CKD (chronic kidney disease)      Rectal cancer      S/P carotid endarterectomy      History of lung biopsy      Social History:  lives with spouse/Daughter (06 Mar 2024 03:02)    MEDICATIONS  (STANDING):  azithromycin   Tablet 500 milliGRAM(s) Oral daily  buDESOnide    Inhalation Suspension 0.5 milliGRAM(s) Inhalation two times a day  dextrose 5%. 1000 milliLiter(s) (50 mL/Hr) IV Continuous <Continuous>  dextrose 50% Injectable 25 Gram(s) IV Push once  donepezil 5 milliGRAM(s) Oral at bedtime  glucagon  Injectable 1 milliGRAM(s) IntraMuscular once  guaiFENesin  milliGRAM(s) Oral every 12 hours  heparin   Injectable 5000 Unit(s) SubCutaneous every 12 hours  insulin lispro (ADMELOG) corrective regimen sliding scale   SubCutaneous Before meals and at bedtime  levothyroxine 75 MICROGram(s) Oral daily  memantine 5 milliGRAM(s) Oral at bedtime  midodrine. 5 milliGRAM(s) Oral three times a day  montelukast 10 milliGRAM(s) Oral daily  pantoprazole    Tablet 40 milliGRAM(s) Oral two times a day  piperacillin/tazobactam IVPB.. 3.375 Gram(s) IV Intermittent every 8 hours  potassium chloride    Tablet ER 20 milliEquivalent(s) Oral daily  torsemide 10 milliGRAM(s) Oral two times a day  umeclidinium 62.5 MICROgram(s)/vilanterol 25 MICROgram(s) Inhaler 1 Puff(s) Inhalation daily    MEDICATIONS  (PRN):  acetaminophen     Tablet .. 650 milliGRAM(s) Oral every 6 hours PRN Temp greater or equal to 38C (100.4F), Mild Pain (1 - 3)  albuterol/ipratropium for Nebulization 3 milliLiter(s) Nebulizer every 6 hours PRN Shortness of Breath and/or Wheezing  aluminum hydroxide/magnesium hydroxide/simethicone Suspension 30 milliLiter(s) Oral every 4 hours PRN Dyspepsia  dextrose Oral Gel 15 Gram(s) Oral once PRN Blood Glucose LESS THAN 70 milliGRAM(s)/deciliter  melatonin 3 milliGRAM(s) Oral at bedtime PRN Insomnia  ondansetron Injectable 4 milliGRAM(s) IV Push every 8 hours PRN Nausea and/or Vomiting    Vital Signs Last 24 Hrs  T(C): 36.9 (07 Mar 2024 08:00), Max: 37.1 (07 Mar 2024 00:15)  T(F): 98.5 (07 Mar 2024 08:00), Max: 98.8 (07 Mar 2024 04:30)  HR: 86 (07 Mar 2024 08:00) (65 - 92)  BP: 92/54 (07 Mar 2024 08:00) (90/53 - 115/70)  BP(mean): --  RR: 19 (07 Mar 2024 08:00) (18 - 20)  SpO2: 92% (07 Mar 2024 09:01) (89% - 94%)    Parameters below as of 07 Mar 2024 09:01  Patient On (Oxygen Delivery Method): room air                            9.9    13.81 )-----------( 172      ( 06 Mar 2024 08:40 )             31.3                           9.9    13.81 )-----------( 172      ( 06 Mar 2024 08:40 )    03-06    136  |  100  |  57.7<H>  ----------------------------<  164<H>  4.3   |  23.0  |  1.99<H>    Ca    8.7      06 Mar 2024 08:40  Phos  3.6     03-06  Mg     2.3     03-06    TPro  7.4  /  Alb  3.8  /  TBili  0.5  /  DBili  x   /  AST  13  /  ALT  8   /  AlkPhos  131<H>  03-05   31.3      Physical Exam:   Constitutional 	elderly male sitting up in bed in NAD  Eyes PERRLA conjunctiva clear   ENMT	 mucosa dry· Respiratory	  Resp no rhonchi; diminished breath sounds, L; diminished breath sounds, R  Cardiovascular	regular rate and rhythm; S1 S2 present  Gastrointestinal: soft; nontender  Mental Status	AOx1 person  Skin warm and dry   Musculoskeletal Comments +2 B/L LE edema

## 2024-03-08 ENCOUNTER — TRANSCRIPTION ENCOUNTER (OUTPATIENT)
Age: 89
End: 2024-03-08

## 2024-03-08 LAB
ALBUMIN SERPL ELPH-MCNC: 3 G/DL — LOW (ref 3.3–5.2)
ALP SERPL-CCNC: 92 U/L — SIGNIFICANT CHANGE UP (ref 40–120)
ALT FLD-CCNC: 5 U/L — SIGNIFICANT CHANGE UP
ANION GAP SERPL CALC-SCNC: 12 MMOL/L — SIGNIFICANT CHANGE UP (ref 5–17)
AST SERPL-CCNC: 8 U/L — SIGNIFICANT CHANGE UP
BASOPHILS # BLD AUTO: 0.03 K/UL — SIGNIFICANT CHANGE UP (ref 0–0.2)
BASOPHILS NFR BLD AUTO: 0.3 % — SIGNIFICANT CHANGE UP (ref 0–2)
BILIRUB SERPL-MCNC: 0.5 MG/DL — SIGNIFICANT CHANGE UP (ref 0.4–2)
BUN SERPL-MCNC: 52.6 MG/DL — HIGH (ref 8–20)
CALCIUM SERPL-MCNC: 8.7 MG/DL — SIGNIFICANT CHANGE UP (ref 8.4–10.5)
CHLORIDE SERPL-SCNC: 103 MMOL/L — SIGNIFICANT CHANGE UP (ref 96–108)
CO2 SERPL-SCNC: 25 MMOL/L — SIGNIFICANT CHANGE UP (ref 22–29)
CREAT SERPL-MCNC: 2.27 MG/DL — HIGH (ref 0.5–1.3)
EGFR: 27 ML/MIN/1.73M2 — LOW
EOSINOPHIL # BLD AUTO: 0.46 K/UL — SIGNIFICANT CHANGE UP (ref 0–0.5)
EOSINOPHIL NFR BLD AUTO: 4.8 % — SIGNIFICANT CHANGE UP (ref 0–6)
GLUCOSE BLDC GLUCOMTR-MCNC: 116 MG/DL — HIGH (ref 70–99)
GLUCOSE BLDC GLUCOMTR-MCNC: 154 MG/DL — HIGH (ref 70–99)
GLUCOSE BLDC GLUCOMTR-MCNC: 196 MG/DL — HIGH (ref 70–99)
GLUCOSE BLDC GLUCOMTR-MCNC: 239 MG/DL — HIGH (ref 70–99)
GLUCOSE SERPL-MCNC: 96 MG/DL — SIGNIFICANT CHANGE UP (ref 70–99)
HCT VFR BLD CALC: 25.8 % — LOW (ref 39–50)
HGB BLD-MCNC: 8.4 G/DL — LOW (ref 13–17)
IMM GRANULOCYTES NFR BLD AUTO: 0.5 % — SIGNIFICANT CHANGE UP (ref 0–0.9)
LYMPHOCYTES # BLD AUTO: 0.81 K/UL — LOW (ref 1–3.3)
LYMPHOCYTES # BLD AUTO: 8.5 % — LOW (ref 13–44)
MAGNESIUM SERPL-MCNC: 2.4 MG/DL — SIGNIFICANT CHANGE UP (ref 1.6–2.6)
MCHC RBC-ENTMCNC: 28.4 PG — SIGNIFICANT CHANGE UP (ref 27–34)
MCHC RBC-ENTMCNC: 32.6 GM/DL — SIGNIFICANT CHANGE UP (ref 32–36)
MCV RBC AUTO: 87.2 FL — SIGNIFICANT CHANGE UP (ref 80–100)
MONOCYTES # BLD AUTO: 1.03 K/UL — HIGH (ref 0–0.9)
MONOCYTES NFR BLD AUTO: 10.8 % — SIGNIFICANT CHANGE UP (ref 2–14)
NEUTROPHILS # BLD AUTO: 7.18 K/UL — SIGNIFICANT CHANGE UP (ref 1.8–7.4)
NEUTROPHILS NFR BLD AUTO: 75.1 % — SIGNIFICANT CHANGE UP (ref 43–77)
PLATELET # BLD AUTO: 186 K/UL — SIGNIFICANT CHANGE UP (ref 150–400)
POTASSIUM SERPL-MCNC: 4.4 MMOL/L — SIGNIFICANT CHANGE UP (ref 3.5–5.3)
POTASSIUM SERPL-SCNC: 4.4 MMOL/L — SIGNIFICANT CHANGE UP (ref 3.5–5.3)
PROT SERPL-MCNC: 6.3 G/DL — LOW (ref 6.6–8.7)
RBC # BLD: 2.96 M/UL — LOW (ref 4.2–5.8)
RBC # FLD: 15.4 % — HIGH (ref 10.3–14.5)
SODIUM SERPL-SCNC: 140 MMOL/L — SIGNIFICANT CHANGE UP (ref 135–145)
T4 FREE SERPL-MCNC: 1.1 NG/DL — SIGNIFICANT CHANGE UP (ref 0.9–1.8)
TSH SERPL-MCNC: 3.94 UIU/ML — SIGNIFICANT CHANGE UP (ref 0.27–4.2)
WBC # BLD: 9.56 K/UL — SIGNIFICANT CHANGE UP (ref 3.8–10.5)
WBC # FLD AUTO: 9.56 K/UL — SIGNIFICANT CHANGE UP (ref 3.8–10.5)

## 2024-03-08 PROCEDURE — 99232 SBSQ HOSP IP/OBS MODERATE 35: CPT

## 2024-03-08 RX ORDER — SENNA PLUS 8.6 MG/1
1 TABLET ORAL
Refills: 0 | Status: DISCONTINUED | OUTPATIENT
Start: 2024-03-08 | End: 2024-03-11

## 2024-03-08 RX ADMIN — PANTOPRAZOLE SODIUM 40 MILLIGRAM(S): 20 TABLET, DELAYED RELEASE ORAL at 05:15

## 2024-03-08 RX ADMIN — SENNA PLUS 1 TABLET(S): 8.6 TABLET ORAL at 12:53

## 2024-03-08 RX ADMIN — Medication 2: at 16:57

## 2024-03-08 RX ADMIN — Medication 600 MILLIGRAM(S): at 05:15

## 2024-03-08 RX ADMIN — MONTELUKAST 10 MILLIGRAM(S): 4 TABLET, CHEWABLE ORAL at 12:49

## 2024-03-08 RX ADMIN — PIPERACILLIN AND TAZOBACTAM 25 GRAM(S): 4; .5 INJECTION, POWDER, LYOPHILIZED, FOR SOLUTION INTRAVENOUS at 05:16

## 2024-03-08 RX ADMIN — PANTOPRAZOLE SODIUM 40 MILLIGRAM(S): 20 TABLET, DELAYED RELEASE ORAL at 17:57

## 2024-03-08 RX ADMIN — Medication 600 MILLIGRAM(S): at 17:56

## 2024-03-08 RX ADMIN — Medication 0.5 MILLIGRAM(S): at 08:55

## 2024-03-08 RX ADMIN — Medication 75 MICROGRAM(S): at 05:15

## 2024-03-08 RX ADMIN — MEMANTINE HYDROCHLORIDE 5 MILLIGRAM(S): 10 TABLET ORAL at 22:07

## 2024-03-08 RX ADMIN — Medication 3 MILLILITER(S): at 20:44

## 2024-03-08 RX ADMIN — Medication 20 MILLIEQUIVALENT(S): at 12:53

## 2024-03-08 RX ADMIN — SENNA PLUS 1 TABLET(S): 8.6 TABLET ORAL at 17:57

## 2024-03-08 RX ADMIN — Medication 10 MILLIGRAM(S): at 13:05

## 2024-03-08 RX ADMIN — Medication 3 MILLILITER(S): at 08:55

## 2024-03-08 RX ADMIN — Medication 0.5 MILLIGRAM(S): at 20:44

## 2024-03-08 RX ADMIN — Medication 3 MILLIGRAM(S): at 22:07

## 2024-03-08 RX ADMIN — DONEPEZIL HYDROCHLORIDE 5 MILLIGRAM(S): 10 TABLET, FILM COATED ORAL at 22:08

## 2024-03-08 RX ADMIN — HEPARIN SODIUM 5000 UNIT(S): 5000 INJECTION INTRAVENOUS; SUBCUTANEOUS at 17:56

## 2024-03-08 RX ADMIN — PIPERACILLIN AND TAZOBACTAM 25 GRAM(S): 4; .5 INJECTION, POWDER, LYOPHILIZED, FOR SOLUTION INTRAVENOUS at 13:12

## 2024-03-08 RX ADMIN — HEPARIN SODIUM 5000 UNIT(S): 5000 INJECTION INTRAVENOUS; SUBCUTANEOUS at 05:16

## 2024-03-08 RX ADMIN — PIPERACILLIN AND TAZOBACTAM 25 GRAM(S): 4; .5 INJECTION, POWDER, LYOPHILIZED, FOR SOLUTION INTRAVENOUS at 22:08

## 2024-03-08 RX ADMIN — AZITHROMYCIN 500 MILLIGRAM(S): 500 TABLET, FILM COATED ORAL at 12:49

## 2024-03-08 RX ADMIN — Medication 3 MILLILITER(S): at 02:51

## 2024-03-08 RX ADMIN — Medication 3 MILLILITER(S): at 15:02

## 2024-03-08 RX ADMIN — Medication 4: at 22:08

## 2024-03-08 RX ADMIN — Medication 2: at 13:09

## 2024-03-08 NOTE — DISCHARGE NOTE NURSING/CASE MANAGEMENT/SOCIAL WORK - PATIENT PORTAL LINK FT
You can access the FollowMyHealth Patient Portal offered by [facility] by registering at the following website: [email or web address]. By joining [email or web address]’s FollowMyHealth portal, you will also be able to view your health information using other applications (apps) compatible with our system.

## 2024-03-08 NOTE — PROGRESS NOTE ADULT - SUBJECTIVE AND OBJECTIVE BOX
88 y/o elderly male with hx of Rectal cancer diagnosed in 1/23 s/p XRT from 3/6-3/10/23, recent MRI in 2/24 with local progression, and recent f/u at [facility] for discussion for treatment options with systemic chemo which was decided too high risk for patient. He also has hx of COPD not on home 02, DM-2, Afib not on AC, chronic diastolic CHF, CKD-4, hypothyroidism, dementia. Patient resides at home with Wife/Daughter uses a cane at baseline and is currently going to o/p PT. Patient Daughter called [facility] yesterday to notify them that patient was more confused than baseline. No reports of chills, fever, N/V/D. No changes in medications. No hx of aspiration, falls, HA, focal weakness. Patient Daughter advised to take him to the ED by [facility]. In the ED vitals with low grade temp at 99.8. BP stable, not hypoxic. No rectal temp done per Daughter request due to hx of rectal cancer. Blood cx sent, given empiric abx. History obtained from daughter at bedside.     CT brain 3/5/2024 MPRESSION: No large territory acute infarct, intracranial hemorrhage, or mass effect.  Stable appearance of chronic microangiopathic white matter changes and  parenchymal volume loss.  CXR 3/5/2024 On January 1, 2023 there was a left lower lobe infiltrate and presently this infiltrate is again noted essentially unchanged.  The present film also shows a mild left perihilar infiltrate and an increasing right lower perihilar infiltrate.    seen at bedside, on abx, afebrile, more alert      MEDICATIONS  (STANDING):  albuterol/ipratropium for Nebulization 3 milliLiter(s) Nebulizer every 6 hours  azithromycin   Tablet 500 milliGRAM(s) Oral daily  buDESOnide    Inhalation Suspension 0.5 milliGRAM(s) Inhalation two times a day  dextrose 5%. 1000 milliLiter(s) (50 mL/Hr) IV Continuous <Continuous>  dextrose 50% Injectable 25 Gram(s) IV Push once  donepezil 5 milliGRAM(s) Oral at bedtime  glucagon  Injectable 1 milliGRAM(s) IntraMuscular once  guaiFENesin  milliGRAM(s) Oral every 12 hours  heparin   Injectable 5000 Unit(s) SubCutaneous every 12 hours  insulin lispro (ADMELOG) corrective regimen sliding scale   SubCutaneous Before meals and at bedtime  levothyroxine 75 MICROGram(s) Oral daily  memantine 5 milliGRAM(s) Oral at bedtime  midodrine. 5 milliGRAM(s) Oral three times a day  montelukast 10 milliGRAM(s) Oral daily  pantoprazole    Tablet 40 milliGRAM(s) Oral two times a day  piperacillin/tazobactam IVPB.. 3.375 Gram(s) IV Intermittent every 8 hours  potassium chloride    Tablet ER 20 milliEquivalent(s) Oral daily  senna 2 Tablet(s) Oral at bedtime  torsemide 10 milliGRAM(s) Oral two times a day  umeclidinium 62.5 MICROgram(s)/vilanterol 25 MICROgram(s) Inhaler 1 Puff(s) Inhalation daily    MEDICATIONS  (PRN):  acetaminophen     Tablet .. 650 milliGRAM(s) Oral every 6 hours PRN Temp greater or equal to 38C (100.4F), Mild Pain (1 - 3)  aluminum hydroxide/magnesium hydroxide/simethicone Suspension 30 milliLiter(s) Oral every 4 hours PRN Dyspepsia  benzocaine/menthol Lozenge 1 Lozenge Oral four times a day PRN Sore Throat  dextrose Oral Gel 15 Gram(s) Oral once PRN Blood Glucose LESS THAN 70 milliGRAM(s)/deciliter  melatonin 3 milliGRAM(s) Oral at bedtime PRN Insomnia  ondansetron Injectable 4 milliGRAM(s) IV Push every 8 hours PRN Nausea and/or Vomiting    ICU Vital Signs Last 24 Hrs  T(C): 36.7 (08 Mar 2024 08:30), Max: 37 (07 Mar 2024 11:23)  T(F): 98 (08 Mar 2024 08:30), Max: 98.6 (07 Mar 2024 11:23)  HR: 83 (08 Mar 2024 08:30) (78 - 97)  BP: 115/77 (08 Mar 2024 08:30) (96/55 - 115/77)  BP(mean): --  ABP: --  ABP(mean): --  RR: 17 (08 Mar 2024 08:30) (17 - 18)  SpO2: 96% (08 Mar 2024 08:30) (92% - 100%)    O2 Parameters below as of 08 Mar 2024 08:30  Patient On (Oxygen Delivery Method): room air       Physical Exam:   Constitutional 	elderly male sitting up in bed in NAD  Eyes PERRLA conjunctiva clear   ENMT	 mucosa dry· Respiratory	  Resp no rhonchi; diminished breath sounds, L; diminished breath sounds, R  Cardiovascular	regular rate and rhythm; S1 S2 present  Gastrointestinal: soft; nontender  Mental Status	AOx1 person  Skin warm and dry   Musculoskeletal Comments +2 B/L LE edema                           8.4    9.56  )-----------( 186      ( 08 Mar 2024 05:41 )             25.8                           9.9    13.81 )-----------( 172      ( 06 Mar 2024 08:40 )             31.3                           9.9    13.81 )-----------( 172      ( 06 Mar 2024 08:40 )      03-08    140  |  103  |  52.6<H>  ----------------------------<  96  4.4   |  25.0  |  2.27<H>    Ca    8.7      08 Mar 2024 05:41  Mg     2.4     03-08    TPro  6.3<L>  /  Alb  3.0<L>  /  TBili  0.5  /  DBili  x   /  AST  8   /  ALT  5   /  AlkPhos  92  03-08    03-06    136  |  100  |  57.7<H>  ----------------------------<  164<H>  4.3   |  23.0  |  1.99<H>    Ca    8.7      06 Mar 2024 08:40  Phos  3.6     03-06  Mg     2.3     03-06    TPro  7.4  /  Alb  3.8  /  TBili  0.5  /  DBili  x   /  AST  13  /  ALT  8   /  AlkPhos  131<H>  03-05   31.3          88 y/o elderly male with hx of Rectal cancer diagnosed in 1/23 s/p XRT from 3/6-3/10/23, recent MRI in 2/24 with local progression, and recent f/u at [facility] for discussion for treatment options with systemic chemo which was decided too high risk for patient. He also has hx of COPD not on home 02, DM-2, Afib not on AC, chronic diastolic CHF, CKD-4, hypothyroidism, dementia. Patient resides at home with Wife/Daughter uses a cane at baseline and is currently going to o/p PT. Patient Daughter called [facility] yesterday to notify them that patient was more confused than baseline. No reports of chills, fever, N/V/D. No changes in medications. No hx of aspiration, falls, HA, focal weakness. Patient Daughter advised to take him to the ED by [facility]. In the ED vitals with low grade temp at 99.8. BP stable, not hypoxic. No rectal temp done per Daughter request due to hx of rectal cancer. Blood cx sent, given empiric abx. History obtained from daughter at bedside.     CT brain 3/5/2024 MPRESSION: No large territory acute infarct, intracranial hemorrhage, or mass effect.  Stable appearance of chronic microangiopathic white matter changes and  parenchymal volume loss.  CXR 3/5/2024 On January 1, 2023 there was a left lower lobe infiltrate and presently this infiltrate is again noted essentially unchanged.  The present film also shows a mild left perihilar infiltrate and an increasing right lower perihilar infiltrate.    seen at bedside, on abx, afebrile, confused from dementia but as per daughter more alert  CT chest showing asp PNA      MEDICATIONS  (STANDING):  albuterol/ipratropium for Nebulization 3 milliLiter(s) Nebulizer every 6 hours  azithromycin   Tablet 500 milliGRAM(s) Oral daily  buDESOnide    Inhalation Suspension 0.5 milliGRAM(s) Inhalation two times a day  dextrose 5%. 1000 milliLiter(s) (50 mL/Hr) IV Continuous <Continuous>  dextrose 50% Injectable 25 Gram(s) IV Push once  donepezil 5 milliGRAM(s) Oral at bedtime  glucagon  Injectable 1 milliGRAM(s) IntraMuscular once  guaiFENesin  milliGRAM(s) Oral every 12 hours  heparin   Injectable 5000 Unit(s) SubCutaneous every 12 hours  insulin lispro (ADMELOG) corrective regimen sliding scale   SubCutaneous Before meals and at bedtime  levothyroxine 75 MICROGram(s) Oral daily  memantine 5 milliGRAM(s) Oral at bedtime  midodrine. 5 milliGRAM(s) Oral three times a day  montelukast 10 milliGRAM(s) Oral daily  pantoprazole    Tablet 40 milliGRAM(s) Oral two times a day  piperacillin/tazobactam IVPB.. 3.375 Gram(s) IV Intermittent every 8 hours  potassium chloride    Tablet ER 20 milliEquivalent(s) Oral daily  senna 2 Tablet(s) Oral at bedtime  torsemide 10 milliGRAM(s) Oral two times a day  umeclidinium 62.5 MICROgram(s)/vilanterol 25 MICROgram(s) Inhaler 1 Puff(s) Inhalation daily    MEDICATIONS  (PRN):  acetaminophen     Tablet .. 650 milliGRAM(s) Oral every 6 hours PRN Temp greater or equal to 38C (100.4F), Mild Pain (1 - 3)  aluminum hydroxide/magnesium hydroxide/simethicone Suspension 30 milliLiter(s) Oral every 4 hours PRN Dyspepsia  benzocaine/menthol Lozenge 1 Lozenge Oral four times a day PRN Sore Throat  dextrose Oral Gel 15 Gram(s) Oral once PRN Blood Glucose LESS THAN 70 milliGRAM(s)/deciliter  melatonin 3 milliGRAM(s) Oral at bedtime PRN Insomnia  ondansetron Injectable 4 milliGRAM(s) IV Push every 8 hours PRN Nausea and/or Vomiting    ICU Vital Signs Last 24 Hrs  T(C): 36.7 (08 Mar 2024 08:30), Max: 37 (07 Mar 2024 11:23)  T(F): 98 (08 Mar 2024 08:30), Max: 98.6 (07 Mar 2024 11:23)  HR: 83 (08 Mar 2024 08:30) (78 - 97)  BP: 115/77 (08 Mar 2024 08:30) (96/55 - 115/77)  BP(mean): --  ABP: --  ABP(mean): --  RR: 17 (08 Mar 2024 08:30) (17 - 18)  SpO2: 96% (08 Mar 2024 08:30) (92% - 100%)    O2 Parameters below as of 08 Mar 2024 08:30  Patient On (Oxygen Delivery Method): room air       Physical Exam:   Constitutional 	elderly male sitting up in bed in NAD  Eyes PERRLA conjunctiva clear   ENMT	 mucosa dry· Respiratory	  Resp no rhonchi; diminished breath sounds, L; diminished breath sounds, R  Cardiovascular	regular rate and rhythm; S1 S2 present  Gastrointestinal: soft; nontender  Mental Status	AOx1 person  Skin warm and dry   Musculoskeletal Comments +2 B/L LE edema                           8.4    9.56  )-----------( 186      ( 08 Mar 2024 05:41 )             25.8                           9.9    13.81 )-----------( 172      ( 06 Mar 2024 08:40 )             31.3                           9.9    13.81 )-----------( 172      ( 06 Mar 2024 08:40 )      03-08    140  |  103  |  52.6<H>  ----------------------------<  96  4.4   |  25.0  |  2.27<H>    Ca    8.7      08 Mar 2024 05:41  Mg     2.4     03-08    TPro  6.3<L>  /  Alb  3.0<L>  /  TBili  0.5  /  DBili  x   /  AST  8   /  ALT  5   /  AlkPhos  92  03-08    03-06    136  |  100  |  57.7<H>  ----------------------------<  164<H>  4.3   |  23.0  |  1.99<H>    Ca    8.7      06 Mar 2024 08:40  Phos  3.6     03-06  Mg     2.3     03-06    TPro  7.4  /  Alb  3.8  /  TBili  0.5  /  DBili  x   /  AST  13  /  ALT  8   /  AlkPhos  131<H>  03-05   31.3

## 2024-03-08 NOTE — PROGRESS NOTE ADULT - ASSESSMENT
CKD(IIIb): renal function at baseline  ALMAS, prerenal  - avoid potential nephrotoxins  - cont Midodrine and diuretics--> expect azotemia  - follow labs  - I d/w daughter at bedside

## 2024-03-08 NOTE — DISCHARGE NOTE NURSING/CASE MANAGEMENT/SOCIAL WORK - NSDCFUADDAPPT_GEN_ALL_CORE_FT
PT'S DAUGHTER DECLINED [facility] MEDS TO BED, WANTS MEDS SEND TO OWN PHARMACY  AGREEABLE TO HC with MARYBETHCHRISTIANOSOL  DECLINED F/U APPTS.   YELLOW FOLDER PROVIDED. AGREEABLE TO HC with MILADY  DECLINED F/U APPTS.   YELLOW FOLDER PROVIDED. AGREEABLE TO HC with MILADY  DECLINED F/U APPTS. DAUGHTER SCHEDULED F/U APPOINTMENTS.  YELLOW FOLDER PROVIDED.

## 2024-03-08 NOTE — PROGRESS NOTE ADULT - SUBJECTIVE AND OBJECTIVE BOX
MINO CHRISANO    [id number]    89y      Male    Patient is a 89y old  Male who presents with a chief complaint of R/O bacteremia  Encephalopathy (08 Mar 2024 11:06)      INTERVAL HPI/OVERNIGHT EVENTS:    Patient is feeling better, his mentation is improving, denies fever, chills, chest pain, sob     Vital Signs Last 24 Hrs  T(C): 36.7 (08 Mar 2024 08:30), Max: 36.9 (07 Mar 2024 20:00)  T(F): 98 (08 Mar 2024 08:30), Max: 98.4 (07 Mar 2024 20:00)  HR: 76 (08 Mar 2024 15:02) (76 - 97)  BP: 115/77 (08 Mar 2024 08:30) (96/55 - 115/77)  RR: 17 (08 Mar 2024 08:30) (17 - 18)  SpO2: 96% (08 Mar 2024 15:02) (93% - 100%)    Parameters below as of 08 Mar 2024 15:02  Patient On (Oxygen Delivery Method): room air        PHYSICAL EXAM:    GENERAL: Elderly male looking comfortable   HEENT: PERRL, +EOMI  NECK: soft, Supple, No JVD  CHEST/LUNG: Clear to auscultate bilaterally; No wheezing  HEART: S1S2+, Regular rate and rhythm; No murmurs  ABDOMEN: Soft, Nontender, Nondistended; Bowel sounds present  EXTREMITIES:  1+ Peripheral Pulses, No edema  SKIN: No rashes or lesions  NEURO: AAOX3  PSYCH: normal mood      LABS:                        8.4    9.56  )-----------( 186      ( 08 Mar 2024 05:41 )             25.8     03-08    140  |  103  |  52.6<H>  ----------------------------<  96  4.4   |  25.0  |  2.27<H>    Ca    8.7      08 Mar 2024 05:41  Mg     2.4     03-08    TPro  6.3<L>  /  Alb  3.0<L>  /  TBili  0.5  /  DBili  x   /  AST  8   /  ALT  5   /  AlkPhos  92  03-08        I&O's Summary    07 Mar 2024 07:01  -  08 Mar 2024 07:00  --------------------------------------------------------  IN: 200 mL / OUT: 700 mL / NET: -500 mL        MEDICATIONS  (STANDING):  albuterol/ipratropium for Nebulization 3 milliLiter(s) Nebulizer every 6 hours  azithromycin   Tablet 500 milliGRAM(s) Oral daily  buDESOnide    Inhalation Suspension 0.5 milliGRAM(s) Inhalation two times a day  dextrose 5%. 1000 milliLiter(s) (50 mL/Hr) IV Continuous <Continuous>  dextrose 50% Injectable 25 Gram(s) IV Push once  donepezil 5 milliGRAM(s) Oral at bedtime  glucagon  Injectable 1 milliGRAM(s) IntraMuscular once  guaiFENesin  milliGRAM(s) Oral every 12 hours  heparin   Injectable 5000 Unit(s) SubCutaneous every 12 hours  insulin lispro (ADMELOG) corrective regimen sliding scale   SubCutaneous Before meals and at bedtime  levothyroxine 75 MICROGram(s) Oral daily  memantine 5 milliGRAM(s) Oral at bedtime  midodrine. 5 milliGRAM(s) Oral three times a day  montelukast 10 milliGRAM(s) Oral daily  pantoprazole    Tablet 40 milliGRAM(s) Oral two times a day  piperacillin/tazobactam IVPB.. 3.375 Gram(s) IV Intermittent every 8 hours  potassium chloride    Tablet ER 20 milliEquivalent(s) Oral daily  senna 1 Tablet(s) Oral two times a day  torsemide 10 milliGRAM(s) Oral two times a day  umeclidinium 62.5 MICROgram(s)/vilanterol 25 MICROgram(s) Inhaler 1 Puff(s) Inhalation daily    MEDICATIONS  (PRN):  acetaminophen     Tablet .. 650 milliGRAM(s) Oral every 6 hours PRN Temp greater or equal to 38C (100.4F), Mild Pain (1 - 3)  aluminum hydroxide/magnesium hydroxide/simethicone Suspension 30 milliLiter(s) Oral every 4 hours PRN Dyspepsia  benzocaine/menthol Lozenge 1 Lozenge Oral four times a day PRN Sore Throat  dextrose Oral Gel 15 Gram(s) Oral once PRN Blood Glucose LESS THAN 70 milliGRAM(s)/deciliter  melatonin 3 milliGRAM(s) Oral at bedtime PRN Insomnia  ondansetron Injectable 4 milliGRAM(s) IV Push every 8 hours PRN Nausea and/or Vomiting

## 2024-03-08 NOTE — PROGRESS NOTE ADULT - ASSESSMENT
88 y/o elderly male with increased confusion. leukocytosis leucocytosis.  PMHx of  rectal cancer (follows at [facility]), Chronic diastolic CHF, CKD-4, DM-2, Afib, COPD, Dementia, Hypothyroidism     Leukocytosis mostly like due to pneumonia  -Low grade temp on arrival now resolved  - possible occult bacteremia from locally invasive rectal cancer  - 3/5/2024 CXR grossly unchanged from prior, UA neg, RVP neg  - blood cx - negative   -WBC on arrival 20.57  WBC today improved 9.56  -Cont to trend WBC  -on IV Zosyn + PO Azithromycin    Rectal Cancer:  -Pt follows with [facility]  - S/P short course of RT 3/6-3/10/2023  -No plan for systemic therapy    DVT ppx  per medicine  Pt is a fall risk  Pt has dementia no mechanical DVT ppx due to fall risk    Chronic diastolic CHF:  -No evidence of decompensated CHF  -cont. out ptregimen as BP will tolerate  -DASH diet    CKD-4:    creat on adm 2.99 toady 2.2.7  -Avoid nephrotoxic drugs    Afib:  -Not on AC due to dementia  -fall risk/ rectal cancer bleed risk          Keep [facility] inform of pts progress      [facility]  [phone]            89 y/o elderly male with increased confusion. leukocytosis leucocytosis.  PMHx of  rectal cancer (follows at [facility] [place]), Chronic diastolic CHF, CKD-4, DM-2, Afib, COPD, Dementia, Hypothyroidism     Leukocytosis mostly like due to pneumonia  -Low grade temp on arrival now resolved  - possible occult bacteremia from locally invasive rectal cancer  - 3/5/2024 CXR grossly unchanged from prior, UA neg, RVP neg  - blood cx - negative   -WBC on arrival 20.57  WBC today improved 9.56  -Cont to trend WBC  -on IV Zosyn + PO Azithromycin    ct chest with opacity concerning for asp PNA    Rectal Cancer:  -Pt follows with [facility] [place]  - S/P short course of RT 3/6-3/10/2023  -No plan for systemic therapy    DVT ppx  per medicine  Pt is a fall risk  Pt has dementia no mechanical DVT ppx due to fall risk    Chronic diastolic CHF:  -No evidence of decompensated CHF  -cont. out ptregimen as BP will tolerate  -DASH diet    CKD-4:    creat on adm 2.99 toady 2.2.7  -Avoid nephrotoxic drugs    Afib:  -Not on AC due to dementia  -fall risk/ rectal cancer bleed risk          Keep [facility] inform of pts progress      [facility]  [phone]

## 2024-03-08 NOTE — PROGRESS NOTE ADULT - SUBJECTIVE AND OBJECTIVE BOX
NEPHROLOGY INTERVAL HPI/OVERNIGHT EVENTS:  pt comfortable  no acute distress  no adverse overnight events  daughter at bedside    MEDICATIONS  (STANDING):  albuterol/ipratropium for Nebulization 3 milliLiter(s) Nebulizer every 6 hours  azithromycin   Tablet 500 milliGRAM(s) Oral daily  buDESOnide    Inhalation Suspension 0.5 milliGRAM(s) Inhalation two times a day  dextrose 5%. 1000 milliLiter(s) (50 mL/Hr) IV Continuous <Continuous>  dextrose 50% Injectable 25 Gram(s) IV Push once  donepezil 5 milliGRAM(s) Oral at bedtime  glucagon  Injectable 1 milliGRAM(s) IntraMuscular once  guaiFENesin  milliGRAM(s) Oral every 12 hours  heparin   Injectable 5000 Unit(s) SubCutaneous every 12 hours  insulin lispro (ADMELOG) corrective regimen sliding scale   SubCutaneous Before meals and at bedtime  levothyroxine 75 MICROGram(s) Oral daily  memantine 5 milliGRAM(s) Oral at bedtime  midodrine. 5 milliGRAM(s) Oral three times a day  montelukast 10 milliGRAM(s) Oral daily  pantoprazole    Tablet 40 milliGRAM(s) Oral two times a day  piperacillin/tazobactam IVPB.. 3.375 Gram(s) IV Intermittent every 8 hours  potassium chloride    Tablet ER 20 milliEquivalent(s) Oral daily  senna 1 Tablet(s) Oral two times a day  torsemide 10 milliGRAM(s) Oral two times a day  umeclidinium 62.5 MICROgram(s)/vilanterol 25 MICROgram(s) Inhaler 1 Puff(s) Inhalation daily    MEDICATIONS  (PRN):  acetaminophen     Tablet .. 650 milliGRAM(s) Oral every 6 hours PRN Temp greater or equal to 38C (100.4F), Mild Pain (1 - 3)  aluminum hydroxide/magnesium hydroxide/simethicone Suspension 30 milliLiter(s) Oral every 4 hours PRN Dyspepsia  benzocaine/menthol Lozenge 1 Lozenge Oral four times a day PRN Sore Throat  dextrose Oral Gel 15 Gram(s) Oral once PRN Blood Glucose LESS THAN 70 milliGRAM(s)/deciliter  melatonin 3 milliGRAM(s) Oral at bedtime PRN Insomnia  ondansetron Injectable 4 milliGRAM(s) IV Push every 8 hours PRN Nausea and/or Vomiting      Allergies    No Known Allergies        Vital Signs Last 24 Hrs  T(C): 36.7 (08 Mar 2024 08:30), Max: 37 (07 Mar 2024 11:23)  T(F): 98 (08 Mar 2024 08:30), Max: 98.6 (07 Mar 2024 11:23)  HR: 83 (08 Mar 2024 08:30) (78 - 97)  BP: 115/77 (08 Mar 2024 08:30) (96/55 - 115/77)  BP(mean): --  RR: 17 (08 Mar 2024 08:30) (17 - 18)  SpO2: 96% (08 Mar 2024 08:30) (93% - 100%)    Parameters below as of 08 Mar 2024 08:30  Patient On (Oxygen Delivery Method): room air        PHYSICAL EXAM:  GENERAL: Chronically debilitated  HEENT: No periorbital edema  NECK: Supple; no JVD  CHEST/LUNG: Diminished BS but clear  HEART: Regular rate and rhythm; no rub  ABDOMEN: Soft,  +BS  EXTREMITIES: Min dependent edema  NEURO: Demented--> MS back to baseline    LABS:                        8.4    9.56  )-----------( 186      ( 08 Mar 2024 05:41 )             25.8     03-08    140  |  103  |  52.6<H>  ----------------------------<  96  4.4   |  25.0  |  2.27<H>    Ca    8.7      08 Mar 2024 05:41  Mg     2.4     03-08    TPro  6.3<L>  /  Alb  3.0<L>  /  TBili  0.5  /  DBili  x   /  AST  8   /  ALT  5   /  AlkPhos  92  03-08      Urinalysis Basic - ( 08 Mar 2024 05:41 )    Color: x / Appearance: x / SG: x / pH: x  Gluc: 96 mg/dL / Ketone: x  / Bili: x / Urobili: x   Blood: x / Protein: x / Nitrite: x   Leuk Esterase: x / RBC: x / WBC x   Sq Epi: x / Non Sq Epi: x / Bacteria: x      Magnesium: 2.4 mg/dL (03-08 @ 05:41)      RADIOLOGY & ADDITIONAL TESTS:  < from: CT Chest No Cont (03.07.24 @ 09:49) >    ACC: [id number] EXAM:  CT CHEST   ORDERED BY: MARIA EUGENIA STEPHEN     PROCEDURE DATE:  03/07/2024          INTERPRETATION:  CLINICAL INDICATION: PNEUMONIA.    Axial CT images of the chest are obtained without intravenous   administration of contrast.    Comparison is made with the prior chest CT of July 26, 2022.    No enlarged axillary, mediastinal or hilar lymph nodes. Small mediastinal   lymph nodes are unchanged.    Vascular calcifications with involvement of the aorta and the coronary   arteries. Aortic valve calcifications. Heart size appears normal.   Circumferential pericardial thickening and/or trace pericardial fluid   appears unchanged.    Oral contrast within the mid to lower aspect of the esophagus and the   proximal stomach. Mild distention of the mid to upper aspect of the   esophagus containing internal air.    Evaluation of the upper abdomen demonstrate nodularity of the partially   imaged liver suggestive of cirrhosis. Partially imaged pneumobilia as on   the prior study. Air within the partially imaged gallbladder. Minimal   ascites. Subcutaneous edema.    Bilateral mid to lower hemithorax pleural thickening as on the prior   study. Foci of pleural calcifications. Trace loculated right pleural   fluid.    Evaluation of the lungs demonstrate mild biapical scarring unchanged.   Emphysema.    Ill-defined clustered nodular consolidations or opacities within the   posterior segment of the left upper lobe with surrounding groundglass   appears new since 7/26/2022.    Bilateral lower lobe partial rounded areas of atelectasis, left greater   than right with associated volume loss may have slightly progressed since   July 26, 2022.    5 mm right upper lobe right apical linear opacity appears new since the   prior study.  5 mm right upper lobe nodular opacity on image 69 of series 3 is   unchanged.    Clustered foci of impacted distal airways within the bilateral upper   lobes may be due to mucus some of which are new and some of which are   unchanged since the prior study.    Secretions within the trachea and the bilateral mainstem bronchi   degenerative changes off the spine.    IMPRESSION: Nodular consolidations within the posterior segment of the   left upper lobe with surrounding groundglass new since 2022 likely  infectious in etiology. Considerations include aspiration pneumonia given   the distribution. 1-3 month follow-up noncontrast chest CT is recommended   to ensure improvement/resolution.    < end of copied text >

## 2024-03-08 NOTE — PROGRESS NOTE ADULT - ASSESSMENT
88 y/o male with increased confusion/Leucocytosis, hx of rectal cancer, Chronic diastolic CHF, CKD-4, DM-2, Afib, COPD, Dementia, Hypothyroidism presents to [facility] on 3/6 for ams and weakness. Patient admitted to medicine and renal, cardio, pulm and hem onc consulted on patient     Plan:     acute metabolic encephalopathy - ct brain negative  -At baseline cognition in ED per Daughter  -No focal weakness   -No photophobia/neck stiffness   -Low grade temp resolved  -Concern for possible occult bacteremia from locally invasive rectal cancer  -Cont. empiric abx to cover enterics and atypical   -c.w zosyn and azithro   -blood cultures no growth    Rectal Cancer:  -[facility] notes reviewed in [facility]IE, not a candidate for further chemo  - hemonc following   -Daughter wishes to inform Dr. Lozada if bacteremic from rectal cancer invasion   -Palliative care for ongoing GOC  -DNR/DNI    Chronic diastolic CHF:  -No evidence of decompensated CHF  -cont. O/P regimen as BP will tolerate  -DASH diet    CKD-4:  -Cr. at baseline  -Avoid nephrotoxic drugs  -Renally dose meds   -will monitor BMP     Afib:  -Not on AC due to dementia/fall risk/Risk of rectal bleeding with invasive rectal cancer    COPD:  -Not on home 02  -Nebs prn  -Cont. Anoro Ellipta  -Budesonide  -Singulair    -no exacerbation     Dementia:  -Cont. Aricept/Memantine   -fall risk  -At risk for delirium while hospitalized     DM-2:  -ADA diet  -Coverage insulin  -Accu checks AC/HS    Hypothyroidism:  -Cont. Synthroid     Anemia: looks like chronic Hb is 8.4, was 10, no bleeding or black stools     Discussed with Daughter  wants pt and eventual home with outpatinet pt  will get PT and possible D/C tomorrow if does well with PT and CBC is ok

## 2024-03-08 NOTE — DISCHARGE NOTE NURSING/CASE MANAGEMENT/SOCIAL WORK - NSDCPEFALRISK_GEN_ALL_CORE
For information on Fall & Injury Prevention, visit: [email or web address] OR  [email or web address] OR  https://www.cdc.gov/steadi/patient.html

## 2024-03-09 LAB
ALBUMIN SERPL ELPH-MCNC: 2.9 G/DL — LOW (ref 3.3–5.2)
ALP SERPL-CCNC: 93 U/L — SIGNIFICANT CHANGE UP (ref 40–120)
ALT FLD-CCNC: 5 U/L — SIGNIFICANT CHANGE UP
ANION GAP SERPL CALC-SCNC: 12 MMOL/L — SIGNIFICANT CHANGE UP (ref 5–17)
AST SERPL-CCNC: 11 U/L — SIGNIFICANT CHANGE UP
BILIRUB SERPL-MCNC: 0.5 MG/DL — SIGNIFICANT CHANGE UP (ref 0.4–2)
BUN SERPL-MCNC: 47.6 MG/DL — HIGH (ref 8–20)
CALCIUM SERPL-MCNC: 8.4 MG/DL — SIGNIFICANT CHANGE UP (ref 8.4–10.5)
CHLORIDE SERPL-SCNC: 104 MMOL/L — SIGNIFICANT CHANGE UP (ref 96–108)
CO2 SERPL-SCNC: 24 MMOL/L — SIGNIFICANT CHANGE UP (ref 22–29)
CREAT SERPL-MCNC: 2.31 MG/DL — HIGH (ref 0.5–1.3)
EGFR: 26 ML/MIN/1.73M2 — LOW
GLUCOSE BLDC GLUCOMTR-MCNC: 138 MG/DL — HIGH (ref 70–99)
GLUCOSE BLDC GLUCOMTR-MCNC: 139 MG/DL — HIGH (ref 70–99)
GLUCOSE BLDC GLUCOMTR-MCNC: 151 MG/DL — HIGH (ref 70–99)
GLUCOSE BLDC GLUCOMTR-MCNC: 157 MG/DL — HIGH (ref 70–99)
GLUCOSE BLDC GLUCOMTR-MCNC: 208 MG/DL — HIGH (ref 70–99)
GLUCOSE SERPL-MCNC: 134 MG/DL — HIGH (ref 70–99)
HCT VFR BLD CALC: 26 % — LOW (ref 39–50)
HGB BLD-MCNC: 8.1 G/DL — LOW (ref 13–17)
MCHC RBC-ENTMCNC: 27.3 PG — SIGNIFICANT CHANGE UP (ref 27–34)
MCHC RBC-ENTMCNC: 31.2 GM/DL — LOW (ref 32–36)
MCV RBC AUTO: 87.5 FL — SIGNIFICANT CHANGE UP (ref 80–100)
PLATELET # BLD AUTO: 192 K/UL — SIGNIFICANT CHANGE UP (ref 150–400)
POTASSIUM SERPL-MCNC: 4.6 MMOL/L — SIGNIFICANT CHANGE UP (ref 3.5–5.3)
POTASSIUM SERPL-SCNC: 4.6 MMOL/L — SIGNIFICANT CHANGE UP (ref 3.5–5.3)
PROT SERPL-MCNC: 6.4 G/DL — LOW (ref 6.6–8.7)
RBC # BLD: 2.97 M/UL — LOW (ref 4.2–5.8)
RBC # FLD: 15.3 % — HIGH (ref 10.3–14.5)
SODIUM SERPL-SCNC: 139 MMOL/L — SIGNIFICANT CHANGE UP (ref 135–145)
WBC # BLD: 7.46 K/UL — SIGNIFICANT CHANGE UP (ref 3.8–10.5)
WBC # FLD AUTO: 7.46 K/UL — SIGNIFICANT CHANGE UP (ref 3.8–10.5)

## 2024-03-09 PROCEDURE — 99233 SBSQ HOSP IP/OBS HIGH 50: CPT

## 2024-03-09 PROCEDURE — 99232 SBSQ HOSP IP/OBS MODERATE 35: CPT

## 2024-03-09 RX ADMIN — Medication 3 MILLILITER(S): at 13:19

## 2024-03-09 RX ADMIN — Medication 4: at 18:01

## 2024-03-09 RX ADMIN — MEMANTINE HYDROCHLORIDE 5 MILLIGRAM(S): 10 TABLET ORAL at 23:58

## 2024-03-09 RX ADMIN — PANTOPRAZOLE SODIUM 40 MILLIGRAM(S): 20 TABLET, DELAYED RELEASE ORAL at 05:33

## 2024-03-09 RX ADMIN — AZITHROMYCIN 500 MILLIGRAM(S): 500 TABLET, FILM COATED ORAL at 13:41

## 2024-03-09 RX ADMIN — PIPERACILLIN AND TAZOBACTAM 25 GRAM(S): 4; .5 INJECTION, POWDER, LYOPHILIZED, FOR SOLUTION INTRAVENOUS at 18:04

## 2024-03-09 RX ADMIN — Medication 10 MILLIGRAM(S): at 05:33

## 2024-03-09 RX ADMIN — MONTELUKAST 10 MILLIGRAM(S): 4 TABLET, CHEWABLE ORAL at 13:41

## 2024-03-09 RX ADMIN — PIPERACILLIN AND TAZOBACTAM 25 GRAM(S): 4; .5 INJECTION, POWDER, LYOPHILIZED, FOR SOLUTION INTRAVENOUS at 05:32

## 2024-03-09 RX ADMIN — SENNA PLUS 1 TABLET(S): 8.6 TABLET ORAL at 05:36

## 2024-03-09 RX ADMIN — DONEPEZIL HYDROCHLORIDE 5 MILLIGRAM(S): 10 TABLET, FILM COATED ORAL at 23:54

## 2024-03-09 RX ADMIN — Medication 3 MILLILITER(S): at 21:30

## 2024-03-09 RX ADMIN — HEPARIN SODIUM 5000 UNIT(S): 5000 INJECTION INTRAVENOUS; SUBCUTANEOUS at 18:02

## 2024-03-09 RX ADMIN — Medication 600 MILLIGRAM(S): at 05:33

## 2024-03-09 RX ADMIN — Medication 75 MICROGRAM(S): at 05:33

## 2024-03-09 RX ADMIN — Medication 2: at 23:59

## 2024-03-09 RX ADMIN — PANTOPRAZOLE SODIUM 40 MILLIGRAM(S): 20 TABLET, DELAYED RELEASE ORAL at 18:02

## 2024-03-09 RX ADMIN — HEPARIN SODIUM 5000 UNIT(S): 5000 INJECTION INTRAVENOUS; SUBCUTANEOUS at 05:33

## 2024-03-09 RX ADMIN — Medication 3 MILLILITER(S): at 05:46

## 2024-03-09 RX ADMIN — SENNA PLUS 1 TABLET(S): 8.6 TABLET ORAL at 18:03

## 2024-03-09 RX ADMIN — Medication 20 MILLIEQUIVALENT(S): at 13:41

## 2024-03-09 RX ADMIN — Medication 600 MILLIGRAM(S): at 18:01

## 2024-03-09 RX ADMIN — Medication 0.5 MILLIGRAM(S): at 21:33

## 2024-03-09 NOTE — PROGRESS NOTE ADULT - ASSESSMENT
89 y/o M with hx of dementia, copd, dCHF,CKD, hypothyroidism , rectal cancer,  present with altered mental status, admitted for infectiouis workup.   Current saturating well on room air     #AMS  #r/o pneumonia concern for aspiration  #hx of rectal cancer   #dCHF  #CKD  #dementia   #COPD without evidence of exacerbation     - complete 7 days of abx, if ready for dc can be on Levaquin to complete.  - culture still NGTD  - rec aspiration workup given imaging  - continue duoneb, q6 while awake and dc on same (unclear compliance and ability to time breath with inhaler)  - rest of management per primary team   - follow up imaging for resolution ( PET pending per daughter otherwise CT chest non con in 8-12 weeks)  -follow up with Dr Rivera in pulmonary clinic.  - pulmonary will sign off at this time feel free to reach out to us with any questions.

## 2024-03-09 NOTE — PROGRESS NOTE ADULT - SUBJECTIVE AND OBJECTIVE BOX
89 y/o elderly male with hx of Rectal cancer diagnosed in 1/23 s/p XRT from 3/6-3/10/23, recent MRI in 2/24 with local progression, and recent f/u at [facility] for discussion for treatment options with systemic chemo which was decided too high risk for patient. He also has hx of COPD not on home 02, DM-2, Afib not on AC, chronic diastolic CHF, CKD-4, hypothyroidism, dementia. Patient resides at home with Wife/Daughter uses a cane at baseline and is currently going to o/p PT. Patient Daughter called [facility] yesterday to notify them that patient was more confused than baseline. No reports of chills, fever, N/V/D. No changes in medications. No hx of aspiration, falls, HA, focal weakness. Patient Daughter advised to take him to the ED by [facility]. In the ED vitals with low grade temp at 99.8. BP stable, not hypoxic. No rectal temp done per Daughter request due to hx of rectal cancer. Blood cx sent, given empiric abx. History obtained from daughter at bedside.     CT brain 3/5/2024 MPRESSION: No large territory acute infarct, intracranial hemorrhage, or mass effect.  Stable appearance of chronic microangiopathic white matter changes and  parenchymal volume loss.  CXR 3/5/2024 On January 1, 2023 there was a left lower lobe infiltrate and presently this infiltrate is again noted essentially unchanged.  The present film also shows a mild left perihilar infiltrate and an increasing right lower perihilar infiltrate.    seen at bedside, on abx, afebrile, confused from dementia but as per daughter more alert  CT chest showing asp PNA  3/9/2024:  Seen at bedside, resting comfortably; no resp distress; family at bedside      MEDICATIONS  (STANDING):  albuterol/ipratropium for Nebulization 3 milliLiter(s) Nebulizer every 6 hours  azithromycin   Tablet 500 milliGRAM(s) Oral daily  buDESOnide    Inhalation Suspension 0.5 milliGRAM(s) Inhalation two times a day  dextrose 5%. 1000 milliLiter(s) (50 mL/Hr) IV Continuous <Continuous>  dextrose 50% Injectable 25 Gram(s) IV Push once  donepezil 5 milliGRAM(s) Oral at bedtime  glucagon  Injectable 1 milliGRAM(s) IntraMuscular once  guaiFENesin  milliGRAM(s) Oral every 12 hours  heparin   Injectable 5000 Unit(s) SubCutaneous every 12 hours  insulin lispro (ADMELOG) corrective regimen sliding scale   SubCutaneous Before meals and at bedtime  levothyroxine 75 MICROGram(s) Oral daily  memantine 5 milliGRAM(s) Oral at bedtime  midodrine. 5 milliGRAM(s) Oral three times a day  montelukast 10 milliGRAM(s) Oral daily  pantoprazole    Tablet 40 milliGRAM(s) Oral two times a day  piperacillin/tazobactam IVPB.. 3.375 Gram(s) IV Intermittent every 8 hours  potassium chloride    Tablet ER 20 milliEquivalent(s) Oral daily  senna 2 Tablet(s) Oral at bedtime  torsemide 10 milliGRAM(s) Oral two times a day  umeclidinium 62.5 MICROgram(s)/vilanterol 25 MICROgram(s) Inhaler 1 Puff(s) Inhalation daily    MEDICATIONS  (PRN):  acetaminophen     Tablet .. 650 milliGRAM(s) Oral every 6 hours PRN Temp greater or equal to 38C (100.4F), Mild Pain (1 - 3)  aluminum hydroxide/magnesium hydroxide/simethicone Suspension 30 milliLiter(s) Oral every 4 hours PRN Dyspepsia  benzocaine/menthol Lozenge 1 Lozenge Oral four times a day PRN Sore Throat  dextrose Oral Gel 15 Gram(s) Oral once PRN Blood Glucose LESS THAN 70 milliGRAM(s)/deciliter  melatonin 3 milliGRAM(s) Oral at bedtime PRN Insomnia  ondansetron Injectable 4 milliGRAM(s) IV Push every 8 hours PRN Nausea and/or Vomiting    ICU Vital Signs Last 24 Hrs  T(C): 36.7 (08 Mar 2024 08:30), Max: 37 (07 Mar 2024 11:23)  T(F): 98 (08 Mar 2024 08:30), Max: 98.6 (07 Mar 2024 11:23)  HR: 83 (08 Mar 2024 08:30) (78 - 97)  BP: 115/77 (08 Mar 2024 08:30) (96/55 - 115/77)  BP(mean): --  ABP: --  ABP(mean): --  RR: 17 (08 Mar 2024 08:30) (17 - 18)  SpO2: 96% (08 Mar 2024 08:30) (92% - 100%)    O2 Parameters below as of 08 Mar 2024 08:30  Patient On (Oxygen Delivery Method): room air       Physical Exam:   Constitutional 	elderly male sitting up in bed in NAD  Eyes PERRLA conjunctiva clear   ENMT	 mucosa dry· Respiratory	  Resp no rhonchi; diminished breath sounds, L; diminished breath sounds, R  Cardiovascular	regular rate and rhythm; S1 S2 present  Gastrointestinal: soft; nontender  Mental Status	AOx1 person  Skin warm and dry   Musculoskeletal Comments +2 B/L LE edema       WBC 7.46  H/H 8.1/26  plt ct 192,000  (3/9/2024)                    8.4    9.56  )-----------( 186      ( 08 Mar 2024 05:41 )             25.8                           9.9    13.81 )-----------( 172      ( 06 Mar 2024 08:40 )             31.3                           9.9    13.81 )-----------( 172      ( 06 Mar 2024 08:40 )      03-08    140  |  103  |  52.6<H>  ----------------------------<  96  4.4   |  25.0  |  2.27<H>    Ca    8.7      08 Mar 2024 05:41  Mg     2.4     03-08    TPro  6.3<L>  /  Alb  3.0<L>  /  TBili  0.5  /  DBili  x   /  AST  8   /  ALT  5   /  AlkPhos  92  03-08    03-06    136  |  100  |  57.7<H>  ----------------------------<  164<H>  4.3   |  23.0  |  1.99<H>    Ca    8.7      06 Mar 2024 08:40  Phos  3.6     03-06  Mg     2.3     03-06    TPro  7.4  /  Alb  3.8  /  TBili  0.5  /  DBili  x   /  AST  13  /  ALT  8   /  AlkPhos  131<H>  03-05   31.3

## 2024-03-09 NOTE — PROGRESS NOTE ADULT - SUBJECTIVE AND OBJECTIVE BOX
[facility] Division of Hospital Medicine    Chief Complaint:      SUBJECTIVE / OVERNIGHT EVENTS:    Patient's mental status has improved as per daughter who was a bedside. she says patient has started to eat better since yesterday.    MEDICATIONS  (STANDING):  albuterol/ipratropium for Nebulization 3 milliLiter(s) Nebulizer every 6 hours  azithromycin   Tablet 500 milliGRAM(s) Oral daily  buDESOnide    Inhalation Suspension 0.5 milliGRAM(s) Inhalation two times a day  dextrose 5%. 1000 milliLiter(s) (50 mL/Hr) IV Continuous <Continuous>  dextrose 50% Injectable 25 Gram(s) IV Push once  donepezil 5 milliGRAM(s) Oral at bedtime  glucagon  Injectable 1 milliGRAM(s) IntraMuscular once  guaiFENesin  milliGRAM(s) Oral every 12 hours  heparin   Injectable 5000 Unit(s) SubCutaneous every 12 hours  insulin lispro (ADMELOG) corrective regimen sliding scale   SubCutaneous Before meals and at bedtime  levothyroxine 75 MICROGram(s) Oral daily  memantine 5 milliGRAM(s) Oral at bedtime  midodrine. 5 milliGRAM(s) Oral three times a day  montelukast 10 milliGRAM(s) Oral daily  pantoprazole    Tablet 40 milliGRAM(s) Oral two times a day  piperacillin/tazobactam IVPB.. 3.375 Gram(s) IV Intermittent every 8 hours  potassium chloride    Tablet ER 20 milliEquivalent(s) Oral daily  senna 1 Tablet(s) Oral two times a day  torsemide 10 milliGRAM(s) Oral two times a day  umeclidinium 62.5 MICROgram(s)/vilanterol 25 MICROgram(s) Inhaler 1 Puff(s) Inhalation daily    MEDICATIONS  (PRN):  acetaminophen     Tablet .. 650 milliGRAM(s) Oral every 6 hours PRN Temp greater or equal to 38C (100.4F), Mild Pain (1 - 3)  aluminum hydroxide/magnesium hydroxide/simethicone Suspension 30 milliLiter(s) Oral every 4 hours PRN Dyspepsia  benzocaine/menthol Lozenge 1 Lozenge Oral four times a day PRN Sore Throat  dextrose Oral Gel 15 Gram(s) Oral once PRN Blood Glucose LESS THAN 70 milliGRAM(s)/deciliter  melatonin 3 milliGRAM(s) Oral at bedtime PRN Insomnia  ondansetron Injectable 4 milliGRAM(s) IV Push every 8 hours PRN Nausea and/or Vomiting        I&O's Summary    08 Mar 2024 07:01  -  09 Mar 2024 07:00  --------------------------------------------------------  IN: 340 mL / OUT: 1050 mL / NET: -710 mL        PHYSICAL EXAM:  Vital Signs Last 24 Hrs  T(C): 37.6 (09 Mar 2024 08:10), Max: 37.7 (09 Mar 2024 00:54)  T(F): 99.7 (09 Mar 2024 08:10), Max: 99.9 (09 Mar 2024 00:54)  HR: 86 (09 Mar 2024 13:01) (76 - 88)  BP: 95/59 (09 Mar 2024 13:01) (95/59 - 116/55)  BP(mean): --  RR: 18 (09 Mar 2024 08:10) (18 - 18)  SpO2: 93% (09 Mar 2024 08:10) (93% - 98%)    Parameters below as of 09 Mar 2024 08:10  Patient On (Oxygen Delivery Method): room air            CONSTITUTIONAL: NAD.  ENMT: atraumatic, normocephalic  RESPIRATORY: Normal respiratory effort; lungs are clear to auscultation bilaterally  CARDIOVASCULAR: Regular rate and rhythm, normal S1 and S2, no murmur/rub/gallop  ABDOMEN: Nontender to palpation, no rebound/guarding; No hepatosplenomegaly  MUSCLOSKELETAL:  No edema  PSYCH: Awake  NEUROLOGY: CN 2-12 are intact and symmetric; no gross deficits;       LABS:                        8.1    7.46  )-----------( 192      ( 09 Mar 2024 05:05 )             26.0     03-09    139  |  104  |  47.6<H>  ----------------------------<  134<H>  4.6   |  24.0  |  2.31<H>    Ca    8.4      09 Mar 2024 05:05  Mg     2.4     03-08    TPro  6.4<L>  /  Alb  2.9<L>  /  TBili  0.5  /  DBili  x   /  AST  11  /  ALT  5   /  AlkPhos  93  03-09          Urinalysis Basic - ( 09 Mar 2024 05:05 )    Color: x / Appearance: x / SG: x / pH: x  Gluc: 134 mg/dL / Ketone: x  / Bili: x / Urobili: x   Blood: x / Protein: x / Nitrite: x   Leuk Esterase: x / RBC: x / WBC x   Sq Epi: x / Non Sq Epi: x / Bacteria: x        CAPILLARY BLOOD GLUCOSE      POCT Blood Glucose.: 139 mg/dL (09 Mar 2024 11:56)  POCT Blood Glucose.: 138 mg/dL (09 Mar 2024 07:59)  POCT Blood Glucose.: 239 mg/dL (08 Mar 2024 21:59)  POCT Blood Glucose.: 196 mg/dL (08 Mar 2024 16:54)        RADIOLOGY & ADDITIONAL TESTS:  Results Reviewed:   Imaging Personally Reviewed:  Electrocardiogram Personally Reviewed:

## 2024-03-09 NOTE — PROGRESS NOTE ADULT - ASSESSMENT
CKD(IIIb): renal function at baseline  ALMAS, prerenal due to diuretics  AMS improved  - avoid potential nephrotoxins  - cont Midodrine   - adjust diuretics as needed--> expect and accept azotemia  - follow labs    Anemia: multifactorial  - defer THERESA in setting of malignancy unless cleared by Heme/Onc  - consider PRBCs if needed

## 2024-03-09 NOTE — PROGRESS NOTE ADULT - ASSESSMENT
88 y/o elderly male with increased confusion. leukocytosis leucocytosis.  PMHx of  rectal cancer (follows at [facility]), Chronic diastolic CHF, CKD-4, DM-2, Afib, COPD, Dementia, Hypothyroidism     Leukocytosis mostly like due to pneumonia  -Low grade temp on arrival now resolved  - possible occult bacteremia from locally invasive rectal cancer  - 3/5/2024 CXR grossly unchanged from prior, UA neg, RVP neg  - blood cx - negative   -WBC on arrival 20.57  WBC today improved 7.46  -Cont to trend WBC  -on IV Zosyn + PO Azithromycin    ct chest with opacity concerning for asp PNA    Rectal Cancer:  -Pt follows with [facility]  - S/P short course of RT 3/6-3/10/2023  -No plan for systemic therapy    DVT ppx  per medicine  Pt is a fall risk  Pt has dementia no mechanical DVT ppx due to fall risk    Chronic diastolic CHF:  -No evidence of decompensated CHF  -cont. out ptregimen as BP will tolerate  -DASH diet    CKD-4:    creat on adm 2.99 toady 2.2.7  -Avoid nephrotoxic drugs    Afib:  -Not on AC due to dementia  -fall risk/ rectal cancer bleed risk      D/W family at bedside    Keep [facility] inform of pts progress      [facility]  [phone]

## 2024-03-09 NOTE — PROGRESS NOTE ADULT - TIME BILLING
reviewing labs, radiology, other provider's notes, d/w pt and family at bedside
Time spent with review of chart documents, labs, imaging. Direct patient assessment,  formulation of care plan. Discussion with  Interdisciplinary  team

## 2024-03-09 NOTE — PROGRESS NOTE ADULT - SUBJECTIVE AND OBJECTIVE BOX
PULMONARY PROGRESS NOTE      MINO STEPHENSEITAN-[id number]    Patient is a 89y old  Male who presents with a chief complaint of R/O bacteremia  Encephalopathy (09 Mar 2024 06:53)    88 y/o male with hx of Rectal cancer diagnosed in 1/23 s/p XRT from 3/6-3/10/23, recent MRI in 2/24 with local progression, and recent f/u at [facility] for discussion for treatment options with systemic chemo which was decided too high risk for patient. He also has hx of COPD not on home 02, DM-2, Afib not on AC, chronic diastolic CHF, CKD-4, hypothyroidism, dementia. Patient resides at home with Wife/Daughter uses a cane at baseline and is currently going to o/p PT. Patient Daughter called [facility] yesterday to notify them that patient was more confused than baseline. No reports of chills, fever, N/V/D. No changes in medications. No hx of aspiration, falls, HA, focal weakness. Patient Daughter advised to take him to the ED by [facility]. In the ED vitals with low grade temp at 99.8. BP stable, not hypoxic. No rectal temp done per Daughter request due to hx of rectal cancer. CXR with mild left sided fullness compared to prior, UA neg, leucocytosis of 20 with left shift noted. RVP neg, CT head with microvascular changes. Blood cx sent, given empiric abx. History obtained from HCP/Daughter at bedside.     Patient present to Ed for AMS, concern raised for possible infection, likely pneumonia.    pulmonary consulted for copd management    patient saturating 94% on room air, not in respiratory distress,   patient denies acute complaint, except for back pain.     Patietn follow up with dr. Rivera for moderate COPD, unable to obtain pft due to dementia  patient on anoro, questionable compliance  on as needed duonebs      INTERVAL HPI/OVERNIGHT EVENTS:  improving overall, c/o dyspnea however limited in history by dementia. daughter at bedside concerned about cough. overall improving    MEDICATIONS  (STANDING):  albuterol/ipratropium for Nebulization 3 milliLiter(s) Nebulizer every 6 hours  azithromycin   Tablet 500 milliGRAM(s) Oral daily  buDESOnide    Inhalation Suspension 0.5 milliGRAM(s) Inhalation two times a day  dextrose 5%. 1000 milliLiter(s) (50 mL/Hr) IV Continuous <Continuous>  dextrose 50% Injectable 25 Gram(s) IV Push once  donepezil 5 milliGRAM(s) Oral at bedtime  glucagon  Injectable 1 milliGRAM(s) IntraMuscular once  guaiFENesin  milliGRAM(s) Oral every 12 hours  heparin   Injectable 5000 Unit(s) SubCutaneous every 12 hours  insulin lispro (ADMELOG) corrective regimen sliding scale   SubCutaneous Before meals and at bedtime  levothyroxine 75 MICROGram(s) Oral daily  memantine 5 milliGRAM(s) Oral at bedtime  midodrine. 5 milliGRAM(s) Oral three times a day  montelukast 10 milliGRAM(s) Oral daily  pantoprazole    Tablet 40 milliGRAM(s) Oral two times a day  piperacillin/tazobactam IVPB.. 3.375 Gram(s) IV Intermittent every 8 hours  potassium chloride    Tablet ER 20 milliEquivalent(s) Oral daily  senna 1 Tablet(s) Oral two times a day  torsemide 10 milliGRAM(s) Oral two times a day  umeclidinium 62.5 MICROgram(s)/vilanterol 25 MICROgram(s) Inhaler 1 Puff(s) Inhalation daily      MEDICATIONS  (PRN):  acetaminophen     Tablet .. 650 milliGRAM(s) Oral every 6 hours PRN Temp greater or equal to 38C (100.4F), Mild Pain (1 - 3)  aluminum hydroxide/magnesium hydroxide/simethicone Suspension 30 milliLiter(s) Oral every 4 hours PRN Dyspepsia  benzocaine/menthol Lozenge 1 Lozenge Oral four times a day PRN Sore Throat  dextrose Oral Gel 15 Gram(s) Oral once PRN Blood Glucose LESS THAN 70 milliGRAM(s)/deciliter  melatonin 3 milliGRAM(s) Oral at bedtime PRN Insomnia  ondansetron Injectable 4 milliGRAM(s) IV Push every 8 hours PRN Nausea and/or Vomiting      Allergies    No Known Allergies    Intolerances        PAST MEDICAL & SURGICAL HISTORY:  DM (diabetes mellitus)      Afib      Dementia      COPD (chronic obstructive pulmonary disease)      CKD (chronic kidney disease)      Rectal cancer      S/P carotid endarterectomy      History of lung biopsy          SOCIAL HISTORY  Smoking History:       REVIEW OF SYSTEMS:    CONSTITUTIONAL:  No distress    HEENT:  Eyes:  No diplopia or blurred vision.    CARDIOVASCULAR:  No pressure, squeezing, tightness, heaviness or aching about the chest; no palpitations.    RESPIRATORY:  per HPI     limited by dementia    Vital Signs Last 24 Hrs  T(C): 37.6 (09 Mar 2024 08:10), Max: 37.7 (09 Mar 2024 00:54)  T(F): 99.7 (09 Mar 2024 08:10), Max: 99.9 (09 Mar 2024 00:54)  HR: 88 (09 Mar 2024 08:10) (76 - 88)  BP: 102/62 (09 Mar 2024 08:10) (98/63 - 116/55)  BP(mean): --  RR: 18 (09 Mar 2024 08:10) (18 - 18)  SpO2: 93% (09 Mar 2024 08:10) (93% - 98%)    Parameters below as of 09 Mar 2024 08:10  Patient On (Oxygen Delivery Method): room air        PHYSICAL EXAMINATION:    GENERAL: The patient is awake and alert in no apparent distress.     HEENT: Head is normocephalic and atraumatic. Extraocular muscles are intact. Mucous membranes are moist.    NECK: Supple.    LUNGS: scattered wheezing, diminished. no crackles.    HEART: Regular rate and rhythm without murmur.    ABDOMEN: Soft, nontender, and nondistended.      EXTREMITIES: Without any cyanosis, clubbing, rash, lesions or edema.    NEUROLOGIC: Grossly intact.    LABS:                        8.1    7.46  )-----------( 192      ( 09 Mar 2024 05:05 )             26.0     03-09    139  |  104  |  47.6<H>  ----------------------------<  134<H>  4.6   |  24.0  |  2.31<H>    Ca    8.4      09 Mar 2024 05:05  Mg     2.4     03-08    TPro  6.4<L>  /  Alb  2.9<L>  /  TBili  0.5  /  DBili  x   /  AST  11  /  ALT  5   /  AlkPhos  93  03-09      Urinalysis Basic - ( 09 Mar 2024 05:05 )    Color: x / Appearance: x / SG: x / pH: x  Gluc: 134 mg/dL / Ketone: x  / Bili: x / Urobili: x   Blood: x / Protein: x / Nitrite: x   Leuk Esterase: x / RBC: x / WBC x   Sq Epi: x / Non Sq Epi: x / Bacteria: x                      MICROBIOLOGY:  ngtd  RADIOLOGY & ADDITIONAL STUDIES:  INTERPRETATION: CLINICAL INDICATION: PNEUMONIA.    Axial CT images of the chest are obtained without intravenous administration of contrast.    Comparison is made with the prior chest CT of July 26, 2022.    No enlarged axillary, mediastinal or hilar lymph nodes. Small mediastinal lymph nodes are unchanged.    Vascular calcifications with involvement of the aorta and the coronary arteries. Aortic valve calcifications. Heart size appears normal. Circumferential pericardial thickening and/or trace pericardial fluid appears unchanged.    Oral contrast within the mid to lower aspect of the esophagus and the proximal stomach. Mild distention of the mid to upper aspect of the esophagus containing internal air.    Evaluation of the upper abdomen demonstrate nodularity of the partially imaged liver suggestive of cirrhosis. Partially imaged pneumobilia as on the prior study. Air within the partially imaged gallbladder. Minimal ascites. Subcutaneous edema.    Bilateral mid to lower hemithorax pleural thickening as on the prior study. Foci of pleural calcifications. Trace loculated right pleural fluid.    Evaluation of the lungs demonstrate mild biapical scarring unchanged. Emphysema.    Ill-defined clustered nodular consolidations or opacities within the posterior segment of the left upper lobe with surrounding groundglass appears new since 7/26/2022.    Bilateral lower lobe partial rounded areas of atelectasis, left greater than right with associated volume loss may have slightly progressed since July 26, 2022.    5 mm right upper lobe right apical linear opacity appears new since the prior study.  5 mm right upper lobe nodular opacity on image 69 of series 3 is unchanged.    Clustered foci of impacted distal airways within the bilateral upper lobes may be due to mucus some of which are new and some of which are unchanged since the prior study.    Secretions within the trachea and the bilateral mainstem bronchi degenerative changes off the spine.    IMPRESSION: Nodular consolidations within the posterior segment of the left upper lobe with surrounding groundglass new since 2022 likely infectious in etiology. Considerations include aspiration pneumonia given the distribution. 1-3 month follow-up noncontrast chest CT is recommended to ensure improvement/resolution.

## 2024-03-09 NOTE — PROGRESS NOTE ADULT - ASSESSMENT
89 y/o male with increased confusion/Leucocytosis, hx of rectal cancer, Chronic diastolic CHF, CKD-4, DM-2, Afib, COPD, Dementia, Hypothyroidism presents to [facility] on 3/6 for ams and weakness.      acute metabolic encephalopathy - resolved.  mental status almost at baseline now as per Daughter. 2/2 aspiration pneumonia    aspiration pneumonia.  mental status improved and leukocytosis resolved.  initial concern for possible occult bacteremia from locally invasive rectal cancer but blood culture negative. initial presentation due to aspiration pneumonia   -c.w zosyn and vonda daughter wants to continue IV ABx one more day as she is concerned that he refuse oral Abx at home  -blood cultures no growth    Rectal Cancer:  -[facility] notes reviewed in [facility], not a candidate for further chemo  - [facility] following   -Palliative care for ongoing GOC    Chronic diastolic CHF:  -No evidence of decompensated CHF  hold diuretics as creatinine appear to be increasing    CKD-4:  -Cr. slightly trending up. hold diuretics for now. may need to lower dose at discharge.    Afib:  -Not on AC due to dementia/fall risk/Risk of rectal bleeding with invasive rectal cancer    COPD:  -Nebs prn  -Cont. Anoro Ellipta  -Budesonide  -Singulair        Dementia:  -Cont. Aricept/Memantine   -fall risk  -At risk for delirium while hospitalized     DM-2:  -ADA diet  -Coverage insulin  -Accu checks AC/HS    Hypothyroidism:  -Cont. Synthroid     Anemia: Hb slowly trended down and is 8.1 today.    DVT prophylaxis: heparin  Dispo: home with home health tomorrow if creatinine stable.    Discussed with Daughter     89 y/o male with increased confusion/Leucocytosis, hx of rectal cancer, Chronic diastolic CHF, CKD-4, DM-2, Afib, COPD, Dementia, Hypothyroidism presents to [facility] on 3/6 for ams and weakness.      acute metabolic encephalopathy - resolved.  mental status almost at baseline now as per Daughter. 2/2 aspiration pneumonia    aspiration pneumonia.  mental status improved and leukocytosis resolved.  initial concern for possible occult bacteremia from locally invasive rectal cancer but blood culture negative. initial presentation due to aspiration pneumonia   -c.w zosyn and vonda daughter wants to continue IV ABx one more day as she is concerned that he refuse oral Abx at home  speech evaluation ordered    Rectal Cancer:  -[facility] notes reviewed in [facility]IE, not a candidate for further chemo  - [facility] following   -Palliative care for ongoing GOC    Chronic diastolic CHF:  -No evidence of decompensated CHF  hold diuretics as creatinine appear to be increasing    CKD-4:  -Cr. slightly trending up. hold diuretics for now. may need to lower dose at discharge.    Afib:  -Not on AC due to dementia/fall risk/Risk of rectal bleeding with invasive rectal cancer    COPD:  -Nebs prn  -Cont. Anoro Ellipta  -Budesonide  -Singulair        Dementia:  -Cont. Aricept/Memantine   -fall risk  -At risk for delirium while hospitalized     DM-2:  -ADA diet  -Coverage insulin  -Accu checks AC/HS    Hypothyroidism:  -Cont. Synthroid     Anemia: Hb slowly trended down and is 8.1 today.    DVT prophylaxis: heparin  Dispo: home with home health tomorrow if creatinine stable.    Discussed with Daughter

## 2024-03-09 NOTE — PROGRESS NOTE ADULT - SUBJECTIVE AND OBJECTIVE BOX
NEPHROLOGY INTERVAL HPI/OVERNIGHT EVENTS:  pt essentially the same  no acute distress noted  no overnight events documented    MEDICATIONS  (STANDING):  albuterol/ipratropium for Nebulization 3 milliLiter(s) Nebulizer every 6 hours  azithromycin   Tablet 500 milliGRAM(s) Oral daily  buDESOnide    Inhalation Suspension 0.5 milliGRAM(s) Inhalation two times a day  dextrose 5%. 1000 milliLiter(s) (50 mL/Hr) IV Continuous <Continuous>  dextrose 50% Injectable 25 Gram(s) IV Push once  donepezil 5 milliGRAM(s) Oral at bedtime  glucagon  Injectable 1 milliGRAM(s) IntraMuscular once  guaiFENesin  milliGRAM(s) Oral every 12 hours  heparin   Injectable 5000 Unit(s) SubCutaneous every 12 hours  insulin lispro (ADMELOG) corrective regimen sliding scale   SubCutaneous Before meals and at bedtime  levothyroxine 75 MICROGram(s) Oral daily  memantine 5 milliGRAM(s) Oral at bedtime  midodrine. 5 milliGRAM(s) Oral three times a day  montelukast 10 milliGRAM(s) Oral daily  pantoprazole    Tablet 40 milliGRAM(s) Oral two times a day  piperacillin/tazobactam IVPB.. 3.375 Gram(s) IV Intermittent every 8 hours  potassium chloride    Tablet ER 20 milliEquivalent(s) Oral daily  senna 1 Tablet(s) Oral two times a day  torsemide 10 milliGRAM(s) Oral two times a day  umeclidinium 62.5 MICROgram(s)/vilanterol 25 MICROgram(s) Inhaler 1 Puff(s) Inhalation daily    MEDICATIONS  (PRN):  acetaminophen     Tablet .. 650 milliGRAM(s) Oral every 6 hours PRN Temp greater or equal to 38C (100.4F), Mild Pain (1 - 3)  aluminum hydroxide/magnesium hydroxide/simethicone Suspension 30 milliLiter(s) Oral every 4 hours PRN Dyspepsia  benzocaine/menthol Lozenge 1 Lozenge Oral four times a day PRN Sore Throat  dextrose Oral Gel 15 Gram(s) Oral once PRN Blood Glucose LESS THAN 70 milliGRAM(s)/deciliter  melatonin 3 milliGRAM(s) Oral at bedtime PRN Insomnia  ondansetron Injectable 4 milliGRAM(s) IV Push every 8 hours PRN Nausea and/or Vomiting      Allergies    No Known Allergies        Vital Signs Last 24 Hrs  T(C): 37.2 (09 Mar 2024 05:28), Max: 37.7 (09 Mar 2024 00:54)  T(F): 98.9 (09 Mar 2024 05:28), Max: 99.9 (09 Mar 2024 00:54)  HR: 81 (09 Mar 2024 05:28) (76 - 88)  BP: 116/55 (09 Mar 2024 05:28) (98/63 - 116/55)  BP(mean): --  RR: 18 (09 Mar 2024 05:28) (17 - 18)  SpO2: 97% (09 Mar 2024 06:02) (93% - 98%)    Parameters below as of 09 Mar 2024 06:02  Patient On (Oxygen Delivery Method): room air        PHYSICAL EXAM:  GENERAL: Appears chronically ill  HEENT: Moist MMs  NECK: Supple; no JVD  CHEST/LUNG: Diminished BS but clear  HEART: Regular rate and rhythm; no rub  ABDOMEN: Soft,  +BS  EXTREMITIES: Min dependent edema  NEURO: Demented    LABS:                        8.1    7.46  )-----------( 192      ( 09 Mar 2024 05:05 )             26.0     03-08    140  |  103  |  52.6<H>  ----------------------------<  96  4.4   |  25.0  |  2.27<H>    Ca    8.7      08 Mar 2024 05:41  Mg     2.4     03-08    TPro  6.3<L>  /  Alb  3.0<L>  /  TBili  0.5  /  DBili  x   /  AST  8   /  ALT  5   /  AlkPhos  92  03-08      Urinalysis Basic - ( 08 Mar 2024 05:41 )    Color: x / Appearance: x / SG: x / pH: x  Gluc: 96 mg/dL / Ketone: x  / Bili: x / Urobili: x   Blood: x / Protein: x / Nitrite: x   Leuk Esterase: x / RBC: x / WBC x   Sq Epi: x / Non Sq Epi: x / Bacteria: x          RADIOLOGY & ADDITIONAL TESTS:   NEPHROLOGY INTERVAL HPI/OVERNIGHT EVENTS:  pt essentially the same  no acute distress noted  no overnight events documented  daughter at bedside    MEDICATIONS  (STANDING):  albuterol/ipratropium for Nebulization 3 milliLiter(s) Nebulizer every 6 hours  azithromycin   Tablet 500 milliGRAM(s) Oral daily  buDESOnide    Inhalation Suspension 0.5 milliGRAM(s) Inhalation two times a day  dextrose 5%. 1000 milliLiter(s) (50 mL/Hr) IV Continuous <Continuous>  dextrose 50% Injectable 25 Gram(s) IV Push once  donepezil 5 milliGRAM(s) Oral at bedtime  glucagon  Injectable 1 milliGRAM(s) IntraMuscular once  guaiFENesin  milliGRAM(s) Oral every 12 hours  heparin   Injectable 5000 Unit(s) SubCutaneous every 12 hours  insulin lispro (ADMELOG) corrective regimen sliding scale   SubCutaneous Before meals and at bedtime  levothyroxine 75 MICROGram(s) Oral daily  memantine 5 milliGRAM(s) Oral at bedtime  midodrine. 5 milliGRAM(s) Oral three times a day  montelukast 10 milliGRAM(s) Oral daily  pantoprazole    Tablet 40 milliGRAM(s) Oral two times a day  piperacillin/tazobactam IVPB.. 3.375 Gram(s) IV Intermittent every 8 hours  potassium chloride    Tablet ER 20 milliEquivalent(s) Oral daily  senna 1 Tablet(s) Oral two times a day  torsemide 10 milliGRAM(s) Oral two times a day  umeclidinium 62.5 MICROgram(s)/vilanterol 25 MICROgram(s) Inhaler 1 Puff(s) Inhalation daily    MEDICATIONS  (PRN):  acetaminophen     Tablet .. 650 milliGRAM(s) Oral every 6 hours PRN Temp greater or equal to 38C (100.4F), Mild Pain (1 - 3)  aluminum hydroxide/magnesium hydroxide/simethicone Suspension 30 milliLiter(s) Oral every 4 hours PRN Dyspepsia  benzocaine/menthol Lozenge 1 Lozenge Oral four times a day PRN Sore Throat  dextrose Oral Gel 15 Gram(s) Oral once PRN Blood Glucose LESS THAN 70 milliGRAM(s)/deciliter  melatonin 3 milliGRAM(s) Oral at bedtime PRN Insomnia  ondansetron Injectable 4 milliGRAM(s) IV Push every 8 hours PRN Nausea and/or Vomiting      Allergies    No Known Allergies        Vital Signs Last 24 Hrs  T(C): 37.2 (09 Mar 2024 05:28), Max: 37.7 (09 Mar 2024 00:54)  T(F): 98.9 (09 Mar 2024 05:28), Max: 99.9 (09 Mar 2024 00:54)  HR: 81 (09 Mar 2024 05:28) (76 - 88)  BP: 116/55 (09 Mar 2024 05:28) (98/63 - 116/55)  BP(mean): --  RR: 18 (09 Mar 2024 05:28) (17 - 18)  SpO2: 97% (09 Mar 2024 06:02) (93% - 98%)    Parameters below as of 09 Mar 2024 06:02  Patient On (Oxygen Delivery Method): room air        PHYSICAL EXAM:  GENERAL: Appears chronically ill  HEENT: Moist MMs  NECK: Supple; no JVD  CHEST/LUNG: Diminished BS but clear  HEART: Regular rate and rhythm; no rub  ABDOMEN: Soft,  +BS  EXTREMITIES: Min dependent edema  NEURO: Demented    LABS:                        8.1    7.46  )-----------( 192      ( 09 Mar 2024 05:05 )             26.0   Comprehensive Metabolic Panel in AM (03.09.24 @ 05:05)   Sodium: 139 mmol/L  Potassium: 4.6 mmol/L  Chloride: 104: Chloride reference range changed from ..10/26/2022 mmol/L  Carbon Dioxide: 24.0 mmol/L  Anion Gap: 12 mmol/L  Blood Urea Nitrogen: 47.6 mg/dL  Creatinine: 2.31 mg/dL  Glucose: 134 mg/dL  Calcium: 8.4 mg/dL  Protein Total: 6.4 g/dL  Albumin: 2.9 g/dL  Bilirubin Total: 0.5 mg/dL  Alkaline Phosphatase: 93 U/L  Aspartate Aminotransferase (AST/SGOT): 11 U/L  Alanine Aminotransferase (ALT/SGPT): 5 U/L    03-08    140  |  103  |  52.6<H>  ----------------------------<  96  4.4   |  25.0  |  2.27<H>    Ca    8.7      08 Mar 2024 05:41  Mg     2.4     03-08    TPro  6.3<L>  /  Alb  3.0<L>  /  TBili  0.5  /  DBili  x   /  AST  8   /  ALT  5   /  AlkPhos  92  03-08      Urinalysis Basic - ( 08 Mar 2024 05:41 )    Color: x / Appearance: x / SG: x / pH: x  Gluc: 96 mg/dL / Ketone: x  / Bili: x / Urobili: x   Blood: x / Protein: x / Nitrite: x   Leuk Esterase: x / RBC: x / WBC x   Sq Epi: x / Non Sq Epi: x / Bacteria: x          RADIOLOGY & ADDITIONAL TESTS:

## 2024-03-10 LAB
ANION GAP SERPL CALC-SCNC: 11 MMOL/L — SIGNIFICANT CHANGE UP (ref 5–17)
BUN SERPL-MCNC: 44.7 MG/DL — HIGH (ref 8–20)
CALCIUM SERPL-MCNC: 8.4 MG/DL — SIGNIFICANT CHANGE UP (ref 8.4–10.5)
CHLORIDE SERPL-SCNC: 104 MMOL/L — SIGNIFICANT CHANGE UP (ref 96–108)
CO2 SERPL-SCNC: 24 MMOL/L — SIGNIFICANT CHANGE UP (ref 22–29)
CREAT SERPL-MCNC: 2.49 MG/DL — HIGH (ref 0.5–1.3)
EGFR: 24 ML/MIN/1.73M2 — LOW
GLUCOSE BLDC GLUCOMTR-MCNC: 135 MG/DL — HIGH (ref 70–99)
GLUCOSE BLDC GLUCOMTR-MCNC: 196 MG/DL — HIGH (ref 70–99)
GLUCOSE BLDC GLUCOMTR-MCNC: 203 MG/DL — HIGH (ref 70–99)
GLUCOSE BLDC GLUCOMTR-MCNC: 92 MG/DL — SIGNIFICANT CHANGE UP (ref 70–99)
GLUCOSE SERPL-MCNC: 94 MG/DL — SIGNIFICANT CHANGE UP (ref 70–99)
HCT VFR BLD CALC: 27.6 % — LOW (ref 39–50)
HGB BLD-MCNC: 8.6 G/DL — LOW (ref 13–17)
MAGNESIUM SERPL-MCNC: 2.5 MG/DL — SIGNIFICANT CHANGE UP (ref 1.6–2.6)
MCHC RBC-ENTMCNC: 27.7 PG — SIGNIFICANT CHANGE UP (ref 27–34)
MCHC RBC-ENTMCNC: 31.2 GM/DL — LOW (ref 32–36)
MCV RBC AUTO: 88.7 FL — SIGNIFICANT CHANGE UP (ref 80–100)
PHOSPHATE SERPL-MCNC: 3.7 MG/DL — SIGNIFICANT CHANGE UP (ref 2.4–4.7)
PLATELET # BLD AUTO: 191 K/UL — SIGNIFICANT CHANGE UP (ref 150–400)
POTASSIUM SERPL-MCNC: 4.4 MMOL/L — SIGNIFICANT CHANGE UP (ref 3.5–5.3)
POTASSIUM SERPL-SCNC: 4.4 MMOL/L — SIGNIFICANT CHANGE UP (ref 3.5–5.3)
RBC # BLD: 3.11 M/UL — LOW (ref 4.2–5.8)
RBC # FLD: 15.2 % — HIGH (ref 10.3–14.5)
SODIUM SERPL-SCNC: 139 MMOL/L — SIGNIFICANT CHANGE UP (ref 135–145)
WBC # BLD: 7.09 K/UL — SIGNIFICANT CHANGE UP (ref 3.8–10.5)
WBC # FLD AUTO: 7.09 K/UL — SIGNIFICANT CHANGE UP (ref 3.8–10.5)

## 2024-03-10 PROCEDURE — 99232 SBSQ HOSP IP/OBS MODERATE 35: CPT

## 2024-03-10 RX ORDER — MIDODRINE HYDROCHLORIDE 2.5 MG/1
5 TABLET ORAL THREE TIMES A DAY
Refills: 0 | Status: DISCONTINUED | OUTPATIENT
Start: 2024-03-10 | End: 2024-03-11

## 2024-03-10 RX ADMIN — Medication 2: at 18:12

## 2024-03-10 RX ADMIN — MIDODRINE HYDROCHLORIDE 5 MILLIGRAM(S): 2.5 TABLET ORAL at 06:59

## 2024-03-10 RX ADMIN — Medication 75 MICROGRAM(S): at 06:58

## 2024-03-10 RX ADMIN — AZITHROMYCIN 500 MILLIGRAM(S): 500 TABLET, FILM COATED ORAL at 12:38

## 2024-03-10 RX ADMIN — PIPERACILLIN AND TAZOBACTAM 25 GRAM(S): 4; .5 INJECTION, POWDER, LYOPHILIZED, FOR SOLUTION INTRAVENOUS at 23:46

## 2024-03-10 RX ADMIN — MEMANTINE HYDROCHLORIDE 5 MILLIGRAM(S): 10 TABLET ORAL at 21:13

## 2024-03-10 RX ADMIN — Medication 0.5 MILLIGRAM(S): at 21:13

## 2024-03-10 RX ADMIN — PIPERACILLIN AND TAZOBACTAM 25 GRAM(S): 4; .5 INJECTION, POWDER, LYOPHILIZED, FOR SOLUTION INTRAVENOUS at 01:25

## 2024-03-10 RX ADMIN — Medication 600 MILLIGRAM(S): at 18:27

## 2024-03-10 RX ADMIN — PIPERACILLIN AND TAZOBACTAM 25 GRAM(S): 4; .5 INJECTION, POWDER, LYOPHILIZED, FOR SOLUTION INTRAVENOUS at 16:27

## 2024-03-10 RX ADMIN — Medication 3 MILLILITER(S): at 08:38

## 2024-03-10 RX ADMIN — PANTOPRAZOLE SODIUM 40 MILLIGRAM(S): 20 TABLET, DELAYED RELEASE ORAL at 18:26

## 2024-03-10 RX ADMIN — HEPARIN SODIUM 5000 UNIT(S): 5000 INJECTION INTRAVENOUS; SUBCUTANEOUS at 06:59

## 2024-03-10 RX ADMIN — UMECLIDINIUM BROMIDE AND VILANTEROL TRIFENATATE 1 PUFF(S): 62.5; 25 POWDER RESPIRATORY (INHALATION) at 08:39

## 2024-03-10 RX ADMIN — Medication 3 MILLILITER(S): at 05:25

## 2024-03-10 RX ADMIN — DONEPEZIL HYDROCHLORIDE 5 MILLIGRAM(S): 10 TABLET, FILM COATED ORAL at 21:13

## 2024-03-10 RX ADMIN — PANTOPRAZOLE SODIUM 40 MILLIGRAM(S): 20 TABLET, DELAYED RELEASE ORAL at 06:59

## 2024-03-10 RX ADMIN — Medication 0.5 MILLIGRAM(S): at 08:36

## 2024-03-10 RX ADMIN — PIPERACILLIN AND TAZOBACTAM 25 GRAM(S): 4; .5 INJECTION, POWDER, LYOPHILIZED, FOR SOLUTION INTRAVENOUS at 08:58

## 2024-03-10 RX ADMIN — HEPARIN SODIUM 5000 UNIT(S): 5000 INJECTION INTRAVENOUS; SUBCUTANEOUS at 18:26

## 2024-03-10 RX ADMIN — MONTELUKAST 10 MILLIGRAM(S): 4 TABLET, CHEWABLE ORAL at 12:38

## 2024-03-10 RX ADMIN — Medication 4: at 21:12

## 2024-03-10 RX ADMIN — Medication 600 MILLIGRAM(S): at 06:59

## 2024-03-10 NOTE — SWALLOW BEDSIDE ASSESSMENT ADULT - SWALLOW EVAL: DIAGNOSIS
Oropharyngeal swallow appear clinically unremarkable w/no overt s/s penetration or aspiration demonstrated at the bedside.

## 2024-03-10 NOTE — PROGRESS NOTE ADULT - SUBJECTIVE AND OBJECTIVE BOX
[facility] Division of Hospital Medicine    Chief Complaint:      SUBJECTIVE / OVERNIGHT EVENTS:    Patient doing well as per daughter who was at bedside.     MEDICATIONS  (STANDING):  azithromycin   Tablet 500 milliGRAM(s) Oral daily  buDESOnide    Inhalation Suspension 0.5 milliGRAM(s) Inhalation two times a day  dextrose 5%. 1000 milliLiter(s) (50 mL/Hr) IV Continuous <Continuous>  dextrose 50% Injectable 25 Gram(s) IV Push once  donepezil 5 milliGRAM(s) Oral at bedtime  glucagon  Injectable 1 milliGRAM(s) IntraMuscular once  guaiFENesin  milliGRAM(s) Oral every 12 hours  heparin   Injectable 5000 Unit(s) SubCutaneous every 12 hours  insulin lispro (ADMELOG) corrective regimen sliding scale   SubCutaneous Before meals and at bedtime  levothyroxine 75 MICROGram(s) Oral daily  memantine 5 milliGRAM(s) Oral at bedtime  montelukast 10 milliGRAM(s) Oral daily  pantoprazole    Tablet 40 milliGRAM(s) Oral two times a day  piperacillin/tazobactam IVPB.. 3.375 Gram(s) IV Intermittent every 8 hours  senna 1 Tablet(s) Oral two times a day  umeclidinium 62.5 MICROgram(s)/vilanterol 25 MICROgram(s) Inhaler 1 Puff(s) Inhalation daily    MEDICATIONS  (PRN):  acetaminophen     Tablet .. 650 milliGRAM(s) Oral every 6 hours PRN Temp greater or equal to 38C (100.4F), Mild Pain (1 - 3)  aluminum hydroxide/magnesium hydroxide/simethicone Suspension 30 milliLiter(s) Oral every 4 hours PRN Dyspepsia  benzocaine/menthol Lozenge 1 Lozenge Oral four times a day PRN Sore Throat  dextrose Oral Gel 15 Gram(s) Oral once PRN Blood Glucose LESS THAN 70 milliGRAM(s)/deciliter  melatonin 3 milliGRAM(s) Oral at bedtime PRN Insomnia  midodrine. 5 milliGRAM(s) Oral three times a day PRN SBP<90  ondansetron Injectable 4 milliGRAM(s) IV Push every 8 hours PRN Nausea and/or Vomiting        I&O's Summary      PHYSICAL EXAM:  Vital Signs Last 24 Hrs  T(C): 36.4 (10 Mar 2024 08:15), Max: 37 (09 Mar 2024 16:34)  T(F): 97.6 (10 Mar 2024 08:15), Max: 98.6 (09 Mar 2024 16:34)  HR: 72 (10 Mar 2024 09:01) (72 - 86)  BP: 109/66 (10 Mar 2024 08:15) (95/59 - 114/71)  BP(mean): --  RR: 18 (10 Mar 2024 08:15) (17 - 18)  SpO2: 97% (10 Mar 2024 09:01) (93% - 98%)    Parameters below as of 10 Mar 2024 08:15  Patient On (Oxygen Delivery Method): room air            CONSTITUTIONAL: NAD.  ENMT: atraumatic, normocephalic  RESPIRATORY: Normal respiratory effort; lungs are clear to auscultation bilaterally  CARDIOVASCULAR: Regular rate and rhythm, normal S1 and S2, no murmur/rub/gallop  ABDOMEN: Nontender to palpation, no rebound/guarding, No hepatosplenomegaly  MUSCuLOSKELETAL:  mild leg edema  PSYCH: Awake, confused  NEUROLOGY: CN 2-12 are intact and symmetric; no gross deficits;     LABS:                        8.6    7.09  )-----------( 191      ( 10 Mar 2024 06:00 )             27.6     03-10    139  |  104  |  44.7<H>  ----------------------------<  94  4.4   |  24.0  |  2.49<H>    Ca    8.4      10 Mar 2024 06:00  Phos  3.7     03-10  Mg     2.5     03-10    TPro  6.4<L>  /  Alb  2.9<L>  /  TBili  0.5  /  DBili  x   /  AST  11  /  ALT  5   /  AlkPhos  93  03-09          Urinalysis Basic - ( 10 Mar 2024 06:00 )    Color: x / Appearance: x / SG: x / pH: x  Gluc: 94 mg/dL / Ketone: x  / Bili: x / Urobili: x   Blood: x / Protein: x / Nitrite: x   Leuk Esterase: x / RBC: x / WBC x   Sq Epi: x / Non Sq Epi: x / Bacteria: x        CAPILLARY BLOOD GLUCOSE      POCT Blood Glucose.: 92 mg/dL (10 Mar 2024 08:43)  POCT Blood Glucose.: 151 mg/dL (09 Mar 2024 23:51)  POCT Blood Glucose.: 157 mg/dL (09 Mar 2024 22:27)  POCT Blood Glucose.: 208 mg/dL (09 Mar 2024 17:03)  POCT Blood Glucose.: 139 mg/dL (09 Mar 2024 11:56)        RADIOLOGY & ADDITIONAL TESTS:  Results Reviewed:   Imaging Personally Reviewed:  Electrocardiogram Personally Reviewed:

## 2024-03-10 NOTE — SWALLOW BEDSIDE ASSESSMENT ADULT - SLP PERTINENT HISTORY OF CURRENT PROBLEM
As per MD note, "89 y/o male with increased confusion/Leucocytosis, hx of rectal cancer, Chronic diastolic CHF, CKD-4, DM-2, Afib, COPD, Dementia, Hypothyroidism presents to [facility] on 3/6 for ams and weakness".

## 2024-03-10 NOTE — SWALLOW BEDSIDE ASSESSMENT ADULT - SLP GENERAL OBSERVATIONS
Recd awake/upright in bed, A&A Ox1, reduced cognition, confused, inappropriate verbal absurdities at times, 0/10 pain pre/post, tolerating RA, daughter present for assessment

## 2024-03-10 NOTE — PROGRESS NOTE ADULT - ASSESSMENT
88 y/o male with increased confusion/Leucocytosis, hx of rectal cancer, Chronic diastolic CHF, CKD-4, DM-2, Afib, COPD, Dementia, Hypothyroidism presents to [facility] on 3/6 for ams and weakness.      acute metabolic encephalopathy 2/2 aspiration pneumonia.   resolved. mental status at baseline now as per Daughter.     aspiration pneumonia.  mental status improved and leukocytosis resolved.  initial concern for possible occult bacteremia from locally invasive rectal cancer but blood culture negative. initial presentation due to aspiration pneumonia   -c/w zosyn and will complete 5 days of azithro today  speech evaluation cleared him for regular consistency food.    Rectal Cancer:  -follows at [facility] following   -no plan for systemic chemo.    Chronic diastolic CHF:  at home he gets diuretics PRN for LE edema and increase in weight by >2LB.    CKD-4:  -Cr. increased to 2.49. baseline close to 2. 2/2 diuretics. uses them PRN at home while it was given scheduled here in hospital. on hold now    Afib:  -Not on AC due to dementia/fall risk/Risk of rectal bleeding with invasive rectal cancer    COPD:  -Nebs prn  -Cont. Anoro Ellipta  -Budesonide  -Singulair        Dementia:  -Cont. Aricept/Memantine   -fall risk  -At risk for delirium while hospitalized     DM-2:  -ADA diet  -Coverage insulin  -Accu checks AC/HS    Hypothyroidism:  -Cont. Synthroid     Anemia: Hb stable now.    DVT prophylaxis: heparin  Dispo: home with home health tomorrow if creatinine trending down.    Discussed with Daughter

## 2024-03-10 NOTE — PROGRESS NOTE ADULT - ASSESSMENT
CKD(IIIb): ALMAS, prerenal due to diuretics  AMS improved and at baseline  PNA  - cont to avoid potential nephrotoxins  - cont Midodrine   - diuretics held  - antibiotics to complete course  - follow labs    Anemia: multifactorial  - defer THERESA in setting of malignancy unless cleared by Heme/Onc  - consider PRBCs if needed

## 2024-03-10 NOTE — PROGRESS NOTE ADULT - ASSESSMENT
89 y/o elderly male with increased confusion. leukocytosis leucocytosis.  PMHx of  [place]al [place] [facility] Chronic diastolic CHF, CKD-4, DM-2, Afib, COPD, Dementia, Hypothyroidism     Leukocytosis mostly like due to pneumonia  -Low grade temp on arrival now resolved  - possible occult bacteremia from locally invasive rectal cancer  - 3/5/2024 CXR grossly unchanged from prior, UA neg, RVP neg  - blood cx - negative   -WBC on arrival 20.57  WBC at 7.09K this AM   -Cont to trend WBC  -on IV Zosyn + PO Azithromycin    Rectal Cancer:  -Pt follows with [facility]  - S/P short course of RT 3/6-3/10/2023  -No plan for systemic therapy    DVT ppx  per medicine  Pt is a fall risk  Pt has dementia no mechanical DVT ppx due to fall risk    Chronic diastolic CHF:  -No evidence of decompensated CHF  -cont. out ptregimen as BP will tolerate  -DASH diet    CKD-4:    creat on adm 2.99 toady 2.2.7  -Avoid nephrotoxic drugs    Afib:  -Not on AC due to dementia  -fall risk/ rectal cancer bleed risk      D/W family at bedside    Keep [facility] inform of pts progress      [facility]  [phone]            88 y/o elderly male with increased confusion. leukocytosis leucocytosis.  PMHx of  [place] [facility] Chronic diastolic CHF, CKD-4, DM-2, Afib, COPD, Dementia, Hypothyroidism     Leukocytosis mostly like due to pneumonia  - Low grade temp on arrival now resolved  - possible occult bacteremia from locally invasive rectal cancer  - 3/5/2024 CXR grossly unchanged from prior, UA neg, RVP neg  - blood cx - negative   - WBC on arrival 20.57  WBC at 7.09K this AM   - Cont to trend WBC  - on IV Zosyn + PO Azithromycin    Rectal Cancer:  - Pt follows with [facility]  - S/P short course of RT 3/6-3/10/2023  - No plan for systemic therapy  - outpatient follow up with [facility] on discharge     DVT ppx  per medicine  Pt is a fall risk  Pt has dementia no mechanical DVT ppx due to fall risk    Chronic diastolic CHF:  -No evidence of decompensated CHF  -cont. out ptregimen as BP will tolerate  -DASH diet    CKD-4:  creat on adm 2.99   - Avoid nephrotoxic drugs  - cr 2.49 this AM     Afib:  -Not on AC due to dementia  -fall risk/ rectal cancer bleed risk    D/W family at bedside  Will update [facility] daily     [facility]  [phone]

## 2024-03-10 NOTE — PROGRESS NOTE ADULT - SUBJECTIVE AND OBJECTIVE BOX
NEPHROLOGY INTERVAL HPI/OVERNIGHT EVENTS:  pt continues to improve  no acute sob, cp, n/v/d  overnight uneventful  daughter remains at bedside    MEDICATIONS  (STANDING):  azithromycin   Tablet 500 milliGRAM(s) Oral daily  buDESOnide    Inhalation Suspension 0.5 milliGRAM(s) Inhalation two times a day  dextrose 5%. 1000 milliLiter(s) (50 mL/Hr) IV Continuous <Continuous>  dextrose 50% Injectable 25 Gram(s) IV Push once  donepezil 5 milliGRAM(s) Oral at bedtime  glucagon  Injectable 1 milliGRAM(s) IntraMuscular once  guaiFENesin  milliGRAM(s) Oral every 12 hours  heparin   Injectable 5000 Unit(s) SubCutaneous every 12 hours  insulin lispro (ADMELOG) corrective regimen sliding scale   SubCutaneous Before meals and at bedtime  levothyroxine 75 MICROGram(s) Oral daily  memantine 5 milliGRAM(s) Oral at bedtime  midodrine. 5 milliGRAM(s) Oral three times a day  montelukast 10 milliGRAM(s) Oral daily  pantoprazole    Tablet 40 milliGRAM(s) Oral two times a day  piperacillin/tazobactam IVPB.. 3.375 Gram(s) IV Intermittent every 8 hours  senna 1 Tablet(s) Oral two times a day  umeclidinium 62.5 MICROgram(s)/vilanterol 25 MICROgram(s) Inhaler 1 Puff(s) Inhalation daily    MEDICATIONS  (PRN):  acetaminophen     Tablet .. 650 milliGRAM(s) Oral every 6 hours PRN Temp greater or equal to 38C (100.4F), Mild Pain (1 - 3)  aluminum hydroxide/magnesium hydroxide/simethicone Suspension 30 milliLiter(s) Oral every 4 hours PRN Dyspepsia  benzocaine/menthol Lozenge 1 Lozenge Oral four times a day PRN Sore Throat  dextrose Oral Gel 15 Gram(s) Oral once PRN Blood Glucose LESS THAN 70 milliGRAM(s)/deciliter  melatonin 3 milliGRAM(s) Oral at bedtime PRN Insomnia  ondansetron Injectable 4 milliGRAM(s) IV Push every 8 hours PRN Nausea and/or Vomiting      Allergies    No Known Allergies          Vital Signs Last 24 Hrs  T(C): 36.6 (10 Mar 2024 05:05), Max: 37 (09 Mar 2024 16:34)  T(F): 97.9 (10 Mar 2024 05:05), Max: 98.6 (09 Mar 2024 16:34)  HR: 73 (10 Mar 2024 05:05) (73 - 86)  BP: 100/65 (10 Mar 2024 05:05) (95/59 - 114/71)  BP(mean): --  RR: 18 (10 Mar 2024 05:05) (17 - 18)  SpO2: 95% (10 Mar 2024 05:05) (93% - 98%)    Parameters below as of 10 Mar 2024 05:31  Patient On (Oxygen Delivery Method): room air        PHYSICAL EXAM:  GENERAL: Frail, deconditioned  HEENT: Anicteric sclerae  NECK: Supple; no JVD  CHEST/LUNG: Diminished BS but clear  HEART: Regular rate and rhythm; no rub  ABDOMEN: Soft,  +BS  EXTREMITIES: Min dependent edema  NEURO: Demented --> MS at baseline    LABS:                        8.6    7.09  )-----------( 191      ( 10 Mar 2024 06:00 )             27.6     03-10    139  |  104  |  44.7<H>  ----------------------------<  94  4.4   |  24.0  |  2.49<H>    Ca    8.4      10 Mar 2024 06:00  Phos  3.7     03-10  Mg     2.5     03-10    TPro  6.4<L>  /  Alb  2.9<L>  /  TBili  0.5  /  DBili  x   /  AST  11  /  ALT  5   /  AlkPhos  93  03-09      Urinalysis Basic - ( 10 Mar 2024 06:00 )    Color: x / Appearance: x / SG: x / pH: x  Gluc: 94 mg/dL / Ketone: x  / Bili: x / Urobili: x   Blood: x / Protein: x / Nitrite: x   Leuk Esterase: x / RBC: x / WBC x   Sq Epi: x / Non Sq Epi: x / Bacteria: x      Magnesium: 2.5 mg/dL (03-10 @ 06:00)  Phosphorus: 3.7 mg/dL (03-10 @ 06:00)      RADIOLOGY & ADDITIONAL TESTS:  < from: CT Chest No Cont (03.07.24 @ 09:49) >    ACC: [id number] EXAM:  CT CHEST   ORDERED BY: MARIA EUGENIA STEPHEN     PROCEDURE DATE:  03/07/2024          INTERPRETATION:  CLINICAL INDICATION: PNEUMONIA.    Axial CT images of the chest are obtained without intravenous   administration of contrast.    Comparison is made with the prior chest CT of July 26, 2022.    No enlarged axillary, mediastinal or hilar lymph nodes. Small mediastinal   lymph nodes are unchanged.    Vascular calcifications with involvement of the aorta and the coronary   arteries. Aortic valve calcifications. Heart size appears normal.   Circumferential pericardial thickening and/or trace pericardial fluid   appears unchanged.    Oral contrast within the mid to lower aspect of the esophagus and the   proximal stomach. Mild distention of the mid to upper aspect of the   esophagus containing internal air.    Evaluation of the upper abdomen demonstrate nodularity of the partially   imaged liver suggestive of cirrhosis. Partially imaged pneumobilia as on   the prior study. Air within the partially imaged gallbladder. Minimal   ascites. Subcutaneous edema.    Bilateral mid to lower hemithorax pleural thickening as on the prior   study. Foci of pleural calcifications. Trace loculated right pleural   fluid.    Evaluation of the lungs demonstrate mild biapical scarring unchanged.   Emphysema.    Ill-defined clustered nodular consolidations or opacities within the   posterior segment of the left upper lobe with surrounding groundglass   appears new since 7/26/2022.    Bilateral lower lobe partial rounded areas of atelectasis, left greater   than right with associated volume loss may have slightly progressed since   July 26, 2022.    5 mm right upper lobe right apical linear opacity appears new since the   prior study.  5 mm right upper lobe nodular opacity on image 69 of series 3 is   unchanged.    Clustered foci of impacted distal airways within the bilateral upper   lobes may be due to mucus some of which are new and some of which are   unchanged since the prior study.    Secretions within the trachea and the bilateral mainstem bronchi   degenerative changes off the spine.    IMPRESSION: Nodular consolidations within the posterior segment of the   left upper lobe with surrounding groundglass new since 2022 likely  infectious in etiology. Considerations include aspiration pneumonia given   the distribution. 1-3 month follow-up noncontrast chest CT is recommended   to ensure improvement/resolution.    < end of copied text >

## 2024-03-10 NOTE — PROGRESS NOTE ADULT - SUBJECTIVE AND OBJECTIVE BOX
NOTE IN PROGRESS     89 y/o elderly male with hx of Rectal cancer diagnosed in 1/23 s/p XRT from 3/6-3/10/23, recent MRI in 2/24 with local progression, and recent f/u at [facility] for discussion for treatment options with systemic chemo which was decided too high risk for patient. He also has hx of COPD not on home 02, DM-2, Afib not on AC, chronic diastolic CHF, CKD-4, hypothyroidism, dementia. Patient resides at home with Wife/Daughter uses a cane at baseline and is currently going to o/p PT. Patient Daughter called [facility] yesterday to notify them that patient was more confused than baseline. No reports of chills, fever, N/V/D. No changes in medications. No hx of aspiration, falls, HA, focal weakness. Patient Daughter advised to take him to the ED by [facility]. In the ED vitals with low grade temp at 99.8. BP stable, not hypoxic. No rectal temp done per Daughter request due to hx of rectal cancer. Blood cx sent, given empiric abx. History obtained from daughter at bedside.     CT brain 3/5/2024 MPRESSION: No large territory acute infarct, intracranial hemorrhage, or mass effect.  Stable appearance of chronic microangiopathic white matter changes and  parenchymal volume loss.  CXR 3/5/2024 On January 1, 2023 there was a left lower lobe infiltrate and presently this infiltrate is again noted essentially unchanged.  The present film also shows a mild left perihilar infiltrate and an increasing right lower perihilar infiltrate.    seen at bedside, on abx, afebrile, confused from dementia but as per daughter more alert  CT chest showing asp PNA  3/9/2024:  Seen at bedside, resting comfortably; no resp distress; family at bedside      MEDICATIONS  (STANDING):  albuterol/ipratropium for Nebulization 3 milliLiter(s) Nebulizer every 6 hours  azithromycin   Tablet 500 milliGRAM(s) Oral daily  buDESOnide    Inhalation Suspension 0.5 milliGRAM(s) Inhalation two times a day  dextrose 5%. 1000 milliLiter(s) (50 mL/Hr) IV Continuous <Continuous>  dextrose 50% Injectable 25 Gram(s) IV Push once  donepezil 5 milliGRAM(s) Oral at bedtime  glucagon  Injectable 1 milliGRAM(s) IntraMuscular once  guaiFENesin  milliGRAM(s) Oral every 12 hours  heparin   Injectable 5000 Unit(s) SubCutaneous every 12 hours  insulin lispro (ADMELOG) corrective regimen sliding scale   SubCutaneous Before meals and at bedtime  levothyroxine 75 MICROGram(s) Oral daily  memantine 5 milliGRAM(s) Oral at bedtime  midodrine. 5 milliGRAM(s) Oral three times a day  montelukast 10 milliGRAM(s) Oral daily  pantoprazole    Tablet 40 milliGRAM(s) Oral two times a day  piperacillin/tazobactam IVPB.. 3.375 Gram(s) IV Intermittent every 8 hours  potassium chloride    Tablet ER 20 milliEquivalent(s) Oral daily  senna 2 Tablet(s) Oral at bedtime  torsemide 10 milliGRAM(s) Oral two times a day  umeclidinium 62.5 MICROgram(s)/vilanterol 25 MICROgram(s) Inhaler 1 Puff(s) Inhalation daily    MEDICATIONS  (STANDING):  azithromycin   Tablet 500 milliGRAM(s) Oral daily  buDESOnide    Inhalation Suspension 0.5 milliGRAM(s) Inhalation two times a day  dextrose 5%. 1000 milliLiter(s) (50 mL/Hr) IV Continuous <Continuous>  dextrose 50% Injectable 25 Gram(s) IV Push once  donepezil 5 milliGRAM(s) Oral at bedtime  glucagon  Injectable 1 milliGRAM(s) IntraMuscular once  guaiFENesin  milliGRAM(s) Oral every 12 hours  heparin   Injectable 5000 Unit(s) SubCutaneous every 12 hours  insulin lispro (ADMELOG) corrective regimen sliding scale   SubCutaneous Before meals and at bedtime  levothyroxine 75 MICROGram(s) Oral daily  memantine 5 milliGRAM(s) Oral at bedtime  montelukast 10 milliGRAM(s) Oral daily  pantoprazole    Tablet 40 milliGRAM(s) Oral two times a day  piperacillin/tazobactam IVPB.. 3.375 Gram(s) IV Intermittent every 8 hours  senna 1 Tablet(s) Oral two times a day  umeclidinium 62.5 MICROgram(s)/vilanterol 25 MICROgram(s) Inhaler 1 Puff(s) Inhalation daily    MEDICATIONS  (PRN):  acetaminophen     Tablet .. 650 milliGRAM(s) Oral every 6 hours PRN Temp greater or equal to 38C (100.4F), Mild Pain (1 - 3)  aluminum hydroxide/magnesium hydroxide/simethicone Suspension 30 milliLiter(s) Oral every 4 hours PRN Dyspepsia  benzocaine/menthol Lozenge 1 Lozenge Oral four times a day PRN Sore Throat  dextrose Oral Gel 15 Gram(s) Oral once PRN Blood Glucose LESS THAN 70 milliGRAM(s)/deciliter  melatonin 3 milliGRAM(s) Oral at bedtime PRN Insomnia  midodrine. 5 milliGRAM(s) Oral three times a day PRN SBP<90  ondansetron Injectable 4 milliGRAM(s) IV Push every 8 hours PRN Nausea and/or Vomiting      Vital Signs Last 24 Hrs  T(C): 36.4 (10 Mar 2024 08:15), Max: 37 (09 Mar 2024 16:34)  T(F): 97.6 (10 Mar 2024 08:15), Max: 98.6 (09 Mar 2024 16:34)  HR: 79 (10 Mar 2024 08:15) (73 - 86)  BP: 109/66 (10 Mar 2024 08:15) (95/59 - 114/71)  BP(mean): --  RR: 18 (10 Mar 2024 08:15) (17 - 18)  SpO2: 95% (10 Mar 2024 08:15) (93% - 98%)    Parameters below as of 10 Mar 2024 08:15  Patient On (Oxygen Delivery Method): room air           Physical Exam:   Constitutional 	elderly male sitting up in bed in NAD  Eyes PERRLA conjunctiva clear   ENMT	 mucosa dry· Respiratory	  Resp no rhonchi; diminished breath sounds, L; diminished breath sounds, R  Cardiovascular	regular rate and rhythm; S1 S2 present  Gastrointestinal: soft; nontender  Mental Status	AOx1 person  Skin warm and dry   Musculoskeletal Comments +2 B/L LE edema                             8.6    7.09  )-----------( 191      ( 10 Mar 2024 06:00 )             27.6       WBC 7.46  H/H 8.1/26  plt ct 192,000  (3/9/2024)                       89 y/o elderly male with hx of Rectal cancer diagnosed in 1/23 s/p XRT from 3/6-3/10/23, recent MRI in 2/24 with local progression, and recent f/u at [facility] for discussion for treatment options with systemic chemo which was decided too high risk for patient. He also has hx of COPD not on home 02, DM-2, Afib not on AC, chronic diastolic CHF, CKD-4, hypothyroidism, dementia. Patient resides at home with Wife/Daughter uses a cane at baseline and is currently going to o/p PT. Patient Daughter called [facility] yesterday to notify them that patient was more confused than baseline. No reports of chills, fever, N/V/D. No changes in medications. No hx of aspiration, falls, HA, focal weakness. Patient Daughter advised to take him to the ED by [facility]. In the ED vitals with low grade temp at 99.8. BP stable, not hypoxic. No rectal temp done per Daughter request due to hx of rectal cancer. Blood cx sent, given empiric abx. History obtained from daughter at bedside.     CT brain 3/5/2024 MPRESSION: No large territory acute infarct, intracranial hemorrhage, or mass effect.  Stable appearance of chronic microangiopathic white matter changes and  parenchymal volume loss.  CXR 3/5/2024 On January 1, 2023 there was a left lower lobe infiltrate and presently this infiltrate is again noted essentially unchanged.  The present film also shows a mild left perihilar infiltrate and an increasing right lower perihilar infiltrate.    3/10: out of bed in chair, resting comfortably. Daughter at bedside. hgb 8.6 g/dl, PLT 191K      MEDICATIONS  (STANDING):  azithromycin   Tablet 500 milliGRAM(s) Oral daily  buDESOnide    Inhalation Suspension 0.5 milliGRAM(s) Inhalation two times a day  dextrose 5%. 1000 milliLiter(s) (50 mL/Hr) IV Continuous <Continuous>  dextrose 50% Injectable 25 Gram(s) IV Push once  donepezil 5 milliGRAM(s) Oral at bedtime  glucagon  Injectable 1 milliGRAM(s) IntraMuscular once  guaiFENesin  milliGRAM(s) Oral every 12 hours  heparin   Injectable 5000 Unit(s) SubCutaneous every 12 hours  insulin lispro (ADMELOG) corrective regimen sliding scale   SubCutaneous Before meals and at bedtime  levothyroxine 75 MICROGram(s) Oral daily  memantine 5 milliGRAM(s) Oral at bedtime  montelukast 10 milliGRAM(s) Oral daily  pantoprazole    Tablet 40 milliGRAM(s) Oral two times a day  piperacillin/tazobactam IVPB.. 3.375 Gram(s) IV Intermittent every 8 hours  senna 1 Tablet(s) Oral two times a day  umeclidinium 62.5 MICROgram(s)/vilanterol 25 MICROgram(s) Inhaler 1 Puff(s) Inhalation daily    MEDICATIONS  (PRN):  acetaminophen     Tablet .. 650 milliGRAM(s) Oral every 6 hours PRN Temp greater or equal to 38C (100.4F), Mild Pain (1 - 3)  aluminum hydroxide/magnesium hydroxide/simethicone Suspension 30 milliLiter(s) Oral every 4 hours PRN Dyspepsia  benzocaine/menthol Lozenge 1 Lozenge Oral four times a day PRN Sore Throat  dextrose Oral Gel 15 Gram(s) Oral once PRN Blood Glucose LESS THAN 70 milliGRAM(s)/deciliter  melatonin 3 milliGRAM(s) Oral at bedtime PRN Insomnia  midodrine. 5 milliGRAM(s) Oral three times a day PRN SBP<90  ondansetron Injectable 4 milliGRAM(s) IV Push every 8 hours PRN Nausea and/or Vomiting      Vital Signs Last 24 Hrs  T(C): 36.4 (10 Mar 2024 08:15), Max: 37 (09 Mar 2024 16:34)  T(F): 97.6 (10 Mar 2024 08:15), Max: 98.6 (09 Mar 2024 16:34)  HR: 79 (10 Mar 2024 08:15) (73 - 86)  BP: 109/66 (10 Mar 2024 08:15) (95/59 - 114/71)  BP(mean): --  RR: 18 (10 Mar 2024 08:15) (17 - 18)  SpO2: 95% (10 Mar 2024 08:15) (93% - 98%)    Parameters below as of 10 Mar 2024 08:15  Patient On (Oxygen Delivery Method): room air     Physical Exam:   Constitutional 	elderly male sitting up in bed in NAD  Eyes PERRLA conjunctiva clear   ENMT	 mucosa dry· Respiratory	  Resp no rhonchi; diminished breath sounds, L; diminished breath sounds, R  Cardiovascular	regular rate and rhythm; S1 S2 present  Gastrointestinal: soft; nontender  Mental Status	AOx1 person  Skin warm and dry   Musculoskeletal Comments +2 B/L LE edema                             8.6    7.09  )-----------( 191      ( 10 Mar 2024 06:00 )             27.6       WBC 7.46  H/H 8.1/26  plt ct 192,000  (3/9/2024)

## 2024-03-11 ENCOUNTER — TRANSCRIPTION ENCOUNTER (OUTPATIENT)
Age: 89
End: 2024-03-11

## 2024-03-11 VITALS
TEMPERATURE: 98 F | OXYGEN SATURATION: 98 % | RESPIRATION RATE: 18 BRPM | SYSTOLIC BLOOD PRESSURE: 117 MMHG | HEART RATE: 78 BPM | DIASTOLIC BLOOD PRESSURE: 68 MMHG

## 2024-03-11 LAB
ANION GAP SERPL CALC-SCNC: 14 MMOL/L — SIGNIFICANT CHANGE UP (ref 5–17)
BUN SERPL-MCNC: 39.8 MG/DL — HIGH (ref 8–20)
CALCIUM SERPL-MCNC: 8.5 MG/DL — SIGNIFICANT CHANGE UP (ref 8.4–10.5)
CHLORIDE SERPL-SCNC: 102 MMOL/L — SIGNIFICANT CHANGE UP (ref 96–108)
CO2 SERPL-SCNC: 22 MMOL/L — SIGNIFICANT CHANGE UP (ref 22–29)
CREAT SERPL-MCNC: 2.21 MG/DL — HIGH (ref 0.5–1.3)
CULTURE RESULTS: SIGNIFICANT CHANGE UP
CULTURE RESULTS: SIGNIFICANT CHANGE UP
EGFR: 28 ML/MIN/1.73M2 — LOW
GLUCOSE BLDC GLUCOMTR-MCNC: 129 MG/DL — HIGH (ref 70–99)
GLUCOSE BLDC GLUCOMTR-MCNC: 147 MG/DL — HIGH (ref 70–99)
GLUCOSE BLDC GLUCOMTR-MCNC: 223 MG/DL — HIGH (ref 70–99)
GLUCOSE SERPL-MCNC: 128 MG/DL — HIGH (ref 70–99)
HCT VFR BLD CALC: 26.2 % — LOW (ref 39–50)
HGB BLD-MCNC: 8.4 G/DL — LOW (ref 13–17)
MCHC RBC-ENTMCNC: 28 PG — SIGNIFICANT CHANGE UP (ref 27–34)
MCHC RBC-ENTMCNC: 32.1 GM/DL — SIGNIFICANT CHANGE UP (ref 32–36)
MCV RBC AUTO: 87.3 FL — SIGNIFICANT CHANGE UP (ref 80–100)
PLATELET # BLD AUTO: 184 K/UL — SIGNIFICANT CHANGE UP (ref 150–400)
POTASSIUM SERPL-MCNC: 4.4 MMOL/L — SIGNIFICANT CHANGE UP (ref 3.5–5.3)
POTASSIUM SERPL-SCNC: 4.4 MMOL/L — SIGNIFICANT CHANGE UP (ref 3.5–5.3)
RBC # BLD: 3 M/UL — LOW (ref 4.2–5.8)
RBC # FLD: 15.1 % — HIGH (ref 10.3–14.5)
SODIUM SERPL-SCNC: 138 MMOL/L — SIGNIFICANT CHANGE UP (ref 135–145)
SPECIMEN SOURCE: SIGNIFICANT CHANGE UP
SPECIMEN SOURCE: SIGNIFICANT CHANGE UP
WBC # BLD: 7.02 K/UL — SIGNIFICANT CHANGE UP (ref 3.8–10.5)
WBC # FLD AUTO: 7.02 K/UL — SIGNIFICANT CHANGE UP (ref 3.8–10.5)

## 2024-03-11 PROCEDURE — 85730 THROMBOPLASTIN TIME PARTIAL: CPT

## 2024-03-11 PROCEDURE — 85018 HEMOGLOBIN: CPT

## 2024-03-11 PROCEDURE — 85610 PROTHROMBIN TIME: CPT

## 2024-03-11 PROCEDURE — 80053 COMPREHEN METABOLIC PANEL: CPT

## 2024-03-11 PROCEDURE — 94640 AIRWAY INHALATION TREATMENT: CPT

## 2024-03-11 PROCEDURE — 36415 COLL VENOUS BLD VENIPUNCTURE: CPT

## 2024-03-11 PROCEDURE — 85025 COMPLETE CBC W/AUTO DIFF WBC: CPT

## 2024-03-11 PROCEDURE — 71250 CT THORAX DX C-: CPT | Mod: MC

## 2024-03-11 PROCEDURE — 70450 CT HEAD/BRAIN W/O DYE: CPT | Mod: MC

## 2024-03-11 PROCEDURE — 84484 ASSAY OF TROPONIN QUANT: CPT

## 2024-03-11 PROCEDURE — 82330 ASSAY OF CALCIUM: CPT

## 2024-03-11 PROCEDURE — 97116 GAIT TRAINING THERAPY: CPT

## 2024-03-11 PROCEDURE — 82962 GLUCOSE BLOOD TEST: CPT

## 2024-03-11 PROCEDURE — 84100 ASSAY OF PHOSPHORUS: CPT

## 2024-03-11 PROCEDURE — 82435 ASSAY OF BLOOD CHLORIDE: CPT

## 2024-03-11 PROCEDURE — 85014 HEMATOCRIT: CPT

## 2024-03-11 PROCEDURE — 80048 BASIC METABOLIC PNL TOTAL CA: CPT

## 2024-03-11 PROCEDURE — 0225U NFCT DS DNA&RNA 21 SARSCOV2: CPT

## 2024-03-11 PROCEDURE — 81003 URINALYSIS AUTO W/O SCOPE: CPT

## 2024-03-11 PROCEDURE — 82947 ASSAY GLUCOSE BLOOD QUANT: CPT

## 2024-03-11 PROCEDURE — 85027 COMPLETE CBC AUTOMATED: CPT

## 2024-03-11 PROCEDURE — 84295 ASSAY OF SERUM SODIUM: CPT

## 2024-03-11 PROCEDURE — 87040 BLOOD CULTURE FOR BACTERIA: CPT

## 2024-03-11 PROCEDURE — 80061 LIPID PANEL: CPT

## 2024-03-11 PROCEDURE — 71045 X-RAY EXAM CHEST 1 VIEW: CPT

## 2024-03-11 PROCEDURE — 82803 BLOOD GASES ANY COMBINATION: CPT

## 2024-03-11 PROCEDURE — 93005 ELECTROCARDIOGRAM TRACING: CPT

## 2024-03-11 PROCEDURE — 99239 HOSP IP/OBS DSCHRG MGMT >30: CPT

## 2024-03-11 PROCEDURE — 94760 N-INVAS EAR/PLS OXIMETRY 1: CPT

## 2024-03-11 PROCEDURE — 97530 THERAPEUTIC ACTIVITIES: CPT

## 2024-03-11 PROCEDURE — 83605 ASSAY OF LACTIC ACID: CPT

## 2024-03-11 PROCEDURE — 99285 EMERGENCY DEPT VISIT HI MDM: CPT

## 2024-03-11 PROCEDURE — 84439 ASSAY OF FREE THYROXINE: CPT

## 2024-03-11 PROCEDURE — 84443 ASSAY THYROID STIM HORMONE: CPT

## 2024-03-11 PROCEDURE — 83735 ASSAY OF MAGNESIUM: CPT

## 2024-03-11 PROCEDURE — 80307 DRUG TEST PRSMV CHEM ANLYZR: CPT

## 2024-03-11 PROCEDURE — 84132 ASSAY OF SERUM POTASSIUM: CPT

## 2024-03-11 RX ORDER — ALBUTEROL 90 UG/1
3 AEROSOL, METERED ORAL
Qty: 90 | Refills: 0
Start: 2024-03-11 | End: 2024-04-09

## 2024-03-11 RX ORDER — IPRATROPIUM/ALBUTEROL SULFATE 18-103MCG
3 AEROSOL WITH ADAPTER (GRAM) INHALATION
Qty: 180 | Refills: 0
Start: 2024-03-11 | End: 2024-04-09

## 2024-03-11 RX ORDER — IPRATROPIUM/ALBUTEROL SULFATE 18-103MCG
3 AEROSOL WITH ADAPTER (GRAM) INHALATION EVERY 6 HOURS
Refills: 0 | Status: DISCONTINUED | OUTPATIENT
Start: 2024-03-11 | End: 2024-03-11

## 2024-03-11 RX ORDER — ALBUTEROL 90 UG/1
3 AEROSOL, METERED ORAL
Qty: 0 | Refills: 0 | DISCHARGE

## 2024-03-11 RX ORDER — IPRATROPIUM/ALBUTEROL SULFATE 18-103MCG
3 AEROSOL WITH ADAPTER (GRAM) INHALATION
Qty: 60 | Refills: 0
Start: 2024-03-11 | End: 2024-04-09

## 2024-03-11 RX ORDER — ALBUTEROL 90 UG/1
3 AEROSOL, METERED ORAL
Qty: 60 | Refills: 0
Start: 2024-03-11 | End: 2024-04-09

## 2024-03-11 RX ADMIN — Medication 4: at 17:52

## 2024-03-11 RX ADMIN — Medication 75 MICROGRAM(S): at 05:43

## 2024-03-11 RX ADMIN — Medication 600 MILLIGRAM(S): at 05:43

## 2024-03-11 RX ADMIN — HEPARIN SODIUM 5000 UNIT(S): 5000 INJECTION INTRAVENOUS; SUBCUTANEOUS at 18:07

## 2024-03-11 RX ADMIN — Medication 600 MILLIGRAM(S): at 18:07

## 2024-03-11 RX ADMIN — PANTOPRAZOLE SODIUM 40 MILLIGRAM(S): 20 TABLET, DELAYED RELEASE ORAL at 05:43

## 2024-03-11 RX ADMIN — Medication 0.5 MILLIGRAM(S): at 08:31

## 2024-03-11 RX ADMIN — PIPERACILLIN AND TAZOBACTAM 25 GRAM(S): 4; .5 INJECTION, POWDER, LYOPHILIZED, FOR SOLUTION INTRAVENOUS at 16:53

## 2024-03-11 RX ADMIN — PIPERACILLIN AND TAZOBACTAM 25 GRAM(S): 4; .5 INJECTION, POWDER, LYOPHILIZED, FOR SOLUTION INTRAVENOUS at 08:18

## 2024-03-11 RX ADMIN — HEPARIN SODIUM 5000 UNIT(S): 5000 INJECTION INTRAVENOUS; SUBCUTANEOUS at 05:49

## 2024-03-11 RX ADMIN — MONTELUKAST 10 MILLIGRAM(S): 4 TABLET, CHEWABLE ORAL at 12:24

## 2024-03-11 RX ADMIN — PANTOPRAZOLE SODIUM 40 MILLIGRAM(S): 20 TABLET, DELAYED RELEASE ORAL at 18:07

## 2024-03-11 RX ADMIN — Medication 3 MILLILITER(S): at 15:31

## 2024-03-11 NOTE — PROGRESS NOTE ADULT - REASON FOR ADMISSION
R/O bacteremia  Encephalopathy
Encephalopathy
R/O bacteremia  Encephalopathy
Encephalopathy
R/O bacteremia  Encephalopathy

## 2024-03-11 NOTE — DISCHARGE NOTE PROVIDER - NSDCFUADDAPPT_GEN_ALL_CORE_FT
PT'S DAUGHTER DECLINED [facility] MEDS TO BED, WANTS MEDS SEND TO OWN PHARMACY  AGREEABLE TO HC with [facility]  DECLINED F/U APPTS.   YELLOW FOLDER PROVIDED. PT'S DAUGHTER Requested [facility] MED[facility] TO BED  AGREEABLE TO HC with MILAD[facility]  DECLINED F/U APPTS.   YELLOW FOLDER PROVIDED.

## 2024-03-11 NOTE — DISCHARGE NOTE PROVIDER - HOSPITAL COURSE
89 year old male with pmhx of rectal cancer (followed by [facility] [place] [facility]), Chronic diastolic CHF, CKD-4, DM-2, Afib, COPD, Dementia, Hypothyroidism presented to [facility] ED on 3/6 for AMS and weakness.  Patient dx with acute metabolic encephalopathy secondary to aspiration pneumonia. Mental status has returned to baseline and leukocytosis has resolved. Blood cultures negative. Patient completed 5 day course of azithromycin and to continue with 8 day course of Zosyn, started 3/6. Patient cleared by speech for regular consistency food. Rectal cancer dx being followed at [facility]. [facility] was following patient, no systemic chemotherapy given. Patient on at home diuretics prn for chronic diastolic CHF, LE edema, and weight gain. Diuretics put on hold due to Cr increase to 2.49, but now trending down. Patient not on anticoagulants for afib due to dementia/fall risk/risk of rectal bleeding with invasive cancer. DVT prophylaxis with heparin during stay. Patient cleared to be discharged to home.      89 year old male with pmhx of rectal cancer (followed by [facility]), Chronic diastolic CHF, CKD-4, DM-2, Afib, COPD, Dementia, Hypothyroidism presented to [facility] ED on 3/6 for AMS and weakness.  Patient dx with acute metabolic encephalopathy secondary to aspiration pneumonia. Mental status has returned to baseline and leukocytosis has resolved. Blood cultures negative. Patient completed 5 day course of azithromycin and received 6 day course of Zosyn. will complete treatement with one dose of levofloxacin after discharge. Patient cleared by speech for regular consistency food. Rectal cancer dx being followed at [facility]. [facility] was following patient, no systemic chemotherapy given. Patient on at home diuretics prn for chronic diastolic CHF, LE edema, and weight gain. Cr increase to 2.49 due to scheduled diuretics, but now trending down once diuretics were stopped. Patient not on anticoagulants for afib due to dementia/fall risk/risk of rectal bleeding with invasive cancer. DVT prophylaxis with heparin during stay. Patient cleared to be discharged to home.      89 year old male with pmhx of rectal cancer [place] [facility] Chronic diastolic CHF, CKD-4, DM-2, Afib, COPD, Dementia, Hypothyroidism presented to [facility] ED on 3/6 for AMS and weakness.  Patient dx with acute metabolic encephalopathy secondary to aspiration pneumonia. Mental status has returned to baseline and leukocytosis has resolved. Blood cultures negative. Patient completed 5 day course of azithromycin and received 6 day course of Zosyn. will complete treatment with one dose of levofloxacin after discharge. Patient cleared by speech for regular consistency food. Rectal cancer dx being [place] [facility]. [facility] was following patient, no systemic chemotherapy given. Patient on at home diuretics prn for chronic diastolic CHF, LE edema, and weight gain. Cr increase to 2.49 due to scheduled diuretics, but now trending down once diuretics were stopped. Patient not on anticoagulants for afib due to dementia/fall risk/risk of rectal bleeding with invasive cancer. DVT prophylaxis with heparin during stay. Patient cleared to be discharged to home.

## 2024-03-11 NOTE — DISCHARGE NOTE PROVIDER - PROVIDER TOKENS
FREE:[LAST:[PCP],PHONE:[(   )    -],FAX:[(   )    -]] PROVIDER:[TOKEN:[[id number]]],PROVIDER:[TOKEN:[[id number]]],PROVIDER:[TOKEN:[[id number]]],FREE:[LAST:[PCP],PHONE:[(   )    -],FAX:[(   )    -]],PROVIDER:[TOKEN:[[id number]]] PROVIDER:[TOKEN:[[id number]]],PROVIDER:[TOKEN:[[id number]]],PROVIDER:[TOKEN:[[id number]]],FREE:[LAST:[PCP],PHONE:[(   )    -],FAX:[(   )    -]],PROVIDER:[TOKEN:[[id number]]]

## 2024-03-11 NOTE — DIETITIAN INITIAL EVALUATION ADULT - NS FNS DIET ORDER
Diet, DASH/TLC:   Sodium & Cholesterol Restricted  Consistent Carbohydrate {No Snacks} (CSTCHO)  1500mL Fluid Restriction ([id number])  Supplement Feeding Modality:  Oral  Glucerna Shake Cans or Servings Per Day:  1       Frequency:  Two Times a day (03-08-24 @ 10:51) [Active]

## 2024-03-11 NOTE — PROGRESS NOTE ADULT - SUBJECTIVE AND OBJECTIVE BOX
NEPHROLOGY INTERVAL HPI/OVERNIGHT EVENTS:  pt comfortable  no acute distress  daughter at bedside    MEDICATIONS  (STANDING):  albuterol/ipratropium for Nebulization 3 milliLiter(s) Nebulizer every 6 hours  buDESOnide    Inhalation Suspension 0.5 milliGRAM(s) Inhalation two times a day  dextrose 5%. 1000 milliLiter(s) (50 mL/Hr) IV Continuous <Continuous>  dextrose 50% Injectable 25 Gram(s) IV Push once  donepezil 5 milliGRAM(s) Oral at bedtime  glucagon  Injectable 1 milliGRAM(s) IntraMuscular once  guaiFENesin  milliGRAM(s) Oral every 12 hours  heparin   Injectable 5000 Unit(s) SubCutaneous every 12 hours  insulin lispro (ADMELOG) corrective regimen sliding scale   SubCutaneous Before meals and at bedtime  levothyroxine 75 MICROGram(s) Oral daily  memantine 5 milliGRAM(s) Oral at bedtime  montelukast 10 milliGRAM(s) Oral daily  pantoprazole    Tablet 40 milliGRAM(s) Oral two times a day  piperacillin/tazobactam IVPB.. 3.375 Gram(s) IV Intermittent every 8 hours  senna 1 Tablet(s) Oral two times a day  umeclidinium 62.5 MICROgram(s)/vilanterol 25 MICROgram(s) Inhaler 1 Puff(s) Inhalation daily    MEDICATIONS  (PRN):  acetaminophen     Tablet .. 650 milliGRAM(s) Oral every 6 hours PRN Temp greater or equal to 38C (100.4F), Mild Pain (1 - 3)  aluminum hydroxide/magnesium hydroxide/simethicone Suspension 30 milliLiter(s) Oral every 4 hours PRN Dyspepsia  benzocaine/menthol Lozenge 1 Lozenge Oral four times a day PRN Sore Throat  dextrose Oral Gel 15 Gram(s) Oral once PRN Blood Glucose LESS THAN 70 milliGRAM(s)/deciliter  melatonin 3 milliGRAM(s) Oral at bedtime PRN Insomnia  midodrine. 5 milliGRAM(s) Oral three times a day PRN SBP<90  ondansetron Injectable 4 milliGRAM(s) IV Push every 8 hours PRN Nausea and/or Vomiting      Allergies    No Known Allergies          Vital Signs Last 24 Hrs  T(C): 36.4 (11 Mar 2024 05:47), Max: 36.9 (10 Mar 2024 16:35)  T(F): 97.6 (11 Mar 2024 05:47), Max: 98.4 (10 Mar 2024 16:35)  HR: 72 (11 Mar 2024 08:35) (72 - 77)  BP: 106/63 (11 Mar 2024 05:47) (106/52 - 121/64)  BP(mean): --  RR: 17 (11 Mar 2024 05:47) (17 - 18)  SpO2: 95% (11 Mar 2024 08:35) (95% - 98%)    Parameters below as of 11 Mar 2024 08:35  Patient On (Oxygen Delivery Method): room air        PHYSICAL EXAM:  GENERAL: Comfortable  HEENT: Moist MMs  NECK: Supple; no JVD  CHEST/LUNG: Diminished BS but clear  HEART: Regular rate and rhythm; no rub  ABDOMEN: Soft,  +BS  EXTREMITIES: Min dependent edema  NEURO: Demented     LABS:                        8.4    7.02  )-----------( 184      ( 11 Mar 2024 06:48 )             26.2     03-11    138  |  102  |  39.8<H>  ----------------------------<  128<H>  4.4   |  22.0  |  2.21<H>    Ca    8.5      11 Mar 2024 06:48  Phos  3.7     03-10  Mg     2.5     03-10        Urinalysis Basic - ( 11 Mar 2024 06:48 )    Color: x / Appearance: x / SG: x / pH: x  Gluc: 128 mg/dL / Ketone: x  / Bili: x / Urobili: x   Blood: x / Protein: x / Nitrite: x   Leuk Esterase: x / RBC: x / WBC x   Sq Epi: x / Non Sq Epi: x / Bacteria: x          RADIOLOGY & ADDITIONAL TESTS:

## 2024-03-11 NOTE — PROGRESS NOTE ADULT - PROVIDER SPECIALTY LIST ADULT
Heme/Onc
Hospitalist
Nephrology
Pulmonology
Nephrology
Hospitalist
Hospitalist
Internal Medicine
Palliative Care

## 2024-03-11 NOTE — PROGRESS NOTE ADULT - ASSESSMENT
89 y/o elderly male with increased confusion. leukocytosis leucocytosis.  PMHx of  rectal cancer (follows at [facility]), Chronic diastolic CHF, CKD-4, DM-2, Afib, COPD, Dementia, Hypothyroidism     Leukocytosis mostly like due to pneumonia  - Low grade temp on arrival now resolved  - possible occult bacteremia from locally invasive rectal cancer  - 3/5/2024 CXR grossly unchanged from prior, UA neg, RVP neg  - blood cx - negative   - WBC on arrival 20.57  WBC at 7.02K this AM   - Cont to trend WBC  - on IV Zosyn + PO Azithromycin    Rectal Cancer:  - Pt follows with [facility]  - S/P short course of RT 3/6-3/10/2023  - No plan for systemic therapy due to age, dementia.  - outpatient follow up with [facility] on discharge     Anemia - hgb stable at 8.4. Likely mutlifactorial including CKD, acute illness.   Follow up outpatient.  Transfuse if hgb <7.0.    DVT ppx  per medicine  Pt is a fall risk  Pt has dementia no mechanical DVT ppx due to fall risk    Chronic diastolic CHF:  -No evidence of decompensated CHF  -cont. outpt regimen as BP will tolerate  -DASH diet    CKD-4:  creat on adm 2.99   - Avoid nephrotoxic drugs  - cr 2.21 this AM     Afib:  -Not on AC due to dementia  -fall risk/ rectal cancer bleed risk    D/W daughter at bedside  Will update [facility] daily     [facility]  [phone]

## 2024-03-11 NOTE — PROGRESS NOTE ADULT - SUBJECTIVE AND OBJECTIVE BOX
88 y/o elderly male with hx of Rectal cancer diagnosed in 1/23 s/p XRT from 3/6-3/10/23, recent MRI in 2/24 with local progression, and recent f/u at [facility] for discussion for treatment options with systemic chemo which was decided too high risk for patient. He also has hx of COPD not on home 02, DM-2, Afib not on AC, chronic diastolic CHF, CKD-4, hypothyroidism, dementia. Patient resides at home with Wife/Daughter uses a cane at baseline and is currently going to o/p PT. Patient Daughter called [facility] yesterday to notify them that patient was more confused than baseline. No reports of chills, fever, N/V/D. No changes in medications. No hx of aspiration, falls, HA, focal weakness. Patient Daughter advised to take him to the ED by [facility]. In the ED vitals with low grade temp at 99.8. BP stable, not hypoxic. No rectal temp done per Daughter request due to hx of rectal cancer. Blood cx sent, given empiric abx. History obtained from daughter at bedside.     CT brain 3/5/2024 MPRESSION: No large territory acute infarct, intracranial hemorrhage, or mass effect.  Stable appearance of chronic microangiopathic white matter changes and  parenchymal volume loss.  CXR 3/5/2024 On January 1, 2023 there was a left lower lobe infiltrate and presently this infiltrate is again noted essentially unchanged.  The present film also shows a mild left perihilar infiltrate and an increasing right lower perihilar infiltrate.    3/11: alert and talkative. Daughter at bedside. She says that he is back to baseline and has baseline dementia. He does not want anyone mentioning the work cancer to him because he will says that nobody told him and will become a problem. He does not remember because of his dementia. He denies bleeding from any site.       MEDICATIONS  (STANDING):  albuterol/ipratropium for Nebulization 3 milliLiter(s) Nebulizer every 6 hours  buDESOnide    Inhalation Suspension 0.5 milliGRAM(s) Inhalation two times a day  dextrose 5%. 1000 milliLiter(s) (50 mL/Hr) IV Continuous <Continuous>  dextrose 50% Injectable 25 Gram(s) IV Push once  donepezil 5 milliGRAM(s) Oral at bedtime  glucagon  Injectable 1 milliGRAM(s) IntraMuscular once  guaiFENesin  milliGRAM(s) Oral every 12 hours  heparin   Injectable 5000 Unit(s) SubCutaneous every 12 hours  insulin lispro (ADMELOG) corrective regimen sliding scale   SubCutaneous Before meals and at bedtime  levothyroxine 75 MICROGram(s) Oral daily  memantine 5 milliGRAM(s) Oral at bedtime  montelukast 10 milliGRAM(s) Oral daily  pantoprazole    Tablet 40 milliGRAM(s) Oral two times a day  piperacillin/tazobactam IVPB.. 3.375 Gram(s) IV Intermittent every 8 hours  senna 1 Tablet(s) Oral two times a day  umeclidinium 62.5 MICROgram(s)/vilanterol 25 MICROgram(s) Inhaler 1 Puff(s) Inhalation daily    MEDICATIONS  (PRN):  acetaminophen     Tablet .. 650 milliGRAM(s) Oral every 6 hours PRN Temp greater or equal to 38C (100.4F), Mild Pain (1 - 3)  aluminum hydroxide/magnesium hydroxide/simethicone Suspension 30 milliLiter(s) Oral every 4 hours PRN Dyspepsia  benzocaine/menthol Lozenge 1 Lozenge Oral four times a day PRN Sore Throat  dextrose Oral Gel 15 Gram(s) Oral once PRN Blood Glucose LESS THAN 70 milliGRAM(s)/deciliter  melatonin 3 milliGRAM(s) Oral at bedtime PRN Insomnia  midodrine. 5 milliGRAM(s) Oral three times a day PRN SBP<90  ondansetron Injectable 4 milliGRAM(s) IV Push every 8 hours PRN Nausea and/or Vomiting       Vital Signs Last 24 Hrs  T(C): 36.4 (11 Mar 2024 05:47), Max: 36.9 (10 Mar 2024 16:35)  T(F): 97.6 (11 Mar 2024 05:47), Max: 98.4 (10 Mar 2024 16:35)  HR: 72 (11 Mar 2024 08:35) (72 - 77)  BP: 106/63 (11 Mar 2024 05:47) (106/52 - 121/64)  BP(mean): --  RR: 17 (11 Mar 2024 05:47) (17 - 18)  SpO2: 95% (11 Mar 2024 08:35) (95% - 98%)    Parameters below as of 11 Mar 2024 08:35  Patient On (Oxygen Delivery Method): room air    Physical Exam:  Constitutional 	elderly male sitting up in bed in NAD  Eyes PERRLA conjunctiva clear   ENMT	 mucosa dry· Respiratory	  Resp no rhonchi; diminished breath sounds, L; diminished breath sounds, R  Cardiovascular	regular rate and rhythm; S1 S2 present  Gastrointestinal: soft; nontender  Mental Status	AOx1 person  Skin warm and dry   Musculoskeletal Comments +2 B/L LE edema       Labs:                          8.4    7.02  )-----------( 184      ( 11 Mar 2024 06:48 )             26.2                           8.6    7.09  )-----------( 191      ( 10 Mar 2024 06:00 )             27.6       WBC 7.46  H/H 8.1/26  plt ct 192,000  (3/9/2024)             03-11    138  |  102  |  39.8<H>  ----------------------------<  128<H>  4.4   |  22.0  |  2.21<H>    Ca    8.5      11 Mar 2024 06:48  Phos  3.7     03-10  Mg     2.5     03-10

## 2024-03-11 NOTE — DIETITIAN INITIAL EVALUATION ADULT - PERTINENT MEDS FT
MEDICATIONS  (STANDING):  dextrose 5%. 1000 milliLiter(s) (50 mL/Hr) IV Continuous <Continuous>  dextrose 50% Injectable 25 Gram(s) IV Push once  donepezil 5 milliGRAM(s) Oral at bedtime  glucagon  Injectable 1 milliGRAM(s) IntraMuscular once  guaiFENesin  milliGRAM(s) Oral every 12 hours  heparin   Injectable 5000 Unit(s) SubCutaneous every 12 hours  insulin lispro (ADMELOG) corrective regimen sliding scale   SubCutaneous Before meals and at bedtime  levothyroxine 75 MICROGram(s) Oral daily  memantine 5 milliGRAM(s) Oral at bedtime  montelukast 10 milliGRAM(s) Oral daily  pantoprazole    Tablet 40 milliGRAM(s) Oral two times a day  piperacillin/tazobactam IVPB.. 3.375 Gram(s) IV Intermittent every 8 hours  senna 1 Tablet(s) Oral two times a day    MEDICATIONS  (PRN):  dextrose Oral Gel 15 Gram(s) Oral once PRN Blood Glucose LESS THAN 70 milliGRAM(s)/deciliter  ondansetron Injectable 4 milliGRAM(s) IV Push every 8 hours PRN Nausea and/or Vomiting

## 2024-03-11 NOTE — DISCHARGE NOTE PROVIDER - CARE PROVIDER_API CALL
PCP,   Phone: (   )    -  Fax: (   )    -  Follow Up Time:    Tim Wilkerson  Hematology  [address] [place] [address]  Phone: [phone]  Fax: [phone]  Follow Up Time:     Jayesh Rivera  Pulmonary Disease  [address] [facility], [address]  [place] [address]  Phone: [phone]  Fax: [phone]  Follow Up Time:     Benito Calhoun  Nephrology  [address] [place] [address], [facility] [place] [address]  Phone: [phone]  Fax: [phone]  Follow Up Time:     PCP,   Phone: (   )    -  Fax: (   )    -  Follow Up Time:     Jose L Cote  Cardiovascular Disease  [address] [facility], [facility] [address]  [place] [address]  Phone: [phone]  Fax: [phone]  Follow Up Time:    Jayesh Rivera  Pulmonary Disease  [address] [facility], [address]  [place] [address]  Phone: [phone]  Fax: [phone]  Follow Up Time:     Benito Calhoun  Nephrology  [address] [facility], [place] [address]  Phone: [phone]  Fax: [phone]  Follow Up Time:     Cypress, Anthony P.  Medical Oncology  [address] [place] [address], [address]  [place] [address]  Phone: [phone]  Fax: [phone]  Follow Up Time:     PCP,   Phone: (   )    -  Fax: (   )    -  Follow Up Time:     Melvin Wilkes  Interventional Cardiology  [address] [facility], [address]  [place] [address]  Phone: [phone]  Fax: [phone]  Follow Up Time:

## 2024-03-11 NOTE — DISCHARGE NOTE PROVIDER - DISCHARGE DIET
Regular Diet - No restrictions Regular Diet - No restrictions/DASH Diet/Consistent Carbohydrate Diabetic Diets/Other Diet Instructions

## 2024-03-11 NOTE — DISCHARGE NOTE PROVIDER - NSDCMRMEDTOKEN_GEN_ALL_CORE_FT
3 in 1 Commode : prn   albuterol 1.25 mg/3 mL (0.042%) inhalation solution: 3 milliliter(s) inhaled 3 times a day, As Needed  Anoro Ellipta 62.5 mcg-25 mcg/inh inhalation powder: 1 puff(s) inhaled once a day  Colace 100 mg oral capsule: 1 cap(s) orally 3 times a day  insulin lispro 100 units/mL injectable solution: 4 unit(s) injectable 3 times a day (before meals)  levothyroxine 75 mcg (0.075 mg) oral tablet: 1 tab(s) orally once a day  melatonin 5 mg oral tablet: 1 tab(s) orally once a day (at bedtime)  memantine 7 mg oral capsule, extended release: 1 cap(s) orally once a day  MiraLax oral powder for reconstitution: 17 gram(s) orally once a day, As Needed for constipation.   montelukast 10 mg oral tablet: 1 tab(s) orally once a day  Multiple Vitamins oral tablet: 1 tab(s) orally once a day  potassium chloride 20 mEq oral granule, extended release: orally 2 times a day  Protonix 40 mg oral delayed release tablet: 1 tab(s) orally 2 times a day  Rollator     Diagnosis: Ambulatory Dysfunction: prn   torsemide 10 mg oral tablet: 1 tab(s) orally 2 times a day as needed for edema takes it if more than 2 pound weight gain  Tomikayla SoloStar 300 units/mL subcutaneous solution: 6 unit(s) subcutaneous once a day (at bedtime)   albuterol 1.25 mg/3 mL (0.042%) inhalation solution: 3 milliliter(s) inhaled 3 times a day as needed for  shortness of breath and/or wheezing  Anoro Ellipta 62.5 mcg-25 mcg/inh inhalation powder: 1 puff(s) inhaled once a day  Colace 100 mg oral capsule: 1 cap(s) orally 3 times a day  insulin lispro 100 units/mL injectable solution: 4 unit(s) injectable 3 times a day (before meals)  ipratropium-albuterol 0.5 mg-2.5 mg/3 mL inhalation solution: 3 milliliter(s) inhaled 2 times a day as needed for  shortness of breath and/or wheezing  levoFLOXacin 750 mg oral tablet: 1 tab(s) orally once a day  levothyroxine 75 mcg (0.075 mg) oral tablet: 1 tab(s) orally once a day  melatonin 5 mg oral tablet: 1 tab(s) orally once a day (at bedtime)  memantine 7 mg oral capsule, extended release: 1 cap(s) orally once a day  MiraLax oral powder for reconstitution: 17 gram(s) orally once a day, As Needed for constipation.   montelukast 10 mg oral tablet: 1 tab(s) orally once a day  Multiple Vitamins oral tablet: 1 tab(s) orally once a day  potassium chloride 20 mEq oral granule, extended release: orally 2 times a day  Protonix 40 mg oral delayed release tablet: 1 tab(s) orally 2 times a day  torsemide 10 mg oral tablet: 1 tab(s) orally 2 times a day as needed for edema takes it if more than 2 pound weight gain  Kathie AppleoStar 300 units/mL subcutaneous solution: 6 unit(s) subcutaneous once a day (at bedtime)   albuterol 1.25 mg/3 mL (0.042%) inhalation solution: 3 milliliter(s) inhaled 3 times a day as needed for  shortness of breath and/or wheezing  Anoro Ellipta 62.5 mcg-25 mcg/inh inhalation powder: 1 puff(s) inhaled once a day  Colace 100 mg oral capsule: 1 cap(s) orally 3 times a day  insulin lispro 100 units/mL injectable solution: 4 unit(s) injectable 3 times a day (before meals)  ipratropium-albuterol 0.5 mg-2.5 mg/3 mL inhalation solution: 3 milliliter(s) inhaled 2 times a day as needed for  shortness of breath and/or wheezing  levoFLOXacin 750 mg oral tablet: 1 tab(s) orally once a day  levoFLOXacin 750 mg oral tablet: 1 tab(s) orally once a day  levothyroxine 75 mcg (0.075 mg) oral tablet: 1 tab(s) orally once a day  melatonin 5 mg oral tablet: 1 tab(s) orally once a day (at bedtime)  memantine 7 mg oral capsule, extended release: 1 cap(s) orally once a day  MiraLax oral powder for reconstitution: 17 gram(s) orally once a day, As Needed for constipation.   montelukast 10 mg oral tablet: 1 tab(s) orally once a day  Multiple Vitamins oral tablet: 1 tab(s) orally once a day  potassium chloride 20 mEq oral granule, extended release: orally 2 times a day  Protonix 40 mg oral delayed release tablet: 1 tab(s) orally 2 times a day  torsemide 10 mg oral tablet: 1 tab(s) orally 2 times a day as needed for edema takes it if more than 2 pound weight gain  Kathie AppleoStar 300 units/mL subcutaneous solution: 6 unit(s) subcutaneous once a day (at bedtime)   albuterol 1.25 mg/3 mL (0.042%) inhalation solution: 3 milliliter(s) inhaled 3 times a day  Anoro Ellipta 62.5 mcg-25 mcg/inh inhalation powder: 1 puff(s) inhaled once a day  Colace 100 mg oral capsule: 1 cap(s) orally 3 times a day  insulin lispro 100 units/mL injectable solution: 4 unit(s) injectable 3 times a day (before meals)  levoFLOXacin 750 mg oral tablet: 1 tab(s) orally once a day  levothyroxine 75 mcg (0.075 mg) oral tablet: 1 tab(s) orally once a day  melatonin 5 mg oral tablet: 1 tab(s) orally once a day (at bedtime)  memantine 7 mg oral capsule, extended release: 1 cap(s) orally once a day  MiraLax oral powder for reconstitution: 17 gram(s) orally once a day, As Needed for constipation.   montelukast 10 mg oral tablet: 1 tab(s) orally once a day  Multiple Vitamins oral tablet: 1 tab(s) orally once a day  potassium chloride 20 mEq oral granule, extended release: orally 2 times a day  Protonix 40 mg oral delayed release tablet: 1 tab(s) orally 2 times a day  torsemide 10 mg oral tablet: 1 tab(s) orally 2 times a day as needed for edema takes it if more than 2 pound weight gain  Tousabrina SoloStar 300 units/mL subcutaneous solution: 6 unit(s) subcutaneous once a day (at bedtime)   albuterol 1.25 mg/3 mL (0.042%) inhalation solution: 3 milliliter(s) inhaled 2 times a day ICD 10 : J44.9  Anoro Ellipta 62.5 mcg-25 mcg/inh inhalation powder: 1 puff(s) inhaled once a day  Colace 100 mg oral capsule: 1 cap(s) orally 3 times a day  insulin lispro 100 units/mL injectable solution: 4 unit(s) injectable 3 times a day (before meals)  ipratropium-albuterol 0.5 mg-2.5 mg/3 mL inhalation solution: 3 milliliter(s) inhaled 2 times a day ICD 10: J 44.9  levoFLOXacin 750 mg oral tablet: 1 tab(s) orally once a day  levothyroxine 75 mcg (0.075 mg) oral tablet: 1 tab(s) orally once a day  melatonin 5 mg oral tablet: 1 tab(s) orally once a day (at bedtime)  memantine 7 mg oral capsule, extended release: 1 cap(s) orally once a day  MiraLax oral powder for reconstitution: 17 gram(s) orally once a day, As Needed for constipation.   montelukast 10 mg oral tablet: 1 tab(s) orally once a day  Multiple Vitamins oral tablet: 1 tab(s) orally once a day  potassium chloride 20 mEq oral granule, extended release: orally 2 times a day  Protonix 40 mg oral delayed release tablet: 1 tab(s) orally 2 times a day  torsemide 10 mg oral tablet: 1 tab(s) orally 2 times a day as needed for edema takes it if more than 2 pound weight gain  Tomikayla SoloStar 300 units/mL subcutaneous solution: 6 unit(s) subcutaneous once a day (at bedtime)

## 2024-03-11 NOTE — DIETITIAN INITIAL EVALUATION ADULT - NSFNSGIIOFT_GEN_A_CORE
03-10-24 @ 07:01  -  03-11-24 @ 07:00  --------------------------------------------------------  OUT:    Stool (mL): 1 mL  Total OUT: 1 mL    Total NET: -1 mL

## 2024-03-11 NOTE — DIETITIAN INITIAL EVALUATION ADULT - ORAL INTAKE PTA/DIET HISTORY
Noted pt with altered mental status - family at bedside to assist with nutrition interview. Pt tolerating current diet well. PTA pt with good appetite, since admission pt has a decreased appetite due to not feeling well. Providing Glucerna oral nutrition supplement BID and pt drinking about 50%. Pt c/o nausea this AM, but no vomiting. Pt also with diarrhea intermittently. Family states pt's  lbs and he has had no unintentional weight loss over the past year. Pt does not meet criteria for malnutrition at this time. Pt with hx of DM - labs reviewed. POCT glucose ranging . Family provided with diet education on heart healthy, consistent carbohydrate diet. Family also requesting diet education for gastroparesis. Provided family with verbal and written education as well as RD contact information for further questions or concerns. Will continue to monitor and follow up as needed. RD remains available.

## 2024-03-11 NOTE — DISCHARGE NOTE PROVIDER - NSDCFUSCHEDAPPT_GEN_ALL_CORE_FT
Jayesh Rivera  [facility]  [address]  Scheduled Appointment: 05/07/2024

## 2024-03-11 NOTE — DISCHARGE NOTE PROVIDER - ATTENDING DISCHARGE PHYSICAL EXAMINATION:
General: no acute distress  HEENT: atraumatic, normocephalic  Lungs: clear to auscultation  CVS: RRR. S1, S2. no acute distress  CNS: AAOx1. no focal deficit.

## 2024-03-11 NOTE — DISCHARGE NOTE PROVIDER - NPI NUMBER (FOR SYSADMIN USE ONLY) :
[UNKNOWN] [[id number]],[[id number]],[[id number]],[UNKNOWN],[[id number]] [[id number]],[[id number]],[[id number]],[UNKNOWN],[[id number]]

## 2024-03-11 NOTE — DIETITIAN INITIAL EVALUATION ADULT - ADD RECOMMEND
1) Continue diet as tolerated, discontinue fluid restriction   2) Encourage po intake, monitor diet tolerance, and provide assistance at meals as needed.   3) Continue Glucerna BID  4) Obtain daily weights to monitor trends.   5) Monitor BG levels, correct prn

## 2024-03-11 NOTE — DISCHARGE NOTE PROVIDER - CARE PROVIDERS DIRECT ADDRESSES
,DirectAddress_Unknown ,[email or web address],DirectAddress_Unknown,DirectAddress_Unknown,[email or web address],[email or web address],[email or web address],DirectAddress_Unknown,[email or web address]

## 2024-03-11 NOTE — DIETITIAN INITIAL EVALUATION ADULT - PERTINENT LABORATORY DATA
03-11 Na138 mmol/L Glu 128 mg/dL<H> K+ 4.4 mmol/L Cr  2.21 mg/dL<H> BUN 39.8 mg/dL<H>     POCT Blood Glucose.: 129 mg/dL (03-11-24 @ 07:55)

## 2024-03-11 NOTE — PROGRESS NOTE ADULT - ASSESSMENT
CKD(IIIb): ALMAS, prerenal due to diuretics--> better  AMS improved and at baseline  PNA  - cont to avoid potential nephrotoxins  - diuretics as needed  - follow labs    Anemia: multifactorial  - defer THERESA in setting of malignancy unless cleared by Heme/Onc  - consider PRBCs if needed

## 2024-03-11 NOTE — DISCHARGE NOTE PROVIDER - NSDCCPCAREPLAN_GEN_ALL_CORE_FT
PRINCIPAL DISCHARGE DIAGNOSIS  Diagnosis: Pneumonia, aspiration  Assessment and Plan of Treatment: -Mental status has returned to baseline  -Blood culture was negative for bacteria  -White blood cell count has returned to normal  -5 day course of Azithromycin completed  -Continue with 8 day course of Zosyn, started on 3/6      SECONDARY DISCHARGE DIAGNOSES  Diagnosis: Rectal cancer  Assessment and Plan of Treatment: -Treatment being followed by providers at [facility]  -Continue follow-up care with providers as scheduled or as needed    Diagnosis: Chronic diastolic congestive heart failure  Assessment and Plan of Treatment: -Continue at home diuretics as needed for lower extremity swelling  -Continue follow-up care with outpatient cardiologist as scheduled or as needed    Diagnosis: Atrial fibrillation  Assessment and Plan of Treatment: -Continue follow-up care with outpatient cardiologist as scheduled or as needed    Diagnosis: Stage 4 chronic kidney disease  Assessment and Plan of Treatment: -Continue diuretics as instructed  -Continue follow-up care with outpatient nephrologist as scheduled or as needed    Diagnosis: COPD, moderate  Assessment and Plan of Treatment: -Continue medications as instructed  -Nebulizer as needed  -Continue follow-up with outpatient provider as scheduled or as needed    Diagnosis: DM (diabetes mellitus), type 2  Assessment and Plan of Treatment: -Continue medications as instructed  -Continue follow-up with outpatient provider as scheduled or as needed    Diagnosis: Hypothyroidism  Assessment and Plan of Treatment: -Continue Synthroid as instructed  -Continue follow-up with outpatient provider as scheduled or as needed     PRINCIPAL DISCHARGE DIAGNOSIS  Diagnosis: Pneumonia, aspiration  Assessment and Plan of Treatment: -Mental status has returned to baseline  -Blood culture was negative for bacteria  -White blood cell count has returned to normal  -5 day course of Azithromycin completed and 6 day course of Zosyn, take one more dose of leofloxacin tomorrow to complete the course.      SECONDARY DISCHARGE DIAGNOSES  Diagnosis: Rectal cancer  Assessment and Plan of Treatment: -Treatment being followed by providers at [facility]  -Continue follow-up care with providers as scheduled or as needed    Diagnosis: Chronic diastolic congestive heart failure  Assessment and Plan of Treatment: -Continue at home diuretics as needed for lower extremity swelling  -Continue follow-up care with outpatient cardiologist as scheduled or as needed    Diagnosis: Atrial fibrillation  Assessment and Plan of Treatment: -Continue follow-up care with outpatient cardiologist as scheduled or as needed    Diagnosis: Stage 4 chronic kidney disease  Assessment and Plan of Treatment: -Continue diuretics as instructed  -Continue follow-up care with outpatient nephrologist as scheduled or as needed    Diagnosis: COPD, moderate  Assessment and Plan of Treatment: -Continue medications as instructed  -Nebulizer as needed  -Continue follow-up with outpatient provider as scheduled or as needed    Diagnosis: DM (diabetes mellitus), type 2  Assessment and Plan of Treatment: -Continue medications as instructed  -Continue follow-up with outpatient provider as scheduled or as needed    Diagnosis: Hypothyroidism  Assessment and Plan of Treatment: -Continue Synthroid as instructed  -Continue follow-up with outpatient provider as scheduled or as needed     PRINCIPAL DISCHARGE DIAGNOSIS  Diagnosis: Pneumonia, aspiration  Assessment and Plan of Treatment: -Mental status has returned to baseline  -Blood culture was negative for bacteria  -White blood cell count has returned to normal  -5 day course of Azithromycin completed and 6 day course of Zosyn, take one more dose of levofloxacin tomorrow to complete the course.      SECONDARY DISCHARGE DIAGNOSES  Diagnosis: Rectal cancer  Assessment and Plan of Treatment: -Treatment being followed by providers at [facility]  -Continue follow-up care with providers as scheduled or as needed    Diagnosis: Chronic diastolic congestive heart failure  Assessment and Plan of Treatment: -Continue at home diuretics as needed for lower extremity swelling  -Continue follow-up care with outpatient cardiologist as scheduled or as needed  Weigh yourself daily.  If you gain 3lbs in 3 days, or 5lbs in a week call your Health Care Provider.  Do not eat or drink foods containing more than 2000mg of salt (sodium) in your diet every day.  Call your Health Care Provider if you have any swelling or increased swelling in your feet, ankles, and/or stomach.  Take all of your medication as directed.  If you become dizzy call your Health Care Provider.      Diagnosis: Atrial fibrillation  Assessment and Plan of Treatment: -Continue follow-up care with outpatient cardiologist as scheduled or as needed    Diagnosis: Stage 4 chronic kidney disease  Assessment and Plan of Treatment: -Continue diuretics as instructed  -Continue follow-up care with outpatient nephrologist as scheduled or as needed    Diagnosis: COPD, moderate  Assessment and Plan of Treatment: -Continue medications as instructed  -Nebulizer as needed  -Continue follow-up with outpatient provider as scheduled or as needed    Diagnosis: DM (diabetes mellitus), type 2  Assessment and Plan of Treatment: -Continue medications as instructed  -Continue follow-up with outpatient provider as scheduled or as needed    Diagnosis: Hypothyroidism  Assessment and Plan of Treatment: -Continue Synthroid as instructed  -Continue follow-up with outpatient provider as scheduled or as needed

## 2024-03-11 NOTE — DIETITIAN INITIAL EVALUATION ADULT - OTHER INFO
89 y/o elderly male with increased confusion. leukocytosis leucocytosis.  PMHx of  rectal cancer (follows at [facility]), Chronic diastolic CHF, CKD-4, DM-2, Afib, COPD, Dementia, Hypothyroidism

## 2024-03-12 ENCOUNTER — TRANSCRIPTION ENCOUNTER (OUTPATIENT)
Age: 89
End: 2024-03-12

## 2024-03-12 RX ORDER — LEVOFLOXACIN 5 MG/ML
1 INJECTION, SOLUTION INTRAVENOUS
Qty: 1 | Refills: 0
Start: 2024-03-12 | End: 2024-03-12

## 2024-03-13 PROBLEM — C20 MALIGNANT NEOPLASM OF RECTUM: Chronic | Status: ACTIVE | Noted: 2024-03-06

## 2024-03-14 ENCOUNTER — APPOINTMENT (OUTPATIENT)
Dept: CARE COORDINATION | Facility: HOME HEALTH | Age: 89
End: 2024-03-14
Payer: MEDICARE

## 2024-03-14 ENCOUNTER — TRANSCRIPTION ENCOUNTER (OUTPATIENT)
Age: 89
End: 2024-03-14

## 2024-03-14 DIAGNOSIS — J69.0 PNEUMONITIS DUE TO INHALATION OF FOOD AND VOMIT: ICD-10-CM

## 2024-03-14 DIAGNOSIS — K92.1 MELENA: ICD-10-CM

## 2024-03-14 PROCEDURE — 99495 TRANSJ CARE MGMT MOD F2F 14D: CPT

## 2024-03-15 VITALS
HEART RATE: 50 BPM | DIASTOLIC BLOOD PRESSURE: 57 MMHG | SYSTOLIC BLOOD PRESSURE: 121 MMHG | RESPIRATION RATE: 16 BRPM | OXYGEN SATURATION: 93 %

## 2024-03-15 PROBLEM — K92.1 PASSAGE OF BLOODY STOOLS: Status: ACTIVE | Noted: 2024-03-15

## 2024-03-15 PROBLEM — J69.0 ASPIRATION PNEUMONIA, UNSPECIFIED ASPIRATION PNEUMONIA TYPE, UNSPECIFIED LATERALITY, UNSPECIFIED PART OF LUNG: Status: ACTIVE | Noted: 2024-03-15

## 2024-03-15 RX ORDER — PREDNISONE 10 MG/1
10 TABLET ORAL
Qty: 50 | Refills: 0 | Status: COMPLETED | COMMUNITY
Start: 2023-12-07 | End: 2024-03-15

## 2024-03-15 RX ORDER — ALBUTEROL SULFATE 90 UG/1
108 (90 BASE) AEROSOL, METERED RESPIRATORY (INHALATION)
Refills: 0 | Status: DISCONTINUED | COMMUNITY
End: 2024-03-15

## 2024-03-15 RX ORDER — PREDNISONE 10 MG/1
10 TABLET ORAL
Qty: 50 | Refills: 0 | Status: COMPLETED | COMMUNITY
Start: 2024-01-11 | End: 2024-03-15

## 2024-03-15 NOTE — HISTORY OF PRESENT ILLNESS
[Post-hospitalization from ___ Hospital] : Post-hospitalization from [unfilled] Hospital [Admitted on: ___] : The patient was admitted on [unfilled] [Discharged on ___] : discharged on [unfilled] [Discharge Summary] : discharge summary [Discharge Med List] : discharge medication list [Med Reconciliation] : medication reconciliation has been completed [Patient Contacted By: ____] : and contacted by [unfilled] [FreeTextEntry2] : Copied from EMR, "Hospital Course	 89 year old male with pmhx of rectal cancer (followed by [facility]), Chronic diastolic CHF, CKD-4, DM-2, Afib, COPD, Dementia, Hypothyroidism presented to [facility] ED on 3/6 for AMS and weakness.  Patient dx with acute metabolic encephalopathy secondary to aspiration pneumonia. Mental status has returned to baseline and leukocytosis has resolved. Blood cultures negative. Patient completed 5 day course of azithromycin and received 6 day course of Zosyn. will complete treatment with one dose of levofloxacin after discharge. Patient cleared by speech for regular consistency food. Rectal cancer dx being followed at [facility]. [facility] was following patient, no systemic chemotherapy given. Patient on at home diuretics prn for chronic diastolic CHF, LE edema, and weight gain. Cr increase to 2.49 due to scheduled diuretics, but now trending down once diuretics were stopped. Patient not on anticoagulants for afib due to dementia/fall risk/risk of rectal bleeding with invasive cancer. DVT prophylaxis with heparin during stay. Patient cleared to be discharged to home."  Pt seen in his home for transitional care management with HHA and daughter present. He denies any cough, fever, chills. Pt wishes to be left alone and is agitated about this NP visiting. Daughter reports he had blood in his BM this am and pt's wife took a photo. He denies any abdominal pain, distention. Daughter wishes not to mention pt's cancer diagnosis in his presence.

## 2024-03-15 NOTE — REVIEW OF SYSTEMS
[Melena] : meldinorah [Confusion] : confusion [Unsteady Walk] : ataxia [Memory Loss] : memory loss [Negative] : Psychiatric [Abdominal Pain] : no abdominal pain [Constipation] : no constipation [Nausea] : no nausea [Diarrhea] : no diarrhea [Headache] : no headache [Dizziness] : no dizziness [Fainting] : no fainting

## 2024-03-15 NOTE — ASSESSMENT
[FreeTextEntry1] : Tony Lopez is being seen after discharge home from [facility] hospital. He was admitted on 3/6/24 for PNA and discharged home on 3/11/24.  Discharge medications were reviewed and reconciled with the current medication list and medications in home.  #PNA 2/2 aspiration Resolved Cont duoneb TID  [place] TCM teaching: Advised patient to adhere to all medications including demonstrating proper use of inhalers and nebulizers. Encourage the use of proper breathing techniques such as pursed lip breathing or leaning forward during exhalation. Increase activity as tolerated and maintain optimal activity levels. Continue coughing and deep breathing exercises including use of Incentive Spirometry. Receive routine pneumococcal and influenza vaccinations. Identify early signs and sx of disease and notify NP/MD. Maintain proper nutrition and hydration. Follow up with Pulmonologist within 7 days of discharge. Contact information given, patient advised to call with any questions/concerns.   #CHF euvolemic at visit Cont Torsemide prn  [place] TCM teaching: Enforced with patient need for daily weights. Advised to call for an increase of greater than 2 lbs. in a day or 5 pounds in a week. Adhere to low salt diet and educated patient on foods that should be avoided such as processed or fast food. Limit fluids to 1 liter a day which is 4-5 glasses. Continue medications as ordered.  Follow up with Cardiologist. Contact information given, patient advised to call with any questions/concerns.  #Passage of bloody stool Reported by family-photo taken Unable to ascertain if dark blood in stool, no bright red blood noted Single incident from this am Pt uncooperative with questions-daughter reports no BM since this am Advised to monitor for BRB in stool, keep stool soft w/Miralax and Senna Hx recent rectal cancer s/p radiation, daughter wishes to not discuss in front of patient. She reports she is in contact w/[facility] doctors treating pt.   Reminded of TCM program and encouraged pt to call with any questions or concerns and especially with worsening of symptoms. Verbalized understanding.

## 2024-03-15 NOTE — PHYSICAL EXAM
[No Acute Distress] : no acute distress [Well Nourished] : well nourished [Well Developed] : well developed [No Respiratory Distress] : no respiratory distress  [No Accessory Muscle Use] : no accessory muscle use [Normal Rate] : normal rate  [Regular Rhythm] : with a regular rhythm [Normal S1, S2] : normal S1 and S2 [No Murmur] : no murmur heard [No Edema] : there was no peripheral edema [Pedal Pulses Present] : the pedal pulses are present [Speech Grossly Normal] : speech grossly normal [No Focal Deficits] : no focal deficits [Place] : oriented to place [Repeating Phrases] : no difficulty repeating a phrase [Agitated] : agitated [Impaired judgment] : impaired judgment [Impaired Insight] : impaired insight [Short Term Intact] : short term memory impaired [Span Intact] : the attention span was decreased [Concentration Intact] : a decrease in concentrating ability was observed [de-identified] : agitated [Current Events] : inadequate knowledge of current events [de-identified] : RLL diminished

## 2024-03-15 NOTE — COUNSELING
[Fall prevention counseling provided] : Fall prevention counseling provided [No throw rugs] : No throw rugs [Adequate lighting] : Adequate lighting [Use recommended devices] : Use recommended devices [Use proper foot wear] : Use proper foot wear [None] : None [Good understanding] : Patient has a good understanding of lifestyle changes and steps needed to achieve self management goal

## 2024-03-22 ENCOUNTER — TRANSCRIPTION ENCOUNTER (OUTPATIENT)
Age: 89
End: 2024-03-22

## 2024-03-27 ENCOUNTER — RX RENEWAL (OUTPATIENT)
Age: 89
End: 2024-03-27

## 2024-03-27 RX ORDER — UMECLIDINIUM BROMIDE AND VILANTEROL TRIFENATATE 62.5; 25 UG/1; UG/1
62.5-25 POWDER RESPIRATORY (INHALATION) DAILY
Qty: 180 | Refills: 2 | Status: ACTIVE | COMMUNITY
Start: 2021-06-21 | End: 1900-01-01

## 2024-03-27 NOTE — ED PROCEDURE NOTE - ATTENDING CONTRIBUTION TO CARE
no edema, no murmurs, regular rate and rhythm
I personally saw the patient with the resident, and completed the key components of the history and physical exam. I then discussed the management plan with the resident.

## 2024-03-28 ENCOUNTER — TRANSCRIPTION ENCOUNTER (OUTPATIENT)
Age: 89
End: 2024-03-28

## 2024-03-28 ENCOUNTER — RX ONLY (RX ONLY)
Age: 89
End: 2024-03-28

## 2024-03-28 ENCOUNTER — OFFICE (OUTPATIENT)
Dept: URBAN - METROPOLITAN AREA CLINIC 113 | Facility: CLINIC | Age: 89
Setting detail: OPHTHALMOLOGY
End: 2024-03-28
Payer: MEDICARE

## 2024-03-28 DIAGNOSIS — H01.004: ICD-10-CM

## 2024-03-28 DIAGNOSIS — H01.001: ICD-10-CM

## 2024-03-28 PROCEDURE — 92012 INTRM OPH EXAM EST PATIENT: CPT | Performed by: OPHTHALMOLOGY

## 2024-03-28 ASSESSMENT — LID EXAM ASSESSMENTS
OS_BLEPHARITIS: LUL 1+
OD_BLEPHARITIS: RUL 1+

## 2024-04-03 ENCOUNTER — TRANSCRIPTION ENCOUNTER (OUTPATIENT)
Age: 89
End: 2024-04-03

## 2024-04-04 ENCOUNTER — APPOINTMENT (OUTPATIENT)
Dept: CARDIOLOGY | Facility: CLINIC | Age: 89
End: 2024-04-04
Payer: MEDICARE

## 2024-04-04 ENCOUNTER — NON-APPOINTMENT (OUTPATIENT)
Age: 89
End: 2024-04-04

## 2024-04-04 VITALS — HEART RATE: 79 BPM | OXYGEN SATURATION: 95 % | SYSTOLIC BLOOD PRESSURE: 100 MMHG | DIASTOLIC BLOOD PRESSURE: 62 MMHG

## 2024-04-04 VITALS — BODY MASS INDEX: 25.44 KG/M2 | HEIGHT: 64 IN | WEIGHT: 149 LBS

## 2024-04-04 DIAGNOSIS — I48.91 UNSPECIFIED ATRIAL FIBRILLATION: ICD-10-CM

## 2024-04-04 DIAGNOSIS — I50.9 HEART FAILURE, UNSPECIFIED: ICD-10-CM

## 2024-04-04 DIAGNOSIS — E78.5 HYPERLIPIDEMIA, UNSPECIFIED: ICD-10-CM

## 2024-04-04 PROCEDURE — G2211 COMPLEX E/M VISIT ADD ON: CPT

## 2024-04-04 PROCEDURE — 93000 ELECTROCARDIOGRAM COMPLETE: CPT

## 2024-04-04 PROCEDURE — 99214 OFFICE O/P EST MOD 30 MIN: CPT

## 2024-04-04 NOTE — HISTORY OF PRESENT ILLNESS
[FreeTextEntry1] : Patient with history of ckd, dementia, afib, CHF, COPD. CEA on right carotid, Initially presented to [facility] and noted to have afib with RVR which was medically managed. Converted to sinus rhythm spontaneously.  Noted to have mildly elevated troponins with normal ejection fraction. Due to his dementia, advanced age, ckd, medical management was suggested.  5/2021- Patient had sepsis, ? of secondary to gallstone. Underwent ERCP. Relative hypotension and fatigue.   1/2023- Volume overloaded in the hospital. Now lost ~20 lbs. Getting PT. Rectal mass diagnosed.   3/2023- Goal weight 145. Undergoing radiation for rectal mass. Bring treated at [facility]. 20 mg bid torsemide for now.   7/2023- Has had MRCP then ERCP. CA- rectal mass decreased in size.   11/2023- Hypotension. Holding torsemide.   4/2024- Pneumonia/sepsis. 7 days in hospital.  Aspiration pneumonia most likely.

## 2024-04-04 NOTE — DISCUSSION/SUMMARY
Patient:   REY GASPAR            MRN: [id number]            FIN: [id number]               Age:   11 years     Sex:  FEMALE     :  06   Associated Diagnoses:   None   Author:   LUX MUKHERJEE LEOLA Truong is a 11 year old girl who presents for prolonged EEG monitoring due to abnormal EEG and seizures. Mother is present for evaluation. Family states that on 2017 she had a seizure at 11pm where she was unresponsive, drooling, no twitching, lasting 4-5 minutes. Family states that she was seen in the ER and she had her medication increased. Family states that she has had no episodes of staring or unresponsiveness. She has had no headaches. She has had no significant fevers or infections. She has had no problems with taking the medication. She is drinking water and not a lot of sugary drinks. She is not skipping meals and she is eating healthy. She is sleeping from 1130pm - 9am. She is playing basketball over the summer and she plans to play basketball during the new year.     Brief summation of history  Family states that her seizures started in 2016. Family states that they cannot think of any triggering events that initiated her seizures. Family states that she has had no head trauma, no concussion, no significant fever or infection that may have caused or are triggering her seizures. Family states that her last seizure was 2016. Family states that her seizures are partial. Family states that her seizures involve her being unresponsive, having generalized stiffening and shaking, eyes closed, moaning, and resolved on their own. Her 2nd one involved her walking in circles. Her 3rd one involved head twitching, biting of her lip, walking to the doors, unfocused, lasting 10 minutes. Family states that she gets a headache before the first seizure. Family states that the seizures are not waking her out of sleep. Family states that she has no myoclonic jerking during sleep or when she is awake.  Family states that she is snoring during sleep. Family states that she is dropping things from her hands early in the morning. Family states that she is having staring and unresponsive episodes starting at 5-6 years but not anymore with the medications. Family has seen no rhythmic or twitching movements of her extremities or face when she is awake or when she is asleep. Family states that her seizures are not occurring at school. Family states that she has no headaches. Family states that she has had no vision changes. Family states that she has had no blurry vision, no double vision associated with her seizures. Family states that she has had no nausea and/or vomiting episodes associated with her seizures. Family states that she has no dizziness associated with her seizures. Family states that she has had no ringing in her ears. Family states that she has no weakness. Family states that she is not missing school due to her seizures. Family states that her seizures are not affecting her ability to perform at school. Family states that her seizures are not interfering with her social and extracurricular activities. Family states that she is drinking 1 bottles of water a day. Family states that she is drinking a lot of pop or sugary drinks. Family states that she is sleeping from 10pm - 7am. Family states that she is restless during sleep. Family states that she can be tired during the day. Family states that she is not skipping meals and she is not skipping breakfast. Family states that she is not eating healthy and she is not eating a lot of chips, candy, or fast food. Family states that she has a good appetite. Family states that she is walking well but she will trip at times. Family states that she is dropping things from her hands and she uses both hands well. Family states that she is swallowing well and she is not choking on liquids or solids. Family states that she has had no changes in her mood or behavior.  [FreeTextEntry1] : 1. CHF- stable.  2. Afib- Not on AC. Frail and concern for falls. Discussed watchman. Now in sinus. No Aspirin either due to recent GI bleeding. Discussed risk of stroke with family.  3.Htn: stable.  4. Carotid arterial disease: previous CEA on right side. Left with significant plaque. Discussed with family. Plan for medical management.  ? of fatigue due to crestor, at family request, have been holding statin.  Family knows risk regarding CVA.  5. Volume overload: Stable.  6. Rectal mass- treated. Had a very lengthy discussion with family regarding goals of care. Patient high risk from cardiac perspective for any invasive abd/rectal surgeries considering his previous history of NSTEMI- medically managed. had radiation.  7. improved edema. Now with hypotension episodes. Advised to hold torsemide till BP above ~110 systolic.  8. VOlume- If BP <110, no diuretics by family. PT at home.  9. Follow up in July as scheduled.   Family states that she has had no memory changes. Family states that she is on her electronics for prolonged periods. Family states that she has been tried on no medications for prophylactic therapy of her seizures. Family states that she has been to the ER due to all her seizures. Family states that she has had a MRI brain which they were told was normal. Family states that she has had EEG testing for evaluation of her episodes concerning for seizures which was normal. Family states that she is on vitamins with iron. Family states that there is family history of seizures in paternal grandmother, great grandmother, and cousin. Family has noticed some skin changes concerning for eczema.Family states that she has an IEP at school. Family states that she has never been involved in any therapies.     REVIEW OF SYSTEMS:   Neurological:   seizure, abnormal EEG  Constitutional:   There has been no unexplained weight loss, fever, chills or lassitude.   Eyes:   There have been no recurrent or unexplained episodes of visual blurring, visual loss, diplopia or eye pain.   Ears, Nose, Throat, Mouth:   There have been no recurrent or unexplained episodes of tinnitus or vertigo. Hearing loss has not been evident. There has been no unexplained or persistent nasal discharge or loss of smell or taste. Pain on swallowing, coughing or dysphagia have not been experienced.   Cardiovascular:   There have been no episodes of palpitations, exertional chest pain or edema.   Respiratory:   There is no SOB, wheezing, hemoptysis or any prolonged and unexplained cough.   Gastrointestinal:   There is no excessive nausea/vomiting, diarrhea, constipation or abdominal bloating.   Genitourinary:   There is no dysuria, nocturia, difficulty with initiating urination or incontinence of urine.   Musculoskeletal:   There have been no prominent joint pain, swelling or joint deformities. No significant back or neck pain has developed.   Integumentary (skin  [EKG obtained to assist in diagnosis and management of assessed problem(s)] : EKG obtained to assist in diagnosis and management of assessed problem(s) and/or breast):   No prominent skin lesions have developed. There has been no significant pruritus or skin rash.   Endocrine:   There has been no excessive thirst, urination, weight loss or gain. No abnormal skin pigmentation has developed over the extremities or torso.   Hematological and Lymphatic:   There is no history or treatment for an anemia. There has not been any palpable adenopathy.   Allergies and Immunologic:   There is no history of any seasonal or environmental allergies. There is no clear predisposition to seasonal illnesses or colds and no recurrent infections.   Psychiatric or Psychological:   no abnormalities.         Allergies  Fish  Seafood    Current Meds   · EpiPen 2-Hugo 0.3 MG/0.3ML Injection Solution Auto-injector; Use as needed for a severe  allergic reaction;  Therapy: 11Nov2016 to (Evaluate:10Jan2017)  Requested for: 11Nov2016; Last  Rx:11Nov2016 Ordered   · Hydrocortisone 1 % External Ointment; APPLY TO THE FACE TWICE DAILY FOR 1 WEEK  AS NEEDED FOR FACIAL ECZEMA;  Therapy: 24Jun2017 to (Evaluate:22Oct2017)  Requested for: 24Jun2017; Last  Rx:24Jun2017 Ordered   · Hydrocortisone 2.5 % External Ointment; APPLY TO AFFECTED AREA TWICE DAILY AS  DIRECTED;  Therapy: [id number] to (Evaluate:15May2017)  Requested for: 23Rhj2816; Last  Rx:[id number] Ordered   · OXcarbazepine 600 MG Oral Tablet; Take 1 tablet in AM (600mg) and 1.5 tablets at night  (900mg)  Requested for: 07Jun2017; Last Rx:07Jun2017 Ordered    Active Problems  Abnormal EEG (794.02) (R94.01)  Abnormal involuntary movement (781.0) (R25.9)  Allergy to seafood (V15.04) (Z91.013)  Alteration of awareness (780.09) (R41.9)  Epilepsy (345.90) (G40.909)  Hyperpigmentation of skin (709.00) (L81.9)  Lip-licking eczema (692.9) (L30.9)  Paroxysmal spells  Perioral hyperpigmentation (709.00) (L81.8)  Seizure (780.39) (R56.9)  Seizure-like activity (780.39) (R56.9)  Staring spell    Past Medical History    Birth History: The infant was born at 40  weeks gestation. Delivery was by normal vaginal route.      Surgical History  Denied: History of General Surgery    Family History  Mother   No pertinent family history  Father   Family history of sickle cell trait (V18.3) (Z83.2)  Family history of Seizure  Sibling   Family history of sickle cell trait (V18.3) (Z83.2)  Maternal Grandmother   Family history of diabetes mellitus (V18.0) (Z83.3)  Paternal Grandmother   Family history of Seizure  Paternal Great Grandmother   Family history of Seizure  Paternal Cousin   Family history of Seizure    Social History    Comments. She is in the 5th grade and she has a IEP at school.        Results/Data  MRI brain on 4/28/2016 - BRAIN: -Abnormal enhancement: No abnormal enhancement. -Ventricular system: Normal in size for age, no hydrocephalus. -White matter: Normal for age. -Grey matter: No findings of cortical infarct. No findings of heterotopic gray matter. Hippocampi have a symmetric appearance, no findings of mesial temporal sclerosis. -Restricted diffusion: None, no findings to suggest an acute infarct. -Susceptibility weighted imaging: Negative.  SUBSITES:-Orbits: Visualized orbits are unremarkable. -Internal auditory canals: Unremarkable. -Sella: Unremarkable.  IMPRESSION: 1. Negative.    EEG on 2/16/2017 - INTERPRETATION: Abnormal 1-day video electroencephalogram, finding indicative   of underlying seizure, generalized in nature. Clinical correlation will be   recommended.     EEG on 4/29/2016 - INTERPRETATION: Abnormal electroencephalogram, finding indicative of underlying seizure, partial in nature with focality to the left.      Physical Exam    Dionne is a pleasant girl in no distress    NEUROLOGIC EXAM:   Mental Status:   She was awake and alert. Her speech was fluent and comprehension was intact. She was able to follow complex commands crossing the midline.     Neck:   Supple.     Cranial Nerves:   Tongue midline, face symmetric. Pupils were 3mm equal round and  reactive to light. Ocular motility was full and there was no nystagmus evident. Facial sensation was normal. Corneal reflexes were present (direct and consensual). Facial expression was unremarkable without hypomimia and there was no facial weakness or Bell's phenomenon.     Motor Examination (bulk, tone, strength):   Motor examination showed normal muscle bulk, tone, and strength throughout.     Muscle Stretch reflexes (MSR's):   Muscle stretch reflexes were symmetric and normoactive.     Sensory:   Normal light-touch, pinprick, temperature, vibration.     Cerebellar and coordination:   Finger-finger, finger-nose was unremarkable.     Romberg test:   Romberg was negative.     Gait and Station: posture was normal. There was no postural instability. Heel and toe walking were unremarkable.     Spine:   Normal range of motion. No tenderness on palpation.        Assessment   1. Abnormal EEG (794.02) (R94.01)   2. Abnormal involuntary movement (781.0) (R25.9)   3. 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 Seizure (780.39) (R56.9)   5. Seizure-like activity (780.39) (R56.9)    Discussion/Summary    Impression:. Dionne is a 11 year old girl with seizures and abnormal EEG who presents for prolonged EEG monitoring. I would continue her oxcarbazepine 600mg in AM and 900mg in PM.      Antonio Rich MD     Note: over 80 minutes spent with the patient, with over 50% in consultation discussing diagnosis, treatment, and prognosis.                     Electronically Signed On 10/10/2017 15:35  __________________________________________________   YAZ, ANTONIO BHAGAT

## 2024-04-04 NOTE — PHYSICAL EXAM
[No Acute Distress] : no acute distress [No Carotid Bruit] : no carotid bruit [Normal S1, S2] : normal S1, S2 [No Murmur] : no murmur [Clear Lung Fields] : clear lung fields [No Respiratory Distress] : no respiratory distress  [Soft] : abdomen soft [Normal Bowel Sounds] : normal bowel sounds [Normal Radial B/L] : normal radial B/L [Normal PT B/L] : normal PT B/L [Moves all extremities] : moves all extremities [Person] : oriented to person [Place] : oriented to place [Time] : disoriented to time [de-identified] : fatigued [de-identified] : Deferred- wearing a mask  [de-identified] : using cane  [de-identified] : + edema.

## 2024-04-04 NOTE — CARDIOLOGY SUMMARY
[de-identified] : 2/2024: SR 1st Degree AV block with PAC, nonspecific ST depression and negative T waves- consistent with prior EKG

## 2024-04-05 ENCOUNTER — APPOINTMENT (OUTPATIENT)
Dept: PULMONOLOGY | Facility: CLINIC | Age: 89
End: 2024-04-05
Payer: MEDICARE

## 2024-04-05 VITALS
SYSTOLIC BLOOD PRESSURE: 100 MMHG | HEIGHT: 64 IN | WEIGHT: 149 LBS | HEART RATE: 82 BPM | RESPIRATION RATE: 16 BRPM | OXYGEN SATURATION: 93 % | DIASTOLIC BLOOD PRESSURE: 54 MMHG | BODY MASS INDEX: 25.44 KG/M2

## 2024-04-05 VITALS — DIASTOLIC BLOOD PRESSURE: 52 MMHG | SYSTOLIC BLOOD PRESSURE: 100 MMHG

## 2024-04-05 PROCEDURE — G2211 COMPLEX E/M VISIT ADD ON: CPT

## 2024-04-05 PROCEDURE — 99215 OFFICE O/P EST HI 40 MIN: CPT

## 2024-04-05 NOTE — DISCUSSION/SUMMARY
[FreeTextEntry1] : #1. Pt with COPD Gold class II based on old PFTs, complicated by Dementia, A fib, HFpEF. #2. Post admission [facility] for biliary sepsis, post ERCP, sphincterotomy and stone extraction. Pt s/p XRT for rectal cancer. #3. Prior echocardiogram 4/21 normal LV no pH #4. Diurese as tolerates for LE edema and mild rales on exam. #5. Reportedly was tolerating Anoro daily and nebulizer up to 2-3 x daily prn, dyspnea near baseline. Started Duoneb 4 x daily as unclear how much Anoro he is actually getting given dementia and limited cooperation. #6. Prior CT scan 7/26/22 and PET CT from 5/2023 with stable small eff/round atelectasis x yrs. Current CT with bibasilar infiltrates/consolidations with stable 5 mm nodules. Will repeat. #7. Renal and cardiology f/u for LE edema and adjustment of diuretic therapy as needed. #8. Renal f/u for CRI. #9. Pt had both Covid vaccines. #10. Unable to perform lung function testing - limited by dementia. #11. Zyrtec for allergies and Flonase for PNDS as these conditions may also be contributing to cough. #12. Completed trial of prednisone taper again. Could consider adding Pulmicort if needed. #13. F/u in 2 months with chest CT.  The patient expressed understanding and agreement with the above recommendations/plan and accepts responsibility to be compliant with recommended testing, therapies, and f/u visits. All relevant questions and concerns were addressed.  Discussed above with patient and wife and daughter who were also present.

## 2024-04-05 NOTE — HISTORY OF PRESENT ILLNESS
[Former] : former [>= 20 pack years] : >= 20 pack years [Follow-Up - Routine Clinic] : a routine clinic follow-up of [Dyspnea on Exertion] : dyspnea on exertion [Currently Experiencing] : The patient is currently experiencing symptoms. [Short-Acting Beta Agonists] : short-acting beta agonists [Long-Acting Beta Agonists] : long-acting beta agonists [Anticholinergics (Inhalation)] : inhaled anticholinergics [Good Compliance] : good compliance with treatment [Good Tolerance] : good tolerance of treatment [Good Symptom Control] : good symptom control [On ___] : performed on [unfilled] [Patient] : the patient [Indication ___] : for an indication of [unfilled] [None] : no new symptoms reported [TextBox_4] : Former 50 pack yr smoker, quit 1992; prior [place] exposure. Poor historian due to dementia. Pt s/p lung surgery. On Mucinex as needed Abnormal CT in 2018 but last CT with stable changes and persistent small effusions; repeat as needed. Pt previously with + PCR for Covid infection and was treated with Paxlovid.   Daughter reports recent hospitalization for rectal bleeding and w/u revealed rectal cancer s/p brief XRT and now not on therapy. On diuretic therapy per renal and cardiology depending on weight due to h/o CHF and LE edema. Presents with increased congestion and cough. Diuretics increased but concern for inflammation. Pt on Albuterol nebulizer 2-3 x daily though reportedly with improvement in symptoms with prednisone in the past.  Pt was hospitalized from 3/6-3/11/24 for bacteremia and pneumonia with the following d/c summary: Hospital Course: 89 year old male with pmhx of rectal cancer (followed by [facility]), Chronic diastolic CHF, CKD-4, DM-2, Afib, COPD, Dementia, Hypothyroidism presented to [facility] ED on 3/6 for AMS and weakness.  Patient dx with acute metabolic encephalopathy secondary to aspiration pneumonia. Mental status has returned to baseline and leukocytosis has resolved. Blood cultures negative. Patient completed 5 day course of azithromycin and received 6 day course of Zosyn. will complete treatment with one dose of levofloxacin after discharge. Patient cleared by speech for regular consistency food. Rectal cancer dx being followed at [facility]. [facility] was following patient, no systemic chemotherapy given. Patient on at home diuretics prn for chronic diastolic CHF, LE edema, and weight gain. Cr increase to 2.49 due to scheduled diuretics, but now trending down once diuretics were stopped. Patient not on anticoagulants for afib due to dementia/fall risk/risk of rectal bleeding with invasive cancer. DVT prophylaxis with heparin during stay. Patient cleared to be discharged to home.  Pt now near baseline.    [FreeTextEntry9] : Chest CT [FreeTextEntry8] : Bibasilar rounded atelectasis, small effusions, biapical scarring and emphysema [TextEntry] : Chest CT from 7/26/22 revealed essentially stable emphysematous changes with persistent small R>L effusions vs pleural thickening given small calcifications noted as well as bibasilar rounded atelectasis. PET CT from 5/18/23 revealed stable interlobular septal thickening with mild PVC and stable bibasilar opacities/consolidations with low FDG uptake with small effusions thought to be due to rounded atelectasis per report. Chest CT from 3/7/24 revealed with bibasilar infiltrates and otherwise stable 5 mm nodules.

## 2024-04-05 NOTE — CONSULT LETTER
[Dear  ___] : Dear  [unfilled], [Consult Letter:] : I had the pleasure of evaluating your patient, [unfilled]. [Please see my note below.] : Please see my note below. [Sincerely,] : Sincerely, [FreeTextEntry3] : Jayesh Rivera MD, FCCP, D. ABSM\par  Pulmonary and Sleep Medicine\par  [facility] at [place]

## 2024-04-11 ENCOUNTER — TRANSCRIPTION ENCOUNTER (OUTPATIENT)
Age: 89
End: 2024-04-11

## 2024-04-15 ENCOUNTER — APPOINTMENT (OUTPATIENT)
Dept: DERMATOLOGY | Facility: CLINIC | Age: 89
End: 2024-04-15
Payer: MEDICARE

## 2024-04-15 PROCEDURE — 99213 OFFICE O/P EST LOW 20 MIN: CPT | Mod: 25

## 2024-04-15 PROCEDURE — 17003 DESTRUCT PREMALG LES 2-14: CPT

## 2024-04-15 PROCEDURE — 17000 DESTRUCT PREMALG LESION: CPT

## 2024-04-24 ENCOUNTER — APPOINTMENT (OUTPATIENT)
Dept: DERMATOLOGY | Facility: CLINIC | Age: 89
End: 2024-04-24

## 2024-05-07 ENCOUNTER — APPOINTMENT (OUTPATIENT)
Dept: PULMONOLOGY | Facility: CLINIC | Age: 89
End: 2024-05-07
Payer: MEDICARE

## 2024-05-07 VITALS
WEIGHT: 151 LBS | HEART RATE: 77 BPM | BODY MASS INDEX: 25.78 KG/M2 | SYSTOLIC BLOOD PRESSURE: 96 MMHG | DIASTOLIC BLOOD PRESSURE: 50 MMHG | RESPIRATION RATE: 16 BRPM | HEIGHT: 64 IN | OXYGEN SATURATION: 93 %

## 2024-05-07 PROCEDURE — G2211 COMPLEX E/M VISIT ADD ON: CPT

## 2024-05-07 PROCEDURE — 99214 OFFICE O/P EST MOD 30 MIN: CPT

## 2024-05-07 RX ORDER — UREA 10 %
45 LOTION (ML) TOPICAL DAILY
Refills: 0 | Status: ACTIVE | COMMUNITY

## 2024-05-07 NOTE — CONSULT LETTER
[Dear  ___] : Dear  [unfilled], [Consult Letter:] : I had the pleasure of evaluating your patient, [unfilled]. [Please see my note below.] : Please see my note below. [Sincerely,] : Sincerely, [FreeTextEntry3] : Jayesh Rivera MD, FCCP, D. ABSM\par  Pulmonary and Sleep Medicine\par  [facility] at [place]

## 2024-05-07 NOTE — DISCUSSION/SUMMARY
[FreeTextEntry1] : #1. Pt with COPD Gold class II based on old PFTs, complicated by Dementia, A fib, HFpEF. #2. Post admission [facility] for biliary sepsis, post ERCP, sphincterotomy and stone extraction. Pt s/p XRT for rectal cancer. #3. Prior echocardiogram 4/21 normal LV no pH #4. Diurese as tolerates for LE edema and mild rales on exam. #5. Reportedly was tolerating Anoro daily and nebulizer up to 2-3 x daily prn, dyspnea near baseline. Started Duoneb 2-3 x daily as unclear how much Anoro he is actually getting given dementia and limited cooperation. Albuterol nebulizer as needed as well. #6. Prior CT scan 7/26/22 and PET CT from 5/2023 with stable small eff/round atelectasis x yrs. Current CT with bibasilar infiltrates/consolidations with stable 5 mm nodules. Will repeat. #7. Renal and cardiology f/u for LE edema and adjustment of diuretic therapy as needed. #8. Renal f/u for CRI. #9. Pt had both Covid vaccines. #10. Unable to perform lung function testing - limited by dementia. #11. Zyrtec for allergies and Flonase for PNDS as these conditions may also be contributing to cough. #12. Completed trial of prednisone taper again. Could consider adding Pulmicort if needed. #13. F/u in 2 months with chest CT or PET CT per [facility].  The patient expressed understanding and agreement with the above recommendations/plan and accepts responsibility to be compliant with recommended testing, therapies, and f/u visits. All relevant questions and concerns were addressed.  Discussed above with patient and wife and daughter who were also present.

## 2024-05-07 NOTE — HISTORY OF PRESENT ILLNESS
[Former] : former [>= 20 pack years] : >= 20 pack years [Follow-Up - Routine Clinic] : a routine clinic follow-up of [Dyspnea on Exertion] : dyspnea on exertion [Currently Experiencing] : The patient is currently experiencing symptoms. [Short-Acting Beta Agonists] : short-acting beta agonists [Long-Acting Beta Agonists] : long-acting beta agonists [Anticholinergics (Inhalation)] : inhaled anticholinergics [Good Compliance] : good compliance with treatment [Good Tolerance] : good tolerance of treatment [Good Symptom Control] : good symptom control [On ___] : performed on [unfilled] [Patient] : the patient [Indication ___] : for an indication of [unfilled] [None] : no new symptoms reported [TextBox_4] : Former 50 pack yr smoker, quit 1992; prior [place] exposure. Poor historian due to dementia. Pt s/p lung surgery. On Mucinex as needed Abnormal CT in 2018 but last CT with stable changes and persistent small effusions; repeat as needed. Pt previously with + PCR for Covid infection and was treated with Paxlovid.   Daughter reports recent hospitalization for rectal bleeding and w/u revealed rectal cancer s/p brief XRT and now not on therapy. On diuretic therapy per renal and cardiology depending on weight due to h/o CHF and LE edema. Presents with increased congestion and cough. Diuretics increased but concern for inflammation. Pt on Albuterol nebulizer 2-3 x daily though reportedly with improvement in symptoms with prednisone in the past.  Pt was hospitalized from 3/6-3/11/24 for bacteremia and pneumonia with the following d/c summary: Hospital Course: 89-year-old male with pmhx of rectal cancer (followed by [facility]), Chronic diastolic CHF, CKD-4, DM-2, Afib, COPD, Dementia, Hypothyroidism presented to [facility] ED on 3/6 for AMS and weakness.  Patient dx with acute metabolic encephalopathy secondary to aspiration pneumonia. Mental status has returned to baseline and leukocytosis has resolved. Blood cultures negative. Patient completed 5 day course of azithromycin and received 6 day course of Zosyn. will complete treatment with one dose of levofloxacin after discharge. Patient cleared by speech for regular consistency food. Rectal cancer dx being followed at [facility]. [facility] was following patient, no systemic chemotherapy given. Patient on at home diuretics prn for chronic diastolic CHF, LE edema, and weight gain. Cr increase to 2.49 due to scheduled diuretics, but now trending down once diuretics were stopped. Patient not on anticoagulants for afib due to dementia/fall risk/risk of rectal bleeding with invasive cancer. DVT prophylaxis with heparin during stay. Patient cleared to be discharged to home.  Pt now near baseline. [FreeTextEntry9] : Chest CT [FreeTextEntry8] : Bibasilar rounded atelectasis, small effusions, biapical scarring and emphysema [TextEntry] : Chest CT from 7/26/22 revealed essentially stable emphysematous changes with persistent small R>L effusions vs pleural thickening given small calcifications noted as well as bibasilar rounded atelectasis. PET CT from 5/18/23 revealed stable interlobular septal thickening with mild PVC and stable bibasilar opacities/consolidations with low FDG uptake with small effusions thought to be due to rounded atelectasis per report. Chest CT from 3/7/24 revealed with bibasilar infiltrates and otherwise stable 5 mm nodules.

## 2024-05-11 ENCOUNTER — OFFICE (OUTPATIENT)
Dept: URBAN - METROPOLITAN AREA CLINIC 94 | Facility: CLINIC | Age: 89
Setting detail: OPHTHALMOLOGY
End: 2024-05-11
Payer: MEDICARE

## 2024-05-11 DIAGNOSIS — E11.3211: ICD-10-CM

## 2024-05-11 DIAGNOSIS — E11.3292: ICD-10-CM

## 2024-05-11 PROCEDURE — 67210 TREATMENT OF RETINAL LESION: CPT | Mod: RT | Performed by: OPHTHALMOLOGY

## 2024-05-11 PROCEDURE — 92134 CPTRZ OPH DX IMG PST SGM RTA: CPT | Performed by: OPHTHALMOLOGY

## 2024-05-11 ASSESSMENT — LID EXAM ASSESSMENTS
OS_BLEPHARITIS: LUL 1+
OD_BLEPHARITIS: RUL 1+

## 2024-05-11 ASSESSMENT — CONFRONTATIONAL VISUAL FIELD TEST (CVF)
OS_FINDINGS: FULL
OD_FINDINGS: FULL

## 2024-06-10 ENCOUNTER — APPOINTMENT (OUTPATIENT)
Dept: CT IMAGING | Facility: CLINIC | Age: 89
End: 2024-06-10

## 2024-06-20 ENCOUNTER — APPOINTMENT (OUTPATIENT)
Dept: PULMONOLOGY | Facility: CLINIC | Age: 89
End: 2024-06-20
Payer: MEDICARE

## 2024-06-20 VITALS
RESPIRATION RATE: 16 BRPM | HEART RATE: 72 BPM | SYSTOLIC BLOOD PRESSURE: 109 MMHG | OXYGEN SATURATION: 96 % | HEIGHT: 64 IN | BODY MASS INDEX: 25.61 KG/M2 | DIASTOLIC BLOOD PRESSURE: 61 MMHG | WEIGHT: 150 LBS

## 2024-06-20 VITALS — BODY MASS INDEX: 25.75 KG/M2 | WEIGHT: 150 LBS

## 2024-06-20 DIAGNOSIS — Z87.891 PERSONAL HISTORY OF NICOTINE DEPENDENCE: ICD-10-CM

## 2024-06-20 DIAGNOSIS — R05.9 COUGH, UNSPECIFIED: ICD-10-CM

## 2024-06-20 DIAGNOSIS — J44.9 CHRONIC OBSTRUCTIVE PULMONARY DISEASE, UNSPECIFIED: ICD-10-CM

## 2024-06-20 DIAGNOSIS — J98.11 ATELECTASIS: ICD-10-CM

## 2024-06-20 DIAGNOSIS — R91.8 OTHER NONSPECIFIC ABNORMAL FINDING OF LUNG FIELD: ICD-10-CM

## 2024-06-20 DIAGNOSIS — R53.83 OTHER FATIGUE: ICD-10-CM

## 2024-06-20 DIAGNOSIS — R06.02 SHORTNESS OF BREATH: ICD-10-CM

## 2024-06-20 DIAGNOSIS — J90 PLEURAL EFFUSION, NOT ELSEWHERE CLASSIFIED: ICD-10-CM

## 2024-06-20 DIAGNOSIS — G47.00 INSOMNIA, UNSPECIFIED: ICD-10-CM

## 2024-06-20 DIAGNOSIS — R93.89 ABNORMAL FINDINGS ON DIAGNOSTIC IMAGING OF OTHER SPECIFIED BODY STRUCTURES: ICD-10-CM

## 2024-06-20 PROCEDURE — 99214 OFFICE O/P EST MOD 30 MIN: CPT

## 2024-06-20 PROCEDURE — G2211 COMPLEX E/M VISIT ADD ON: CPT

## 2024-06-20 RX ORDER — MULTIVIT-MIN/IRON/FOLIC ACID/K 18-600-40
CAPSULE ORAL
Refills: 0 | Status: ACTIVE | COMMUNITY

## 2024-06-20 NOTE — CONSULT LETTER
[Dear  ___] : Dear  [unfilled], [Consult Letter:] : I had the pleasure of evaluating your patient, [unfilled]. [Please see my note below.] : Please see my note below. [Sincerely,] : Sincerely, [FreeTextEntry3] : Jayesh Rivera MD, FCCP, D. ABSM\par  Pulmonary and Sleep Medicine\par  [facility] at [place]

## 2024-06-20 NOTE — DISCUSSION/SUMMARY
[FreeTextEntry1] : #1. Pt with COPD Gold class II based on old PFTs, complicated by Dementia, A fib, HFpEF. #2. Post admission [facility] for biliary sepsis, post ERCP, sphincterotomy and stone extraction. Pt s/p XRT for rectal cancer. #3. Prior echocardiogram 4/21 normal LV no PH. #4. Diurese as tolerates for LE edema and mild rales on exam. #5. Reportedly was tolerating Anoro daily and nebulizer up to 2-3 x daily prn, dyspnea near baseline. Started Duoneb 2-3 x daily as unclear how much Anoro he is actually getting given dementia and limited cooperation. Albuterol nebulizer as needed as well. #6. Prior CT scan 7/26/22 and PET CT from 5/2023 with stable small eff/round atelectasis x yrs. Current CT with bibasilar infiltrates/consolidations with stable 5 mm nodules. Will repeat. #7. Renal and cardiology f/u for LE edema and adjustment of diuretic therapy as needed. #8. Renal f/u for CRI. #9. Pt had both Covid vaccines. #10. Unable to perform lung function testing - limited by dementia. #11. Zyrtec for allergies and Flonase for PNDS as these conditions may also be contributing to cough. #12. Completed trial of prednisone taper again. Could consider adding Pulmicort if needed. #13. F/u in 3 months with chest CT or PET CT per [facility]. #14. Cardiology and renal f/u for diuretic therapy.  The patient expressed understanding and agreement with the above recommendations/plan and accepts responsibility to be compliant with recommended testing, therapies, and f/u visits. All relevant questions and concerns were addressed.  Discussed above with patient and wife and daughter who were also present.

## 2024-06-20 NOTE — REVIEW OF SYSTEMS
[Edema] : edema [Seasonal Allergies] : seasonal allergies [Anemia] : anemia [Diabetes] : diabetes [Thyroid Problem] : thyroid problem [Negative] : Psychiatric [TextBox_30] : see HPI [TextBox_134] : Dementia [TextBox_69] : gastroparesis followed with GI

## 2024-06-20 NOTE — HISTORY OF PRESENT ILLNESS
[Former] : former [>= 20 pack years] : >= 20 pack years [Follow-Up - Routine Clinic] : a routine clinic follow-up of [Dyspnea on Exertion] : dyspnea on exertion [Currently Experiencing] : The patient is currently experiencing symptoms. [Short-Acting Beta Agonists] : short-acting beta agonists [Long-Acting Beta Agonists] : long-acting beta agonists [Anticholinergics (Inhalation)] : inhaled anticholinergics [Good Compliance] : good compliance with treatment [Good Tolerance] : good tolerance of treatment [Good Symptom Control] : good symptom control [On ___] : performed on [unfilled] [Patient] : the patient [Indication ___] : for an indication of [unfilled] [None] : no new symptoms reported [TextBox_4] : Former 50 pack yr smoker, quit 1992; prior [place] exposure. Poor historian due to dementia. Pt s/p lung surgery. On Mucinex as needed Abnormal CT in 2018 but last CT with stable changes and persistent small effusions; repeat as needed. Pt previously with + PCR for Covid infection and was treated with Paxlovid.   Daughter reports recent hospitalization for rectal bleeding and w/u revealed rectal cancer s/p brief XRT and now not on therapy. On diuretic therapy per renal and cardiology depending on weight due to h/o CHF and LE edema. Presents with increased congestion and cough. Diuretics increased but concern for inflammation. Pt on Albuterol nebulizer 2-3 x daily though reportedly with improvement in symptoms with prednisone in the past.  Pt was hospitalized from 3/6-3/11/24 for bacteremia and pneumonia with the following d/c summary: Hospital Course: 89-year-old male with pmhx of rectal cancer (followed by [facility]), Chronic diastolic CHF, CKD-4, DM-2, Afib, COPD, Dementia, Hypothyroidism presented to [facility] ED on 3/6 for AMS and weakness.  Patient dx with acute metabolic encephalopathy secondary to aspiration pneumonia. Mental status has returned to baseline and leukocytosis has resolved. Blood cultures negative. Patient completed 5 day course of azithromycin and received 6 day course of Zosyn. will complete treatment with one dose of levofloxacin after discharge. Patient cleared by speech for regular consistency food. Rectal cancer dx being followed at [facility]. [facility] was following patient, no systemic chemotherapy given. Patient on at home diuretics prn for chronic diastolic CHF, LE edema, and weight gain. Cr increase to 2.49 due to scheduled diuretics, but now trending down once diuretics were stopped. Patient not on anticoagulants for afib due to dementia/fall risk/risk of rectal bleeding with invasive cancer. DVT prophylaxis with heparin during stay. Patient cleared to be discharged to home.  Pt now near baseline. [FreeTextEntry9] : Chest CT [FreeTextEntry8] : Bibasilar rounded atelectasis, small effusions, biapical scarring and emphysema [TextEntry] : Chest CT from 7/26/22 revealed essentially stable emphysematous changes with persistent small R>L effusions vs pleural thickening given small calcifications noted as well as bibasilar rounded atelectasis. PET CT from 5/18/23 revealed stable interlobular septal thickening with mild PVC and stable bibasilar opacities/consolidations with low FDG uptake with small effusions thought to be due to rounded atelectasis per report. Chest CT from 3/7/24 revealed with bibasilar infiltrates and otherwise stable 5 mm nodules.

## 2024-07-23 ENCOUNTER — APPOINTMENT (OUTPATIENT)
Dept: CARDIOLOGY | Facility: CLINIC | Age: 89
End: 2024-07-23

## 2024-07-30 ENCOUNTER — APPOINTMENT (OUTPATIENT)
Dept: CARDIOLOGY | Facility: CLINIC | Age: 89
End: 2024-07-30
Payer: MEDICARE

## 2024-07-30 ENCOUNTER — NON-APPOINTMENT (OUTPATIENT)
Age: 89
End: 2024-07-30

## 2024-07-30 VITALS
HEART RATE: 76 BPM | OXYGEN SATURATION: 96 % | DIASTOLIC BLOOD PRESSURE: 60 MMHG | SYSTOLIC BLOOD PRESSURE: 93 MMHG | BODY MASS INDEX: 24.04 KG/M2 | HEIGHT: 64 IN | WEIGHT: 140.8 LBS

## 2024-07-30 DIAGNOSIS — I48.91 UNSPECIFIED ATRIAL FIBRILLATION: ICD-10-CM

## 2024-07-30 DIAGNOSIS — E78.5 HYPERLIPIDEMIA, UNSPECIFIED: ICD-10-CM

## 2024-07-30 DIAGNOSIS — I50.9 HEART FAILURE, UNSPECIFIED: ICD-10-CM

## 2024-07-30 PROCEDURE — G2211 COMPLEX E/M VISIT ADD ON: CPT

## 2024-07-30 PROCEDURE — 93000 ELECTROCARDIOGRAM COMPLETE: CPT

## 2024-07-30 PROCEDURE — 99213 OFFICE O/P EST LOW 20 MIN: CPT

## 2024-07-30 RX ORDER — CYANOCOBALAMIN/FOLIC ACID 1MG-400MCG
1000-400 TABLET, SUBLINGUAL SUBLINGUAL
Refills: 0 | Status: ACTIVE | COMMUNITY

## 2024-07-30 NOTE — HISTORY OF PRESENT ILLNESS
[FreeTextEntry1] : Patient with history of ckd, dementia, afib, CHF, COPD. CEA on right carotid, Initially presented to [facility] and noted to have afib with RVR which was medically managed. Converted to sinus rhythm spontaneously.  Noted to have mildly elevated troponins with normal ejection fraction. Due to his dementia, advanced age, ckd, medical management was suggested.  5/2021- Patient had sepsis, ? of secondary to gallstone. Underwent ERCP. Relative hypotension and fatigue.   1/2023- Volume overloaded in the hospital. Now lost ~20 lbs. Getting PT. Rectal mass diagnosed.   3/2023- Goal weight 145. Undergoing radiation for rectal mass. Bring treated at [facility]. 20 mg bid torsemide for now.   7/2023- Has had MRCP then ERCP. CA- rectal mass decreased in size.   11/2023- Hypotension. Holding torsemide.   4/2024- Pneumonia/sepsis. 7 days in hospital.  Aspiration pneumonia most likely.   7/2024- Had volume overload. Started on metolazone for 3 days. Now on torsemide. Keeping weights around 140s.

## 2024-07-30 NOTE — DISCUSSION/SUMMARY
[FreeTextEntry1] : 1. CHF- stable.  2. Afib- Not on AC. Frail and concern for falls. Discussed watchman. Now in sinus. No Aspirin either due to recent GI bleeding. Discussed risk of stroke with family.  3.Htn: stable.  4. Carotid arterial disease: previous CEA on right side. Left with significant plaque. Discussed with family. Plan for medical management.  ? of fatigue due to crestor, at family request, have been holding statin.  Family knows risk regarding CVA.  5. Volume overload: Stable.  6. Rectal mass- treated. Had a very lengthy discussion with family regarding goals of care. Patient high risk from cardiac perspective for any invasive abd/rectal surgeries considering his previous history of NSTEMI- medically managed. had radiation.  7. improved edema. stable.  8. VOlume- If BP <105, no diuretics by family. PT at home.  9. Follow up in september.    [EKG obtained to assist in diagnosis and management of assessed problem(s)] : EKG obtained to assist in diagnosis and management of assessed problem(s)

## 2024-07-30 NOTE — PHYSICAL EXAM
[No Acute Distress] : no acute distress [No Carotid Bruit] : no carotid bruit [Normal S1, S2] : normal S1, S2 [No Murmur] : no murmur [Clear Lung Fields] : clear lung fields [No Respiratory Distress] : no respiratory distress  [Soft] : abdomen soft [Normal Bowel Sounds] : normal bowel sounds [Normal Radial B/L] : normal radial B/L [Normal PT B/L] : normal PT B/L [Moves all extremities] : moves all extremities [Person] : oriented to person [Place] : oriented to place [Time] : disoriented to time [de-identified] : fatigued [de-identified] : Deferred- wearing a mask  [de-identified] : using cane  [de-identified] : + edema.

## 2024-07-30 NOTE — CARDIOLOGY SUMMARY
[de-identified] : 7/2024: SR 1st Degree AV block with PAC, nonspecific ST depression and negative T waves- consistent with prior EKG

## 2024-08-17 NOTE — PATIENT PROFILE ADULT - VISION (WITH CORRECTIVE LENSES IF THE PATIENT USUALLY WEARS THEM):
-- DO NOT REPLY / DO NOT REPLY ALL --  -- This inbox is not monitored. If this was sent to the wrong provider or department, reroute message to P ECO Reroute pool. --  -- Message is from Engagement Center Operations (ECO) --    Request Result      Which Result are your requesting?  Lab results from 8/16/24    What is the full name of the provider that ordered the lab or test?: Fili Montez     [facility] site name: [facility]         Alternative phone number: no    Can a detailed message be left?: Yes - Voicemail    Patient has been advised the message will be addressed within 2-3 business days.         Normal vision: sees adequately in most situations; can see medication labels, newsprint

## 2024-08-20 ENCOUNTER — APPOINTMENT (OUTPATIENT)
Dept: DERMATOLOGY | Facility: CLINIC | Age: 89
End: 2024-08-20
Payer: MEDICARE

## 2024-08-20 PROCEDURE — 17000 DESTRUCT PREMALG LESION: CPT | Mod: 59

## 2024-08-20 PROCEDURE — 99212 OFFICE O/P EST SF 10 MIN: CPT | Mod: 25

## 2024-08-20 PROCEDURE — 11102 TANGNTL BX SKIN SINGLE LES: CPT

## 2024-08-20 PROCEDURE — 17003 DESTRUCT PREMALG LES 2-14: CPT | Mod: 59

## 2024-09-05 ENCOUNTER — APPOINTMENT (OUTPATIENT)
Dept: CARDIOLOGY | Facility: CLINIC | Age: 89
End: 2024-09-05
Payer: MEDICARE

## 2024-09-05 VITALS
HEIGHT: 64 IN | WEIGHT: 144 LBS | SYSTOLIC BLOOD PRESSURE: 95 MMHG | DIASTOLIC BLOOD PRESSURE: 62 MMHG | HEART RATE: 76 BPM | OXYGEN SATURATION: 95 % | BODY MASS INDEX: 24.59 KG/M2

## 2024-09-05 DIAGNOSIS — I50.9 HEART FAILURE, UNSPECIFIED: ICD-10-CM

## 2024-09-05 DIAGNOSIS — I48.91 UNSPECIFIED ATRIAL FIBRILLATION: ICD-10-CM

## 2024-09-05 PROCEDURE — G2211 COMPLEX E/M VISIT ADD ON: CPT

## 2024-09-05 PROCEDURE — 99215 OFFICE O/P EST HI 40 MIN: CPT

## 2024-09-05 NOTE — PHYSICAL EXAM
[No Acute Distress] : no acute distress [No Carotid Bruit] : no carotid bruit [Normal S1, S2] : normal S1, S2 [No Murmur] : no murmur [Clear Lung Fields] : clear lung fields [No Respiratory Distress] : no respiratory distress  [Soft] : abdomen soft [Normal Bowel Sounds] : normal bowel sounds [Normal Radial B/L] : normal radial B/L [Normal PT B/L] : normal PT B/L [Moves all extremities] : moves all extremities [Person] : oriented to person [Place] : oriented to place [Time] : disoriented to time [de-identified] : fatigued [de-identified] : Deferred- wearing a mask  [de-identified] : using cane  [de-identified] : + edema.

## 2024-09-05 NOTE — HISTORY OF PRESENT ILLNESS
[FreeTextEntry1] : Patient with history of ckd, dementia, afib, CHF, COPD. CEA on right carotid, Initially presented to [facility] and noted to have afib with RVR which was medically managed. Converted to sinus rhythm spontaneously.  Noted to have mildly elevated troponins with normal ejection fraction. Due to his dementia, advanced age, ckd, medical management was suggested.  5/2021- Patient had sepsis, ? of secondary to gallstone. Underwent ERCP. Relative hypotension and fatigue.   1/2023- Volume overloaded in the hospital. Now lost ~20 lbs. Getting PT. Rectal mass diagnosed.   7/2023- Has had MRCP then ERCP. CA- rectal mass decreased in size.   11/2023- Hypotension. Holding torsemide.   4/2024- Pneumonia/sepsis. 7 days in hospital.  Aspiration pneumonia most likely.   7/2024- Had volume overload. Started on metolazone for 3 days. Now on torsemide. Keeping weights around 140s.   9/2024- Hypotensive. HOlding torsemide.

## 2024-09-05 NOTE — CARDIOLOGY SUMMARY
[de-identified] : 7/2024: SR 1st Degree AV block with PAC, nonspecific ST depression and negative T waves- consistent with prior EKG

## 2024-09-05 NOTE — DISCUSSION/SUMMARY
[FreeTextEntry1] : 1. CHF- stable. On torsemide.  2. Afib- Not on AC. Frail and concern for falls. Discussed watchman. Now in sinus. No Aspirin either due to recent GI bleeding. Discussed risk of stroke with family.  3.Htn: stable.  4. Carotid arterial disease: previous CEA on right side. Left with significant plaque. Discussed with family. Plan for medical management.  ? of fatigue due to crestor, at family request, have been holding statin.  Family knows risk regarding CVA.  5. Volume overload: Stable.  6. Rectal mass- treated. Had a very lengthy discussion with family regarding goals of care. Patient high risk from cardiac perspective for any invasive abd/rectal surgeries considering his previous history of NSTEMI- medically managed. had radiation.  7. improved edema. stable.  8. VOlume- If BP <100, no diuretics by family. PT at home. Conitnue current regimen.  9. Follow up in 2 months.

## 2024-09-12 ENCOUNTER — APPOINTMENT (OUTPATIENT)
Dept: PULMONOLOGY | Facility: CLINIC | Age: 89
End: 2024-09-12

## 2024-09-13 ENCOUNTER — APPOINTMENT (OUTPATIENT)
Dept: PULMONOLOGY | Facility: CLINIC | Age: 89
End: 2024-09-13
Payer: MEDICARE

## 2024-09-13 VITALS
DIASTOLIC BLOOD PRESSURE: 62 MMHG | OXYGEN SATURATION: 94 % | HEART RATE: 55 BPM | SYSTOLIC BLOOD PRESSURE: 90 MMHG | BODY MASS INDEX: 23.9 KG/M2 | HEIGHT: 64 IN | WEIGHT: 140 LBS | RESPIRATION RATE: 16 BRPM

## 2024-09-13 VITALS — DIASTOLIC BLOOD PRESSURE: 58 MMHG | SYSTOLIC BLOOD PRESSURE: 86 MMHG

## 2024-09-13 DIAGNOSIS — R91.8 OTHER NONSPECIFIC ABNORMAL FINDING OF LUNG FIELD: ICD-10-CM

## 2024-09-13 DIAGNOSIS — J90 PLEURAL EFFUSION, NOT ELSEWHERE CLASSIFIED: ICD-10-CM

## 2024-09-13 DIAGNOSIS — R93.89 ABNORMAL FINDINGS ON DIAGNOSTIC IMAGING OF OTHER SPECIFIED BODY STRUCTURES: ICD-10-CM

## 2024-09-13 DIAGNOSIS — J44.9 CHRONIC OBSTRUCTIVE PULMONARY DISEASE, UNSPECIFIED: ICD-10-CM

## 2024-09-13 DIAGNOSIS — G47.00 INSOMNIA, UNSPECIFIED: ICD-10-CM

## 2024-09-13 DIAGNOSIS — R05.9 COUGH, UNSPECIFIED: ICD-10-CM

## 2024-09-13 DIAGNOSIS — J98.11 ATELECTASIS: ICD-10-CM

## 2024-09-13 DIAGNOSIS — Z87.891 PERSONAL HISTORY OF NICOTINE DEPENDENCE: ICD-10-CM

## 2024-09-13 DIAGNOSIS — R06.02 SHORTNESS OF BREATH: ICD-10-CM

## 2024-09-13 PROCEDURE — 99214 OFFICE O/P EST MOD 30 MIN: CPT

## 2024-09-13 PROCEDURE — G2211 COMPLEX E/M VISIT ADD ON: CPT

## 2024-09-13 RX ORDER — ASCORBIC ACID 500 MG
TABLET ORAL
Refills: 0 | Status: ACTIVE | COMMUNITY

## 2024-09-13 RX ORDER — IRON/IRON ASP GLY/FA/MV-MIN 38 125-25-1MG
TABLET ORAL
Refills: 0 | Status: ACTIVE | COMMUNITY

## 2024-09-13 NOTE — CONSULT LETTER
[Dear  ___] : Dear  [unfilled], [Consult Letter:] : I had the pleasure of evaluating your patient, [unfilled]. [Please see my note below.] : Please see my note below. [Sincerely,] : Sincerely, [FreeTextEntry3] : Jayesh Rivera MD, FCCP, D. ABSM\par  Pulmonary and Sleep Medicine\par  [facility] at [place]

## 2024-09-13 NOTE — PHYSICAL EXAM
[No Acute Distress] : no acute distress [Normal Appearance] : normal appearance [Normal Rate/Rhythm] : normal rate/rhythm [Normal S1, S2] : normal s1, s2 [No Murmurs] : no murmurs [No Resp Distress] : no resp distress [No Acc Muscle Use] : no acc muscle use [Normal Rhythm and Effort] : normal rhythm and effort [Clear to Auscultation Bilaterally] : clear to auscultation bilaterally [No Abnormalities] : no abnormalities [Benign] : benign [Not Tender] : not tender [Soft] : soft [Normal Gait] : normal gait [No Clubbing] : no clubbing [No Cyanosis] : no cyanosis [2+ Pitting] : 2+ pitting [Normal Color/ Pigmentation] : normal color/ pigmentation [No Focal Deficits] : no focal deficits [Oriented x3] : oriented x3 [TextBox_68] : Few basilar crackles with mild rhonchi

## 2024-09-13 NOTE — END OF VISIT
[Time Spent: ___ minutes] : I have spent [unfilled] minutes of time on the encounter which excludes teaching and separately reported services. [TextEntry] :  Discussed with pt at length regarding COPD, cough, soboe, smoking hx, abnormal CT; reviewed w/u with pt, daughter, and wife as above.

## 2024-09-13 NOTE — DISCUSSION/SUMMARY
[FreeTextEntry1] : #1. Pt with COPD Gold class II based on old PFTs, complicated by Dementia, A fib, HFpEF. #2. Post admission [facility] for biliary sepsis, post ERCP, sphincterotomy and stone extraction. Pt s/p XRT for rectal cancer. #3. Prior echocardiogram 4/21 normal LV no PH. #4. Diurese as tolerates for LE edema and mild rales on exam. #5. Reportedly was tolerating Anoro daily and nebulizer up to 2-3 x daily prn, dyspnea near baseline. Started Duoneb 3 x daily as unclear how much Anoro he is actually getting given dementia and limited cooperation. Albuterol nebulizer as needed as well. #6. Prior CT scan 7/26/22 and PET CT from 5/2023 with stable small eff/round atelectasis x yrs. Current CT with bibasilar infiltrates/consolidations with stable 5 mm nodules. Will repeat. #7. Renal and cardiology f/u for LE edema and adjustment of diuretic therapy as needed. #8. Renal f/u for CRI. #9. Pt had both Covid vaccines. #10. Unable to perform lung function testing - limited by dementia. #11. Zyrtec for allergies and Flonase for PNDS as these conditions may also be contributing to cough. #12. Completed trial of prednisone taper again. Could consider adding Pulmicort if needed. #13. F/u in 3 months with chest CT unless PET CT done per [facility]. #14. Cardiology and renal f/u for diuretic therapy.  The patient expressed understanding and agreement with the above recommendations/plan and accepts responsibility to be compliant with recommended testing, therapies, and f/u visits. All relevant questions and concerns were addressed.  Discussed above with patient and wife and daughter who were also present.

## 2024-09-13 NOTE — HISTORY OF PRESENT ILLNESS
[Former] : former [>= 20 pack years] : >= 20 pack years [Follow-Up - Routine Clinic] : a routine clinic follow-up of [Dyspnea on Exertion] : dyspnea on exertion [Currently Experiencing] : The patient is currently experiencing symptoms. [Short-Acting Beta Agonists] : short-acting beta agonists [Long-Acting Beta Agonists] : long-acting beta agonists [Anticholinergics (Inhalation)] : inhaled anticholinergics [Good Compliance] : good compliance with treatment [Good Tolerance] : good tolerance of treatment [Good Symptom Control] : good symptom control [On ___] : performed on [unfilled] [Patient] : the patient [Indication ___] : for an indication of [unfilled] [None] : no new symptoms reported [TextBox_4] : Former 50 pack yr smoker, quit 1992; prior [place] exposure. Poor historian due to dementia. Pt s/p lung surgery. On Mucinex as needed Abnormal CT in 2018 but last CT with stable changes and persistent small effusions; repeat as needed. Pt previously with + PCR for Covid infection and was treated with Paxlovid.   Daughter reports recent hospitalization for rectal bleeding and w/u revealed rectal cancer s/p brief XRT and now not on therapy. On diuretic therapy per renal and cardiology depending on weight due to h/o CHF and LE edema. Presents with increased congestion and cough. Diuretics increased but concern for inflammation. Pt on Albuterol nebulizer 2-3 x daily though reportedly with improvement in symptoms with prednisone in the past.  Pt was hospitalized from 3/6-3/11/24 for bacteremia and pneumonia with the following d/c summary: Hospital Course: 89-year-old male with pmhx of rectal cancer (followed by [facility]), Chronic diastolic CHF, CKD-4, DM-2, Afib, COPD, Dementia, Hypothyroidism presented to [facility] ED on 3/6 for AMS and weakness.  Patient dx with acute metabolic encephalopathy secondary to aspiration pneumonia. Mental status has returned to baseline and leukocytosis has resolved. Blood cultures negative. Patient completed 5-day course of azithromycin and received 6-day course of Zosyn. will complete treatment with one dose of levofloxacin after discharge. Patient cleared by speech for regular consistency food. Rectal cancer dx being followed at [facility]. [facility] was following patient, no systemic chemotherapy given. Patient on at home diuretics prn for chronic diastolic CHF, LE edema, and weight gain. Cr increase to 2.49 due to scheduled diuretics, but now trending down once diuretics were stopped. Patient not on anticoagulants for afib due to dementia/fall risk/risk of rectal bleeding with invasive cancer. DVT prophylaxis with heparin during stay. Patient cleared to be discharged to home.  Pt near baseline. [FreeTextEntry9] : Chest CT [FreeTextEntry8] : Bibasilar rounded atelectasis, small effusions, biapical scarring and emphysema [TextEntry] : Chest CT from 7/26/22 revealed essentially stable emphysematous changes with persistent small R>L effusions vs pleural thickening given small calcifications noted as well as bibasilar rounded atelectasis. PET CT from 5/18/23 revealed stable interlobular septal thickening with mild PVC and stable bibasilar opacities/consolidations with low FDG uptake with small effusions thought to be due to rounded atelectasis per report. Chest CT from 3/7/24 revealed with bibasilar infiltrates and otherwise stable 5 mm nodules.

## 2024-09-14 ENCOUNTER — OFFICE (OUTPATIENT)
Dept: URBAN - METROPOLITAN AREA CLINIC 94 | Facility: CLINIC | Age: 89
Setting detail: OPHTHALMOLOGY
End: 2024-09-14
Payer: MEDICARE

## 2024-09-14 DIAGNOSIS — E11.3211: ICD-10-CM

## 2024-09-14 DIAGNOSIS — E11.3292: ICD-10-CM

## 2024-09-14 PROCEDURE — 92134 CPTRZ OPH DX IMG PST SGM RTA: CPT | Performed by: OPHTHALMOLOGY

## 2024-09-14 PROCEDURE — 92014 COMPRE OPH EXAM EST PT 1/>: CPT | Performed by: OPHTHALMOLOGY

## 2024-09-14 ASSESSMENT — LID EXAM ASSESSMENTS
OD_BLEPHARITIS: RUL 1+
OS_BLEPHARITIS: LUL 1+

## 2024-09-14 ASSESSMENT — CONFRONTATIONAL VISUAL FIELD TEST (CVF)
OS_FINDINGS: FULL
OD_FINDINGS: FULL

## 2024-10-14 ENCOUNTER — APPOINTMENT (OUTPATIENT)
Dept: DERMATOLOGY | Facility: CLINIC | Age: 89
End: 2024-10-14

## 2024-11-05 ENCOUNTER — APPOINTMENT (OUTPATIENT)
Dept: DERMATOLOGY | Facility: CLINIC | Age: 89
End: 2024-11-05

## 2024-11-15 ENCOUNTER — TELEHEALTH-OTHER THEN HOME (OUTPATIENT)
Age: 89
Setting detail: OPHTHALMOLOGY
End: 2024-11-15
Payer: MEDICARE

## 2024-11-15 DIAGNOSIS — H01.001: ICD-10-CM

## 2024-11-15 DIAGNOSIS — H01.004: ICD-10-CM

## 2024-11-15 PROCEDURE — 99441 TELEPHONE E/M SERVICE; 5-10 MINUTES OF MEDICAL DISCUSSION: CPT | Mod: 93 | Performed by: OPHTHALMOLOGY

## 2024-11-15 ASSESSMENT — REFRACTION_AUTOREFRACTION
OS_AXIS: 105
OD_CYLINDER: -0.75
OS_CYLINDER: -1.25
OS_SPHERE: +0.50
OD_AXIS: 081
OD_SPHERE: +1.00

## 2024-11-15 ASSESSMENT — KERATOMETRY
OD_AXISANGLE_DEGREES: 174
OS_K1POWER_DIOPTERS: 43.00
OD_K1POWER_DIOPTERS: 43.25
OS_K2POWER_DIOPTERS: 44.00
OS_AXISANGLE_DEGREES: 012
OD_K2POWER_DIOPTERS: 44.00

## 2024-11-15 ASSESSMENT — LID EXAM ASSESSMENTS
OD_BLEPHARITIS: RUL 1+
OS_BLEPHARITIS: LUL 1+

## 2024-11-15 ASSESSMENT — VISUAL ACUITY
OS_BCVA: 20/25
OD_BCVA: 20/25

## 2024-11-15 ASSESSMENT — CONFRONTATIONAL VISUAL FIELD TEST (CVF)
OD_FINDINGS: FULL
OS_FINDINGS: FULL

## 2024-11-21 ENCOUNTER — NON-APPOINTMENT (OUTPATIENT)
Age: 89
End: 2024-11-21

## 2024-11-21 ENCOUNTER — APPOINTMENT (OUTPATIENT)
Dept: CARDIOLOGY | Facility: CLINIC | Age: 89
End: 2024-11-21
Payer: MEDICARE

## 2024-11-21 VITALS
BODY MASS INDEX: 24.07 KG/M2 | SYSTOLIC BLOOD PRESSURE: 90 MMHG | OXYGEN SATURATION: 97 % | HEART RATE: 84 BPM | WEIGHT: 141 LBS | HEIGHT: 64 IN | DIASTOLIC BLOOD PRESSURE: 42 MMHG

## 2024-11-21 DIAGNOSIS — N18.30 CHRONIC KIDNEY DISEASE, STAGE 3 UNSPECIFIED: ICD-10-CM

## 2024-11-21 DIAGNOSIS — E78.5 HYPERLIPIDEMIA, UNSPECIFIED: ICD-10-CM

## 2024-11-21 DIAGNOSIS — I50.9 HEART FAILURE, UNSPECIFIED: ICD-10-CM

## 2024-11-21 PROCEDURE — 99214 OFFICE O/P EST MOD 30 MIN: CPT

## 2024-11-21 PROCEDURE — 93000 ELECTROCARDIOGRAM COMPLETE: CPT

## 2024-11-21 PROCEDURE — G2211 COMPLEX E/M VISIT ADD ON: CPT

## 2024-12-02 ENCOUNTER — APPOINTMENT (OUTPATIENT)
Dept: DERMATOLOGY | Facility: CLINIC | Age: 88
End: 2024-12-02
Payer: MEDICARE

## 2024-12-02 PROCEDURE — 17000 DESTRUCT PREMALG LESION: CPT

## 2024-12-02 PROCEDURE — 99213 OFFICE O/P EST LOW 20 MIN: CPT | Mod: 25

## 2024-12-02 PROCEDURE — 17003 DESTRUCT PREMALG LES 2-14: CPT

## 2024-12-16 ENCOUNTER — APPOINTMENT (OUTPATIENT)
Dept: PULMONOLOGY | Facility: CLINIC | Age: 88
End: 2024-12-16
Payer: MEDICARE

## 2024-12-16 VITALS
HEART RATE: 78 BPM | DIASTOLIC BLOOD PRESSURE: 62 MMHG | SYSTOLIC BLOOD PRESSURE: 100 MMHG | RESPIRATION RATE: 16 BRPM | OXYGEN SATURATION: 92 %

## 2024-12-16 VITALS — BODY MASS INDEX: 23.39 KG/M2 | WEIGHT: 137 LBS | HEIGHT: 64 IN

## 2024-12-16 DIAGNOSIS — R06.02 SHORTNESS OF BREATH: ICD-10-CM

## 2024-12-16 DIAGNOSIS — J44.9 CHRONIC OBSTRUCTIVE PULMONARY DISEASE, UNSPECIFIED: ICD-10-CM

## 2024-12-16 DIAGNOSIS — R91.8 OTHER NONSPECIFIC ABNORMAL FINDING OF LUNG FIELD: ICD-10-CM

## 2024-12-16 DIAGNOSIS — J69.0 PNEUMONITIS DUE TO INHALATION OF FOOD AND VOMIT: ICD-10-CM

## 2024-12-16 DIAGNOSIS — G47.00 INSOMNIA, UNSPECIFIED: ICD-10-CM

## 2024-12-16 DIAGNOSIS — Z87.891 PERSONAL HISTORY OF NICOTINE DEPENDENCE: ICD-10-CM

## 2024-12-16 DIAGNOSIS — R53.83 OTHER FATIGUE: ICD-10-CM

## 2024-12-16 DIAGNOSIS — J90 PLEURAL EFFUSION, NOT ELSEWHERE CLASSIFIED: ICD-10-CM

## 2024-12-16 DIAGNOSIS — R05.9 COUGH, UNSPECIFIED: ICD-10-CM

## 2024-12-16 DIAGNOSIS — J98.11 ATELECTASIS: ICD-10-CM

## 2024-12-16 DIAGNOSIS — R93.89 ABNORMAL FINDINGS ON DIAGNOSTIC IMAGING OF OTHER SPECIFIED BODY STRUCTURES: ICD-10-CM

## 2024-12-16 PROCEDURE — 99214 OFFICE O/P EST MOD 30 MIN: CPT

## 2024-12-16 PROCEDURE — G2211 COMPLEX E/M VISIT ADD ON: CPT

## 2025-01-25 ENCOUNTER — RX RENEWAL (OUTPATIENT)
Age: 89
End: 2025-01-25

## 2025-02-03 ENCOUNTER — RX RENEWAL (OUTPATIENT)
Age: 89
End: 2025-02-03

## 2025-03-25 ENCOUNTER — APPOINTMENT (OUTPATIENT)
Dept: CARDIOLOGY | Facility: CLINIC | Age: 89
End: 2025-03-25
Payer: MEDICARE

## 2025-03-25 ENCOUNTER — NON-APPOINTMENT (OUTPATIENT)
Age: 89
End: 2025-03-25

## 2025-03-25 VITALS
OXYGEN SATURATION: 93 % | DIASTOLIC BLOOD PRESSURE: 42 MMHG | HEIGHT: 64 IN | HEART RATE: 80 BPM | SYSTOLIC BLOOD PRESSURE: 86 MMHG

## 2025-03-25 DIAGNOSIS — I48.91 UNSPECIFIED ATRIAL FIBRILLATION: ICD-10-CM

## 2025-03-25 DIAGNOSIS — I50.9 HEART FAILURE, UNSPECIFIED: ICD-10-CM

## 2025-03-25 PROCEDURE — 99203 OFFICE O/P NEW LOW 30 MIN: CPT

## 2025-03-25 PROCEDURE — 99213 OFFICE O/P EST LOW 20 MIN: CPT

## 2025-03-25 PROCEDURE — 93000 ELECTROCARDIOGRAM COMPLETE: CPT

## 2025-03-25 RX ORDER — LORATADINE 10 MG
17 TABLET,DISINTEGRATING ORAL
Refills: 0 | Status: ACTIVE | COMMUNITY

## 2025-03-26 NOTE — PATIENT PROFILE ADULT - FUNCTIONAL ASSESSMENT - BASIC MOBILITY SECTION LABEL
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 (HCC) [K70.30]  Acute pancreatitis without infection or necrosis [K85.90]  Acute pancreatitis without infection or necrosis, unspecified pancreatitis type [K85.90]   Date / Time: 3/23/2025 10:49 AM    Financial:  Payor: [facility] / Plan: [facility] MEDICAID / Product Type: *No Product type* /      Pharmacy:    [facility] [id number] [facility] [address] - P [phone] - F [phone]  [address] [address]  Phone: [phone] Fax: [phone]    [facility] [id number] [facility] [address] - P [phone] - F [phone]  [address] [facility] [address]  Phone: [phone] Fax: [phone]    [facility] [id number] [facility] [address] - P [phone] - F [phone]  [address] [facility] [address]  Phone: [phone] Fax: [phone]    [facility] [facility] [address] [facility] [phone] - F [phone]  [address] [facility] [address]  Phone: [phone] Fax: [phone]      Assistance purchasing medications?: Potential Assistance Purchasing Medications: No  Assistance provided by Case Management: None at this time    DISCHARGE Disposition: Home- No Services Needed    Transportation:  Transportation PLAN for discharge: family   Mode of Transport: Private Car    Additional CM Notes:   DC order noted.  DC home no needs.  Family will transport.    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 Not Indicated    Jen Way RN  [facility]   Case Management Department  Ph: [phone]   .

## 2025-04-17 ENCOUNTER — APPOINTMENT (OUTPATIENT)
Dept: CT IMAGING | Facility: CLINIC | Age: 89
End: 2025-04-17

## 2025-04-17 ENCOUNTER — NON-APPOINTMENT (OUTPATIENT)
Age: 89
End: 2025-04-17

## 2025-04-21 ENCOUNTER — APPOINTMENT (OUTPATIENT)
Dept: PULMONOLOGY | Facility: CLINIC | Age: 89
End: 2025-04-21

## 2025-05-06 ENCOUNTER — APPOINTMENT (OUTPATIENT)
Dept: PULMONOLOGY | Facility: CLINIC | Age: 89
End: 2025-05-06

## 2025-06-02 ENCOUNTER — APPOINTMENT (OUTPATIENT)
Dept: DERMATOLOGY | Facility: CLINIC | Age: 89
End: 2025-06-02
Payer: MEDICARE

## 2025-06-02 PROCEDURE — 99214 OFFICE O/P EST MOD 30 MIN: CPT | Mod: 25

## 2025-06-02 PROCEDURE — 17000 DESTRUCT PREMALG LESION: CPT

## 2025-07-22 NOTE — ED PROVIDER NOTE - CADM POA PRESS ULCER
Chief Complaint:  Chief Complaint   Patient presents with   • Office Visit   • Breast Cancer     FUV with MD, no labs         Hematological and oncological history:  Well-differentiated Invasive lobular carcinoma, 1.1 cm, ER/IL positive, HER2 negative (0), (pT1c pN0), Oncotype score of 8  - Screening mammogram 4/15/2025 with a new nodule of right lower outer quadrant.   - Diagnostic study 4/17/2025 with persistent nodularity.   - Biopsy of nodule 4/18/2025:   Pathologic Diagnosis   Breast, right at 8:00, 8 cm from nipple, core biopsy:   - Invasive lobular carcinoma, well-differentiated (see synoptic report below).  - Lobular carcinoma in situ.   Electronically signed by Sherron Aceves MD on 4/22/2025 at 1219 CDT      IHC Prognostic Marker Result     BREAST BIOMARKERS  Block: A1     Estrogen Receptor: POSITIVE               90% of tumor nuclei, weak to moderate staining.     Progesterone Receptor: POSITIVE               95% of tumor nuclei, strong staining.     HER2 by IHC: EQUIVOCAL (2+); FISH STUDIES ORDERED      FINAL INTERPRETATION   NON-AMPLIFIED (NEGATIVE)     Comment:  The 2018 [facility]/[facility] Clinical Practice Guidelines, stipulate that retesting MAY be of benefit in HER2 non-amplified, grade 3 breast carcinoma cases.   Electronically signed by Sharla Oreilly MD on 4/30/2025 at 1131 CDT      - MRI Breast 4/27/2025 confirming right breast mass, no other suspicious findings. No axillary or internal mammary adenopathy. No findings within left breast.   - S/p lumpectomy 5/19/2025. Pathology:   Pathologic Diagnosis   A.   Right breast lower outer quadrant, lumpectomy:  - Invasive, well differentiated lobular carcinoma, 1.5 cm greatest dimension.  - No lymphovascular invasion identified.  - All margins are negative for invasive carcinoma.  - ER previously positive, IL previously positive, HER-2 previously 2+/equivocal by IHC and non-amplified by FISH (cf. [id number]). HER-2 will be repeated on the current  specimen (block A2).  - LCIS, classic type,  - Biopsy site changes.     B-D.   Lymph nodes, right axilla, sentinel biopsies:  - 3 lymph nodes negative for malignancy.     E-H.  Right breast anterior, lateral, medial and posterior margin re-excisions:  - Negative for malignancy.     I.   Lymph node, right axilla, sentinel biopsy:  - 1 lymph node negative for malignancy.   Electronically signed by Phu Dasilva MD on 5/20/2025 at 1621 CDT      Addendum: IHC Prognostic Marker Result   BREAST BIOMARKERS  Block: A2     HER2 by IHC: NEGATIVE (0)      - Oncotype Rx testing:   - Discussed role of adjuvant radiation and endocrine therapies. Rad/Onc consult pending. Endocrine treatment in premenopausal setting with Tamoxifen.    Other medical issues:   - Elevated lipid profile, following with PCP  - Hx of provoked DVT, approximately 2004       Interval history: Patient presents to the clinic today for follow up. Accompanied by a family member.     She is doing good. Has approximately 5 sessions of radiation treatment remaining. Patient is planning to begin Tamoxifen and Eliquis after completion of RT.     Regarding risk of  malignancy with Tamoxifen, patient has followed up with an OB/Gyn specialist. Discussed multiple types of procedures. Patient does express leaning towards hysterectomy with ovary removal.         ROS: all 10 review of system is negative except the ones mentioned in the history section.     Other Medical problems:  Patient Active Problem List   Diagnosis   • Well woman exam with routine gynecological exam   • RONAL (obstructive sleep apnea)   • Chlamydia   • Class 3 severe obesity due to excess calories with serious comorbidity and body mass index (BMI) of 45.0 to 49.9 in adult   • Elevated white blood cell count   • History of DVT of lower extremity   • Back pain   • Pain in both knees   • Migraine without status migrainosus, not intractable   • Lower extremity edema   • Right lower quadrant  abdominal mass   • Anxiety and depression   • Malignant neoplasm of upper-outer quadrant of right breast in female, estrogen receptor positive (CMD)         Medications:   Current Outpatient Medications   Medication Sig Dispense Refill   • tamoxifen (NOLVADEX) 20 MG tablet TAKE 1 TABLET BY MOUTH EVERY DAY (Patient not taking: Reported on 7/22/2025) 90 tablet 1   • apixaBAN (ELIQUIS) 2.5 MG Tab Take 1 tablet by mouth in the morning and 1 tablet in the evening. (Patient not taking: Reported on 7/22/2025) 60 tablet 1   • naratriptan (AMERGE) 2.5 MG tablet Take 1 tablet  by  mouth at onset of migraine,May repeat after 4 hours if needed. No more than 8 days a month, no more than use of 2 days per week. 12 tablet 1   • sertraline (ZOLOFT) 100 MG tablet Take 1.5 tablets by mouth daily. 135 tablet 3   • propRANolol (INDERAL LA) 80 MG 24 hr capsule Take 1 capsule by mouth daily after breakfast. 90 capsule 3   • orphenadrine (NORFLEX) 100 MG extended release tablet Take 1 tablet by mouth 2 times daily as needed for Muscle spasms. 20 tablet 3   • prochlorperazine (COMPAZINE) 10 MG tablet Take 1 tablet by mouth 2 times daily as needed for Nausea. 30 tablet 1   • COMPRESSION STOCKING 20-30 Wear daily. Diagnosis: lymphedema I89.0. To the knee 1 each 1   • diphenhydrAMINE (Benadryl Allergy) 25 MG tablet Take 2 tablets by mouth 2 times daily as needed (take with prochlorperazine). 30 tablet 1   • magnesium gluconate (MAGONATE) 500 MG tablet Take 500 mg by mouth daily.     • Multiple Vitamin (MULTIVITAMIN ADULT PO)      • COLLAGEN PO Take 1 tablet by mouth daily.     • Cholecalciferol (D3 ADULT PO) Take 2 tablets by mouth daily. 2000 IU     • cetirizine (ZYRTEC) 10 MG tablet Take 10 mg by mouth daily.       No current facility-administered medications for this visit.         Physical examination:   47 year old   Visit Vitals  BP (!) 141/77 (BP Location: RUE - Right upper extremity, Patient Position: Sitting, Cuff Size: Regular)    Pulse 66   Temp 98.3 °F (36.8 °C) (Oral)   Resp 16   Ht 5' 4\" (1.626 m)   Wt 134.8 kg (297 lb 1.1 oz)   LMP 06/19/2025 (Approximate)   SpO2 97%   BMI 50.99 kg/m²     ECOG [07/22/25 9306]   ECOG Performance Status 0     VS: Vital signs as noted above  Gen: no apparent distress, well-nourished. Obesity  HEENT: oropharynx clear and moist, sclerae anicteric, no conjunctival pallor, neck supple  Lungs: clear to auscultation bilaterally with no wheezes, rales, or rhonchi. No increased work of breathing.  CV: regular rate & rhythm, no murmurs, rubs or gallops  Breasts:  Exam relatively unremarkable. Surgical site well healed. No masses noted, no ulcerations or nipple discharge. Radiation-induced skin changes over right breast, grade 1, no open wounds.   Lymph: no axillary, cervical or supra-, or infraclavicular lymphadenopathy.   Abdomen: soft, non-tender, non-distended, normoactive bowel sounds present, no hepatosplenomegaly appreciated  Ext: warm & well perfused, no edema  Skin: warm, dry, and intact with no rashes, ulcers, bruises, or petechiae  Neuro: alert & oriented x 3, moving all extremities, normal gait  Psych: appropriate affect       Labs and imaging: Reviewed and confirmed in Epic   D-dimer from 6/10/2025 reviewed.     Assessment/Plan and Discussion:  Well-differentiated Invasive lobular carcinoma, 1.1 cm, ER/FL positive, HER2 negative (0), (pT1c pN0), Oncotype score of 8  Family history of breast cancer, negative for hereditary cancer genes  History of provoked DVT, approximately 2004  Elevated lipid profile    Patient currently undergoing radiation to right breast. Feels she has been tolerating overall well, has had some skin changes due to treatment. She will be starting Tamoxifen after completion of radiation. Given her previous history of DVT and increased risk of clots while on Tamoxifen she is advised to also begin Eliquis BID. Additionally, D-dimer was elevated on 6/10/2025.     She has followed up  with an OB/Gyn specialist. Patient is considering having a DELLA/BSO. Discussed that if pursuing this option would recommend holding Tamoxifen about 7-10 days prior to planned surgery. Additionally recommend holding Eliquis 3 days prior to planned surgery. Post-surgery would follow up with patient a couple weeks after to discuss change of treatment to aromatase inhibitors.     Reviewed signs and symptoms which to monitor for and to contact the clinic if any issues arise. I plan to see her again in about 2 months for follow up.        Tracie was seen today for office visit and breast cancer.    Diagnoses and all orders for this visit:    Malignant neoplasm of upper-outer quadrant of right breast in female, estrogen receptor positive (CMD)    Radiation dermatitis    History of DVT of lower extremity    Elevated d-dimer          Follow up:  No follow-ups on file.  1. Md visit in 2 months with LFT lipid panel and ddimer       Note to patient: The 21st Century Cures Act makes medical notes like these available to patients in the interest of transparency. However, be advised this is a medical document. It is intended as peer to peer communication. It is written in medical language and may contain abbreviations or verbiage that are unfamiliar. It may appear blunt or direct. Medical documents are intended to carry relevant information, facts as evident, and the clinical opinion of the practitioner.     Toi Suggs MD     This chart was documented by Stanley Higuera, acting as a scribe for Toi Suggs MD. 7/22/2025, 3:19 PM.      The documentation recorded by the scribe accurately and completely reflects the service(s) I personally performed and the decisions made by me.       No

## 2025-07-31 ENCOUNTER — APPOINTMENT (OUTPATIENT)
Dept: PULMONOLOGY | Facility: CLINIC | Age: 89
End: 2025-07-31

## (undated) DEVICE — SOL IRR BAG H2O 1000ML

## (undated) DEVICE — SOL BAG NS 0.9% 1000ML

## (undated) DEVICE — UNDERPAD LINEN SAVER 23 X 36"

## (undated) DEVICE — TUBING ALARIS PUMP MODULE NON-DEHP

## (undated) DEVICE — SYR IV FLUSH SALINE 10ML 30/TY

## (undated) DEVICE — PACK IV START WITH CHG

## (undated) DEVICE — FORCEP RADIAL JAW 4 W NDL 2.4MM 2.8MM 240CM ORANGE DISP

## (undated) DEVICE — SYR LUER SLIP TIP 50CC

## (undated) DEVICE — SENSOR O2 FINGER ADULT

## (undated) DEVICE — DRSG 2X2

## (undated) DEVICE — TUBING IV EXTENSION MACRO W CLAVE 7"

## (undated) DEVICE — SYR SLIP 10CC

## (undated) DEVICE — GOWN IMPERV XL

## (undated) DEVICE — DENTURE CUP PINK

## (undated) DEVICE — MASK PROC EAR LOOP

## (undated) DEVICE — DRSG CURITY GAUZE SPONGE 4 X 4" 12-PLY NON-STERILE

## (undated) DEVICE — CATH IV SAFE BC 22G X 1" (BLUE)

## (undated) DEVICE — SNARE EXACTO COLD 9MMX230CM